# Patient Record
Sex: FEMALE | Race: WHITE | Employment: OTHER | URBAN - METROPOLITAN AREA
[De-identification: names, ages, dates, MRNs, and addresses within clinical notes are randomized per-mention and may not be internally consistent; named-entity substitution may affect disease eponyms.]

---

## 2017-01-10 ENCOUNTER — HOSPITAL ENCOUNTER (OUTPATIENT)
Dept: LAB | Age: 72
Discharge: HOME | End: 2017-01-10
Payer: MEDICARE

## 2017-01-10 DIAGNOSIS — C50.919 MALIGNANT NEOPLASM OF FEMALE BREAST, UNSPECIFIED LATERALITY, UNSPECIFIED SITE OF BREAST: ICD-10-CM

## 2017-01-10 LAB
ALBUMIN SERPL BCP-MCNC: 3.4 G/DL (ref 3.2–4.6)
ALBUMIN/GLOB SERPL: 0.9 {RATIO} (ref 1.2–3.5)
ALP SERPL-CCNC: 117 U/L (ref 50–136)
ALT SERPL-CCNC: 18 U/L (ref 12–65)
ANION GAP BLD CALC-SCNC: 5 MMOL/L (ref 7–16)
AST SERPL W P-5'-P-CCNC: 16 U/L (ref 15–37)
BASOPHILS # BLD AUTO: 0.1 K/UL (ref 0–0.2)
BASOPHILS # BLD: 2 % (ref 0–2)
BILIRUB SERPL-MCNC: 0.3 MG/DL (ref 0.2–1.1)
BUN SERPL-MCNC: 24 MG/DL (ref 8–23)
CALCIUM SERPL-MCNC: 7.6 MG/DL (ref 8.3–10.4)
CHLORIDE SERPL-SCNC: 108 MMOL/L (ref 98–107)
CO2 SERPL-SCNC: 26 MMOL/L (ref 23–32)
CREAT SERPL-MCNC: 1.61 MG/DL (ref 0.6–1)
DIFFERENTIAL METHOD BLD: ABNORMAL
EOSINOPHIL # BLD: 0 K/UL (ref 0–0.8)
EOSINOPHIL NFR BLD: 1 % (ref 0.5–7.8)
ERYTHROCYTE [DISTWIDTH] IN BLOOD BY AUTOMATED COUNT: 18.4 % (ref 11.9–14.6)
GLOBULIN SER CALC-MCNC: 3.6 G/DL (ref 2.3–3.5)
GLUCOSE SERPL-MCNC: 88 MG/DL (ref 65–100)
HCT VFR BLD AUTO: 28 % (ref 35.8–46.3)
HGB BLD-MCNC: 9.1 G/DL (ref 11.7–15.4)
LYMPHOCYTES # BLD AUTO: 29 % (ref 13–44)
LYMPHOCYTES # BLD: 1 K/UL (ref 0.5–4.6)
MAGNESIUM SERPL-MCNC: 2.2 MG/DL (ref 1.8–2.4)
MCH RBC QN AUTO: 33.1 PG (ref 26.1–32.9)
MCHC RBC AUTO-ENTMCNC: 32.5 G/DL (ref 31.4–35)
MCV RBC AUTO: 101.8 FL (ref 79.6–97.8)
MONOCYTES # BLD: 0.3 K/UL (ref 0.1–1.3)
MONOCYTES NFR BLD AUTO: 10 % (ref 4–12)
NEUTS SEG # BLD: 1.9 K/UL (ref 1.7–8.2)
NEUTS SEG NFR BLD AUTO: 58 % (ref 43–78)
NRBC # BLD: 0 K/UL (ref 0–0.2)
PLATELET # BLD AUTO: 216 K/UL (ref 150–450)
PMV BLD AUTO: 9.4 FL (ref 10.8–14.1)
POTASSIUM SERPL-SCNC: 4.3 MMOL/L (ref 3.5–5.1)
PROT SERPL-MCNC: 7 G/DL (ref 6.3–8.2)
RBC # BLD AUTO: 2.75 M/UL (ref 4.05–5.25)
SODIUM SERPL-SCNC: 139 MMOL/L (ref 136–145)
WBC # BLD AUTO: 3.3 K/UL (ref 4.3–11.1)

## 2017-01-10 PROCEDURE — 85025 COMPLETE CBC W/AUTO DIFF WBC: CPT | Performed by: INTERNAL MEDICINE

## 2017-01-10 PROCEDURE — 83735 ASSAY OF MAGNESIUM: CPT | Performed by: INTERNAL MEDICINE

## 2017-01-10 PROCEDURE — 36415 COLL VENOUS BLD VENIPUNCTURE: CPT | Performed by: INTERNAL MEDICINE

## 2017-01-10 PROCEDURE — 80053 COMPREHEN METABOLIC PANEL: CPT | Performed by: INTERNAL MEDICINE

## 2017-01-23 ENCOUNTER — HOSPITAL ENCOUNTER (OUTPATIENT)
Dept: INFUSION THERAPY | Age: 72
Discharge: HOME | End: 2017-01-23
Payer: MEDICARE

## 2017-01-23 ENCOUNTER — HOSPITAL ENCOUNTER (OUTPATIENT)
Dept: LAB | Age: 72
Discharge: HOME | End: 2017-01-23
Payer: MEDICARE

## 2017-01-23 ENCOUNTER — DOCUMENTATION ONLY (OUTPATIENT)
Dept: HEMATOLOGY | Age: 72
End: 2017-01-23

## 2017-01-23 DIAGNOSIS — C79.51 MALIGNANT NEOPLASM METASTATIC TO BONE (HCC): ICD-10-CM

## 2017-01-23 LAB
ALBUMIN SERPL BCP-MCNC: 3.4 G/DL (ref 3.2–4.6)
ALBUMIN/GLOB SERPL: 1 {RATIO} (ref 1.2–3.5)
ALP SERPL-CCNC: 109 U/L (ref 50–136)
ALT SERPL-CCNC: 14 U/L (ref 12–65)
ANION GAP BLD CALC-SCNC: 7 MMOL/L (ref 7–16)
AST SERPL W P-5'-P-CCNC: 17 U/L (ref 15–37)
BASOPHILS # BLD AUTO: 0 K/UL (ref 0–0.2)
BASOPHILS # BLD: 1 % (ref 0–2)
BILIRUB SERPL-MCNC: 0.3 MG/DL (ref 0.2–1.1)
BUN SERPL-MCNC: 21 MG/DL (ref 8–23)
CALCIUM SERPL-MCNC: 6.9 MG/DL (ref 8.3–10.4)
CHLORIDE SERPL-SCNC: 110 MMOL/L (ref 98–107)
CO2 SERPL-SCNC: 23 MMOL/L (ref 23–32)
CREAT SERPL-MCNC: 2.29 MG/DL (ref 0.6–1)
DIFFERENTIAL METHOD BLD: ABNORMAL
EOSINOPHIL # BLD: 0.1 K/UL (ref 0–0.8)
EOSINOPHIL NFR BLD: 2 % (ref 0.5–7.8)
ERYTHROCYTE [DISTWIDTH] IN BLOOD BY AUTOMATED COUNT: 15.9 % (ref 11.9–14.6)
GLOBULIN SER CALC-MCNC: 3.4 G/DL (ref 2.3–3.5)
GLUCOSE SERPL-MCNC: 84 MG/DL (ref 65–100)
HCT VFR BLD AUTO: 26.6 % (ref 35.8–46.3)
HGB BLD-MCNC: 8.5 G/DL (ref 11.7–15.4)
LYMPHOCYTES # BLD AUTO: 23 % (ref 13–44)
LYMPHOCYTES # BLD: 0.6 K/UL (ref 0.5–4.6)
MCH RBC QN AUTO: 33.5 PG (ref 26.1–32.9)
MCHC RBC AUTO-ENTMCNC: 32 G/DL (ref 31.4–35)
MCV RBC AUTO: 104.7 FL (ref 79.6–97.8)
MONOCYTES # BLD: 0.1 K/UL (ref 0.1–1.3)
MONOCYTES NFR BLD AUTO: 4 % (ref 4–12)
NEUTS SEG # BLD: 1.8 K/UL (ref 1.7–8.2)
NEUTS SEG NFR BLD AUTO: 70 % (ref 43–78)
NRBC # BLD: 0 K/UL (ref 0–0.2)
PLATELET # BLD AUTO: 192 K/UL (ref 150–450)
PMV BLD AUTO: 9.1 FL (ref 10.8–14.1)
POTASSIUM SERPL-SCNC: 4.3 MMOL/L (ref 3.5–5.1)
PROT SERPL-MCNC: 6.8 G/DL (ref 6.3–8.2)
RBC # BLD AUTO: 2.54 M/UL (ref 4.05–5.25)
SODIUM SERPL-SCNC: 140 MMOL/L (ref 136–145)
WBC # BLD AUTO: 2.5 K/UL (ref 4.3–11.1)

## 2017-01-23 PROCEDURE — 74011250636 HC RX REV CODE- 250/636: Performed by: NURSE PRACTITIONER

## 2017-01-23 PROCEDURE — 36415 COLL VENOUS BLD VENIPUNCTURE: CPT | Performed by: NURSE PRACTITIONER

## 2017-01-23 PROCEDURE — 96372 THER/PROPH/DIAG INJ SC/IM: CPT

## 2017-01-23 PROCEDURE — 85025 COMPLETE CBC W/AUTO DIFF WBC: CPT | Performed by: NURSE PRACTITIONER

## 2017-01-23 PROCEDURE — 80053 COMPREHEN METABOLIC PANEL: CPT | Performed by: NURSE PRACTITIONER

## 2017-01-23 RX ORDER — CALCIUM CARBONATE 500(1250)
2000 TABLET ORAL ONCE
Status: DISCONTINUED | OUTPATIENT
Start: 2017-01-23 | End: 2017-01-23

## 2017-01-23 RX ADMIN — DENOSUMAB 120 MG: 120 INJECTION SUBCUTANEOUS at 12:09

## 2017-01-23 NOTE — PROGRESS NOTES
Ms. Elvin Mars was approved for a $5,000 nick through [facility]) to cover patient responsibility for Xgeva injections. Effective dates for nick are from 7/27/16 - 1/23/18. Sallie Olivares information will be scanned into EPIC. Nick info given to Nidia Noriega for billing.

## 2017-01-23 NOTE — PROGRESS NOTES
Arrived to the [facility]. Xgeva completed. Patient tolerated well. Any issues or concerns during appointment: MD office ordered IV Calcium. Pt unable to stay for infusion. Notified Marisa JOSEPH at office. Per Marisa JOSEPH office to call in PO calcium. Office notified patient. Patient aware of next [facility]  appointment on 2-21-17 (date) at 12:45 (time). Discharged via ambulatory.

## 2017-02-21 ENCOUNTER — HOSPITAL ENCOUNTER (OUTPATIENT)
Dept: LAB | Age: 72
Discharge: HOME | End: 2017-02-21
Payer: MEDICARE

## 2017-02-21 ENCOUNTER — HOSPITAL ENCOUNTER (OUTPATIENT)
Dept: INFUSION THERAPY | Age: 72
Discharge: HOME | End: 2017-02-21
Payer: MEDICARE

## 2017-02-21 DIAGNOSIS — C79.51 MALIGNANT NEOPLASM METASTATIC TO BONE (HCC): ICD-10-CM

## 2017-02-21 DIAGNOSIS — C50.919 MALIGNANT NEOPLASM OF FEMALE BREAST, UNSPECIFIED LATERALITY, UNSPECIFIED SITE OF BREAST: ICD-10-CM

## 2017-02-21 LAB
ALBUMIN SERPL BCP-MCNC: 3.4 G/DL (ref 3.2–4.6)
ALBUMIN/GLOB SERPL: 1.1 {RATIO} (ref 1.2–3.5)
ALP SERPL-CCNC: 110 U/L (ref 50–136)
ALT SERPL-CCNC: 18 U/L (ref 12–65)
ANION GAP BLD CALC-SCNC: 8 MMOL/L (ref 7–16)
AST SERPL W P-5'-P-CCNC: 18 U/L (ref 15–37)
BASOPHILS # BLD AUTO: 0 K/UL (ref 0–0.2)
BASOPHILS # BLD: 2 % (ref 0–2)
BILIRUB SERPL-MCNC: 0.3 MG/DL (ref 0.2–1.1)
BUN SERPL-MCNC: 22 MG/DL (ref 8–23)
CALCIUM SERPL-MCNC: 7.7 MG/DL (ref 8.3–10.4)
CHLORIDE SERPL-SCNC: 110 MMOL/L (ref 98–107)
CO2 SERPL-SCNC: 24 MMOL/L (ref 23–32)
CREAT SERPL-MCNC: 2.14 MG/DL (ref 0.6–1)
DIFFERENTIAL METHOD BLD: ABNORMAL
EOSINOPHIL # BLD: 0 K/UL (ref 0–0.8)
EOSINOPHIL NFR BLD: 1 % (ref 0.5–7.8)
ERYTHROCYTE [DISTWIDTH] IN BLOOD BY AUTOMATED COUNT: 15.6 % (ref 11.9–14.6)
GLOBULIN SER CALC-MCNC: 3.2 G/DL (ref 2.3–3.5)
GLUCOSE SERPL-MCNC: 92 MG/DL (ref 65–100)
HCT VFR BLD AUTO: 25.9 % (ref 35.8–46.3)
HGB BLD-MCNC: 8.2 G/DL (ref 11.7–15.4)
LYMPHOCYTES # BLD AUTO: 30 % (ref 13–44)
LYMPHOCYTES # BLD: 0.6 K/UL (ref 0.5–4.6)
MAGNESIUM SERPL-MCNC: 2.2 MG/DL (ref 1.8–2.4)
MCH RBC QN AUTO: 34.3 PG (ref 26.1–32.9)
MCHC RBC AUTO-ENTMCNC: 31.7 G/DL (ref 31.4–35)
MCV RBC AUTO: 108.4 FL (ref 79.6–97.8)
MONOCYTES # BLD: 0.1 K/UL (ref 0.1–1.3)
MONOCYTES NFR BLD AUTO: 5 % (ref 4–12)
NEUTS SEG # BLD: 1.2 K/UL (ref 1.7–8.2)
NEUTS SEG NFR BLD AUTO: 63 % (ref 43–78)
NRBC # BLD: 0 K/UL (ref 0–0.2)
NRBC BLD-RTO: 0 PER 100 WBC (ref 0–2)
PLATELET # BLD AUTO: 221 K/UL (ref 150–450)
PMV BLD AUTO: 9.3 FL (ref 10.8–14.1)
POTASSIUM SERPL-SCNC: 4.5 MMOL/L (ref 3.5–5.1)
PROT SERPL-MCNC: 6.6 G/DL (ref 6.3–8.2)
RBC # BLD AUTO: 2.39 M/UL (ref 4.05–5.25)
SODIUM SERPL-SCNC: 142 MMOL/L (ref 136–145)
WBC # BLD AUTO: 1.9 K/UL (ref 4.3–11.1)

## 2017-02-21 PROCEDURE — 74011250636 HC RX REV CODE- 250/636: Performed by: INTERNAL MEDICINE

## 2017-02-21 PROCEDURE — 85025 COMPLETE CBC W/AUTO DIFF WBC: CPT | Performed by: INTERNAL MEDICINE

## 2017-02-21 PROCEDURE — 83735 ASSAY OF MAGNESIUM: CPT | Performed by: INTERNAL MEDICINE

## 2017-02-21 PROCEDURE — 80053 COMPREHEN METABOLIC PANEL: CPT | Performed by: INTERNAL MEDICINE

## 2017-02-21 PROCEDURE — 96372 THER/PROPH/DIAG INJ SC/IM: CPT

## 2017-02-21 PROCEDURE — 36415 COLL VENOUS BLD VENIPUNCTURE: CPT | Performed by: INTERNAL MEDICINE

## 2017-02-21 RX ADMIN — DENOSUMAB 120 MG: 120 INJECTION SUBCUTANEOUS at 14:40

## 2017-03-21 ENCOUNTER — HOSPITAL ENCOUNTER (OUTPATIENT)
Dept: INFUSION THERAPY | Age: 72
Discharge: HOME | End: 2017-03-21
Payer: MEDICARE

## 2017-03-21 ENCOUNTER — HOSPITAL ENCOUNTER (OUTPATIENT)
Dept: LAB | Age: 72
Discharge: HOME | End: 2017-03-21
Payer: MEDICARE

## 2017-03-21 DIAGNOSIS — C79.51 MALIGNANT NEOPLASM METASTATIC TO BONE (HCC): ICD-10-CM

## 2017-03-21 DIAGNOSIS — C50.919 INFILTRATING DUCTAL CARCINOMA OF FEMALE BREAST, UNSPECIFIED LATERALITY (HCC): ICD-10-CM

## 2017-03-21 LAB
ALBUMIN SERPL BCP-MCNC: 3.6 G/DL (ref 3.2–4.6)
ALBUMIN/GLOB SERPL: 1.1 {RATIO} (ref 1.2–3.5)
ALP SERPL-CCNC: 109 U/L (ref 50–136)
ALT SERPL-CCNC: 15 U/L (ref 12–65)
ANION GAP BLD CALC-SCNC: 7 MMOL/L (ref 7–16)
AST SERPL W P-5'-P-CCNC: 20 U/L (ref 15–37)
BASOPHILS # BLD AUTO: 0 K/UL (ref 0–0.2)
BASOPHILS # BLD: 1 % (ref 0–2)
BILIRUB SERPL-MCNC: 0.2 MG/DL (ref 0.2–1.1)
BUN SERPL-MCNC: 28 MG/DL (ref 8–23)
CALCIUM SERPL-MCNC: 7.2 MG/DL (ref 8.3–10.4)
CHLORIDE SERPL-SCNC: 113 MMOL/L (ref 98–107)
CO2 SERPL-SCNC: 23 MMOL/L (ref 23–32)
CREAT SERPL-MCNC: 2.41 MG/DL (ref 0.6–1)
DIFFERENTIAL METHOD BLD: ABNORMAL
EOSINOPHIL # BLD: 0 K/UL (ref 0–0.8)
EOSINOPHIL NFR BLD: 1 % (ref 0.5–7.8)
ERYTHROCYTE [DISTWIDTH] IN BLOOD BY AUTOMATED COUNT: 15.5 % (ref 11.9–14.6)
GLOBULIN SER CALC-MCNC: 3.3 G/DL (ref 2.3–3.5)
GLUCOSE SERPL-MCNC: 82 MG/DL (ref 65–100)
HCT VFR BLD AUTO: 27.1 % (ref 35.8–46.3)
HGB BLD-MCNC: 8.8 G/DL (ref 11.7–15.4)
LYMPHOCYTES # BLD AUTO: 32 % (ref 13–44)
LYMPHOCYTES # BLD: 0.6 K/UL (ref 0.5–4.6)
MAGNESIUM SERPL-MCNC: 2.2 MG/DL (ref 1.8–2.4)
MCH RBC QN AUTO: 34.9 PG (ref 26.1–32.9)
MCHC RBC AUTO-ENTMCNC: 32.5 G/DL (ref 31.4–35)
MCV RBC AUTO: 107.5 FL (ref 79.6–97.8)
MONOCYTES # BLD: 0.1 K/UL (ref 0.1–1.3)
MONOCYTES NFR BLD AUTO: 4 % (ref 4–12)
NEUTS SEG # BLD: 1.1 K/UL (ref 1.7–8.2)
NEUTS SEG NFR BLD AUTO: 61 % (ref 43–78)
NRBC # BLD: 0 K/UL (ref 0–0.2)
PLATELET # BLD AUTO: 203 K/UL (ref 150–450)
PMV BLD AUTO: 9.5 FL (ref 10.8–14.1)
POTASSIUM SERPL-SCNC: 5.2 MMOL/L (ref 3.5–5.1)
PROT SERPL-MCNC: 6.9 G/DL (ref 6.3–8.2)
RBC # BLD AUTO: 2.52 M/UL (ref 4.05–5.25)
SODIUM SERPL-SCNC: 143 MMOL/L (ref 136–145)
WBC # BLD AUTO: 1.9 K/UL (ref 4.3–11.1)

## 2017-03-21 PROCEDURE — 80053 COMPREHEN METABOLIC PANEL: CPT | Performed by: NURSE PRACTITIONER

## 2017-03-21 PROCEDURE — 96372 THER/PROPH/DIAG INJ SC/IM: CPT

## 2017-03-21 PROCEDURE — 74011250636 HC RX REV CODE- 250/636: Performed by: NURSE PRACTITIONER

## 2017-03-21 PROCEDURE — 36415 COLL VENOUS BLD VENIPUNCTURE: CPT | Performed by: NURSE PRACTITIONER

## 2017-03-21 PROCEDURE — 83735 ASSAY OF MAGNESIUM: CPT | Performed by: NURSE PRACTITIONER

## 2017-03-21 PROCEDURE — 85025 COMPLETE CBC W/AUTO DIFF WBC: CPT | Performed by: NURSE PRACTITIONER

## 2017-03-21 RX ADMIN — DENOSUMAB 120 MG: 120 INJECTION SUBCUTANEOUS at 11:41

## 2017-03-21 NOTE — PROGRESS NOTES
Xgeva injection given per left arm per order Corrected Calcium 7.52 today. Patient taking oral Calcium supplements as per order. Directed to push oral hydration 2-3 liters daily. Note Creatinine 2.41 today. Reports voiding without difficulty. Directed to notify MD for difficulty urinating or inability to push oral hydration. Verbalizes understanding. Patient discharged via w/c accompanied by family. Instructed to notify physician of any problems, questions or concerns. Allowed opportunity for patient/family to ask questions. Verbalized understanding. Next appointment is 04/18/17 at 200 with lab and OV prior.

## 2017-03-21 NOTE — PROGRESS NOTES
Problem: Knowledge Deficit  Goal: *Verbalizes understanding of procedures and medications  Outcome: Progressing Towards Goal  Reviewed Xgeva injection with patient.

## 2017-03-31 VITALS — OXYGEN SATURATION: 99 %

## 2017-04-18 ENCOUNTER — HOSPITAL ENCOUNTER (OUTPATIENT)
Dept: LAB | Age: 72
Discharge: HOME | End: 2017-04-18
Payer: MEDICARE

## 2017-04-18 ENCOUNTER — HOSPITAL ENCOUNTER (OUTPATIENT)
Dept: INFUSION THERAPY | Age: 72
Discharge: HOME | End: 2017-04-18
Payer: MEDICARE

## 2017-04-18 VITALS
DIASTOLIC BLOOD PRESSURE: 52 MMHG | RESPIRATION RATE: 18 BRPM | HEART RATE: 74 BPM | WEIGHT: 208.2 LBS | BODY MASS INDEX: 29.04 KG/M2 | OXYGEN SATURATION: 99 % | SYSTOLIC BLOOD PRESSURE: 131 MMHG | TEMPERATURE: 98.1 F

## 2017-04-18 DIAGNOSIS — D64.9 ANEMIA, UNSPECIFIED TYPE: ICD-10-CM

## 2017-04-18 DIAGNOSIS — C79.51 MALIGNANT NEOPLASM METASTATIC TO BONE (HCC): ICD-10-CM

## 2017-04-18 DIAGNOSIS — C50.919 MALIGNANT NEOPLASM OF FEMALE BREAST, UNSPECIFIED LATERALITY, UNSPECIFIED SITE OF BREAST: ICD-10-CM

## 2017-04-18 LAB
ALBUMIN SERPL BCP-MCNC: 3.5 G/DL (ref 3.2–4.6)
ALBUMIN/GLOB SERPL: 1.1 {RATIO}
ALP SERPL-CCNC: 87 U/L (ref 50–136)
ALT SERPL-CCNC: 18 U/L (ref 12–65)
ANION GAP BLD CALC-SCNC: 7 MMOL/L
AST SERPL W P-5'-P-CCNC: 17 U/L (ref 15–37)
BASOPHILS # BLD AUTO: 0 K/UL (ref 0–0.2)
BASOPHILS # BLD: 1 % (ref 0–2)
BILIRUB SERPL-MCNC: 0.4 MG/DL (ref 0.2–1.1)
BUN SERPL-MCNC: 28 MG/DL (ref 8–23)
CALCIUM SERPL-MCNC: 7.4 MG/DL (ref 8.3–10.4)
CHLORIDE SERPL-SCNC: 110 MMOL/L (ref 98–107)
CO2 SERPL-SCNC: 24 MMOL/L (ref 21–32)
CREAT SERPL-MCNC: 2.1 MG/DL (ref 0.6–1)
DIFFERENTIAL METHOD BLD: ABNORMAL
EOSINOPHIL # BLD: 0 K/UL (ref 0–0.8)
EOSINOPHIL NFR BLD: 1 % (ref 0.5–7.8)
ERYTHROCYTE [DISTWIDTH] IN BLOOD BY AUTOMATED COUNT: 15 % (ref 11.9–14.6)
FERRITIN SERPL-MCNC: 224 NG/ML (ref 8–388)
FOLATE SERPL-MCNC: 16.2 NG/ML (ref 3.1–17.5)
GLOBULIN SER CALC-MCNC: 3.3 G/DL
GLUCOSE SERPL-MCNC: 90 MG/DL (ref 65–100)
HAPTOGLOB SERPL-MCNC: 123 MG/DL (ref 30–200)
HCT VFR BLD AUTO: 25.5 % (ref 35.8–46.3)
HGB BLD-MCNC: 8.4 G/DL (ref 11.7–15.4)
HGB RETIC QN AUTO: 37 PG (ref 29–35)
IMM RETICS NFR: 20.7 % (ref 3–15.9)
IRON SATN MFR SERPL: 23 %
IRON SERPL-MCNC: 54 UG/DL (ref 35–150)
LDH SERPL L TO P-CCNC: 194 U/L (ref 110–210)
LYMPHOCYTES # BLD AUTO: 32 % (ref 13–44)
LYMPHOCYTES # BLD: 0.7 K/UL (ref 0.5–4.6)
MAGNESIUM SERPL-MCNC: 2.3 MG/DL (ref 1.8–2.4)
MCH RBC QN AUTO: 35.4 PG (ref 26.1–32.9)
MCHC RBC AUTO-ENTMCNC: 32.9 G/DL (ref 31.4–35)
MCV RBC AUTO: 107.6 FL (ref 79.6–97.8)
MONOCYTES # BLD: 0.1 K/UL (ref 0.1–1.3)
MONOCYTES NFR BLD AUTO: 5 % (ref 4–12)
NEUTS SEG # BLD: 1.3 K/UL (ref 1.7–8.2)
NEUTS SEG NFR BLD AUTO: 61 % (ref 43–78)
NRBC # BLD: 0 K/UL (ref 0–0.2)
PLATELET # BLD AUTO: 190 K/UL (ref 150–450)
PMV BLD AUTO: 9.2 FL (ref 10.8–14.1)
POTASSIUM SERPL-SCNC: 4.6 MMOL/L (ref 3.5–5.1)
PROT SERPL-MCNC: 6.8 G/DL (ref 6.3–8.2)
RBC # BLD AUTO: 2.37 M/UL (ref 4.05–5.25)
RETICS # AUTO: 0.05 M/UL (ref 0.03–0.1)
RETICS/RBC NFR AUTO: 2 % (ref 0.3–2)
SODIUM SERPL-SCNC: 141 MMOL/L (ref 136–145)
TIBC SERPL-MCNC: 233 UG/DL (ref 250–450)
TSH SERPL DL<=0.005 MIU/L-ACNC: 1.01 UIU/ML (ref 0.36–3.74)
VIT B12 SERPL-MCNC: 334 PG/ML (ref 193–986)
WBC # BLD AUTO: 2.1 K/UL (ref 4.3–11.1)

## 2017-04-18 PROCEDURE — 36415 COLL VENOUS BLD VENIPUNCTURE: CPT | Performed by: NURSE PRACTITIONER

## 2017-04-18 PROCEDURE — 83540 ASSAY OF IRON: CPT | Performed by: INTERNAL MEDICINE

## 2017-04-18 PROCEDURE — 82728 ASSAY OF FERRITIN: CPT | Performed by: INTERNAL MEDICINE

## 2017-04-18 PROCEDURE — 82668 ASSAY OF ERYTHROPOIETIN: CPT | Performed by: INTERNAL MEDICINE

## 2017-04-18 PROCEDURE — 96372 THER/PROPH/DIAG INJ SC/IM: CPT

## 2017-04-18 PROCEDURE — 82607 VITAMIN B-12: CPT | Performed by: INTERNAL MEDICINE

## 2017-04-18 PROCEDURE — 82746 ASSAY OF FOLIC ACID SERUM: CPT | Performed by: INTERNAL MEDICINE

## 2017-04-18 PROCEDURE — 83735 ASSAY OF MAGNESIUM: CPT | Performed by: INTERNAL MEDICINE

## 2017-04-18 PROCEDURE — 80053 COMPREHEN METABOLIC PANEL: CPT | Performed by: INTERNAL MEDICINE

## 2017-04-18 PROCEDURE — 84443 ASSAY THYROID STIM HORMONE: CPT | Performed by: INTERNAL MEDICINE

## 2017-04-18 PROCEDURE — 85046 RETICYTE/HGB CONCENTRATE: CPT | Performed by: INTERNAL MEDICINE

## 2017-04-18 PROCEDURE — 83615 LACTATE (LD) (LDH) ENZYME: CPT | Performed by: INTERNAL MEDICINE

## 2017-04-18 PROCEDURE — 74011250636 HC RX REV CODE- 250/636: Performed by: INTERNAL MEDICINE

## 2017-04-18 PROCEDURE — 83010 ASSAY OF HAPTOGLOBIN QUANT: CPT | Performed by: INTERNAL MEDICINE

## 2017-04-18 PROCEDURE — 85025 COMPLETE CBC W/AUTO DIFF WBC: CPT | Performed by: NURSE PRACTITIONER

## 2017-04-18 RX ADMIN — DENOSUMAB 120 MG: 120 INJECTION SUBCUTANEOUS at 11:48

## 2017-04-18 NOTE — PROGRESS NOTES
Pt arrived via wheelchair today at 1110, to receive Xgeva. Pt tolerated without difficulty. Patient discharged via wheelchair accompanied by family. Instructed to notify physician of any problems, questions or concerns. Allowed opportunity for patient/family to ask questions. Verbalized understanding. Next appointment is May 16 at 1400 with [facility].

## 2017-04-18 NOTE — PROGRESS NOTES
Problem: Knowledge Deficit  Goal: *Verbalizes understanding of procedures and medications  Outcome: Progressing Towards Goal  Verbalizes/demonstrates understanding of purpose/procedure/potential side effects of xgeva.

## 2017-04-19 LAB — EPO SERPL-ACNC: 43.4 MIU/ML (ref 2.6–18.5)

## 2017-05-16 ENCOUNTER — HOSPITAL ENCOUNTER (OUTPATIENT)
Dept: LAB | Age: 72
Discharge: HOME | End: 2017-05-16
Payer: MEDICARE

## 2017-05-16 ENCOUNTER — HOSPITAL ENCOUNTER (OUTPATIENT)
Dept: INFUSION THERAPY | Age: 72
Discharge: HOME | End: 2017-05-16
Payer: MEDICARE

## 2017-05-16 DIAGNOSIS — C79.51 MALIGNANT NEOPLASM METASTATIC TO BONE (HCC): ICD-10-CM

## 2017-05-16 LAB
ALBUMIN SERPL BCP-MCNC: 3.4 G/DL (ref 3.2–4.6)
ALBUMIN/GLOB SERPL: 1.1 {RATIO} (ref 1.2–3.5)
ALP SERPL-CCNC: 93 U/L (ref 50–136)
ALT SERPL-CCNC: 18 U/L (ref 12–65)
ANION GAP BLD CALC-SCNC: 6 MMOL/L (ref 7–16)
AST SERPL W P-5'-P-CCNC: 14 U/L (ref 15–37)
BASOPHILS # BLD AUTO: 0 K/UL (ref 0–0.2)
BASOPHILS # BLD: 2 % (ref 0–2)
BILIRUB SERPL-MCNC: 0.4 MG/DL (ref 0.2–1.1)
BUN SERPL-MCNC: 26 MG/DL (ref 8–23)
CALCIUM SERPL-MCNC: 7.7 MG/DL (ref 8.3–10.4)
CHLORIDE SERPL-SCNC: 111 MMOL/L (ref 98–107)
CO2 SERPL-SCNC: 25 MMOL/L (ref 21–32)
CREAT SERPL-MCNC: 2.34 MG/DL (ref 0.6–1)
DIFFERENTIAL METHOD BLD: ABNORMAL
EOSINOPHIL # BLD: 0 K/UL (ref 0–0.8)
EOSINOPHIL NFR BLD: 2 % (ref 0.5–7.8)
ERYTHROCYTE [DISTWIDTH] IN BLOOD BY AUTOMATED COUNT: 14.1 % (ref 11.9–14.6)
GLOBULIN SER CALC-MCNC: 3.1 G/DL (ref 2.3–3.5)
GLUCOSE SERPL-MCNC: 115 MG/DL (ref 65–100)
HCT VFR BLD AUTO: 25 % (ref 35.8–46.3)
HGB BLD-MCNC: 8.2 G/DL (ref 11.7–15.4)
LYMPHOCYTES # BLD AUTO: 34 % (ref 13–44)
LYMPHOCYTES # BLD: 0.7 K/UL (ref 0.5–4.6)
MAGNESIUM SERPL-MCNC: 2.2 MG/DL (ref 1.8–2.4)
MCH RBC QN AUTO: 36 PG (ref 26.1–32.9)
MCHC RBC AUTO-ENTMCNC: 32.8 G/DL (ref 31.4–35)
MCV RBC AUTO: 109.6 FL (ref 79.6–97.8)
MONOCYTES # BLD: 0.1 K/UL (ref 0.1–1.3)
MONOCYTES NFR BLD AUTO: 5 % (ref 4–12)
NEUTS SEG # BLD: 1.2 K/UL (ref 1.7–8.2)
NEUTS SEG NFR BLD AUTO: 57 % (ref 43–78)
NRBC # BLD: 0 K/UL (ref 0–0.2)
NRBC BLD-RTO: 0 PER 100 WBC (ref 0–2)
PLATELET # BLD AUTO: 219 K/UL (ref 150–450)
PLATELET COMMENTS,PCOM: ADEQUATE
PMV BLD AUTO: 9.1 FL (ref 10.8–14.1)
POTASSIUM SERPL-SCNC: 4.5 MMOL/L (ref 3.5–5.1)
PROT SERPL-MCNC: 6.5 G/DL (ref 6.3–8.2)
RBC # BLD AUTO: 2.28 M/UL (ref 4.05–5.25)
RBC MORPH BLD: ABNORMAL
SODIUM SERPL-SCNC: 142 MMOL/L (ref 136–145)
WBC # BLD AUTO: 2 K/UL (ref 4.3–11.1)
WBC MORPH BLD: ABNORMAL

## 2017-05-16 PROCEDURE — 36415 COLL VENOUS BLD VENIPUNCTURE: CPT | Performed by: INTERNAL MEDICINE

## 2017-05-16 PROCEDURE — 96372 THER/PROPH/DIAG INJ SC/IM: CPT

## 2017-05-16 PROCEDURE — 83735 ASSAY OF MAGNESIUM: CPT | Performed by: INTERNAL MEDICINE

## 2017-05-16 PROCEDURE — 74011250636 HC RX REV CODE- 250/636: Performed by: INTERNAL MEDICINE

## 2017-05-16 PROCEDURE — 80053 COMPREHEN METABOLIC PANEL: CPT | Performed by: INTERNAL MEDICINE

## 2017-05-16 PROCEDURE — 85025 COMPLETE CBC W/AUTO DIFF WBC: CPT | Performed by: INTERNAL MEDICINE

## 2017-05-16 RX ADMIN — DENOSUMAB 120 MG: 120 INJECTION SUBCUTANEOUS at 14:28

## 2017-05-16 NOTE — PROGRESS NOTES
Arrived to the [facility]. Xgeva completed. Patient tolerated well. Any issues or concerns during appointment: none. Patient aware of next infusion appointment on 06/14 at 1300 . Discharged via wheelchair.

## 2017-06-13 ENCOUNTER — APPOINTMENT (OUTPATIENT)
Dept: INFUSION THERAPY | Age: 72
End: 2017-06-13
Payer: MEDICARE

## 2017-06-14 ENCOUNTER — HOSPITAL ENCOUNTER (OUTPATIENT)
Dept: LAB | Age: 72
Discharge: HOME | End: 2017-06-14
Payer: MEDICARE

## 2017-06-14 ENCOUNTER — HOSPITAL ENCOUNTER (OUTPATIENT)
Dept: INFUSION THERAPY | Age: 72
Discharge: HOME | End: 2017-06-14
Payer: MEDICARE

## 2017-06-14 VITALS — WEIGHT: 211 LBS | BODY MASS INDEX: 29.43 KG/M2

## 2017-06-14 DIAGNOSIS — C79.51 MALIGNANT NEOPLASM METASTATIC TO BONE (HCC): ICD-10-CM

## 2017-06-14 DIAGNOSIS — D64.9 ANEMIA, UNSPECIFIED TYPE: ICD-10-CM

## 2017-06-14 DIAGNOSIS — R42 VERTIGO: ICD-10-CM

## 2017-06-14 DIAGNOSIS — R42 DIZZINESS: ICD-10-CM

## 2017-06-14 LAB
ALBUMIN SERPL BCP-MCNC: 3.2 G/DL (ref 3.2–4.6)
ALBUMIN/GLOB SERPL: 1.1 {RATIO} (ref 1.2–3.5)
ALP SERPL-CCNC: 85 U/L (ref 50–136)
ALT SERPL-CCNC: 17 U/L (ref 12–65)
ANION GAP BLD CALC-SCNC: 9 MMOL/L (ref 7–16)
AST SERPL W P-5'-P-CCNC: 14 U/L (ref 15–37)
BASOPHILS # BLD AUTO: 0.1 K/UL (ref 0–0.2)
BASOPHILS # BLD: 2 % (ref 0–2)
BILIRUB SERPL-MCNC: 0.2 MG/DL (ref 0.2–1.1)
BUN SERPL-MCNC: 31 MG/DL (ref 8–23)
CALCIUM SERPL-MCNC: 7.8 MG/DL (ref 8.3–10.4)
CHLORIDE SERPL-SCNC: 114 MMOL/L (ref 98–107)
CO2 SERPL-SCNC: 20 MMOL/L (ref 21–32)
CREAT SERPL-MCNC: 2.43 MG/DL (ref 0.6–1)
DIFFERENTIAL METHOD BLD: ABNORMAL
EOSINOPHIL # BLD: 0.1 K/UL (ref 0–0.8)
EOSINOPHIL NFR BLD: 2 % (ref 0.5–7.8)
ERYTHROCYTE [DISTWIDTH] IN BLOOD BY AUTOMATED COUNT: 13.3 % (ref 11.9–14.6)
GLOBULIN SER CALC-MCNC: 3 G/DL (ref 2.3–3.5)
GLUCOSE SERPL-MCNC: 101 MG/DL (ref 65–100)
HCT VFR BLD AUTO: 23.2 % (ref 35.8–46.3)
HGB BLD-MCNC: 7.6 G/DL (ref 11.7–15.4)
LYMPHOCYTES # BLD AUTO: 25 % (ref 13–44)
LYMPHOCYTES # BLD: 0.7 K/UL (ref 0.5–4.6)
MCH RBC QN AUTO: 35.8 PG (ref 26.1–32.9)
MCHC RBC AUTO-ENTMCNC: 32.8 G/DL (ref 31.4–35)
MCV RBC AUTO: 109.4 FL (ref 79.6–97.8)
MONOCYTES # BLD: 0.2 K/UL (ref 0.1–1.3)
MONOCYTES NFR BLD AUTO: 6 % (ref 4–12)
NEUTS SEG # BLD: 1.9 K/UL (ref 1.7–8.2)
NEUTS SEG NFR BLD AUTO: 66 % (ref 43–78)
NRBC # BLD: 0 K/UL (ref 0–0.2)
PLATELET # BLD AUTO: 183 K/UL (ref 150–450)
PMV BLD AUTO: 9.3 FL (ref 10.8–14.1)
POTASSIUM SERPL-SCNC: 5.4 MMOL/L (ref 3.5–5.1)
PROT SERPL-MCNC: 6.2 G/DL (ref 6.3–8.2)
RBC # BLD AUTO: 2.12 M/UL (ref 4.05–5.25)
SODIUM SERPL-SCNC: 143 MMOL/L (ref 136–145)
WBC # BLD AUTO: 2.9 K/UL (ref 4.3–11.1)

## 2017-06-14 PROCEDURE — 85025 COMPLETE CBC W/AUTO DIFF WBC: CPT | Performed by: INTERNAL MEDICINE

## 2017-06-14 PROCEDURE — 83921 ORGANIC ACID SINGLE QUANT: CPT | Performed by: INTERNAL MEDICINE

## 2017-06-14 PROCEDURE — 83090 ASSAY OF HOMOCYSTEINE: CPT | Performed by: INTERNAL MEDICINE

## 2017-06-14 PROCEDURE — 80053 COMPREHEN METABOLIC PANEL: CPT | Performed by: INTERNAL MEDICINE

## 2017-06-14 PROCEDURE — 36415 COLL VENOUS BLD VENIPUNCTURE: CPT | Performed by: INTERNAL MEDICINE

## 2017-06-14 PROCEDURE — 96372 THER/PROPH/DIAG INJ SC/IM: CPT

## 2017-06-14 PROCEDURE — 74011250636 HC RX REV CODE- 250/636: Performed by: INTERNAL MEDICINE

## 2017-06-14 RX ADMIN — DENOSUMAB 120 MG: 120 INJECTION SUBCUTANEOUS at 16:17

## 2017-06-14 NOTE — PROGRESS NOTES
Arrived to the [facility]. Xgeva injection completed. Patient tolerated well. Any issues or concerns during appointment: NO.  Patient aware of next infusion appointment on 07/13/17 (date) at 200 (time). Discharged via wheelchair with family.

## 2017-06-15 LAB — HCYS SERPL-SCNC: 16.9 UMOL/L (ref 0–15)

## 2017-06-17 LAB — METHYLMALONATE SERPL-SCNC: 367 NMOL/L (ref 0–378)

## 2017-06-27 ENCOUNTER — HOSPITAL ENCOUNTER (OUTPATIENT)
Dept: PET IMAGING | Age: 72
Discharge: HOME | End: 2017-06-27
Attending: INTERNAL MEDICINE
Payer: MEDICARE

## 2017-06-27 DIAGNOSIS — C79.51 MALIGNANT NEOPLASM METASTATIC TO BONE (HCC): ICD-10-CM

## 2017-06-27 DIAGNOSIS — D64.9 ANEMIA, UNSPECIFIED TYPE: ICD-10-CM

## 2017-06-27 DIAGNOSIS — R42 VERTIGO: ICD-10-CM

## 2017-06-27 DIAGNOSIS — R42 DIZZINESS: ICD-10-CM

## 2017-06-27 PROCEDURE — A9552 F18 FDG: HCPCS

## 2017-06-27 PROCEDURE — 74011636320 HC RX REV CODE- 636/320: Performed by: INTERNAL MEDICINE

## 2017-06-27 RX ORDER — SODIUM CHLORIDE 0.9 % (FLUSH) 0.9 %
10 SYRINGE (ML) INJECTION
Status: COMPLETED | OUTPATIENT
Start: 2017-06-27 | End: 2017-06-27

## 2017-06-27 RX ADMIN — Medication 20 ML: at 11:08

## 2017-06-27 RX ADMIN — DIATRIZOATE MEGLUMINE AND DIATRIZOATE SODIUM 10 ML: 660; 100 LIQUID ORAL; RECTAL at 11:08

## 2017-07-18 ENCOUNTER — HOSPITAL ENCOUNTER (OUTPATIENT)
Dept: ULTRASOUND IMAGING | Age: 72
Discharge: HOME | End: 2017-07-18
Attending: NURSE PRACTITIONER
Payer: MEDICARE

## 2017-07-18 ENCOUNTER — HOSPITAL ENCOUNTER (OUTPATIENT)
Dept: INFUSION THERAPY | Age: 72
Discharge: HOME | End: 2017-07-18
Payer: MEDICARE

## 2017-07-18 ENCOUNTER — HOSPITAL ENCOUNTER (OUTPATIENT)
Dept: LAB | Age: 72
Discharge: HOME | End: 2017-07-18
Payer: MEDICARE

## 2017-07-18 DIAGNOSIS — C50.919 INFILTRATING DUCTAL CARCINOMA OF FEMALE BREAST, UNSPECIFIED LATERALITY (HCC): ICD-10-CM

## 2017-07-18 DIAGNOSIS — E87.5 HYPERKALEMIA: ICD-10-CM

## 2017-07-18 DIAGNOSIS — M79.89 SWELLING OF LOWER EXTREMITY: ICD-10-CM

## 2017-07-18 DIAGNOSIS — C79.51 MALIGNANT NEOPLASM METASTATIC TO BONE (HCC): ICD-10-CM

## 2017-07-18 LAB
ALBUMIN SERPL BCP-MCNC: 3.5 G/DL (ref 3.2–4.6)
ALBUMIN/GLOB SERPL: 1 {RATIO} (ref 1.2–3.5)
ALP SERPL-CCNC: 84 U/L (ref 50–136)
ALT SERPL-CCNC: 18 U/L (ref 12–65)
ANION GAP BLD CALC-SCNC: 1 MMOL/L (ref 7–16)
AST SERPL W P-5'-P-CCNC: 17 U/L (ref 15–37)
BASOPHILS # BLD AUTO: 0 K/UL (ref 0–0.2)
BASOPHILS # BLD: 2 % (ref 0–2)
BILIRUB SERPL-MCNC: 0.3 MG/DL (ref 0.2–1.1)
BUN SERPL-MCNC: 28 MG/DL (ref 8–23)
CALCIUM SERPL-MCNC: 6.8 MG/DL (ref 8.3–10.4)
CHLORIDE SERPL-SCNC: 118 MMOL/L (ref 98–107)
CO2 SERPL-SCNC: 20 MMOL/L (ref 21–32)
CREAT SERPL-MCNC: 2.65 MG/DL (ref 0.6–1)
CREAT SERPL-MCNC: 2.7 MG/DL (ref 0.6–1)
DIFFERENTIAL METHOD BLD: ABNORMAL
EOSINOPHIL # BLD: 0 K/UL (ref 0–0.8)
EOSINOPHIL NFR BLD: 1 % (ref 0.5–7.8)
ERYTHROCYTE [DISTWIDTH] IN BLOOD BY AUTOMATED COUNT: 14.6 % (ref 11.9–14.6)
GLOBULIN SER CALC-MCNC: 3.5 G/DL (ref 2.3–3.5)
GLUCOSE SERPL-MCNC: 87 MG/DL (ref 65–100)
HCT VFR BLD AUTO: 24.9 % (ref 35.8–46.3)
HGB BLD-MCNC: 7.9 G/DL (ref 11.7–15.4)
LYMPHOCYTES # BLD AUTO: 27 % (ref 13–44)
LYMPHOCYTES # BLD: 0.5 K/UL (ref 0.5–4.6)
MCH RBC QN AUTO: 35.1 PG (ref 26.1–32.9)
MCHC RBC AUTO-ENTMCNC: 31.7 G/DL (ref 31.4–35)
MCV RBC AUTO: 110.7 FL (ref 79.6–97.8)
MONOCYTES # BLD: 0.1 K/UL (ref 0.1–1.3)
MONOCYTES NFR BLD AUTO: 5 % (ref 4–12)
NEUTS SEG # BLD: 1.1 K/UL (ref 1.7–8.2)
NEUTS SEG NFR BLD AUTO: 65 % (ref 43–78)
NRBC # BLD: 0 K/UL (ref 0–0.2)
PLATELET # BLD AUTO: 156 K/UL (ref 150–450)
PMV BLD AUTO: 9.3 FL (ref 10.8–14.1)
POTASSIUM SERPL-SCNC: 6.2 MMOL/L (ref 3.5–5.1)
POTASSIUM SERPL-SCNC: 6.5 MMOL/L (ref 3.5–5.1)
PROT SERPL-MCNC: 7 G/DL (ref 6.3–8.2)
RBC # BLD AUTO: 2.25 M/UL (ref 4.05–5.25)
SODIUM SERPL-SCNC: 139 MMOL/L (ref 136–145)
WBC # BLD AUTO: 1.6 K/UL (ref 4.3–11.1)

## 2017-07-18 PROCEDURE — 84132 ASSAY OF SERUM POTASSIUM: CPT | Performed by: NURSE PRACTITIONER

## 2017-07-18 PROCEDURE — 74011250637 HC RX REV CODE- 250/637: Performed by: NURSE PRACTITIONER

## 2017-07-18 PROCEDURE — 36415 COLL VENOUS BLD VENIPUNCTURE: CPT | Performed by: INTERNAL MEDICINE

## 2017-07-18 PROCEDURE — 96361 HYDRATE IV INFUSION ADD-ON: CPT

## 2017-07-18 PROCEDURE — 80053 COMPREHEN METABOLIC PANEL: CPT | Performed by: INTERNAL MEDICINE

## 2017-07-18 PROCEDURE — 96372 THER/PROPH/DIAG INJ SC/IM: CPT

## 2017-07-18 PROCEDURE — 85025 COMPLETE CBC W/AUTO DIFF WBC: CPT | Performed by: INTERNAL MEDICINE

## 2017-07-18 PROCEDURE — 74011250636 HC RX REV CODE- 250/636: Performed by: NURSE PRACTITIONER

## 2017-07-18 PROCEDURE — 96365 THER/PROPH/DIAG IV INF INIT: CPT

## 2017-07-18 RX ORDER — SODIUM POLYSTYRENE SULFONATE 15 G/60ML
30 SUSPENSION ORAL; RECTAL
Status: COMPLETED | OUTPATIENT
Start: 2017-07-18 | End: 2017-07-18

## 2017-07-18 RX ADMIN — DENOSUMAB 120 MG: 120 INJECTION SUBCUTANEOUS at 16:20

## 2017-07-18 RX ADMIN — SODIUM CHLORIDE 500 ML: 900 INJECTION, SOLUTION INTRAVENOUS at 13:00

## 2017-07-18 RX ADMIN — SODIUM POLYSTYRENE SULFONATE 30 G: 15 SUSPENSION ORAL; RECTAL at 13:00

## 2017-07-18 RX ADMIN — CALCIUM GLUCONATE 2 G: 94 INJECTION, SOLUTION INTRAVENOUS at 16:14

## 2017-07-18 RX ADMIN — SODIUM POLYSTYRENE SULFONATE 30 G: 15 SUSPENSION ORAL; RECTAL at 17:20

## 2017-07-18 NOTE — PROGRESS NOTES
Arrived to the [facility] via [facility].   500ccns bolus, kayexalate, xgeva and calcium glucouate completed after 2nd lab draw pt got another dose of kayexalate per Vance Palafox NP. Patient tolerated well. Any issues or concerns during appointment: no.  Patient aware of next infusion appointment on  7/25 at 1500. Discharged to home via [facility] with family.

## 2017-07-25 ENCOUNTER — HOSPITAL ENCOUNTER (OUTPATIENT)
Dept: LAB | Age: 72
Discharge: HOME | End: 2017-07-25
Payer: MEDICARE

## 2017-07-25 DIAGNOSIS — C50.919 INFILTRATING DUCTAL CARCINOMA OF FEMALE BREAST, UNSPECIFIED LATERALITY (HCC): ICD-10-CM

## 2017-07-25 PROBLEM — N18.9 ANEMIA DUE TO CHRONIC KIDNEY DISEASE TREATED WITH ERYTHROPOIETIN: Status: ACTIVE | Noted: 2017-07-25

## 2017-07-25 PROBLEM — D63.1 ANEMIA DUE TO CHRONIC KIDNEY DISEASE TREATED WITH ERYTHROPOIETIN: Status: ACTIVE | Noted: 2017-07-25

## 2017-07-25 LAB
ALBUMIN SERPL BCP-MCNC: 3.2 G/DL (ref 3.2–4.6)
ALBUMIN/GLOB SERPL: 1 {RATIO} (ref 1.2–3.5)
ALP SERPL-CCNC: 77 U/L (ref 50–136)
ALT SERPL-CCNC: 13 U/L (ref 12–65)
ANION GAP BLD CALC-SCNC: 7 MMOL/L (ref 7–16)
AST SERPL W P-5'-P-CCNC: 13 U/L (ref 15–37)
BASOPHILS # BLD AUTO: 0 K/UL (ref 0–0.2)
BASOPHILS # BLD: 1 % (ref 0–2)
BILIRUB SERPL-MCNC: 0.4 MG/DL (ref 0.2–1.1)
BUN SERPL-MCNC: 21 MG/DL (ref 8–23)
CALCIUM SERPL-MCNC: 6.6 MG/DL (ref 8.3–10.4)
CHLORIDE SERPL-SCNC: 113 MMOL/L (ref 98–107)
CO2 SERPL-SCNC: 21 MMOL/L (ref 21–32)
CREAT SERPL-MCNC: 2.39 MG/DL (ref 0.6–1)
DIFFERENTIAL METHOD BLD: ABNORMAL
EOSINOPHIL # BLD: 0 K/UL (ref 0–0.8)
EOSINOPHIL NFR BLD: 2 % (ref 0.5–7.8)
ERYTHROCYTE [DISTWIDTH] IN BLOOD BY AUTOMATED COUNT: 15.5 % (ref 11.9–14.6)
FERRITIN SERPL-MCNC: 213 NG/ML (ref 8–388)
GLOBULIN SER CALC-MCNC: 3.2 G/DL (ref 2.3–3.5)
GLUCOSE SERPL-MCNC: 126 MG/DL (ref 65–100)
HCT VFR BLD AUTO: 21.5 % (ref 35.8–46.3)
HGB BLD-MCNC: 7.1 G/DL (ref 11.7–15.4)
LYMPHOCYTES # BLD AUTO: 37 % (ref 13–44)
LYMPHOCYTES # BLD: 0.5 K/UL (ref 0.5–4.6)
MCH RBC QN AUTO: 35.5 PG (ref 26.1–32.9)
MCHC RBC AUTO-ENTMCNC: 33 G/DL (ref 31.4–35)
MCV RBC AUTO: 107.5 FL (ref 79.6–97.8)
MONOCYTES # BLD: 0.1 K/UL (ref 0.1–1.3)
MONOCYTES NFR BLD AUTO: 6 % (ref 4–12)
NEUTS SEG # BLD: 0.8 K/UL (ref 1.7–8.2)
NEUTS SEG NFR BLD AUTO: 54 % (ref 43–78)
NRBC # BLD: 0.01 K/UL (ref 0–0.2)
PLATELET # BLD AUTO: 101 K/UL (ref 150–450)
PLATELET COMMENTS,PCOM: ABNORMAL
PMV BLD AUTO: 10 FL (ref 10.8–14.1)
POTASSIUM SERPL-SCNC: 4.4 MMOL/L (ref 3.5–5.1)
PROT SERPL-MCNC: 6.4 G/DL (ref 6.3–8.2)
RBC # BLD AUTO: 2 M/UL (ref 4.05–5.25)
RBC MORPH BLD: ABNORMAL
SODIUM SERPL-SCNC: 141 MMOL/L (ref 136–145)
WBC # BLD AUTO: 1.4 K/UL (ref 4.3–11.1)
WBC MORPH BLD: ABNORMAL

## 2017-07-25 PROCEDURE — 82728 ASSAY OF FERRITIN: CPT | Performed by: INTERNAL MEDICINE

## 2017-07-25 PROCEDURE — 36415 COLL VENOUS BLD VENIPUNCTURE: CPT | Performed by: INTERNAL MEDICINE

## 2017-07-25 PROCEDURE — 85025 COMPLETE CBC W/AUTO DIFF WBC: CPT | Performed by: INTERNAL MEDICINE

## 2017-07-25 PROCEDURE — 80053 COMPREHEN METABOLIC PANEL: CPT | Performed by: INTERNAL MEDICINE

## 2017-08-01 ENCOUNTER — APPOINTMENT (OUTPATIENT)
Dept: INFUSION THERAPY | Age: 72
End: 2017-08-01
Payer: MEDICARE

## 2017-08-02 ENCOUNTER — HOSPITAL ENCOUNTER (OUTPATIENT)
Dept: INFUSION THERAPY | Age: 72
Discharge: HOME | End: 2017-08-02
Payer: MEDICARE

## 2017-08-02 VITALS
WEIGHT: 208.4 LBS | HEART RATE: 77 BPM | RESPIRATION RATE: 18 BRPM | TEMPERATURE: 98.7 F | OXYGEN SATURATION: 94 % | DIASTOLIC BLOOD PRESSURE: 65 MMHG | SYSTOLIC BLOOD PRESSURE: 133 MMHG | BODY MASS INDEX: 29.07 KG/M2

## 2017-08-02 DIAGNOSIS — N18.9 ANEMIA DUE TO CHRONIC KIDNEY DISEASE TREATED WITH ERYTHROPOIETIN: ICD-10-CM

## 2017-08-02 DIAGNOSIS — C79.51 MALIGNANT NEOPLASM METASTATIC TO BONE (HCC): ICD-10-CM

## 2017-08-02 DIAGNOSIS — E87.5 HYPERKALEMIA: ICD-10-CM

## 2017-08-02 DIAGNOSIS — D63.1 ANEMIA DUE TO CHRONIC KIDNEY DISEASE TREATED WITH ERYTHROPOIETIN: ICD-10-CM

## 2017-08-02 LAB — HGB BLD-MCNC: 8 G/DL (ref 11.7–15.4)

## 2017-08-02 PROCEDURE — 36415 COLL VENOUS BLD VENIPUNCTURE: CPT | Performed by: INTERNAL MEDICINE

## 2017-08-02 PROCEDURE — 96372 THER/PROPH/DIAG INJ SC/IM: CPT

## 2017-08-02 PROCEDURE — 85018 HEMOGLOBIN: CPT | Performed by: INTERNAL MEDICINE

## 2017-08-02 PROCEDURE — 74011250636 HC RX REV CODE- 250/636: Performed by: INTERNAL MEDICINE

## 2017-08-02 RX ORDER — CYANOCOBALAMIN 1000 UG/ML
1000 INJECTION, SOLUTION INTRAMUSCULAR; SUBCUTANEOUS ONCE
Status: COMPLETED | OUTPATIENT
Start: 2017-08-02 | End: 2017-08-02

## 2017-08-02 RX ADMIN — CYANOCOBALAMIN 1000 MCG: 1000 INJECTION, SOLUTION INTRAMUSCULAR at 10:00

## 2017-08-02 RX ADMIN — ERYTHROPOIETIN 40000 UNITS: 40000 INJECTION, SOLUTION INTRAVENOUS; SUBCUTANEOUS at 10:02

## 2017-08-02 NOTE — PROGRESS NOTES
Problem: Knowledge Deficit  Goal: *Verbalizes understanding of procedures and medications  Outcome: Progressing Towards Goal  Verbalizes/demonstrates understanding of purpose/procedure/potential side effects of procrit/vitamin B-12.

## 2017-08-02 NOTE — PROGRESS NOTES
Pt arrived via wheelchair today at 0930, to receive Procrit/b-12. Pt tolerated without difficulty. Patient discharged via wheelchair accompanied by family. Instructed to notify physician of any problems, questions or concerns. Allowed opportunity for patient/family to ask questions. Verbalized understanding. Next appointment is August 15 at 1500 with Maria Esther Hagen.

## 2017-08-15 ENCOUNTER — HOSPITAL ENCOUNTER (OUTPATIENT)
Dept: LAB | Age: 72
Discharge: HOME | End: 2017-08-15
Payer: MEDICARE

## 2017-08-15 ENCOUNTER — HOSPITAL ENCOUNTER (OUTPATIENT)
Dept: INFUSION THERAPY | Age: 72
Discharge: HOME | End: 2017-08-15
Payer: MEDICARE

## 2017-08-15 DIAGNOSIS — N18.9 ANEMIA DUE TO CHRONIC KIDNEY DISEASE TREATED WITH ERYTHROPOIETIN: ICD-10-CM

## 2017-08-15 DIAGNOSIS — C79.51 MALIGNANT NEOPLASM METASTATIC TO BONE (HCC): ICD-10-CM

## 2017-08-15 DIAGNOSIS — D63.1 ANEMIA DUE TO CHRONIC KIDNEY DISEASE TREATED WITH ERYTHROPOIETIN: ICD-10-CM

## 2017-08-15 LAB
ALBUMIN SERPL BCP-MCNC: 3.5 G/DL (ref 3.2–4.6)
ALBUMIN/GLOB SERPL: 1.1 {RATIO} (ref 1.2–3.5)
ALP SERPL-CCNC: 79 U/L (ref 50–136)
ALT SERPL-CCNC: 13 U/L (ref 12–65)
ANION GAP BLD CALC-SCNC: 8 MMOL/L (ref 7–16)
AST SERPL W P-5'-P-CCNC: 12 U/L (ref 15–37)
BASOPHILS # BLD AUTO: 0.1 K/UL (ref 0–0.2)
BASOPHILS NFR BLD MANUAL: 4 % (ref 0–2)
BILIRUB SERPL-MCNC: 0.3 MG/DL (ref 0.2–1.1)
BUN SERPL-MCNC: 24 MG/DL (ref 8–23)
CALCIUM SERPL-MCNC: 7.5 MG/DL (ref 8.3–10.4)
CHLORIDE SERPL-SCNC: 108 MMOL/L (ref 98–107)
CO2 SERPL-SCNC: 22 MMOL/L (ref 21–32)
CREAT SERPL-MCNC: 2.4 MG/DL (ref 0.6–1)
DIFFERENTIAL METHOD BLD: ABNORMAL
ERYTHROCYTE [DISTWIDTH] IN BLOOD BY AUTOMATED COUNT: 15.7 % (ref 11.9–14.6)
GLOBULIN SER CALC-MCNC: 3.2 G/DL (ref 2.3–3.5)
GLUCOSE SERPL-MCNC: 143 MG/DL (ref 65–100)
HCT VFR BLD AUTO: 26.3 % (ref 35.8–46.3)
HGB BLD-MCNC: 8.5 G/DL (ref 11.7–15.4)
LYMPHOCYTES # BLD: 0.6 K/UL (ref 0.5–4.6)
LYMPHOCYTES NFR BLD MANUAL: 23 % (ref 16–44)
MCH RBC QN AUTO: 35.1 PG (ref 26.1–32.9)
MCHC RBC AUTO-ENTMCNC: 32.3 G/DL (ref 31.4–35)
MCV RBC AUTO: 108.7 FL (ref 79.6–97.8)
MONOCYTES # BLD: 0.1 K/UL (ref 0.1–1.3)
MONOCYTES NFR BLD MANUAL: 4 % (ref 3–9)
NEUTS SEG # BLD: 1.6 K/UL (ref 1.7–8.2)
NEUTS SEG NFR BLD MANUAL: 69 % (ref 47–75)
NRBC # BLD: 0.01 K/UL (ref 0–0.2)
PLATELET # BLD AUTO: 239 K/UL (ref 150–450)
PLATELET COMMENTS,PCOM: ADEQUATE
PMV BLD AUTO: 9.6 FL (ref 10.8–14.1)
POTASSIUM SERPL-SCNC: 3.7 MMOL/L (ref 3.5–5.1)
PROT SERPL-MCNC: 6.7 G/DL (ref 6.3–8.2)
RBC # BLD AUTO: 2.42 M/UL (ref 4.05–5.25)
RBC MORPH BLD: ABNORMAL
SODIUM SERPL-SCNC: 138 MMOL/L (ref 136–145)
WBC # BLD AUTO: 2.4 K/UL (ref 4.3–11.1)
WBC MORPH BLD: ABNORMAL

## 2017-08-15 PROCEDURE — 36415 COLL VENOUS BLD VENIPUNCTURE: CPT | Performed by: INTERNAL MEDICINE

## 2017-08-15 PROCEDURE — 74011250636 HC RX REV CODE- 250/636: Performed by: INTERNAL MEDICINE

## 2017-08-15 PROCEDURE — 80053 COMPREHEN METABOLIC PANEL: CPT | Performed by: INTERNAL MEDICINE

## 2017-08-15 PROCEDURE — 85025 COMPLETE CBC W/AUTO DIFF WBC: CPT | Performed by: INTERNAL MEDICINE

## 2017-08-15 PROCEDURE — 96372 THER/PROPH/DIAG INJ SC/IM: CPT

## 2017-08-15 RX ADMIN — DENOSUMAB 120 MG: 120 INJECTION SUBCUTANEOUS at 15:51

## 2017-08-15 RX ADMIN — ERYTHROPOIETIN 40000 UNITS: 40000 INJECTION, SOLUTION INTRAVENOUS; SUBCUTANEOUS at 15:48

## 2017-08-29 ENCOUNTER — HOSPITAL ENCOUNTER (OUTPATIENT)
Dept: INFUSION THERAPY | Age: 72
Discharge: HOME | End: 2017-08-29
Payer: MEDICARE

## 2017-08-29 VITALS
WEIGHT: 203 LBS | RESPIRATION RATE: 18 BRPM | HEART RATE: 84 BPM | DIASTOLIC BLOOD PRESSURE: 60 MMHG | BODY MASS INDEX: 28.31 KG/M2 | OXYGEN SATURATION: 98 % | TEMPERATURE: 97.9 F | SYSTOLIC BLOOD PRESSURE: 138 MMHG

## 2017-08-29 DIAGNOSIS — N18.9 ANEMIA DUE TO CHRONIC KIDNEY DISEASE TREATED WITH ERYTHROPOIETIN: ICD-10-CM

## 2017-08-29 DIAGNOSIS — D63.1 ANEMIA DUE TO CHRONIC KIDNEY DISEASE TREATED WITH ERYTHROPOIETIN: ICD-10-CM

## 2017-08-29 LAB
FERRITIN SERPL-MCNC: 227 NG/ML (ref 8–388)
HGB BLD-MCNC: 9.3 G/DL (ref 11.7–15.4)
IRON SATN MFR SERPL: 39 %
IRON SERPL-MCNC: 92 UG/DL (ref 35–150)
TIBC SERPL-MCNC: 237 UG/DL (ref 250–450)

## 2017-08-29 PROCEDURE — 96372 THER/PROPH/DIAG INJ SC/IM: CPT

## 2017-08-29 PROCEDURE — 82728 ASSAY OF FERRITIN: CPT | Performed by: INTERNAL MEDICINE

## 2017-08-29 PROCEDURE — 85018 HEMOGLOBIN: CPT | Performed by: INTERNAL MEDICINE

## 2017-08-29 PROCEDURE — 83540 ASSAY OF IRON: CPT | Performed by: INTERNAL MEDICINE

## 2017-08-29 PROCEDURE — 36415 COLL VENOUS BLD VENIPUNCTURE: CPT | Performed by: INTERNAL MEDICINE

## 2017-08-29 PROCEDURE — 74011250636 HC RX REV CODE- 250/636: Performed by: INTERNAL MEDICINE

## 2017-08-29 RX ORDER — CYANOCOBALAMIN 1000 UG/ML
1000 INJECTION, SOLUTION INTRAMUSCULAR; SUBCUTANEOUS ONCE
Status: COMPLETED | OUTPATIENT
Start: 2017-08-29 | End: 2017-08-29

## 2017-08-29 RX ADMIN — CYANOCOBALAMIN 1000 MCG: 1000 INJECTION, SOLUTION INTRAMUSCULAR at 15:03

## 2017-08-29 RX ADMIN — ERYTHROPOIETIN 40000 UNITS: 40000 INJECTION, SOLUTION INTRAVENOUS; SUBCUTANEOUS at 15:04

## 2017-08-29 NOTE — PROGRESS NOTES
Arrived to the [facility]. IM Vitamin B12 and Procrit SQ completed. Patient tolerated well. Any issues or concerns during appointment: NONE  Patient aware of next infusion appointment on 9/12 @ 1400 for labs then injection (1500). Discharged via wheelchair with family.

## 2017-09-12 ENCOUNTER — HOSPITAL ENCOUNTER (OUTPATIENT)
Dept: INFUSION THERAPY | Age: 72
Discharge: HOME | End: 2017-09-12
Payer: MEDICARE

## 2017-09-12 ENCOUNTER — HOSPITAL ENCOUNTER (OUTPATIENT)
Dept: LAB | Age: 72
Discharge: HOME | End: 2017-09-12
Payer: MEDICARE

## 2017-09-12 DIAGNOSIS — C79.51 MALIGNANT NEOPLASM METASTATIC TO BONE (HCC): ICD-10-CM

## 2017-09-12 DIAGNOSIS — N18.9 ANEMIA DUE TO CHRONIC KIDNEY DISEASE TREATED WITH ERYTHROPOIETIN: ICD-10-CM

## 2017-09-12 DIAGNOSIS — D63.1 ANEMIA DUE TO CHRONIC KIDNEY DISEASE TREATED WITH ERYTHROPOIETIN: ICD-10-CM

## 2017-09-12 LAB
ALBUMIN SERPL-MCNC: 3.7 G/DL (ref 3.2–4.6)
ALBUMIN/GLOB SERPL: 1 {RATIO} (ref 1.2–3.5)
ALP SERPL-CCNC: 92 U/L (ref 50–136)
ALT SERPL-CCNC: 18 U/L (ref 12–65)
ANION GAP SERPL CALC-SCNC: 3 MMOL/L (ref 7–16)
AST SERPL-CCNC: 21 U/L (ref 15–37)
BASOPHILS # BLD: 0.1 K/UL (ref 0–0.2)
BASOPHILS NFR BLD: 3 % (ref 0–2)
BILIRUB SERPL-MCNC: 0.4 MG/DL (ref 0.2–1.1)
BUN SERPL-MCNC: 22 MG/DL (ref 8–23)
CALCIUM SERPL-MCNC: 8.2 MG/DL (ref 8.3–10.4)
CHLORIDE SERPL-SCNC: 111 MMOL/L (ref 98–107)
CO2 SERPL-SCNC: 25 MMOL/L (ref 21–32)
CREAT SERPL-MCNC: 1.95 MG/DL (ref 0.6–1)
DIFFERENTIAL METHOD BLD: ABNORMAL
EOSINOPHIL # BLD: 0 K/UL (ref 0–0.8)
EOSINOPHIL NFR BLD: 1 % (ref 0.5–7.8)
ERYTHROCYTE [DISTWIDTH] IN BLOOD BY AUTOMATED COUNT: 15.5 % (ref 11.9–14.6)
GLOBULIN SER CALC-MCNC: 3.6 G/DL (ref 2.3–3.5)
GLUCOSE SERPL-MCNC: 140 MG/DL (ref 65–100)
HCT VFR BLD AUTO: 30.2 % (ref 35.8–46.3)
HGB BLD-MCNC: 10 G/DL (ref 11.7–15.4)
LYMPHOCYTES # BLD: 0.7 K/UL (ref 0.5–4.6)
LYMPHOCYTES NFR BLD: 35 % (ref 13–44)
MCH RBC QN AUTO: 36.2 PG (ref 26.1–32.9)
MCHC RBC AUTO-ENTMCNC: 33.1 G/DL (ref 31.4–35)
MCV RBC AUTO: 109.4 FL (ref 79.6–97.8)
MONOCYTES # BLD: 0.1 K/UL (ref 0.1–1.3)
MONOCYTES NFR BLD: 4 % (ref 4–12)
NEUTS SEG # BLD: 1.1 K/UL (ref 1.7–8.2)
NEUTS SEG NFR BLD: 57 % (ref 43–78)
NRBC # BLD: 0 K/UL (ref 0–0.2)
PLATELET # BLD AUTO: 196 K/UL (ref 150–450)
PMV BLD AUTO: 9.8 FL (ref 10.8–14.1)
POTASSIUM SERPL-SCNC: 3.9 MMOL/L (ref 3.5–5.1)
PROT SERPL-MCNC: 7.3 G/DL (ref 6.3–8.2)
RBC # BLD AUTO: 2.76 M/UL (ref 4.05–5.25)
SODIUM SERPL-SCNC: 139 MMOL/L (ref 136–145)
WBC # BLD AUTO: 1.9 K/UL (ref 4.3–11.1)

## 2017-09-12 PROCEDURE — 36415 COLL VENOUS BLD VENIPUNCTURE: CPT | Performed by: INTERNAL MEDICINE

## 2017-09-12 PROCEDURE — 85025 COMPLETE CBC W/AUTO DIFF WBC: CPT | Performed by: INTERNAL MEDICINE

## 2017-09-12 PROCEDURE — 74011250636 HC RX REV CODE- 250/636: Performed by: INTERNAL MEDICINE

## 2017-09-12 PROCEDURE — 96372 THER/PROPH/DIAG INJ SC/IM: CPT

## 2017-09-12 PROCEDURE — 80053 COMPREHEN METABOLIC PANEL: CPT | Performed by: INTERNAL MEDICINE

## 2017-09-12 RX ADMIN — DENOSUMAB 120 MG: 120 INJECTION SUBCUTANEOUS at 16:59

## 2017-09-12 NOTE — PROGRESS NOTES
Pt. Discharged via wheelchair accompanied by family. Tolerated injection well. No distress noted. To return to Infusions on 9/26/17.

## 2017-09-26 ENCOUNTER — HOSPITAL ENCOUNTER (OUTPATIENT)
Dept: INFUSION THERAPY | Age: 72
Discharge: HOME | End: 2017-09-26
Payer: MEDICARE

## 2017-09-26 VITALS
WEIGHT: 206.8 LBS | BODY MASS INDEX: 28.84 KG/M2 | DIASTOLIC BLOOD PRESSURE: 71 MMHG | OXYGEN SATURATION: 97 % | SYSTOLIC BLOOD PRESSURE: 159 MMHG | RESPIRATION RATE: 18 BRPM | HEART RATE: 85 BPM | TEMPERATURE: 99.3 F

## 2017-09-26 DIAGNOSIS — N18.9 ANEMIA DUE TO CHRONIC KIDNEY DISEASE TREATED WITH ERYTHROPOIETIN: ICD-10-CM

## 2017-09-26 DIAGNOSIS — D63.1 ANEMIA DUE TO CHRONIC KIDNEY DISEASE TREATED WITH ERYTHROPOIETIN: ICD-10-CM

## 2017-09-26 LAB — HGB BLD-MCNC: 9.1 G/DL (ref 11.7–15.4)

## 2017-09-26 PROCEDURE — 85018 HEMOGLOBIN: CPT | Performed by: INTERNAL MEDICINE

## 2017-09-26 PROCEDURE — 96372 THER/PROPH/DIAG INJ SC/IM: CPT

## 2017-09-26 PROCEDURE — 74011250636 HC RX REV CODE- 250/636: Performed by: INTERNAL MEDICINE

## 2017-09-26 RX ADMIN — ERYTHROPOIETIN 40000 UNITS: 40000 INJECTION, SOLUTION INTRAVENOUS; SUBCUTANEOUS at 17:09

## 2017-10-10 ENCOUNTER — HOSPITAL ENCOUNTER (OUTPATIENT)
Dept: INFUSION THERAPY | Age: 72
Discharge: HOME | End: 2017-10-10
Payer: MEDICARE

## 2017-10-10 ENCOUNTER — HOSPITAL ENCOUNTER (OUTPATIENT)
Dept: LAB | Age: 72
Discharge: HOME | End: 2017-10-10
Payer: MEDICARE

## 2017-10-10 DIAGNOSIS — C79.51 MALIGNANT NEOPLASM METASTATIC TO BONE (HCC): ICD-10-CM

## 2017-10-10 DIAGNOSIS — N18.9 ANEMIA DUE TO CHRONIC KIDNEY DISEASE TREATED WITH ERYTHROPOIETIN: ICD-10-CM

## 2017-10-10 DIAGNOSIS — D63.1 ANEMIA DUE TO CHRONIC KIDNEY DISEASE TREATED WITH ERYTHROPOIETIN: ICD-10-CM

## 2017-10-10 LAB
ALBUMIN SERPL-MCNC: 3.3 G/DL (ref 3.2–4.6)
ALBUMIN/GLOB SERPL: 1 {RATIO} (ref 1.2–3.5)
ALP SERPL-CCNC: 81 U/L (ref 50–136)
ALT SERPL-CCNC: 19 U/L (ref 12–65)
ANION GAP SERPL CALC-SCNC: 8 MMOL/L (ref 7–16)
AST SERPL-CCNC: 18 U/L (ref 15–37)
BASOPHILS # BLD: 0.1 K/UL (ref 0–0.2)
BASOPHILS NFR BLD: 3 % (ref 0–2)
BILIRUB SERPL-MCNC: 0.4 MG/DL (ref 0.2–1.1)
BUN SERPL-MCNC: 20 MG/DL (ref 8–23)
CALCIUM SERPL-MCNC: 7.8 MG/DL (ref 8.3–10.4)
CHLORIDE SERPL-SCNC: 108 MMOL/L (ref 98–107)
CO2 SERPL-SCNC: 26 MMOL/L (ref 21–32)
CREAT SERPL-MCNC: 2.01 MG/DL (ref 0.6–1)
DIFFERENTIAL METHOD BLD: ABNORMAL
EOSINOPHIL # BLD: 0.1 K/UL (ref 0–0.8)
EOSINOPHIL NFR BLD: 3 % (ref 0.5–7.8)
ERYTHROCYTE [DISTWIDTH] IN BLOOD BY AUTOMATED COUNT: 14.8 % (ref 11.9–14.6)
GLOBULIN SER CALC-MCNC: 3.4 G/DL (ref 2.3–3.5)
GLUCOSE SERPL-MCNC: 134 MG/DL (ref 65–100)
HCT VFR BLD AUTO: 27.7 % (ref 35.8–46.3)
HGB BLD-MCNC: 9.1 G/DL (ref 11.7–15.4)
LYMPHOCYTES # BLD: 0.5 K/UL (ref 0.5–4.6)
LYMPHOCYTES NFR BLD: 31 % (ref 13–44)
MCH RBC QN AUTO: 36.3 PG (ref 26.1–32.9)
MCHC RBC AUTO-ENTMCNC: 32.9 G/DL (ref 31.4–35)
MCV RBC AUTO: 110.4 FL (ref 79.6–97.8)
MONOCYTES # BLD: 0.1 K/UL (ref 0.1–1.3)
MONOCYTES NFR BLD: 5 % (ref 4–12)
NEUTS SEG # BLD: 0.9 K/UL (ref 1.7–8.2)
NEUTS SEG NFR BLD: 58 % (ref 43–78)
NRBC # BLD: 0 K/UL (ref 0–0.2)
PLATELET # BLD AUTO: 182 K/UL (ref 150–450)
PMV BLD AUTO: 9.5 FL (ref 10.8–14.1)
POTASSIUM SERPL-SCNC: 4 MMOL/L (ref 3.5–5.1)
PROT SERPL-MCNC: 6.7 G/DL (ref 6.3–8.2)
RBC # BLD AUTO: 2.51 M/UL (ref 4.05–5.25)
SODIUM SERPL-SCNC: 142 MMOL/L (ref 136–145)
WBC # BLD AUTO: 1.7 K/UL (ref 4.3–11.1)

## 2017-10-10 PROCEDURE — 85025 COMPLETE CBC W/AUTO DIFF WBC: CPT | Performed by: INTERNAL MEDICINE

## 2017-10-10 PROCEDURE — 96372 THER/PROPH/DIAG INJ SC/IM: CPT

## 2017-10-10 PROCEDURE — 80053 COMPREHEN METABOLIC PANEL: CPT | Performed by: INTERNAL MEDICINE

## 2017-10-10 PROCEDURE — 36415 COLL VENOUS BLD VENIPUNCTURE: CPT | Performed by: INTERNAL MEDICINE

## 2017-10-10 PROCEDURE — 74011250636 HC RX REV CODE- 250/636: Performed by: NURSE PRACTITIONER

## 2017-10-10 RX ADMIN — ERYTHROPOIETIN 40000 UNITS: 40000 INJECTION, SOLUTION INTRAVENOUS; SUBCUTANEOUS at 11:14

## 2017-10-10 RX ADMIN — DENOSUMAB 120 MG: 120 INJECTION SUBCUTANEOUS at 11:14

## 2017-10-10 NOTE — PROGRESS NOTES
Arrived to the [facility]. Procrit and Xgeva completed. Patient tolerated well. Any issues or concerns during appointment: no.  Patient aware of next infusion appointment on 10/24 (date) at 1500 (time). Discharged via w/c.

## 2017-10-17 ENCOUNTER — HOSPITAL ENCOUNTER (OUTPATIENT)
Dept: ULTRASOUND IMAGING | Age: 72
Discharge: HOME | End: 2017-10-17
Attending: INTERNAL MEDICINE
Payer: MEDICARE

## 2017-10-17 DIAGNOSIS — N18.4 CKD (CHRONIC KIDNEY DISEASE), STAGE IV (HCC): ICD-10-CM

## 2017-10-17 PROCEDURE — 76770 US EXAM ABDO BACK WALL COMP: CPT

## 2017-10-26 ENCOUNTER — HOSPITAL ENCOUNTER (OUTPATIENT)
Dept: INFUSION THERAPY | Age: 72
Discharge: HOME | End: 2017-10-26
Payer: MEDICARE

## 2017-10-26 VITALS
HEART RATE: 80 BPM | BODY MASS INDEX: 29.43 KG/M2 | OXYGEN SATURATION: 97 % | WEIGHT: 211 LBS | DIASTOLIC BLOOD PRESSURE: 66 MMHG | TEMPERATURE: 98.9 F | SYSTOLIC BLOOD PRESSURE: 146 MMHG | RESPIRATION RATE: 18 BRPM

## 2017-10-26 LAB
FERRITIN SERPL-MCNC: 183 NG/ML (ref 8–388)
HGB BLD-MCNC: 9.5 G/DL (ref 11.7–15.4)
IRON SATN MFR SERPL: 28 %
IRON SERPL-MCNC: 71 UG/DL (ref 35–150)
TIBC SERPL-MCNC: 253 UG/DL (ref 250–450)

## 2017-10-26 PROCEDURE — 74011250636 HC RX REV CODE- 250/636: Performed by: INTERNAL MEDICINE

## 2017-10-26 PROCEDURE — 82728 ASSAY OF FERRITIN: CPT | Performed by: INTERNAL MEDICINE

## 2017-10-26 PROCEDURE — 85018 HEMOGLOBIN: CPT | Performed by: INTERNAL MEDICINE

## 2017-10-26 PROCEDURE — 83540 ASSAY OF IRON: CPT | Performed by: INTERNAL MEDICINE

## 2017-10-26 PROCEDURE — 96372 THER/PROPH/DIAG INJ SC/IM: CPT

## 2017-10-26 PROCEDURE — 36415 COLL VENOUS BLD VENIPUNCTURE: CPT | Performed by: INTERNAL MEDICINE

## 2017-10-26 RX ADMIN — ERYTHROPOIETIN 40000 UNITS: 40000 INJECTION, SOLUTION INTRAVENOUS; SUBCUTANEOUS at 17:25

## 2017-10-26 NOTE — PROGRESS NOTES
Pt arrived via wheelchair today at 1630, to receive Procrit. Pt tolerated without difficulty. Patient discharged via wheelchair accompanied by daughter. Instructed to notify physician of any problems, questions or concerns. Allowed opportunity for patient/family to ask questions. Verbalized understanding. Next appointment is Nov 9 at [phone] with Maria sEther [phone].

## 2017-10-26 NOTE — PROGRESS NOTES
Problem: Knowledge Deficit  Goal: *Verbalizes understanding of procedures and medications  Outcome: Progressing Towards Goal  Verbalizes/demonstrates understanding of purpose/procedure/potential side effects of procrit.

## 2017-11-09 ENCOUNTER — HOSPITAL ENCOUNTER (OUTPATIENT)
Dept: LAB | Age: 72
Discharge: HOME | End: 2017-11-09
Payer: MEDICARE

## 2017-11-09 ENCOUNTER — HOSPITAL ENCOUNTER (OUTPATIENT)
Dept: INFUSION THERAPY | Age: 72
Discharge: HOME | End: 2017-11-09
Payer: MEDICARE

## 2017-11-09 DIAGNOSIS — C79.51 MALIGNANT NEOPLASM METASTATIC TO BONE (HCC): ICD-10-CM

## 2017-11-09 DIAGNOSIS — D63.1 ANEMIA DUE TO CHRONIC KIDNEY DISEASE TREATED WITH ERYTHROPOIETIN: ICD-10-CM

## 2017-11-09 DIAGNOSIS — N18.9 ANEMIA DUE TO CHRONIC KIDNEY DISEASE TREATED WITH ERYTHROPOIETIN: ICD-10-CM

## 2017-11-09 DIAGNOSIS — C50.919 INFILTRATING DUCTAL CARCINOMA OF FEMALE BREAST, UNSPECIFIED LATERALITY (HCC): ICD-10-CM

## 2017-11-09 LAB
ALBUMIN SERPL-MCNC: 3.3 G/DL (ref 3.2–4.6)
ALBUMIN/GLOB SERPL: 0.9 {RATIO}
ALP SERPL-CCNC: 88 U/L (ref 50–136)
ALT SERPL-CCNC: 16 U/L (ref 12–65)
ANION GAP SERPL CALC-SCNC: 10 MMOL/L
AST SERPL-CCNC: 16 U/L (ref 15–37)
BASOPHILS # BLD: 0 K/UL (ref 0–0.2)
BASOPHILS NFR BLD: 2 % (ref 0–2)
BILIRUB SERPL-MCNC: 0.4 MG/DL (ref 0.2–1.1)
BUN SERPL-MCNC: 35 MG/DL (ref 8–23)
CALCIUM SERPL-MCNC: 8 MG/DL (ref 8.3–10.4)
CHLORIDE SERPL-SCNC: 109 MMOL/L (ref 98–107)
CO2 SERPL-SCNC: 23 MMOL/L (ref 21–32)
CREAT SERPL-MCNC: 2.2 MG/DL (ref 0.6–1)
DIFFERENTIAL METHOD BLD: ABNORMAL
EOSINOPHIL # BLD: 0 K/UL (ref 0–0.8)
EOSINOPHIL NFR BLD: 2 % (ref 0.5–7.8)
ERYTHROCYTE [DISTWIDTH] IN BLOOD BY AUTOMATED COUNT: 14.7 % (ref 11.9–14.6)
FERRITIN SERPL-MCNC: 182 NG/ML (ref 8–388)
GLOBULIN SER CALC-MCNC: 3.7 G/DL
GLUCOSE SERPL-MCNC: 99 MG/DL (ref 65–100)
HCT VFR BLD AUTO: 29 % (ref 35.8–46.3)
HGB BLD-MCNC: 9.4 G/DL (ref 11.7–15.4)
IRON SATN MFR SERPL: 31 %
IRON SERPL-MCNC: 79 UG/DL (ref 35–150)
LYMPHOCYTES # BLD: 0.4 K/UL (ref 0.5–4.6)
LYMPHOCYTES NFR BLD: 26 % (ref 13–44)
MCH RBC QN AUTO: 36.2 PG (ref 26.1–32.9)
MCHC RBC AUTO-ENTMCNC: 32.4 G/DL (ref 31.4–35)
MCV RBC AUTO: 111.5 FL (ref 79.6–97.8)
MONOCYTES # BLD: 0.1 K/UL (ref 0.1–1.3)
MONOCYTES NFR BLD: 6 % (ref 4–12)
NEUTS SEG # BLD: 1.2 K/UL (ref 1.7–8.2)
NEUTS SEG NFR BLD: 64 % (ref 43–78)
NRBC # BLD: 0 K/UL (ref 0–0.2)
PLATELET # BLD AUTO: 175 K/UL (ref 150–450)
PLATELET COMMENTS,PCOM: ADEQUATE
PMV BLD AUTO: 10 FL (ref 10.8–14.1)
POTASSIUM SERPL-SCNC: 4.8 MMOL/L (ref 3.5–5.1)
PROT SERPL-MCNC: 7 G/DL (ref 6.3–8.2)
RBC # BLD AUTO: 2.6 M/UL (ref 4.05–5.25)
RBC MORPH BLD: ABNORMAL
SODIUM SERPL-SCNC: 142 MMOL/L (ref 136–145)
TIBC SERPL-MCNC: 254 UG/DL (ref 250–450)
WBC # BLD AUTO: 1.7 K/UL (ref 4.3–11.1)
WBC MORPH BLD: ABNORMAL

## 2017-11-09 PROCEDURE — 96372 THER/PROPH/DIAG INJ SC/IM: CPT

## 2017-11-09 PROCEDURE — 74011250636 HC RX REV CODE- 250/636: Performed by: INTERNAL MEDICINE

## 2017-11-09 RX ADMIN — ERYTHROPOIETIN 40000 UNITS: 40000 INJECTION, SOLUTION INTRAVENOUS; SUBCUTANEOUS at 12:25

## 2017-11-09 RX ADMIN — DENOSUMAB 120 MG: 120 INJECTION SUBCUTANEOUS at 12:25

## 2017-11-09 NOTE — PROGRESS NOTES
Arrived to the [facility]. Procrit and Xgeva completed. Patient tolerated well   Any issues or concerns during appointment: No  Patient aware of next infusion appointment on Friday,November 25th @ 1400  Discharged home via wheelchair.

## 2017-11-24 ENCOUNTER — APPOINTMENT (OUTPATIENT)
Dept: INFUSION THERAPY | Age: 72
End: 2017-11-24
Payer: MEDICARE

## 2017-11-29 ENCOUNTER — HOSPITAL ENCOUNTER (OUTPATIENT)
Dept: INFUSION THERAPY | Age: 72
Discharge: HOME | End: 2017-11-29
Payer: MEDICARE

## 2017-11-29 VITALS
RESPIRATION RATE: 18 BRPM | OXYGEN SATURATION: 99 % | HEART RATE: 90 BPM | WEIGHT: 213 LBS | SYSTOLIC BLOOD PRESSURE: 134 MMHG | DIASTOLIC BLOOD PRESSURE: 65 MMHG | TEMPERATURE: 98.2 F | BODY MASS INDEX: 29.71 KG/M2

## 2017-11-29 DIAGNOSIS — N18.9 ANEMIA DUE TO CHRONIC KIDNEY DISEASE TREATED WITH ERYTHROPOIETIN: ICD-10-CM

## 2017-11-29 DIAGNOSIS — D63.1 ANEMIA DUE TO CHRONIC KIDNEY DISEASE TREATED WITH ERYTHROPOIETIN: ICD-10-CM

## 2017-11-29 LAB — HGB BLD-MCNC: 9.2 G/DL (ref 11.7–15.4)

## 2017-11-29 PROCEDURE — 96372 THER/PROPH/DIAG INJ SC/IM: CPT

## 2017-11-29 PROCEDURE — 36415 COLL VENOUS BLD VENIPUNCTURE: CPT | Performed by: INTERNAL MEDICINE

## 2017-11-29 PROCEDURE — 85018 HEMOGLOBIN: CPT | Performed by: INTERNAL MEDICINE

## 2017-11-29 PROCEDURE — 74011250636 HC RX REV CODE- 250/636: Performed by: NURSE PRACTITIONER

## 2017-11-29 RX ADMIN — ERYTHROPOIETIN 40000 UNITS: 40000 INJECTION, SOLUTION INTRAVENOUS; SUBCUTANEOUS at 14:28

## 2017-11-29 NOTE — PROGRESS NOTES
Arrived to the [facility]. Labs and blood pressure reviewed; Procrit completed. Patient tolerated well. Any issues or concerns during appointment: none. Patient aware of next infusion appointment on 12/8/17 at 1115. Discharged via wheelchair accompanied by family.     Jere Ching RN

## 2017-12-08 ENCOUNTER — HOSPITAL ENCOUNTER (OUTPATIENT)
Dept: LAB | Age: 72
Discharge: HOME | End: 2017-12-08
Payer: MEDICARE

## 2017-12-08 ENCOUNTER — HOSPITAL ENCOUNTER (OUTPATIENT)
Dept: INFUSION THERAPY | Age: 72
Discharge: HOME | End: 2017-12-08
Payer: MEDICARE

## 2017-12-08 DIAGNOSIS — D63.1 ANEMIA DUE TO CHRONIC KIDNEY DISEASE TREATED WITH ERYTHROPOIETIN: ICD-10-CM

## 2017-12-08 DIAGNOSIS — N18.9 ANEMIA DUE TO CHRONIC KIDNEY DISEASE TREATED WITH ERYTHROPOIETIN: ICD-10-CM

## 2017-12-08 DIAGNOSIS — C50.919 INFILTRATING DUCTAL CARCINOMA OF FEMALE BREAST, UNSPECIFIED LATERALITY (HCC): ICD-10-CM

## 2017-12-08 DIAGNOSIS — C79.51 MALIGNANT NEOPLASM METASTATIC TO BONE (HCC): ICD-10-CM

## 2017-12-08 LAB
ALBUMIN SERPL-MCNC: 3.2 G/DL (ref 3.2–4.6)
ALBUMIN/GLOB SERPL: 0.9 {RATIO} (ref 1.2–3.5)
ALP SERPL-CCNC: 97 U/L (ref 50–136)
ALT SERPL-CCNC: 21 U/L (ref 12–65)
ANION GAP SERPL CALC-SCNC: 9 MMOL/L (ref 7–16)
AST SERPL-CCNC: 18 U/L (ref 15–37)
BASOPHILS # BLD: 0 K/UL (ref 0–0.2)
BASOPHILS NFR BLD: 1 % (ref 0–2)
BILIRUB SERPL-MCNC: 0.3 MG/DL (ref 0.2–1.1)
BUN SERPL-MCNC: 26 MG/DL (ref 8–23)
CALCIUM SERPL-MCNC: 7.5 MG/DL (ref 8.3–10.4)
CHLORIDE SERPL-SCNC: 108 MMOL/L (ref 98–107)
CO2 SERPL-SCNC: 26 MMOL/L (ref 21–32)
CREAT SERPL-MCNC: 2.13 MG/DL (ref 0.6–1)
DIFFERENTIAL METHOD BLD: ABNORMAL
EOSINOPHIL # BLD: 0 K/UL (ref 0–0.8)
EOSINOPHIL NFR BLD: 1 % (ref 0.5–7.8)
ERYTHROCYTE [DISTWIDTH] IN BLOOD BY AUTOMATED COUNT: 14.8 % (ref 11.9–14.6)
FERRITIN SERPL-MCNC: 236 NG/ML (ref 8–388)
GLOBULIN SER CALC-MCNC: 3.7 G/DL (ref 2.3–3.5)
GLUCOSE SERPL-MCNC: 104 MG/DL (ref 65–100)
HCT VFR BLD AUTO: 28.5 % (ref 35.8–46.3)
HGB BLD-MCNC: 9.1 G/DL (ref 11.7–15.4)
IRON SATN MFR SERPL: 29 %
IRON SERPL-MCNC: 67 UG/DL (ref 35–150)
LYMPHOCYTES # BLD: 0.7 K/UL (ref 0.5–4.6)
LYMPHOCYTES NFR BLD: 23 % (ref 13–44)
MCH RBC QN AUTO: 36.1 PG (ref 26.1–32.9)
MCHC RBC AUTO-ENTMCNC: 31.9 G/DL (ref 31.4–35)
MCV RBC AUTO: 113.1 FL (ref 79.6–97.8)
MONOCYTES # BLD: 0.1 K/UL (ref 0.1–1.3)
MONOCYTES NFR BLD: 5 % (ref 4–12)
NEUTS SEG # BLD: 2.1 K/UL (ref 1.7–8.2)
NEUTS SEG NFR BLD: 70 % (ref 43–78)
NRBC # BLD: 0.01 K/UL (ref 0–0.2)
NRBC BLD-RTO: 0 PER 100 WBC (ref 0–2)
PLATELET # BLD AUTO: 221 K/UL (ref 150–450)
PLATELET COMMENTS,PCOM: ADEQUATE
PMV BLD AUTO: 10 FL (ref 10.8–14.1)
POTASSIUM SERPL-SCNC: 4 MMOL/L (ref 3.5–5.1)
PROT SERPL-MCNC: 6.9 G/DL (ref 6.3–8.2)
RBC # BLD AUTO: 2.52 M/UL (ref 4.05–5.25)
RBC MORPH BLD: ABNORMAL
SODIUM SERPL-SCNC: 143 MMOL/L (ref 136–145)
TIBC SERPL-MCNC: 231 UG/DL (ref 250–450)
WBC # BLD AUTO: 2.9 K/UL (ref 4.3–11.1)
WBC MORPH BLD: ABNORMAL

## 2017-12-08 PROCEDURE — 80053 COMPREHEN METABOLIC PANEL: CPT | Performed by: NURSE PRACTITIONER

## 2017-12-08 PROCEDURE — 96372 THER/PROPH/DIAG INJ SC/IM: CPT

## 2017-12-08 PROCEDURE — 74011250636 HC RX REV CODE- 250/636: Performed by: NURSE PRACTITIONER

## 2017-12-08 PROCEDURE — 85025 COMPLETE CBC W/AUTO DIFF WBC: CPT | Performed by: NURSE PRACTITIONER

## 2017-12-08 PROCEDURE — 83540 ASSAY OF IRON: CPT | Performed by: NURSE PRACTITIONER

## 2017-12-08 PROCEDURE — 36415 COLL VENOUS BLD VENIPUNCTURE: CPT | Performed by: NURSE PRACTITIONER

## 2017-12-08 PROCEDURE — 82728 ASSAY OF FERRITIN: CPT | Performed by: NURSE PRACTITIONER

## 2017-12-08 RX ADMIN — ERYTHROPOIETIN 40000 UNITS: 40000 INJECTION, SOLUTION INTRAVENOUS; SUBCUTANEOUS at 11:59

## 2017-12-08 RX ADMIN — DENOSUMAB 120 MG: 120 INJECTION SUBCUTANEOUS at 12:00

## 2017-12-08 NOTE — PROGRESS NOTES
Pt arrived via w/c to [facility]. Procrit and xgeva given sq to bilateral arms. Pt aware of next appt on 12/22/17 at 1115. Pt discharged via w/c.

## 2017-12-22 ENCOUNTER — HOSPITAL ENCOUNTER (OUTPATIENT)
Dept: INFUSION THERAPY | Age: 72
Discharge: HOME | End: 2017-12-22
Payer: MEDICARE

## 2017-12-22 VITALS
OXYGEN SATURATION: 97 % | BODY MASS INDEX: 30.01 KG/M2 | DIASTOLIC BLOOD PRESSURE: 58 MMHG | TEMPERATURE: 98.1 F | SYSTOLIC BLOOD PRESSURE: 147 MMHG | HEART RATE: 80 BPM | WEIGHT: 215.2 LBS | RESPIRATION RATE: 18 BRPM

## 2017-12-22 DIAGNOSIS — D63.1 ANEMIA DUE TO CHRONIC KIDNEY DISEASE TREATED WITH ERYTHROPOIETIN: ICD-10-CM

## 2017-12-22 DIAGNOSIS — N18.9 ANEMIA DUE TO CHRONIC KIDNEY DISEASE TREATED WITH ERYTHROPOIETIN: ICD-10-CM

## 2017-12-22 LAB — HGB BLD-MCNC: 9.7 G/DL (ref 11.7–15.4)

## 2017-12-22 PROCEDURE — 74011250636 HC RX REV CODE- 250/636: Performed by: INTERNAL MEDICINE

## 2017-12-22 PROCEDURE — 96372 THER/PROPH/DIAG INJ SC/IM: CPT

## 2017-12-22 PROCEDURE — 85018 HEMOGLOBIN: CPT | Performed by: INTERNAL MEDICINE

## 2017-12-22 PROCEDURE — 36415 COLL VENOUS BLD VENIPUNCTURE: CPT | Performed by: INTERNAL MEDICINE

## 2017-12-22 RX ADMIN — ERYTHROPOIETIN 40000 UNITS: 40000 INJECTION, SOLUTION INTRAVENOUS; SUBCUTANEOUS at 10:47

## 2017-12-22 NOTE — PROGRESS NOTES
Pt. Discharged via wheelchair accompanied by daughter. Tolerated injection well. No distress noted. To return to Infusions on 1/4/18.

## 2018-01-04 ENCOUNTER — HOSPITAL ENCOUNTER (OUTPATIENT)
Dept: INFUSION THERAPY | Age: 73
End: 2018-01-04
Payer: MEDICARE

## 2018-01-12 ENCOUNTER — HOSPITAL ENCOUNTER (OUTPATIENT)
Dept: LAB | Age: 73
Discharge: HOME | End: 2018-01-12
Payer: MEDICARE

## 2018-01-12 ENCOUNTER — HOSPITAL ENCOUNTER (OUTPATIENT)
Dept: INFUSION THERAPY | Age: 73
Discharge: HOME | End: 2018-01-12
Payer: MEDICARE

## 2018-01-12 DIAGNOSIS — C79.51 MALIGNANT NEOPLASM METASTATIC TO BONE (HCC): ICD-10-CM

## 2018-01-12 DIAGNOSIS — N18.9 ANEMIA DUE TO CHRONIC KIDNEY DISEASE TREATED WITH ERYTHROPOIETIN: ICD-10-CM

## 2018-01-12 DIAGNOSIS — D63.1 ANEMIA DUE TO CHRONIC KIDNEY DISEASE TREATED WITH ERYTHROPOIETIN: ICD-10-CM

## 2018-01-12 LAB
ALBUMIN SERPL-MCNC: 3.5 G/DL (ref 3.2–4.6)
ALBUMIN/GLOB SERPL: 1 {RATIO} (ref 1.2–3.5)
ALP SERPL-CCNC: 69 U/L (ref 50–136)
ALT SERPL-CCNC: 18 U/L (ref 12–65)
ANION GAP SERPL CALC-SCNC: 8 MMOL/L (ref 7–16)
AST SERPL-CCNC: 16 U/L (ref 15–37)
BILIRUB SERPL-MCNC: 0.4 MG/DL (ref 0.2–1.1)
BUN SERPL-MCNC: 29 MG/DL (ref 8–23)
CALCIUM SERPL-MCNC: 8.4 MG/DL (ref 8.3–10.4)
CHLORIDE SERPL-SCNC: 109 MMOL/L (ref 98–107)
CO2 SERPL-SCNC: 26 MMOL/L (ref 21–32)
CREAT SERPL-MCNC: 2.33 MG/DL (ref 0.6–1)
DIFFERENTIAL METHOD BLD: ABNORMAL
EOSINOPHIL # BLD: 0.1 K/UL (ref 0–0.8)
EOSINOPHIL NFR BLD MANUAL: 4 % (ref 1–8)
ERYTHROCYTE [DISTWIDTH] IN BLOOD BY AUTOMATED COUNT: 14.1 % (ref 11.9–14.6)
FERRITIN SERPL-MCNC: 198 NG/ML (ref 8–388)
GLOBULIN SER CALC-MCNC: 3.5 G/DL (ref 2.3–3.5)
GLUCOSE SERPL-MCNC: 118 MG/DL (ref 65–100)
HCT VFR BLD AUTO: 30.6 % (ref 35.8–46.3)
HGB BLD-MCNC: 10.2 G/DL (ref 11.7–15.4)
IRON SATN MFR SERPL: 52 %
IRON SERPL-MCNC: 134 UG/DL (ref 35–150)
LYMPHOCYTES # BLD: 0.8 K/UL (ref 0.5–4.6)
LYMPHOCYTES NFR BLD MANUAL: 47 % (ref 16–44)
MAGNESIUM SERPL-MCNC: 1.8 MG/DL (ref 1.8–2.4)
MCH RBC QN AUTO: 36.4 PG (ref 26.1–32.9)
MCHC RBC AUTO-ENTMCNC: 33.3 G/DL (ref 31.4–35)
MCV RBC AUTO: 109.3 FL (ref 79.6–97.8)
MONOCYTES # BLD: 0.1 K/UL (ref 0.1–1.3)
MONOCYTES NFR BLD MANUAL: 4 % (ref 3–9)
NEUTS SEG # BLD: 0.8 K/UL (ref 1.7–8.2)
NEUTS SEG NFR BLD MANUAL: 45 % (ref 47–75)
NRBC # BLD: 0 K/UL (ref 0–0.2)
PHOSPHATE SERPL-MCNC: 2.5 MG/DL (ref 2.3–3.7)
PLATELET # BLD AUTO: 121 K/UL (ref 150–450)
PLATELET COMMENTS,PCOM: SLIGHT
PMV BLD AUTO: 10.2 FL (ref 10.8–14.1)
POTASSIUM SERPL-SCNC: 4.3 MMOL/L (ref 3.5–5.1)
PROT SERPL-MCNC: 7 G/DL (ref 6.3–8.2)
RBC # BLD AUTO: 2.8 M/UL (ref 4.05–5.25)
RBC MORPH BLD: ABNORMAL
RBC MORPH BLD: ABNORMAL
SODIUM SERPL-SCNC: 143 MMOL/L (ref 136–145)
TIBC SERPL-MCNC: 257 UG/DL (ref 250–450)
WBC # BLD AUTO: 1.8 K/UL (ref 4.3–11.1)
WBC MORPH BLD: ABNORMAL

## 2018-01-12 PROCEDURE — 83540 ASSAY OF IRON: CPT | Performed by: INTERNAL MEDICINE

## 2018-01-12 PROCEDURE — 83735 ASSAY OF MAGNESIUM: CPT | Performed by: INTERNAL MEDICINE

## 2018-01-12 PROCEDURE — 84100 ASSAY OF PHOSPHORUS: CPT | Performed by: INTERNAL MEDICINE

## 2018-01-12 PROCEDURE — 80053 COMPREHEN METABOLIC PANEL: CPT | Performed by: INTERNAL MEDICINE

## 2018-01-12 PROCEDURE — 85025 COMPLETE CBC W/AUTO DIFF WBC: CPT | Performed by: INTERNAL MEDICINE

## 2018-01-12 PROCEDURE — 74011250636 HC RX REV CODE- 250/636: Performed by: NURSE PRACTITIONER

## 2018-01-12 PROCEDURE — 74011250636 HC RX REV CODE- 250/636: Performed by: INTERNAL MEDICINE

## 2018-01-12 PROCEDURE — 96372 THER/PROPH/DIAG INJ SC/IM: CPT

## 2018-01-12 PROCEDURE — 36415 COLL VENOUS BLD VENIPUNCTURE: CPT | Performed by: INTERNAL MEDICINE

## 2018-01-12 PROCEDURE — 82728 ASSAY OF FERRITIN: CPT | Performed by: INTERNAL MEDICINE

## 2018-01-12 RX ORDER — CYANOCOBALAMIN 1000 UG/ML
1000 INJECTION, SOLUTION INTRAMUSCULAR; SUBCUTANEOUS ONCE
Status: COMPLETED | OUTPATIENT
Start: 2018-01-12 | End: 2018-01-12

## 2018-01-12 RX ADMIN — CYANOCOBALAMIN 1000 MCG: 1000 INJECTION, SOLUTION INTRAMUSCULAR at 12:45

## 2018-01-12 RX ADMIN — DENOSUMAB 120 MG: 120 INJECTION SUBCUTANEOUS at 12:50

## 2018-01-12 RX ADMIN — ERYTHROPOIETIN 40000 UNITS: 40000 INJECTION, SOLUTION INTRAVENOUS; SUBCUTANEOUS at 12:47

## 2018-01-12 NOTE — PROGRESS NOTES
Arrived to the [facility]. Injections completed. Patient tolerated well. Any issues or concerns during appointment: none. Patient aware of next infusion appointment on 1/26/18 at 1700. Discharged in [facility].

## 2018-01-26 ENCOUNTER — HOSPITAL ENCOUNTER (OUTPATIENT)
Dept: INFUSION THERAPY | Age: 73
Discharge: HOME | End: 2018-01-26
Payer: MEDICARE

## 2018-01-26 VITALS
BODY MASS INDEX: 30.68 KG/M2 | SYSTOLIC BLOOD PRESSURE: 139 MMHG | RESPIRATION RATE: 18 BRPM | DIASTOLIC BLOOD PRESSURE: 68 MMHG | WEIGHT: 220 LBS | TEMPERATURE: 98.6 F | HEART RATE: 75 BPM | OXYGEN SATURATION: 97 %

## 2018-01-26 DIAGNOSIS — N18.9 ANEMIA DUE TO CHRONIC KIDNEY DISEASE TREATED WITH ERYTHROPOIETIN: ICD-10-CM

## 2018-01-26 DIAGNOSIS — D63.1 ANEMIA DUE TO CHRONIC KIDNEY DISEASE TREATED WITH ERYTHROPOIETIN: ICD-10-CM

## 2018-01-26 LAB
HCT VFR BLD AUTO: 33 % (ref 35.8–46.3)
HGB BLD-MCNC: 11.1 G/DL (ref 11.7–15.4)

## 2018-01-26 PROCEDURE — 99211 OFF/OP EST MAY X REQ PHY/QHP: CPT

## 2018-01-26 PROCEDURE — 85018 HEMOGLOBIN: CPT | Performed by: INTERNAL MEDICINE

## 2018-02-08 ENCOUNTER — HOSPITAL ENCOUNTER (OUTPATIENT)
Dept: LAB | Age: 73
Discharge: HOME | End: 2018-02-08
Payer: MEDICARE

## 2018-02-08 ENCOUNTER — HOSPITAL ENCOUNTER (OUTPATIENT)
Dept: INFUSION THERAPY | Age: 73
Discharge: HOME | End: 2018-02-08
Payer: MEDICARE

## 2018-02-08 DIAGNOSIS — N18.9 ANEMIA DUE TO CHRONIC KIDNEY DISEASE TREATED WITH ERYTHROPOIETIN: ICD-10-CM

## 2018-02-08 DIAGNOSIS — D63.1 ANEMIA DUE TO CHRONIC KIDNEY DISEASE TREATED WITH ERYTHROPOIETIN: ICD-10-CM

## 2018-02-08 DIAGNOSIS — C79.51 MALIGNANT NEOPLASM METASTATIC TO BONE (HCC): ICD-10-CM

## 2018-02-08 LAB
ALBUMIN SERPL-MCNC: 3.6 G/DL (ref 3.2–4.6)
ALBUMIN/GLOB SERPL: 1.1 {RATIO} (ref 1.2–3.5)
ALP SERPL-CCNC: 70 U/L (ref 50–136)
ALT SERPL-CCNC: 20 U/L (ref 12–65)
ANION GAP SERPL CALC-SCNC: 7 MMOL/L (ref 7–16)
AST SERPL-CCNC: 17 U/L (ref 15–37)
BASOPHILS # BLD: 0 K/UL (ref 0–0.2)
BASOPHILS NFR BLD: 2 % (ref 0–2)
BILIRUB SERPL-MCNC: 0.4 MG/DL (ref 0.2–1.1)
BUN SERPL-MCNC: 36 MG/DL (ref 8–23)
CALCIUM SERPL-MCNC: 8.6 MG/DL (ref 8.3–10.4)
CHLORIDE SERPL-SCNC: 110 MMOL/L (ref 98–107)
CO2 SERPL-SCNC: 25 MMOL/L (ref 21–32)
CREAT SERPL-MCNC: 2.16 MG/DL (ref 0.6–1)
DIFFERENTIAL METHOD BLD: ABNORMAL
EOSINOPHIL # BLD: 0 K/UL (ref 0–0.8)
EOSINOPHIL NFR BLD: 2 % (ref 0.5–7.8)
ERYTHROCYTE [DISTWIDTH] IN BLOOD BY AUTOMATED COUNT: 13.8 % (ref 11.9–14.6)
FERRITIN SERPL-MCNC: 185 NG/ML (ref 8–388)
GLOBULIN SER CALC-MCNC: 3.4 G/DL (ref 2.3–3.5)
GLUCOSE SERPL-MCNC: 87 MG/DL (ref 65–100)
HCT VFR BLD AUTO: 28.6 % (ref 35.8–46.3)
HGB BLD-MCNC: 9.5 G/DL (ref 11.7–15.4)
IRON SATN MFR SERPL: 37 %
IRON SERPL-MCNC: 100 UG/DL (ref 35–150)
LYMPHOCYTES # BLD: 0.7 K/UL (ref 0.5–4.6)
LYMPHOCYTES NFR BLD: 39 % (ref 13–44)
MCH RBC QN AUTO: 36.4 PG (ref 26.1–32.9)
MCHC RBC AUTO-ENTMCNC: 33.2 G/DL (ref 31.4–35)
MCV RBC AUTO: 109.6 FL (ref 79.6–97.8)
MONOCYTES # BLD: 0.1 K/UL (ref 0.1–1.3)
MONOCYTES NFR BLD: 7 % (ref 4–12)
NEUTS SEG # BLD: 0.9 K/UL (ref 1.7–8.2)
NEUTS SEG NFR BLD: 50 % (ref 43–78)
NRBC # BLD: 0 K/UL (ref 0–0.2)
PLATELET # BLD AUTO: 103 K/UL (ref 150–450)
PLATELET COMMENTS,PCOM: SLIGHT
PMV BLD AUTO: 9.3 FL (ref 10.8–14.1)
POTASSIUM SERPL-SCNC: 4.4 MMOL/L (ref 3.5–5.1)
PROT SERPL-MCNC: 7 G/DL (ref 6.3–8.2)
RBC # BLD AUTO: 2.61 M/UL (ref 4.05–5.25)
RBC MORPH BLD: ABNORMAL
RBC MORPH BLD: ABNORMAL
SODIUM SERPL-SCNC: 142 MMOL/L (ref 136–145)
TIBC SERPL-MCNC: 267 UG/DL (ref 250–450)
WBC # BLD AUTO: 1.7 K/UL (ref 4.3–11.1)
WBC MORPH BLD: ABNORMAL

## 2018-02-08 PROCEDURE — 82728 ASSAY OF FERRITIN: CPT | Performed by: INTERNAL MEDICINE

## 2018-02-08 PROCEDURE — 85025 COMPLETE CBC W/AUTO DIFF WBC: CPT | Performed by: INTERNAL MEDICINE

## 2018-02-08 PROCEDURE — 80053 COMPREHEN METABOLIC PANEL: CPT | Performed by: INTERNAL MEDICINE

## 2018-02-08 PROCEDURE — 36415 COLL VENOUS BLD VENIPUNCTURE: CPT | Performed by: INTERNAL MEDICINE

## 2018-02-08 PROCEDURE — 74011250636 HC RX REV CODE- 250/636: Performed by: INTERNAL MEDICINE

## 2018-02-08 PROCEDURE — 96372 THER/PROPH/DIAG INJ SC/IM: CPT

## 2018-02-08 PROCEDURE — 83540 ASSAY OF IRON: CPT | Performed by: INTERNAL MEDICINE

## 2018-02-08 RX ORDER — CYANOCOBALAMIN 1000 UG/ML
1000 INJECTION, SOLUTION INTRAMUSCULAR; SUBCUTANEOUS ONCE
Status: COMPLETED | OUTPATIENT
Start: 2018-02-08 | End: 2018-02-08

## 2018-02-08 RX ADMIN — ERYTHROPOIETIN 40000 UNITS: 40000 INJECTION, SOLUTION INTRAVENOUS; SUBCUTANEOUS at 13:56

## 2018-02-08 RX ADMIN — DENOSUMAB 120 MG: 120 INJECTION SUBCUTANEOUS at 14:04

## 2018-02-08 RX ADMIN — CYANOCOBALAMIN 1000 MCG: 1000 INJECTION, SOLUTION INTRAMUSCULAR at 13:55

## 2018-02-08 NOTE — PROGRESS NOTES
Arrived to the [facility]. Assessment completed. Patient was seen in [place] [address] [place] today, before coming to infusion. Labs reviewed. Patient tolerated procrit, xgeva, and vitamin B12 injections well today. Any issues or concerns during appointment: none. Patient aware of next infusion appointment on 2/22/18 (date) at 1400 (time) with [facility] infusion center. Discharged via W/C, with family member. Patient instructed to call Dr. Silvia Luis office immediately for any problems or concerns. She verbalizes understanding.

## 2018-02-22 ENCOUNTER — HOSPITAL ENCOUNTER (OUTPATIENT)
Dept: INFUSION THERAPY | Age: 73
Discharge: HOME | End: 2018-02-22
Payer: MEDICARE

## 2018-02-22 VITALS
SYSTOLIC BLOOD PRESSURE: 166 MMHG | WEIGHT: 221.8 LBS | TEMPERATURE: 98 F | BODY MASS INDEX: 30.93 KG/M2 | DIASTOLIC BLOOD PRESSURE: 77 MMHG | RESPIRATION RATE: 18 BRPM | HEART RATE: 70 BPM | OXYGEN SATURATION: 99 %

## 2018-02-22 LAB — HGB BLD-MCNC: 10 G/DL (ref 11.7–15.4)

## 2018-02-22 PROCEDURE — 85018 HEMOGLOBIN: CPT | Performed by: INTERNAL MEDICINE

## 2018-03-08 ENCOUNTER — HOSPITAL ENCOUNTER (OUTPATIENT)
Dept: LAB | Age: 73
Discharge: HOME | End: 2018-03-08
Payer: MEDICARE

## 2018-03-08 ENCOUNTER — HOSPITAL ENCOUNTER (OUTPATIENT)
Dept: INFUSION THERAPY | Age: 73
Discharge: HOME | End: 2018-03-08
Payer: MEDICARE

## 2018-03-08 DIAGNOSIS — C50.919 INFILTRATING DUCTAL CARCINOMA OF FEMALE BREAST, UNSPECIFIED LATERALITY (HCC): ICD-10-CM

## 2018-03-08 DIAGNOSIS — C79.51 MALIGNANT NEOPLASM METASTATIC TO BONE (HCC): ICD-10-CM

## 2018-03-08 LAB
ALBUMIN SERPL-MCNC: 3.4 G/DL (ref 3.2–4.6)
ALBUMIN/GLOB SERPL: 1 {RATIO} (ref 1.2–3.5)
ALP SERPL-CCNC: 66 U/L (ref 50–136)
ALT SERPL-CCNC: 16 U/L (ref 12–65)
ANION GAP SERPL CALC-SCNC: 5 MMOL/L (ref 7–16)
AST SERPL-CCNC: 15 U/L (ref 15–37)
BASOPHILS # BLD: 0 K/UL (ref 0–0.2)
BASOPHILS NFR BLD: 1 % (ref 0–2)
BILIRUB SERPL-MCNC: 0.3 MG/DL (ref 0.2–1.1)
BUN SERPL-MCNC: 30 MG/DL (ref 8–23)
CALCIUM SERPL-MCNC: 8.6 MG/DL (ref 8.3–10.4)
CHLORIDE SERPL-SCNC: 111 MMOL/L (ref 98–107)
CO2 SERPL-SCNC: 27 MMOL/L (ref 21–32)
CREAT SERPL-MCNC: 2.13 MG/DL (ref 0.6–1)
DIFFERENTIAL METHOD BLD: ABNORMAL
EOSINOPHIL # BLD: 0 K/UL (ref 0–0.8)
EOSINOPHIL NFR BLD: 1 % (ref 0.5–7.8)
ERYTHROCYTE [DISTWIDTH] IN BLOOD BY AUTOMATED COUNT: 13.9 % (ref 11.9–14.6)
GLOBULIN SER CALC-MCNC: 3.5 G/DL (ref 2.3–3.5)
GLUCOSE SERPL-MCNC: 140 MG/DL (ref 65–100)
HCT VFR BLD AUTO: 30.2 % (ref 35.8–46.3)
HGB BLD-MCNC: 10.1 G/DL (ref 11.7–15.4)
LYMPHOCYTES # BLD: 0.6 K/UL (ref 0.5–4.6)
LYMPHOCYTES NFR BLD: 30 % (ref 13–44)
MCH RBC QN AUTO: 36.5 PG (ref 26.1–32.9)
MCHC RBC AUTO-ENTMCNC: 33.4 G/DL (ref 31.4–35)
MCV RBC AUTO: 109 FL (ref 79.6–97.8)
MONOCYTES # BLD: 0.1 K/UL (ref 0.1–1.3)
MONOCYTES NFR BLD: 6 % (ref 4–12)
NEUTS SEG # BLD: 1.4 K/UL (ref 1.7–8.2)
NEUTS SEG NFR BLD: 62 % (ref 43–78)
NRBC # BLD: 0 K/UL (ref 0–0.2)
NRBC BLD-RTO: 0 PER 100 WBC (ref 0–2)
PLATELET # BLD AUTO: 147 K/UL (ref 150–450)
PLATELET COMMENTS,PCOM: SLIGHT
PMV BLD AUTO: 9.4 FL (ref 10.8–14.1)
POTASSIUM SERPL-SCNC: 4 MMOL/L (ref 3.5–5.1)
PROT SERPL-MCNC: 6.9 G/DL (ref 6.3–8.2)
RBC # BLD AUTO: 2.77 M/UL (ref 4.05–5.25)
RBC MORPH BLD: ABNORMAL
SODIUM SERPL-SCNC: 143 MMOL/L (ref 136–145)
WBC # BLD AUTO: 2.1 K/UL (ref 4.3–11.1)
WBC MORPH BLD: ABNORMAL

## 2018-03-08 PROCEDURE — 85025 COMPLETE CBC W/AUTO DIFF WBC: CPT

## 2018-03-08 PROCEDURE — 80053 COMPREHEN METABOLIC PANEL: CPT

## 2018-03-08 PROCEDURE — 74011250636 HC RX REV CODE- 250/636: Performed by: NURSE PRACTITIONER

## 2018-03-08 PROCEDURE — 96372 THER/PROPH/DIAG INJ SC/IM: CPT

## 2018-03-08 PROCEDURE — 36415 COLL VENOUS BLD VENIPUNCTURE: CPT

## 2018-03-08 RX ORDER — CYANOCOBALAMIN 1000 UG/ML
1000 INJECTION, SOLUTION INTRAMUSCULAR; SUBCUTANEOUS ONCE
Status: COMPLETED | OUTPATIENT
Start: 2018-03-08 | End: 2018-03-08

## 2018-03-08 RX ADMIN — DENOSUMAB 120 MG: 120 INJECTION SUBCUTANEOUS at 14:39

## 2018-03-08 RX ADMIN — CYANOCOBALAMIN 1000 MCG: 1000 INJECTION, SOLUTION INTRAMUSCULAR at 14:38

## 2018-03-08 NOTE — PROGRESS NOTES
Arrived to the [facility]. Xgeva and B12 completed. Patient tolerated well. Any issues or concerns during appointment: none. Patient aware of next infusion appointment on 3/22 (date) at [phone] (time). Discharged via wheelchair.

## 2018-03-22 ENCOUNTER — HOSPITAL ENCOUNTER (OUTPATIENT)
Dept: INFUSION THERAPY | Age: 73
Discharge: HOME | End: 2018-03-22
Payer: MEDICARE

## 2018-03-22 VITALS
BODY MASS INDEX: 31.8 KG/M2 | SYSTOLIC BLOOD PRESSURE: 147 MMHG | OXYGEN SATURATION: 98 % | DIASTOLIC BLOOD PRESSURE: 59 MMHG | TEMPERATURE: 98.5 F | HEART RATE: 80 BPM | RESPIRATION RATE: 18 BRPM | WEIGHT: 228 LBS

## 2018-03-22 DIAGNOSIS — E87.5 HYPERKALEMIA: ICD-10-CM

## 2018-03-22 DIAGNOSIS — C79.51 MALIGNANT NEOPLASM METASTATIC TO BONE (HCC): ICD-10-CM

## 2018-03-22 DIAGNOSIS — D63.1 ANEMIA DUE TO CHRONIC KIDNEY DISEASE TREATED WITH ERYTHROPOIETIN: ICD-10-CM

## 2018-03-22 DIAGNOSIS — N18.9 ANEMIA DUE TO CHRONIC KIDNEY DISEASE TREATED WITH ERYTHROPOIETIN: ICD-10-CM

## 2018-03-22 LAB
BASOPHILS # BLD: 0 K/UL (ref 0–0.2)
BASOPHILS NFR BLD: 2 % (ref 0–2)
DIFFERENTIAL METHOD BLD: ABNORMAL
EOSINOPHIL # BLD: 0 K/UL (ref 0–0.8)
EOSINOPHIL NFR BLD: 1 % (ref 0.5–7.8)
ERYTHROCYTE [DISTWIDTH] IN BLOOD BY AUTOMATED COUNT: 14.2 % (ref 11.9–14.6)
FERRITIN SERPL-MCNC: 187 NG/ML (ref 8–388)
HCT VFR BLD AUTO: 28.2 % (ref 35.8–46.3)
HGB BLD-MCNC: 9.6 G/DL (ref 11.7–15.4)
IRON SATN MFR SERPL: 27 %
IRON SERPL-MCNC: 67 UG/DL (ref 35–150)
LYMPHOCYTES # BLD: 0.6 K/UL (ref 0.5–4.6)
LYMPHOCYTES NFR BLD: 27 % (ref 13–44)
MCH RBC QN AUTO: 36.8 PG (ref 26.1–32.9)
MCHC RBC AUTO-ENTMCNC: 34 G/DL (ref 31.4–35)
MCV RBC AUTO: 108 FL (ref 79.6–97.8)
MONOCYTES # BLD: 0.2 K/UL (ref 0.1–1.3)
MONOCYTES NFR BLD: 8 % (ref 4–12)
NEUTS SEG # BLD: 1.3 K/UL (ref 1.7–8.2)
NEUTS SEG NFR BLD: 62 % (ref 43–78)
NRBC # BLD: 0 K/UL (ref 0–0.2)
PLATELET # BLD AUTO: 186 K/UL (ref 150–450)
PLATELET COMMENTS,PCOM: ADEQUATE
PMV BLD AUTO: 9.3 FL (ref 10.8–14.1)
RBC # BLD AUTO: 2.61 M/UL (ref 4.05–5.25)
RBC MORPH BLD: ABNORMAL
TIBC SERPL-MCNC: 247 UG/DL (ref 250–450)
WBC # BLD AUTO: 2.1 K/UL (ref 4.3–11.1)
WBC MORPH BLD: ABNORMAL

## 2018-03-22 PROCEDURE — 85025 COMPLETE CBC W/AUTO DIFF WBC: CPT | Performed by: INTERNAL MEDICINE

## 2018-03-22 PROCEDURE — 82728 ASSAY OF FERRITIN: CPT | Performed by: INTERNAL MEDICINE

## 2018-03-22 PROCEDURE — 83540 ASSAY OF IRON: CPT | Performed by: INTERNAL MEDICINE

## 2018-03-22 PROCEDURE — 74011250636 HC RX REV CODE- 250/636: Performed by: NURSE PRACTITIONER

## 2018-03-22 PROCEDURE — 96372 THER/PROPH/DIAG INJ SC/IM: CPT

## 2018-03-22 PROCEDURE — 36415 COLL VENOUS BLD VENIPUNCTURE: CPT | Performed by: INTERNAL MEDICINE

## 2018-03-22 RX ADMIN — ERYTHROPOIETIN 40000 UNITS: 40000 INJECTION, SOLUTION INTRAVENOUS; SUBCUTANEOUS at 14:37

## 2018-03-22 NOTE — PROGRESS NOTES
Arrived to the [facility] [address] at the [facility]. Labs and Procrit completed.  Patient tolerated without problems  Any issues or concerns during appointment: no  Patient aware of next infusion appointment on 4/6/18 at 1400  Discharged ambulatory

## 2018-04-05 ENCOUNTER — APPOINTMENT (OUTPATIENT)
Dept: INFUSION THERAPY | Age: 73
End: 2018-04-05

## 2018-04-06 ENCOUNTER — HOSPITAL ENCOUNTER (OUTPATIENT)
Dept: LAB | Age: 73
Discharge: HOME | End: 2018-04-06
Payer: MEDICARE

## 2018-04-06 ENCOUNTER — HOSPITAL ENCOUNTER (OUTPATIENT)
Dept: INFUSION THERAPY | Age: 73
Discharge: HOME | End: 2018-04-06
Payer: MEDICARE

## 2018-04-06 DIAGNOSIS — N18.9 ANEMIA DUE TO CHRONIC KIDNEY DISEASE TREATED WITH ERYTHROPOIETIN: ICD-10-CM

## 2018-04-06 DIAGNOSIS — C79.51 MALIGNANT NEOPLASM METASTATIC TO BONE (HCC): ICD-10-CM

## 2018-04-06 DIAGNOSIS — D63.1 ANEMIA DUE TO CHRONIC KIDNEY DISEASE TREATED WITH ERYTHROPOIETIN: ICD-10-CM

## 2018-04-06 LAB
ALBUMIN SERPL-MCNC: 3.6 G/DL (ref 3.2–4.6)
ALBUMIN/GLOB SERPL: 1 {RATIO} (ref 1.2–3.5)
ALP SERPL-CCNC: 68 U/L (ref 50–136)
ALT SERPL-CCNC: 18 U/L (ref 12–65)
ANION GAP SERPL CALC-SCNC: 8 MMOL/L (ref 7–16)
AST SERPL-CCNC: 16 U/L (ref 15–37)
BASOPHILS # BLD: 0 K/UL (ref 0–0.2)
BASOPHILS NFR BLD MANUAL: 2 % (ref 0–2)
BILIRUB SERPL-MCNC: 0.3 MG/DL (ref 0.2–1.1)
BUN SERPL-MCNC: 27 MG/DL (ref 8–23)
CALCIUM SERPL-MCNC: 8.5 MG/DL (ref 8.3–10.4)
CHLORIDE SERPL-SCNC: 108 MMOL/L (ref 98–107)
CO2 SERPL-SCNC: 25 MMOL/L (ref 21–32)
CREAT SERPL-MCNC: 2.13 MG/DL (ref 0.6–1)
DIFFERENTIAL METHOD BLD: ABNORMAL
EOSINOPHIL # BLD: 0 K/UL (ref 0–0.8)
EOSINOPHIL NFR BLD MANUAL: 2 % (ref 1–8)
ERYTHROCYTE [DISTWIDTH] IN BLOOD BY AUTOMATED COUNT: 14.8 % (ref 11.9–14.6)
FERRITIN SERPL-MCNC: 160 NG/ML (ref 8–388)
GLOBULIN SER CALC-MCNC: 3.5 G/DL (ref 2.3–3.5)
GLUCOSE SERPL-MCNC: 100 MG/DL (ref 65–100)
HCT VFR BLD AUTO: 30.2 % (ref 35.8–46.3)
HGB BLD-MCNC: 9.9 G/DL (ref 11.7–15.4)
IRON SATN MFR SERPL: 26 %
IRON SERPL-MCNC: 70 UG/DL (ref 35–150)
LYMPHOCYTES # BLD: 0.8 K/UL (ref 0.5–4.6)
LYMPHOCYTES NFR BLD MANUAL: 38 % (ref 16–44)
MCH RBC QN AUTO: 36.3 PG (ref 26.1–32.9)
MCHC RBC AUTO-ENTMCNC: 32.8 G/DL (ref 31.4–35)
MCV RBC AUTO: 110.6 FL (ref 79.6–97.8)
MONOCYTES # BLD: 0.3 K/UL (ref 0.1–1.3)
MONOCYTES NFR BLD MANUAL: 14 % (ref 3–9)
NEUTS SEG # BLD: 0.9 K/UL (ref 1.7–8.2)
NEUTS SEG NFR BLD MANUAL: 44 % (ref 47–75)
NRBC # BLD: 0 K/UL (ref 0–0.2)
PLATELET # BLD AUTO: 123 K/UL (ref 150–450)
PLATELET COMMENTS,PCOM: ADEQUATE
PMV BLD AUTO: 9.4 FL (ref 10.8–14.1)
POTASSIUM SERPL-SCNC: 3.9 MMOL/L (ref 3.5–5.1)
PROT SERPL-MCNC: 7.1 G/DL (ref 6.3–8.2)
RBC # BLD AUTO: 2.73 M/UL (ref 4.05–5.25)
RBC MORPH BLD: ABNORMAL
SODIUM SERPL-SCNC: 141 MMOL/L (ref 136–145)
TIBC SERPL-MCNC: 270 UG/DL (ref 250–450)
WBC # BLD AUTO: 2 K/UL (ref 4.3–11.1)
WBC MORPH BLD: ABNORMAL

## 2018-04-06 PROCEDURE — 36415 COLL VENOUS BLD VENIPUNCTURE: CPT | Performed by: INTERNAL MEDICINE

## 2018-04-06 PROCEDURE — 80053 COMPREHEN METABOLIC PANEL: CPT | Performed by: INTERNAL MEDICINE

## 2018-04-06 PROCEDURE — 96372 THER/PROPH/DIAG INJ SC/IM: CPT

## 2018-04-06 PROCEDURE — 83540 ASSAY OF IRON: CPT | Performed by: INTERNAL MEDICINE

## 2018-04-06 PROCEDURE — 85025 COMPLETE CBC W/AUTO DIFF WBC: CPT | Performed by: INTERNAL MEDICINE

## 2018-04-06 PROCEDURE — 74011250636 HC RX REV CODE- 250/636: Performed by: INTERNAL MEDICINE

## 2018-04-06 PROCEDURE — 82728 ASSAY OF FERRITIN: CPT | Performed by: INTERNAL MEDICINE

## 2018-04-06 RX ORDER — CYANOCOBALAMIN 1000 UG/ML
1000 INJECTION, SOLUTION INTRAMUSCULAR; SUBCUTANEOUS ONCE
Status: COMPLETED | OUTPATIENT
Start: 2018-04-06 | End: 2018-04-06

## 2018-04-06 RX ADMIN — ERYTHROPOIETIN 40000 UNITS: 40000 INJECTION, SOLUTION INTRAVENOUS; SUBCUTANEOUS at 15:24

## 2018-04-06 RX ADMIN — DENOSUMAB 120 MG: 120 INJECTION SUBCUTANEOUS at 15:20

## 2018-04-06 RX ADMIN — CYANOCOBALAMIN 1000 MCG: 1000 INJECTION, SOLUTION INTRAMUSCULAR at 15:26

## 2018-04-06 NOTE — PROGRESS NOTES
Pt arrived via wheelchair today at 1500, to receive Procrit, xgeva & Vitamin B-12. Pt tolerated without difficulty. Patient discharged via wheelchair accompanied by daughter. Instructed to notify physician of any problems, questions or concerns. Allowed opportunity for patient/family to ask questions. Verbalized understanding. Next appointment is April 19 at 1600 with Maria Esther Hagen.

## 2018-04-20 ENCOUNTER — HOSPITAL ENCOUNTER (OUTPATIENT)
Dept: INFUSION THERAPY | Age: 73
Discharge: HOME | End: 2018-04-20
Payer: MEDICARE

## 2018-04-20 VITALS
SYSTOLIC BLOOD PRESSURE: 147 MMHG | TEMPERATURE: 98.4 F | OXYGEN SATURATION: 97 % | RESPIRATION RATE: 16 BRPM | WEIGHT: 228.8 LBS | BODY MASS INDEX: 31.91 KG/M2 | HEART RATE: 87 BPM | DIASTOLIC BLOOD PRESSURE: 66 MMHG

## 2018-04-20 LAB
BASOPHILS # BLD: 0.1 K/UL (ref 0–0.2)
BASOPHILS NFR BLD: 3 % (ref 0–2)
DIFFERENTIAL METHOD BLD: ABNORMAL
EOSINOPHIL # BLD: 0 K/UL (ref 0–0.8)
EOSINOPHIL NFR BLD: 0 % (ref 0.5–7.8)
ERYTHROCYTE [DISTWIDTH] IN BLOOD BY AUTOMATED COUNT: 14.2 % (ref 11.9–14.6)
HCT VFR BLD AUTO: 30.9 % (ref 35.8–46.3)
HGB BLD-MCNC: 10.1 G/DL (ref 11.7–15.4)
LYMPHOCYTES # BLD: 0.6 K/UL (ref 0.5–4.6)
LYMPHOCYTES NFR BLD: 24 % (ref 13–44)
MCH RBC QN AUTO: 35.9 PG (ref 26.1–32.9)
MCHC RBC AUTO-ENTMCNC: 32.7 G/DL (ref 31.4–35)
MCV RBC AUTO: 110 FL (ref 79.6–97.8)
MONOCYTES # BLD: 0.2 K/UL (ref 0.1–1.3)
MONOCYTES NFR BLD: 10 % (ref 4–12)
NEUTS SEG # BLD: 1.5 K/UL (ref 1.7–8.2)
NEUTS SEG NFR BLD: 63 % (ref 43–78)
NRBC # BLD: 0 K/UL (ref 0–0.2)
PLATELET # BLD AUTO: 181 K/UL (ref 150–450)
PMV BLD AUTO: 9.5 FL (ref 10.8–14.1)
RBC # BLD AUTO: 2.81 M/UL (ref 4.05–5.25)
WBC # BLD AUTO: 2.3 K/UL (ref 4.3–11.1)

## 2018-04-20 PROCEDURE — 85025 COMPLETE CBC W/AUTO DIFF WBC: CPT | Performed by: INTERNAL MEDICINE

## 2018-04-20 PROCEDURE — 36415 COLL VENOUS BLD VENIPUNCTURE: CPT | Performed by: INTERNAL MEDICINE

## 2018-04-20 NOTE — PROGRESS NOTES
Hgb resulted as 10.1 today. Procrit held per treatment plan parameters. Patient aware of next appt scheduled on 5/3/18 at 11:15am. Patient not seen in infusion center.

## 2018-05-03 ENCOUNTER — HOSPITAL ENCOUNTER (OUTPATIENT)
Dept: LAB | Age: 73
Discharge: HOME | End: 2018-05-03
Payer: MEDICARE

## 2018-05-03 ENCOUNTER — HOSPITAL ENCOUNTER (OUTPATIENT)
Dept: INFUSION THERAPY | Age: 73
Discharge: HOME | End: 2018-05-03
Payer: MEDICARE

## 2018-05-03 DIAGNOSIS — C79.51 MALIGNANT NEOPLASM METASTATIC TO BONE (HCC): ICD-10-CM

## 2018-05-03 DIAGNOSIS — N18.9 ANEMIA DUE TO CHRONIC KIDNEY DISEASE TREATED WITH ERYTHROPOIETIN: ICD-10-CM

## 2018-05-03 DIAGNOSIS — E87.5 HYPERKALEMIA: ICD-10-CM

## 2018-05-03 DIAGNOSIS — D63.1 ANEMIA DUE TO CHRONIC KIDNEY DISEASE TREATED WITH ERYTHROPOIETIN: ICD-10-CM

## 2018-05-03 LAB
ALBUMIN SERPL-MCNC: 3.4 G/DL (ref 3.2–4.6)
ALBUMIN/GLOB SERPL: 1 {RATIO} (ref 1.2–3.5)
ALP SERPL-CCNC: 65 U/L (ref 50–136)
ALT SERPL-CCNC: 16 U/L (ref 12–65)
ANION GAP SERPL CALC-SCNC: 6 MMOL/L (ref 7–16)
AST SERPL-CCNC: 17 U/L (ref 15–37)
BASOPHILS # BLD: 0.1 K/UL (ref 0–0.2)
BASOPHILS NFR BLD: 3 % (ref 0–2)
BILIRUB SERPL-MCNC: 0.3 MG/DL (ref 0.2–1.1)
BUN SERPL-MCNC: 26 MG/DL (ref 8–23)
CALCIUM SERPL-MCNC: 8.3 MG/DL (ref 8.3–10.4)
CHLORIDE SERPL-SCNC: 105 MMOL/L (ref 98–107)
CO2 SERPL-SCNC: 28 MMOL/L (ref 21–32)
CREAT SERPL-MCNC: 2.39 MG/DL (ref 0.6–1)
DIFFERENTIAL METHOD BLD: ABNORMAL
EOSINOPHIL # BLD: 0 K/UL (ref 0–0.8)
EOSINOPHIL NFR BLD: 1 % (ref 0.5–7.8)
ERYTHROCYTE [DISTWIDTH] IN BLOOD BY AUTOMATED COUNT: 13.6 % (ref 11.9–14.6)
FERRITIN SERPL-MCNC: 165 NG/ML (ref 8–388)
GLOBULIN SER CALC-MCNC: 3.5 G/DL (ref 2.3–3.5)
GLUCOSE SERPL-MCNC: 122 MG/DL (ref 65–100)
HCT VFR BLD AUTO: 29.2 % (ref 35.8–46.3)
HGB BLD-MCNC: 9.7 G/DL (ref 11.7–15.4)
IRON SATN MFR SERPL: 31 %
IRON SERPL-MCNC: 78 UG/DL (ref 35–150)
LYMPHOCYTES # BLD: 0.7 K/UL (ref 0.5–4.6)
LYMPHOCYTES NFR BLD: 29 % (ref 13–44)
MCH RBC QN AUTO: 35.8 PG (ref 26.1–32.9)
MCHC RBC AUTO-ENTMCNC: 33.2 G/DL (ref 31.4–35)
MCV RBC AUTO: 107.7 FL (ref 79.6–97.8)
MONOCYTES # BLD: 0.2 K/UL (ref 0.1–1.3)
MONOCYTES NFR BLD: 8 % (ref 4–12)
NEUTS SEG # BLD: 1.4 K/UL (ref 1.7–8.2)
NEUTS SEG NFR BLD: 59 % (ref 43–78)
NRBC # BLD: 0 K/UL (ref 0–0.2)
PLATELET # BLD AUTO: 171 K/UL (ref 150–450)
PMV BLD AUTO: 9.5 FL (ref 10.8–14.1)
POTASSIUM SERPL-SCNC: 4 MMOL/L (ref 3.5–5.1)
PROT SERPL-MCNC: 6.9 G/DL (ref 6.3–8.2)
RBC # BLD AUTO: 2.71 M/UL (ref 4.05–5.25)
SODIUM SERPL-SCNC: 139 MMOL/L (ref 136–145)
TIBC SERPL-MCNC: 252 UG/DL (ref 250–450)
WBC # BLD AUTO: 2.4 K/UL (ref 4.3–11.1)

## 2018-05-03 PROCEDURE — 74011250636 HC RX REV CODE- 250/636: Performed by: INTERNAL MEDICINE

## 2018-05-03 PROCEDURE — 80053 COMPREHEN METABOLIC PANEL: CPT | Performed by: INTERNAL MEDICINE

## 2018-05-03 PROCEDURE — 85025 COMPLETE CBC W/AUTO DIFF WBC: CPT | Performed by: INTERNAL MEDICINE

## 2018-05-03 PROCEDURE — 36415 COLL VENOUS BLD VENIPUNCTURE: CPT | Performed by: INTERNAL MEDICINE

## 2018-05-03 PROCEDURE — 82728 ASSAY OF FERRITIN: CPT | Performed by: INTERNAL MEDICINE

## 2018-05-03 PROCEDURE — 83540 ASSAY OF IRON: CPT | Performed by: INTERNAL MEDICINE

## 2018-05-03 PROCEDURE — 96372 THER/PROPH/DIAG INJ SC/IM: CPT

## 2018-05-03 RX ORDER — CYANOCOBALAMIN 1000 UG/ML
1000 INJECTION, SOLUTION INTRAMUSCULAR; SUBCUTANEOUS ONCE
Status: COMPLETED | OUTPATIENT
Start: 2018-05-03 | End: 2018-05-03

## 2018-05-03 RX ADMIN — CYANOCOBALAMIN 1000 MCG: 1000 INJECTION, SOLUTION INTRAMUSCULAR at 11:35

## 2018-05-03 RX ADMIN — DENOSUMAB 120 MG: 120 INJECTION SUBCUTANEOUS at 11:35

## 2018-05-03 RX ADMIN — ERYTHROPOIETIN 40000 UNITS: 40000 INJECTION, SOLUTION INTRAVENOUS; SUBCUTANEOUS at 11:35

## 2018-05-17 ENCOUNTER — HOSPITAL ENCOUNTER (OUTPATIENT)
Dept: INFUSION THERAPY | Age: 73
Discharge: HOME | End: 2018-05-17
Payer: MEDICARE

## 2018-05-17 VITALS
RESPIRATION RATE: 16 BRPM | TEMPERATURE: 98 F | DIASTOLIC BLOOD PRESSURE: 63 MMHG | OXYGEN SATURATION: 98 % | SYSTOLIC BLOOD PRESSURE: 142 MMHG | HEART RATE: 76 BPM | WEIGHT: 234.2 LBS | BODY MASS INDEX: 32.66 KG/M2

## 2018-05-17 DIAGNOSIS — D63.1 ANEMIA DUE TO CHRONIC KIDNEY DISEASE TREATED WITH ERYTHROPOIETIN: ICD-10-CM

## 2018-05-17 DIAGNOSIS — N18.9 ANEMIA DUE TO CHRONIC KIDNEY DISEASE TREATED WITH ERYTHROPOIETIN: ICD-10-CM

## 2018-05-17 LAB — HGB BLD-MCNC: 9.8 G/DL (ref 11.7–15.4)

## 2018-05-17 PROCEDURE — 36415 COLL VENOUS BLD VENIPUNCTURE: CPT | Performed by: INTERNAL MEDICINE

## 2018-05-17 PROCEDURE — 74011250636 HC RX REV CODE- 250/636: Performed by: INTERNAL MEDICINE

## 2018-05-17 PROCEDURE — 85018 HEMOGLOBIN: CPT | Performed by: INTERNAL MEDICINE

## 2018-05-17 PROCEDURE — 96372 THER/PROPH/DIAG INJ SC/IM: CPT

## 2018-05-17 RX ADMIN — ERYTHROPOIETIN 40000 UNITS: 40000 INJECTION, SOLUTION INTRAVENOUS; SUBCUTANEOUS at 14:03

## 2018-05-17 NOTE — PROGRESS NOTES
Arrived to the [facility]. procrit completed. Patient tolerated well. Any issues or concerns during appointment: no.  Patient aware of next infusion appointment on 5/31 (date) at 8 (time). Discharged home.

## 2018-05-31 ENCOUNTER — HOSPITAL ENCOUNTER (OUTPATIENT)
Dept: INFUSION THERAPY | Age: 73
Discharge: HOME | End: 2018-05-31
Payer: MEDICARE

## 2018-05-31 ENCOUNTER — HOSPITAL ENCOUNTER (OUTPATIENT)
Dept: LAB | Age: 73
Discharge: HOME | End: 2018-05-31
Payer: MEDICARE

## 2018-05-31 DIAGNOSIS — C79.51 MALIGNANT NEOPLASM METASTATIC TO BONE (HCC): ICD-10-CM

## 2018-05-31 LAB
ALBUMIN SERPL-MCNC: 3.7 G/DL (ref 3.2–4.6)
ALBUMIN/GLOB SERPL: 1.1 {RATIO} (ref 1.2–3.5)
ALP SERPL-CCNC: 56 U/L (ref 50–136)
ALT SERPL-CCNC: 15 U/L (ref 12–65)
ANION GAP SERPL CALC-SCNC: 6 MMOL/L (ref 7–16)
AST SERPL-CCNC: 17 U/L (ref 15–37)
BASOPHILS # BLD: 0.1 K/UL (ref 0–0.2)
BASOPHILS NFR BLD: 2 % (ref 0–2)
BILIRUB SERPL-MCNC: 0.6 MG/DL (ref 0.2–1.1)
BUN SERPL-MCNC: 20 MG/DL (ref 8–23)
CALCIUM SERPL-MCNC: 8.2 MG/DL (ref 8.3–10.4)
CHLORIDE SERPL-SCNC: 107 MMOL/L (ref 98–107)
CO2 SERPL-SCNC: 27 MMOL/L (ref 21–32)
CREAT SERPL-MCNC: 2.16 MG/DL (ref 0.6–1)
DIFFERENTIAL METHOD BLD: ABNORMAL
EOSINOPHIL # BLD: 0 K/UL (ref 0–0.8)
EOSINOPHIL NFR BLD: 2 % (ref 0.5–7.8)
ERYTHROCYTE [DISTWIDTH] IN BLOOD BY AUTOMATED COUNT: 14 % (ref 11.9–14.6)
FERRITIN SERPL-MCNC: 136 NG/ML (ref 8–388)
GLOBULIN SER CALC-MCNC: 3.5 G/DL (ref 2.3–3.5)
GLUCOSE SERPL-MCNC: 90 MG/DL (ref 65–100)
HCT VFR BLD AUTO: 33.3 % (ref 35.8–46.3)
HGB BLD-MCNC: 10.8 G/DL (ref 11.7–15.4)
IRON SATN MFR SERPL: 43 %
IRON SERPL-MCNC: 112 UG/DL (ref 35–150)
LYMPHOCYTES # BLD: 0.6 K/UL (ref 0.5–4.6)
LYMPHOCYTES NFR BLD: 25 % (ref 13–44)
MCH RBC QN AUTO: 35.1 PG (ref 26.1–32.9)
MCHC RBC AUTO-ENTMCNC: 32.4 G/DL (ref 31.4–35)
MCV RBC AUTO: 108.1 FL (ref 79.6–97.8)
MONOCYTES # BLD: 0.1 K/UL (ref 0.1–1.3)
MONOCYTES NFR BLD: 4 % (ref 4–12)
NEUTS SEG # BLD: 1.5 K/UL (ref 1.7–8.2)
NEUTS SEG NFR BLD: 67 % (ref 43–78)
NRBC # BLD: 0 K/UL (ref 0–0.2)
PLATELET # BLD AUTO: 155 K/UL (ref 150–450)
PMV BLD AUTO: 9.7 FL (ref 10.8–14.1)
POTASSIUM SERPL-SCNC: 4 MMOL/L (ref 3.5–5.1)
PROT SERPL-MCNC: 7.2 G/DL (ref 6.3–8.2)
RBC # BLD AUTO: 3.08 M/UL (ref 4.05–5.25)
SODIUM SERPL-SCNC: 140 MMOL/L (ref 136–145)
TIBC SERPL-MCNC: 260 UG/DL (ref 250–450)
WBC # BLD AUTO: 2.2 K/UL (ref 4.3–11.1)

## 2018-05-31 PROCEDURE — 80053 COMPREHEN METABOLIC PANEL: CPT | Performed by: INTERNAL MEDICINE

## 2018-05-31 PROCEDURE — 85025 COMPLETE CBC W/AUTO DIFF WBC: CPT | Performed by: INTERNAL MEDICINE

## 2018-05-31 PROCEDURE — 74011250636 HC RX REV CODE- 250/636: Performed by: INTERNAL MEDICINE

## 2018-05-31 PROCEDURE — 36415 COLL VENOUS BLD VENIPUNCTURE: CPT | Performed by: INTERNAL MEDICINE

## 2018-05-31 PROCEDURE — 82728 ASSAY OF FERRITIN: CPT | Performed by: INTERNAL MEDICINE

## 2018-05-31 PROCEDURE — 83540 ASSAY OF IRON: CPT | Performed by: INTERNAL MEDICINE

## 2018-05-31 PROCEDURE — 96372 THER/PROPH/DIAG INJ SC/IM: CPT

## 2018-05-31 RX ORDER — CYANOCOBALAMIN 1000 UG/ML
1000 INJECTION, SOLUTION INTRAMUSCULAR; SUBCUTANEOUS ONCE
Status: COMPLETED | OUTPATIENT
Start: 2018-05-31 | End: 2018-05-31

## 2018-05-31 RX ADMIN — CYANOCOBALAMIN 1000 MCG: 1000 INJECTION, SOLUTION INTRAMUSCULAR at 11:35

## 2018-05-31 RX ADMIN — DENOSUMAB 120 MG: 120 INJECTION SUBCUTANEOUS at 11:35

## 2018-05-31 NOTE — PROGRESS NOTES
Arrived to the [facility]. Xgeva and Vitamin B12 completed. Patient tolerated well.   Any issues or concerns during appointment:No  Patient aware of next infusion appointment on Thursday,June 14th @ [phone]  Discharged via wheelchair accompanied by domenico

## 2018-06-14 ENCOUNTER — HOSPITAL ENCOUNTER (OUTPATIENT)
Dept: INFUSION THERAPY | Age: 73
Discharge: HOME | End: 2018-06-14
Payer: MEDICARE

## 2018-06-14 VITALS
SYSTOLIC BLOOD PRESSURE: 160 MMHG | OXYGEN SATURATION: 96 % | HEART RATE: 76 BPM | TEMPERATURE: 98.2 F | RESPIRATION RATE: 16 BRPM | DIASTOLIC BLOOD PRESSURE: 72 MMHG | BODY MASS INDEX: 32.39 KG/M2 | WEIGHT: 232.2 LBS

## 2018-06-14 DIAGNOSIS — N18.9 ANEMIA DUE TO CHRONIC KIDNEY DISEASE TREATED WITH ERYTHROPOIETIN: ICD-10-CM

## 2018-06-14 DIAGNOSIS — D63.1 ANEMIA DUE TO CHRONIC KIDNEY DISEASE TREATED WITH ERYTHROPOIETIN: ICD-10-CM

## 2018-06-14 LAB
HCT VFR BLD AUTO: 29.9 % (ref 35.8–46.3)
HGB BLD-MCNC: 9.8 G/DL (ref 11.7–15.4)

## 2018-06-14 PROCEDURE — 96372 THER/PROPH/DIAG INJ SC/IM: CPT

## 2018-06-14 PROCEDURE — 85018 HEMOGLOBIN: CPT | Performed by: INTERNAL MEDICINE

## 2018-06-14 PROCEDURE — 74011250636 HC RX REV CODE- 250/636: Performed by: INTERNAL MEDICINE

## 2018-06-14 PROCEDURE — 36415 COLL VENOUS BLD VENIPUNCTURE: CPT | Performed by: INTERNAL MEDICINE

## 2018-06-14 RX ADMIN — ERYTHROPOIETIN 40000 UNITS: 40000 INJECTION, SOLUTION INTRAVENOUS; SUBCUTANEOUS at 14:25

## 2018-06-14 NOTE — PROGRESS NOTES
Pt arrived via wheelchair today at [phone], to receive Procrit. Pt tolerated without difficulty. Patient discharged via wheelchair accompanied by daughter. Instructed to notify physician of any problems, questions or concerns. Allowed opportunity for patient/family to ask questions. Verbalized understanding. Next appointment is June 28 at [phone] with Maria Esther Hagen.

## 2018-06-28 ENCOUNTER — HOSPITAL ENCOUNTER (OUTPATIENT)
Dept: INFUSION THERAPY | Age: 73
Discharge: HOME | End: 2018-06-28
Payer: MEDICARE

## 2018-06-28 ENCOUNTER — HOSPITAL ENCOUNTER (OUTPATIENT)
Dept: LAB | Age: 73
Discharge: HOME | End: 2018-06-28
Payer: MEDICARE

## 2018-06-28 DIAGNOSIS — C79.51 MALIGNANT NEOPLASM METASTATIC TO BONE (HCC): ICD-10-CM

## 2018-06-28 LAB
ALBUMIN SERPL-MCNC: 3.5 G/DL (ref 3.2–4.6)
ALBUMIN/GLOB SERPL: 1.1 {RATIO} (ref 1.2–3.5)
ALP SERPL-CCNC: 53 U/L (ref 50–136)
ALT SERPL-CCNC: 13 U/L (ref 12–65)
ANION GAP SERPL CALC-SCNC: 7 MMOL/L (ref 7–16)
AST SERPL-CCNC: 15 U/L (ref 15–37)
BASOPHILS # BLD: 0.1 K/UL (ref 0–0.2)
BASOPHILS NFR BLD: 3 % (ref 0–2)
BILIRUB SERPL-MCNC: 0.7 MG/DL (ref 0.2–1.1)
BUN SERPL-MCNC: 17 MG/DL (ref 8–23)
CALCIUM SERPL-MCNC: 7.6 MG/DL (ref 8.3–10.4)
CHLORIDE SERPL-SCNC: 109 MMOL/L (ref 98–107)
CO2 SERPL-SCNC: 24 MMOL/L (ref 21–32)
CREAT SERPL-MCNC: 1.91 MG/DL (ref 0.6–1)
DIFFERENTIAL METHOD BLD: ABNORMAL
EOSINOPHIL # BLD: 0 K/UL (ref 0–0.8)
EOSINOPHIL NFR BLD: 1 % (ref 0.5–7.8)
ERYTHROCYTE [DISTWIDTH] IN BLOOD BY AUTOMATED COUNT: 15.3 % (ref 11.9–14.6)
FERRITIN SERPL-MCNC: 130 NG/ML (ref 8–388)
GLOBULIN SER CALC-MCNC: 3.1 G/DL (ref 2.3–3.5)
GLUCOSE SERPL-MCNC: 85 MG/DL (ref 65–100)
HCT VFR BLD AUTO: 31.3 % (ref 35.8–46.3)
HGB BLD-MCNC: 10.4 G/DL (ref 11.7–15.4)
IRON SATN MFR SERPL: 45 %
IRON SERPL-MCNC: 115 UG/DL (ref 35–150)
LYMPHOCYTES # BLD: 0.7 K/UL (ref 0.5–4.6)
LYMPHOCYTES NFR BLD: 33 % (ref 13–44)
MCH RBC QN AUTO: 35.1 PG (ref 26.1–32.9)
MCHC RBC AUTO-ENTMCNC: 33.2 G/DL (ref 31.4–35)
MCV RBC AUTO: 105.7 FL (ref 79.6–97.8)
MONOCYTES # BLD: 0.2 K/UL (ref 0.1–1.3)
MONOCYTES NFR BLD: 8 % (ref 4–12)
NEUTS SEG # BLD: 1.1 K/UL (ref 1.7–8.2)
NEUTS SEG NFR BLD: 56 % (ref 43–78)
NRBC # BLD: 0 K/UL (ref 0–0.2)
PLATELET # BLD AUTO: 143 K/UL (ref 150–450)
PMV BLD AUTO: 10.1 FL (ref 10.8–14.1)
POTASSIUM SERPL-SCNC: 4 MMOL/L (ref 3.5–5.1)
PROT SERPL-MCNC: 6.6 G/DL (ref 6.3–8.2)
RBC # BLD AUTO: 2.96 M/UL (ref 4.05–5.25)
SODIUM SERPL-SCNC: 140 MMOL/L (ref 136–145)
TIBC SERPL-MCNC: 253 UG/DL (ref 250–450)
WBC # BLD AUTO: 2 K/UL (ref 4.3–11.1)

## 2018-06-28 PROCEDURE — 82728 ASSAY OF FERRITIN: CPT | Performed by: INTERNAL MEDICINE

## 2018-06-28 PROCEDURE — 80053 COMPREHEN METABOLIC PANEL: CPT | Performed by: INTERNAL MEDICINE

## 2018-06-28 PROCEDURE — 96372 THER/PROPH/DIAG INJ SC/IM: CPT

## 2018-06-28 PROCEDURE — 83540 ASSAY OF IRON: CPT | Performed by: INTERNAL MEDICINE

## 2018-06-28 PROCEDURE — 85025 COMPLETE CBC W/AUTO DIFF WBC: CPT | Performed by: INTERNAL MEDICINE

## 2018-06-28 PROCEDURE — 36415 COLL VENOUS BLD VENIPUNCTURE: CPT | Performed by: INTERNAL MEDICINE

## 2018-06-28 PROCEDURE — 74011250636 HC RX REV CODE- 250/636: Performed by: NURSE PRACTITIONER

## 2018-06-28 RX ORDER — CYANOCOBALAMIN 1000 UG/ML
1000 INJECTION, SOLUTION INTRAMUSCULAR; SUBCUTANEOUS ONCE
Status: COMPLETED | OUTPATIENT
Start: 2018-06-28 | End: 2018-06-28

## 2018-06-28 RX ADMIN — CYANOCOBALAMIN 1000 MCG: 1000 INJECTION, SOLUTION INTRAMUSCULAR at 13:37

## 2018-06-28 RX ADMIN — DENOSUMAB 120 MG: 120 INJECTION SUBCUTANEOUS at 13:37

## 2018-06-28 NOTE — PROGRESS NOTES
Arrived to the [facility]. VitB12, and xgeva injections completed. Patient tolerated well. Any issues or concerns during appointment: None. Patient aware of next infusion appointment on July 12th at 1315. Discharged in wheelchair.

## 2018-07-12 ENCOUNTER — HOSPITAL ENCOUNTER (OUTPATIENT)
Dept: INFUSION THERAPY | Age: 73
Discharge: HOME | End: 2018-07-12
Payer: MEDICARE

## 2018-07-12 VITALS
DIASTOLIC BLOOD PRESSURE: 70 MMHG | WEIGHT: 234.2 LBS | RESPIRATION RATE: 16 BRPM | SYSTOLIC BLOOD PRESSURE: 148 MMHG | TEMPERATURE: 99 F | HEART RATE: 86 BPM | OXYGEN SATURATION: 95 % | BODY MASS INDEX: 32.66 KG/M2

## 2018-07-12 DIAGNOSIS — D63.1 ANEMIA DUE TO CHRONIC KIDNEY DISEASE TREATED WITH ERYTHROPOIETIN: ICD-10-CM

## 2018-07-12 DIAGNOSIS — N18.9 ANEMIA DUE TO CHRONIC KIDNEY DISEASE TREATED WITH ERYTHROPOIETIN: ICD-10-CM

## 2018-07-12 LAB — HGB BLD-MCNC: 9.5 G/DL (ref 11.7–15.4)

## 2018-07-12 PROCEDURE — 85018 HEMOGLOBIN: CPT | Performed by: INTERNAL MEDICINE

## 2018-07-12 PROCEDURE — 74011250636 HC RX REV CODE- 250/636: Performed by: NURSE PRACTITIONER

## 2018-07-12 PROCEDURE — 96372 THER/PROPH/DIAG INJ SC/IM: CPT

## 2018-07-12 PROCEDURE — 36415 COLL VENOUS BLD VENIPUNCTURE: CPT | Performed by: INTERNAL MEDICINE

## 2018-07-12 RX ADMIN — ERYTHROPOIETIN 40000 UNITS: 40000 INJECTION, SOLUTION INTRAVENOUS; SUBCUTANEOUS at 13:10

## 2018-07-12 NOTE — PROGRESS NOTES
Arrived to the [facility]. Procrit completed. Patient tolerated well  Any issues or concerns during appointment: No  Patient aware of next infusion appointment on Thursday,July 26th @ 1400  Discharged home via wheelchair.

## 2018-07-26 ENCOUNTER — HOSPITAL ENCOUNTER (OUTPATIENT)
Dept: INFUSION THERAPY | Age: 73
Discharge: HOME | End: 2018-07-26
Payer: MEDICARE

## 2018-07-26 ENCOUNTER — HOSPITAL ENCOUNTER (OUTPATIENT)
Dept: LAB | Age: 73
Discharge: HOME | End: 2018-07-26
Payer: MEDICARE

## 2018-07-26 DIAGNOSIS — C79.51 MALIGNANT NEOPLASM METASTATIC TO BONE (HCC): ICD-10-CM

## 2018-07-26 DIAGNOSIS — C50.919 INFILTRATING DUCTAL CARCINOMA OF FEMALE BREAST, UNSPECIFIED LATERALITY (HCC): ICD-10-CM

## 2018-07-26 DIAGNOSIS — D63.1 ANEMIA DUE TO CHRONIC KIDNEY DISEASE TREATED WITH ERYTHROPOIETIN: ICD-10-CM

## 2018-07-26 DIAGNOSIS — N18.9 ANEMIA DUE TO CHRONIC KIDNEY DISEASE TREATED WITH ERYTHROPOIETIN: ICD-10-CM

## 2018-07-26 LAB
ALBUMIN SERPL-MCNC: 3.3 G/DL (ref 3.2–4.6)
ALBUMIN/GLOB SERPL: 1 {RATIO} (ref 1.2–3.5)
ALP SERPL-CCNC: 67 U/L (ref 50–136)
ALT SERPL-CCNC: 21 U/L (ref 12–65)
ANION GAP SERPL CALC-SCNC: 8 MMOL/L (ref 7–16)
AST SERPL-CCNC: 20 U/L (ref 15–37)
BILIRUB SERPL-MCNC: 0.6 MG/DL (ref 0.2–1.1)
BUN SERPL-MCNC: 25 MG/DL (ref 8–23)
CALCIUM SERPL-MCNC: 7.2 MG/DL (ref 8.3–10.4)
CHLORIDE SERPL-SCNC: 105 MMOL/L (ref 98–107)
CO2 SERPL-SCNC: 26 MMOL/L (ref 21–32)
CREAT SERPL-MCNC: 2.45 MG/DL (ref 0.6–1)
DIFFERENTIAL METHOD BLD: ABNORMAL
EOSINOPHIL # BLD: 0 K/UL (ref 0–0.8)
EOSINOPHIL NFR BLD MANUAL: 2 % (ref 1–8)
ERYTHROCYTE [DISTWIDTH] IN BLOOD BY AUTOMATED COUNT: 15.6 % (ref 11.9–14.6)
FERRITIN SERPL-MCNC: 209 NG/ML (ref 8–388)
GLOBULIN SER CALC-MCNC: 3.2 G/DL (ref 2.3–3.5)
GLUCOSE SERPL-MCNC: 92 MG/DL (ref 65–100)
HCT VFR BLD AUTO: 30.4 % (ref 35.8–46.3)
HGB BLD-MCNC: 9.8 G/DL (ref 11.7–15.4)
IRON SATN MFR SERPL: 32 %
IRON SERPL-MCNC: 69 UG/DL (ref 35–150)
LYMPHOCYTES # BLD: 0.4 K/UL (ref 0.5–4.6)
LYMPHOCYTES NFR BLD MANUAL: 24 % (ref 16–44)
MCH RBC QN AUTO: 34.3 PG (ref 26.1–32.9)
MCHC RBC AUTO-ENTMCNC: 32.2 G/DL (ref 31.4–35)
MCV RBC AUTO: 106.3 FL (ref 79.6–97.8)
MONOCYTES # BLD: 0.1 K/UL (ref 0.1–1.3)
MONOCYTES NFR BLD MANUAL: 4 % (ref 3–9)
NEUTS BAND NFR BLD MANUAL: 10 % (ref 0–10)
NEUTS SEG # BLD: 1.2 K/UL (ref 1.7–8.2)
NEUTS SEG NFR BLD MANUAL: 60 % (ref 47–75)
NRBC # BLD: 0.01 K/UL (ref 0–0.2)
PLATELET # BLD AUTO: 121 K/UL (ref 150–450)
PLATELET COMMENTS,PCOM: SLIGHT
PMV BLD AUTO: 9.1 FL (ref 10.8–14.1)
POTASSIUM SERPL-SCNC: 3.9 MMOL/L (ref 3.5–5.1)
PROT SERPL-MCNC: 6.5 G/DL (ref 6.3–8.2)
RBC # BLD AUTO: 2.86 M/UL (ref 4.05–5.25)
RBC MORPH BLD: ABNORMAL
SODIUM SERPL-SCNC: 139 MMOL/L (ref 136–145)
TIBC SERPL-MCNC: 216 UG/DL (ref 250–450)
WBC # BLD AUTO: 1.7 K/UL (ref 4.3–11.1)
WBC MORPH BLD: ABNORMAL

## 2018-07-26 PROCEDURE — 96372 THER/PROPH/DIAG INJ SC/IM: CPT

## 2018-07-26 PROCEDURE — 85025 COMPLETE CBC W/AUTO DIFF WBC: CPT | Performed by: INTERNAL MEDICINE

## 2018-07-26 PROCEDURE — 80053 COMPREHEN METABOLIC PANEL: CPT | Performed by: INTERNAL MEDICINE

## 2018-07-26 PROCEDURE — 82728 ASSAY OF FERRITIN: CPT | Performed by: INTERNAL MEDICINE

## 2018-07-26 PROCEDURE — 83540 ASSAY OF IRON: CPT | Performed by: INTERNAL MEDICINE

## 2018-07-26 PROCEDURE — 74011250636 HC RX REV CODE- 250/636: Performed by: NURSE PRACTITIONER

## 2018-07-26 PROCEDURE — 36415 COLL VENOUS BLD VENIPUNCTURE: CPT | Performed by: INTERNAL MEDICINE

## 2018-07-26 PROCEDURE — 74011250636 HC RX REV CODE- 250/636: Performed by: INTERNAL MEDICINE

## 2018-07-26 RX ORDER — CYANOCOBALAMIN 1000 UG/ML
1000 INJECTION, SOLUTION INTRAMUSCULAR; SUBCUTANEOUS ONCE
Status: DISPENSED | OUTPATIENT
Start: 2018-07-26 | End: 2018-07-27

## 2018-07-26 RX ADMIN — DENOSUMAB 120 MG: 120 INJECTION SUBCUTANEOUS at 14:35

## 2018-07-26 RX ADMIN — ERYTHROPOIETIN 40000 UNITS: 40000 INJECTION, SOLUTION INTRAVENOUS; SUBCUTANEOUS at 14:33

## 2018-07-26 NOTE — PROGRESS NOTES
Arrived to infusion  No concerns ,seen in [facility]   Vit b12,procrit,and xgeva administered  Tolerated well

## 2018-08-07 ENCOUNTER — HOSPITAL ENCOUNTER (INPATIENT)
Age: 73
LOS: 7 days | Discharge: HOME HEALTH | DRG: 478 | End: 2018-08-14
Attending: EMERGENCY MEDICINE | Admitting: INTERNAL MEDICINE
Payer: MEDICARE

## 2018-08-07 ENCOUNTER — APPOINTMENT (OUTPATIENT)
Dept: GENERAL RADIOLOGY | Age: 73
DRG: 478 | End: 2018-08-07
Attending: EMERGENCY MEDICINE
Payer: MEDICARE

## 2018-08-07 ENCOUNTER — APPOINTMENT (OUTPATIENT)
Dept: GENERAL RADIOLOGY | Age: 73
DRG: 478 | End: 2018-08-07
Attending: NURSE PRACTITIONER
Payer: MEDICARE

## 2018-08-07 DIAGNOSIS — S72.002A FRACTURE, PROXIMAL FEMUR, LEFT, CLOSED, INITIAL ENCOUNTER (HCC): Primary | ICD-10-CM

## 2018-08-07 DIAGNOSIS — C50.919 METASTATIC BREAST CANCER (HCC): ICD-10-CM

## 2018-08-07 DIAGNOSIS — E83.51 HYPOCALCEMIA: ICD-10-CM

## 2018-08-07 DIAGNOSIS — E87.6 HYPOKALEMIA: ICD-10-CM

## 2018-08-07 PROBLEM — S72.92XA FEMUR FRACTURE, LEFT (HCC): Status: ACTIVE | Noted: 2018-08-07

## 2018-08-07 LAB
ANION GAP SERPL CALC-SCNC: 12 MMOL/L (ref 7–16)
BUN SERPL-MCNC: 16 MG/DL (ref 8–23)
CALCIUM SERPL-MCNC: 6.6 MG/DL (ref 8.3–10.4)
CHLORIDE SERPL-SCNC: 110 MMOL/L (ref 98–107)
CO2 SERPL-SCNC: 24 MMOL/L (ref 21–32)
CREAT SERPL-MCNC: 1.67 MG/DL (ref 0.6–1)
ERYTHROCYTE [DISTWIDTH] IN BLOOD BY AUTOMATED COUNT: 15.9 %
GLUCOSE SERPL-MCNC: 107 MG/DL (ref 65–100)
HCT VFR BLD AUTO: 32.4 % (ref 35.8–46.3)
HGB BLD-MCNC: 10.3 G/DL (ref 11.7–15.4)
INR PPP: 1.1
MAGNESIUM SERPL-MCNC: 1.5 MG/DL (ref 1.8–2.4)
MCH RBC QN AUTO: 34.4 PG (ref 26.1–32.9)
MCHC RBC AUTO-ENTMCNC: 31.8 G/DL (ref 31.4–35)
MCV RBC AUTO: 108.4 FL (ref 79.6–97.8)
NRBC # BLD: 0 K/UL (ref 0–0.2)
PLATELET # BLD AUTO: 181 K/UL (ref 150–450)
PMV BLD AUTO: 9.9 FL (ref 9.4–12.3)
POTASSIUM SERPL-SCNC: 3.1 MMOL/L (ref 3.5–5.1)
PROTHROMBIN TIME: 14 SEC (ref 11.5–14.5)
RBC # BLD AUTO: 2.99 M/UL (ref 4.05–5.2)
SODIUM SERPL-SCNC: 146 MMOL/L (ref 136–145)
WBC # BLD AUTO: 2.6 K/UL (ref 4.3–11.1)

## 2018-08-07 PROCEDURE — 74011250636 HC RX REV CODE- 250/636: Performed by: EMERGENCY MEDICINE

## 2018-08-07 PROCEDURE — 99285 EMERGENCY DEPT VISIT HI MDM: CPT | Performed by: EMERGENCY MEDICINE

## 2018-08-07 PROCEDURE — 73502 X-RAY EXAM HIP UNI 2-3 VIEWS: CPT

## 2018-08-07 PROCEDURE — 74011250636 HC RX REV CODE- 250/636: Performed by: INTERNAL MEDICINE

## 2018-08-07 PROCEDURE — 74011250637 HC RX REV CODE- 250/637: Performed by: NURSE PRACTITIONER

## 2018-08-07 PROCEDURE — 96375 TX/PRO/DX INJ NEW DRUG ADDON: CPT | Performed by: EMERGENCY MEDICINE

## 2018-08-07 PROCEDURE — 96374 THER/PROPH/DIAG INJ IV PUSH: CPT | Performed by: EMERGENCY MEDICINE

## 2018-08-07 PROCEDURE — 71045 X-RAY EXAM CHEST 1 VIEW: CPT

## 2018-08-07 PROCEDURE — 83735 ASSAY OF MAGNESIUM: CPT

## 2018-08-07 PROCEDURE — 72170 X-RAY EXAM OF PELVIS: CPT

## 2018-08-07 PROCEDURE — 93005 ELECTROCARDIOGRAM TRACING: CPT | Performed by: EMERGENCY MEDICINE

## 2018-08-07 PROCEDURE — 86900 BLOOD TYPING SEROLOGIC ABO: CPT

## 2018-08-07 PROCEDURE — 86923 COMPATIBILITY TEST ELECTRIC: CPT

## 2018-08-07 PROCEDURE — 65270000029 HC RM PRIVATE

## 2018-08-07 PROCEDURE — 73552 X-RAY EXAM OF FEMUR 2/>: CPT

## 2018-08-07 PROCEDURE — 85610 PROTHROMBIN TIME: CPT

## 2018-08-07 PROCEDURE — 80048 BASIC METABOLIC PNL TOTAL CA: CPT

## 2018-08-07 PROCEDURE — 85027 COMPLETE CBC AUTOMATED: CPT

## 2018-08-07 RX ORDER — OXYCODONE HYDROCHLORIDE 5 MG/1
5 TABLET ORAL
Status: DISCONTINUED | OUTPATIENT
Start: 2018-08-07 | End: 2018-08-08

## 2018-08-07 RX ORDER — SODIUM CHLORIDE, SODIUM LACTATE, POTASSIUM CHLORIDE, CALCIUM CHLORIDE 600; 310; 30; 20 MG/100ML; MG/100ML; MG/100ML; MG/100ML
75 INJECTION, SOLUTION INTRAVENOUS
Status: COMPLETED | OUTPATIENT
Start: 2018-08-07 | End: 2018-08-08

## 2018-08-07 RX ORDER — TRAMADOL HYDROCHLORIDE 50 MG/1
50 TABLET ORAL
Status: DISCONTINUED | OUTPATIENT
Start: 2018-08-07 | End: 2018-08-14 | Stop reason: HOSPADM

## 2018-08-07 RX ORDER — HYDROMORPHONE HYDROCHLORIDE 2 MG/ML
1 INJECTION, SOLUTION INTRAMUSCULAR; INTRAVENOUS; SUBCUTANEOUS
Status: COMPLETED | OUTPATIENT
Start: 2018-08-07 | End: 2018-08-07

## 2018-08-07 RX ORDER — CEFAZOLIN SODIUM/WATER 2 G/20 ML
2 SYRINGE (ML) INTRAVENOUS
Status: COMPLETED | OUTPATIENT
Start: 2018-08-07 | End: 2018-08-08

## 2018-08-07 RX ORDER — MORPHINE SULFATE 2 MG/ML
4 INJECTION, SOLUTION INTRAMUSCULAR; INTRAVENOUS
Status: DISCONTINUED | OUTPATIENT
Start: 2018-08-07 | End: 2018-08-07

## 2018-08-07 RX ORDER — HYDRALAZINE HYDROCHLORIDE 20 MG/ML
10 INJECTION INTRAMUSCULAR; INTRAVENOUS
Status: DISCONTINUED | OUTPATIENT
Start: 2018-08-07 | End: 2018-08-14 | Stop reason: HOSPADM

## 2018-08-07 RX ORDER — ONDANSETRON 2 MG/ML
4 INJECTION INTRAMUSCULAR; INTRAVENOUS
Status: COMPLETED | OUTPATIENT
Start: 2018-08-07 | End: 2018-08-07

## 2018-08-07 RX ORDER — ACETAMINOPHEN 325 MG/1
650 TABLET ORAL EVERY 8 HOURS
Status: DISCONTINUED | OUTPATIENT
Start: 2018-08-07 | End: 2018-08-08

## 2018-08-07 RX ORDER — SODIUM CHLORIDE 9 MG/ML
2000 INJECTION, SOLUTION INTRAVENOUS CONTINUOUS
Status: DISCONTINUED | OUTPATIENT
Start: 2018-08-07 | End: 2018-08-08

## 2018-08-07 RX ORDER — POTASSIUM CHLORIDE 14.9 MG/ML
20 INJECTION INTRAVENOUS ONCE
Status: COMPLETED | OUTPATIENT
Start: 2018-08-07 | End: 2018-08-08

## 2018-08-07 RX ORDER — HYDROMORPHONE HYDROCHLORIDE 2 MG/ML
0.5 INJECTION, SOLUTION INTRAMUSCULAR; INTRAVENOUS; SUBCUTANEOUS
Status: DISCONTINUED | OUTPATIENT
Start: 2018-08-07 | End: 2018-08-08

## 2018-08-07 RX ADMIN — ONDANSETRON 4 MG: 2 INJECTION, SOLUTION INTRAMUSCULAR; INTRAVENOUS at 19:07

## 2018-08-07 RX ADMIN — HYDROMORPHONE HYDROCHLORIDE 1 MG: 2 INJECTION, SOLUTION INTRAMUSCULAR; INTRAVENOUS; SUBCUTANEOUS at 19:07

## 2018-08-07 RX ADMIN — ACETAMINOPHEN 650 MG: 325 TABLET ORAL at 23:23

## 2018-08-07 RX ADMIN — HYDRALAZINE HYDROCHLORIDE 10 MG: 20 INJECTION INTRAMUSCULAR; INTRAVENOUS at 23:23

## 2018-08-07 RX ADMIN — TRAMADOL HYDROCHLORIDE 50 MG: 50 TABLET, FILM COATED ORAL at 20:50

## 2018-08-07 RX ADMIN — OXYCODONE HYDROCHLORIDE 5 MG: 5 TABLET ORAL at 22:25

## 2018-08-07 NOTE — ED TRIAGE NOTES
pt reports coming grocery store pt fell in paring lot . Pt injured left leg.  bp 144/82 hr 65 bgl 160  20g in  r-arm

## 2018-08-07 NOTE — IP AVS SNAPSHOT
[address] [facility] ([place] 
 
 
 [address] [facility] [address] [facility] 
[phone] Patient: Kolton Galvez MRN: [id number] JG About your hospitalization You were admitted on:  2018 You last received care in the:  [facility] 7 MED SURG You were discharged on:  2018 Why you were hospitalized Your primary diagnosis was:  Femur Fracture, Left (Hcc) Your diagnoses also included: Malignant Neoplasm Metastatic To Bone (Hcc), Obesity, Hypertension, Stage 3 Chronic Kidney Disease, Anemia Due To Chronic Kidney Disease Treated With Erythropoietin, Hypokalemia Follow-up Information Follow up With Details Comments Contact Info Hui Weston NP Call As needed [facility]12 [facility] 8583 [facility] 
[phone] Paloma Andrew MD On 2018 7:30 AM [place] [address] [employer] [address] [place] [id number]  35 [facility]  someone with homecare to arrange followup appointment [address] [address] 
[phone] Your Scheduled Appointments   9:15 AM EDT  
NM PET/CT DOSE with [facility] NM PET/CT INJ [facility]. [facility] DENA/ Aneudy Najera [address] [place] [address]  
[phone] * FOR ALL APPOINTMENTS BEFORE NOON: Nothing to eat or drink after midnight except for water ONLY. * AFTERNOON APPOINTMENTS: May eat a light breakfast, but without carbohydrates or sugars (We have a list of suggested foods). They must be NPO except for water for at least 4 hours before their appointment time. Patient should be well hydrated (at least 24 oz of water). Do not participate in strenuous activity the day before or the morning before afternoon appointments. Diabetic patients should refrain from insulin 4 hours prior to their appointment.   No pregnant patients may have a PET/CT exam, breast feeding mothers should pump enough breast milk for 24 hours as they will not be able to breast feed for 1 day after the scan. Contact Nuclear Medicine with any questions. Procedure takes anywhere from 2-3 hours. If the patient requires pain or anxiety medications, arrangements must be made prior to patient's arrival with their ordering physician. The patient should wait 8 weeks after radiation therapy and 2 weeks after chemotherapy has been completed. * PATIENT ARRIVAL A Technologist will contact you with arrival instructions. Thursday August 30, 2018  9:45 AM EDT  
LAB with [address]  
1808 [facility] OUTREACH INSURANCE [facility]) Georgie Riggs [address] [place] [address]  
[phone] Thursday August 30, 2018 10:15 AM EDT Follow Up with MD Arlette Parisisor [facility] Hematology and Oncology [facility]) DENA/ Aneudy Najera 33 [facility] [address]  
[phone] Thursday August 30, 2018 11:15 AM EDT Injection with [facility]  
ST. 3979 [facility] ([facility]) [address] Viky Key [phone] [facility] [address]  
[phone] [address] Discharge Orders None A check georges indicates which time of day the medication should be taken. My Medications START taking these medications Instructions Each Dose to Equal  
 Morning Noon Evening Bedtime  
 enoxaparin 30 mg/0.3 mL injection Commonly known as:  LOVENOX Your next dose is:  Tomorrow afternoon 8/15  
   
 0.3 mL by SubCUTAneous route every twenty-four (24) hours. 30 mg  
    
   
  
   
   
  
 oxyCODONE IR 5 mg immediate release tablet Commonly known as:  Ever Ivans Your last dose was: This morning 8/14  5 AM  
Your next dose is: Take today if needed Take 1-2 Tabs by mouth every four (4) hours as needed. Max Daily Amount: 60 mg. 5 mg moderate pain 10 mg for severe pain 5-10 mg  
    
   
   
   
  
 triamterene-hydroCHLOROthiazide 37.5-25 mg per tablet Commonly known as:  Vandana Ortiz Your next dose is:  Tomorrow morning 8/15 TAKE 1 TABLET BY MOUTH EVERY DAY  
     
  
   
   
   
  
  
CONTINUE taking these medications Instructions Each Dose to Equal  
 Morning Noon Evening Bedtime  
 amLODIPine 5 mg tablet Commonly known as:  Jerri Colon Your last dose was: This morning 8/14 Your next dose is:  Tomorrow morning 8/15 Take 5 mg by mouth daily. 5 mg  
    
  
   
   
   
  
 calcium carbonate 500 mg calcium (1,250 mg) tablet Commonly known as:  OS-JORGE Your last dose was:  8/8 Your next dose is: This evening 8/14 Take 2 tablets at lunch and 2 tablets at dinner. Indications: hypocalcemia  
     
   
  
   
  
   
  
 furosemide 20 mg tablet Commonly known as:  LASIX Your next dose is: Take today if needed Take 1 Tab by mouth daily as needed. For swelling. 20 mg  
    
   
   
   
  
 letrozole 2.5 mg tablet Commonly known as:  [facility] Your last dose was: This morning 8/14 Your next dose is:  Tomorrow morning 8/15 Take 1 Tab by mouth daily. 2.5 mg  
    
  
   
   
   
  
 ondansetron hcl 8 mg tablet Commonly known as:  Lindarlean Precise Your next dose is: Take today if needed Take 1 Tab by mouth every eight (8) hours as needed for Nausea. 8 mg  
    
   
   
   
  
 palbociclib 75 mg Cap Commonly known as:  [employer] Your next dose is:  Tomorrow morning 8/15  
   
 1 capsule daily for 21 days, hold for 7 Where to Get Your Medications Information on where to get these meds will be given to you by the nurse or doctor. ! Ask your nurse or doctor about these medications  
  enoxaparin 30 mg/0.3 mL injection oxyCODONE IR 5 mg immediate release tablet  
 triamterene-hydroCHLOROthiazide 37.5-25 mg per tablet Opioid Education Prescription Opioids: What You Need to Know: 
 
Prescription opioids can be used to help relieve moderate-to-severe pain and are often prescribed following a surgery or injury, or for certain health conditions. These medications can be an important part of treatment but also come with serious risks. Opioids are strong pain medicines. Examples include hydrocodone, oxycodone, fentanyl, and morphine. Heroin is an example of an illegal opioid. It is important to work with your health care provider to make sure you are getting the safest, most effective care. WHAT ARE THE RISKS AND SIDE EFFECTS OF OPIOID USE? Prescription opioids carry serious risks of addiction and overdose, especially with prolonged use. An opioid overdose, often marked by slow breathing, can cause sudden death. The use of prescription opioids can have a number of side effects as well, even when taken as directed. · Tolerance-meaning you might need to take more of a medication for the same pain relief · Physical dependence-meaning you have symptoms of withdrawal when the medication is stopped. Withdrawal symptoms can include nausea, sweating, chills, diarrhea, stomach cramps, and muscle aches. Withdrawal can last up to several weeks, depending on which drug you took and how long you took it. · Increased sensitivity to pain · Constipation · Nausea, vomiting, and dry mouth · Sleepiness and dizziness · Confusion · Depression · Low levels of testosterone that can result in lower sex drive, energy, and strength · Itching and sweating RISKS ARE GREATER WITH:      
· History of drug misuse, substance use disorder, or overdose · Mental health conditions (such as depression or anxiety) · Sleep apnea · Older age (72 years or older) · Pregnancy Avoid alcohol while taking prescription opioids. Also, unless specifically advised by your health care provider, medications to avoid include: · Benzodiazepines (such as Xanax or Valium) · Muscle relaxants (such as Soma or Flexeril) · Hypnotics (such as Ambien or Lunesta) · Other prescription opioids KNOW YOUR OPTIONS Talk to your health care provider about ways to manage your pain that don't involve prescription opioids. Some of these options may actually work better and have fewer risks and side effects. Options may include: 
· Pain relievers such as acetaminophen, ibuprofen, and naproxen · Some medications that are also used for depression or seizures · Physical therapy and exercise · Counseling to help patients learn how to cope better with triggers of pain and stress. · Application of heat or cold compress · Massage therapy · Relaxation techniques Be Informed Make sure you know the name of your medication, how much and how often to take it, and its potential risks & side effects. IF YOU ARE PRESCRIBED OPIOIDS FOR PAIN: 
· Never take opioids in greater amounts or more often than prescribed. Remember the goal is not to be pain-free but to manage your pain at a tolerable level. · Follow up with your primary care provider to: · Work together to create a plan on how to manage your pain. · Talk about ways to help manage your pain that don't involve prescription opioids. · Talk about any and all concerns and side effects. · Help prevent misuse and abuse. · Never sell or share prescription opioids · Help prevent misuse and abuse. · Store prescription opioids in a secure place and out of reach of others (this may include visitors, children, friends, and family).  
· Safely dispose of unused/unwanted prescription opioids: Find your community drug take-back program or your pharmacy mail-back program, or flush them down the toilet, following guidance from the Food and Drug Administration (www.fda.gov/Drugs/ResourcesForYou). · Visit www.cdc.gov/drugoverdose to learn about the risks of opioid abuse and overdose. · If you believe you may be struggling with addiction, tell your health care provider and ask for guidance or call Robert Montesinos at 9-877-194-SMJS. Discharge Instructions Activity : non weight bearing of lower extremities No driving until directed by doctor Dressing : keep clean and dry Report to Dr Severa Art  temp 100.5 ,increased pain , redness, warmth , swelling or drainage from  Incision . Report to Dr Severa Art  redness ,swelling or pain  in your calf, back of knee, thigh or groin Go to emergency room for sudden chest pain and difficulty breathing Report  to Dr Severa Art signs of impaired circulation in affected extremity:  Excessive swelling, increased pain , numbness, tingling ,discoloration or extremity cool to touch Notify surgeon of pain not controlled by prescription pain medication Avoid having pets sleep in bed with you until incision is completely healed Hip Precautions: 
 
 
Avoid bending right or left leg  past 90 degrees Avoid twisting right or left leg in or out Avoid crossing your legs or ankles Keep feet pointed straight ahead No bending at waist 
 
 
 
  
Enoxaparin (Lovenox): Care Instructions Your Care Instructions Enoxaparin (Lovenox) is an anticoagulant medicine. It is one of a class of anticoagulants called low molecular weight heparin. Many people call these medicines blood thinners. They don't actually thin the blood, but they increase the time it takes a blood clot to form. This reduces the chance of a blood clot in the leg veins (deep vein thrombosis) or in the lungs (pulmonary embolism). Enoxaparin is a shot (injection).  You or someone caring for you will inject it once or twice a day. Most people need shots for 5 to 10 days, but in some cases it can be longer. Your doctor will tell you how long you need to have the shots. Enoxaparin is used to: · Treat deep vein thrombosis (DVT), which is a blood clot in the legs, pelvis, or arms. · Reduce the chance of getting blood clots after certain surgeries. For example, you may take enoxaparin after knee or hip replacement surgery. · Reduce the chance of getting blood clots in people who are likely to get them and who are not active for a long period of time. For example, you may need enoxaparin if you need to stay in bed for a long time because of a health problem. · Reduce the chance of blood clots when another blood thinner is stopped for a short time. For example, if you take warfarin and need surgery, your doctor may ask you to stop taking warfarin for a short time before the surgery. You will take enoxaparin to help prevent blood clots before the surgery. After the surgery, your doctor will tell you when it is safe to start taking warfarin again. This is called bridge therapy. Follow-up care is a key part of your treatment and safety. Be sure to make and go to all appointments, and call your doctor if you are having problems. It's also a good idea to know your test results and keep a list of the medicines you take. How can you care for yourself at home? How to inject enoxaparin You will get a prescription for prefilled syringes. Inject the medicine at the same time every day unless your doctor gives you other instructions. 1. Wash and dry your hands. 2. Sit or lie in a position that lets you see your belly. 3. Clean the injection site with an alcohol pad or swab, and let it dry. Choose a site on the right or left side of your belly, at least 2 inches from your belly button. Change the site each time you inject the medicine. 4. Remove the needle cap by pulling it straight off. Don't twist it. 5. Hold the syringe like a pencil in one hand. With the other hand, pinch an area of the injection site skin. You should have a \"fold\" in the skin. 6. Insert the entire needle straight down into the fold of skin. Don't insert the needle at an angle. 7. Press the plunger with your thumb until the syringe is empty. 8. Pull the needle straight out and let go of the skin. 9. Point the needle away from you and press down on the plunger. The needle will be covered. Take precautions · Don't rub the injection site. This could cause bruising. · Don't push air bubbles out of the syringe unless your doctor tells you to. Each syringe comes with air bubbles. · Don't stop taking enoxaparin without talking to your doctor. · If you are taking a blood thinner, be sure you get instructions about how to take your medicine safely. Blood thinners can cause serious bleeding problems. · Talk to your doctor before you take any prescription medicines, over-the-counter medicines, antibiotics, vitamins, or herbal products. · Don't take the following medicines unless your doctor says it's okay: ¨ Aspirin, products like aspirin (salicylates), or products that contain aspirin ¨ Nonsteroidal anti-inflammatory drugs (NSAIDs), such as ibuprofen (Advil, Motrin) and naproxen (Aleve) · Store enoxaparin at room temperature. Don't put it in the refrigerator or freezer. When should you call for help? Call 911 anytime you think you may need emergency care. For example, call if: 
  · You passed out (lost consciousness).  
  · You have signs of severe bleeding, such as: ¨ A severe headache that is different from past headaches. ¨ Vomiting blood or what looks like coffee grounds. ¨ Passing maroon or very bloody stools.  
 Call your doctor now or seek immediate medical care if: 
  · You have unexpected bleeding, including: ¨ Blood in stools or black stools that look like tar. ¨ Blood in your urine. ¨ Bruises or blood spots under the skin.  
  · You feel dizzy or lightheaded.  
 Watch closely for changes in your health, and be sure to contact your doctor if: 
  · You do not get better as expected. Where can you learn more? Go to http://ayush-nury.info/. Enter P266 in the search box to learn more about \"Enoxaparin (Lovenox): Care Instructions. \" Current as of: May 10, 2017 Content Version: 11.7 © 0361-0694 [employer]. Care instructions adapted under license by [employer] (which disclaims liability or warranty for this information). If you have questions about a medical condition or this instruction, always ask your healthcare professional. [address] any warranty or liability for your use of this information. Acute Kidney Injury: Care Instructions Your Care Instructions Acute kidney injury (ALEISHA) is a sudden decrease in kidney function. This can happen over a period of hours, days or, in some cases, weeks. ALEISHA used to be called acute renal failure, but kidney failure doesn't always happen with ALEISHA. Common causes of ALEISHA are dehydration, blood loss, and medicines. When ALEISHA happens, the kidneys have trouble removing waste and excess fluids from the body. The waste and fluids build up and become harmful. Kidney function may return to normal if the cause of ALEISHA is treated quickly. Your chance of a full recovery depends on what caused the problem, how quickly the cause was treated, and what other medical problems you have. A machine may be used to help your kidneys remove waste and fluids for a short period of time. This is called dialysis. Follow-up care is a key part of your treatment and safety. Be sure to make and go to all appointments, and call your doctor if you are having problems. It's also a good idea to know your test results and keep a list of the medicines you take. How can you care for yourself at home? · Talk to your doctor about how much fluid you should drink. · Eat a balanced diet. Talk to your doctor or a dietitian about what type of diet may be best for you. You may need to limit sodium, potassium, and phosphorus. · If you need dialysis, follow the instructions and schedule for dialysis that your doctor gives you. · Do not smoke. Smoking can make your condition worse. If you need help quitting, talk to your doctor about stop-smoking programs and medicines. These can increase your chances of quitting for good. · Do not drink alcohol. · Review all of your medicines with your doctor. Do not take any medicines, including nonsteroidal anti-inflammatory drugs (NSAIDs) such as ibuprofen (Advil, Motrin) or naproxen (Aleve), unless your doctor says it is safe for you to do so. · Make sure that anyone treating you for any health problem knows that you have had ALEISHA. When should you call for help? Call 911 anytime you think you may need emergency care. For example, call if: 
  · You passed out (lost consciousness).  
 Call your doctor now or seek immediate medical care if: 
  · You have new or worse nausea and vomiting.  
  · You have much less urine than normal, or you have no urine.  
  · You are feeling confused or cannot think clearly.  
  · You have new or more blood in your urine.  
  · You have new swelling.  
  · You are dizzy or lightheaded, or feel like you may faint.  
 Watch closely for changes in your health, and be sure to contact your doctor if: 
  · You do not get better as expected. Where can you learn more? Go to http://ayush-nury.info/. Enter B405 in the search box to learn more about \"Acute Kidney Injury: Care Instructions. \" Current as of: May 12, 2017 Content Version: 11.7 © 6936-4158 [employer], [employer].  Care instructions adapted under license by [employer]Why (which disclaims liability or warranty for this information). If you have questions about a medical condition or this instruction, always ask your healthcare professional. [address] any warranty or liability for your use of this information. DISCHARGE SUMMARY from Nurse PATIENT INSTRUCTIONS: 
 
 
F-face looks uneven A-arms unable to move or move unevenly S-speech slurred or non-existent T-time-call 911 as soon as signs and symptoms begin-DO NOT go Back to bed or wait to see if you get better-TIME IS BRAIN. Warning Signs of HEART ATTACK Call 911 if you have these symptoms: 
? Chest discomfort. Most heart attacks involve discomfort in the center of the chest that lasts more than a few minutes, or that goes away and comes back. It can feel like uncomfortable pressure, squeezing, fullness, or pain. ? Discomfort in other areas of the upper body. Symptoms can include pain or discomfort in one or both arms, the back, neck, jaw, or stomach. ? Shortness of breath with or without chest discomfort. ? Other signs may include breaking out in a cold sweat, nausea, or lightheadedness. Don't wait more than five minutes to call [address]! Fast action can save your life. Calling 911 is almost always the fastest way to get lifesaving treatment. Emergency Medical Services staff can begin treatment when they arrive  up to an hour sooner than if someone gets to the hospital by car. The discharge information has been reviewed with the patient. The patient verbalized understanding. Discharge medications reviewed with the patient and appropriate educational materials and side effects teaching were provided.  
___________________________________________________________________________ ________________________________________________________ [employer] Announcement We are excited to announce that we are making your provider's discharge notes available to you in Ciespace. You will see these notes when they are completed and signed by the physician that discharged you from your recent hospital stay. If you have any questions or concerns about any information you see in Ciespace, please call the Health Information Department where you were seen or reach out to your Primary Care Provider for more information about your plan of care. Introducing [facility]! [employer] introduces Ciespace patient portal. Now you can access parts of your medical record, email your doctor's office, and request medication refills online. 1. In your internet browser, go to [email or web address] 2. Click on the First Time User? Click Here link in the Sign In box. You will see the New Member Sign Up page. 3. Enter your Ciespace Access Code exactly as it appears below. You will not need to use this code after youve completed the sign-up process. If you do not sign up before the expiration date, you must request a new code. · Ciespace Access Code: [email or web address] Expires: 10/30/2018 10:19 AM 
 
4. Enter the last four digits of your Social Security Number (xxxx) and Date of Birth (mm/dd/yyyy) as indicated and click Submit. You will be taken to the next sign-up page. 5. Create a Ciespace ID. This will be your Ciespace login ID and cannot be changed, so think of one that is secure and easy to remember. 6. Create a Ciespace password. You can change your password at any time. 7. Enter your Password Reset Question and Answer. This can be used at a later time if you forget your password. 8. Enter your e-mail address. You will receive e-mail notification when new information is available in [address] 9. Click Sign Up. You can now view and download portions of your medical record. 10. Click the Download Summary menu link to download a portable copy of your medical information. If you have questions, please visit the Frequently Asked Questions section of the [employer] website. Remember, [employer] is NOT to be used for urgent needs. For medical emergencies, dial 911. Now available from your iPhone and Android! Introducing Rashad Mendenhall As a Néstor Lora patient, I wanted to make you aware of our electronic visit tool called Rashad Mendenhall. Tackk 24/7 allows you to connect within minutes with a medical provider 24 hours a day, seven days a week via a mobile device or tablet or logging into a secure website from your computer. You can access Rashad Mendenhall from anywhere in the [place]. A virtual visit might be right for you when you have a simple condition and feel like you just dont want to get out of bed, or cant get away from work for an appointment, when your regular [facility] provider is not available (evenings, weekends or holidays), or when youre out of town and need minor care. Electronic visits cost only $49 and if the Néstor [employer]/Innoverne provider determines a prescription is needed to treat your condition, one can be electronically transmitted to a nearby pharmacy*. Please take a moment to enroll today if you have not already done so. The enrollment process is free and takes just a few minutes. To enroll, please download the [employer]/Innoverne hayden to your tablet or phone, or visit [email or web address] to enroll on your computer. And, as an [address] [place] [address] patient with a [employer] account, the results of your visits will be scanned into your electronic medical record and your primary care provider will be able to view the scanned results. We urge you to continue to see your regular [facility] provider for your ongoing medical care.   And while your primary care provider may not be the one available when you seek a [facility]gg[facility] virtual visit, the peace of mind you get from getting a real diagnosis real time can be priceless. For more information on [facility] Scrugg[facility]in, view our Frequently Asked Questions (FAQs) at [email or web address]. Sincerely, 
 
Otilia Cevallos MD 
Chief Medical Officer Ac Fu *:  certain medications cannot be prescribed via [facility] Unresulted Labs-Please follow up with your PCP about these lab tests Order Current Status NC XR TECHNOLOGIST SERVICE In process NC XR TECHNOLOGIST SERVICE In process Providers Seen During Your Hospitalization Provider Specialty Primary office phone Eda Dakins, MD Emergency Medicine [phone] Harpreet Sinclair [facility], [address] [place] [address] Internal Medicine [phone] Immunizations Administered for This Admission Name Date  
 TB Skin Test (PPD) Intradermal  Deferred (),  Deferred (), 8/8/2018 Your Primary Care Physician (PCP) Primary Care Physician Office Phone [facility] [address] [phone] You are allergic to the following Allergen Reactions Penicillins Unknown (comments) UNKNOWN REACTION Recent Documentation Height Weight BMI OB Status Smoking Status 1.803 m 105.2 kg 32.36 kg/m2 Menopause Never Smoker Emergency Contacts Name Discharge Info Relation Home Work Mobile Kristine Ridley DISCHARGE CAREGIVER [3] Daughter [21] [phone] Patient Belongings The following personal items are in your possession at time of discharge: 
  Dental Appliances: None Please provide this summary of care documentation to your next provider. Signatures-by signing, you are acknowledging that this After Visit Summary has been reviewed with you and you have received a copy.   
  
 
  
    
    
 Patient Signature: ____________________________________________________________ Date:  ____________________________________________________________  
  
Aloma Snellen Provider Signature:  ____________________________________________________________ Date:  ____________________________________________________________

## 2018-08-07 NOTE — IP AVS SNAPSHOT
[address] 
[phone] Patient: Joanne Saldana MRN: [id number] CUO:[id number] A check georges indicates which time of day the medication should be taken. My Medications START taking these medications Instructions Each Dose to Equal  
 Morning Noon Evening Bedtime  
 enoxaparin 30 mg/0.3 mL injection Commonly known as:  LOVENOX Your next dose is:  Tomorrow afternoon 8/15  
   
 0.3 mL by SubCUTAneous route every twenty-four (24) hours. 30 mg  
    
   
  
   
   
  
 oxyCODONE IR 5 mg immediate release tablet Commonly known as:  Jameel Mejia Your last dose was: This morning 8/14  5 AM  
Your next dose is: Take today if needed Take 1-2 Tabs by mouth every four (4) hours as needed. Max Daily Amount: 60 mg. 5 mg moderate pain 10 mg for severe pain 5-10 mg  
    
   
   
   
  
 triamterene-hydroCHLOROthiazide 37.5-25 mg per tablet Commonly known as:  Mellissa Perch Your next dose is:  Tomorrow morning 8/15 TAKE 1 TABLET BY MOUTH EVERY DAY  
     
  
   
   
   
  
  
CONTINUE taking these medications Instructions Each Dose to Equal  
 Morning Noon Evening Bedtime  
 amLODIPine 5 mg tablet Commonly known as:  Cathy Merida Your last dose was: This morning 8/14 Your next dose is:  Tomorrow morning 8/15 Take 5 mg by mouth daily. 5 mg  
    
  
   
   
   
  
 calcium carbonate 500 mg calcium (1,250 mg) tablet Commonly known as:  OS-JORGE Your last dose was:  8/8 Your next dose is: This evening 8/14 Take 2 tablets at lunch and 2 tablets at dinner. Indications: hypocalcemia  
     
   
  
   
  
   
  
 furosemide 20 mg tablet Commonly known as:  LASIX Your next dose is: Take today if needed Take 1 Tab by mouth daily as needed. For swelling. 20 mg  
    
   
   
   
  
 letrozole 2.5 mg tablet Commonly known as:  [facility] Your last dose was: This morning 8/14 Your next dose is:  Tomorrow morning 8/15 Take 1 Tab by mouth daily. 2.5 mg  
    
  
   
   
   
  
 ondansetron hcl 8 mg tablet Commonly known as:  Angy Reyes Your next dose is: Take today if needed Take 1 Tab by mouth every eight (8) hours as needed for Nausea. 8 mg  
    
   
   
   
  
 palbociclib 75 mg Cap Commonly known as:  [employer] Your next dose is:  Tomorrow morning 8/15  
   
 1 capsule daily for 21 days, hold for 7 Where to Get Your Medications Information on where to get these meds will be given to you by the nurse or doctor. ! Ask your nurse or doctor about these medications  
  enoxaparin 30 mg/0.3 mL injection  
 oxyCODONE IR 5 mg immediate release tablet  
 triamterene-hydroCHLOROthiazide 37.5-25 mg per tablet

## 2018-08-08 ENCOUNTER — ANESTHESIA (OUTPATIENT)
Dept: SURGERY | Age: 73
DRG: 478 | End: 2018-08-08
Payer: MEDICARE

## 2018-08-08 ENCOUNTER — APPOINTMENT (OUTPATIENT)
Dept: NUCLEAR MEDICINE | Age: 73
DRG: 478 | End: 2018-08-08
Attending: NURSE PRACTITIONER
Payer: MEDICARE

## 2018-08-08 ENCOUNTER — APPOINTMENT (OUTPATIENT)
Dept: GENERAL RADIOLOGY | Age: 73
DRG: 478 | End: 2018-08-08
Attending: ORTHOPAEDIC SURGERY
Payer: MEDICARE

## 2018-08-08 ENCOUNTER — ANESTHESIA EVENT (OUTPATIENT)
Dept: SURGERY | Age: 73
DRG: 478 | End: 2018-08-08
Payer: MEDICARE

## 2018-08-08 PROBLEM — N18.30 STAGE 3 CHRONIC KIDNEY DISEASE (HCC): Status: ACTIVE | Noted: 2018-08-08

## 2018-08-08 PROBLEM — E87.6 HYPOKALEMIA: Status: ACTIVE | Noted: 2018-08-08

## 2018-08-08 LAB
ANION GAP SERPL CALC-SCNC: 10 MMOL/L (ref 7–16)
APPEARANCE UR: CLEAR
ATRIAL RATE: 82 BPM
BACTERIA SPEC CULT: NORMAL
BASOPHILS # BLD: 0 K/UL
BASOPHILS NFR BLD: 1 % (ref 0–2)
BILIRUB UR QL: NEGATIVE
BUN SERPL-MCNC: 17 MG/DL (ref 8–23)
CALCIUM SERPL-MCNC: 6.4 MG/DL (ref 8.3–10.4)
CALCIUM SERPL-MCNC: 6.5 MG/DL (ref 8.3–10.4)
CALCULATED P AXIS, ECG09: 64 DEGREES
CALCULATED R AXIS, ECG10: 40 DEGREES
CALCULATED T AXIS, ECG11: -58 DEGREES
CHLORIDE SERPL-SCNC: 109 MMOL/L (ref 98–107)
CO2 SERPL-SCNC: 25 MMOL/L (ref 21–32)
COLOR UR: YELLOW
CREAT SERPL-MCNC: 2.14 MG/DL (ref 0.6–1)
DIAGNOSIS, 93000: NORMAL
DIFFERENTIAL METHOD BLD: ABNORMAL
EOSINOPHIL # BLD: 0 K/UL
EOSINOPHIL NFR BLD: 0 % (ref 0.5–7.8)
ERYTHROCYTE [DISTWIDTH] IN BLOOD BY AUTOMATED COUNT: 15.9 %
GLUCOSE SERPL-MCNC: 115 MG/DL (ref 65–100)
GLUCOSE UR STRIP.AUTO-MCNC: NEGATIVE MG/DL
HCT VFR BLD AUTO: 31.8 % (ref 35.8–46.3)
HGB BLD-MCNC: 10 G/DL (ref 11.7–15.4)
HGB UR QL STRIP: NEGATIVE
IMM GRANULOCYTES # BLD: 0 K/UL
IMM GRANULOCYTES NFR BLD AUTO: 1 % (ref 0–5)
KETONES UR QL STRIP.AUTO: NEGATIVE MG/DL
LEUKOCYTE ESTERASE UR QL STRIP.AUTO: NEGATIVE
LYMPHOCYTES # BLD: 0.5 K/UL
LYMPHOCYTES NFR BLD: 17 % (ref 13–44)
MCH RBC QN AUTO: 34.5 PG (ref 26.1–32.9)
MCHC RBC AUTO-ENTMCNC: 31.4 G/DL (ref 31.4–35)
MCV RBC AUTO: 109.7 FL (ref 79.6–97.8)
MONOCYTES # BLD: 0.4 K/UL
MONOCYTES NFR BLD: 13 % (ref 4–12)
NEUTS SEG # BLD: 2.2 K/UL
NEUTS SEG NFR BLD: 69 % (ref 43–78)
NITRITE UR QL STRIP.AUTO: NEGATIVE
NRBC # BLD: 0 K/UL (ref 0–0.2)
PH UR STRIP: 5 [PH] (ref 5–9)
PLATELET # BLD AUTO: 231 K/UL (ref 150–450)
PMV BLD AUTO: 9.9 FL (ref 9.4–12.3)
POTASSIUM SERPL-SCNC: 4.2 MMOL/L (ref 3.5–5.1)
PREALB SERPL-MCNC: 20.5 MG/DL (ref 18–35.7)
PROT UR STRIP-MCNC: NEGATIVE MG/DL
PTH-INTACT SERPL-MCNC: 960.3 PG/ML (ref 18.5–88)
Q-T INTERVAL, ECG07: 372 MS
QRS DURATION, ECG06: 78 MS
QTC CALCULATION (BEZET), ECG08: 437 MS
RBC # BLD AUTO: 2.9 M/UL (ref 4.05–5.2)
SERVICE CMNT-IMP: NORMAL
SODIUM SERPL-SCNC: 144 MMOL/L (ref 136–145)
SP GR UR REFRACTOMETRY: 1.01 (ref 1–1.02)
UROBILINOGEN UR QL STRIP.AUTO: 0.2 EU/DL (ref 0.2–1)
VENTRICULAR RATE, ECG03: 83 BPM
WBC # BLD AUTO: 3.2 K/UL (ref 4.3–11.1)

## 2018-08-08 PROCEDURE — 36415 COLL VENOUS BLD VENIPUNCTURE: CPT

## 2018-08-08 PROCEDURE — 87641 MR-STAPH DNA AMP PROBE: CPT

## 2018-08-08 PROCEDURE — 74011250636 HC RX REV CODE- 250/636

## 2018-08-08 PROCEDURE — 73552 X-RAY EXAM OF FEMUR 2/>: CPT

## 2018-08-08 PROCEDURE — C1713 ANCHOR/SCREW BN/BN,TIS/BN: HCPCS | Performed by: ORTHOPAEDIC SURGERY

## 2018-08-08 PROCEDURE — 86580 TB INTRADERMAL TEST: CPT | Performed by: INTERNAL MEDICINE

## 2018-08-08 PROCEDURE — 77030018836 HC SOL IRR NACL ICUM -A: Performed by: ORTHOPAEDIC SURGERY

## 2018-08-08 PROCEDURE — 84134 ASSAY OF PREALBUMIN: CPT

## 2018-08-08 PROCEDURE — 77030020143 HC AIRWY LARYN INTUB CGAS -A: Performed by: ANESTHESIOLOGY

## 2018-08-08 PROCEDURE — 74011250636 HC RX REV CODE- 250/636: Performed by: ORTHOPAEDIC SURGERY

## 2018-08-08 PROCEDURE — 74011000258 HC RX REV CODE- 258: Performed by: ORTHOPAEDIC SURGERY

## 2018-08-08 PROCEDURE — 82306 VITAMIN D 25 HYDROXY: CPT

## 2018-08-08 PROCEDURE — 74011250636 HC RX REV CODE- 250/636: Performed by: INTERNAL MEDICINE

## 2018-08-08 PROCEDURE — 77030002933 HC SUT MCRYL J&J -A: Performed by: ORTHOPAEDIC SURGERY

## 2018-08-08 PROCEDURE — 76010000173 HC OR TIME 3 TO 3.5 HR INTENSV-TIER 1: Performed by: ORTHOPAEDIC SURGERY

## 2018-08-08 PROCEDURE — 74011250636 HC RX REV CODE- 250/636: Performed by: ANESTHESIOLOGY

## 2018-08-08 PROCEDURE — 74011250636 HC RX REV CODE- 250/636: Performed by: NURSE PRACTITIONER

## 2018-08-08 PROCEDURE — 74011000250 HC RX REV CODE- 250

## 2018-08-08 PROCEDURE — A9561 TC99M OXIDRONATE: HCPCS

## 2018-08-08 PROCEDURE — 76060000038 HC ANESTHESIA 3.5 TO 4 HR: Performed by: ORTHOPAEDIC SURGERY

## 2018-08-08 PROCEDURE — 77030035168: Performed by: ORTHOPAEDIC SURGERY

## 2018-08-08 PROCEDURE — 77030003862 HC BIT DRL SYNT -B: Performed by: ORTHOPAEDIC SURGERY

## 2018-08-08 PROCEDURE — 77030020255 HC SOL INJ LR 1000ML BG

## 2018-08-08 PROCEDURE — 85025 COMPLETE CBC W/AUTO DIFF WBC: CPT

## 2018-08-08 PROCEDURE — 77030025487 HC SLV RESRB LOK SCR SYNT -C: Performed by: ORTHOPAEDIC SURGERY

## 2018-08-08 PROCEDURE — 0QS704Z REPOSITION LEFT UPPER FEMUR WITH INTERNAL FIXATION DEVICE, OPEN APPROACH: ICD-10-PCS | Performed by: ORTHOPAEDIC SURGERY

## 2018-08-08 PROCEDURE — C1769 GUIDE WIRE: HCPCS | Performed by: ORTHOPAEDIC SURGERY

## 2018-08-08 PROCEDURE — 88305 TISSUE EXAM BY PATHOLOGIST: CPT

## 2018-08-08 PROCEDURE — 74011000302 HC RX REV CODE- 302: Performed by: INTERNAL MEDICINE

## 2018-08-08 PROCEDURE — 81003 URINALYSIS AUTO W/O SCOPE: CPT

## 2018-08-08 PROCEDURE — 83970 ASSAY OF PARATHORMONE: CPT

## 2018-08-08 PROCEDURE — 77030014405 HC GD ROD RMR SYNT -C: Performed by: ORTHOPAEDIC SURGERY

## 2018-08-08 PROCEDURE — 74011250637 HC RX REV CODE- 250/637: Performed by: NURSE PRACTITIONER

## 2018-08-08 PROCEDURE — 77030008467 HC STPLR SKN COVD -B: Performed by: ORTHOPAEDIC SURGERY

## 2018-08-08 PROCEDURE — 80048 BASIC METABOLIC PNL TOTAL CA: CPT

## 2018-08-08 PROCEDURE — 88311 DECALCIFY TISSUE: CPT

## 2018-08-08 PROCEDURE — 77030016470 HC BIT DRL QC1 SYNT -C: Performed by: ORTHOPAEDIC SURGERY

## 2018-08-08 PROCEDURE — 0QB70ZX EXCISION OF LEFT UPPER FEMUR, OPEN APPROACH, DIAGNOSTIC: ICD-10-PCS | Performed by: ORTHOPAEDIC SURGERY

## 2018-08-08 PROCEDURE — 74011250637 HC RX REV CODE- 250/637: Performed by: INTERNAL MEDICINE

## 2018-08-08 PROCEDURE — 77030020782 HC GWN BAIR PAWS FLX 3M -B: Performed by: ANESTHESIOLOGY

## 2018-08-08 PROCEDURE — 76210000016 HC OR PH I REC 1 TO 1.5 HR: Performed by: ORTHOPAEDIC SURGERY

## 2018-08-08 PROCEDURE — 65270000029 HC RM PRIVATE

## 2018-08-08 PROCEDURE — 0QS634Z REPOSITION RIGHT UPPER FEMUR WITH INTERNAL FIXATION DEVICE, PERCUTANEOUS APPROACH: ICD-10-PCS | Performed by: ORTHOPAEDIC SURGERY

## 2018-08-08 DEVICE — IMPLANTABLE DEVICE: Type: IMPLANTABLE DEVICE | Site: FEMUR | Status: FUNCTIONAL

## 2018-08-08 DEVICE — NAIL IM L400MM DIA11MM 125DEG LNG GRN R PROX FEM TI: Type: IMPLANTABLE DEVICE | Site: FEMUR | Status: FUNCTIONAL

## 2018-08-08 DEVICE — SCREW BNE L38MM DIA5MM S STL ST LOK FULL THRD T25 STARDRV: Type: IMPLANTABLE DEVICE | Site: FEMUR | Status: FUNCTIONAL

## 2018-08-08 DEVICE — DBX MIX, 10CC
Type: IMPLANTABLE DEVICE | Site: FEMUR | Status: FUNCTIONAL
Brand: DBX®

## 2018-08-08 DEVICE — SCREW BNE L52MM DIA5MM LT BLU TI ST W STARDRV T25 RECESS FOR: Type: IMPLANTABLE DEVICE | Site: FEMUR | Status: FUNCTIONAL

## 2018-08-08 DEVICE — SCREW BNE L42MM DIA5MM S STL ST LOK FULL THRD T25 STARDRV: Type: IMPLANTABLE DEVICE | Site: FEMUR | Status: FUNCTIONAL

## 2018-08-08 DEVICE — SCREW BNE L36MM DIA5MM S STL ST LOK FULL THRD T25 STARDRV: Type: IMPLANTABLE DEVICE | Site: FEMUR | Status: FUNCTIONAL

## 2018-08-08 DEVICE — SLEEVE IM RESRB FOR 5MM ANGULAR STBL LOK SCR: Type: IMPLANTABLE DEVICE | Site: FEMUR | Status: FUNCTIONAL

## 2018-08-08 DEVICE — SCREW BNE L40MM DIA5MM S STL ST LOK FULL THRD T25 STARDRV: Type: IMPLANTABLE DEVICE | Site: FEMUR | Status: FUNCTIONAL

## 2018-08-08 RX ORDER — PROPOFOL 10 MG/ML
INJECTION, EMULSION INTRAVENOUS AS NEEDED
Status: DISCONTINUED | OUTPATIENT
Start: 2018-08-08 | End: 2018-08-08 | Stop reason: HOSPADM

## 2018-08-08 RX ORDER — MAG HYDROX/ALUMINUM HYD/SIMETH 200-200-20
30 SUSPENSION, ORAL (FINAL DOSE FORM) ORAL
Status: DISCONTINUED | OUTPATIENT
Start: 2018-08-08 | End: 2018-08-14 | Stop reason: HOSPADM

## 2018-08-08 RX ORDER — ONDANSETRON 2 MG/ML
4 INJECTION INTRAMUSCULAR; INTRAVENOUS ONCE
Status: DISCONTINUED | OUTPATIENT
Start: 2018-08-08 | End: 2018-08-08 | Stop reason: HOSPADM

## 2018-08-08 RX ORDER — DOCUSATE SODIUM 100 MG/1
100 CAPSULE, LIQUID FILLED ORAL 2 TIMES DAILY
Status: DISCONTINUED | OUTPATIENT
Start: 2018-08-08 | End: 2018-08-14 | Stop reason: HOSPADM

## 2018-08-08 RX ORDER — ONDANSETRON 2 MG/ML
INJECTION INTRAMUSCULAR; INTRAVENOUS AS NEEDED
Status: DISCONTINUED | OUTPATIENT
Start: 2018-08-08 | End: 2018-08-08 | Stop reason: HOSPADM

## 2018-08-08 RX ORDER — SODIUM CHLORIDE, SODIUM LACTATE, POTASSIUM CHLORIDE, CALCIUM CHLORIDE 600; 310; 30; 20 MG/100ML; MG/100ML; MG/100ML; MG/100ML
100 INJECTION, SOLUTION INTRAVENOUS CONTINUOUS
Status: DISCONTINUED | OUTPATIENT
Start: 2018-08-08 | End: 2018-08-08

## 2018-08-08 RX ORDER — ONDANSETRON 2 MG/ML
4 INJECTION INTRAMUSCULAR; INTRAVENOUS
Status: DISCONTINUED | OUTPATIENT
Start: 2018-08-08 | End: 2018-08-08

## 2018-08-08 RX ORDER — EPHEDRINE SULFATE 50 MG/ML
INJECTION, SOLUTION INTRAVENOUS AS NEEDED
Status: DISCONTINUED | OUTPATIENT
Start: 2018-08-08 | End: 2018-08-08 | Stop reason: HOSPADM

## 2018-08-08 RX ORDER — ENOXAPARIN SODIUM 100 MG/ML
30 INJECTION SUBCUTANEOUS EVERY 24 HOURS
Status: DISCONTINUED | OUTPATIENT
Start: 2018-08-09 | End: 2018-08-14 | Stop reason: HOSPADM

## 2018-08-08 RX ORDER — AMLODIPINE BESYLATE 5 MG/1
5 TABLET ORAL DAILY
Status: DISCONTINUED | OUTPATIENT
Start: 2018-08-08 | End: 2018-08-14 | Stop reason: HOSPADM

## 2018-08-08 RX ORDER — CALCIUM CARBONATE 500(1250)
500 TABLET ORAL 2 TIMES DAILY WITH MEALS
Status: DISCONTINUED | OUTPATIENT
Start: 2018-08-08 | End: 2018-08-08

## 2018-08-08 RX ORDER — ACETAMINOPHEN 10 MG/ML
INJECTION, SOLUTION INTRAVENOUS AS NEEDED
Status: DISCONTINUED | OUTPATIENT
Start: 2018-08-08 | End: 2018-08-08 | Stop reason: HOSPADM

## 2018-08-08 RX ORDER — ACETAMINOPHEN 325 MG/1
650 TABLET ORAL EVERY 8 HOURS
Status: DISCONTINUED | OUTPATIENT
Start: 2018-08-08 | End: 2018-08-14 | Stop reason: HOSPADM

## 2018-08-08 RX ORDER — OXYCODONE HYDROCHLORIDE 5 MG/1
5-10 TABLET ORAL
Status: DISCONTINUED | OUTPATIENT
Start: 2018-08-08 | End: 2018-08-14 | Stop reason: HOSPADM

## 2018-08-08 RX ORDER — SODIUM CHLORIDE 0.9 % (FLUSH) 0.9 %
5-10 SYRINGE (ML) INJECTION AS NEEDED
Status: DISCONTINUED | OUTPATIENT
Start: 2018-08-08 | End: 2018-08-10 | Stop reason: SDUPTHER

## 2018-08-08 RX ORDER — SODIUM CHLORIDE 0.9 % (FLUSH) 0.9 %
5-10 SYRINGE (ML) INJECTION AS NEEDED
Status: DISCONTINUED | OUTPATIENT
Start: 2018-08-08 | End: 2018-08-14 | Stop reason: HOSPADM

## 2018-08-08 RX ORDER — FENTANYL CITRATE 50 UG/ML
INJECTION, SOLUTION INTRAMUSCULAR; INTRAVENOUS AS NEEDED
Status: DISCONTINUED | OUTPATIENT
Start: 2018-08-08 | End: 2018-08-08 | Stop reason: HOSPADM

## 2018-08-08 RX ORDER — ONDANSETRON 4 MG/1
8 TABLET, ORALLY DISINTEGRATING ORAL
Status: DISCONTINUED | OUTPATIENT
Start: 2018-08-08 | End: 2018-08-14 | Stop reason: HOSPADM

## 2018-08-08 RX ORDER — FERROUS SULFATE, DRIED 160(50) MG
1 TABLET, EXTENDED RELEASE ORAL
Status: DISCONTINUED | OUTPATIENT
Start: 2018-08-09 | End: 2018-08-14 | Stop reason: HOSPADM

## 2018-08-08 RX ORDER — ONDANSETRON 2 MG/ML
4 INJECTION INTRAMUSCULAR; INTRAVENOUS
Status: DISCONTINUED | OUTPATIENT
Start: 2018-08-08 | End: 2018-08-14 | Stop reason: HOSPADM

## 2018-08-08 RX ORDER — SODIUM CHLORIDE 0.9 % (FLUSH) 0.9 %
5-10 SYRINGE (ML) INJECTION EVERY 8 HOURS
Status: DISCONTINUED | OUTPATIENT
Start: 2018-08-08 | End: 2018-08-14 | Stop reason: HOSPADM

## 2018-08-08 RX ORDER — SODIUM CHLORIDE 0.9 % (FLUSH) 0.9 %
5-10 SYRINGE (ML) INJECTION EVERY 8 HOURS
Status: DISCONTINUED | OUTPATIENT
Start: 2018-08-08 | End: 2018-08-10

## 2018-08-08 RX ORDER — LIDOCAINE HYDROCHLORIDE 20 MG/ML
INJECTION, SOLUTION EPIDURAL; INFILTRATION; INTRACAUDAL; PERINEURAL AS NEEDED
Status: DISCONTINUED | OUTPATIENT
Start: 2018-08-08 | End: 2018-08-08 | Stop reason: HOSPADM

## 2018-08-08 RX ORDER — ACETAMINOPHEN 10 MG/ML
1000 INJECTION, SOLUTION INTRAVENOUS ONCE
Status: COMPLETED | OUTPATIENT
Start: 2018-08-08 | End: 2018-08-08

## 2018-08-08 RX ORDER — SODIUM CHLORIDE, SODIUM LACTATE, POTASSIUM CHLORIDE, CALCIUM CHLORIDE 600; 310; 30; 20 MG/100ML; MG/100ML; MG/100ML; MG/100ML
75 INJECTION, SOLUTION INTRAVENOUS CONTINUOUS
Status: DISCONTINUED | OUTPATIENT
Start: 2018-08-08 | End: 2018-08-09

## 2018-08-08 RX ORDER — LETROZOLE 2.5 MG/1
2.5 TABLET, FILM COATED ORAL DAILY
Status: DISCONTINUED | OUTPATIENT
Start: 2018-08-08 | End: 2018-08-14 | Stop reason: HOSPADM

## 2018-08-08 RX ADMIN — ACETAMINOPHEN 1000 MG: 10 INJECTION, SOLUTION INTRAVENOUS at 17:48

## 2018-08-08 RX ADMIN — LIDOCAINE HYDROCHLORIDE 40 MG: 20 INJECTION, SOLUTION EPIDURAL; INFILTRATION; INTRACAUDAL; PERINEURAL at 16:44

## 2018-08-08 RX ADMIN — OXYCODONE HYDROCHLORIDE 5 MG: 5 TABLET ORAL at 04:37

## 2018-08-08 RX ADMIN — Medication 10 ML: at 02:26

## 2018-08-08 RX ADMIN — SODIUM CHLORIDE, SODIUM LACTATE, POTASSIUM CHLORIDE, AND CALCIUM CHLORIDE: 600; 310; 30; 20 INJECTION, SOLUTION INTRAVENOUS at 15:59

## 2018-08-08 RX ADMIN — Medication 5 ML: at 19:21

## 2018-08-08 RX ADMIN — SODIUM CHLORIDE, SODIUM LACTATE, POTASSIUM CHLORIDE, AND CALCIUM CHLORIDE 100 ML/HR: 600; 310; 30; 20 INJECTION, SOLUTION INTRAVENOUS at 07:00

## 2018-08-08 RX ADMIN — AMLODIPINE BESYLATE 5 MG: 5 TABLET ORAL at 09:18

## 2018-08-08 RX ADMIN — SODIUM CHLORIDE, SODIUM LACTATE, POTASSIUM CHLORIDE, AND CALCIUM CHLORIDE 75 ML/HR: 600; 310; 30; 20 INJECTION, SOLUTION INTRAVENOUS at 19:20

## 2018-08-08 RX ADMIN — ONDANSETRON 4 MG: 2 INJECTION, SOLUTION INTRAMUSCULAR; INTRAVENOUS at 19:18

## 2018-08-08 RX ADMIN — PROPOFOL 150 MG: 10 INJECTION, EMULSION INTRAVENOUS at 13:55

## 2018-08-08 RX ADMIN — LIDOCAINE HYDROCHLORIDE 60 MG: 20 INJECTION, SOLUTION EPIDURAL; INFILTRATION; INTRACAUDAL; PERINEURAL at 13:45

## 2018-08-08 RX ADMIN — EPHEDRINE SULFATE 5 MG: 50 INJECTION, SOLUTION INTRAVENOUS at 14:26

## 2018-08-08 RX ADMIN — Medication 2 G: at 13:58

## 2018-08-08 RX ADMIN — FENTANYL CITRATE 12.5 MCG: 50 INJECTION, SOLUTION INTRAMUSCULAR; INTRAVENOUS at 15:39

## 2018-08-08 RX ADMIN — FENTANYL CITRATE 12.5 MCG: 50 INJECTION, SOLUTION INTRAMUSCULAR; INTRAVENOUS at 15:09

## 2018-08-08 RX ADMIN — SODIUM CHLORIDE 1000 MG: 900 INJECTION, SOLUTION INTRAVENOUS at 22:14

## 2018-08-08 RX ADMIN — TUBERCULIN PURIFIED PROTEIN DERIVATIVE 5 UNITS: 5 INJECTION, SOLUTION INTRADERMAL at 02:11

## 2018-08-08 RX ADMIN — FENTANYL CITRATE 12.5 MCG: 50 INJECTION, SOLUTION INTRAMUSCULAR; INTRAVENOUS at 15:42

## 2018-08-08 RX ADMIN — ONDANSETRON 4 MG: 2 INJECTION INTRAMUSCULAR; INTRAVENOUS at 16:42

## 2018-08-08 RX ADMIN — ACETAMINOPHEN 650 MG: 325 TABLET ORAL at 04:37

## 2018-08-08 RX ADMIN — OXYCODONE HYDROCHLORIDE 10 MG: 5 TABLET ORAL at 22:13

## 2018-08-08 RX ADMIN — SODIUM CHLORIDE, SODIUM LACTATE, POTASSIUM CHLORIDE, AND CALCIUM CHLORIDE: 600; 310; 30; 20 INJECTION, SOLUTION INTRAVENOUS at 13:39

## 2018-08-08 RX ADMIN — Medication 500 MG: at 09:19

## 2018-08-08 RX ADMIN — HYDROMORPHONE HYDROCHLORIDE 0.5 MG: 2 INJECTION, SOLUTION INTRAMUSCULAR; INTRAVENOUS; SUBCUTANEOUS at 17:29

## 2018-08-08 RX ADMIN — ACETAMINOPHEN 650 MG: 325 TABLET, FILM COATED ORAL at 22:13

## 2018-08-08 RX ADMIN — OXYCODONE HYDROCHLORIDE 5 MG: 5 TABLET ORAL at 11:26

## 2018-08-08 RX ADMIN — POTASSIUM CHLORIDE 20 MEQ: 200 INJECTION, SOLUTION INTRAVENOUS at 02:22

## 2018-08-08 RX ADMIN — SODIUM CHLORIDE 2000 ML: 900 INJECTION, SOLUTION INTRAVENOUS at 00:08

## 2018-08-08 RX ADMIN — FENTANYL CITRATE 50 MCG: 50 INJECTION, SOLUTION INTRAMUSCULAR; INTRAVENOUS at 14:03

## 2018-08-08 RX ADMIN — ACETAMINOPHEN 1000 MG: 10 INJECTION, SOLUTION INTRAVENOUS at 16:31

## 2018-08-08 RX ADMIN — HYDROMORPHONE HYDROCHLORIDE 0.5 MG: 2 INJECTION, SOLUTION INTRAMUSCULAR; INTRAVENOUS; SUBCUTANEOUS at 02:22

## 2018-08-08 NOTE — ED PROVIDER NOTES
HPI Comments: Patient lost her balance and her leg gave out when she got out of the car causing her to fall to the pavement onto her left buttock area. She complains of left hip and left thigh pain. She denies other injury. No loss of consciousness. Patient is a 75 y.o. female presenting with hip pain. The history is provided by the patient and a relative. Hip Pain    This is a new problem. The problem occurs constantly. The problem has not changed since onset. The pain is present in the left upper leg. The pain is moderate. Pertinent negatives include no numbness, no back pain and no neck pain. Past Medical History:   Diagnosis Date    Abnormal EKG 2/16/2016    Aortic valve insufficiency 2/16/2016    Cancer ([facility] [address]     breast    Hyperlipidemia 2/16/2016    Hypertension 2/16/2016    UNSPECIFIED     Obesity 2/16/2016    UNSPECIFIED        Past Surgical History:   Procedure Laterality Date    HX TUBAL LIGATION           Family History:   Problem Relation Age of Onset    Coronary Artery Disease Mother     Heart Disease Father        Social History     Social History    Marital status:      Spouse name: N/A    Number of children: N/A    Years of education: N/A     Occupational History    Not on file. Social History Main Topics    Smoking status: Never Smoker    Smokeless tobacco: Current User     Types: Snuff    Alcohol use No    Drug use: Not on file    Sexual activity: Not on file     Other Topics Concern    Not on file     Social History Narrative         ALLERGIES: Penicillins    Review of Systems   HENT: Negative for ear pain and facial swelling. Respiratory: Negative for shortness of breath. Cardiovascular: Negative for chest pain and leg swelling. Gastrointestinal: Negative for blood in stool, nausea and vomiting. Genitourinary: Negative for dysuria and hematuria. Musculoskeletal: Negative for back pain and neck pain.    Neurological: Negative for syncope, weakness, numbness and headaches. Vitals:    08/07/18 1823   BP: 158/72   Pulse: 80   Resp: 18   Temp: 98.4 °F (36.9 °C)   SpO2: 99%   Weight: 105.2 kg (232 lb)   Height: 5' 11\" (1.803 m)            Physical Exam   Constitutional: She is oriented to person, place, and time. No distress. Morbidly obese   Eyes: Conjunctivae are normal. Pupils are equal, round, and reactive to light. Neck: Normal range of motion. Neck supple. Cardiovascular: Normal rate, regular rhythm and normal heart sounds. No murmur heard. Pulmonary/Chest: Breath sounds normal. No respiratory distress. Abdominal: Soft. She exhibits no distension. There is no tenderness. There is no rebound and no guarding. Musculoskeletal: Normal range of motion. She exhibits tenderness (tenderness left hip area. pain with movement of the left hip with flexion and internal/external rotation. Leg is shortened and externally rotated. ). She exhibits no edema. Neurological: She is alert and oriented to person, place, and time. She has normal strength. No sensory deficit. Skin: Skin is warm and dry. Nursing note and vitals reviewed. MDM  Number of Diagnoses or Management Options  Diagnosis management comments: No syncope. Pain began when she hit the ground and not prior so I do not believe this is a pathologic fracture. No other injuries or tenderness. 8:25 PM  Admitted to room 729 to the hospitalist service. Still awaiting basic metabolic panel.        Amount and/or Complexity of Data Reviewed  Clinical lab tests: ordered and reviewed (Results for orders placed or performed during the hospital encounter of 08/07/18  -CBC W/O DIFF       Result                                            Value                         Ref Range                       WBC                                               2.6 (L)                       4.3 - 11.1 K/uL                 RBC                                               2.99 (L) 4.05 - 5.2 M/uL                 HGB                                               10.3 (L)                      11.7 - 15.4 g/dL                HCT                                               32.4 (L)                      35.8 - 46.3 %                   MCV                                               108.4 (H)                     79.6 - 97.8 FL                  MCH                                               34.4 (H)                      26.1 - 32.9 PG                  MCHC                                              31.8                          31.4 - 35.0 g/dL                RDW                                               15.9                          %                               PLATELET                                          181                           150 - 450 K/uL                  MPV                                               9.9                           9.4 - 12.3 FL                   ABSOLUTE NRBC                                     0.00                          0.0 - 0.2 K/uL             -TYPE & SCREEN       Result                                            Value                         Ref Range                       Crossmatch Expiration                             08/10/2018                                                    ABO/Rh(D)                                         A POSITIVE                                                    Antibody screen                                   NEG                                                      )  Tests in the radiology section of CPT®: ordered and reviewed (Xr Chest Sngl V    Result Date: 8/7/2018  Portable chest x-ray CLINICAL INDICATION: Preoperative evaluation prior to hip surgery FINDINGS: Single AP view the chest the lungs to be expanded and clear. No pleural effusion or pneumothorax. The cardiac silhouette and mediastinum are unremarkable. IMPRESSION: No acute abnormality.     Xr Pelv Ap Only    Result Date: 8/7/2018  Pelvis single view 8/7/2018 CLINICAL HISTORY: Fall with acute hip pain. FINDINGS: A very limited portable single frontal view of the pelvis is submitted for evaluation. There is a clearly acute, angulated fracture involving the proximal diaphysis of the left femur. Significant angulation is seen with a secondary varus deformity. No evidence for dislocation is clearly seen of the left femoral head. No definite acute fractures are otherwise seen. Note is made that multiple osseous structures have an abnormal sclerotic appearance which can suggest an underlying metastatic, or metabolic bony disorder. IMPRESSION: 1. Significantly angulated fracture of the proximal diaphysis of the left femur. 2.  Abnormal sclerotic changes of multiple osseous structures raising concern for a potential underlying metastatic, or metabolic bony process. Xr Femur Lt 2 V    Result Date: 8/7/2018  Left femur Clinical indications: Left hip pain after fall today, metastatic breast cancer FINDINGS: Five views of the left femur show a pathologic left proximal femur subtrochanteric fracture with marked varus angulation. IMPRESSION: Pathologic left proximal femur subtrochanteric fracture with shortening and varus attenuation.     )          ED Course       Procedures

## 2018-08-08 NOTE — H&P
HOSPITALIST H&P  NAME:  Lavern Collins   Age:  75 y.o.  :   1945   MRN:   [id number]  PCP: Raina Ventura NP  Consulting MD:  Treatment Team: Attending Provider: Mei Rose. 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 She reports her L leg 'gave out' and she fell backwards. She is noted to have a L proximal subtrochanteric pathologic fracture on x-ray. At present, she reports her L hip pain is controlled. She denies any chest pain or shortness of breath. Hospitalist service asked to admit for L hip fracture. Complete ROS done and is as stated in HPI or otherwise negative  Past Medical History:   Diagnosis Date    Abnormal EKG 2016    Aortic valve insufficiency 2016    Cancer ([facility] [address]     breast    Hyperlipidemia 2016    Hypertension 2016    UNSPECIFIED     Obesity 2016    UNSPECIFIED       Past Surgical History:   Procedure Laterality Date    HX TUBAL LIGATION        Prior to Admission Medications   Prescriptions Last Dose Informant Patient Reported? Taking? amLODIPine (NORVASC) 5 mg tablet   Yes Yes   Sig: Take 5 mg by mouth daily. calcium carbonate (OS-JORGE) 500 mg calcium (1,250 mg) tablet   No Yes   Sig: Take 2 tablets at lunch and 2 tablets at dinner. Indications: hypocalcemia   furosemide (LASIX) 20 mg tablet   No Yes   Sig: Take 1 Tab by mouth daily as needed. For swelling. letrozole (FEMARA) 2.5 mg tablet   No Yes   Sig: Take 1 Tab by mouth daily. ondansetron hcl (ZOFRAN, AS HYDROCHLORIDE,) 8 mg tablet   No Yes   Sig: Take 1 Tab by mouth every eight (8) hours as needed for Nausea.    palbociclib (IBRANCE) 75 mg cap   No Yes   Si capsule daily for 21 days, hold for 7      Facility-Administered Medications: None     Allergies   Allergen Reactions    Penicillins Unknown (comments)     UNKNOWN REACTION       Social History   Substance Use Topics    Smoking status: Never Smoker    Smokeless tobacco: Current User     Types: Snuff    Alcohol use No      Family History   Problem Relation Age of Onset    Coronary Artery Disease Mother     Heart Disease Father       Objective:     Visit Vitals    /83 (BP 1 Location: Left arm, BP Patient Position: At rest)    Pulse 98    Temp 98.3 °F (36.8 °C)    Resp 16    Ht 5' 11\" (1.803 m)    Wt 105.2 kg (232 lb)    SpO2 96%    BMI 32.36 kg/m2      Temp (24hrs), Av.4 °F (36.9 °C), Min:98.3 °F (36.8 °C), Max:98.4 °F (36.9 °C)    Oxygen Therapy  O2 Sat (%): 96 % (18 0213)  Pulse via Oximetry: 98 beats per minute (18 0100)  O2 Device: Nasal cannula (18 0013)  O2 Flow Rate (L/min): 2 l/min (18 0013)  Physical Exam:  General:    Alert, cooperative, no distress, obese  Head:   Normocephalic, without obvious abnormality, atraumatic. Nose:  Nares normal. No drainage or sinus tenderness. Lungs:   Clear to auscultation bilaterally. No Wheezing or Rhonchi. No rales. Heart:   Regular rate and rhythm,  no murmur, rub or gallop. Abdomen:   Soft, non-tender. Not distended. Bowel sounds normal.   Extremities: No cyanosis. No edema. No clubbing. L leg noticeably shorter than R.  Skin:     Texture, turgor normal. No rashes or lesions.   Not Jaundiced  Neurologic: Alert and oriented x 3, no focal deficits   Data Review:   Recent Results (from the past 24 hour(s))   CBC W/O DIFF    Collection Time: 18  7:27 PM   Result Value Ref Range    WBC 2.6 (L) 4.3 - 11.1 K/uL    RBC 2.99 (L) 4.05 - 5.2 M/uL    HGB 10.3 (L) 11.7 - 15.4 g/dL    HCT 32.4 (L) 35.8 - 46.3 %    .4 (H) 79.6 - 97.8 FL    MCH 34.4 (H) 26.1 - 32.9 PG    MCHC 31.8 31.4 - 35.0 g/dL RDW 15.9 %    PLATELET 087 174 - 444 K/uL    MPV 9.9 9.4 - 12.3 FL    ABSOLUTE NRBC 0.00 0.0 - 0.2 K/uL   METABOLIC PANEL, BASIC    Collection Time: 08/07/18  7:27 PM   Result Value Ref Range    Sodium 146 (H) 136 - 145 mmol/L    Potassium 3.1 (L) 3.5 - 5.1 mmol/L    Chloride 110 (H) 98 - 107 mmol/L    CO2 24 21 - 32 mmol/L    Anion gap 12 7 - 16 mmol/L    Glucose 107 (H) 65 - 100 mg/dL    BUN 16 8 - 23 MG/DL    Creatinine 1.67 (H) 0.6 - 1.0 MG/DL    GFR est AA 39 (L) >60 ml/min/1.73m2    GFR est non-AA 32 (L) >60 ml/min/1.73m2    Calcium 6.6 (L) 8.3 - 10.4 MG/DL   PROTHROMBIN TIME + INR    Collection Time: 08/07/18  7:27 PM   Result Value Ref Range    Prothrombin time 14.0 11.5 - 14.5 sec    INR 1.1     MAGNESIUM    Collection Time: 08/07/18  7:27 PM   Result Value Ref Range    Magnesium 1.5 (L) 1.8 - 2.4 mg/dL   TYPE & SCREEN    Collection Time: 08/07/18  7:35 PM   Result Value Ref Range    Crossmatch Expiration 08/10/2018     ABO/Rh(D) A POSITIVE     Antibody screen NEG    EKG, 12 LEAD, INITIAL    Collection Time: 08/07/18  8:25 PM   Result Value Ref Range    Ventricular Rate 83 BPM    Atrial Rate 82 BPM    QRS Duration 78 ms    Q-T Interval 372 ms    QTC Calculation (Bezet) 437 ms    Calculated P Axis 64 degrees    Calculated R Axis 40 degrees    Calculated T Axis -58 degrees    Diagnosis       !! AGE AND GENDER SPECIFIC ECG ANALYSIS !!   Sinus rhythm with A-V dissociation and Accelerated Junctional rhythm  Low voltage QRS  Septal infarct , age undetermined  T wave abnormality, consider inferior ischemia  Abnormal ECG  No previous ECGs available     URINALYSIS W/ RFLX MICROSCOPIC    Collection Time: 08/08/18 12:58 AM   Result Value Ref Range    Color YELLOW      Appearance CLEAR      Specific gravity 1.013 1.001 - 1.023      pH (UA) 5.0 5.0 - 9.0      Protein NEGATIVE  NEG mg/dL    Glucose NEGATIVE  mg/dL    Ketone NEGATIVE  NEG mg/dL    Bilirubin NEGATIVE  NEG      Blood NEGATIVE  NEG      Urobilinogen 0.2 0.2 - 1.0 EU/dL    Nitrites NEGATIVE  NEG      Leukocyte Esterase NEGATIVE  NEG       Imaging /Procedures /Studies   XR HIP LT W OR WO PELV 2-3 VWS   Final Result   IMPRESSION:      1. No acute fracture or dislocation of the right hip. Right femur is intact. 2. Osseous sclerosis, suggesting metastasis. 3. Left femur subtrochanteric fracture. There is suggestion of underlying lytic   lesion in the subtrochanteric region. XR HIP RT W OR WO PELV 2-3 VWS   Final Result   IMPRESSION:      1. No acute fracture or dislocation of the right hip. Right femur is intact. 2. Osseous sclerosis, suggesting metastasis. 3. Left femur subtrochanteric fracture. There is suggestion of underlying lytic   lesion in the subtrochanteric region. XR FEMUR RT 2 VS   Final Result   IMPRESSION:      1. No acute fracture or dislocation of the right hip. Right femur is intact. 2. Osseous sclerosis, suggesting metastasis. 3. Left femur subtrochanteric fracture. There is suggestion of underlying lytic   lesion in the subtrochanteric region. XR CHEST SNGL V   Final Result   IMPRESSION: No acute abnormality. XR FEMUR LT 2 V   Final Result   IMPRESSION: Pathologic left proximal femur subtrochanteric fracture with   shortening and varus attenuation. XR PELV AP ONLY   Final Result   IMPRESSION:   1. Significantly angulated fracture of the proximal diaphysis of the left femur. 2.  Abnormal sclerotic changes of multiple osseous structures raising concern   for a potential underlying metastatic, or metabolic bony process. Assessment and Plan:      Active Hospital Problems    Diagnosis Date Noted    Stage 3 chronic kidney disease 08/08/2018    Hypokalemia 08/08/2018    Femur fracture, left ([facility] [address] 08/07/2018    Anemia due to chronic kidney disease treated with erythropoietin 07/25/2017    Malignant neoplasm metastatic to bone ([facility] [address] 07/31/2016     Last Assessment & Plan:   1. Complete radiation therapy to the patient's sacrum and femurs as directed by Dr. María Neely. 2.  Return to the office in 6 weeks with x-rays of the pelvis on arrival.  3.  Instruct the patient to advance her weightbearing as tolerated as she continues to enjoy a reduction in pain following the radiation therapy.  Obesity 02/16/2016     UNSPECIFIED       Hypertension 02/16/2016     UNSPECIFIED          PLAN  · Admit to ortho floor. · Pain control and DVT prophylaxis per ortho. · HTN- continue amlodipine and use prn hydralazine. · Hypokalemia- replete and monitor. · Metastatic breast cancer- continue Femara. Resume Ibranze on discharge. · Consult case management for discharge planning. Patient is low-to-moderate cardiovascular risk and may proceed with surgery. Code Status: Full    Anticipated discharge: 3 days to STR. Signed By: Janet Samayoa MD     August 8, 2018

## 2018-08-08 NOTE — ROUTINE PROCESS
TRANSFER - OUT REPORT:    Verbal report given to Halle Byrd rn(name) on Estela Fraction  being transferred to 729(unit) for routine progression of care       Report consisted of patients Situation, Background, Assessment and   Recommendations(SBAR). Information from the following report(s) SBAR was reviewed with the receiving nurse. Lines:   Peripheral IV 08/07/18 Right Antecubital (Active)   Site Assessment Clean, dry, & intact 8/7/2018 11:06 PM   Phlebitis Assessment 0 8/7/2018 11:06 PM   Infiltration Assessment 0 8/7/2018 11:06 PM   Dressing Status Clean, dry, & intact 8/7/2018 11:06 PM   Dressing Type Transparent 8/7/2018 11:06 PM        Opportunity for questions and clarification was provided.       Patient transported with:   [employer]

## 2018-08-08 NOTE — PROGRESS NOTES
TRANSFER - IN REPORT:    Verbal report received from OPAL Hoover(name) on [facility]  being received from PACU(unit) for routine progression of care      Report consisted of patients Situation, Background, Assessment and   Recommendations(SBAR). Information from the following report(s) SBAR, MAR and Recent Results was reviewed with the receiving nurse. Opportunity for questions and clarification was provided. Assessment to be completed upon patients arrival to unit and care assumed.

## 2018-08-08 NOTE — PROGRESS NOTES
ORTHO:    PATIENT TO BE ADMITTED BY HOSPITALIST TO ROOM 729 FOR LEFT FEMUR FRACTURE.    XRAYS SHOW LEFT FEMUR STRESS FRACTURE, RIGHT HIP AND FEMUR XRAYS ORDERED FOR COMPARISON DUE TO METASTATIC DISEASE. SURGERY PLANNED WITH DR. DUMONT TOMORROW. PLEASE KEEP NPO AFTER MIDNIGHT.

## 2018-08-08 NOTE — PROGRESS NOTES
TRANSFER - IN REPORT:    Verbal report received from Venus Rivers, [address] [place] [address] on Zelphia Cuff  being received from ED for ordered procedure      Report consisted of patients Situation, Background, Assessment and   Recommendations(SBAR). Information from the following report(s) SBAR, ED Summary and MAR was reviewed with the receiving nurse. Opportunity for questions and clarification was provided. Assessment completed upon patients arrival to unit and care assumed.

## 2018-08-08 NOTE — PERIOP NOTES
TRANSFER - IN REPORT:    Verbal report received from Bret Porter, [address] on [address]  being received from [phone] for routine progression of care      Report consisted of patients Situation, Background, Assessment and   Recommendations(SBAR). Information from the following report(s) SBAR was reviewed with the receiving nurse. Opportunity for questions and clarification was provided. Assessment to be completed upon patients arrival to unit and care assumed.

## 2018-08-08 NOTE — PROGRESS NOTES
Problem: Pressure Injury - Risk of  Goal: *Prevention of pressure injury  Document Arturo Scale and appropriate interventions in the flowsheet. Outcome: Progressing Towards Goal  Pressure Injury Interventions: Activity Interventions: Pressure redistribution bed/mattress(bed type)    Mobility Interventions: HOB 30 degrees or less, Pressure redistribution bed/mattress (bed type), PT/OT evaluation    Nutrition Interventions: Document food/fluid/supplement intake                    Problem: Falls - Risk of  Goal: *Absence of Falls  Document Maurizio Fall Risk and appropriate interventions in the flowsheet.    Outcome: Progressing Towards Goal  Fall Risk Interventions:            Medication Interventions: Bed/chair exit alarm, Evaluate medications/consider consulting pharmacy, Patient to call before getting OOB    Elimination Interventions: Call light in reach, Bed/chair exit alarm, Patient to call for help with toileting needs    History of Falls Interventions: Bed/chair exit alarm, Consult care management for discharge planning, Door open when patient unattended

## 2018-08-08 NOTE — PROGRESS NOTES
TRANSFER - OUT REPORT:    Verbal report given to Bao dawson RN on Gage Espinosa  being transferred to PreOp for ordered procedure       Report consisted of patients Situation, Background, Assessment and   Recommendations(SBAR). Information from the following report(s) SBAR, Intake/Output and MAR was reviewed with the receiving nurse. Lines:   Peripheral IV 08/07/18 Right Antecubital (Active)   Site Assessment Clean, dry, & intact 8/8/2018  2:36 AM   Phlebitis Assessment 0 8/8/2018  2:36 AM   Infiltration Assessment 0 8/8/2018  2:36 AM   Dressing Status Clean, dry, & intact 8/8/2018  2:36 AM   Dressing Type Tape;Transparent 8/8/2018  2:36 AM   Hub Color/Line Status Capped 8/8/2018  2:36 AM       Peripheral IV Left Antecubital (Active)   Site Assessment Clean, dry, & intact 8/8/2018  2:36 AM   Phlebitis Assessment 0 8/8/2018  2:36 AM   Infiltration Assessment 0 8/8/2018  2:36 AM   Dressing Status Clean, dry, & intact 8/8/2018  2:36 AM   Dressing Type Tape;Transparent 8/8/2018  2:36 AM   Hub Color/Line Status Green; Infusing 8/8/2018  2:36 AM        Opportunity for questions and clarification was provided.       Patient transported with:   O2 @ 2 liters

## 2018-08-08 NOTE — PROGRESS NOTES
Spoke with patient and family regarding discharge planning. Lives with 3 daughters in single story home with ramp entrance. Multiple family members provide her support and assistance if needed. At baseline patient is wheelchair bound but is able to transfer from wheelchair to toilet/bed/shower chair. PCP- Dr. Leslie Barboza and last seen approx 8 months ago. Dr. Toney Saunders is her oncologist and see has scheduled appointments every two weeks with him. DME- wheelchair, shower chair  Denies any oxygen use at home. Denies any previous [facility] services. Denies having any problems filling Rx. CM discussed discharge to [facility] and patient is declining at this time to go to [facility] upon discharge. Patient and family are agreeable to [facility] PT services upon discharge. CM will continue to follow    Care Management Interventions  PCP Verified by CM: Yes  Mode of Transport at Discharge:  Other (see comment) (family)  Transition of Care Consult (CM Consult): Discharge Planning  Discharge Durable Medical Equipment: No  Physical Therapy Consult: Yes  Occupational Therapy Consult: Yes  Current Support Network: Relative's Home  Confirm Follow Up Transport: Family  Plan discussed with Pt/Family/Caregiver: Yes  Freedom of Choice Offered: Yes  Discharge Location  Discharge Placement: Home with home health

## 2018-08-08 NOTE — ANESTHESIA PREPROCEDURE EVALUATION
Anesthetic History   No history of anesthetic complications            Review of Systems / Medical History  Patient summary reviewed and pertinent labs reviewed    Pulmonary  Within defined limits                 Neuro/Psych   Within defined limits           Cardiovascular    Hypertension: well controlled  Valvular problems/murmurs: aortic insufficiency              Comments: Denies recent CP, SOB, Palps, Syncope, Fatigue    Echo 2014: EF 50%, Mod AI, impaired relaxation   GI/Hepatic/Renal         Renal disease (Cr 1.67,  K 3.1, Ca 6.6): CRI       Endo/Other        Morbid obesity, cancer (Breast) and anemia     Other Findings              Physical Exam    Airway  Mallampati: II  TM Distance: 4 - 6 cm  Neck ROM: normal range of motion   Mouth opening: Normal     Cardiovascular    Rhythm: regular  Rate: normal         Dental  No notable dental hx       Pulmonary  Breath sounds clear to auscultation               Abdominal  GI exam deferred       Other Findings            Anesthetic Plan    ASA: 3  Anesthesia type: general            Anesthetic plan and risks discussed with: Patient      Pt request GA

## 2018-08-08 NOTE — PROGRESS NOTES
08/08/18 0227   Dual Skin Pressure Injury Assessment   Dual Skin Pressure Injury Assessment WDL   Second Care Provider (Based on [address] [place] [address]) Doreen Carrero, RN   Skin Integumentary   Skin Integumentary (WDL) WDL   Skin Color Appropriate for ethnicity   Skin Condition/Temp Warm;Dry;Flaky   Skin Integrity Intact;Scars (comment)     Dual skin assessment performed by primary RN and Doreen Carrero RN. Scars noted to R breast and right thigh. Sacrum clear. Skin intact. No other skin abnormalities noted.

## 2018-08-08 NOTE — ED NOTES
Spoke with pharmacy about potassium not being in pyxis. Pharmacy stated it may be \"a little while\" before they get it to us. Patient will be transported upstairs without it.   RN notified during report

## 2018-08-08 NOTE — PERIOP NOTES
TRANSFER - OUT REPORT:    Verbal report given to Robert Freed on Randy Santamaria  being transferred to 729(unit) for routine post - op       Report consisted of patients Situation, Background, Assessment and   Recommendations(SBAR). Information from the following report(s) SBAR, OR Summary and Procedure Summary was reviewed with the receiving nurse. Lines:   Peripheral IV 08/07/18 Right Antecubital (Active)   Site Assessment Clean, dry, & intact 8/8/2018  5:55 PM   Phlebitis Assessment 0 8/8/2018  5:55 PM   Infiltration Assessment 0 8/8/2018  5:55 PM   Dressing Status Dry 8/8/2018  5:55 PM   Dressing Type Transparent 8/8/2018  5:55 PM   Hub Color/Line Status Pink 8/8/2018  5:55 PM       Peripheral IV Left Antecubital (Active)   Site Assessment Clean, dry, & intact 8/8/2018  5:55 PM   Phlebitis Assessment 0 8/8/2018  5:55 PM   Infiltration Assessment 0 8/8/2018  5:55 PM   Dressing Status Dry 8/8/2018  5:55 PM   Dressing Type Transparent 8/8/2018  5:55 PM   Hub Color/Line Status Green 8/8/2018  5:55 PM        Opportunity for questions and clarification was provided. Patient transported with:   O2 @ 5 liters    VTE prophylaxis orders have been written for Randy Santamaria. Patient and family given floor number and nurses name. Family updated re: pt status after security code verified.

## 2018-08-09 LAB
25(OH)D3+25(OH)D2 SERPL-MCNC: 8.4 NG/ML (ref 30–100)
ANION GAP SERPL CALC-SCNC: 11 MMOL/L (ref 7–16)
ANION GAP SERPL CALC-SCNC: 11 MMOL/L (ref 7–16)
ANION GAP SERPL CALC-SCNC: 14 MMOL/L (ref 7–16)
BASOPHILS # BLD: 0 K/UL
BASOPHILS # BLD: 0 K/UL
BASOPHILS NFR BLD: 0 % (ref 0–2)
BASOPHILS NFR BLD: 0 % (ref 0–2)
BUN SERPL-MCNC: 22 MG/DL (ref 8–23)
BUN SERPL-MCNC: 28 MG/DL (ref 8–23)
BUN SERPL-MCNC: 30 MG/DL (ref 8–23)
CA-I BLD-MCNC: 3.2 MG/DL (ref 4–5.2)
CALCIUM SERPL-MCNC: 5.6 MG/DL (ref 8.3–10.4)
CALCIUM SERPL-MCNC: 5.7 MG/DL (ref 8.3–10.4)
CALCIUM SERPL-MCNC: 6.4 MG/DL (ref 8.3–10.4)
CHLORIDE SERPL-SCNC: 104 MMOL/L (ref 98–107)
CHLORIDE SERPL-SCNC: 105 MMOL/L (ref 98–107)
CHLORIDE SERPL-SCNC: 106 MMOL/L (ref 98–107)
CO2 SERPL-SCNC: 21 MMOL/L (ref 21–32)
CO2 SERPL-SCNC: 23 MMOL/L (ref 21–32)
CO2 SERPL-SCNC: 25 MMOL/L (ref 21–32)
CREAT SERPL-MCNC: 3.15 MG/DL (ref 0.6–1)
CREAT SERPL-MCNC: 3.85 MG/DL (ref 0.6–1)
CREAT SERPL-MCNC: 3.89 MG/DL (ref 0.6–1)
DIFFERENTIAL METHOD BLD: ABNORMAL
DIFFERENTIAL METHOD BLD: ABNORMAL
EOSINOPHIL # BLD: 0 K/UL
EOSINOPHIL # BLD: 0 K/UL
EOSINOPHIL NFR BLD: 0 % (ref 0.5–7.8)
EOSINOPHIL NFR BLD: 0 % (ref 0.5–7.8)
ERYTHROCYTE [DISTWIDTH] IN BLOOD BY AUTOMATED COUNT: 16.3 %
ERYTHROCYTE [DISTWIDTH] IN BLOOD BY AUTOMATED COUNT: 18.7 %
GLUCOSE SERPL-MCNC: 128 MG/DL (ref 65–100)
GLUCOSE SERPL-MCNC: 137 MG/DL (ref 65–100)
GLUCOSE SERPL-MCNC: 178 MG/DL (ref 65–100)
HCT VFR BLD AUTO: 26 % (ref 35.8–46.3)
HCT VFR BLD AUTO: 28.1 % (ref 35.8–46.3)
HGB BLD-MCNC: 8.4 G/DL (ref 11.7–15.4)
HGB BLD-MCNC: 8.6 G/DL (ref 11.7–15.4)
IMM GRANULOCYTES # BLD: 0 K/UL
IMM GRANULOCYTES # BLD: 0 K/UL
IMM GRANULOCYTES NFR BLD AUTO: 1 % (ref 0–5)
IMM GRANULOCYTES NFR BLD AUTO: 1 % (ref 0–5)
LYMPHOCYTES # BLD: 0.4 K/UL
LYMPHOCYTES # BLD: 0.5 K/UL
LYMPHOCYTES NFR BLD: 11 % (ref 13–44)
LYMPHOCYTES NFR BLD: 16 % (ref 13–44)
MAGNESIUM SERPL-MCNC: 1.2 MG/DL (ref 1.8–2.4)
MCH RBC QN AUTO: 33.9 PG (ref 26.1–32.9)
MCH RBC QN AUTO: 34.3 PG (ref 26.1–32.9)
MCHC RBC AUTO-ENTMCNC: 30.6 G/DL (ref 31.4–35)
MCHC RBC AUTO-ENTMCNC: 32.3 G/DL (ref 31.4–35)
MCV RBC AUTO: 104.8 FL (ref 79.6–97.8)
MCV RBC AUTO: 112 FL (ref 79.6–97.8)
MM INDURATION POC: 0 MM (ref 0–5)
MONOCYTES # BLD: 0.5 K/UL
MONOCYTES # BLD: 0.7 K/UL
MONOCYTES NFR BLD: 15 % (ref 4–12)
MONOCYTES NFR BLD: 18 % (ref 4–12)
NEUTS SEG # BLD: 1.8 K/UL
NEUTS SEG # BLD: 3.1 K/UL
NEUTS SEG NFR BLD: 64 % (ref 43–78)
NEUTS SEG NFR BLD: 73 % (ref 43–78)
NRBC # BLD: 0 K/UL (ref 0–0.2)
NRBC # BLD: 0.07 K/UL (ref 0–0.2)
PHOSPHATE SERPL-MCNC: 3 MG/DL (ref 2.3–3.7)
PLATELET # BLD AUTO: 163 K/UL (ref 150–450)
PLATELET # BLD AUTO: 254 K/UL (ref 150–450)
PMV BLD AUTO: 9.4 FL (ref 9.4–12.3)
PMV BLD AUTO: 9.4 FL (ref 9.4–12.3)
POTASSIUM SERPL-SCNC: 4.1 MMOL/L (ref 3.5–5.1)
POTASSIUM SERPL-SCNC: 4.3 MMOL/L (ref 3.5–5.1)
POTASSIUM SERPL-SCNC: 4.5 MMOL/L (ref 3.5–5.1)
PPD POC: NEGATIVE NEGATIVE
RBC # BLD AUTO: 2.48 M/UL (ref 4.05–5.2)
RBC # BLD AUTO: 2.51 M/UL (ref 4.05–5.2)
SODIUM SERPL-SCNC: 138 MMOL/L (ref 136–145)
SODIUM SERPL-SCNC: 141 MMOL/L (ref 136–145)
SODIUM SERPL-SCNC: 141 MMOL/L (ref 136–145)
WBC # BLD AUTO: 2.7 K/UL (ref 4.3–11.1)
WBC # BLD AUTO: 4.2 K/UL (ref 4.3–11.1)

## 2018-08-09 PROCEDURE — 74011250636 HC RX REV CODE- 250/636: Performed by: ORTHOPAEDIC SURGERY

## 2018-08-09 PROCEDURE — 80048 BASIC METABOLIC PNL TOTAL CA: CPT

## 2018-08-09 PROCEDURE — 65270000029 HC RM PRIVATE

## 2018-08-09 PROCEDURE — 97166 OT EVAL MOD COMPLEX 45 MIN: CPT

## 2018-08-09 PROCEDURE — 74011250637 HC RX REV CODE- 250/637: Performed by: NURSE PRACTITIONER

## 2018-08-09 PROCEDURE — 82330 ASSAY OF CALCIUM: CPT

## 2018-08-09 PROCEDURE — 97535 SELF CARE MNGMENT TRAINING: CPT

## 2018-08-09 PROCEDURE — 36415 COLL VENOUS BLD VENIPUNCTURE: CPT

## 2018-08-09 PROCEDURE — P9016 RBC LEUKOCYTES REDUCED: HCPCS

## 2018-08-09 PROCEDURE — 97162 PT EVAL MOD COMPLEX 30 MIN: CPT

## 2018-08-09 PROCEDURE — 74011250636 HC RX REV CODE- 250/636: Performed by: HOSPITALIST

## 2018-08-09 PROCEDURE — 74011250637 HC RX REV CODE- 250/637: Performed by: INTERNAL MEDICINE

## 2018-08-09 PROCEDURE — 36430 TRANSFUSION BLD/BLD COMPNT: CPT

## 2018-08-09 PROCEDURE — 97530 THERAPEUTIC ACTIVITIES: CPT

## 2018-08-09 PROCEDURE — 85025 COMPLETE CBC W/AUTO DIFF WBC: CPT

## 2018-08-09 PROCEDURE — 77030020263 HC SOL INJ SOD CL0.9% LFCR 1000ML

## 2018-08-09 PROCEDURE — 74011250636 HC RX REV CODE- 250/636: Performed by: NURSE PRACTITIONER

## 2018-08-09 PROCEDURE — 84100 ASSAY OF PHOSPHORUS: CPT

## 2018-08-09 PROCEDURE — 77030011943

## 2018-08-09 PROCEDURE — 74011000258 HC RX REV CODE- 258: Performed by: ORTHOPAEDIC SURGERY

## 2018-08-09 PROCEDURE — 83735 ASSAY OF MAGNESIUM: CPT

## 2018-08-09 PROCEDURE — 77030039270 HC TU BLD FLTR CARD -A

## 2018-08-09 PROCEDURE — 51798 US URINE CAPACITY MEASURE: CPT

## 2018-08-09 RX ORDER — SODIUM CHLORIDE 9 MG/ML
75 INJECTION, SOLUTION INTRAVENOUS CONTINUOUS
Status: DISCONTINUED | OUTPATIENT
Start: 2018-08-09 | End: 2018-08-11

## 2018-08-09 RX ORDER — MAGNESIUM SULFATE HEPTAHYDRATE 40 MG/ML
2 INJECTION, SOLUTION INTRAVENOUS ONCE
Status: COMPLETED | OUTPATIENT
Start: 2018-08-09 | End: 2018-08-09

## 2018-08-09 RX ORDER — SODIUM CHLORIDE 9 MG/ML
250 INJECTION, SOLUTION INTRAVENOUS AS NEEDED
Status: DISCONTINUED | OUTPATIENT
Start: 2018-08-09 | End: 2018-08-10 | Stop reason: SDUPTHER

## 2018-08-09 RX ADMIN — SODIUM CHLORIDE 500 ML: 900 INJECTION, SOLUTION INTRAVENOUS at 02:32

## 2018-08-09 RX ADMIN — ACETAMINOPHEN 650 MG: 325 TABLET, FILM COATED ORAL at 21:17

## 2018-08-09 RX ADMIN — ONDANSETRON 4 MG: 2 INJECTION INTRAMUSCULAR; INTRAVENOUS at 09:25

## 2018-08-09 RX ADMIN — CALCIUM GLUCONATE 2 G: 94 INJECTION, SOLUTION INTRAVENOUS at 23:35

## 2018-08-09 RX ADMIN — Medication 10 ML: at 15:37

## 2018-08-09 RX ADMIN — ENOXAPARIN SODIUM 30 MG: 100 INJECTION SUBCUTANEOUS at 15:37

## 2018-08-09 RX ADMIN — Medication 10 ML: at 05:54

## 2018-08-09 RX ADMIN — MAGNESIUM SULFATE HEPTAHYDRATE 2 G: 40 INJECTION, SOLUTION INTRAVENOUS at 21:17

## 2018-08-09 RX ADMIN — OXYCODONE HYDROCHLORIDE 5 MG: 5 TABLET ORAL at 11:21

## 2018-08-09 RX ADMIN — DOCUSATE SODIUM 100 MG: 100 CAPSULE, LIQUID FILLED ORAL at 17:42

## 2018-08-09 RX ADMIN — Medication 5 ML: at 21:17

## 2018-08-09 RX ADMIN — DOCUSATE SODIUM 100 MG: 100 CAPSULE, LIQUID FILLED ORAL at 11:21

## 2018-08-09 RX ADMIN — SODIUM CHLORIDE 1000 MG: 900 INJECTION, SOLUTION INTRAVENOUS at 05:52

## 2018-08-09 RX ADMIN — ACETAMINOPHEN 650 MG: 325 TABLET, FILM COATED ORAL at 15:37

## 2018-08-09 RX ADMIN — CALCIUM CARBONATE 500 MG (1,250 MG)-VITAMIN D3 200 UNIT TABLET 1 TABLET: at 17:42

## 2018-08-09 RX ADMIN — ACETAMINOPHEN 650 MG: 325 TABLET, FILM COATED ORAL at 05:53

## 2018-08-09 RX ADMIN — ONDANSETRON 8 MG: 4 TABLET, ORALLY DISINTEGRATING ORAL at 17:37

## 2018-08-09 RX ADMIN — CALCIUM CARBONATE 500 MG (1,250 MG)-VITAMIN D3 200 UNIT TABLET 1 TABLET: at 11:21

## 2018-08-09 RX ADMIN — SODIUM CHLORIDE 75 ML/HR: 900 INJECTION, SOLUTION INTRAVENOUS at 12:00

## 2018-08-09 NOTE — PROGRESS NOTES
Slide board ordered from [address] [place] [address] to be delivered to room for patient/family education prior to discharge home. Patient is still requesting to go home with [facility] and not pursue STR. CM will continue to follow.

## 2018-08-09 NOTE — PROGRESS NOTES
ORTH FRACTURE PROGRESS NOTE    2018  Admit Date:   2018    Post Op day: 1 Day Post-Op    Subjective:    Thresea Ahr SITTING UP IN BED     PT/OT:   Gait:                    Vital Signs:    Patient Vitals for the past 8 hrs:   BP Temp Pulse Resp SpO2   18 1530 135/77 98.3 °F (36.8 °C) 88 18 95 %   18 1431 125/77 97.8 °F (36.6 °C) 89 16 96 %   18 1416 122/61 98.3 °F (36.8 °C) 87 18 96 %   18 1110 120/58 98.2 °F (36.8 °C) 78 16 94 %     Temp (24hrs), Av.9 °F (36.6 °C), Min:97.2 °F (36.2 °C), Max:99.1 °F (37.3 °C)      Pain Control:   Pain Assessment  Pain Scale 1: Numeric (0 - 10)  Pain Intensity 1: 5  Pain Onset 1: post fall  Pain Location 1: Hip  Pain Orientation 1: Left, Right  Pain Description 1: Aching  Pain Intervention(s) 1: Ambulation/Increased Activity    Meds:    Current Facility-Administered Medications   Medication Dose Route Frequency    0.9% sodium chloride infusion 250 mL  250 mL IntraVENous PRN    0.9% sodium chloride infusion  75 mL/hr IntraVENous CONTINUOUS    amLODIPine (NORVASC) tablet 5 mg  5 mg Oral DAILY    letrozole (FEMARA) tablet 2.5 mg (Patient Supplied)  2.5 mg Oral DAILY    ondansetron (ZOFRAN ODT) tablet 8 mg  8 mg Oral Q8H PRN    sodium chloride (NS) flush 5-10 mL  5-10 mL IntraVENous Q8H    sodium chloride (NS) flush 5-10 mL  5-10 mL IntraVENous PRN    sodium chloride (NS) flush 5-10 mL  5-10 mL IntraVENous Q8H    sodium chloride (NS) flush 5-10 mL  5-10 mL IntraVENous PRN    acetaminophen (TYLENOL) tablet 650 mg  650 mg Oral Q8H    oxyCODONE IR (ROXICODONE) tablet 5-10 mg  5-10 mg Oral Q4H PRN    ondansetron (ZOFRAN) injection 4 mg  4 mg IntraVENous Q4H PRN    docusate sodium (COLACE) capsule 100 mg  100 mg Oral BID    alum-mag hydroxide-simeth (MYLANTA) oral suspension 30 mL  30 mL Oral Q4H PRN    calcium-vitamin D (OS-JORGE) 500 mg-200 unit tablet  1 Tab Oral TID WITH MEALS    enoxaparin (LOVENOX) injection 30 mg  30 mg SubCUTAneous Q24H    traMADol (ULTRAM) tablet 50 mg  50 mg Oral Q6H PRN    hydrALAZINE (APRESOLINE) 20 mg/mL injection 10 mg  10 mg IntraVENous Q6H PRN       LAB:    Recent Labs      08/09/18   0210   08/07/18   1927   HCT  28.1*   < >  32.4*   HGB  8.6*   < >  10.3*   INR   --    --   1.1    < > = values in this interval not displayed. 24 Hour Assessment Issues:    Oriented    Discharge Planning: HOME    Transfuse PRBC's:      Assessment & Physician's Comment:  Dressing is clean, dry, and intact  Neurovascular checks within normal limits    Principal Problem:    Femur fracture, left ([facility] [address] (8/7/2018)    Active Problems:    Hypertension (2/16/2016)      Overview: UNSPECIFIED       Obesity (2/16/2016)      Overview: UNSPECIFIED       Malignant neoplasm metastatic to bone ([facility] [address] (7/31/2016)      Overview: Last Assessment & Plan:       1. Complete radiation therapy to the patient's sacrum and femurs as       directed by Dr. Raquel Gong. 2.  Return to the office in 6 weeks with x-rays of the pelvis on arrival.      3.  Instruct the patient to advance her weightbearing as tolerated as she       continues to enjoy a reduction in pain following the radiation therapy.       Anemia due to chronic kidney disease treated with erythropoietin (7/25/2017)      Stage 3 chronic kidney disease (8/8/2018)      Hypokalemia (8/8/2018)        Plan:  CONTINUE THERAPY   NWB LEFT LE   WBAT RIGHT [facility]      Abdirahman Ring MD

## 2018-08-09 NOTE — ANESTHESIA POSTPROCEDURE EVALUATION
Post-Anesthesia Evaluation and Assessment    Patient: Lavern Collins MRN: [id number]  SSN: [id number]    YOB: 1945  Age: 75 y.o. Sex: female       Cardiovascular Function/Vital Signs  Visit Vitals    /81 (BP 1 Location: Left arm, BP Patient Position: At rest)    Pulse 95    Temp 36.2 °C (97.2 °F)    Resp 16    Ht 5' 11\" (1.803 m)    Wt 105.2 kg (232 lb)    SpO2 93%    BMI 32.36 kg/m2       Patient is status post general anesthesia for Procedure(s):  Prophylactic nailing right femur / Open reduction internal fixation left subtrochanteric left femur / Open biopsy left proximal femur. Nausea/Vomiting: None    Postoperative hydration reviewed and adequate. Pain:  Pain Scale 1: Numeric (0 - 10) (08/08/18 2214)  Pain Intensity 1: 7 (08/08/18 2214)   Managed    Neurological Status:   Neuro (WDL): Exceptions to WDL (08/08/18 1755)  Neuro  Neurologic State: Drowsy (08/08/18 1755)   At baseline    Mental Status and Level of Consciousness: Arousable    Pulmonary Status:   O2 Device: Oxygen mask (08/08/18 1755)   Adequate oxygenation and airway patent    Complications related to anesthesia: None    Post-anesthesia assessment completed.  No concerns    Signed By: Yan Walker MD     August 8, 2018

## 2018-08-09 NOTE — PROGRESS NOTES
Problem: Interdisciplinary Rounds  Goal: Interdisciplinary Rounds  Outcome: Progressing Towards Goal  Interdisciplinary team rounds were held 8/9/2018 with the following team members:Care Management, Nurse Practitioner, Occupational Therapy and  and the patient and child(ezequiel). Anticipate discharge home with home health/family assistance. Plan of care discussed. See clinical pathway and/or care plan for interventions and desired outcomes.

## 2018-08-09 NOTE — BRIEF OP NOTE
BRIEF OPERATIVE NOTE    Date of Procedure: 8/8/2018   Preoperative Diagnosis: hip fracture  Postoperative Diagnosis: Pending pathological fracture right femur / Left pathological subtrochanteric femur fracture    Procedure(s):  Prophylactic nailing right femur    Open reduction internal fixation left subtrochanteric left femur   Open biopsy left proximal femur  Surgeon(s) and Role:     * Jovani Mathews MD - Primary         Surgical Assistant: NONE    Surgical Staff:  Circ-1: Keith Chapman RN  Scrub Tech-1: [place] Debbie  Scrub Tech-2: Maishamicheal Banda  Scrub Tech-3: Linda Cote  Event Time In   Incision Start 1406   Incision Close 1644     Anesthesia: General   Estimated Blood Loss: 100 CC FROM RIGHT  CC FROM LEFT  Specimens:   ID Type Source Tests Collected by Time Destination   1 : Nailings Right Femur Fresh Bone  Jovani Mathews MD 8/8/2018 1520 Pathology   2 : Bone Proximal Left Femur Fresh Bone  Jovani Mathews MD 8/8/2018 1520 Pathology      Findings: NONE   Complications: NONE  Implants:   Implant Name Type Inv.  Item Serial No.  Lot No. LRB No. Used Action   SLEEVE RESORB 5.0MM LCK SCR -- 2/PK STRL - [id number]  SLEEVE RESORB 5.0MM LCK SCR -- 2/PK STRL  [employer] [place] [id number] Right 1 Implanted   NAIL CHETAN 125D [id number] RT -- TFNA STRL - [id number]  NAIL CHETAN 125D [id number] RT -- TFNA STRL  [employer] [place] [id number] Right 1 Implanted   BLADE HELCL FEN 100MM STRL -- TFNA - [id number]  BLADE HELCL FEN 100MM STRL -- TFNA  [employer] [place] [id number] Right 1 Implanted   SCR NL LCK ANGL STBL T25 5X52 -- TI STRL - [id number]  SCR NL LCK ANGL STBL T25 5X52 -- [place] [id number] Right 1 Implanted   SLEEVE RESORB 5.0MM LCK SCR -- 2/PK STRL - [id number]  SLEEVE RESORB 5.0MM LCK SCR -- 2/PK STRL  [employer] [place] [id number] Right 1 Implanted   SCR NL LCK ANGL STBL T25 5X74 -- TI STRL - [id number]  SCR NL LCK ANGL STBL T25 5X74 -- [place] [id number] Right 1 Implanted   BONE GRFT SUB DBX PUTTY 10ML --  - [id number]  BONE GRFT SUB DBX PUTTY 10ML --  [id number] MUSCULOSKELETAL TRANS [id number] Left 1 Implanted   PLATE FEM PROX 35U 2.7D061 LT --  - [id number]  PLATE FEM PROX 58Y 3.5U322 LT --   [address] [id number] Left 1 Implanted   SCR BNE LCK ST T25 3.5X36MM SS --  - [id number]  SCR BNE LCK ST T25 3.5X36MM SS --   [employer] [place] [id number] Left 1 Implanted   SCR BNE LCK ST T25 3.5X38MM SS --  - [id number]  SCR BNE LCK ST T25 3.5X38MM SS --   [employer] [place] [id number] Left 1 Implanted   SCR BNE LCK ST T25 3.5X40MM SS --  - [id number]  SCR BNE LCK ST T25 3.5X40MM SS --   [employer] [place] [id number] Left 2 Implanted   SCR BNE LCK ST T25 3.5X42MM SS --  - [id number]  SCR BNE LCK ST T25 3.5X42MM SS --   [employer] [place] [id number] Left 1 Implanted   SCR BNE CHETAN LCK 7.3X90MM SS --  - [id number]  SCR BNE CHETAN LCK 7.3X90MM SS --   [employer] [place] [id number] Left 1 Implanted   SCR BNE CHETAN LCK 7.3O131UU SS --  - [id number]   SCR BNE CHETAN LCK 7.8H172WM SS --    [address] [id number] Left 1 Implanted

## 2018-08-09 NOTE — PROGRESS NOTES
08/08/18 1908   Assessment  Type   Assessment Type Shift assessment   Dual Skin Pressure Injury Assessment   Dual Skin Pressure Injury Assessment WDL   Second Care Provider (Based on Facility Policy) Carmen Fried RN   Skin Integumentary   Skin Integumentary (WDL) X   Skin Color Appropriate for ethnicity   Skin Condition/Temp Dry; Warm   Skin Integrity Incision (comment); Excoriation  (Bilateral Legs (dressing cdi), groin)   Turgor Epidermis thin w/ loss of subcut tissue   Hair Growth Present   Wound Prevention and Protection Methods   Orientation of Wound Prevention Posterior   Location of Wound Prevention Sacrum/Coccyx   Dressing Present  No   Wound Offloading (Prevention Methods) Bed, pressure reduction mattress;Pillows;Repositioning;Turning     Dual Skin Assessment    Skin assessment completed with Carmen Fried RN. Patient has surgical incisions to bilateral legs,   See below. Patient has excoriation to her groin and abdominal folds, with a slight abrasion on right side. Patient has extensive bruising to her right breast.  Patient has blanchable redness to her buttocks, no evidence of breakdown noted. Patient has scattered moles and bruising. Bilateral feet are dry and flaky with thickened toenails. Noland catheter is in place. No other skin issues noted.     Wound Hip (Active)   DRESSING STATUS Clean, dry, and intact 8/8/2018  5:55 PM   DRESSING TYPE 4 x 4 8/8/2018  5:55 PM       Wound Hip Right (Active)   DRESSING STATUS Breakthrough drainage 8/8/2018  5:11 PM   DRESSING TYPE 4 x 4;Transparent film 8/8/2018  5:11 PM        Wendy Woodard RN

## 2018-08-09 NOTE — PROGRESS NOTES
Hospitalist Progress Note     Admit Date:  2018  6:22 PM   Name:  Kolton Galvez   Age:  75 y.o.  :  1945   MRN:  [id number]   PCP:  Hui Weston NP  Treatment Team: Attending Provider: Ricardo Ham. Zackery Marquez MD; Consulting Provider: Paloma Andrew MD; Utilization Review: Mayo Toledo RN; Care Manager: Kaiden Cameron RN; Charge Nurse: Luzmaria Ortega RN    Subjective:   CC:fall    Pt is a 74 yo female who presented to the ED after falling while transferring from a car to her wheelchair. Pt has PMH of metastatic breast cancer, HTN, obesity, CKD stage 3-4, wheelchair bound. Pt reported that her left leg gave out and she fell backward. Xray showed fracture of left proximal subtrochanteric pathologic fracture. Additionally it was felt that patient had a pending pathologic fracture of her right femur. Pt went to surgery for prophylactic nailing of the right femur, and an ORIF of the left subtrochanteric left femur. They also did an open biopsy of the left proximal femur. Pt doing well post operatively, pain controlled. PT/OT to work with pt whose baseline is wheelchair bound. H/H 8.6/28.1, WBC 4.2, her Cr/BUN are up to 3.15/22 - will monitor and request nephrology to see pt.       Objective:   Patient Vitals for the past 24 hrs:   Temp Pulse Resp BP SpO2   18 0715 99.1 °F (37.3 °C) 99 16 96/55 94 %   18 0249 97.3 °F (36.3 °C) 93 16 112/74 93 %   18 2329 97.4 °F (36.3 °C) 97 16 115/73 95 %   18 97.2 °F (36.2 °C) 95 16 116/81 -   18 193 97.4 °F (36.3 °C) 97 16 92/61 -   18 1932 - 95 - (!) 88/53 -   18 1925 97.4 °F (36.3 °C) 96 16 (!) 81/55 -   18 1755 97.6 °F (36.4 °C) 96 14 120/56 93 %   /0818 1750 - 97 - 121/59 92 %   18 1745 - 98 - 139/57 93 %   18 1740 - 98 - 128/58 93 %   18 1735 - 99 - 97/51 (!) 89 %   18 1730 - 97 - 100/54 96 %   18 1725 - (!) 102 - 113/55 93 %   18 1720 - (!) 105 14 108/54 93 % 08/08/18 1714 - (!) 101 11 96/53 93 %   08/08/18 1711 98.2 °F (36.8 °C) 99 12 116/56 93 %     Oxygen Therapy  O2 Sat (%): 94 % (08/09/18 0715)  Pulse via Oximetry: 97 beats per minute (08/08/18 1755)  O2 Device: Oxygen mask (08/08/18 1755)  O2 Flow Rate (L/min): 6 l/min (08/08/18 1755)  ETCO2 (mmHg): 99 mmHg (08/08/18 2011)    Intake/Output Summary (Last 24 hours) at 08/09/18 1106  Last data filed at 08/09/18 0800   Gross per 24 hour   Intake             1540 ml   Output              750 ml   Net              790 ml         General:    Well nourished. Awake and alert. Head:  Normocephalic, atraumatic  Eyes:  Extraocular movements intact, normal sclera  Neck:  Supple, no lymphadenopathy  CV:   RRR. No  Murmurs, clicks, or gallops  Lungs:   Unlabored, no cyanosis  Abdomen:   Soft, nondistended, nontender. Extremities: Warm and dry. No cyanosis or edema. Dressings intact over each, bloody drainage on right. Skin:     No rashes or jaundice. Neuro:  No gross focal deficits  Psych:  Mood and affect appropriate    Data Review:  I have reviewed all labs, meds, telemetry events, and studies from the last 24 hours. Recent Results (from the past 24 hour(s))   CBC WITH AUTOMATED DIFF    Collection Time: 08/09/18  2:10 AM   Result Value Ref Range    WBC 4.2 (L) 4.3 - 11.1 K/uL    RBC 2.51 (L) 4.05 - 5.2 M/uL    HGB 8.6 (L) 11.7 - 15.4 g/dL    HCT 28.1 (L) 35.8 - 46.3 %    .0 (H) 79.6 - 97.8 FL    MCH 34.3 (H) 26.1 - 32.9 PG    MCHC 30.6 (L) 31.4 - 35.0 g/dL    RDW 16.3 %    PLATELET 421 146 - 046 K/uL    MPV 9.4 9.4 - 12.3 FL    ABSOLUTE NRBC 0.00 0.0 - 0.2 K/uL    DF AUTOMATED      NEUTROPHILS 73 43 - 78 %    LYMPHOCYTES 11 (L) 13 - 44 %    MONOCYTES 15 (H) 4.0 - 12.0 %    EOSINOPHILS 0 (L) 0.5 - 7.8 %    BASOPHILS 0 0.0 - 2.0 %    IMMATURE GRANULOCYTES 1 0.0 - 5.0 %    ABS. NEUTROPHILS 3.1 K/UL    ABS. LYMPHOCYTES 0.5 K/UL    ABS. MONOCYTES 0.7 K/UL    ABS. EOSINOPHILS 0.0 K/UL    ABS.  BASOPHILS 0.0 K/UL ABS. IMM. GRANS. 0.0 K/UL   METABOLIC PANEL, BASIC    Collection Time: 08/09/18  2:10 AM   Result Value Ref Range    Sodium 141 136 - 145 mmol/L    Potassium 4.5 3.5 - 5.1 mmol/L    Chloride 106 98 - 107 mmol/L    CO2 21 21 - 32 mmol/L    Anion gap 14 7 - 16 mmol/L    Glucose 178 (H) 65 - 100 mg/dL    BUN 22 8 - 23 MG/DL    Creatinine 3.15 (H) 0.6 - 1.0 MG/DL    GFR est AA 19 (L) >60 ml/min/1.73m2    GFR est non-AA 15 (L) >60 ml/min/1.73m2    Calcium 6.4 (L) 8.3 - 10.4 MG/DL   PLEASE READ & DOCUMENT PPD TEST IN 24 HRS    Collection Time: 08/09/18  2:43 AM   Result Value Ref Range    PPD Negative Negative    mm Induration 0 mm        All Micro Results     Procedure Component Value Units Date/Time    MSSA/MRSA SC BY PCR, NASAL SWAB [[id number]] Collected:  08/08/18 0251    Order Status:  Completed Specimen:  Swab Updated:  08/08/18 5204     Special Requests: NO SPECIAL REQUESTS        Culture result:         SA target not detected. A MRSA NEGATIVE, SA NEGATIVE test result does not preclude MRSA or SA nasal colonization.           Current Meds:  Current Facility-Administered Medications   Medication Dose Route Frequency    0.9% sodium chloride infusion 250 mL  250 mL IntraVENous PRN    amLODIPine (NORVASC) tablet 5 mg  5 mg Oral DAILY    letrozole (FEMARA) tablet 2.5 mg (Patient Supplied)  2.5 mg Oral DAILY    ondansetron (ZOFRAN ODT) tablet 8 mg  8 mg Oral Q8H PRN    sodium chloride (NS) flush 5-10 mL  5-10 mL IntraVENous Q8H    sodium chloride (NS) flush 5-10 mL  5-10 mL IntraVENous PRN    lactated Ringers infusion  75 mL/hr IntraVENous CONTINUOUS    sodium chloride (NS) flush 5-10 mL  5-10 mL IntraVENous Q8H    sodium chloride (NS) flush 5-10 mL  5-10 mL IntraVENous PRN    acetaminophen (TYLENOL) tablet 650 mg  650 mg Oral Q8H    oxyCODONE IR (ROXICODONE) tablet 5-10 mg  5-10 mg Oral Q4H PRN    ondansetron (ZOFRAN) injection 4 mg  4 mg IntraVENous Q4H PRN    docusate sodium (COLACE) capsule 100 mg  100 mg Oral BID    alum-mag hydroxide-simeth (MYLANTA) oral suspension 30 mL  30 mL Oral Q4H PRN    calcium-vitamin D (OS-JORGE) 500 mg-200 unit tablet  1 Tab Oral TID WITH MEALS    enoxaparin (LOVENOX) injection 30 mg  30 mg SubCUTAneous Q24H    traMADol (ULTRAM) tablet 50 mg  50 mg Oral Q6H PRN    hydrALAZINE (APRESOLINE) 20 mg/mL injection 10 mg  10 mg IntraVENous Q6H PRN       Diet:  DIET REGULAR    Other Studies (last 24 hours):  Nm Bone Scan Wh Body    Result Date: 8/8/2018  Whole-body bone scintigraphy. Indication: evaluate for metastatic disease, 75 years Female fall on August 07, 2018, left femur fracture with left hip pain, history of breast cancer in 2016 with bone metastases, status post radiation treatment to the right femur and pelvis in 2016, chronic kidney disease Comparison:  Left hip radiographs August 07, 2018, whole body PET/CT June 27, 2017 Radiopharmaceutical:  26.5 mCi of technetium 99m HDP. Findings: Delayed whole-body images were obtained. Asymmetric focal intense radiotracer uptake is seen in the left intratrochanteric region consistent with healing of recent nondisplaced fracture. There are multiple asymmetric foci of increased radiotracer uptake in the bilateral anterior more than posterior ribs, these may correspond to the multiple healing nondisplaced fracture deformities of the bilateral ribs seen on prior PET/CT. Asymmetric focal increased uptake in the distal right femoral metadiaphysis is nonspecific without prior comparison. Mild symmetric uptake in the bilateral joints likely representing degenerative changes. Impression: 1. Healing subacute fracture of the left intertrochanteric region. 2.  Asymmetric focal uptake in the bilateral anterior ribs likely corresponding to subacute healing nondisplaced fractures seen on prior PET/CT. 3.  Nonspecific asymmetric focal uptake in the distal right femur.   If further clinical concern for metastatic disease, elective whole body PET/CT may be more sensitive in evaluation. Xr Femur Lt 2 V    Result Date: 8/8/2018  Exam:  Left femur radiographs History:  pain, lt femur fx, 75 years Female OR 7 LT FEMUR FX ?ORIF LEFT FEMUR ?3.55 MIN FLUORO ?98.54 MGY Comparison: Left femur radiographs yesterday Findings: Limited intraoperative spot radiographs of the left femur demonstrate lateral cortical plate and screw fixation of the left proximal femoral metadiaphyseal fracture, with final images in relative anatomic alignment. The left femoral head appears well-seated within the acetabulum. Normal alignment, joint spaces preserved. Persistent mild diffuse osteopenia. Visualized soft tissues otherwise unremarkable. Impression: Intraoperative fixation as above. Xr Femur Rt 2 Vs    Result Date: 8/8/2018  Exam:  Right femur radiographs History:  pain, or 7 rt femur, 73 years Female Or 7 rt prophylactic femur fx ? ? ? ? ? ? ? ? ? orif right femur ? ? ? ? ? ? 3.22 min fluoro ?67.48 mgy ? ? ? Long tfn placed with 2 distal screws Comparison: Right femur radiographs yesterday Findings:  Limited intraoperative spot radiographs of the right femur demonstrate intramedullary jeri fixation of the right femur in relative anatomic alignment. No evidence of acute fracture or dislocation. Normal alignment, joint spaces preserved. Persistent mild diffuse osteopenia. Visualized soft tissues otherwise unremarkable. Impression: Intraoperative findings as above.        Assessment and Plan:     Hospital Problems as of 8/9/2018  Date Reviewed: 7/26/2018          Codes Class Noted - Resolved POA    Stage 3 chronic kidney disease ICD-10-CM: N18.3  ICD-9-CM: 585.3  8/8/2018 - Present Yes        Hypokalemia ICD-10-CM: E87.6  ICD-9-CM: 276.8  8/8/2018 - Present Yes        * (Principal)Femur fracture, left ([address] ICD-10-CM: J02.19ZI  ICD-9-CM: 821.00  8/7/2018 - Present Yes        Anemia due to chronic kidney disease treated with erythropoietin ICD-10-CM: N18.9, D63.1  ICD-9-CM: 285.21, 585.9  7/25/2017 - Present Yes        Malignant neoplasm metastatic to bone [facility]) ICD-10-CM: C79.51  ICD-9-CM: 198.5  7/31/2016 - Present Yes    Overview Signed 1/10/2017  6:13 PM by General tEhan MD     Last Assessment & Plan:   1. Complete radiation therapy to the patient's sacrum and femurs as directed by Dr. Di Moran. 2.  Return to the office in 6 weeks with x-rays of the pelvis on arrival.  3.  Instruct the patient to advance her weightbearing as tolerated as she continues to enjoy a reduction in pain following the radiation therapy. Hypertension ICD-10-CM: I10  ICD-9-CM: 401.9  2/16/2016 - Present Yes    Overview Signed 2/16/2016 12:09 PM by Suly Dawn     UNSPECIFIED              Obesity ICD-10-CM: E66.9  ICD-9-CM: 278.00  2/16/2016 - Present Yes    Overview Signed 2/16/2016 12:10 PM by Suly Dawn     UNSPECIFIED                    A/P:    1. Hip fracture  -Pain control and DVT prophylaxis per ortho    2. HTN  -Cont amlodipine  -Prn hydralazine    3. Hypokalemia  -Resolved  -Cont to monitor    4. Metastatic breast CA  -Cont Femara  -Restart lbranze on d/c    5. ALEISHA  -Cr 3.15, up from 1.67 on admit  -IVF with NS 75/hr  -Nephrology consult    DC planning/Dispo:  CM consult, pt wants to go home with [address]  DVT ppx:  SCDs per ortho    Case reviewed with supervising physician - MIKE Sin MD    Signed:  KHRIS Macias

## 2018-08-09 NOTE — PROGRESS NOTES
1. Patient will verbalize and demonstrate understanding of hip precautions with 100% accuracy during ADL. - MET  2. Patient will complete functional transfers with CGA and adaptive equipment as needed. 3. Patient will complete lower body bathing and dressing with mod A and adaptive equipment as needed. 4. Patient will complete toileting with mod A.   5. Patient will tolerate at least 10 minutes of BUE therapeutic exercises to increase strength in BUE to aid in functional transfers. 6. Patient will tolerate at least 23 minutes of OT treatment with no rest breaks to increase activity tolerance for ADLs. Timeframe: 7 visits       OCCUPATIONAL THERAPY: Initial Assessment, Daily Note, Treatment Day: 1st and AM 8/9/2018  INPATIENT: Hospital Day: 3  Payor: Jorge Green / Plan: [address] [place] [address] HMO / Product Type: [employer] Medicare /      NAME/AGE/GENDER: Kolton Galvez is a 75 y.o. female   PRIMARY DIAGNOSIS:  hip fracture  Femur fracture, left ([address] Femur fracture, left ([address] Femur fracture, left (HCC)  Procedure(s) (LRB):  Prophylactic nailing right femur / Open reduction internal fixation left subtrochanteric left femur / Open biopsy left proximal femur (Left)  1 Day Post-Op  ICD-10: Treatment Diagnosis:    · Generalized Muscle Weakness (M62.81)  · Other lack of cordination (R27.8)  · History of falling (Z91.81)   Precautions/Allergies:     Penicillins      ASSESSMENT:     Ms. Barry Berrios presents for L femur fracture after sustaining a fall transferring from car to w/c. Pt is currently NWB in LLE and WBAT in RLE. Upon arrival, pt sitting upright on side of bed, on 4L O2 via n.c. and working with PT. Pt agreeable to OT evaluation. Pt lives with 3 daughters in [facility]. At baseline, pt states she was completing all of her ADLs and was performing functional transfers independently, however was using w/c for household and community mobility. Assisted PT with slide board transfer to bedside chair.  Pt required mod A for slide board transfer and scooting into chair. Pt did report lightheadedness and dizziness upon completing slide board transfer. Per PT, pt did become sick during their session; RN had been notified and provided medication. Completed total body bathing, with pt able to complete UB bathing with min A for washing back; pt required total A for lower body bathing, however was able to assist with jannette-care. Pt able [facility] independently. Pt performed self-grooming, including washing face and brushing teeth independently after set-up. Pt left upright in bedside chair, with needs met, call light within reach, posey alarm on, and daughters at bedside. At this time, pt is functioning below baseline for ADLs and functional mobility. Pt would benefit from skilled OT services to address OT goals and plan of care    This section established at most recent assessment   PROBLEM LIST (Impairments causing functional limitations):  1. Decreased Strength  2. Decreased ADL/Functional Activities  3. Decreased Transfer Abilities  4. Decreased Ambulation Ability/Technique  5. Decreased Balance  6. Increased Pain  7. Decreased Activity Tolerance  8. Increased Fatigue  9. Decreased Knowledge of Precautions   INTERVENTIONS PLANNED: (Benefits and precautions of occupational therapy have been discussed with the patient.)  1. Activities of daily living training  2. Adaptive equipment training  3. Balance training  4. Clothing management  5. Community reintergration  6. Donning&doffing training  7. Royce tech training  8. Re-evaluation  9. Therapeutic activity  10. Therapeutic exercise     TREATMENT PLAN: Frequency/Duration: Follow patient 6x/week to address above goals.   Rehabilitation Potential For Stated Goals: Fair     RECOMMENDED REHABILITATION/EQUIPMENT: (at time of discharge pending progress): Due to the probability of continued deficits (see above) this patient will likely need continued skilled occupational therapy after discharge. Equipment:    TBD              OCCUPATIONAL PROFILE AND HISTORY:   History of Present Injury/Illness (Reason for Referral):  See H&P  Past Medical History/Comorbidities:   Ms. Clinton Shelton  has a past medical history of Abnormal EKG (2/16/2016); Aortic valve insufficiency (2/16/2016); Cancer ([facility] [address] Hyperlipidemia (2/16/2016); Hypertension (2/16/2016); and Obesity (2/16/2016). Ms. Clinton Shelton  has a past surgical history that includes hx tubal ligation. Social History/Living Environment:   Home Environment: Private residence  Wheelchair Ramp: Yes  One/Two Story Residence: One story  Living Alone: No  Support Systems: Child(ezequiel)  Patient Expects to be Discharged to[de-identified] Unknown  Current DME Used/Available at Home: Wheelchair  Prior Level of Function/Work/Activity:  Independent with ADLs; use of w/c for all mobility. Personal Factors:          Sex:  female        Age:  75 y.o. Other factors that influence how disability is experienced by the patient:  Multiple co-morbidities    Number of Personal Factors/Comorbidities that affect the Plan of Care: Expanded review of therapy/medical records (1-2):  MODERATE COMPLEXITY   ASSESSMENT OF OCCUPATIONAL PERFORMANCE[de-identified]   Activities of Daily Living:   Basic ADLs (From Assessment) Complex ADLs (From Assessment)   Feeding: Independent  Oral Facial Hygiene/Grooming: Independent  Bathing: Maximum assistance  Upper Body Dressing: Independent  Lower Body Dressing: Maximum assistance  Toileting: Maximum assistance Instrumental ADL  Meal Preparation: Total assistance  Homemaking: Total assistance   Grooming/Bathing/Dressing Activities of Daily Living     Cognitive Retraining  Safety/Judgement: Awareness of environment; Fall prevention;Home safety                       Bed/Mat Mobility  Rolling: Moderate assistance  Supine to Sit: Moderate assistance  Sit to Stand: Maximum assistance;Assist x2  Bed to Chair: Maximum assistance;Assist x2  Scooting:  Moderate assistance       Most Recent Physical Functioning:   Gross Assessment:                  Posture:  Posture (WDL): Exceptions to WDL  Posture Assessment: Forward head  Balance:  Sitting: Intact  Standing: Impaired  Standing - Static: Poor  Standing - Dynamic : Poor Bed Mobility:  Rolling: Moderate assistance  Supine to Sit: Moderate assistance  Scooting: Moderate assistance  Wheelchair Mobility:     Transfers:  Sit to Stand: Maximum assistance;Assist x2  Stand to Sit: Maximum assistance;Assist x2  Bed to Chair: Maximum assistance;Assist x2  Sliding Board : Maximum assistance            Patient Vitals for the past 6 hrs:   BP BP Patient Position SpO2 Pulse   18 0715 96/55 At rest 94 % 99       Mental Status  Neurologic State: Drowsy  Orientation Level: Oriented X4  Cognition: Follows commands  Perception: Appears intact  Perseveration: No perseveration noted  Safety/Judgement: Awareness of environment, Fall prevention, Home safety                          Physical Skills Involved:  1. Balance  2. Strength  3. Activity Tolerance  4. Gross Motor Control  5. Pain (acute) Cognitive Skills Affected (resulting in the inability to perform in a timely and safe manner): 1. none Psychosocial Skills Affected:  1. Habits/Routines  2. Environmental Adaptation   Number of elements that affect the Plan of Care: 5+:  HIGH COMPLEXITY   CLINICAL DECISION MAKIN \[facility]\"" [address] [address] AM-PAC 6 Clicks   Daily Activity Inpatient Short Form  How much help from another person does the patient currently need. .. Total A Lot A Little None   1. Putting on and taking off regular lower body clothing? [] 1   [x] 2   [] 3   [] 4   2. Bathing (including washing, rinsing, drying)? [] 1   [x] 2   [] 3   [] 4   3. Toileting, which includes using toilet, bedpan or urinal?   [] 1   [x] 2   [] 3   [] 4   4. Putting on and taking off regular upper body clothing? [] 1   [] 2   [] 3   [x] 4   5.   Taking care of personal grooming such as brushing teeth? [] 1   [] 2   [] 3   [x] 4   6. Eating meals? [] 1   [] 2   [] 3   [x] 4   © 2007, Trustees of [address] [place] [address] [address] [address], under license to [employer]. All rights reserved      Score:  Initial: 21 Most Recent: X (Date: -- )    Interpretation of Tool:  Represents activities that are increasingly more difficult (i.e. Bed mobility, Transfers, Gait). Score 24 23 22-20 19-15 14-10 9-7 6     Modifier CH CI CJ CK CL CM CN      ? Self Care:     - CURRENT STATUS: CJ - 20%-39% impaired, limited or restricted    - GOAL STATUS: CI - 1%-19% impaired, limited or restricted    - D/C STATUS:  ---------------To be determined---------------  Payor: Sajan Vázquez / Plan: [address] [place] [address] HMO / Product Type: Managed Care Medicare /      Medical Necessity:     · Patient is expected to demonstrate progress in strength, balance, coordination and functional technique to decrease assistance required with ADls and functional moiblity. .  Reason for Services/Other Comments:  · Patient continues to require skilled intervention due to medical complications and patient unable to attend/participate in therapy as expected. Use of outcome tool(s) and clinical judgement create a POC that gives a: MODERATE COMPLEXITY         TREATMENT:   (In addition to Assessment/Re-Assessment sessions the following treatments were rendered)     Pre-treatment Symptoms/Complaints:  Pt states, \"I really don't have any pain right now. \"  Pain: Initial:   Pain Intensity 1: 0  Pain Location 1: Hip  Pain Orientation 1: Left, Right  Pain Intervention(s) 1: Ambulation/Increased Activity  Post Session:  same     Self Care: (25 min): Procedure(s) (per grid) utilized to improve and/or restore self-care/home management as related to dressing, bathing and grooming. Required minimal verbal and   cueing to facilitate activities of daily living skills.     Completed UB bathing independently, however did require min A for washing back; required total A for lowr body bathing. Independent with jannette-care. Complete self-grooming, including washing face and brushing teeth, independently after set-up. Pt able to ata/doff hospital gown independently. Braces/Orthotics/Lines/Etc:   · nasal cannula. · O2 Device: Oxygen mask  Treatment/Session Assessment:    · Response to Treatment:  Good participation. · Interdisciplinary Collaboration:   o Physical Therapist  o Physical Therapy Assistant  o Occupational Therapist  o Registered Nurse  · After treatment position/precautions:   o Up in chair  o Bed alarm/tab alert on  o Bed/Chair-wheels locked  o Call light within reach  o Family at bedside   · Compliance with Program/Exercises: Will assess as treatment progresses. · Recommendations/Intent for next treatment session: \"Next visit will focus on advancements to more challenging activities and reduction in assistance provided\".   Total Treatment Duration:  OT Patient Time In/Time Out  Time In: [address]  Time Out: [address], OT

## 2018-08-09 NOTE — PROGRESS NOTES
Because lab cristian CBC and BMP as stat, they cancelled the daily orders for future lab work tomorrow. Notified lab that they needed to reorder the daily labs.

## 2018-08-09 NOTE — PROGRESS NOTES
Problem: Mobility Impaired (Adult and Pediatric)  Goal: *Acute Goals and Plan of Care (Insert Text)  STG:  (1.)Ms. Kelsey Triplett will move from supine to sit and sit to supine , scoot up and down and roll side to side with MINIMAL ASSIST within 3 treatment day(s). (2.)Ms. Kelsey Triplett will transfer from bed to chair and chair to bed with MINIMAL ASSIST using the least restrictive device within 3 treatment day(s). (3.)Ms. Kelsey Triplett will ambulate with MAXIMAL ASSIST for 2 feet with the least restrictive device within 3 treatment day(s). (4.)Ms. Kelsey Triplett will perform standing static and dynamic balance activities x 5 minutes with MAXIMAL ASSIST to improve safety within 3 day(s). (5.)Ms. Kelsye Triplett will perform LE exercises with 1 to 2 cues for form within 3 days to improve strength for functional transfers and ambulation. (6.)Ms. Kelsey Triplett will perform wheelchair mobility 150 feet with SUPERVISION within 3 days to maximize independence with functional mobility. (7.)Ms. Kelsey Triplett will maintain NWB LLE, WBAT RLE throughout all functional mobility within 3 days. LTG:  (1.)Ms. Kelsey Triplett will move from supine to sit and sit to supine , scoot up and down and roll side to side in bed with CONTACT GUARD ASSIST within 7 treatment day(s). (2.)Ms. Kelsey Triplett will transfer from bed to chair and chair to bed with CONTACT GUARD ASSIST using the least restrictive device within 7 treatment day(s). (3.)Ms. Kelsey Triplett will ambulate with MINIMAL ASSIST for 2 feet with the least restrictive device within 7 treatment day(s). (4.)Ms. Kelsey Triplett will perform standing static and dynamic balance activities x 5 minutes with MINIMAL ASSIST to improve safety within 7 day(s). (5.)Ms. Kelsey Triplett will perform wheelchair mobility 250 feet with MODIFIED INDEPENDENCE within 7 days to maximize independence with functional mobility.  ________________________________________________________________________________________________      PHYSICAL THERAPY: Daily Note, Treatment Day: Day of Assessment, PM 8/9/2018  INPATIENT: Hospital Day: 3  Payor: Sajan Vázquez / Plan: [address] [place] [address] HMO / Product Type: Managed Care Medicare /    LLE NWB, RLE WBAT  NAME/AGE/GENDER: Sury Gustafson is a 75 y.o. female   PRIMARY DIAGNOSIS: hip fracture  Femur fracture, left (HCC) Femur fracture, left (HCC) Femur fracture, left (HCC)  Procedure(s) (LRB):  Prophylactic nailing right femur / Open reduction internal fixation left subtrochanteric left femur / Open biopsy left proximal femur (Left)  1 Day Post-Op  ICD-10: Treatment Diagnosis:    · Difficulty in walking, Not elsewhere classified (R26.2)  · Repeated Falls (R29.6)  · History of falling (Z91.81)   Precaution/Allergies:  Penicillins      ASSESSMENT:     Ms. Emery Bryant is a 75 y.o. female s/p above surgery. She is now LLE NWB and RLE WBAT. She lives with 3 daughters in a single story home and typically transfers independently to a wheelchair and is modified independent at wheelchair level. She reports this is the only fall in the last 6 months. She is supine and agreeable to therapy. Understands NWB status, however requires verbal cues for safety awareness, as she wanted to attempt transfer without use of rolling walker. Moderate assist x2 to transfer to edge of bed, intact sitting balance once positioned at edge of bed (requiring max assist to scoot forward). Attempted to stand with maximal assist x2 while maintaining NWB LLE. Unable to clear bottom. Treatment initiated to include education on use of transfer board to complete transfer to chair with drop arm recliner with max assist x2 and maximal verbal, visual and manual cues. Patient left with OT attending at the end of the session. Noted patient became nauseous, vomited and diaphoretic during session, BP taken (WNL) and RN alerted. Discussed concerns with patient and daughters about patient returning home at d/c.  Sury Gustafson is currently functioning below her baseline and would benefit from skilled PT during acute care stay to maximize safety and independence with functional mobility. PM Note: Patient sitting on BSC post transfer with OT. She required total assist x2 to transfer for [facility] back to bed with maximal verbal, visual and manual cues for transfer. Patient's family instructed to bring in wheelchair for PT inspection and recommendations for transferring to wheelchair, family reports it should be here tomorrow. Max to total assist x2 to return to supine. No progress from AM session. Again discussed concerns of patient returning home at d/c, patient and daughters to not appear to be concerned despite required significant assistance for all mobility at this time. She will need a sliding board for transfers if she is to return home. Recommend continued therapy as well. Will continue efforts. This section established at most recent assessment   PROBLEM LIST (Impairments causing functional limitations):  1. Decreased Strength  2. Decreased ADL/Functional Activities  3. Decreased Transfer Abilities  4. Decreased Ambulation Ability/Technique  5. Decreased Balance  6. Increased Pain  7. Decreased Activity Tolerance  8. Decreased Pacing Skills  9. Increased Shortness of Breath  10. Decreased Knowledge of Precautions  11. Decreased [place] with Home Exercise Program   INTERVENTIONS PLANNED: (Benefits and precautions of physical therapy have been discussed with the patient.)  1. Balance Exercise  2. Bed Mobility  3. Family Education  4. Gait Training  5. Home Exercise Program (HEP)  6. Therapeutic Activites  7. Therapeutic Exercise/Strengthening  8. Transfer Training  9. Patient Education  10.  Group Therapy     TREATMENT PLAN: Frequency/Duration: twice daily for duration of hospital stay  Rehabilitation Potential For Stated Goals: Good     RECOMMENDED REHABILITATION/EQUIPMENT: (at time of discharge pending progress): Due to the probability of continued deficits (see above) this patient will likely need continued skilled physical therapy after discharge. 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 She reports her L leg 'gave out' and she fell backwards. She is noted to have a L proximal subtrochanteric pathologic fracture on x-ray. At present, she reports her L hip pain is controlled. She denies any chest pain or shortness of breath.     Hospitalist service asked to admit for L hip fracture. Past Medical History/Comorbidities:   Ms. Kelsey Triplett  has a past medical history of Abnormal EKG (2/16/2016); Aortic valve insufficiency (2/16/2016); Cancer ([facility] [address] Hyperlipidemia (2/16/2016); Hypertension (2/16/2016); and Obesity (2/16/2016). Ms. Kelsey Triplett  has a past surgical history that includes hx tubal ligation. Social History/Living Environment:   Home Environment: Private residence  Wheelchair Ramp: Yes  One/Two Story Residence: One story  Living Alone: No  Support Systems: Child(ezequiel)  Patient Expects to be Discharged to[de-identified] Unknown  Current DME Used/Available at Home: Wheelchair  Prior Level of Function/Work/Activity:  She lives with 3 daughters in a single story home and typically transfers independently to a wheelchair and is modified independent at wheelchair level.      Number of Personal Factors/Comorbidities that affect the Plan of Care: 1-2: MODERATE COMPLEXITY   EXAMINATION:   Most Recent Physical Functioning:   Gross Assessment:  AROM: Grossly decreased, non-functional  PROM: Grossly decreased, non-functional  Strength: Generally decreased, functional  Coordination: Generally decreased, functional Posture:  Posture (WDL): Exceptions to WDL  Posture Assessment: Forward head  Balance:  Sitting: Impaired  Sitting - Static: Fair (occasional)  Sitting - Dynamic: Fair (occasional) (-)  Standing: Impaired  Standing - Static: Poor  Standing - Dynamic : Poor Bed Mobility:  Supine to Sit: Moderate assistance;Assist x2  Sit to Supine: Maximum assistance; Total assistance;Assist x2  Scooting: Maximum assistance;Assist x2  Wheelchair Mobility:     Transfers:  Sit to Stand: Maximum assistance;Assist x2  Stand to Sit: Maximum assistance;Assist x2  Bed to Chair: Maximum assistance;Assist x2  Sliding Board : Total assistance  Gait:            Body Structures Involved:  1. Nerves  2. Lungs  3. Bones  4. Joints  5. Muscles  6. Ligaments Body Functions Affected:  1. Sensory/Pain  2. Neuromusculoskeletal  3. Movement Related Activities and Participation Affected:  1. Mobility  2. Self Care  3. Domestic Life  4. Interpersonal Interactions and Relationships  5. Community, Social and [place]   Number of elements that affect the Plan of Care: 4+: HIGH COMPLEXITY   CLINICAL PRESENTATION:   Presentation: Evolving clinical presentation with changing clinical characteristics: MODERATE COMPLEXITY   CLINICAL DECISION MAKIN [facility] Inpatient Short Form  How much difficulty does the patient currently have. .. Unable A Lot A Little None   1. Turning over in bed (including adjusting bedclothes, sheets and blankets)? [] 1   [x] 2   [] 3   [] 4   2. Sitting down on and standing up from a chair with arms ( e.g., wheelchair, bedside commode, etc.)   [x] 1   [] 2   [] 3   [] 4   3. Moving from lying on back to sitting on the side of the bed? [] 1   [x] 2   [] 3   [] 4   How much help from another person does the patient currently need. .. Total A Lot A Little None   4. Moving to and from a bed to a chair (including a wheelchair)? [] 1   [x] 2   [] 3   [] 4   5.   Need to walk in hospital room? [x] 1   [] 2   [] 3   [] 4   6. Climbing 3-5 steps with a railing? [x] 1   [] 2   [] 3   [] 4   © 2007, Trustees of [address] [place] [address] [address] [address], under license to [employer]. All rights reserved      Score:  Initial: 9 Most Recent: X (Date: -- )    Interpretation of Tool:  Represents activities that are increasingly more difficult (i.e. Bed mobility, Transfers, Gait). Score 24 23 22-20 19-15 14-10 9-7 6     Modifier CH CI CJ CK CL CM CN      ? Mobility - Walking and Moving Around:     - CURRENT STATUS: CM - 80%-99% impaired, limited or restricted    - GOAL STATUS: CK - 40%-59% impaired, limited or restricted    - D/C STATUS:  ---------------To be determined---------------  Payor: [employer] MEDICARE / Plan: [address] [place] [address] HMO / Product Type: Managed Care Medicare /      Medical Necessity:     · Patient demonstrates good rehab potential due to higher previous functional level. Reason for Services/Other Comments:  · Patient continues to require modification of therapeutic interventions to increase complexity of exercises. Use of outcome tool(s) and clinical judgement create a POC that gives a: Questionable prediction of patient's progress: MODERATE COMPLEXITY            TREATMENT:   (In addition to Assessment/Re-Assessment sessions the following treatments were rendered)   Pre-treatment Symptoms/Complaints:  none  Pain: Initial:   Pain Intensity 1: 5  Post Session:  0/10     Therapeutic Activity: (    11 minutes): Therapeutic activities including bed transfers and sliding board transfers to improve mobility, strength and balance. Required maximal visual verbal and manual cues   to promote static and dynamic balance in sitting. Braces/Orthotics/Lines/Etc:   · IV  · O2 Nasal Cannula  Treatment/Session Assessment:    · Response to Treatment:  Patient experienced lightheadedness, diaphoretic, nausea and vomiting during session.   · Interdisciplinary Collaboration:   o Physical Therapist  o Occupational Therapist  o Registered Nurse  o Rehabilitation Attendant  · After treatment position/precautions:   o Supine in bed  o Bed alarm/tab alert on  o Bed/Chair-wheels locked  o Bed in low position  o Call light within reach  o RN notified  o Family at bedside   · Compliance with Program/Exercises: Will assess as treatment progresses. · Recommendations/Intent for next treatment session: \"Next visit will focus on advancements to more challenging activities and reduction in assistance provided\".   Total Treatment Duration:  PT Patient Time In/Time Out  Time In: 1311  Time Out: 1329    Candi Valle DPT

## 2018-08-09 NOTE — PROGRESS NOTES
1. Patient will verbalize and demonstrate understanding of hip precautions with 100% accuracy during ADL. - MET  2. Patient will complete functional transfers with CGA and adaptive equipment as needed. 3. Patient will complete lower body bathing and dressing with mod A and adaptive equipment as needed. - CONTINUE to ADDRESS  4. Patient will complete toileting with mod A.   5. Patient will tolerate at least 10 minutes of BUE therapeutic exercises to increase strength in BUE to aid in functional transfers. 6. Patient will tolerate at least 23 minutes of OT treatment with no rest breaks to increase activity tolerance for ADLs. Timeframe: 7 visits       OCCUPATIONAL THERAPY: Daily Note, Treatment Day: 1st and PM 8/9/2018  INPATIENT: Hospital Day: 3  Payor: Hilton Kahn / Plan: [address] [place] [address] HMO / Product Type: [employer] Medicare /      NAME/AGE/GENDER: Bibi York is a 75 y.o. female   PRIMARY DIAGNOSIS:  hip fracture  Femur fracture, left (HCC) Femur fracture, left ([address] Femur fracture, left (HCC)  Procedure(s) (LRB):  Prophylactic nailing right femur / Open reduction internal fixation left subtrochanteric left femur / Open biopsy left proximal femur (Left)  1 Day Post-Op  ICD-10: Treatment Diagnosis:    · Generalized Muscle Weakness (M62.81)  · Other lack of cordination (R27.8)  · History of falling (Z91.81)   Precautions/Allergies:     Penicillins      ASSESSMENT:     Ms. Christiana Naqvi presents for L femur fracture after sustaining a fall transferring from car to w/c. Pt is currently NWB in LLE and WBAT in RLE. Upon arrival, pt sitting upright on side of bed, on 4L O2 via n.c. and working with PT. Pt agreeable to OT evaluation. Pt lives with 3 daughters in [facility]. At baseline, pt states she was completing all of her ADLs and was performing functional transfers independently, however was using w/c for household and community mobility. Assisted PT with slide board transfer to bedside chair.  Pt required mod A for slide board transfer and scooting into chair. Pt did report lightheadedness and dizziness upon completing slide board transfer. Per PT, pt did become sick during their session; RN had been notified and provided medication. Completed total body bathing, with pt able to complete UB bathing with min A for washing back; pt required total A for lower body bathing, however was able to assist with jannette-care. Pt able [facility] independently. Pt performed self-grooming, including washing face and brushing teeth independently after set-up. Pt left upright in bedside chair, with needs met, call light within reach, posey alarm on, and daughters at bedside. At this time, pt is functioning below baseline for ADLs and functional mobility. Pt would benefit from skilled OT services to address OT goals and plan of care      This afternoon, pt requesting to use BSC before returning to bed. Pt completed slide board transfer to [facility] with max A x 3 and max verbal cueing for hand placement on slide board and for maintaining NWB status in LLE. Despite attempt, pt unable to void during current session. PT entered the room to return pt to bed. Assisted PT with transfer back to bed. Continue to address OT goals and POC. This section established at most recent assessment   PROBLEM LIST (Impairments causing functional limitations):  1. Decreased Strength  2. Decreased ADL/Functional Activities  3. Decreased Transfer Abilities  4. Decreased Ambulation Ability/Technique  5. Decreased Balance  6. Increased Pain  7. Decreased Activity Tolerance  8. Increased Fatigue  9. Decreased Knowledge of Precautions   INTERVENTIONS PLANNED: (Benefits and precautions of occupational therapy have been discussed with the patient.)  1. Activities of daily living training  2. Adaptive equipment training  3. Balance training  4. Clothing management  5. Community reintergration  6. Donning&doffing training  7.  [facility] tech training  8. Re-evaluation  9. Therapeutic activity  10. Therapeutic exercise     TREATMENT PLAN: Frequency/Duration: Follow patient 6x/week to address above goals. Rehabilitation Potential For Stated Goals: Fair     RECOMMENDED REHABILITATION/EQUIPMENT: (at time of discharge pending progress): Due to the probability of continued deficits (see above) this patient will likely need continued skilled occupational therapy after discharge. Equipment:    TBD              OCCUPATIONAL PROFILE AND HISTORY:   History of Present Injury/Illness (Reason for Referral):  See H&P  Past Medical History/Comorbidities:   Ms. Elvin Mars  has a past medical history of Abnormal EKG (2/16/2016); Aortic valve insufficiency (2/16/2016); Cancer ([facility] [address] Hyperlipidemia (2/16/2016); Hypertension (2/16/2016); and Obesity (2/16/2016). Ms. Elvin Mars  has a past surgical history that includes hx tubal ligation. Social History/Living Environment:   Home Environment: Private residence  Wheelchair Ramp: Yes  One/Two Story Residence: One story  Living Alone: No  Support Systems: Child(ezequiel)  Patient Expects to be Discharged to[de-identified] Unknown  Current DME Used/Available at Home: Wheelchair  Prior Level of Function/Work/Activity:  Independent with ADLs; use of w/c for all mobility. Personal Factors:          Sex:  female        Age:  75 y.o.         Other factors that influence how disability is experienced by the patient:  Multiple co-morbidities    Number of Personal Factors/Comorbidities that affect the Plan of Care: Expanded review of therapy/medical records (1-2):  MODERATE COMPLEXITY   ASSESSMENT OF OCCUPATIONAL PERFORMANCE[de-identified]   Activities of Daily Living:   Basic ADLs (From Assessment) Complex ADLs (From Assessment)   Feeding: Independent  Oral Facial Hygiene/Grooming: Independent  Bathing: Maximum assistance  Upper Body Dressing: Independent  Lower Body Dressing: Maximum assistance  Toileting: Maximum assistance Instrumental ADL  Meal Preparation: Total assistance  Homemaking: Total assistance   Grooming/Bathing/Dressing Activities of Daily Living     Cognitive Retraining  Safety/Judgement: Awareness of environment; Fall prevention;Home safety                       Bed/Mat Mobility  Rolling: Moderate assistance  Supine to Sit: Moderate assistance  Sit to Stand: Maximum assistance;Assist x2  Bed to Chair: Maximum assistance;Assist x2  Scooting: Moderate assistance       Most Recent Physical Functioning:   Gross Assessment:                  Posture:  Posture (WDL): Exceptions to WDL  Posture Assessment: Forward head  Balance:  Sitting: Intact  Standing: Impaired  Standing - Static: Poor  Standing - Dynamic : Poor Bed Mobility:  Rolling: Moderate assistance  Supine to Sit: Moderate assistance  Scooting: Moderate assistance  Wheelchair Mobility:     Transfers:  Sit to Stand: Maximum assistance;Assist x2  Stand to Sit: Maximum assistance;Assist x2  Bed to Chair: Maximum assistance;Assist x2  Sliding Board : Maximum assistance            Patient Vitals for the past 6 hrs:   BP BP Patient Position SpO2 Pulse   18 1110 120/58 Sitting 94 % 78   18 1416 122/61 At rest 96 % 87   18 1431 125/77 At rest 96 % 89       Mental Status  Neurologic State: Drowsy  Orientation Level: Oriented X4  Cognition: Follows commands  Perception: Appears intact  Perseveration: No perseveration noted  Safety/Judgement: Awareness of environment, Fall prevention, Home safety                          Physical Skills Involved:  1. Balance  2. Strength  3. Activity Tolerance  4. Gross Motor Control  5. Pain (acute) Cognitive Skills Affected (resulting in the inability to perform in a timely and safe manner): 1. none Psychosocial Skills Affected:  1. Habits/Routines  2.  Environmental Adaptation   Number of elements that affect the Plan of Care: 5+:  HIGH COMPLEXITY   CLINICAL DECISION MAKIN [facility] [address] [address] AM-PAC 6 Clicks   Daily Activity Inpatient Short Form  How much help from another person does the patient currently need. .. Total A Lot A Little None   1. Putting on and taking off regular lower body clothing? [] 1   [x] 2   [] 3   [] 4   2. Bathing (including washing, rinsing, drying)? [] 1   [x] 2   [] 3   [] 4   3. Toileting, which includes using toilet, bedpan or urinal?   [] 1   [x] 2   [] 3   [] 4   4. Putting on and taking off regular upper body clothing? [] 1   [] 2   [] 3   [x] 4   5. Taking care of personal grooming such as brushing teeth? [] 1   [] 2   [] 3   [x] 4   6. Eating meals? [] 1   [] 2   [] 3   [x] 4   © 2007, Trustees of [address] [place] [address] [address] [address], under license to [employer]. All rights reserved      Score:  Initial: 21 Most Recent: X (Date: -- )    Interpretation of Tool:  Represents activities that are increasingly more difficult (i.e. Bed mobility, Transfers, Gait). Score 24 23 22-20 19-15 14-10 9-7 6     Modifier CH CI CJ CK CL CM CN      ? Self Care:     - CURRENT STATUS: CJ - 20%-39% impaired, limited or restricted    - GOAL STATUS: CI - 1%-19% impaired, limited or restricted    - D/C STATUS:  ---------------To be determined---------------  Payor: Christine Chinchilla / Plan: [address] [place] [address] HMO / Product Type: Managed Care Medicare /      Medical Necessity:     · Patient is expected to demonstrate progress in strength, balance, coordination and functional technique to decrease assistance required with ADls and functional moiblity. .  Reason for Services/Other Comments:  · Patient continues to require skilled intervention due to medical complications and patient unable to attend/participate in therapy as expected. Use of outcome tool(s) and clinical judgement create a POC that gives a: MODERATE COMPLEXITY         TREATMENT:   (In addition to Assessment/Re-Assessment sessions the following treatments were rendered)     Pre-treatment Symptoms/Complaints:  Pt states, \"I'm not sure if I need to use the potty, but ill try. \"  Pain: Initial:   Pain Intensity 1: 0  Pain Location 1: Hip  Pain Orientation 1: Left, Right  Pain Intervention(s) 1: Ambulation/Increased Activity  Post Session:  same   Therapeutic Activity: (    10): Therapeutic activities including Toilet transfers to improve mobility and balance. Required minimal   to promote dynamic balance in sitting. Pt completed slide board transfer to [facility] from bedside chair. Pt required max A x 3 for transfer and max verbal cueing to hand placement and to maintain NWB status in L LE.       · nasal cannula. Treatment/Session Assessment:    · Response to Treatment:  Good participation. · Interdisciplinary Collaboration:   o Physical Therapist  o Occupational Therapist  o Registered Nurse  o Rehabilitation Attendant  · After treatment position/precautions:   o Up in chair  o Bed alarm/tab alert on  o Bed/Chair-wheels locked  o Call light within reach  o Family at bedside   · Compliance with Program/Exercises: Will assess as treatment progresses. · Recommendations/Intent for next treatment session: \"Next visit will focus on advancements to more challenging activities and reduction in assistance provided\".   Total Treatment Duration:  OT Patient Time In/Time Out  Time In: 1300  Time Out: 355 Mark[address] Thomas

## 2018-08-09 NOTE — CONSULTS
[facility] Consult    Subjective:     Bibi York is a 75 y.o.  female who is being seen for ALEISHA. This is a very pleasant lady with breast cancer, underlying stage 3 CKD who coimes in with a hip fracture. She had surgery on both hips including ORIF and apparent biopsy. Creatinine on admission was 1.7 which appears to be near baseline. She denies NSAIDs. No contrast. Appears nonoliguric but has not been to the bathroom since rodriguez was removed this morning. No obvious hemodynamic insult. Past Medical History:   Diagnosis Date    Abnormal EKG 2/16/2016    Aortic valve insufficiency 2/16/2016    Cancer (HCC)     breast    Hyperlipidemia 2/16/2016    Hypertension 2/16/2016    UNSPECIFIED     Obesity 2/16/2016    UNSPECIFIED       Past Surgical History:   Procedure Laterality Date    HX TUBAL LIGATION       Family History   Problem Relation Age of Onset    Coronary Artery Disease Mother     Heart Disease Father       Social History   Substance Use Topics    Smoking status: Never Smoker    Smokeless tobacco: Current User     Types: Snuff    Alcohol use No       Current Facility-Administered Medications   Medication Dose Route Frequency Provider Last Rate Last Dose    0.9% sodium chloride infusion 250 mL  250 mL IntraVENous PRN Lawence Room, NP        0.9% sodium chloride infusion  75 mL/hr IntraVENous CONTINUOUS Revkah Twylait, NP        amLODIPine (NORVASC) tablet 5 mg  5 mg Oral DAILY Barbarann Flies. Delisa Huitron MD   Stopped at 08/09/18 0900    letrozole [facility]) tablet 2.5 mg (Patient Supplied)  2.5 mg Oral DAILY Barbarann Flies. Delisa Huitron MD        ondansetron [facility]) tablet 8 mg  8 mg Oral Q8H PRN Barbarann Flies. Delisa Huitron MD        sodium chloride (NS) flush 5-10 mL  5-10 mL IntraVENous Q8H Adan Huitron MD   Stopped at 08/08/18 0600    sodium chloride (NS) flush 5-10 mL  5-10 mL IntraVENous PRN Barbarann Flies.  Delisa Huitron MD        sodium chloride (NS) flush 5-10 mL  5-10 mL IntraVENous Q8H Suzette Martínez NP   10 mL at 08/09/18 0554    sodium chloride (NS) flush 5-10 mL  5-10 mL IntraVENous PRN Gavin Guthrie, ANITA        acetaminophen (TYLENOL) tablet 650 mg  650 mg Oral Q8H Jefe Go Martínez, NP   650 mg at 08/09/18 0553    oxyCODONE IR (ROXICODONE) tablet 5-10 mg  5-10 mg Oral Q4H PRN Gavin Guthrie, NP   5 mg at 08/09/18 1121    ondansetron (ZOFRAN) injection 4 mg  4 mg IntraVENous Q4H PRN Gavin Guthrie, NP   4 mg at 08/09/18 6817    docusate sodium (COLACE) capsule 100 mg  100 mg Oral BID Jefe Go Martínez, NP   100 mg at 08/09/18 1121    alum-mag hydroxide-simeth (MYLANTA) oral suspension 30 mL  30 mL Oral Q4H PRN Gavin Guthrie, NP        calcium-vitamin D (OS-JORGE) 500 mg-200 unit tablet  1 Tab Oral TID WITH MEALS Charlesmunira Go Martínez, NP   1 Tab at 08/09/18 1121    enoxaparin (LOVENOX) injection 30 mg  30 mg SubCUTAneous Q24H Jefe Go Martínez, NP        traMADol (ULTRAM) tablet 50 mg  50 mg Oral Q6H PRN Jefe Go Martínez, NP   50 mg at 08/07/18 2050    hydrALAZINE (APRESOLINE) 20 mg/mL injection 10 mg  10 mg IntraVENous Q6H PRN David Carreno. Serina Kennedy MD   10 mg at 08/07/18 2323        Allergies   Allergen Reactions    Penicillins Unknown (comments)     UNKNOWN REACTION         Review of Systems:  Pertinent items are noted in the History of Present Illness. Objective: Intake and Output:    08/09 0701 - 08/09 1900  In: 240 [P.O.:240]  Out: -   08/07 1901 - 08/09 0700  In: 1300 [I.V.:1300]  Out: 750 [Urine:550]    Physical Exam:   General appearance: alert, cooperative, no distress, appears stated age     Neck: supple, symmetrical, trachea midline, no adenopathy, thyroid: not enlarged, symmetric, no tenderness/mass/nodules, no carotid bruit and no JVD  Lungs: clear to auscultation bilaterally  Heart: regular rate and rhythm, S1, S2 normal, no murmur, click, rub or gallop  Abdomen: soft, non-tender.  Bowel sounds normal. No masses,  no organomegaly  Extremities: extremities normal, atraumatic, no cyanosis or edema         Data Review: Recent Results (from the past 24 hour(s))   CBC WITH AUTOMATED DIFF    Collection Time: 08/09/18  2:10 AM   Result Value Ref Range    WBC 4.2 (L) 4.3 - 11.1 K/uL    RBC 2.51 (L) 4.05 - 5.2 M/uL    HGB 8.6 (L) 11.7 - 15.4 g/dL    HCT 28.1 (L) 35.8 - 46.3 %    .0 (H) 79.6 - 97.8 FL    MCH 34.3 (H) 26.1 - 32.9 PG    MCHC 30.6 (L) 31.4 - 35.0 g/dL    RDW 16.3 %    PLATELET 985 832 - 784 K/uL    MPV 9.4 9.4 - 12.3 FL    ABSOLUTE NRBC 0.00 0.0 - 0.2 K/uL    DF AUTOMATED      NEUTROPHILS 73 43 - 78 %    LYMPHOCYTES 11 (L) 13 - 44 %    MONOCYTES 15 (H) 4.0 - 12.0 %    EOSINOPHILS 0 (L) 0.5 - 7.8 %    BASOPHILS 0 0.0 - 2.0 %    IMMATURE GRANULOCYTES 1 0.0 - 5.0 %    ABS. NEUTROPHILS 3.1 K/UL    ABS. LYMPHOCYTES 0.5 K/UL    ABS. MONOCYTES 0.7 K/UL    ABS. EOSINOPHILS 0.0 K/UL    ABS. BASOPHILS 0.0 K/UL    ABS. IMM. GRANS. 0.0 K/UL   METABOLIC PANEL, BASIC    Collection Time: 08/09/18  2:10 AM   Result Value Ref Range    Sodium 141 136 - 145 mmol/L    Potassium 4.5 3.5 - 5.1 mmol/L    Chloride 106 98 - 107 mmol/L    CO2 21 21 - 32 mmol/L    Anion gap 14 7 - 16 mmol/L    Glucose 178 (H) 65 - 100 mg/dL    BUN 22 8 - 23 MG/DL    Creatinine 3.15 (H) 0.6 - 1.0 MG/DL    GFR est AA 19 (L) >60 ml/min/1.73m2    GFR est non-AA 15 (L) >60 ml/min/1.73m2    Calcium 6.4 (L) 8.3 - 10.4 MG/DL   PLEASE READ & DOCUMENT PPD TEST IN 24 HRS    Collection Time: 08/09/18  2:43 AM   Result Value Ref Range    PPD Negative Negative    mm Induration 0 mm          Assessment:     Principal Problem:    Femur fracture, left (HCC) (8/7/2018)    Active Problems:    Hypertension (2/16/2016)      Overview: UNSPECIFIED       Obesity (2/16/2016)      Overview: UNSPECIFIED       Malignant neoplasm metastatic to bone ([facility] (7/31/2016)      Overview: Last Assessment & Plan:       1. Complete radiation therapy to the patient's sacrum and femurs as       directed by Dr. Nunn Me.       2.  Return to the office in 6 weeks with x-rays of the pelvis on arrival.      3.  Instruct the patient to advance her weightbearing as tolerated as she       continues to enjoy a reduction in pain following the radiation therapy. Anemia due to chronic kidney disease treated with erythropoietin (7/25/2017)      Stage 3 chronic kidney disease (8/8/2018)      Hypokalemia (8/8/2018)        Plan: This is an unfortunate lady with ALEISHA most likely prerenal in origin versus ATN. I agree with fluids. Will check a bladder scan and replace rodriguez if there in any significant volume. Check urine studies as well. Additional work up based on initial response to fluids. Thank you for the kind courtesy of this consultation. Will make further adjustments to the medical regimen as the clinical course dictates and follow very closely with you.       Signed By: Jose Mauricio MD     August 9, 2018

## 2018-08-09 NOTE — PROGRESS NOTES
Problem: Pressure Injury - Risk of  Goal: *Prevention of pressure injury  Document Arturo Scale and appropriate interventions in the flowsheet. Outcome: Progressing Towards Goal  Pressure Injury Interventions: Activity Interventions: Increase time out of bed, Pressure redistribution bed/mattress(bed type), PT/OT evaluation    Mobility Interventions: HOB 30 degrees or less, Pressure redistribution bed/mattress (bed type), PT/OT evaluation    Nutrition Interventions: Document food/fluid/supplement intake                    Problem: Falls - Risk of  Goal: *Absence of Falls  Document Maurizio Fall Risk and appropriate interventions in the flowsheet.    Outcome: Progressing Towards Goal  Fall Risk Interventions:  Mobility Interventions: OT consult for ADLs, Patient to call before getting OOB, PT Consult for mobility concerns, PT Consult for assist device competence         Medication Interventions: Bed/chair exit alarm, Evaluate medications/consider consulting pharmacy, Patient to call before getting OOB    Elimination Interventions: Call light in reach, Elevated toilet seat, Patient to call for help with toileting needs    History of Falls Interventions: Bed/chair exit alarm, Consult care management for discharge planning, Evaluate medications/consider consulting pharmacy

## 2018-08-09 NOTE — CONSULTS
Geriatric Fracture Consult  Patient: Jordan Rice  YOB: 1945   MRN: [id number]      Consult Date: 8/8/2018     Consulting Physician: DR Segundo Lanier    Chief Complaint: LEFT PATHOLOGICAL SUBTROCHANTERIC HIP FRACTURE; IMPENDING PATHOLOGICAL RIGHT FEMUR FRACTURE  History of Present Illness: Jordan Rice is a 68y. o.  male who is being seen for left hip pain after sustaining a fall at home . Onset of symptoms was abrupt with unchanged course since that time. The pain is located in the left hip. Patient describes the pain as continuous and rated as moderate. Pain has been associated with movement. Patient denies other injuries. Review of Systems: A comprehensive review of systems was negative except for that written in the History of Present Illness. ED Presentation Time: < 8 hours  Mechanism of Injury: Fall from standing  Ambulatory Status: MOSTLY WHEELCHAIR BOUND, CAN TRANSFER  Past Medical History:   Past Medical History:   Diagnosis Date    Abnormal EKG 2/16/2016    Aortic valve insufficiency 2/16/2016    Cancer ([facility]     breast    Hyperlipidemia 2/16/2016    Hypertension 2/16/2016    UNSPECIFIED     Obesity 2/16/2016    UNSPECIFIED        Allergies: Allergies   Allergen Reactions    Penicillins Unknown (comments)     UNKNOWN REACTION       Past Surgical History:   Past Surgical History:   Procedure Laterality Date    HX TUBAL LIGATION        Social History:   Social History     Social History    Marital status:      Spouse name: N/A    Number of children: N/A    Years of education: N/A     Occupational History    Not on file.      Social History Main Topics    Smoking status: Never Smoker    Smokeless tobacco: Current User     Types: Snuff    Alcohol use No    Drug use: Not on file    Sexual activity: Not on file     Other Topics Concern    Not on file     Social History Narrative      FAMILY HISTORY - REVIEWED - NO PERTINENT FAMILY HISTORY  Dwelling Status: HOME WITH 3 DAUGHTERS  Current Anticoagulant Medications: DENIES  History of falls: YES  Prior Fractures: DENIES  Osteoporosis Medications: calcium with vitamin D 1200mg daily  Bone Density Tests: UNKNOWN  X-RAYS: Left Subtrochanteric Fracture   BONE SCAN - IMPRESSION  Impression:   1. Healing subacute fracture of the left intertrochanteric region. 2.  Asymmetric focal uptake in the bilateral anterior ribs likely corresponding  to subacute healing nondisplaced fractures seen on prior PET/CT. 3.  Nonspecific asymmetric focal uptake in the distal right femur. If further  clinical concern for metastatic disease, elective whole body PET/CT may be more  sensitive in evaluation. Physical Exam:   PATIENT COMPLAINING OF LEFT HIP PAIN AFTER A FALL AT HOME. FOCUSED MUSCULOSKELETAL EXAM REVEALS DECREASED ROM TO LEFT LE. THERE IS SHORTENING AND EXTERNAL ROTATION NOTED TO LEFT LE. PATIENT IS TENDER TO PALPATION OVER LEFT HIP AND GROIN. PATIENT HAS PAIN WITH LOG ROLLING. N/V INTACT. DENIES OTHER INJURIES.   PATIENT DENIES PAIN TO RIGHT HIP OR THIGH; NO SHORTENING OR ROTATION TO RIGHT LOWER EXTREMITY; N/V INTACT    Assessment / Plan: ORIF LEFT ST FRACTURE; OPEN BIOPSY LEFT FEMUR; PROPHYLACTIC NAILING RIGHT FEMUR; CONSULT PT/OT - NWB LEFT LE; WBAT RIGHT LE; STR VS HOME WITH [facility]  RISKS AND BENEFITS WERE ADDRESSED WITH PATIENT AND 2 DAUGHTERS  Labs:    Lab Results   Component Value Date/Time    HGB 8.6 (L) 08/09/2018 02:10 AM    WBC 4.2 (L) 08/09/2018 02:10 AM    INR 1.1 08/07/2018 07:27 PM    Albumin 3.3 07/26/2018 01:03 PM      Preoperative Clearance: YES by Hospitalist          Signed by: Christy Mays NP   Today's Date: August 9, 2018

## 2018-08-09 NOTE — PROGRESS NOTES
Patient has not voided since catheter was removed this morning. Bladder scans show 0 ml. Notified Suzette orthopedic NP, verbal order for one time straight catheter received.

## 2018-08-09 NOTE — PROGRESS NOTES
Problem: Mobility Impaired (Adult and Pediatric)  Goal: *Acute Goals and Plan of Care (Insert Text)  STG:  (1.)Ms. Alisha Villalta will move from supine to sit and sit to supine , scoot up and down and roll side to side with MINIMAL ASSIST within 3 treatment day(s). (2.)Ms. Alisha Villalta will transfer from bed to chair and chair to bed with MINIMAL ASSIST using the least restrictive device within 3 treatment day(s). (3.)Ms. Alisha Villalta will ambulate with MAXIMAL ASSIST for 2 feet with the least restrictive device within 3 treatment day(s). (4.)Ms. Alisha Villalta will perform standing static and dynamic balance activities x 5 minutes with MAXIMAL ASSIST to improve safety within 3 day(s). (5.)Ms. Alisha Villalta will perform LE exercises with 1 to 2 cues for form within 3 days to improve strength for functional transfers and ambulation. (6.)Ms. Alisha Villalta will perform wheelchair mobility 150 feet with SUPERVISION within 3 days to maximize independence with functional mobility. (7.)Ms. Alisha Villalta will maintain NWB LLE, WBAT RLE throughout all functional mobility within 3 days. LTG:  (1.)Ms. Alisha Villalta will move from supine to sit and sit to supine , scoot up and down and roll side to side in bed with CONTACT GUARD ASSIST within 7 treatment day(s). (2.)Ms. Alisha Vilallta will transfer from bed to chair and chair to bed with CONTACT GUARD ASSIST using the least restrictive device within 7 treatment day(s). (3.)Ms. Alisha Villalta will ambulate with MINIMAL ASSIST for 2 feet with the least restrictive device within 7 treatment day(s). (4.)Ms. Alisha Villalta will perform standing static and dynamic balance activities x 5 minutes with MINIMAL ASSIST to improve safety within 7 day(s). (5.)Ms. Alisha Villalta will perform wheelchair mobility 250 feet with MODIFIED INDEPENDENCE within 7 days to maximize independence with functional mobility.  ________________________________________________________________________________________________      PHYSICAL THERAPY: Initial Assessment, Daily Note, AM 8/9/2018  INPATIENT: Hospital Day: 3  Payor: Annette Simmonds / Plan: [address] [place] [address] HMO / Product Type: Managed Care Medicare /    LLE NWB, RLE WBAT  NAME/AGE/GENDER: Car Mcclain is a 75 y.o. female   PRIMARY DIAGNOSIS: hip fracture  Femur fracture, left (HCC) Femur fracture, left (HCC) Femur fracture, left (HCC)  Procedure(s) (LRB):  Prophylactic nailing right femur / Open reduction internal fixation left subtrochanteric left femur / Open biopsy left proximal femur (Left)  1 Day Post-Op  ICD-10: Treatment Diagnosis:    · Difficulty in walking, Not elsewhere classified (R26.2)  · Repeated Falls (R29.6)  · History of falling (Z91.81)   Precaution/Allergies:  Penicillins      ASSESSMENT:     Ms. Maddi Magana is a 75 y.o. female s/p above surgery. She is now LLE NWB and RLE WBAT. She lives with 3 daughters in a single story home and typically transfers independently to a wheelchair and is modified independent at wheelchair level. She reports this is the only fall in the last 6 months. She is supine and agreeable to therapy. Understands NWB status, however requires verbal cues for safety awareness, as she wanted to attempt transfer without use of rolling walker. Moderate assist x2 to transfer to edge of bed, intact sitting balance once positioned at edge of bed (requiring max assist to scoot forward). Attempted to stand with maximal assist x2 while maintaining NWB LLE. Unable to clear bottom. Treatment initiated to include education on use of transfer board to complete transfer to chair with drop arm recliner with max assist x2 and maximal verbal, visual and manual cues. Patient left with OT attending at the end of the session. Noted patient became nauseous, vomited and diaphoretic during session, BP taken (WNL) and RN alerted. Discussed concerns with patient and daughters about patient returning home at d/c.  Car Mcclain is currently functioning below her baseline and would benefit from skilled PT during acute care stay to maximize safety and independence with functional mobility. This section established at most recent assessment   PROBLEM LIST (Impairments causing functional limitations):  1. Decreased Strength  2. Decreased ADL/Functional Activities  3. Decreased Transfer Abilities  4. Decreased Ambulation Ability/Technique  5. Decreased Balance  6. Increased Pain  7. Decreased Activity Tolerance  8. Decreased Pacing Skills  9. Increased Shortness of Breath  10. Decreased Knowledge of Precautions  11. Decreased [place] with Home Exercise Program   INTERVENTIONS PLANNED: (Benefits and precautions of physical therapy have been discussed with the patient.)  1. Balance Exercise  2. Bed Mobility  3. Family Education  4. Gait Training  5. Home Exercise Program (HEP)  6. Therapeutic Activites  7. Therapeutic Exercise/Strengthening  8. Transfer Training  9. Patient Education  10. Group Therapy     TREATMENT PLAN: Frequency/Duration: twice daily for duration of hospital stay  Rehabilitation Potential For Stated Goals: Good     RECOMMENDED REHABILITATION/EQUIPMENT: (at time of discharge pending progress): Due to the probability of continued deficits (see above) this patient will likely need continued skilled physical therapy after discharge. Equipment:    Transfer/Sliding board              HISTORY:   History of Present Injury/Illness (Reason for Referral):  Bibi York is a 74 yo F with history of metastatic breast cancer (on [place] and [facility]), hypertension, obesity, stage 3-4 CKD, and chronic debility (at baseline is wheelchair bound and can only transfer a few feet with assistance), who presents to the ED after falling while trying to transfer from a car to a wheelchair at her a store. She reports her L leg 'gave out' and she fell backwards. She is noted to have a L proximal subtrochanteric pathologic fracture on x-ray. At present, she reports her L hip pain is controlled.   She denies any chest pain or shortness of breath.     Hospitalist service asked to admit for L hip fracture. Past Medical History/Comorbidities:   Ms. Emeka Rock  has a past medical history of Abnormal EKG (2/16/2016); Aortic valve insufficiency (2/16/2016); Cancer ([facility] Hyperlipidemia (2/16/2016); Hypertension (2/16/2016); and Obesity (2/16/2016). Ms. Emeka Rock  has a past surgical history that includes hx tubal ligation. Social History/Living Environment:   Home Environment: Private residence  Wheelchair Ramp: Yes  One/Two Story Residence: One story  Living Alone: No  Support Systems: Child(ezequiel)  Patient Expects to be Discharged to[de-identified] Unknown  Current DME Used/Available at Home: Wheelchair  Prior Level of Function/Work/Activity:  She lives with 3 daughters in a single story home and typically transfers independently to a wheelchair and is modified independent at wheelchair level. Number of Personal Factors/Comorbidities that affect the Plan of Care: 1-2: MODERATE COMPLEXITY   EXAMINATION:   Most Recent Physical Functioning:   Gross Assessment:  AROM: Grossly decreased, non-functional  PROM: Grossly decreased, non-functional  Strength: Generally decreased, functional  Coordination: Generally decreased, functional               Posture:  Posture (WDL): Exceptions to WDL  Posture Assessment: Forward head  Balance:  Sitting: Intact  Standing: Impaired  Standing - Static: Poor  Standing - Dynamic : Poor Bed Mobility:  Supine to Sit: Moderate assistance;Assist x2  Scooting: Moderate assistance;Assist x2  Wheelchair Mobility:     Transfers:  Sit to Stand: Maximum assistance;Assist x2  Stand to Sit: Maximum assistance;Assist x2  Bed to Chair: Maximum assistance;Assist x2  Sliding Board : Maximum assistance  Gait:            Body Structures Involved:  1. Nerves  2. Lungs  3. Bones  4. Joints  5. Muscles  6. Ligaments Body Functions Affected:  1. Sensory/Pain  2. Neuromusculoskeletal  3.  Movement Related Activities and Participation Affected:  1. Mobility  2. Self Care  3. Domestic Life  4. Interpersonal Interactions and Relationships  5. Community, Social and [place]   Number of elements that affect the Plan of Care: 4+: HIGH COMPLEXITY   CLINICAL PRESENTATION:   Presentation: Evolving clinical presentation with changing clinical characteristics: MODERATE COMPLEXITY   CLINICAL DECISION MAKIN [facility] Mobility Inpatient Short Form  How much difficulty does the patient currently have. .. Unable A Lot A Little None   1. Turning over in bed (including adjusting bedclothes, sheets and blankets)? [] 1   [x] 2   [] 3   [] 4   2. Sitting down on and standing up from a chair with arms ( e.g., wheelchair, bedside commode, etc.)   [x] 1   [] 2   [] 3   [] 4   3. Moving from lying on back to sitting on the side of the bed? [] 1   [x] 2   [] 3   [] 4   How much help from another person does the patient currently need. .. Total A Lot A Little None   4. Moving to and from a bed to a chair (including a wheelchair)? [] 1   [x] 2   [] 3   [] 4   5. Need to walk in hospital room? [x] 1   [] 2   [] 3   [] 4   6. Climbing 3-5 steps with a railing? [x] 1   [] 2   [] 3   [] 4   © , Trustees of [address] [place], [place] [address], under license to [employer]. All rights reserved      Score:  Initial: 9 Most Recent: X (Date: -- )    Interpretation of Tool:  Represents activities that are increasingly more difficult (i.e. Bed mobility, Transfers, Gait). Score 24 23 22-20 19-15 14-10 9-7 6     Modifier CH CI CJ CK CL CM CN      ?  Mobility - Walking and Moving Around:     - CURRENT STATUS: CM - 80%-99% impaired, limited or restricted    - GOAL STATUS: CK - 40%-59% impaired, limited or restricted    - D/C STATUS:  ---------------To be determined---------------  Payor: [facility] MEDICARE / Plan: [address] [place] [address] HMO / Product Type: Managed Care Medicare /      Medical Necessity:     · Patient demonstrates good rehab potential due to higher previous functional level. Reason for Services/Other Comments:  · Patient continues to require modification of therapeutic interventions to increase complexity of exercises. Use of outcome tool(s) and clinical judgement create a POC that gives a: Questionable prediction of patient's progress: MODERATE COMPLEXITY            TREATMENT:   (In addition to Assessment/Re-Assessment sessions the following treatments were rendered)   Pre-treatment Symptoms/Complaints:  none  Pain: Initial:   Pain Intensity 1: 0  Post Session:  0/10     Therapeutic Activity: (    10 minutes): Therapeutic activities including Chair transfers and sliding board transfers to improve mobility, strength and balance. Required maximal visual verbal and manual cues   to promote static and dynamic balance in sitting. Braces/Orthotics/Lines/Etc:   · IV  · O2 Nasal Cannula  Treatment/Session Assessment:    · Response to Treatment:  Patient experienced lightheadedness, diaphoretic, nausea and vomiting during session. · Interdisciplinary Collaboration:   o Physical Therapist  o Occupational Therapist  o Registered Nurse  o Rehabilitation Attendant  · After treatment position/precautions:   o Up in chair  o Bed alarm/tab alert on  o Bed/Chair-wheels locked  o Bed in low position  o Call light within reach  o RN notified  o Family at bedside   · Compliance with Program/Exercises: Will assess as treatment progresses. · Recommendations/Intent for next treatment session: \"Next visit will focus on advancements to more challenging activities and reduction in assistance provided\".   Total Treatment Duration:  PT Patient Time In/Time Out  Time In: 0853  Time Out: 17[address], DPT

## 2018-08-10 ENCOUNTER — HOSPICE ADMISSION (OUTPATIENT)
Dept: HOSPICE | Facility: HOSPICE | Age: 73
End: 2018-08-10

## 2018-08-10 ENCOUNTER — APPOINTMENT (OUTPATIENT)
Dept: ULTRASOUND IMAGING | Age: 73
DRG: 478 | End: 2018-08-10
Attending: INTERNAL MEDICINE
Payer: MEDICARE

## 2018-08-10 ENCOUNTER — HOSPITAL ENCOUNTER (OUTPATIENT)
Dept: INFUSION THERAPY | Age: 73
End: 2018-08-10

## 2018-08-10 LAB
ANION GAP SERPL CALC-SCNC: 11 MMOL/L (ref 7–16)
APPEARANCE UR: ABNORMAL
BACTERIA URNS QL MICRO: 0 /HPF
BASOPHILS # BLD: 0 K/UL
BASOPHILS NFR BLD: 0 % (ref 0–2)
BILIRUB UR QL: NEGATIVE
BUN SERPL-MCNC: 36 MG/DL (ref 8–23)
CALCIUM SERPL-MCNC: 6 MG/DL (ref 8.3–10.4)
CASTS URNS QL MICRO: ABNORMAL /LPF
CHLORIDE SERPL-SCNC: 103 MMOL/L (ref 98–107)
CO2 SERPL-SCNC: 22 MMOL/L (ref 21–32)
COLOR UR: ABNORMAL
CREAT SERPL-MCNC: 3.84 MG/DL (ref 0.6–1)
CREAT UR-MCNC: 167 MG/DL
DIFFERENTIAL METHOD BLD: ABNORMAL
EOSINOPHIL # BLD: 0 K/UL
EOSINOPHIL NFR BLD: 1 % (ref 0.5–7.8)
EPI CELLS #/AREA URNS HPF: ABNORMAL /HPF
ERYTHROCYTE [DISTWIDTH] IN BLOOD BY AUTOMATED COUNT: 19.5 %
GLUCOSE SERPL-MCNC: 107 MG/DL (ref 65–100)
GLUCOSE UR STRIP.AUTO-MCNC: NEGATIVE MG/DL
HCT VFR BLD AUTO: 24.8 % (ref 35.8–46.3)
HGB BLD-MCNC: 7.9 G/DL (ref 11.7–15.4)
HGB UR QL STRIP: ABNORMAL
IMM GRANULOCYTES # BLD: 0.1 K/UL
IMM GRANULOCYTES NFR BLD AUTO: 3 % (ref 0–5)
KETONES UR QL STRIP.AUTO: ABNORMAL MG/DL
LEUKOCYTE ESTERASE UR QL STRIP.AUTO: NEGATIVE
LYMPHOCYTES # BLD: 0.5 K/UL
LYMPHOCYTES NFR BLD: 18 % (ref 13–44)
MCH RBC QN AUTO: 33.3 PG (ref 26.1–32.9)
MCHC RBC AUTO-ENTMCNC: 31.9 G/DL (ref 31.4–35)
MCV RBC AUTO: 104.6 FL (ref 79.6–97.8)
MM INDURATION POC: 0 MM (ref 0–5)
MONOCYTES # BLD: 0.6 K/UL
MONOCYTES NFR BLD: 20 % (ref 4–12)
NEUTS SEG # BLD: 1.6 K/UL
NEUTS SEG NFR BLD: 58 % (ref 43–78)
NITRITE UR QL STRIP.AUTO: NEGATIVE
NRBC # BLD: 0.05 K/UL (ref 0–0.2)
PH UR STRIP: 5 [PH] (ref 5–9)
PLATELET # BLD AUTO: 150 K/UL (ref 150–450)
PLATELET COMMENTS,PCOM: ADEQUATE
PMV BLD AUTO: 9.8 FL (ref 9.4–12.3)
POTASSIUM SERPL-SCNC: 4.2 MMOL/L (ref 3.5–5.1)
PPD POC: NEGATIVE NEGATIVE
PROT UR STRIP-MCNC: ABNORMAL MG/DL
PROT UR-MCNC: 44 MG/DL
PROT/CREAT UR-RTO: 0.3
RBC # BLD AUTO: 2.37 M/UL (ref 4.05–5.2)
RBC #/AREA URNS HPF: ABNORMAL /HPF
RBC MORPH BLD: ABNORMAL
SODIUM SERPL-SCNC: 136 MMOL/L (ref 136–145)
SODIUM UR-SCNC: 10 MMOL/L
SP GR UR REFRACTOMETRY: 1.02 (ref 1–1.02)
UROBILINOGEN UR QL STRIP.AUTO: 0.2 EU/DL (ref 0.2–1)
WBC # BLD AUTO: 2.8 K/UL (ref 4.3–11.1)
WBC MORPH BLD: ABNORMAL
WBC URNS QL MICRO: ABNORMAL /HPF

## 2018-08-10 PROCEDURE — C1751 CATH, INF, PER/CENT/MIDLINE: HCPCS

## 2018-08-10 PROCEDURE — 87086 URINE CULTURE/COLONY COUNT: CPT

## 2018-08-10 PROCEDURE — 76937 US GUIDE VASCULAR ACCESS: CPT

## 2018-08-10 PROCEDURE — 74011250637 HC RX REV CODE- 250/637: Performed by: NURSE PRACTITIONER

## 2018-08-10 PROCEDURE — 74011250636 HC RX REV CODE- 250/636: Performed by: NURSE PRACTITIONER

## 2018-08-10 PROCEDURE — 77030021907 HC KT URIN FOL O&M -A

## 2018-08-10 PROCEDURE — 74011250637 HC RX REV CODE- 250/637: Performed by: INTERNAL MEDICINE

## 2018-08-10 PROCEDURE — P9016 RBC LEUKOCYTES REDUCED: HCPCS

## 2018-08-10 PROCEDURE — 80048 BASIC METABOLIC PNL TOTAL CA: CPT

## 2018-08-10 PROCEDURE — 97530 THERAPEUTIC ACTIVITIES: CPT

## 2018-08-10 PROCEDURE — 77030039270 HC TU BLD FLTR CARD -A

## 2018-08-10 PROCEDURE — 77030020263 HC SOL INJ SOD CL0.9% LFCR 1000ML

## 2018-08-10 PROCEDURE — 85025 COMPLETE CBC W/AUTO DIFF WBC: CPT

## 2018-08-10 PROCEDURE — 77030034849

## 2018-08-10 PROCEDURE — 81001 URINALYSIS AUTO W/SCOPE: CPT

## 2018-08-10 PROCEDURE — 51798 US URINE CAPACITY MEASURE: CPT

## 2018-08-10 PROCEDURE — 84300 ASSAY OF URINE SODIUM: CPT

## 2018-08-10 PROCEDURE — 36415 COLL VENOUS BLD VENIPUNCTURE: CPT

## 2018-08-10 PROCEDURE — 97110 THERAPEUTIC EXERCISES: CPT

## 2018-08-10 PROCEDURE — 84156 ASSAY OF PROTEIN URINE: CPT

## 2018-08-10 PROCEDURE — 76775 US EXAM ABDO BACK WALL LIM: CPT

## 2018-08-10 PROCEDURE — 36430 TRANSFUSION BLD/BLD COMPNT: CPT

## 2018-08-10 PROCEDURE — 65270000029 HC RM PRIVATE

## 2018-08-10 PROCEDURE — 77030018786 HC NDL GD F/USND BARD -B

## 2018-08-10 RX ORDER — SODIUM CHLORIDE 9 MG/ML
250 INJECTION, SOLUTION INTRAVENOUS AS NEEDED
Status: DISCONTINUED | OUTPATIENT
Start: 2018-08-10 | End: 2018-08-14 | Stop reason: HOSPADM

## 2018-08-10 RX ORDER — SODIUM CHLORIDE 0.9 % (FLUSH) 0.9 %
10 SYRINGE (ML) INJECTION AS NEEDED
Status: DISCONTINUED | OUTPATIENT
Start: 2018-08-10 | End: 2018-08-14 | Stop reason: HOSPADM

## 2018-08-10 RX ORDER — SODIUM CHLORIDE 0.9 % (FLUSH) 0.9 %
10 SYRINGE (ML) INJECTION EVERY 8 HOURS
Status: DISCONTINUED | OUTPATIENT
Start: 2018-08-10 | End: 2018-08-14 | Stop reason: HOSPADM

## 2018-08-10 RX ADMIN — Medication 10 ML: at 21:27

## 2018-08-10 RX ADMIN — DOCUSATE SODIUM 100 MG: 100 CAPSULE, LIQUID FILLED ORAL at 16:58

## 2018-08-10 RX ADMIN — ENOXAPARIN SODIUM 30 MG: 100 INJECTION SUBCUTANEOUS at 16:18

## 2018-08-10 RX ADMIN — CALCIUM CARBONATE 500 MG (1,250 MG)-VITAMIN D3 200 UNIT TABLET 1 TABLET: at 09:54

## 2018-08-10 RX ADMIN — ACETAMINOPHEN 650 MG: 325 TABLET, FILM COATED ORAL at 05:10

## 2018-08-10 RX ADMIN — AMLODIPINE BESYLATE 5 MG: 5 TABLET ORAL at 09:55

## 2018-08-10 RX ADMIN — CALCIUM CARBONATE 500 MG (1,250 MG)-VITAMIN D3 200 UNIT TABLET 1 TABLET: at 16:58

## 2018-08-10 RX ADMIN — Medication 10 ML: at 17:01

## 2018-08-10 RX ADMIN — Medication 10 ML: at 14:00

## 2018-08-10 RX ADMIN — CALCIUM CARBONATE 500 MG (1,250 MG)-VITAMIN D3 200 UNIT TABLET 1 TABLET: at 12:01

## 2018-08-10 RX ADMIN — ACETAMINOPHEN 650 MG: 325 TABLET, FILM COATED ORAL at 14:00

## 2018-08-10 RX ADMIN — ACETAMINOPHEN 650 MG: 325 TABLET, FILM COATED ORAL at 21:27

## 2018-08-10 RX ADMIN — DOCUSATE SODIUM 100 MG: 100 CAPSULE, LIQUID FILLED ORAL at 09:54

## 2018-08-10 RX ADMIN — OXYCODONE HYDROCHLORIDE 10 MG: 5 TABLET ORAL at 02:04

## 2018-08-10 NOTE — PROGRESS NOTES
Attempted 4 times with ultrasound guidance to get IV for patient to continue blood transfusion. Emilia Rincon RN attempted 2 times and I attempted two times. Moved to left cephalic vein and placed midline.

## 2018-08-10 NOTE — PROGRESS NOTES
Subjective:   Daily Progress Note: 8/10/2018 10:58 AM    Feels better    Current Facility-Administered Medications   Medication Dose Route Frequency    0.9% sodium chloride infusion 250 mL  250 mL IntraVENous PRN    0.9% sodium chloride infusion 250 mL  250 mL IntraVENous PRN    0.9% sodium chloride infusion  75 mL/hr IntraVENous CONTINUOUS    amLODIPine (NORVASC) tablet 5 mg  5 mg Oral DAILY    letrozole (FEMARA) tablet 2.5 mg (Patient Supplied)  2.5 mg Oral DAILY    ondansetron (ZOFRAN ODT) tablet 8 mg  8 mg Oral Q8H PRN    sodium chloride (NS) flush 5-10 mL  5-10 mL IntraVENous Q8H    sodium chloride (NS) flush 5-10 mL  5-10 mL IntraVENous PRN    sodium chloride (NS) flush 5-10 mL  5-10 mL IntraVENous PRN    acetaminophen (TYLENOL) tablet 650 mg  650 mg Oral Q8H    oxyCODONE IR (ROXICODONE) tablet 5-10 mg  5-10 mg Oral Q4H PRN    ondansetron (ZOFRAN) injection 4 mg  4 mg IntraVENous Q4H PRN    docusate sodium (COLACE) capsule 100 mg  100 mg Oral BID    alum-mag hydroxide-simeth (MYLANTA) oral suspension 30 mL  30 mL Oral Q4H PRN    calcium-vitamin D (OS-JORGE) 500 mg-200 unit tablet  1 Tab Oral TID WITH MEALS    enoxaparin (LOVENOX) injection 30 mg  30 mg SubCUTAneous Q24H    traMADol (ULTRAM) tablet 50 mg  50 mg Oral Q6H PRN    hydrALAZINE (APRESOLINE) 20 mg/mL injection 10 mg  10 mg IntraVENous Q6H PRN               Objective:     Visit Vitals    /64 (BP 1 Location: Right arm, BP Patient Position: At rest)    Pulse 85    Temp 98 °F (36.7 °C)    Resp 17    Ht 5' 11\" (1.803 m)    Wt 105.2 kg (232 lb)    SpO2 97%    BMI 32.36 kg/m2    O2 Flow Rate (L/min): 6 l/min O2 Device: Oxygen mask    Temp (24hrs), Av.4 °F (36.9 °C), Min:97.8 °F (36.6 °C), Max:99.6 °F (37.6 °C)         08/1901 - 08/10 0700  In: 600 [P.O.:600]  Out: 360 [Urine:360]      Visit Vitals    /64 (BP 1 Location: Right arm, BP Patient Position: At rest)    Pulse 85    Temp 98 °F (36.7 °C)    Resp 17    Ht 5' 11\" (1.803 m)    Wt 105.2 kg (232 lb)    SpO2 97%    BMI 32.36 kg/m2     Head: Normocephalic, without obvious abnormality  Neck: no JVD  Lungs: clear to auscultation bilaterally  Heart: regular rate and rhythm  Abdomen: soft, non-tender. Extremities: + edema          Data Review    Recent Results (from the past 48 hour(s))   CBC WITH AUTOMATED DIFF    Collection Time: 08/09/18  2:10 AM   Result Value Ref Range    WBC 4.2 (L) 4.3 - 11.1 K/uL    RBC 2.51 (L) 4.05 - 5.2 M/uL    HGB 8.6 (L) 11.7 - 15.4 g/dL    HCT 28.1 (L) 35.8 - 46.3 %    .0 (H) 79.6 - 97.8 FL    MCH 34.3 (H) 26.1 - 32.9 PG    MCHC 30.6 (L) 31.4 - 35.0 g/dL    RDW 16.3 %    PLATELET 927 625 - 928 K/uL    MPV 9.4 9.4 - 12.3 FL    ABSOLUTE NRBC 0.00 0.0 - 0.2 K/uL    DF AUTOMATED      NEUTROPHILS 73 43 - 78 %    LYMPHOCYTES 11 (L) 13 - 44 %    MONOCYTES 15 (H) 4.0 - 12.0 %    EOSINOPHILS 0 (L) 0.5 - 7.8 %    BASOPHILS 0 0.0 - 2.0 %    IMMATURE GRANULOCYTES 1 0.0 - 5.0 %    ABS. NEUTROPHILS 3.1 K/UL    ABS. LYMPHOCYTES 0.5 K/UL    ABS. MONOCYTES 0.7 K/UL    ABS. EOSINOPHILS 0.0 K/UL    ABS. BASOPHILS 0.0 K/UL    ABS. IMM.  GRANS. 0.0 K/UL   METABOLIC PANEL, BASIC    Collection Time: 08/09/18  2:10 AM   Result Value Ref Range    Sodium 141 136 - 145 mmol/L    Potassium 4.5 3.5 - 5.1 mmol/L    Chloride 106 98 - 107 mmol/L    CO2 21 21 - 32 mmol/L    Anion gap 14 7 - 16 mmol/L    Glucose 178 (H) 65 - 100 mg/dL    BUN 22 8 - 23 MG/DL    Creatinine 3.15 (H) 0.6 - 1.0 MG/DL    GFR est AA 19 (L) >60 ml/min/1.73m2    GFR est non-AA 15 (L) >60 ml/min/1.73m2    Calcium 6.4 (L) 8.3 - 10.4 MG/DL   PLEASE READ & DOCUMENT PPD TEST IN 24 HRS    Collection Time: 08/09/18  2:43 AM   Result Value Ref Range    PPD Negative Negative    mm Induration 0 mm   METABOLIC PANEL, BASIC    Collection Time: 08/09/18  6:11 PM   Result Value Ref Range    Sodium 141 136 - 145 mmol/L    Potassium 4.3 3.5 - 5.1 mmol/L    Chloride 105 98 - 107 mmol/L    CO2 25 21 - 32 mmol/L    Anion gap 11 7 - 16 mmol/L    Glucose 137 (H) 65 - 100 mg/dL    BUN 28 (H) 8 - 23 MG/DL    Creatinine 3.85 (H) 0.6 - 1.0 MG/DL    GFR est AA 15 (L) >60 ml/min/1.73m2    GFR est non-AA 12 (L) >60 ml/min/1.73m2    Calcium 5.7 (LL) 8.3 - 10.4 MG/DL   MAGNESIUM    Collection Time: 08/09/18  6:11 PM   Result Value Ref Range    Magnesium 1.2 (LL) 1.8 - 2.4 mg/dL   PHOSPHORUS    Collection Time: 08/09/18  6:11 PM   Result Value Ref Range    Phosphorus 3.0 2.3 - 3.7 MG/DL   CBC WITH AUTOMATED DIFF    Collection Time: 08/09/18  9:06 PM   Result Value Ref Range    WBC 2.7 (L) 4.3 - 11.1 K/uL    RBC 2.48 (L) 4.05 - 5.2 M/uL    HGB 8.4 (L) 11.7 - 15.4 g/dL    HCT 26.0 (L) 35.8 - 46.3 %    .8 (H) 79.6 - 97.8 FL    MCH 33.9 (H) 26.1 - 32.9 PG    MCHC 32.3 31.4 - 35.0 g/dL    RDW 18.7 %    PLATELET 189 773 - 270 K/uL    MPV 9.4 9.4 - 12.3 FL    ABSOLUTE NRBC 0.07 0.0 - 0.2 K/uL    DF AUTOMATED      NEUTROPHILS 64 43 - 78 %    LYMPHOCYTES 16 13 - 44 %    MONOCYTES 18 (H) 4.0 - 12.0 %    EOSINOPHILS 0 (L) 0.5 - 7.8 %    BASOPHILS 0 0.0 - 2.0 %    IMMATURE GRANULOCYTES 1 0.0 - 5.0 %    ABS. NEUTROPHILS 1.8 K/UL    ABS. LYMPHOCYTES 0.4 K/UL    ABS. MONOCYTES 0.5 K/UL    ABS. EOSINOPHILS 0.0 K/UL    ABS. BASOPHILS 0.0 K/UL    ABS. IMM.  GRANS. 0.0 K/UL   METABOLIC PANEL, BASIC    Collection Time: 08/09/18  9:06 PM   Result Value Ref Range    Sodium 138 136 - 145 mmol/L    Potassium 4.1 3.5 - 5.1 mmol/L    Chloride 104 98 - 107 mmol/L    CO2 23 21 - 32 mmol/L    Anion gap 11 7 - 16 mmol/L    Glucose 128 (H) 65 - 100 mg/dL    BUN 30 (H) 8 - 23 MG/DL    Creatinine 3.89 (H) 0.6 - 1.0 MG/DL    GFR est AA 15 (L) >60 ml/min/1.73m2    GFR est non-AA 12 (L) >60 ml/min/1.73m2    Calcium 5.6 (LL) 8.3 - 10.4 MG/DL   CALCIUM, IONIZED    Collection Time: 08/09/18  9:06 PM   Result Value Ref Range    Calcium, ionized 3.20 (L) 4.0 - 5.2 mg/dL   SODIUM, UR, RANDOM    Collection Time: 08/10/18  1:41 AM   Result Value Ref Range    Sodium urine, random 10 MMOL/L   PROTEIN/CREATININE RATIO, URINE    Collection Time: 08/10/18  1:41 AM   Result Value Ref Range    Protein, urine random 44 (H) <11.9 mg/dL    Creatinine, urine 167.00 mg/dL    Protein/Creat. urine Ratio 0.3     CULTURE, URINE    Collection Time: 08/10/18  1:41 AM   Result Value Ref Range    Special Requests: NO SPECIAL REQUESTS      Culture result:        NO GROWTH AFTER SHORT PERIOD OF INCUBATION. FURTHER RESULTS TO FOLLOW AFTER OVERNIGHT INCUBATION.    URINALYSIS W/ RFLX MICROSCOPIC    Collection Time: 08/10/18  1:41 AM   Result Value Ref Range    Color NANCI      Appearance CLOUDY      Specific gravity 1.023 1.001 - 1.023      pH (UA) 5.0 5.0 - 9.0      Protein TRACE (A) NEG mg/dL    Glucose NEGATIVE  mg/dL    Ketone TRACE (A) NEG mg/dL    Bilirubin NEGATIVE  NEG      Blood LARGE (A) NEG      Urobilinogen 0.2 0.2 - 1.0 EU/dL    Nitrites NEGATIVE  NEG      Leukocyte Esterase NEGATIVE  NEG      WBC 0-3 0 /hpf    RBC 0-3 0 /hpf    Epithelial cells 0-3 0 /hpf    Bacteria 0 0 /hpf    Casts 5-10 0 /lpf   PLEASE READ & DOCUMENT PPD TEST IN 48 HRS    Collection Time: 08/10/18  2:07 AM   Result Value Ref Range    PPD Negative Negative    mm Induration 0 mm   METABOLIC PANEL, BASIC    Collection Time: 08/10/18  6:17 AM   Result Value Ref Range    Sodium 136 136 - 145 mmol/L    Potassium 4.2 3.5 - 5.1 mmol/L    Chloride 103 98 - 107 mmol/L    CO2 22 21 - 32 mmol/L    Anion gap 11 7 - 16 mmol/L    Glucose 107 (H) 65 - 100 mg/dL    BUN 36 (H) 8 - 23 MG/DL    Creatinine 3.84 (H) 0.6 - 1.0 MG/DL    GFR est AA 15 (L) >60 ml/min/1.73m2    GFR est non-AA 12 (L) >60 ml/min/1.73m2    Calcium 6.0 (L) 8.3 - 10.4 MG/DL   CBC WITH AUTOMATED DIFF    Collection Time: 08/10/18  6:17 AM   Result Value Ref Range    WBC 2.8 (L) 4.3 - 11.1 K/uL    RBC 2.37 (L) 4.05 - 5.2 M/uL    HGB 7.9 (L) 11.7 - 15.4 g/dL    HCT 24.8 (L) 35.8 - 46.3 %    .6 (H) 79.6 - 97.8 FL    MCH 33.3 (H) 26.1 - 32.9 PG    MCHC 31.9 31.4 - 35.0 g/dL    RDW 19.5 %    PLATELET 929 445 - 181 K/uL    MPV 9.8 9.4 - 12.3 FL    ABSOLUTE NRBC 0.05 0.0 - 0.2 K/uL    NEUTROPHILS 58 43 - 78 %    LYMPHOCYTES 18 13 - 44 %    MONOCYTES 20 (H) 4.0 - 12.0 %    EOSINOPHILS 1 0.5 - 7.8 %    BASOPHILS 0 0.0 - 2.0 %    IMMATURE GRANULOCYTES 3 0.0 - 5.0 %    ABS. NEUTROPHILS 1.6 K/UL    ABS. LYMPHOCYTES 0.5 K/UL    ABS. MONOCYTES 0.6 K/UL    ABS. EOSINOPHILS 0.0 K/UL    ABS. BASOPHILS 0.0 K/UL    ABS. IMM. GRANS. 0.1 K/UL    RBC COMMENTS SLIGHT  ANISOCYTOSIS        RBC COMMENTS SLIGHT  HYPOCHROMIA        RBC COMMENTS SLIGHT  POLYCHROMASIA        RBC COMMENTS OCCASIONAL  BASOPHILIC STIPPLING        RBC COMMENTS OCCASIONAL  TEARDROP CELLS        WBC COMMENTS Result Confirmed By Smear      PLATELET COMMENTS ADEQUATE      DF MANUAL         Assessment     Patient Active Problem List    Diagnosis Date Noted    Stage 3 chronic kidney disease 08/08/2018    Hypokalemia 08/08/2018    Femur fracture, left ([facility] [address] 08/07/2018    Anemia due to chronic kidney disease treated with erythropoietin 07/25/2017    Hyperkalemia 07/18/2017    Malignant neoplasm metastatic to bone ([facility] [address] 07/31/2016    Infiltrating ductal carcinoma of female breast ([facility] [address] 07/25/2016    Mass of breast 07/08/2016    Aortic valve insufficiency 02/16/2016    Hypertension 02/16/2016    Obesity 02/16/2016    Hyperlipidemia 02/16/2016    Abnormal EKG 02/16/2016           Problems Addressed by Nephrology     ALEISHA - prerenal  CKD 3    Plan     Renal function stable. Making a bit more urine. Urine sodium consistent with prerenal picture. Would continue IV fluid for another day or so. PO intake is picking up as well. D/W family.

## 2018-08-10 NOTE — PROGRESS NOTES
Problem: Mobility Impaired (Adult and Pediatric)  Goal: *Acute Goals and Plan of Care (Insert Text)  STG:  (1.)Ms. Elvin Mars will move from supine to sit and sit to supine , scoot up and down and roll side to side with MINIMAL ASSIST within 3 treatment day(s). (2.)Ms. Elvin Mars will transfer from bed to chair and chair to bed with MINIMAL ASSIST using the least restrictive device within 3 treatment day(s). (3.)Ms. Elvin Mars will ambulate with MAXIMAL ASSIST for 2 feet with the least restrictive device within 3 treatment day(s). (4.)Ms. Elvin Mars will perform standing static and dynamic balance activities x 5 minutes with MAXIMAL ASSIST to improve safety within 3 day(s). (5.)Ms. Elvin Mars will perform LE exercises with 1 to 2 cues for form within 3 days to improve strength for functional transfers and ambulation. (6.)Ms. Elvin Mars will perform wheelchair mobility 150 feet with SUPERVISION within 3 days to maximize independence with functional mobility. (7.)Ms. Elvin Mars will maintain NWB LLE, WBAT RLE throughout all functional mobility within 3 days. LTG:  (1.)Ms. Elvin Mars will move from supine to sit and sit to supine , scoot up and down and roll side to side in bed with CONTACT GUARD ASSIST within 7 treatment day(s). (2.)Ms. Elvin Mars will transfer from bed to chair and chair to bed with CONTACT GUARD ASSIST using the least restrictive device within 7 treatment day(s). (3.)Ms. Elvin Mars will ambulate with MINIMAL ASSIST for 2 feet with the least restrictive device within 7 treatment day(s). (4.)Ms. Elvin Mars will perform standing static and dynamic balance activities x 5 minutes with MINIMAL ASSIST to improve safety within 7 day(s). (5.)Ms. Elvin Mars will perform wheelchair mobility 250 feet with MODIFIED INDEPENDENCE within 7 days to maximize independence with functional mobility.  ________________________________________________________________________________________________    PHYSICAL THERAPY: Daily Note, Treatment Day: 1st, PM 8/10/2018  INPATIENT: Hospital Day: 4  Payor: Albamireya Valentina / Plan: [address] [place] [address] HMO / Product Type: Managed Care Medicare /    LLE NWB, RLE WBAT  NAME/AGE/GENDER: Garrett Epperson is a 75 y.o. female   PRIMARY DIAGNOSIS: hip fracture  Femur fracture, left (HCC) Femur fracture, left ([address] Femur fracture, left (HCC)  Procedure(s) (LRB):  Prophylactic nailing right femur / Open reduction internal fixation left subtrochanteric left femur / Open biopsy left proximal femur (Left)  2 Days Post-Op  ICD-10: Treatment Diagnosis:    · Difficulty in walking, Not elsewhere classified (R26.2)  · Repeated Falls (R29.6)  · History of falling (Z91.81)   Precaution/Allergies:  Penicillins      ASSESSMENT:     Ms. Chucky Ulloa is a 75 y.o. female s/p above surgery. She is now LLE NWB and RLE WBAT. Patient is sitting up in recliner chair upon contact and agreeable to PT. [place] it unsafe to attempt transfer to standing at this time due patient's size and weight bearing restrictions. Patient agreeable to sliding board transfer training. Patient able to perform sliding board transfer to EOB with max assist x 2 and cues for technique as well as several short rest breaks due to fatigue/shortness of breath. Patient demonstrates fair sitting balance during sitting balance activities on EOB. Patient returns to supine with max assist x 2 and cues for technique. Patient rolls left and right several times with max assist x 2 and difficulty completing task due to pain. Overall slow progress towards physical therapy goals. No goals have been met thus far. Will continue efforts. This section established at most recent assessment   PROBLEM LIST (Impairments causing functional limitations):  1. Decreased Strength  2. Decreased ADL/Functional Activities  3. Decreased Transfer Abilities  4. Decreased Ambulation Ability/Technique  5. Decreased Balance  6. Increased Pain  7. Decreased Activity Tolerance  8.  Decreased Pacing Skills  9. Increased Shortness of Breath  10. Decreased Knowledge of Precautions  11. Decreased [place] with Home Exercise Program   INTERVENTIONS PLANNED: (Benefits and precautions of physical therapy have been discussed with the patient.)  1. Balance Exercise  2. Bed Mobility  3. Family Education  4. Gait Training  5. Home Exercise Program (HEP)  6. Therapeutic Activites  7. Therapeutic Exercise/Strengthening  8. Transfer Training  9. Patient Education  10. Group Therapy     TREATMENT PLAN: Frequency/Duration: twice daily for duration of hospital stay  Rehabilitation Potential For Stated Goals: Good     RECOMMENDED REHABILITATION/EQUIPMENT: (at time of discharge pending progress): Due to the probability of continued deficits (see above) this patient will likely need continued skilled physical therapy after discharge. Equipment:    Transfer/Sliding board              HISTORY:   History of Present Injury/Illness (Reason for Referral):  Ivy Nguyen is a 76 yo F with history of metastatic breast cancer (on [place] and Femara), hypertension, obesity, stage 3-4 CKD, and chronic debility (at baseline is wheelchair bound and can only transfer a few feet with assistance), who presents to the ED after falling while trying to transfer from a car to a wheelchair at her a store. She reports her L leg 'gave out' and she fell backwards. She is noted to have a L proximal subtrochanteric pathologic fracture on x-ray. At present, she reports her L hip pain is controlled. She denies any chest pain or shortness of breath.     Hospitalist service asked to admit for L hip fracture. Past Medical History/Comorbidities:   Ms. Maritza Hernandez  has a past medical history of Abnormal EKG (2/16/2016); Aortic valve insufficiency (2/16/2016); Cancer ([facility] [address] Hyperlipidemia (2/16/2016); Hypertension (2/16/2016); and Obesity (2/16/2016). Ms. Maritza Hernandez  has a past surgical history that includes hx tubal ligation.   Social History/Living Environment:   Home Environment: Private residence  Wheelchair Ramp: Yes  One/Two Story Residence: One story  Living Alone: No  Support Systems: Child(ezequiel)  Patient Expects to be Discharged to[de-identified] Unknown  Current DME Used/Available at Home: Wheelchair  Prior Level of Function/Work/Activity:  She lives with 3 daughters in a single story home and typically transfers independently to a wheelchair and is modified independent at wheelchair level. Number of Personal Factors/Comorbidities that affect the Plan of Care: 1-2: MODERATE COMPLEXITY   EXAMINATION:   Most Recent Physical Functioning:   Gross Assessment:                  Posture:     Balance:  Sitting: Impaired  Sitting - Static: Fair (occasional)  Sitting - Dynamic: Fair (occasional) Bed Mobility:  Supine to Sit: Moderate assistance  Sit to Supine: Maximum assistance;Assist x2  Wheelchair Mobility:     Transfers:  Bed to Chair: Maximum assistance;Assist x2  Sliding Board : Maximum assistance (x2)  Gait:            Body Structures Involved:  1. Nerves  2. Lungs  3. Bones  4. Joints  5. Muscles  6. Ligaments Body Functions Affected:  1. Sensory/Pain  2. Neuromusculoskeletal  3. Movement Related Activities and Participation Affected:  1. Mobility  2. Self Care  3. Domestic Life  4. Interpersonal Interactions and Relationships  5. Community, Social and [place]   Number of elements that affect the Plan of Care: 4+: HIGH COMPLEXITY   CLINICAL PRESENTATION:   Presentation: Evolving clinical presentation with changing clinical characteristics: MODERATE COMPLEXITY   CLINICAL DECISION MAKIN [facility] Inpatient Short Form  How much difficulty does the patient currently have. .. Unable A Lot A Little None   1. Turning over in bed (including adjusting bedclothes, sheets and blankets)? [] 1   [x] 2   [] 3   [] 4   2.   Sitting down on and standing up from a chair with arms ( e.g., wheelchair, bedside commode, etc.)   [x] 1 [] 2   [] 3   [] 4   3. Moving from lying on back to sitting on the side of the bed? [] 1   [x] 2   [] 3   [] 4   How much help from another person does the patient currently need. .. Total A Lot A Little None   4. Moving to and from a bed to a chair (including a wheelchair)? [] 1   [x] 2   [] 3   [] 4   5. Need to walk in hospital room? [x] 1   [] 2   [] 3   [] 4   6. Climbing 3-5 steps with a railing? [x] 1   [] 2   [] 3   [] 4   © 2007, Trustees of [address] [place] [address] [address] [address], under license to [employer]. All rights reserved      Score:  Initial: 9 Most Recent: X (Date: -- )    Interpretation of Tool:  Represents activities that are increasingly more difficult (i.e. Bed mobility, Transfers, Gait). Score 24 23 22-20 19-15 14-10 9-7 6     Modifier CH CI CJ CK CL CM CN      ? Mobility - Walking and Moving Around:     - CURRENT STATUS: CM - 80%-99% impaired, limited or restricted    - GOAL STATUS: CK - 40%-59% impaired, limited or restricted    - D/C STATUS:  ---------------To be determined---------------  Payor: [employer] MEDICARE / Plan: [address] [place] [address] HMO / Product Type: Managed Care Medicare /      Medical Necessity:     · Patient demonstrates good rehab potential due to higher previous functional level. Reason for Services/Other Comments:  · Patient continues to require modification of therapeutic interventions to increase complexity of exercises. Use of outcome tool(s) and clinical judgement create a POC that gives a: Questionable prediction of patient's progress: MODERATE COMPLEXITY            TREATMENT:   (In addition to Assessment/Re-Assessment sessions the following treatments were rendered)   Pre-treatment Symptoms/Complaints:  none  Pain: Initial:   Pain Intensity 1: 0  Post Session:  0/10     Therapeutic Activity: (    25 minutes):   Therapeutic activities including bed mobility training including sit to supine and rolling left/right, transfer training (sliding board transfer), static/dynamic sitting balance training, scooting, posture training, and patient education to improve mobility, strength and balance. Required maximal visual verbal and manual cues   to promote static and dynamic balance in sitting and promote coordination of bilateral, lower extremity(s). Therapeutic Exercise: (   Minutes):  Exercises per grid below to improve mobility, strength and balance. Required moderate visual, verbal, manual and tactile cues to promote proper body alignment, promote proper body posture and promote proper body mechanics. Progressed range, repetitions and complexity of movement as indicated. Date:  8/10/18 Date:   Date:     ACTIVITY/EXERCISE AM PM AM PM AM PM   AP's 2x10B A        LAQ 2x10B A        Hip flexion 2x10B A        Hip abduction 2x10B A                                   B = bilateral; AA = active assistive; A = active; P = passive    Braces/Orthotics/Lines/Etc:   · O2 Nasal Cannula  Treatment/Session Assessment:    · Response to Treatment:  No complaints  · Interdisciplinary Collaboration:   o Physical Therapy Assistant  o Registered Nurse  o Rehabilitation Attendant  · After treatment position/precautions:   o Supine in bed  o Bed alarm/tab alert on  o Bed/Chair-wheels locked  o Bed in low position  o Call light within reach  o RN notified  o Family at bedside   · Compliance with Program/Exercises: Will assess as treatment progresses. · Recommendations/Intent for next treatment session: \"Next visit will focus on advancements to more challenging activities and reduction in assistance provided\".   Total Treatment Duration:  PT Patient Time In/Time Out  Time In: 1345  Time Out: [facility]

## 2018-08-10 NOTE — PROGRESS NOTES
Hospitalist Progress Note     Admit Date:  2018  6:22 PM   Name:  Laura Mansfield   Age:  75 y.o.  :  1945   MRN:  [id number]   PCP:  Marian Cervantes NP  Treatment Team: Attending Provider: Niecy Cary MD; Consulting Provider: Melody Ramirez MD; Utilization Review: Lindy Dueñas RN; Care Manager: Belem Rutledge RN; Consulting Provider: Patrick Carr MD; Primary Nurse: Orlando Otoole RN    Subjective:   CC:fall    Pt is a 74 yo female who presented to the ED after falling while transferring from a car to her wheelchair. Pt has PMH of metastatic breast cancer, HTN, obesity, CKD stage 3-4, wheelchair bound. Pt reported that her left leg gave out and she fell backward. Xray showed fracture of left proximal subtrochanteric pathologic fracture. Additionally it was felt that patient had a pending pathologic fracture of her right femur. Pt went to surgery for prophylactic nailing of the right femur, and an ORIF of the left subtrochanteric left femur. They also did an open biopsy of the left proximal femur. Pt doing well post operatively, pain controlled. PT/OT to work with pt whose baseline is wheelchair bound. H/H 7.9 today, WBC 2.8. Pt getting 2 units PRBC today. Per nephrology to continue IV fluids one more day as her ALEISHA resolves. Lengthy discussion with pt and her daughters regarding discharge planning and end of life issues - decision made by pt and family to pursue home hospice. CM notified and hospice assessment requested.     Objective:     Patient Vitals for the past 24 hrs:   Temp Pulse Resp BP SpO2   08/10/18 1300 98.6 °F (37 °C) 84 16 132/60 100 %   08/10/18 1246 98.1 °F (36.7 °C) 86 17 140/76 98 %   08/10/18 1156 98.1 °F (36.7 °C) 86 18 142/77 98 %   08/10/18 0815 98 °F (36.7 °C) 85 17 107/64 97 %   08/10/18 0521 98.6 °F (37 °C) 90 18 118/66 96 %   18 2321 97.9 °F (36.6 °C) 86 18 128/48 95 %   18 2116 99 °F (37.2 °C) 87 18 131/77 98 %   18 1734 98.4 °F (36.9 °C) 85 18 129/78 98 %   08/09/18 1630 99.6 °F (37.6 °C) 80 16 140/78 95 %   08/09/18 1530 98.3 °F (36.8 °C) 88 18 135/77 95 %   08/09/18 1431 97.8 °F (36.6 °C) 89 16 125/77 96 %   08/09/18 1416 98.3 °F (36.8 °C) 87 18 122/61 96 %     Oxygen Therapy  O2 Sat (%): 100 % (08/10/18 1300)  Pulse via Oximetry: 97 beats per minute (08/08/18 1755)  O2 Device: Oxygen mask (08/08/18 1755)  O2 Flow Rate (L/min): 6 l/min (08/08/18 1755)  ETCO2 (mmHg): 99 mmHg (08/08/18 2011)    Intake/Output Summary (Last 24 hours) at 08/10/18 1356  Last data filed at 08/10/18 0203   Gross per 24 hour   Intake              360 ml   Output              310 ml   Net               50 ml         General:    Well nourished. Awake and alert. Head:  Normocephalic, atraumatic  Eyes:  Extraocular movements intact, normal sclera  Neck:  Supple, no lymphadenopathy  CV:   RRR. No  Murmurs, clicks, or gallops  Lungs:   Unlabored, no cyanosis  Abdomen:   Soft, nondistended, nontender. Extremities: Warm and dry. No cyanosis or edema. Dressings intact over each, bloody drainage on right. Skin:     No rashes or jaundice. Neuro:  No gross focal deficits  Psych:  Mood and affect appropriate    Data Review:  I have reviewed all labs, meds, telemetry events, and studies from the last 24 hours.     Recent Results (from the past 24 hour(s))   METABOLIC PANEL, BASIC    Collection Time: 08/09/18  6:11 PM   Result Value Ref Range    Sodium 141 136 - 145 mmol/L    Potassium 4.3 3.5 - 5.1 mmol/L    Chloride 105 98 - 107 mmol/L    CO2 25 21 - 32 mmol/L    Anion gap 11 7 - 16 mmol/L    Glucose 137 (H) 65 - 100 mg/dL    BUN 28 (H) 8 - 23 MG/DL    Creatinine 3.85 (H) 0.6 - 1.0 MG/DL    GFR est AA 15 (L) >60 ml/min/1.73m2    GFR est non-AA 12 (L) >60 ml/min/1.73m2    Calcium 5.7 (LL) 8.3 - 10.4 MG/DL   MAGNESIUM    Collection Time: 08/09/18  6:11 PM   Result Value Ref Range    Magnesium 1.2 (LL) 1.8 - 2.4 mg/dL   PHOSPHORUS    Collection Time: 08/09/18  6:11 PM   Result Value Ref Range    Phosphorus 3.0 2.3 - 3.7 MG/DL   CBC WITH AUTOMATED DIFF    Collection Time: 08/09/18  9:06 PM   Result Value Ref Range    WBC 2.7 (L) 4.3 - 11.1 K/uL    RBC 2.48 (L) 4.05 - 5.2 M/uL    HGB 8.4 (L) 11.7 - 15.4 g/dL    HCT 26.0 (L) 35.8 - 46.3 %    .8 (H) 79.6 - 97.8 FL    MCH 33.9 (H) 26.1 - 32.9 PG    MCHC 32.3 31.4 - 35.0 g/dL    RDW 18.7 %    PLATELET 286 768 - 291 K/uL    MPV 9.4 9.4 - 12.3 FL    ABSOLUTE NRBC 0.07 0.0 - 0.2 K/uL    DF AUTOMATED      NEUTROPHILS 64 43 - 78 %    LYMPHOCYTES 16 13 - 44 %    MONOCYTES 18 (H) 4.0 - 12.0 %    EOSINOPHILS 0 (L) 0.5 - 7.8 %    BASOPHILS 0 0.0 - 2.0 %    IMMATURE GRANULOCYTES 1 0.0 - 5.0 %    ABS. NEUTROPHILS 1.8 K/UL    ABS. LYMPHOCYTES 0.4 K/UL    ABS. MONOCYTES 0.5 K/UL    ABS. EOSINOPHILS 0.0 K/UL    ABS. BASOPHILS 0.0 K/UL    ABS. IMM. GRANS. 0.0 K/UL   METABOLIC PANEL, BASIC    Collection Time: 08/09/18  9:06 PM   Result Value Ref Range    Sodium 138 136 - 145 mmol/L    Potassium 4.1 3.5 - 5.1 mmol/L    Chloride 104 98 - 107 mmol/L    CO2 23 21 - 32 mmol/L    Anion gap 11 7 - 16 mmol/L    Glucose 128 (H) 65 - 100 mg/dL    BUN 30 (H) 8 - 23 MG/DL    Creatinine 3.89 (H) 0.6 - 1.0 MG/DL    GFR est AA 15 (L) >60 ml/min/1.73m2    GFR est non-AA 12 (L) >60 ml/min/1.73m2    Calcium 5.6 (LL) 8.3 - 10.4 MG/DL   CALCIUM, IONIZED    Collection Time: 08/09/18  9:06 PM   Result Value Ref Range    Calcium, ionized 3.20 (L) 4.0 - 5.2 mg/dL   SODIUM, UR, RANDOM    Collection Time: 08/10/18  1:41 AM   Result Value Ref Range    Sodium urine, random 10 MMOL/L   PROTEIN/CREATININE RATIO, URINE    Collection Time: 08/10/18  1:41 AM   Result Value Ref Range    Protein, urine random 44 (H) <11.9 mg/dL    Creatinine, urine 167.00 mg/dL    Protein/Creat.  urine Ratio 0.3     CULTURE, URINE    Collection Time: 08/10/18  1:41 AM   Result Value Ref Range    Special Requests: NO SPECIAL REQUESTS      Culture result:        NO GROWTH AFTER SHORT PERIOD OF INCUBATION. FURTHER RESULTS TO FOLLOW AFTER OVERNIGHT INCUBATION. URINALYSIS W/ RFLX MICROSCOPIC    Collection Time: 08/10/18  1:41 AM   Result Value Ref Range    Color NANCI      Appearance CLOUDY      Specific gravity 1.023 1.001 - 1.023      pH (UA) 5.0 5.0 - 9.0      Protein TRACE (A) NEG mg/dL    Glucose NEGATIVE  mg/dL    Ketone TRACE (A) NEG mg/dL    Bilirubin NEGATIVE  NEG      Blood LARGE (A) NEG      Urobilinogen 0.2 0.2 - 1.0 EU/dL    Nitrites NEGATIVE  NEG      Leukocyte Esterase NEGATIVE  NEG      WBC 0-3 0 /hpf    RBC 0-3 0 /hpf    Epithelial cells 0-3 0 /hpf    Bacteria 0 0 /hpf    Casts 5-10 0 /lpf   PLEASE READ & DOCUMENT PPD TEST IN 48 HRS    Collection Time: 08/10/18  2:07 AM   Result Value Ref Range    PPD Negative Negative    mm Induration 0 mm   METABOLIC PANEL, BASIC    Collection Time: 08/10/18  6:17 AM   Result Value Ref Range    Sodium 136 136 - 145 mmol/L    Potassium 4.2 3.5 - 5.1 mmol/L    Chloride 103 98 - 107 mmol/L    CO2 22 21 - 32 mmol/L    Anion gap 11 7 - 16 mmol/L    Glucose 107 (H) 65 - 100 mg/dL    BUN 36 (H) 8 - 23 MG/DL    Creatinine 3.84 (H) 0.6 - 1.0 MG/DL    GFR est AA 15 (L) >60 ml/min/1.73m2    GFR est non-AA 12 (L) >60 ml/min/1.73m2    Calcium 6.0 (L) 8.3 - 10.4 MG/DL   CBC WITH AUTOMATED DIFF    Collection Time: 08/10/18  6:17 AM   Result Value Ref Range    WBC 2.8 (L) 4.3 - 11.1 K/uL    RBC 2.37 (L) 4.05 - 5.2 M/uL    HGB 7.9 (L) 11.7 - 15.4 g/dL    HCT 24.8 (L) 35.8 - 46.3 %    .6 (H) 79.6 - 97.8 FL    MCH 33.3 (H) 26.1 - 32.9 PG    MCHC 31.9 31.4 - 35.0 g/dL    RDW 19.5 %    PLATELET 971 962 - 927 K/uL    MPV 9.8 9.4 - 12.3 FL    ABSOLUTE NRBC 0.05 0.0 - 0.2 K/uL    NEUTROPHILS 58 43 - 78 %    LYMPHOCYTES 18 13 - 44 %    MONOCYTES 20 (H) 4.0 - 12.0 %    EOSINOPHILS 1 0.5 - 7.8 %    BASOPHILS 0 0.0 - 2.0 %    IMMATURE GRANULOCYTES 3 0.0 - 5.0 %    ABS. NEUTROPHILS 1.6 K/UL    ABS. LYMPHOCYTES 0.5 K/UL    ABS. MONOCYTES 0.6 K/UL    ABS.  EOSINOPHILS 0.0 K/UL ABS. BASOPHILS 0.0 K/UL    ABS. IMM. GRANS. 0.1 K/UL    RBC COMMENTS SLIGHT  ANISOCYTOSIS        RBC COMMENTS SLIGHT  HYPOCHROMIA        RBC COMMENTS SLIGHT  POLYCHROMASIA        RBC COMMENTS OCCASIONAL  BASOPHILIC STIPPLING        RBC COMMENTS OCCASIONAL  TEARDROP CELLS        WBC COMMENTS Result Confirmed By Smear      PLATELET COMMENTS ADEQUATE      DF MANUAL          All Micro Results     Procedure Component Value Units Date/Time    CULTURE, URINE [[id number]] Collected:  08/10/18 0141    Order Status:  Completed Specimen:  Urine from [place] Specimen Updated:  08/10/18 1036     Special Requests: NO SPECIAL REQUESTS        Culture result:         NO GROWTH AFTER SHORT PERIOD OF INCUBATION. FURTHER RESULTS TO FOLLOW AFTER OVERNIGHT INCUBATION. MSSA/MRSA SC BY PCR, NASAL SWAB [[id number]] Collected:  08/08/18 0251    Order Status:  Completed Specimen:  Swab Updated:  08/08/18 4400     Special Requests: NO SPECIAL REQUESTS        Culture result:         SA target not detected. A MRSA NEGATIVE, SA NEGATIVE test result does not preclude MRSA or SA nasal colonization.           Current Meds:  Current Facility-Administered Medications   Medication Dose Route Frequency    0.9% sodium chloride infusion 250 mL  250 mL IntraVENous PRN    0.9% sodium chloride infusion 250 mL  250 mL IntraVENous PRN    0.9% sodium chloride infusion  75 mL/hr IntraVENous CONTINUOUS    amLODIPine (NORVASC) tablet 5 mg  5 mg Oral DAILY    letrozole (FEMARA) tablet 2.5 mg (Patient Supplied)  2.5 mg Oral DAILY    ondansetron (ZOFRAN ODT) tablet 8 mg  8 mg Oral Q8H PRN    sodium chloride (NS) flush 5-10 mL  5-10 mL IntraVENous Q8H    sodium chloride (NS) flush 5-10 mL  5-10 mL IntraVENous PRN    sodium chloride (NS) flush 5-10 mL  5-10 mL IntraVENous PRN    acetaminophen (TYLENOL) tablet 650 mg  650 mg Oral Q8H    oxyCODONE IR (ROXICODONE) tablet 5-10 mg  5-10 mg Oral Q4H PRN    ondansetron (ZOFRAN) injection 4 mg  4 mg IntraVENous Q4H PRN    docusate sodium (COLACE) capsule 100 mg  100 mg Oral BID    alum-mag hydroxide-simeth (MYLANTA) oral suspension 30 mL  30 mL Oral Q4H PRN    calcium-vitamin D (OS-JORGE) 500 mg-200 unit tablet  1 Tab Oral TID WITH MEALS    enoxaparin (LOVENOX) injection 30 mg  30 mg SubCUTAneous Q24H    traMADol (ULTRAM) tablet 50 mg  50 mg Oral Q6H PRN    hydrALAZINE (APRESOLINE) 20 mg/mL injection 10 mg  10 mg IntraVENous Q6H PRN       Diet:  DIET REGULAR    Other Studies (last 24 hours):  [address]    Result Date: 8/10/2018  Renal ultrasound, limited CLINICAL INDICATION:  Acute renal failure PROCEDURE: Realtime grayscale color Doppler evaluation of the kidneys and bladder. COMPARISON: Renal ultrasound dated 10/17/2017 FINDINGS: The right kidney is normal in size with increased renal cortical echogenicity measuring 10.6 cm. The left kidney is normal in size with increased renal cortical echogenicity measuring 9.6 cm. There is no hydronephrosis. The bladder is not distended. The aorta is normal in caliber. IMPRESSION: Bilateral increased renal cortical echogenicity can be seen with medical renal disease. There is no hydronephrosis.       Assessment and Plan:     Hospital Problems as of 8/10/2018  Date Reviewed: 7/26/2018          Codes Class Noted - Resolved POA    Stage 3 chronic kidney disease ICD-10-CM: N18.3  ICD-9-CM: 585.3  8/8/2018 - Present Yes        Hypokalemia ICD-10-CM: E87.6  ICD-9-CM: 276.8  8/8/2018 - Present Yes        * (Principal)Femur fracture, left ([address] ICD-10-CM: Q48.09BW  ICD-9-CM: 821.00  8/7/2018 - Present Yes        Anemia due to chronic kidney disease treated with erythropoietin ICD-10-CM: N18.9, D63.1  ICD-9-CM: 285.21, 585.9  7/25/2017 - Present Yes        Malignant neoplasm metastatic to bone [facility]) ICD-10-CM: C79.51  ICD-9-CM: 198.5  7/31/2016 - Present Yes    Overview Signed 1/10/2017  6:13 PM by Halima Peña MD     Last Assessment & Plan:   1. Complete radiation therapy to the patient's sacrum and femurs as directed by Dr. Caren Singh. 2.  Return to the office in 6 weeks with x-rays of the pelvis on arrival.  3.  Instruct the patient to advance her weightbearing as tolerated as she continues to enjoy a reduction in pain following the radiation therapy. Hypertension ICD-10-CM: I10  ICD-9-CM: 401.9  2/16/2016 - Present Yes    Overview Signed 2/16/2016 12:09 PM by Svetlana Johnson     UNSPECIFIED              Obesity ICD-10-CM: E66.9  ICD-9-CM: 278.00  2/16/2016 - Present Yes    Overview Signed 2/16/2016 12:10 PM by Svetlana Johnson     UNSPECIFIED                    A/P:    1. Hip fracture  -Pain control and DVT prophylaxis per ortho    2. HTN  -Cont amlodipine  -Prn hydralazine    3. Hypokalemia  -Resolved  -Cont to monitor    4. Metastatic breast CA  -Cont Femara  -Restart lbranze on d/c    5. ALEISHA  -Cr 3.84, up from 1.67 on admit  -IVF with NS 75/hr  -Nephrology consult    DC planning/Dispo:  Home with hhc vs home with hospice  DVT ppx:  SCDs per ortho    Case reviewed with supervising physician - MIKE Guzman MD    Signed:  Marylen Marseille, NP-C

## 2018-08-10 NOTE — PROGRESS NOTES
Problem: Mobility Impaired (Adult and Pediatric)  Goal: *Acute Goals and Plan of Care (Insert Text)  STG:  (1.)Ms. Villeda Ped will move from supine to sit and sit to supine , scoot up and down and roll side to side with MINIMAL ASSIST within 3 treatment day(s). (2.)Ms. Laraa Ped will transfer from bed to chair and chair to bed with MINIMAL ASSIST using the least restrictive device within 3 treatment day(s). (3.)Ms. Christiana Ped will ambulate with MAXIMAL ASSIST for 2 feet with the least restrictive device within 3 treatment day(s). (4.)Ms. Christiana Ped will perform standing static and dynamic balance activities x 5 minutes with MAXIMAL ASSIST to improve safety within 3 day(s). (5.)Ms. Christiana Ped will perform LE exercises with 1 to 2 cues for form within 3 days to improve strength for functional transfers and ambulation. (6.)MsRafi Laraa Ped will perform wheelchair mobility 150 feet with SUPERVISION within 3 days to maximize independence with functional mobility. (7.)MsRafi Villeda Ped will maintain NWB LLE, WBAT RLE throughout all functional mobility within 3 days. LTG:  (1.)Ms. Villeda Ped will move from supine to sit and sit to supine , scoot up and down and roll side to side in bed with CONTACT GUARD ASSIST within 7 treatment day(s). (2.)Ms. Villeda Ped will transfer from bed to chair and chair to bed with CONTACT GUARD ASSIST using the least restrictive device within 7 treatment day(s). (3.)Ms. Villeda Ped will ambulate with MINIMAL ASSIST for 2 feet with the least restrictive device within 7 treatment day(s). (4.)Ms. Christiana Ped will perform standing static and dynamic balance activities x 5 minutes with MINIMAL ASSIST to improve safety within 7 day(s). (5.)Ms.  Christiana Ped will perform wheelchair mobility 250 feet with MODIFIED INDEPENDENCE within 7 days to maximize independence with functional mobility.  ________________________________________________________________________________________________    PHYSICAL THERAPY: Daily Note, Treatment Day: 1st, AM 8/10/2018  INPATIENT: Hospital Day: 4  Payor: Natalie Ralf / Plan: [address] [place] [address] HMO / Product Type: Managed Care Medicare /    LLE NWB, RLE WBAT  NAME/AGE/GENDER: Raul Marcelino is a 75 y.o. female   PRIMARY DIAGNOSIS: hip fracture  Femur fracture, left (HCC) Femur fracture, left (HCC) Femur fracture, left (HCC)  Procedure(s) (LRB):  Prophylactic nailing right femur / Open reduction internal fixation left subtrochanteric left femur / Open biopsy left proximal femur (Left)  2 Days Post-Op  ICD-10: Treatment Diagnosis:    · Difficulty in walking, Not elsewhere classified (R26.2)  · Repeated Falls (R29.6)  · History of falling (Z91.81)   Precaution/Allergies:  Penicillins      ASSESSMENT:     Ms. Bev Cooper is a 75 y.o. female s/p above surgery. She is now LLE NWB and RLE WBAT. Patient was supine upon contact and agreeable to PT. Patient able to perform supine to sit with mod assist, additional time, and cues for improved/proper technique. Demonstrates fair static/dynamic sitting balance. Felt it unsafe to attempt transfer to standing at this time due patient's size and weight bearing restrictions. Patient agreeable to sliding board transfer training. Patient able to perform sliding board transfer to recliner chair with max assist x 2 and cues for technique as well as several short rest breaks due to fatigue/shortness of breath. Patient then participates in therapeutic strengthening exercises to improve functional strength for transfers, gait and overall mobility. Patient requires cues and assistance to perform exercises correctly. Overall slow progress towards physical therapy goals. No goals have been met thus far. Will continue efforts. This section established at most recent assessment   PROBLEM LIST (Impairments causing functional limitations):  1. Decreased Strength  2. Decreased ADL/Functional Activities  3. Decreased Transfer Abilities  4.  Decreased Ambulation Ability/Technique  5. Decreased Balance  6. Increased Pain  7. Decreased Activity Tolerance  8. Decreased Pacing Skills  9. Increased Shortness of Breath  10. Decreased Knowledge of Precautions  11. Decreased [place] with Home Exercise Program   INTERVENTIONS PLANNED: (Benefits and precautions of physical therapy have been discussed with the patient.)  1. Balance Exercise  2. Bed Mobility  3. Family Education  4. Gait Training  5. Home Exercise Program (HEP)  6. Therapeutic Activites  7. Therapeutic Exercise/Strengthening  8. Transfer Training  9. Patient Education  10. Group Therapy     TREATMENT PLAN: Frequency/Duration: twice daily for duration of hospital stay  Rehabilitation Potential For Stated Goals: Good     RECOMMENDED REHABILITATION/EQUIPMENT: (at time of discharge pending progress): Due to the probability of continued deficits (see above) this patient will likely need continued skilled physical therapy after discharge. 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 She reports her L leg 'gave out' and she fell backwards. She is noted to have a L proximal subtrochanteric pathologic fracture on x-ray. At present, she reports her L hip pain is controlled. She denies any chest pain or shortness of breath.     Hospitalist service asked to admit for L hip fracture. Past Medical History/Comorbidities:   Ms. Alisha Villalta  has a past medical history of Abnormal EKG (2/16/2016); Aortic valve insufficiency (2/16/2016); Cancer ([facility] [address] Hyperlipidemia (2/16/2016);  Hypertension (2/16/2016); and Obesity (2016). Ms. Brianna Isaacs  has a past surgical history that includes hx tubal ligation. Social History/Living Environment:   Home Environment: Private residence  Wheelchair Ramp: Yes  One/Two Story Residence: One story  Living Alone: No  Support Systems: Child(ezequiel)  Patient Expects to be Discharged to[de-identified] Unknown  Current DME Used/Available at Home: Wheelchair  Prior Level of Function/Work/Activity:  She lives with 3 daughters in a single story home and typically transfers independently to a wheelchair and is modified independent at wheelchair level. Number of Personal Factors/Comorbidities that affect the Plan of Care: 1-2: MODERATE COMPLEXITY   EXAMINATION:   Most Recent Physical Functioning:   Gross Assessment:                  Posture:     Balance:  Sitting: Impaired  Sitting - Static: Fair (occasional)  Sitting - Dynamic: Fair (occasional) Bed Mobility:  Supine to Sit: Moderate assistance  Wheelchair Mobility:     Transfers:  Bed to Chair: Maximum assistance;Assist x2  Sliding Board : Maximum assistance (x2)  Gait:            Body Structures Involved:  1. Nerves  2. Lungs  3. Bones  4. Joints  5. Muscles  6. Ligaments Body Functions Affected:  1. Sensory/Pain  2. Neuromusculoskeletal  3. Movement Related Activities and Participation Affected:  1. Mobility  2. Self Care  3. Domestic Life  4. Interpersonal Interactions and Relationships  5. Community, Social and [place]   Number of elements that affect the Plan of Care: 4+: HIGH COMPLEXITY   CLINICAL PRESENTATION:   Presentation: Evolving clinical presentation with changing clinical characteristics: MODERATE COMPLEXITY   CLINICAL DECISION MAKIN [facility] Mobility Inpatient Short Form  How much difficulty does the patient currently have. .. Unable A Lot A Little None   1. Turning over in bed (including adjusting bedclothes, sheets and blankets)? [] 1   [x] 2   [] 3   [] 4   2.   Sitting down on and standing up from a chair with arms ( e.g., wheelchair, bedside commode, etc.)   [x] 1   [] 2   [] 3   [] 4   3. Moving from lying on back to sitting on the side of the bed? [] 1   [x] 2   [] 3   [] 4   How much help from another person does the patient currently need. .. Total A Lot A Little None   4. Moving to and from a bed to a chair (including a wheelchair)? [] 1   [x] 2   [] 3   [] 4   5. Need to walk in hospital room? [x] 1   [] 2   [] 3   [] 4   6. Climbing 3-5 steps with a railing? [x] 1   [] 2   [] 3   [] 4   © 2007, Trustees of [address] [place] [address] [address] [address], under license to [employer]. All rights reserved      Score:  Initial: 9 Most Recent: X (Date: -- )    Interpretation of Tool:  Represents activities that are increasingly more difficult (i.e. Bed mobility, Transfers, Gait). Score 24 23 22-20 19-15 14-10 9-7 6     Modifier CH CI CJ CK CL CM CN      ? Mobility - Walking and Moving Around:     - CURRENT STATUS: CM - 80%-99% impaired, limited or restricted    - GOAL STATUS: CK - 40%-59% impaired, limited or restricted    - D/C STATUS:  ---------------To be determined---------------  Payor: [employer] MEDICARE / Plan: [address] [place] [address] HMO / Product Type: Managed Care Medicare /      Medical Necessity:     · Patient demonstrates good rehab potential due to higher previous functional level. Reason for Services/Other Comments:  · Patient continues to require modification of therapeutic interventions to increase complexity of exercises. Use of outcome tool(s) and clinical judgement create a POC that gives a: Questionable prediction of patient's progress: MODERATE COMPLEXITY            TREATMENT:   (In addition to Assessment/Re-Assessment sessions the following treatments were rendered)   Pre-treatment Symptoms/Complaints:  none  Pain: Initial:   Pain Intensity 1: 0  Post Session:  0/10     Therapeutic Activity: (    15 minutes):   Therapeutic activities including bed mobility training, transfer training (sliding board transfer), static/dynamic sitting balance training, scooting, posture training, and patient education to improve mobility, strength and balance. Required maximal visual verbal and manual cues   to promote static and dynamic balance in sitting and promote coordination of bilateral, lower extremity(s). Therapeutic Exercise: (  10 Minutes):  Exercises per grid below to improve mobility, strength and balance. Required moderate visual, verbal, manual and tactile cues to promote proper body alignment, promote proper body posture and promote proper body mechanics. Progressed range, repetitions and complexity of movement as indicated. Date:  8/10/18 Date:   Date:     ACTIVITY/EXERCISE AM PM AM PM AM PM   AP's 2x10B A        LAQ 2x10B A        Hip flexion 2x10B A        Hip abduction 2x10B A                                   B = bilateral; AA = active assistive; A = active; P = passive    Braces/Orthotics/Lines/Etc:   · O2 Nasal Cannula  Treatment/Session Assessment:    · Response to Treatment:  No complaints  · Interdisciplinary Collaboration:   o Physical Therapy Assistant  o Registered Nurse  o Rehabilitation Attendant  · After treatment position/precautions:   o Up in chair  o Bed alarm/tab alert on  o Bed/Chair-wheels locked  o Call light within reach  o RN notified  o Family at bedside   · Compliance with Program/Exercises: Will assess as treatment progresses. · Recommendations/Intent for next treatment session: \"Next visit will focus on advancements to more challenging activities and reduction in assistance provided\".   Total Treatment Duration:  PT Patient Time In/Time Out  Time In: 1100  Time Out: [address] [place], [facility]

## 2018-08-10 NOTE — INTERDISCIPLINARY ROUNDS
Interdisciplinary team rounds were held 8/10/2018 with the following team members:Care Management, Nursing, Nurse Practitioner and Physical Therapy. Plan of care discussed. See clinical pathway and/or care plan for interventions and desired outcomes.     For hospice eval.

## 2018-08-10 NOTE — PROGRESS NOTES
Referral to [address] [place] [address] for in home palliative care placed. Patient and family are in agreement of plan.

## 2018-08-10 NOTE — PROGRESS NOTES
Problem: Pressure Injury - Risk of  Goal: *Prevention of pressure injury  Document Arturo Scale and appropriate interventions in the flowsheet. Outcome: Progressing Towards Goal  Pressure Injury Interventions: Activity Interventions: Increase time out of bed, Pressure redistribution bed/mattress(bed type), PT/OT evaluation    Mobility Interventions: HOB 30 degrees or less, Pressure redistribution bed/mattress (bed type), PT/OT evaluation    Nutrition Interventions: Document food/fluid/supplement intake                    Problem: Falls - Risk of  Goal: *Absence of Falls  Document Maurizio Fall Risk and appropriate interventions in the flowsheet.    Outcome: Progressing Towards Goal  Fall Risk Interventions:  Mobility Interventions: Bed/chair exit alarm, OT consult for ADLs, Patient to call before getting OOB, PT Consult for assist device competence, PT Consult for mobility concerns, Strengthening exercises (ROM-active/passive)         Medication Interventions: Evaluate medications/consider consulting pharmacy, Patient to call before getting OOB    Elimination Interventions: Patient to call for help with toileting needs    History of Falls Interventions: Bed/chair exit alarm, Consult care management for discharge planning, Evaluate medications/consider consulting pharmacy

## 2018-08-10 NOTE — PROGRESS NOTES
Problem: Pressure Injury - Risk of  Goal: *Prevention of pressure injury  Document Arturo Scale and appropriate interventions in the flowsheet. Outcome: Progressing Towards Goal  Pressure Injury Interventions: Activity Interventions: PT/OT evaluation, Pressure redistribution bed/mattress(bed type), Increase time out of bed    Mobility Interventions: HOB 30 degrees or less, Pressure redistribution bed/mattress (bed type), PT/OT evaluation    Nutrition Interventions: Document food/fluid/supplement intake, Offer support with meals,snacks and hydration                    Problem: Falls - Risk of  Goal: *Absence of Falls  Document Maurizio Fall Risk and appropriate interventions in the flowsheet.    Outcome: Progressing Towards Goal  Fall Risk Interventions:  Mobility Interventions: Bed/chair exit alarm, Patient to call before getting OOB, OT consult for ADLs, PT Consult for mobility concerns         Medication Interventions: Evaluate medications/consider consulting pharmacy, Patient to call before getting OOB, Bed/chair exit alarm    Elimination Interventions: Call light in reach, Bed/chair exit alarm, Patient to call for help with toileting needs    History of Falls Interventions: Bed/chair exit alarm, Door open when patient unattended, Investigate reason for fall

## 2018-08-10 NOTE — PROGRESS NOTES
MIDLINE Placement Note    PRE-PROCEDURE VERIFICATION  PROCEDURE DETAIL  Time out completed all person present in agreement with time out. A single lumen Midline was started for vascular access and Other \ blood transfusion in progress. The following documentation is in addition to the Midline properties in the lines/airways flowsheet :  Lot #: [id number]  Xylocaine used: no  Mid-Arm Circumference: 41 (cm)  Internal Catheter Length: 8 (cm)  Internal Catheter Total Length: 8 (cm)  Vein Selection for Midline:left cephalic        Line is okay to use: yes.

## 2018-08-10 NOTE — PROGRESS NOTES
PT Daily Note  Attempted to see patient for physical therapy this morning but patient was leaving the floor to go to ultrasound. Will check back on patient later this morning.   Thank you,  Donna Mccormick, PTA

## 2018-08-10 NOTE — ADT AUTH CERT NOTES
Musculoskeletal Surgery or Procedure GRG - Care Day 2 (8/8/2018) by Medina Romero RN        Review Status Review Entered       Completed 8/9/2018       Details              Care Day: 2 Care Date: 8/8/2018 Level of Care: Inpatient Floor       Guideline Day 1        Clinical Status       (X) * Clinical Indications met [D]       (X) * Procedure completed              Interventions       (X) Inpatient interventions as needed       8/9/2018 3:18 PM EDT by Young Cheung         OFIRMEV 1000 MG IV X 1, ANCEF 1G Q8H IV, ANCEF 2G IV X 1, LR DRIP @ 75, K CHL 20 MEQ IV X 1, NS 2000 ML IV X 2, DILAUDID 0.5 MG IV X 2, ZOFRAN 4 MG IV X 1                                          * Milestone              Additional Notes       ORTHO SURG CONSULT              Assessment / Plan: ORIF LEFT ST FRACTURE; OPEN BIOPSY LEFT FEMUR; PROPHYLACTIC NAILING RIGHT FEMUR; CONSULT PT/OT - NWB LEFT LE; WBAT RIGHT LE; STR VS HOME WITH HOME [facility]                     BRIEF OPERATIVE NOTE               Date of Procedure: 8/8/2018        Preoperative Diagnosis: hip fracture       Postoperative Diagnosis: Pending pathological fracture right femur / Left pathological subtrochanteric femur fracture         Procedure(s):       Prophylactic nailing right femur         Open reduction internal fixation left subtrochanteric left femur        Open biopsy left proximal femur       Complications: NONE                     /73, TEMP 97.4, HR 97, RR 16, O2 97 ON 2L NC              WBC 3.2, HGB 10, , CREAT 2.14, CA 6.4/6. 5           Musculoskeletal Surgery or Procedure GRG - Clinical Indications for Procedure by Medina Romero RN        Review Status Review Entered       Completed 8/9/2018       Details              Clinical Indications for Procedure       Most Recent : Young Cheung Most Recent Date: 8/9/2018 3:17 PM EDT       (X) Surgery or other procedure covered by this guideline is indicated for 1 or more of the following         (1) (2) (3):          (X) Fracture, dislocation, or other skeletal injury requiring procedure, including 1 or more of           the following  (4) (5) (6) (7) (8) (9) (10):             (X) Closed or open reduction             8/9/2018 3:17 PM EDT by Linda Walls               Prophylactic nailing right femur   Open reduction internal fixation left subtrochanteric left femur  Open biopsy left proximal femur

## 2018-08-10 NOTE — PROGRESS NOTES
OT note:  Pt recently returned to bed and too fatigued to participate in OT. Will re-attempt at a later date.   Linda Josue

## 2018-08-10 NOTE — HOSPICE
Met with daughter at bedside and discussed hospice philosophy, services and levels of care as well as management of symptoms and medications. Daughter would like patient to continue all cancer treatments at this point. Case discussed with Dr. Олег Moody and it is recommended that patient discharge home with home health and PT/nursing services with a possible bridge to hospice once chemo/radiation are of no longer any benefit.  Thank you for this referral.  Suzan Michelle RN  [place] Nurse Liaison  [facility]   [phone]

## 2018-08-11 ENCOUNTER — APPOINTMENT (OUTPATIENT)
Dept: GENERAL RADIOLOGY | Age: 73
DRG: 478 | End: 2018-08-11
Attending: INTERNAL MEDICINE
Payer: MEDICARE

## 2018-08-11 LAB
ABO + RH BLD: NORMAL
BASOPHILS # BLD: 0 K/UL
BASOPHILS NFR BLD: 1 % (ref 0–2)
BLD PROD TYP BPU: NORMAL
BLOOD GROUP ANTIBODIES SERPL: NORMAL
BPU ID: NORMAL
CROSSMATCH RESULT,%XM: NORMAL
DIFFERENTIAL METHOD BLD: ABNORMAL
EOSINOPHIL # BLD: 0 K/UL
EOSINOPHIL NFR BLD: 1 % (ref 0.5–7.8)
ERYTHROCYTE [DISTWIDTH] IN BLOOD BY AUTOMATED COUNT: 21.3 %
HCT VFR BLD AUTO: 29.5 % (ref 35.8–46.3)
HGB BLD-MCNC: 9.7 G/DL (ref 11.7–15.4)
IMM GRANULOCYTES # BLD: 0.1 K/UL
IMM GRANULOCYTES NFR BLD AUTO: 2 % (ref 0–5)
LYMPHOCYTES # BLD: 0.4 K/UL
LYMPHOCYTES NFR BLD: 19 % (ref 13–44)
MCH RBC QN AUTO: 31.7 PG (ref 26.1–32.9)
MCHC RBC AUTO-ENTMCNC: 32.9 G/DL (ref 31.4–35)
MCV RBC AUTO: 96.4 FL (ref 79.6–97.8)
MM INDURATION POC: 0 MM (ref 0–5)
MONOCYTES # BLD: 0.4 K/UL
MONOCYTES NFR BLD: 17 % (ref 4–12)
NEUTS SEG # BLD: 1.3 K/UL
NEUTS SEG NFR BLD: 60 % (ref 43–78)
NRBC # BLD: 0.03 K/UL (ref 0–0.2)
PLATELET # BLD AUTO: 117 K/UL (ref 150–450)
PMV BLD AUTO: 9.8 FL (ref 9.4–12.3)
PPD POC: NEGATIVE NEGATIVE
RBC # BLD AUTO: 3.06 M/UL (ref 4.05–5.2)
SPECIMEN EXP DATE BLD: NORMAL
STATUS OF UNIT,%ST: NORMAL
UNIT DIVISION, %UDIV: 0
WBC # BLD AUTO: 2.2 K/UL (ref 4.3–11.1)

## 2018-08-11 PROCEDURE — 74011250637 HC RX REV CODE- 250/637: Performed by: NURSE PRACTITIONER

## 2018-08-11 PROCEDURE — 36415 COLL VENOUS BLD VENIPUNCTURE: CPT

## 2018-08-11 PROCEDURE — 97530 THERAPEUTIC ACTIVITIES: CPT

## 2018-08-11 PROCEDURE — 74011250636 HC RX REV CODE- 250/636: Performed by: NURSE PRACTITIONER

## 2018-08-11 PROCEDURE — 74011250637 HC RX REV CODE- 250/637: Performed by: INTERNAL MEDICINE

## 2018-08-11 PROCEDURE — 65270000029 HC RM PRIVATE

## 2018-08-11 PROCEDURE — 71045 X-RAY EXAM CHEST 1 VIEW: CPT

## 2018-08-11 PROCEDURE — 85025 COMPLETE CBC W/AUTO DIFF WBC: CPT

## 2018-08-11 RX ADMIN — Medication 10 ML: at 13:12

## 2018-08-11 RX ADMIN — ACETAMINOPHEN 650 MG: 325 TABLET, FILM COATED ORAL at 05:54

## 2018-08-11 RX ADMIN — ENOXAPARIN SODIUM 30 MG: 100 INJECTION SUBCUTANEOUS at 13:11

## 2018-08-11 RX ADMIN — Medication 10 ML: at 21:14

## 2018-08-11 RX ADMIN — Medication 10 ML: at 21:15

## 2018-08-11 RX ADMIN — OXYCODONE HYDROCHLORIDE 10 MG: 5 TABLET ORAL at 08:45

## 2018-08-11 RX ADMIN — Medication 5 ML: at 05:54

## 2018-08-11 RX ADMIN — CALCIUM CARBONATE 500 MG (1,250 MG)-VITAMIN D3 200 UNIT TABLET 1 TABLET: at 13:11

## 2018-08-11 RX ADMIN — Medication 10 ML: at 05:54

## 2018-08-11 RX ADMIN — DOCUSATE SODIUM 100 MG: 100 CAPSULE, LIQUID FILLED ORAL at 17:00

## 2018-08-11 RX ADMIN — LETROZOLE 2.5 MG: 2.5 TABLET, FILM COATED ORAL at 09:00

## 2018-08-11 RX ADMIN — OXYCODONE HYDROCHLORIDE 10 MG: 5 TABLET ORAL at 13:10

## 2018-08-11 RX ADMIN — AMLODIPINE BESYLATE 5 MG: 5 TABLET ORAL at 08:48

## 2018-08-11 RX ADMIN — OXYCODONE HYDROCHLORIDE 5 MG: 5 TABLET ORAL at 21:14

## 2018-08-11 RX ADMIN — CALCIUM CARBONATE 500 MG (1,250 MG)-VITAMIN D3 200 UNIT TABLET 1 TABLET: at 16:59

## 2018-08-11 RX ADMIN — CALCIUM CARBONATE 500 MG (1,250 MG)-VITAMIN D3 200 UNIT TABLET 1 TABLET: at 08:47

## 2018-08-11 RX ADMIN — DOCUSATE SODIUM 100 MG: 100 CAPSULE, LIQUID FILLED ORAL at 08:49

## 2018-08-11 RX ADMIN — ACETAMINOPHEN 650 MG: 325 TABLET, FILM COATED ORAL at 21:14

## 2018-08-11 RX ADMIN — ACETAMINOPHEN 650 MG: 325 TABLET, FILM COATED ORAL at 13:10

## 2018-08-11 NOTE — PROGRESS NOTES
2018         Post Op day: 3 Days Post-Op     Admit Date: 2018  Admit Diagnosis: hip fracture  Femur fracture, left (HCC)    Subjective: Doing well, No complaints, No SOB, No Chest Pain, No Nausea or Vomitting. Weight Bearing Status: WBAT  BMP:  Recent Labs      08/10/18   0617  18   2106  18   1811   CREA  3.84*  3.89*  3.85*   BUN  36*  30*  28*   NA  136  138  141   K  4.2  4.1  4.3   CL  103  104  105   CO2  22  23  25   AGAP  11  11  11   GLU  107*  128*  137*     Temp (24hrs), Av.4 °F (36.9 °C), Min:97.7 °F (36.5 °C), Max:98.9 °F (37.2 °C)    CBC:  Recent Labs      18   0442  08/10/18   0617  08/09/18   2106   WBC  2.2*  2.8*  2.7*   GRANS  60  58  64   MONOS  17*  20*  18*   EOS  1  1  0*   ANEU  1.3  1.6  1.8   ABL  0.4  0.5  0.4   HGB  9.7*  7.9*  8.4*   HCT  29.5*  24.8*  26.0*   PLT  117*  150  163       Microbiology:     All Micro Results     Procedure Component Value Units Date/Time    CULTURE, URINE [[id number]] Collected:  08/10/18 0141    Order Status:  Completed Specimen:  Urine from [place] Specimen Updated:  18 0945     Special Requests: NO SPECIAL REQUESTS        Culture result: NO GROWTH 1 DAY       MSSA/MRSA SC BY PCR, NASAL SWAB [[id number]] Collected:  18 0251    Order Status:  Completed Specimen:  Swab Updated:  1815     Special Requests: NO SPECIAL REQUESTS        Culture result:         SA target not detected. A MRSA NEGATIVE, SA NEGATIVE test result does not preclude MRSA or SA nasal colonization.         Objective: Vital Signs are Stable, No Acute Distress, Alert and Oriented  Dressing is Dry,  Neurovascular exam is normal.    Assessment:  Patient Active Problem List   Diagnosis Code    Aortic valve insufficiency I35.1    Hypertension I10    Obesity E66.9    Hyperlipidemia E78.5    Abnormal EKG R94.31    Mass of breast N63.0    Infiltrating ductal carcinoma of female breast ([address] C50.919    Malignant neoplasm metastatic to bone (HCC) C79.51    Hyperkalemia E87.5    Anemia due to chronic kidney disease treated with erythropoietin N18.9, D63.1    Femur fracture, left (HCC) S72. 92XA    Stage 3 chronic kidney disease N18.3    Hypokalemia E87.6       Plan: Continue Physical Therapy  Monitor Hbg and labs.    Dispo soon    Signed By: Tayo Poole MD

## 2018-08-11 NOTE — PROGRESS NOTES
Problem: Mobility Impaired (Adult and Pediatric)  Goal: *Acute Goals and Plan of Care (Insert Text)  STG:  (1.)Ms. Jolynn Villegas will move from supine to sit and sit to supine , scoot up and down and roll side to side with MINIMAL ASSIST within 3 treatment day(s). (2.)Ms. Jolynn Villegas will transfer from bed to chair and chair to bed with MINIMAL ASSIST using the least restrictive device within 3 treatment day(s). (3.)Ms. Jolynn Villegas will ambulate with MAXIMAL ASSIST for 2 feet with the least restrictive device within 3 treatment day(s). (4.)Ms. Jolynn Villegas will perform standing static and dynamic balance activities x 5 minutes with MAXIMAL ASSIST to improve safety within 3 day(s). (5.)Ms. Jolynn Villegas will perform LE exercises with 1 to 2 cues for form within 3 days to improve strength for functional transfers and ambulation. (6.)Ms. Jolynn Villegas will perform wheelchair mobility 150 feet with SUPERVISION within 3 days to maximize independence with functional mobility. (7.)Ms. Jolynn Villegas will maintain NWB LLE, WBAT RLE throughout all functional mobility within 3 days. LTG:  (1.)Ms. Jolynn Villegas will move from supine to sit and sit to supine , scoot up and down and roll side to side in bed with CONTACT GUARD ASSIST within 7 treatment day(s). (2.)Ms. Jolynn Villegas will transfer from bed to chair and chair to bed with CONTACT GUARD ASSIST using the least restrictive device within 7 treatment day(s). (3.)Ms. Jolynn Villegas will ambulate with MINIMAL ASSIST for 2 feet with the least restrictive device within 7 treatment day(s). (4.)Ms. Jolynn Villegas will perform standing static and dynamic balance activities x 5 minutes with MINIMAL ASSIST to improve safety within 7 day(s). (5.)Ms. Jolynn Villegas will perform wheelchair mobility 250 feet with MODIFIED INDEPENDENCE within 7 days to maximize independence with functional mobility.  ________________________________________________________________________________________________    PHYSICAL THERAPY: Daily Note, Treatment Day: 2nd, AM 8/11/2018  INPATIENT: Hospital Day: 5  Payor: Kennedy Dick / Plan: [address] [place] [address] HMO / Product Type: Managed Care Medicare /    LLE NWB, LUPE WBBLANKA  NAME/AGE/GENDER: Estela Langford is a 75 y.o. female   PRIMARY DIAGNOSIS: hip fracture  Femur fracture, left (HCC) Femur fracture, left (HCC) Femur fracture, left (HCC)  Procedure(s) (LRB):  Prophylactic nailing right femur / Open reduction internal fixation left subtrochanteric left femur / Open biopsy left proximal femur (Left)  3 Days Post-Op  ICD-10: Treatment Diagnosis:    · Difficulty in walking, Not elsewhere classified (R26.2)  · Repeated Falls (R29.6)  · History of falling (Z91.81)   Precaution/Allergies:  Penicillins      ASSESSMENT:     Ms. Marcos Eastman presents lying supine in bed. She is agreeable to treatment today. She needed moderate assist to get to edge of bed. She then transferred to chair using sliding board with minimal assist x 2. She needed continuous verbal cues to maintain non-weight bearing on left lower extremity. She was putting slight weight on left lower extremity during transfer. She was left up in chair with PCT attending. She is progressing slowly toward all goals. Will continue PT efforts. This section established at most recent assessment   PROBLEM LIST (Impairments causing functional limitations):  1. Decreased Strength  2. Decreased ADL/Functional Activities  3. Decreased Transfer Abilities  4. Decreased Ambulation Ability/Technique  5. Decreased Balance  6. Increased Pain  7. Decreased Activity Tolerance  8. Decreased Pacing Skills  9. Increased Shortness of Breath  10. Decreased Knowledge of Precautions  11. Decreased [place] with Home Exercise Program   INTERVENTIONS PLANNED: (Benefits and precautions of physical therapy have been discussed with the patient.)  1. Balance Exercise  2. Bed Mobility  3. Family Education  4. Gait Training  5. Home Exercise Program (HEP)  6. Therapeutic Activites  7.  Therapeutic Exercise/Strengthening  8. Transfer Training  9. Patient Education  10. Group Therapy     TREATMENT PLAN: Frequency/Duration: twice daily for duration of hospital stay  Rehabilitation Potential For Stated Goals: Good     RECOMMENDED REHABILITATION/EQUIPMENT: (at time of discharge pending progress): Due to the probability of continued deficits (see above) this patient will likely need continued skilled physical therapy after discharge. 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 She reports her L leg 'gave out' and she fell backwards. She is noted to have a L proximal subtrochanteric pathologic fracture on x-ray. At present, she reports her L hip pain is controlled. She denies any chest pain or shortness of breath.     Hospitalist service asked to admit for L hip fracture. Past Medical History/Comorbidities:   Ms. Chucky Ulloa  has a past medical history of Abnormal EKG (2/16/2016); Aortic valve insufficiency (2/16/2016); Cancer ([facility] Hyperlipidemia (2/16/2016); Hypertension (2/16/2016); and Obesity (2/16/2016). Ms. Chucky Ulloa  has a past surgical history that includes hx tubal ligation.   Social History/Living Environment:   Home Environment: Private residence  Wheelchair Ramp: Yes  One/Two Story Residence: One story  Living Alone: No  Support Systems: Child(ezequiel)  Patient Expects to be Discharged to[de-identified] Unknown  Current DME Used/Available at Home: Wheelchair  Prior Level of Function/Work/Activity:  She lives with 3 daughters in a single story home and typically transfers independently to a wheelchair and is modified independent at wheelchair level. Number of Personal Factors/Comorbidities that affect the Plan of Care: 1-2: MODERATE COMPLEXITY   EXAMINATION:   Most Recent Physical Functioning:   Gross Assessment:                  Posture:  Posture (WDL): Exceptions to WDL  Posture Assessment: Forward head  Balance:  Sitting: Impaired  Sitting - Static: Fair (occasional)  Sitting - Dynamic: Fair (occasional) Bed Mobility:  Supine to Sit: Moderate assistance  Scooting: Maximum assistance  Wheelchair Mobility:     Transfers:  Bed to Chair: Minimum assistance;Assist x2  Sliding Board : Minimum assistance (assist x 2)  Gait:            Body Structures Involved:  1. Nerves  2. Lungs  3. Bones  4. Joints  5. Muscles  6. Ligaments Body Functions Affected:  1. Sensory/Pain  2. Neuromusculoskeletal  3. Movement Related Activities and Participation Affected:  1. Mobility  2. Self Care  3. Domestic Life  4. Interpersonal Interactions and Relationships  5. Community, Social and [place]   Number of elements that affect the Plan of Care: 4+: HIGH COMPLEXITY   CLINICAL PRESENTATION:   Presentation: Evolving clinical presentation with changing clinical characteristics: MODERATE COMPLEXITY   CLINICAL DECISION MAKIN [facility] Inpatient Short Form  How much difficulty does the patient currently have. .. Unable A Lot A Little None   1. Turning over in bed (including adjusting bedclothes, sheets and blankets)? [] 1   [x] 2   [] 3   [] 4   2. Sitting down on and standing up from a chair with arms ( e.g., wheelchair, bedside commode, etc.)   [x] 1   [] 2   [] 3   [] 4   3. Moving from lying on back to sitting on the side of the bed? [] 1   [x] 2   [] 3   [] 4   How much help from another person does the patient currently need. .. Total A Lot A Little None   4. Moving to and from a bed to a chair (including a wheelchair)? [] 1   [x] 2   [] 3   [] 4   5.   Need to walk in hospital room?   [x] 1   [] 2   [] 3   [] 4   6. Climbing 3-5 steps with a railing? [x] 1   [] 2   [] 3   [] 4   © 2007, Trustees of [address] [place] [address] [address] [id number], under license to [email or web address]. All rights reserved      Score:  Initial: 9 Most Recent: X (Date: -- )    Interpretation of Tool:  Represents activities that are increasingly more difficult (i.e. Bed mobility, Transfers, Gait). Score 24 23 22-20 19-15 14-10 9-7 6     Modifier CH CI CJ CK CL CM CN      ? Mobility - Walking and Moving Around:     - CURRENT STATUS: CM - 80%-99% impaired, limited or restricted    - GOAL STATUS: CK - 40%-59% impaired, limited or restricted    - D/C STATUS:  ---------------To be determined---------------  Payor: [employer] MEDICARE / Plan: [address] [place] [address] HMO / Product Type: Managed Care Medicare /      Medical Necessity:     · Patient demonstrates good rehab potential due to higher previous functional level. Reason for Services/Other Comments:  · Patient continues to require modification of therapeutic interventions to increase complexity of exercises. Use of outcome tool(s) and clinical judgement create a POC that gives a: Questionable prediction of patient's progress: MODERATE COMPLEXITY            TREATMENT:   (In addition to Assessment/Re-Assessment sessions the following treatments were rendered)   Pre-treatment Symptoms/Complaints: \"Okay. \"  Pain: Initial:   Pain Intensity 1: 0  Post Session:  0/10     Therapeutic Activity: (    15 minutes): Therapeutic activities including bed mobility training including sit to supine and rolling left/right, transfer training (sliding board transfer), static/dynamic sitting balance training, scooting, posture training, and patient education to improve mobility, strength and balance. Required maximal visual verbal and manual cues   to promote static and dynamic balance in sitting and promote coordination of bilateral, lower extremity(s).      Therapeutic Exercise: (   Minutes): Exercises per grid below to improve mobility, strength and balance. Required moderate visual, verbal, manual and tactile cues to promote proper body alignment, promote proper body posture and promote proper body mechanics. Progressed range, repetitions and complexity of movement as indicated. Date:  8/10/18 Date:   Date:     ACTIVITY/EXERCISE AM PM AM PM AM PM   AP's 2x10B A        LAQ 2x10B A        Hip flexion 2x10B A        Hip abduction 2x10B A                                   B = bilateral; AA = active assistive; A = active; P = passive    Braces/Orthotics/Lines/Etc:   · O2 Nasal Cannula  Treatment/Session Assessment:    · Response to Treatment:  No complaints   · Interdisciplinary Collaboration:   o Physical Therapy Assistant  o Registered Nurse  o Rehabilitation Attendant  · After treatment position/precautions:   o Up in chair  o Bed alarm/tab alert on  o Bed/Chair-wheels locked  o Call light within reach  o RN notified  o Family at bedside   · Compliance with Program/Exercises: Will assess as treatment progresses. · Recommendations/Intent for next treatment session: \"Next visit will focus on advancements to more challenging activities and reduction in assistance provided\".   Total Treatment Duration:  PT Patient Time In/Time Out  Time In: 0940  Time Out: 120 [facility]

## 2018-08-11 NOTE — PROGRESS NOTES
Subjective:   Daily Progress Note: 2018 10:58 AM    Feels better    Current Facility-Administered Medications   Medication Dose Route Frequency    0.9% sodium chloride infusion 250 mL  250 mL IntraVENous PRN    sodium chloride (NS) flush 10 mL  10 mL InterCATHeter Q8H    sodium chloride (NS) flush 10 mL  10 mL InterCATHeter PRN    0.9% sodium chloride infusion  75 mL/hr IntraVENous CONTINUOUS    amLODIPine (NORVASC) tablet 5 mg  5 mg Oral DAILY    letrozole (FEMARA) tablet 2.5 mg (Patient Supplied)  2.5 mg Oral DAILY    ondansetron (ZOFRAN ODT) tablet 8 mg  8 mg Oral Q8H PRN    sodium chloride (NS) flush 5-10 mL  5-10 mL IntraVENous Q8H    sodium chloride (NS) flush 5-10 mL  5-10 mL IntraVENous PRN    acetaminophen (TYLENOL) tablet 650 mg  650 mg Oral Q8H    oxyCODONE IR (ROXICODONE) tablet 5-10 mg  5-10 mg Oral Q4H PRN    ondansetron (ZOFRAN) injection 4 mg  4 mg IntraVENous Q4H PRN    docusate sodium (COLACE) capsule 100 mg  100 mg Oral BID    alum-mag hydroxide-simeth (MYLANTA) oral suspension 30 mL  30 mL Oral Q4H PRN    calcium-vitamin D (OS-JORGE) 500 mg-200 unit tablet  1 Tab Oral TID WITH MEALS    enoxaparin (LOVENOX) injection 30 mg  30 mg SubCUTAneous Q24H    traMADol (ULTRAM) tablet 50 mg  50 mg Oral Q6H PRN    hydrALAZINE (APRESOLINE) 20 mg/mL injection 10 mg  10 mg IntraVENous Q6H PRN               Objective:     Visit Vitals    /83 (BP 1 Location: Right arm, BP Patient Position: At rest)    Pulse 92    Temp 98 °F (36.7 °C)    Resp 19    Ht 5' 11\" (1.803 m)    Wt 105.2 kg (232 lb)    SpO2 100%    BMI 32.36 kg/m2    O2 Flow Rate (L/min): 6 l/min O2 Device: Oxygen mask    Temp (24hrs), Av.4 °F (36.9 °C), Min:97.7 °F (36.5 °C), Max:98.9 °F (37.2 °C)       701 - 1900  In: 240 [P.O.:240]  Out: -   1901 -  0700  In: 762.5   Out: 2510 [Urine:2510]      Visit Vitals    /83 (BP 1 Location: Right arm, BP Patient Position: At rest)    Pulse 92  Temp 98 °F (36.7 °C)    Resp 19    Ht 5' 11\" (1.803 m)    Wt 105.2 kg (232 lb)    SpO2 100%    BMI 32.36 kg/m2     Head: Normocephalic, without obvious abnormality  Neck: no JVD  Lungs: clear to auscultation bilaterally  Heart: regular rate and rhythm  Abdomen: soft, non-tender. Extremities: + edema          Data Review    Recent Results (from the past 48 hour(s))   METABOLIC PANEL, BASIC    Collection Time: 08/09/18  6:11 PM   Result Value Ref Range    Sodium 141 136 - 145 mmol/L    Potassium 4.3 3.5 - 5.1 mmol/L    Chloride 105 98 - 107 mmol/L    CO2 25 21 - 32 mmol/L    Anion gap 11 7 - 16 mmol/L    Glucose 137 (H) 65 - 100 mg/dL    BUN 28 (H) 8 - 23 MG/DL    Creatinine 3.85 (H) 0.6 - 1.0 MG/DL    GFR est AA 15 (L) >60 ml/min/1.73m2    GFR est non-AA 12 (L) >60 ml/min/1.73m2    Calcium 5.7 (LL) 8.3 - 10.4 MG/DL   MAGNESIUM    Collection Time: 08/09/18  6:11 PM   Result Value Ref Range    Magnesium 1.2 (LL) 1.8 - 2.4 mg/dL   PHOSPHORUS    Collection Time: 08/09/18  6:11 PM   Result Value Ref Range    Phosphorus 3.0 2.3 - 3.7 MG/DL   CBC WITH AUTOMATED DIFF    Collection Time: 08/09/18  9:06 PM   Result Value Ref Range    WBC 2.7 (L) 4.3 - 11.1 K/uL    RBC 2.48 (L) 4.05 - 5.2 M/uL    HGB 8.4 (L) 11.7 - 15.4 g/dL    HCT 26.0 (L) 35.8 - 46.3 %    .8 (H) 79.6 - 97.8 FL    MCH 33.9 (H) 26.1 - 32.9 PG    MCHC 32.3 31.4 - 35.0 g/dL    RDW 18.7 %    PLATELET 793 925 - 079 K/uL    MPV 9.4 9.4 - 12.3 FL    ABSOLUTE NRBC 0.07 0.0 - 0.2 K/uL    DF AUTOMATED      NEUTROPHILS 64 43 - 78 %    LYMPHOCYTES 16 13 - 44 %    MONOCYTES 18 (H) 4.0 - 12.0 %    EOSINOPHILS 0 (L) 0.5 - 7.8 %    BASOPHILS 0 0.0 - 2.0 %    IMMATURE GRANULOCYTES 1 0.0 - 5.0 %    ABS. NEUTROPHILS 1.8 K/UL    ABS. LYMPHOCYTES 0.4 K/UL    ABS. MONOCYTES 0.5 K/UL    ABS. EOSINOPHILS 0.0 K/UL    ABS. BASOPHILS 0.0 K/UL    ABS. IMM.  GRANS. 0.0 K/UL   METABOLIC PANEL, BASIC    Collection Time: 08/09/18  9:06 PM   Result Value Ref Range    Sodium 138 136 - 145 mmol/L    Potassium 4.1 3.5 - 5.1 mmol/L    Chloride 104 98 - 107 mmol/L    CO2 23 21 - 32 mmol/L    Anion gap 11 7 - 16 mmol/L    Glucose 128 (H) 65 - 100 mg/dL    BUN 30 (H) 8 - 23 MG/DL    Creatinine 3.89 (H) 0.6 - 1.0 MG/DL    GFR est AA 15 (L) >60 ml/min/1.73m2    GFR est non-AA 12 (L) >60 ml/min/1.73m2    Calcium 5.6 (LL) 8.3 - 10.4 MG/DL   CALCIUM, IONIZED    Collection Time: 08/09/18  9:06 PM   Result Value Ref Range    Calcium, ionized 3.20 (L) 4.0 - 5.2 mg/dL   SODIUM, UR, RANDOM    Collection Time: 08/10/18  1:41 AM   Result Value Ref Range    Sodium urine, random 10 MMOL/L   PROTEIN/CREATININE RATIO, URINE    Collection Time: 08/10/18  1:41 AM   Result Value Ref Range    Protein, urine random 44 (H) <11.9 mg/dL    Creatinine, urine 167.00 mg/dL    Protein/Creat.  urine Ratio 0.3     CULTURE, URINE    Collection Time: 08/10/18  1:41 AM   Result Value Ref Range    Special Requests: NO SPECIAL REQUESTS      Culture result: NO GROWTH 1 DAY     URINALYSIS W/ RFLX MICROSCOPIC    Collection Time: 08/10/18  1:41 AM   Result Value Ref Range    Color NANCI      Appearance CLOUDY      Specific gravity 1.023 1.001 - 1.023      pH (UA) 5.0 5.0 - 9.0      Protein TRACE (A) NEG mg/dL    Glucose NEGATIVE  mg/dL    Ketone TRACE (A) NEG mg/dL    Bilirubin NEGATIVE  NEG      Blood LARGE (A) NEG      Urobilinogen 0.2 0.2 - 1.0 EU/dL    Nitrites NEGATIVE  NEG      Leukocyte Esterase NEGATIVE  NEG      WBC 0-3 0 /hpf    RBC 0-3 0 /hpf    Epithelial cells 0-3 0 /hpf    Bacteria 0 0 /hpf    Casts 5-10 0 /lpf   PLEASE READ & DOCUMENT PPD TEST IN 48 HRS    Collection Time: 08/10/18  2:07 AM   Result Value Ref Range    PPD Negative Negative    mm Induration 0 mm   METABOLIC PANEL, BASIC    Collection Time: 08/10/18  6:17 AM   Result Value Ref Range    Sodium 136 136 - 145 mmol/L    Potassium 4.2 3.5 - 5.1 mmol/L    Chloride 103 98 - 107 mmol/L    CO2 22 21 - 32 mmol/L    Anion gap 11 7 - 16 mmol/L    Glucose 107 (H) 65 - 100 mg/dL    BUN 36 (H) 8 - 23 MG/DL    Creatinine 3.84 (H) 0.6 - 1.0 MG/DL    GFR est AA 15 (L) >60 ml/min/1.73m2    GFR est non-AA 12 (L) >60 ml/min/1.73m2    Calcium 6.0 (L) 8.3 - 10.4 MG/DL   CBC WITH AUTOMATED DIFF    Collection Time: 08/10/18  6:17 AM   Result Value Ref Range    WBC 2.8 (L) 4.3 - 11.1 K/uL    RBC 2.37 (L) 4.05 - 5.2 M/uL    HGB 7.9 (L) 11.7 - 15.4 g/dL    HCT 24.8 (L) 35.8 - 46.3 %    .6 (H) 79.6 - 97.8 FL    MCH 33.3 (H) 26.1 - 32.9 PG    MCHC 31.9 31.4 - 35.0 g/dL    RDW 19.5 %    PLATELET 107 825 - 069 K/uL    MPV 9.8 9.4 - 12.3 FL    ABSOLUTE NRBC 0.05 0.0 - 0.2 K/uL    NEUTROPHILS 58 43 - 78 %    LYMPHOCYTES 18 13 - 44 %    MONOCYTES 20 (H) 4.0 - 12.0 %    EOSINOPHILS 1 0.5 - 7.8 %    BASOPHILS 0 0.0 - 2.0 %    IMMATURE GRANULOCYTES 3 0.0 - 5.0 %    ABS. NEUTROPHILS 1.6 K/UL    ABS. LYMPHOCYTES 0.5 K/UL    ABS. MONOCYTES 0.6 K/UL    ABS. EOSINOPHILS 0.0 K/UL    ABS. BASOPHILS 0.0 K/UL    ABS. IMM.  GRANS. 0.1 K/UL    RBC COMMENTS SLIGHT  ANISOCYTOSIS        RBC COMMENTS SLIGHT  HYPOCHROMIA        RBC COMMENTS SLIGHT  POLYCHROMASIA        RBC COMMENTS OCCASIONAL  BASOPHILIC STIPPLING        RBC COMMENTS OCCASIONAL  TEARDROP CELLS        WBC COMMENTS Result Confirmed By Smear      PLATELET COMMENTS ADEQUATE      DF MANUAL     PLEASE READ & DOCUMENT PPD TEST IN 72 HRS    Collection Time: 08/11/18  2:11 AM   Result Value Ref Range    PPD negative Negative    mm Induration 0 mm   CBC WITH AUTOMATED DIFF    Collection Time: 08/11/18  4:42 AM   Result Value Ref Range    WBC 2.2 (L) 4.3 - 11.1 K/uL    RBC 3.06 (L) 4.05 - 5.2 M/uL    HGB 9.7 (L) 11.7 - 15.4 g/dL    HCT 29.5 (L) 35.8 - 46.3 %    MCV 96.4 79.6 - 97.8 FL    MCH 31.7 26.1 - 32.9 PG    MCHC 32.9 31.4 - 35.0 g/dL    RDW 21.3 %    PLATELET 419 (L) 328 - 450 K/uL    MPV 9.8 9.4 - 12.3 FL    ABSOLUTE NRBC 0.03 0.0 - 0.2 K/uL    DF AUTOMATED      NEUTROPHILS 60 43 - 78 %    LYMPHOCYTES 19 13 - 44 %    MONOCYTES 17 (H) 4.0 - 12.0 %    EOSINOPHILS 1 0.5 - 7.8 %    BASOPHILS 1 0.0 - 2.0 %    IMMATURE GRANULOCYTES 2 0.0 - 5.0 %    ABS. NEUTROPHILS 1.3 K/UL    ABS. LYMPHOCYTES 0.4 K/UL    ABS. MONOCYTES 0.4 K/UL    ABS. EOSINOPHILS 0.0 K/UL    ABS. BASOPHILS 0.0 K/UL    ABS. IMM. GRANS. 0.1 K/UL       Assessment     Patient Active Problem List    Diagnosis Date Noted    Stage 3 chronic kidney disease 08/08/2018    Hypokalemia 08/08/2018    [facility] 08[facility]07/2018    Anemia due to chronic kidney disease treated with erythropoietin 07/25/2017    Hyperkalemia 07/18/2017    Malignant neoplasm metastatic to bone ([facility] 07/31/2016    Infiltrating ductal carcinoma of female breast ([facility] 07/25/2016    Mass of breast 07/08/2016    Aortic valve insufficiency 02/16/2016    Hypertension 02/16/2016    Obesity 02/16/2016    Hyperlipidemia 02/16/2016    Abnormal EKG 02/16/2016           Problems Addressed by Nephrology     ALEISHA - prerenal  CKD 3    Plan     Off fluids. Looks markedly better. Eating, drinking. No lab today - can check tomorrow.

## 2018-08-11 NOTE — PROGRESS NOTES
Hospitalist Progress Note     Admit Date:  2018  6:22 PM   Name:  Joanne Saldana   Age:  75 y.o.  :  1945   MRN:  [id number]   PCP:  Tere Robles NP  Treatment Team: Attending Provider: Leonor Miller. Criselda Lewis MD; Consulting Provider: Viktoriya Bright MD; Utilization Review: Sakina Pak RN; Care Manager: Tra Mota RN; Consulting Provider: Jostin Costello MD    Subjective:   CC:fall    Pt is a 76 yo female who presented to the ED after falling while transferring from a car to her wheelchair. Pt has PMH of metastatic breast cancer, HTN, obesity, CKD stage 3-4, wheelchair bound. Pt reported that her left leg gave out and she fell backward. Xray showed fracture of left proximal subtrochanteric pathologic fracture. Additionally it was felt that patient had a pending pathologic fracture of her right femur. Pt went to surgery for prophylactic nailing of the right femur, and an ORIF of the left subtrochanteric left femur. They also did an open biopsy of the left proximal femur. Pt doing well post operatively, pain controlled. PT/OT to work with pt whose baseline is wheelchair bound. H/H 9.7 today, WBC 2.2. Per nephrology to continue IV fluids stopped. Nephrology is following as ALEISHA resolves. Lengthy discussion with pt and her daughters regarding discharge planning and end of life issues - decision made by pt and family to pursue home hospice. Hospice coordination spoke with family and felt home health initially would be more appropriate, transitioning to hospice at some time in the future. Per ortho should be ready for d/c soon.     Objective:     Patient Vitals for the past 24 hrs:   Temp Pulse Resp BP SpO2   18 1201 98 °F (36.7 °C) 89 20 138/74 100 %   18 0805 98 °F (36.7 °C) 92 19 153/83 100 %   18 0357 97.7 °F (36.5 °C) 78 18 105/64 97 %   18 0023 98.5 °F (36.9 °C) 79 18 124/59 96 %   08/10/18 1935 98.2 °F (36.8 °C) 84 18 111/69 97 %   08/10/18 1620 98.8 °F (37.1 °C) 84 16 132/76 98 %   08/10/18 1555 98.9 °F (37.2 °C) 82 16 136/76 98 %   08/10/18 1400 98.8 °F (37.1 °C) 80 16 138/77 98 %     Oxygen Therapy  O2 Sat (%): 100 % (08/11/18 1201)  Pulse via Oximetry: 97 beats per minute (08/08/18 1755)  O2 Device: Oxygen mask (08/11/18 0805)  O2 Flow Rate (L/min): 6 l/min (08/11/18 0805)  ETCO2 (mmHg): 99 mmHg (08/08/18 2011)    Intake/Output Summary (Last 24 hours) at 08/11/18 1328  Last data filed at 08/11/18 0806   Gross per 24 hour   Intake           1002.5 ml   Output             2200 ml   Net          -1197.5 ml         General:    Well nourished. Awake and alert. Head:  Normocephalic, atraumatic  Eyes:  Extraocular movements intact, normal sclera  Neck:  Supple, no lymphadenopathy  CV:   RRR. No  Murmurs, clicks, or gallops  Lungs:   Unlabored, no cyanosis  Abdomen:   Soft, nondistended, nontender. Extremities: Warm and dry. No cyanosis or edema. Dressings intact over dressings bilaterally. Skin:     No rashes or jaundice. Neuro:  No gross focal deficits  Psych:  Mood and affect appropriate    Data Review:  I have reviewed all labs, meds, telemetry events, and studies from the last 24 hours.     Recent Results (from the past 24 hour(s))   PLEASE READ & DOCUMENT PPD TEST IN 72 HRS    Collection Time: 08/11/18  2:11 AM   Result Value Ref Range    PPD negative Negative    mm Induration 0 mm   CBC WITH AUTOMATED DIFF    Collection Time: 08/11/18  4:42 AM   Result Value Ref Range    WBC 2.2 (L) 4.3 - 11.1 K/uL    RBC 3.06 (L) 4.05 - 5.2 M/uL    HGB 9.7 (L) 11.7 - 15.4 g/dL    HCT 29.5 (L) 35.8 - 46.3 %    MCV 96.4 79.6 - 97.8 FL    MCH 31.7 26.1 - 32.9 PG    MCHC 32.9 31.4 - 35.0 g/dL    RDW 21.3 %    PLATELET 213 (L) 562 - 450 K/uL    MPV 9.8 9.4 - 12.3 FL    ABSOLUTE NRBC 0.03 0.0 - 0.2 K/uL    DF AUTOMATED      NEUTROPHILS 60 43 - 78 %    LYMPHOCYTES 19 13 - 44 %    MONOCYTES 17 (H) 4.0 - 12.0 %    EOSINOPHILS 1 0.5 - 7.8 %    BASOPHILS 1 0.0 - 2.0 % IMMATURE GRANULOCYTES 2 0.0 - 5.0 %    ABS. NEUTROPHILS 1.3 K/UL    ABS. LYMPHOCYTES 0.4 K/UL    ABS. MONOCYTES 0.4 K/UL    ABS. EOSINOPHILS 0.0 K/UL    ABS. BASOPHILS 0.0 K/UL    ABS. IMM. GRANS. 0.1 K/UL        All Micro Results     Procedure Component Value Units Date/Time    CULTURE, URINE [[id number]] Collected:  08/10/18 0141    Order Status:  Completed Specimen:  Urine from [place] Specimen Updated:  08/11/18 0906     Special Requests: NO SPECIAL REQUESTS        Culture result: NO GROWTH 1 DAY       MSSA/MRSA SC BY PCR, NASAL SWAB [[id number]] Collected:  08/08/18 0251    Order Status:  Completed Specimen:  Swab Updated:  08/08/18 6817     Special Requests: NO SPECIAL REQUESTS        Culture result:         SA target not detected. A MRSA NEGATIVE, SA NEGATIVE test result does not preclude MRSA or SA nasal colonization.           Current Meds:  Current Facility-Administered Medications   Medication Dose Route Frequency    0.9% sodium chloride infusion 250 mL  250 mL IntraVENous PRN    sodium chloride (NS) flush 10 mL  10 mL InterCATHeter Q8H    sodium chloride (NS) flush 10 mL  10 mL InterCATHeter PRN    amLODIPine (NORVASC) tablet 5 mg  5 mg Oral DAILY    letrozole (FEMARA) tablet 2.5 mg (Patient Supplied)  2.5 mg Oral DAILY    ondansetron (ZOFRAN ODT) tablet 8 mg  8 mg Oral Q8H PRN    sodium chloride (NS) flush 5-10 mL  5-10 mL IntraVENous Q8H    sodium chloride (NS) flush 5-10 mL  5-10 mL IntraVENous PRN    acetaminophen (TYLENOL) tablet 650 mg  650 mg Oral Q8H    oxyCODONE IR (ROXICODONE) tablet 5-10 mg  5-10 mg Oral Q4H PRN    ondansetron (ZOFRAN) injection 4 mg  4 mg IntraVENous Q4H PRN    docusate sodium (COLACE) capsule 100 mg  100 mg Oral BID    alum-mag hydroxide-simeth (MYLANTA) oral suspension 30 mL  30 mL Oral Q4H PRN    calcium-vitamin D (OS-JORGE) 500 mg-200 unit tablet  1 Tab Oral TID WITH MEALS    enoxaparin (LOVENOX) injection 30 mg  30 mg SubCUTAneous Q24H    traMADol (ULTRAM) tablet 50 mg  50 mg Oral Q6H PRN    hydrALAZINE (APRESOLINE) 20 mg/mL injection 10 mg  10 mg IntraVENous Q6H PRN       Diet:  DIET REGULAR    Other Studies (last 24 hours):  No results found. Assessment and Plan:     Hospital Problems as of 8/11/2018  Date Reviewed: 7/26/2018          Codes Class Noted - Resolved POA    Stage 3 chronic kidney disease ICD-10-CM: N18.3  ICD-9-CM: 585.3  8/8/2018 - Present Yes        Hypokalemia ICD-10-CM: E87.6  ICD-9-CM: 276.8  8/8/2018 - Present Yes        * (Principal)Femur fracture, left ([address] ICD-10-CM: V36.45SK  ICD-9-CM: 821.00  8/7/2018 - Present Yes        Anemia due to chronic kidney disease treated with erythropoietin ICD-10-CM: N18.9, D63.1  ICD-9-CM: 285.21, 585.9  7/25/2017 - Present Yes        Malignant neoplasm metastatic to bone [facility]) ICD-10-CM: C79.51  ICD-9-CM: 198.5  7/31/2016 - Present Yes    Overview Signed 1/10/2017  6:13 PM by Eugenio Matthews MD     Last Assessment & Plan:   1. Complete radiation therapy to the patient's sacrum and femurs as directed by Dr. Simba Peace. 2.  Return to the office in 6 weeks with x-rays of the pelvis on arrival.  3.  Instruct the patient to advance her weightbearing as tolerated as she continues to enjoy a reduction in pain following the radiation therapy. Hypertension ICD-10-CM: I10  ICD-9-CM: 401.9  2/16/2016 - Present Yes    Overview Signed 2/16/2016 12:09 PM by Adelita Black     UNSPECIFIED              Obesity ICD-10-CM: E66.9  ICD-9-CM: 278.00  2/16/2016 - Present Yes    Overview Signed 2/16/2016 12:10 PM by Adelita Black     UNSPECIFIED                    A/P:    1. Hip fracture  -Pain control and DVT prophylaxis per ortho  -PT/OT    2. HTN  -Cont amlodipine  -Prn hydralazine    3. Hypokalemia  -Resolved  -Cont to monitor    4. Metastatic breast CA  -Cont Femara  -Restart lbranze on d/c    5. ALEISHA  -Cr 3.84, up from 1.67 on admit  -IVF with NS 75/hr  -Nephrology following    DC planning/Dispo:  Home with c   DVT ppx:  SCDs per ortho    Case reviewed with supervising physician - MIKE Lewis MD    Signed:  KHRIS Ruiz

## 2018-08-11 NOTE — PROGRESS NOTES
Problem: Pressure Injury - Risk of  Goal: *Prevention of pressure injury  Document Arturo Scale and appropriate interventions in the flowsheet. Outcome: Progressing Towards Goal  Pressure Injury Interventions:  Sensory Interventions: Assess changes in LOC    Moisture Interventions: Absorbent underpads    Activity Interventions: Increase time out of bed, Pressure redistribution bed/mattress(bed type), PT/OT evaluation    Mobility Interventions: HOB 30 degrees or less, Pressure redistribution bed/mattress (bed type), PT/OT evaluation    Nutrition Interventions: Document food/fluid/supplement intake    Friction and Shear Interventions: HOB 30 degrees or less               Problem: Falls - Risk of  Goal: *Absence of Falls  Document Maurizio Fall Risk and appropriate interventions in the flowsheet.    Outcome: Progressing Towards Goal  Fall Risk Interventions:  Mobility Interventions: Bed/chair exit alarm, Patient to call before getting OOB         Medication Interventions: Bed/chair exit alarm, Patient to call before getting OOB    Elimination Interventions: Bed/chair exit alarm, Call light in reach, Patient to call for help with toileting needs    History of Falls Interventions: Bed/chair exit alarm

## 2018-08-11 NOTE — PROGRESS NOTES
Problem: Mobility Impaired (Adult and Pediatric)  Goal: *Acute Goals and Plan of Care (Insert Text)  STG:  (1.)Ms. Marcos Eastman will move from supine to sit and sit to supine , scoot up and down and roll side to side with MINIMAL ASSIST within 3 treatment day(s). (2.)Ms. Marcos Eastman will transfer from bed to chair and chair to bed with MINIMAL ASSIST using the least restrictive device within 3 treatment day(s). (3.)Ms. Marcos Eastman will ambulate with MAXIMAL ASSIST for 2 feet with the least restrictive device within 3 treatment day(s). (4.)Ms. Marcos Eastman will perform standing static and dynamic balance activities x 5 minutes with MAXIMAL ASSIST to improve safety within 3 day(s). (5.)Ms. Marcos Eastman will perform LE exercises with 1 to 2 cues for form within 3 days to improve strength for functional transfers and ambulation. (6.)Ms. Marcos Eastman will perform wheelchair mobility 150 feet with SUPERVISION within 3 days to maximize independence with functional mobility. (7.)Ms. Marcos Eastman will maintain NWB LLE, WBAT RLE throughout all functional mobility within 3 days. LTG:  (1.)Ms. Marcos Eastman will move from supine to sit and sit to supine , scoot up and down and roll side to side in bed with CONTACT GUARD ASSIST within 7 treatment day(s). (2.)Ms. Marcos Eastman will transfer from bed to chair and chair to bed with CONTACT GUARD ASSIST using the least restrictive device within 7 treatment day(s). (3.)Ms. Marcos Eastman will ambulate with MINIMAL ASSIST for 2 feet with the least restrictive device within 7 treatment day(s). (4.)Ms. Marcos Eastman will perform standing static and dynamic balance activities x 5 minutes with MINIMAL ASSIST to improve safety within 7 day(s). (5.)Ms. Marcos Eastman will perform wheelchair mobility 250 feet with MODIFIED INDEPENDENCE within 7 days to maximize independence with functional mobility.  ________________________________________________________________________________________________    PHYSICAL THERAPY: Daily Note, Treatment Day: 2nd, PM 8/11/2018  INPATIENT: Hospital Day: 5  Payor: Halima Renteria / Plan: [address] [place] [address] HMO / Product Type: Managed Care Medicare /    LLE NWB, RLE WBAT  NAME/AGE/GENDER: Charlanne Bumpers is a 75 y.o. female   PRIMARY DIAGNOSIS: hip fracture  Femur fracture, left (HCC) Femur fracture, left (HCC) Femur fracture, left (HCC)  Procedure(s) (LRB):  Prophylactic nailing right femur / Open reduction internal fixation left subtrochanteric left femur / Open biopsy left proximal femur (Left)  3 Days Post-Op  ICD-10: Treatment Diagnosis:    · Difficulty in walking, Not elsewhere classified (R26.2)  · Repeated Falls (R29.6)  · History of falling (Z91.81)   Precaution/Allergies:  Penicillins      ASSESSMENT:     Ms. Alisha Villalta presents lying supine in bed. She is agreeable to treatment today. She needed moderate assist to get to edge of bed. She then transferred to chair using sliding board with minimal assist x 2. She needed continuous verbal cues to maintain non-weight bearing on left lower extremity. She was putting slight weight on left lower extremity during transfer. She was left up in chair with PCT attending. She is progressing slowly toward all goals. Will continue PT efforts. PM NOTE: Patient presents sitting up in bedside chair. She is agreeable to treatment today. She needed max assist x 2 to transfer to bed from chair. She is unable to maintain no weight bearing on left lower extremity. She needed additional assistance this afternoon. She was fatigued from sitting up in chair. She needed moderate assist x 2 for sit to supine. No progress noted this treatment. Will continue PT efforts. This section established at most recent assessment   PROBLEM LIST (Impairments causing functional limitations):  1. Decreased Strength  2. Decreased ADL/Functional Activities  3. Decreased Transfer Abilities  4. Decreased Ambulation Ability/Technique  5. Decreased Balance  6. Increased Pain  7.  Decreased Activity Tolerance  8. Decreased Pacing Skills  9. Increased Shortness of Breath  10. Decreased Knowledge of Precautions  11. Decreased Michael with Home Exercise Program   INTERVENTIONS PLANNED: (Benefits and precautions of physical therapy have been discussed with the patient.)  1. Balance Exercise  2. Bed Mobility  3. Family Education  4. Gait Training  5. Home Exercise Program (HEP)  6. Therapeutic Activites  7. Therapeutic Exercise/Strengthening  8. Transfer Training  9. Patient Education  10. Group Therapy     TREATMENT PLAN: Frequency/Duration: twice daily for duration of hospital stay  Rehabilitation Potential For Stated Goals: Good     RECOMMENDED REHABILITATION/EQUIPMENT: (at time of discharge pending progress): Due to the probability of continued deficits (see above) this patient will likely need continued skilled physical therapy after discharge. Equipment:    Transfer/Sliding board              HISTORY:   History of Present Injury/Illness (Reason for Referral):  Jose Calderon is a 76 yo F with history of metastatic breast cancer (on [place] and Femara), hypertension, obesity, stage 3-4 CKD, and chronic debility (at baseline is wheelchair bound and can only transfer a few feet with assistance), who presents to the ED after falling while trying to transfer from a car to a wheelchair at her a store. She reports her L leg 'gave out' and she fell backwards. She is noted to have a L proximal subtrochanteric pathologic fracture on x-ray. At present, she reports her L hip pain is controlled. She denies any chest pain or shortness of breath.     Hospitalist service asked to admit for L hip fracture. Past Medical History/Comorbidities:   Ms. Elvin Mars  has a past medical history of Abnormal EKG (2/16/2016); Aortic valve insufficiency (2/16/2016); Cancer ([facility] [address] Hyperlipidemia (2/16/2016); Hypertension (2/16/2016); and Obesity (2/16/2016).   Ms. Elvin Mars  has a past surgical history that includes hx tubal ligation. Social History/Living Environment:   Home Environment: Private residence  Wheelchair Ramp: Yes  One/Two Story Residence: One story  Living Alone: No  Support Systems: Child(ezequiel)  Patient Expects to be Discharged to[de-identified] Unknown  Current DME Used/Available at Home: Wheelchair  Prior Level of Function/Work/Activity:  She lives with 3 daughters in a single story home and typically transfers independently to a wheelchair and is modified independent at wheelchair level. Number of Personal Factors/Comorbidities that affect the Plan of Care: 1-2: MODERATE COMPLEXITY   EXAMINATION:   Most Recent Physical Functioning:   Gross Assessment:                  Posture:  Posture (WDL): Exceptions to WDL  Posture Assessment: Forward head  Balance:  Sitting: Impaired  Sitting - Static: Fair (occasional)  Sitting - Dynamic: Fair (occasional) Bed Mobility:  Supine to Sit: Moderate assistance  Sit to Supine: Moderate assistance;Assist x2  Scooting: Maximum assistance  Wheelchair Mobility:     Transfers:  Bed to Chair: Maximum assistance;Assist x2  Sliding Board : Maximum assistance (Assist x 2)  Gait:            Body Structures Involved:  1. Nerves  2. Lungs  3. Bones  4. Joints  5. Muscles  6. Ligaments Body Functions Affected:  1. Sensory/Pain  2. Neuromusculoskeletal  3. Movement Related Activities and Participation Affected:  1. Mobility  2. Self Care  3. Domestic Life  4. Interpersonal Interactions and Relationships  5. Community, Social and [place]   Number of elements that affect the Plan of Care: 4+: HIGH COMPLEXITY   CLINICAL PRESENTATION:   Presentation: Evolving clinical presentation with changing clinical characteristics: MODERATE COMPLEXITY   CLINICAL DECISION MAKIN [facility] Mobility Inpatient Short Form  How much difficulty does the patient currently have. .. Unable A Lot A Little None   1. Turning over in bed (including adjusting bedclothes, sheets and blankets)?    [] 1   [x] 2   [] 3   [] 4   2. Sitting down on and standing up from a chair with arms ( e.g., wheelchair, bedside commode, etc.)   [x] 1   [] 2   [] 3   [] 4   3. Moving from lying on back to sitting on the side of the bed? [] 1   [x] 2   [] 3   [] 4   How much help from another person does the patient currently need. .. Total A Lot A Little None   4. Moving to and from a bed to a chair (including a wheelchair)? [] 1   [x] 2   [] 3   [] 4   5. Need to walk in hospital room? [x] 1   [] 2   [] 3   [] 4   6. Climbing 3-5 steps with a railing? [x] 1   [] 2   [] 3   [] 4   © 2007, Trustees of [facility] [employer], under license to [employer]. All rights reserved      Score:  Initial: 9 Most Recent: X (Date: -- )    Interpretation of Tool:  Represents activities that are increasingly more difficult (i.e. Bed mobility, Transfers, Gait). Score 24 23 22-20 19-15 14-10 9-7 6     Modifier CH CI CJ CK CL CM CN      ? Mobility - Walking and Moving Around:     - CURRENT STATUS: CM - 80%-99% impaired, limited or restricted    - GOAL STATUS: CK - 40%-59% impaired, limited or restricted    - D/C STATUS:  ---------------To be determined---------------  Payor: [employer] / Plan: [address] [place] [address] HMO / Product Type: Managed Care Medicare /      Medical Necessity:     · Patient demonstrates good rehab potential due to higher previous functional level. Reason for Services/Other Comments:  · Patient continues to require modification of therapeutic interventions to increase complexity of exercises. Use of outcome tool(s) and clinical judgement create a POC that gives a: Questionable prediction of patient's progress: MODERATE COMPLEXITY            TREATMENT:   (In addition to Assessment/Re-Assessment sessions the following treatments were rendered)   Pre-treatment Symptoms/Complaints:  \"I am tired. \"  Pain: Initial:   Pain Intensity 1: 0  Post Session:  0/10     Therapeutic Activity: (    25 minutes): Therapeutic activities including bed mobility training including sit to supine and rolling left/right, transfer training (sliding board transfer), static/dynamic sitting balance training, scooting, posture training, and patient education to improve mobility, strength and balance. Required maximal visual verbal and manual cues   to promote static and dynamic balance in sitting and promote coordination of bilateral, lower extremity(s). Therapeutic Exercise: (   Minutes):  Exercises per grid below to improve mobility, strength and balance. Required moderate visual, verbal, manual and tactile cues to promote proper body alignment, promote proper body posture and promote proper body mechanics. Progressed range, repetitions and complexity of movement as indicated. Date:  8/10/18 Date:   Date:     ACTIVITY/EXERCISE AM PM AM PM AM PM   AP's 2x10B A        LAQ 2x10B A        Hip flexion 2x10B A        Hip abduction 2x10B A                                   B = bilateral; AA = active assistive; A = active; P = passive    Braces/Orthotics/Lines/Etc:   · O2 Nasal Cannula  Treatment/Session Assessment:    · Response to Treatment:  No complaints   · Interdisciplinary Collaboration:   o Physical Therapist  o Physical Therapy Assistant  o Registered Nurse  · After treatment position/precautions:   o Supine in bed  o Bed alarm/tab alert on  o Bed/Chair-wheels locked  o Bed in low position  o Call light within reach  o RN notified  o Family at bedside   · Compliance with Program/Exercises: Will assess as treatment progresses. · Recommendations/Intent for next treatment session: \"Next visit will focus on advancements to more challenging activities and reduction in assistance provided\".   Total Treatment Duration:  PT Patient Time In/Time Out  Time In: 1350  Time Out: [address], PTA

## 2018-08-12 LAB
ANION GAP SERPL CALC-SCNC: 8 MMOL/L (ref 7–16)
BACTERIA SPEC CULT: NORMAL
BUN SERPL-MCNC: 34 MG/DL (ref 8–23)
CALCIUM SERPL-MCNC: 7.2 MG/DL (ref 8.3–10.4)
CHLORIDE SERPL-SCNC: 107 MMOL/L (ref 98–107)
CO2 SERPL-SCNC: 26 MMOL/L (ref 21–32)
CREAT SERPL-MCNC: 2.24 MG/DL (ref 0.6–1)
GLUCOSE SERPL-MCNC: 99 MG/DL (ref 65–100)
MAGNESIUM SERPL-MCNC: 2.1 MG/DL (ref 1.8–2.4)
POTASSIUM SERPL-SCNC: 4.1 MMOL/L (ref 3.5–5.1)
SERVICE CMNT-IMP: NORMAL
SODIUM SERPL-SCNC: 141 MMOL/L (ref 136–145)

## 2018-08-12 PROCEDURE — 74011250636 HC RX REV CODE- 250/636: Performed by: NURSE PRACTITIONER

## 2018-08-12 PROCEDURE — 36415 COLL VENOUS BLD VENIPUNCTURE: CPT

## 2018-08-12 PROCEDURE — 74011250637 HC RX REV CODE- 250/637: Performed by: NURSE PRACTITIONER

## 2018-08-12 PROCEDURE — 74011250637 HC RX REV CODE- 250/637: Performed by: INTERNAL MEDICINE

## 2018-08-12 PROCEDURE — 65270000029 HC RM PRIVATE

## 2018-08-12 PROCEDURE — 97535 SELF CARE MNGMENT TRAINING: CPT

## 2018-08-12 PROCEDURE — 97530 THERAPEUTIC ACTIVITIES: CPT

## 2018-08-12 PROCEDURE — 80048 BASIC METABOLIC PNL TOTAL CA: CPT

## 2018-08-12 PROCEDURE — 83735 ASSAY OF MAGNESIUM: CPT

## 2018-08-12 RX ADMIN — Medication 10 ML: at 14:27

## 2018-08-12 RX ADMIN — OXYCODONE HYDROCHLORIDE 10 MG: 5 TABLET ORAL at 08:21

## 2018-08-12 RX ADMIN — DOCUSATE SODIUM 100 MG: 100 CAPSULE, LIQUID FILLED ORAL at 17:07

## 2018-08-12 RX ADMIN — ACETAMINOPHEN 650 MG: 325 TABLET, FILM COATED ORAL at 21:50

## 2018-08-12 RX ADMIN — ACETAMINOPHEN 650 MG: 325 TABLET, FILM COATED ORAL at 05:35

## 2018-08-12 RX ADMIN — OXYCODONE HYDROCHLORIDE 10 MG: 5 TABLET ORAL at 21:51

## 2018-08-12 RX ADMIN — Medication 10 ML: at 21:51

## 2018-08-12 RX ADMIN — LETROZOLE 2.5 MG: 2.5 TABLET, FILM COATED ORAL at 09:00

## 2018-08-12 RX ADMIN — ACETAMINOPHEN 650 MG: 325 TABLET, FILM COATED ORAL at 14:26

## 2018-08-12 RX ADMIN — Medication 10 ML: at 08:23

## 2018-08-12 RX ADMIN — Medication 10 ML: at 05:35

## 2018-08-12 RX ADMIN — DOCUSATE SODIUM 100 MG: 100 CAPSULE, LIQUID FILLED ORAL at 08:22

## 2018-08-12 RX ADMIN — CALCIUM CARBONATE 500 MG (1,250 MG)-VITAMIN D3 200 UNIT TABLET 1 TABLET: at 17:07

## 2018-08-12 RX ADMIN — CALCIUM CARBONATE 500 MG (1,250 MG)-VITAMIN D3 200 UNIT TABLET 1 TABLET: at 08:22

## 2018-08-12 RX ADMIN — ONDANSETRON 8 MG: 4 TABLET, ORALLY DISINTEGRATING ORAL at 08:21

## 2018-08-12 RX ADMIN — CALCIUM CARBONATE 500 MG (1,250 MG)-VITAMIN D3 200 UNIT TABLET 1 TABLET: at 12:07

## 2018-08-12 RX ADMIN — ENOXAPARIN SODIUM 30 MG: 100 INJECTION SUBCUTANEOUS at 14:26

## 2018-08-12 RX ADMIN — AMLODIPINE BESYLATE 5 MG: 5 TABLET ORAL at 08:22

## 2018-08-12 NOTE — PROGRESS NOTES
Problem: Falls - Risk of  Goal: *Absence of Falls  Document Maurizio Fall Risk and appropriate interventions in the flowsheet.    Outcome: Progressing Towards Goal  Fall Risk Interventions:  Mobility Interventions: Bed/chair exit alarm, Communicate number of staff needed for ambulation/transfer, Patient to call before getting OOB         Medication Interventions: Assess postural VS orthostatic hypotension, Bed/chair exit alarm, Patient to call before getting OOB    Elimination Interventions: Bed/chair exit alarm, Call light in reach, Patient to call for help with toileting needs    History of Falls Interventions: Bed/chair exit alarm, Door open when patient unattended, Investigate reason for fall

## 2018-08-12 NOTE — PROGRESS NOTES
Problem: Mobility Impaired (Adult and Pediatric)  Goal: *Acute Goals and Plan of Care (Insert Text)  STG:  (1.)Ms. Emery Bryant will move from supine to sit and sit to supine , scoot up and down and roll side to side with MINIMAL ASSIST within 3 treatment day(s). (2.)Ms. Emery Bryant will transfer from bed to chair and chair to bed with MINIMAL ASSIST using the least restrictive device within 3 treatment day(s). (3.)Ms. Emery Bryant will ambulate with MAXIMAL ASSIST for 2 feet with the least restrictive device within 3 treatment day(s). (4.)Ms. Emery Bryant will perform standing static and dynamic balance activities x 5 minutes with MAXIMAL ASSIST to improve safety within 3 day(s). (5.)Ms. Emery Bryant will perform LE exercises with 1 to 2 cues for form within 3 days to improve strength for functional transfers and ambulation. (6.)Ms. Emery Bryant will perform wheelchair mobility 150 feet with SUPERVISION within 3 days to maximize independence with functional mobility. (7.)Ms. Emery Bryant will maintain NWB LLE, WBAT RLE throughout all functional mobility within 3 days. LTG:  (1.)Ms. Emery Bryant will move from supine to sit and sit to supine , scoot up and down and roll side to side in bed with CONTACT GUARD ASSIST within 7 treatment day(s). (2.)Ms. Emery Bryant will transfer from bed to chair and chair to bed with CONTACT GUARD ASSIST using the least restrictive device within 7 treatment day(s). (3.)Ms. Emery Bryant will ambulate with MINIMAL ASSIST for 2 feet with the least restrictive device within 7 treatment day(s). (4.)Ms. Emery Bryant will perform standing static and dynamic balance activities x 5 minutes with MINIMAL ASSIST to improve safety within 7 day(s). (5.)Ms. Emery Bryant will perform wheelchair mobility 250 feet with MODIFIED INDEPENDENCE within 7 days to maximize independence with functional mobility.  ________________________________________________________________________________________________    PHYSICAL THERAPY: Daily Note, Treatment Day: 3rd, PM 8/12/2018  INPATIENT: Hospital Day: 6  Payor: Yudelka Mckeon / Plan: [address] [place] [address] HMO / Product Type: Managed Care Medicare /    LLE NWB, LUPE WBBLANKA  NAME/AGE/GENDER: Gage Espinosa is a 75 y.o. female   PRIMARY DIAGNOSIS: hip fracture  Femur fracture, left (HCC) Femur fracture, left (HCC) Femur fracture, left (HCC)  Procedure(s) (LRB):  Prophylactic nailing right femur / Open reduction internal fixation left subtrochanteric left femur / Open biopsy left proximal femur (Left)  4 Days Post-Op  ICD-10: Treatment Diagnosis:    · Difficulty in walking, Not elsewhere classified (R26.2)  · Repeated Falls (R29.6)  · History of falling (Z91.81)   Precaution/Allergies:  Penicillins      ASSESSMENT:     Ms. Calli Sharp presents sitting up from AM treatment. She is agreeable to treatment. She transferred to bed from chair using sliding board with max/total assist x 2. She was using UE's and right LE to move across sliding board slightly down hill. She puts forth good effort but was just too tired to complete the task. Sit to supine and positioned in bed with total assist.  No goals met . Will continue PT efforts. This section established at most recent assessment   PROBLEM LIST (Impairments causing functional limitations):  1. Decreased Strength  2. Decreased ADL/Functional Activities  3. Decreased Transfer Abilities  4. Decreased Ambulation Ability/Technique  5. Decreased Balance  6. Increased Pain  7. Decreased Activity Tolerance  8. Decreased Pacing Skills  9. Increased Shortness of Breath  10. Decreased Knowledge of Precautions  11. Decreased [place] with Home Exercise Program   INTERVENTIONS PLANNED: (Benefits and precautions of physical therapy have been discussed with the patient.)  1. Balance Exercise  2. Bed Mobility  3. Family Education  4. Gait Training  5. Home Exercise Program (HEP)  6. Therapeutic Activites  7. Therapeutic Exercise/Strengthening  8. Transfer Training  9.  Patient Education  10. Group Therapy     TREATMENT PLAN: Frequency/Duration: twice daily for duration of hospital stay  Rehabilitation Potential For Stated Goals: Good     RECOMMENDED REHABILITATION/EQUIPMENT: (at time of discharge pending progress): Due to the probability of continued deficits (see above) this patient will likely need continued skilled physical therapy after discharge. 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 She reports her L leg 'gave out' and she fell backwards. She is noted to have a L proximal subtrochanteric pathologic fracture on x-ray. At present, she reports her L hip pain is controlled. She denies any chest pain or shortness of breath.     Hospitalist service asked to admit for L hip fracture. Past Medical History/Comorbidities:   Ms. Clinton Shelton  has a past medical history of Abnormal EKG (2/16/2016); Aortic valve insufficiency (2/16/2016); Cancer ([facility] [address] Hyperlipidemia (2/16/2016); Hypertension (2/16/2016); and Obesity (2/16/2016). Ms. Clinton Shelton  has a past surgical history that includes hx tubal ligation. Social History/Living Environment:   Home Environment: Private residence  Wheelchair Ramp: Yes  One/Two Story Residence: One story  Living Alone: No  Support Systems: Child(ezequiel)  Patient Expects to be Discharged to[de-identified] Unknown  Current DME Used/Available at Home: Wheelchair  Prior Level of Function/Work/Activity:  She lives with 3 daughters in a single story home and typically transfers independently to a wheelchair and is modified independent at wheelchair level.      Number of Personal Factors/Comorbidities that affect the Plan of Care: 1-2: MODERATE COMPLEXITY   EXAMINATION:   Most Recent Physical Functioning:   Gross Assessment:                  Posture:     Balance:  Sitting: Impaired  Sitting - Static: Good (unsupported)  Sitting - Dynamic: Fair (occasional) Bed Mobility:     Wheelchair Mobility:     Transfers:  Sliding Board : Assist x2 (initially min assist but as pt fatigued she required max )  Gait:            Body Structures Involved:  1. Nerves  2. Lungs  3. Bones  4. Joints  5. Muscles  6. Ligaments Body Functions Affected:  1. Sensory/Pain  2. Neuromusculoskeletal  3. Movement Related Activities and Participation Affected:  1. Mobility  2. Self Care  3. Domestic Life  4. Interpersonal Interactions and Relationships  5. Community, Social and [place]   Number of elements that affect the Plan of Care: 4+: HIGH COMPLEXITY   CLINICAL PRESENTATION:   Presentation: Evolving clinical presentation with changing clinical characteristics: MODERATE COMPLEXITY   CLINICAL DECISION MAKIN [facility] Inpatient Short Form  How much difficulty does the patient currently have. .. Unable A Lot A Little None   1. Turning over in bed (including adjusting bedclothes, sheets and blankets)? [] 1   [x] 2   [] 3   [] 4   2. Sitting down on and standing up from a chair with arms ( e.g., wheelchair, bedside commode, etc.)   [x] 1   [] 2   [] 3   [] 4   3. Moving from lying on back to sitting on the side of the bed? [] 1   [x] 2   [] 3   [] 4   How much help from another person does the patient currently need. .. Total A Lot A Little None   4. Moving to and from a bed to a chair (including a wheelchair)? [] 1   [x] 2   [] 3   [] 4   5. Need to walk in hospital room? [x] 1   [] 2   [] 3   [] 4   6. Climbing 3-5 steps with a railing? [x] 1   [] 2   [] 3   [] 4   © , Trustees of [address] [place] [address] [address] [address], under license to [employer].  All rights reserved      Score:  Initial: 9 Most Recent: X (Date: -- )    Interpretation of Tool:  Represents activities that are increasingly more difficult (i.e. Bed mobility, Transfers, Gait). Score 24 23 22-20 19-15 14-10 9-7 6     Modifier CH CI CJ CK CL CM CN      ? Mobility - Walking and Moving Around:     - CURRENT STATUS: CM - 80%-99% impaired, limited or restricted    - GOAL STATUS: CK - 40%-59% impaired, limited or restricted    - D/C STATUS:  ---------------To be determined---------------  Payor: [employer] MEDICARE / Plan: [address] [place] [address] HMO / Product Type: [employer] Care Medicare /      Medical Necessity:     · Patient demonstrates good rehab potential due to higher previous functional level. Reason for Services/Other Comments:  · Patient continues to require modification of therapeutic interventions to increase complexity of exercises. Use of outcome tool(s) and clinical judgement create a POC that gives a: Questionable prediction of patient's progress: MODERATE COMPLEXITY            TREATMENT:   (In addition to Assessment/Re-Assessment sessions the following treatments were rendered)   Pre-treatment Symptoms/Complaints:  \"I am tired. \"  Pain: Initial:      Post Session:  0/10     Therapeutic Activity: (    23 minutes): Therapeutic activities including bed mobility training including transfer training (sliding board transfer), static/dynamic sitting balance training, scooting, posture training, Patient education to improve mobility, strength and balance. Required min visual verbal and manual cues   to promote static and dynamic balance in sitting and promote coordination of bilateral, lower extremity(s).             Date:  8/10/18 Date:  8/12/18  Date:     ACTIVITY/EXERCISE AM PM AM PM AM PM   AP's 2x10B A  X 10 B      LAQ 2x10B A  X 10 B      Hip flexion 2x10B A  X 10 B      Hip abduction 2x10B A  X 10 B                                 B = bilateral; AA = active assistive; A = active; P = passive    Braces/Orthotics/Lines/Etc:   · none  Treatment/Session Assessment:    · Response to Treatment:  No complaints   · Interdisciplinary Collaboration:   o Physical Therapy Assistant  o Registered Nurse  · After treatment position/precautions:   o Supine in bed  o Bed alarm/tab alert on  o Bed/Chair-wheels locked  o Bed in low position  o Call light within reach  o RN notified  o Family at bedside   · Compliance with Program/Exercises: Compliant  · Recommendations/Intent for next treatment session: \"Next visit will focus on advancements to more challenging activities and reduction in assistance provided\".   Total Treatment Duration:  PT Patient Time In/Time Out  Time In: 1330  Time Out: Ziggy Aldana, PTA

## 2018-08-12 NOTE — PROGRESS NOTES
Orthopedic Joint Progress Note    2018  Admit Date: 2018  Admit Diagnosis: hip fracture  Femur fracture, left (HCC)    4 Days Post-Op    Subjective: no complaints sitting up in chair     Linette Peter     Review of Systems: Pertinent items are noted in HPI. Objective:     PT/OT:     PATIENT MOBILITY    Bed Mobility  Rolling: Moderate assistance  Supine to Sit: Moderate assistance  Sit to Supine: Moderate assistance, Assist x2  Scooting: Maximum assistance  Transfers  Sit to Stand: Maximum assistance, Assist x2  Stand to Sit: Maximum assistance, Assist x2  Bed to Chair: Maximum assistance, Assist x2  Sliding Board : Maximum assistance (Assist x 2)                   Vital Signs:    Blood pressure 122/69, pulse 70, temperature 98.6 °F (37 °C), resp. rate 16, height 5' 11\" (1.803 m), weight 105.2 kg (232 lb), SpO2 97 %.   Temp (24hrs), Av.4 °F (36.9 °C), Min:98 °F (36.7 °C), Max:99.1 °F (37.3 °C)      Pain Control:   Pain Assessment  Pain Scale 1: Numeric (0 - 10)  Pain Intensity 1: 7  Pain Onset 1: post op  Pain Location 1: Hip, Leg  Pain Orientation 1: Left, Right  Pain Description 1: Aching  Pain Intervention(s) 1: Medication (see MAR)    Meds:  Current Facility-Administered Medications   Medication Dose Route Frequency    0.9% sodium chloride infusion 250 mL  250 mL IntraVENous PRN    sodium chloride (NS) flush 10 mL  10 mL InterCATHeter Q8H    sodium chloride (NS) flush 10 mL  10 mL InterCATHeter PRN    amLODIPine (NORVASC) tablet 5 mg  5 mg Oral DAILY    letrozole (FEMARA) tablet 2.5 mg (Patient Supplied)  2.5 mg Oral DAILY    ondansetron (ZOFRAN ODT) tablet 8 mg  8 mg Oral Q8H PRN    sodium chloride (NS) flush 5-10 mL  5-10 mL IntraVENous Q8H    sodium chloride (NS) flush 5-10 mL  5-10 mL IntraVENous PRN    acetaminophen (TYLENOL) tablet 650 mg  650 mg Oral Q8H    oxyCODONE IR (ROXICODONE) tablet 5-10 mg  5-10 mg Oral Q4H PRN    ondansetron (ZOFRAN) injection 4 mg  4 mg IntraVENous Q4H PRN    docusate sodium (COLACE) capsule 100 mg  100 mg Oral BID    alum-mag hydroxide-simeth (MYLANTA) oral suspension 30 mL  30 mL Oral Q4H PRN    calcium-vitamin D (OS-JORGE) 500 mg-200 unit tablet  1 Tab Oral TID WITH MEALS    enoxaparin (LOVENOX) injection 30 mg  30 mg SubCUTAneous Q24H    traMADol (ULTRAM) tablet 50 mg  50 mg Oral Q6H PRN    hydrALAZINE (APRESOLINE) 20 mg/mL injection 10 mg  10 mg IntraVENous Q6H PRN        LAB:    Lab Results   Component Value Date/Time    INR 1.1 08/07/2018 07:27 PM     Lab Results   Component Value Date/Time    HGB 9.7 (L) 08/11/2018 04:42 AM    HGB 7.9 (L) 08/10/2018 06:17 AM    HGB 8.4 (L) 08/09/2018 09:06 PM       Wound Hip (Active)   DRESSING STATUS Clean, dry, and intact 8/12/2018  8:30 AM   DRESSING TYPE 4 x 4;ABD pad;Transparent film 8/12/2018  8:30 AM   Incision site well approximated? Yes 8/11/2018  5:53 AM   Drainage Amount  Small  8/11/2018  5:53 AM   Drainage Color Serosanguinous 8/11/2018  5:53 AM   Wound Odor None 8/11/2018  5:53 AM   Periwound Skin Condition Intact 8/11/2018  5:53 AM   Dressing Type Applied 4 x 4;ABD pad;Transparent film 8/11/2018  5:53 AM   Procedure Tolerated Well 8/11/2018  5:53 AM   Number of days:4       Wound Hip Right (Active)   DRESSING STATUS Clean, dry, and intact 8/12/2018  8:30 AM   DRESSING TYPE 4 x 4;ABD pad;Transparent film 8/12/2018  8:30 AM   Incision site well approximated?  Yes 8/11/2018  5:53 AM   Drainage Amount  Moderate 8/11/2018  5:53 AM   Drainage Color Serosanguinous 8/11/2018  5:53 AM   Wound Odor None 8/8/2018  7:08 PM   Periwound Skin Condition Intact 8/11/2018  5:53 AM   Dressing Type Applied 4 x 4;ABD pad;Transparent film 8/11/2018  5:53 AM   Procedure Tolerated Well 8/11/2018  5:53 AM   Number of days:4             Physical Exam:  No significant changes    Assessment:      Principal Problem:    Femur fracture, left ([address] (8/7/2018)    Active Problems:    Hypertension (2/16/2016)      Overview: UNSPECIFIED Obesity (2/16/2016)      Overview: UNSPECIFIED       Malignant neoplasm metastatic to bone ([facility] [address] (7/31/2016)      Overview: Last Assessment & Plan:       1. Complete radiation therapy to the patient's sacrum and femurs as       directed by Dr. Alexander Phillips. 2.  Return to the office in 6 weeks with x-rays of the pelvis on arrival.      3.  Instruct the patient to advance her weightbearing as tolerated as she       continues to enjoy a reduction in pain following the radiation therapy.       Anemia due to chronic kidney disease treated with erythropoietin (7/25/2017)      Stage 3 chronic kidney disease (8/8/2018)      Hypokalemia (8/8/2018)         Plan:     Continue PT/OT/Rehab  Observe  Continue care  Transfer to rehab when stable    Patient Expects to be Discharged to[de-identified] Unknown     Signed By: Phong Draper MD

## 2018-08-12 NOTE — PROGRESS NOTES
Problem: Mobility Impaired (Adult and Pediatric)  Goal: *Acute Goals and Plan of Care (Insert Text)  STG:  (1.)Ms. Clinton Shelton will move from supine to sit and sit to supine , scoot up and down and roll side to side with MINIMAL ASSIST within 3 treatment day(s). (2.)Ms. Clinton Shelton will transfer from bed to chair and chair to bed with MINIMAL ASSIST using the least restrictive device within 3 treatment day(s). (3.)Ms. Clinton Shelton will ambulate with MAXIMAL ASSIST for 2 feet with the least restrictive device within 3 treatment day(s). (4.)Ms. Clinton Shelton will perform standing static and dynamic balance activities x 5 minutes with MAXIMAL ASSIST to improve safety within 3 day(s). (5.)Ms. Clinton Shelton will perform LE exercises with 1 to 2 cues for form within 3 days to improve strength for functional transfers and ambulation. (6.)Ms. Clinton Shelton will perform wheelchair mobility 150 feet with SUPERVISION within 3 days to maximize independence with functional mobility. (7.)Ms. Clinton Shelton will maintain NWB LLE, WBAT RLE throughout all functional mobility within 3 days. LTG:  (1.)Ms. Clinton Shelton will move from supine to sit and sit to supine , scoot up and down and roll side to side in bed with CONTACT GUARD ASSIST within 7 treatment day(s). (2.)Ms. Clinton Shelton will transfer from bed to chair and chair to bed with CONTACT GUARD ASSIST using the least restrictive device within 7 treatment day(s). (3.)Ms. Clinton Shelton will ambulate with MINIMAL ASSIST for 2 feet with the least restrictive device within 7 treatment day(s). (4.)Ms. Clinton Shelton will perform standing static and dynamic balance activities x 5 minutes with MINIMAL ASSIST to improve safety within 7 day(s). (5.)Ms. Clinton Shelton will perform wheelchair mobility 250 feet with MODIFIED INDEPENDENCE within 7 days to maximize independence with functional mobility.  ________________________________________________________________________________________________    PHYSICAL THERAPY: Daily Note, Treatment Day: 3rd, AM 8/12/2018  INPATIENT: Hospital Day: 6  Payor: Yolande Colon / Plan: [address] [place] [address] HMO / Product Type: Managed Care Medicare /    LLE NWB, RLE WBAT  NAME/AGE/GENDER: Renay Fairbanks is a 75 y.o. female   PRIMARY DIAGNOSIS: hip fracture  Femur fracture, left (HCC) Femur fracture, left (HCC) Femur fracture, left (HCC)  Procedure(s) (LRB):  Prophylactic nailing right femur / Open reduction internal fixation left subtrochanteric left femur / Open biopsy left proximal femur (Left)  4 Days Post-Op  ICD-10: Treatment Diagnosis:    · Difficulty in walking, Not elsewhere classified (R26.2)  · Repeated Falls (R29.6)  · History of falling (Z91.81)   Precaution/Allergies:  Penicillins      ASSESSMENT:     Ms. Kelsey Triplett presents sitting EOB with [place]. She is agreeable to treatment today. She transferred to chair using sliding board with minimal assist x 2 until very end she fatigued and required max to get scooted over and back in chair. She was using UE's and right LE to move across sliding board. Pt performed bilateral LE ex as listed below. She was left up in chair with PCT attending. No goals met yet but she is progressing slowly toward all goals. Will continue PT efforts. This section established at most recent assessment   PROBLEM LIST (Impairments causing functional limitations):  1. Decreased Strength  2. Decreased ADL/Functional Activities  3. Decreased Transfer Abilities  4. Decreased Ambulation Ability/Technique  5. Decreased Balance  6. Increased Pain  7. Decreased Activity Tolerance  8. Decreased Pacing Skills  9. Increased Shortness of Breath  10. Decreased Knowledge of Precautions  11. Decreased [place] with Home Exercise Program   INTERVENTIONS PLANNED: (Benefits and precautions of physical therapy have been discussed with the patient.)  1. Balance Exercise  2. Bed Mobility  3. Family Education  4. Gait Training  5. Home Exercise Program (HEP)  6. Therapeutic Activites  7.  Therapeutic Exercise/Strengthening  8. Transfer Training  9. Patient Education  10. Group Therapy     TREATMENT PLAN: Frequency/Duration: twice daily for duration of hospital stay  Rehabilitation Potential For Stated Goals: Good     RECOMMENDED REHABILITATION/EQUIPMENT: (at time of discharge pending progress): Due to the probability of continued deficits (see above) this patient will likely need continued skilled physical therapy after discharge. 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 She reports her L leg 'gave out' and she fell backwards. She is noted to have a L proximal subtrochanteric pathologic fracture on x-ray. At present, she reports her L hip pain is controlled. She denies any chest pain or shortness of breath.     Hospitalist service asked to admit for L hip fracture. Past Medical History/Comorbidities:   Ms. Marcos Eastman  has a past medical history of Abnormal EKG (2/16/2016); Aortic valve insufficiency (2/16/2016); Cancer ([facility] [address] Hyperlipidemia (2/16/2016); Hypertension (2/16/2016); and Obesity (2/16/2016). Ms. Marcos Eastman  has a past surgical history that includes hx tubal ligation.   Social History/Living Environment:   Home Environment: Private residence  Wheelchair Ramp: Yes  One/Two Story Residence: One story  Living Alone: No  Support Systems: Child(ezequiel)  Patient Expects to be Discharged to[de-identified] Unknown  Current DME Used/Available at Home: Wheelchair  Prior Level of Function/Work/Activity:  She lives with 3 daughters in a single story home and typically transfers independently to a wheelchair and is modified independent at wheelchair level. Number of Personal Factors/Comorbidities that affect the Plan of Care: 1-2: MODERATE COMPLEXITY   EXAMINATION:   Most Recent Physical Functioning:   Gross Assessment:                  Posture:     Balance:  Sitting: Impaired  Sitting - Static: Good (unsupported)  Sitting - Dynamic: Fair (occasional) Bed Mobility:     Wheelchair Mobility:     Transfers:  Sliding Board : Assist x2 (initially min assist but as pt fatigued she required max )  Gait:            Body Structures Involved:  1. Nerves  2. Lungs  3. Bones  4. Joints  5. Muscles  6. Ligaments Body Functions Affected:  1. Sensory/Pain  2. Neuromusculoskeletal  3. Movement Related Activities and Participation Affected:  1. Mobility  2. Self Care  3. Domestic Life  4. Interpersonal Interactions and Relationships  5. Community, Social and [place]   Number of elements that affect the Plan of Care: 4+: HIGH COMPLEXITY   CLINICAL PRESENTATION:   Presentation: Evolving clinical presentation with changing clinical characteristics: MODERATE COMPLEXITY   CLINICAL DECISION MAKIN [facility] Inpatient Short Form  How much difficulty does the patient currently have. .. Unable A Lot A Little None   1. Turning over in bed (including adjusting bedclothes, sheets and blankets)? [] 1   [x] 2   [] 3   [] 4   2. Sitting down on and standing up from a chair with arms ( e.g., wheelchair, bedside commode, etc.)   [x] 1   [] 2   [] 3   [] 4   3. Moving from lying on back to sitting on the side of the bed? [] 1   [x] 2   [] 3   [] 4   How much help from another person does the patient currently need. .. Total A Lot A Little None   4. Moving to and from a bed to a chair (including a wheelchair)? [] 1   [x] 2   [] 3   [] 4   5. Need to walk in hospital room? [x] 1   [] 2   [] 3   [] 4   6. Climbing 3-5 steps with a railing?    [x] 1   [] 2   [] 3   [] 4   © , Trustees of [facility], under license to [employer]. All rights reserved      Score:  Initial: 9 Most Recent: X (Date: -- )    Interpretation of Tool:  Represents activities that are increasingly more difficult (i.e. Bed mobility, Transfers, Gait). Score 24 23 22-20 19-15 14-10 9-7 6     Modifier CH CI CJ CK CL CM CN      ? Mobility - Walking and Moving Around:     - CURRENT STATUS: CM - 80%-99% impaired, limited or restricted    - GOAL STATUS: CK - 40%-59% impaired, limited or restricted    - D/C STATUS:  ---------------To be determined---------------  Payor: [employer] MEDICARE / Plan: [address] [place] [address] HMO / Product Type: [employer] Care Medicare /      Medical Necessity:     · Patient demonstrates good rehab potential due to higher previous functional level. Reason for Services/Other Comments:  · Patient continues to require modification of therapeutic interventions to increase complexity of exercises. Use of outcome tool(s) and clinical judgement create a POC that gives a: Questionable prediction of patient's progress: MODERATE COMPLEXITY            TREATMENT:   (In addition to Assessment/Re-Assessment sessions the following treatments were rendered)   Pre-treatment Symptoms/Complaints:  \"I am tired. \"  Pain: Initial:      Post Session:  0/10     Therapeutic Activity: (    14 minutes): Therapeutic activities including bed mobility training including transfer training (sliding board transfer), static/dynamic sitting balance training, scooting, posture training, LE ex and patient education to improve mobility, strength and balance. Required min visual verbal and manual cues   to promote static and dynamic balance in sitting and promote coordination of bilateral, lower extremity(s).             Date:  8/10/18 Date:  8/12/18 Date:     ACTIVITY/EXERCISE AM PM AM PM AM PM   AP's 2x10B A  X 10 B      LAQ 2x10B A  X 10 B      Hip flexion 2x10B A  X 10 B      Hip abduction 2x10B A  X 10 B B = bilateral; AA = active assistive; A = active; P = passive    Braces/Orthotics/Lines/Etc:   · none  Treatment/Session Assessment:    · Response to Treatment:  No complaints   · Interdisciplinary Collaboration:   o Physical Therapy Assistant  o Certified Occupational Therapy Assistant  o Registered Nurse  · After treatment position/precautions:   o Up in chair  o Bed alarm/tab alert on  o Bed/Chair-wheels locked  o Bed in low position  o Call light within reach  o RN notified  o Family at bedside   · Compliance with Program/Exercises: Compliant  · Recommendations/Intent for next treatment session: \"Next visit will focus on advancements to more challenging activities and reduction in assistance provided\".   Total Treatment Duration:  PT Patient Time In/Time Out  Time In: 4856  Time Out: [address] [facility] [place], [facility]

## 2018-08-12 NOTE — PROGRESS NOTES
1. Patient will verbalize and demonstrate understanding of hip precautions with 100% accuracy during ADL. - MET  2. Patient will complete functional transfers with CGA and adaptive equipment as needed. 3. Patient will complete lower body bathing and dressing with mod A and adaptive equipment as needed. - CONTINUE to ADDRESS  4. Patient will complete toileting with mod A.   5. Patient will tolerate at least 10 minutes of BUE therapeutic exercises to increase strength in BUE to aid in functional transfers. 6. Patient will tolerate at least 23 minutes of OT treatment with no rest breaks to increase activity tolerance for ADLs. Timeframe: 7 visits       OCCUPATIONAL THERAPY: Daily Note, Treatment Day: 2nd and AM 8/12/2018  INPATIENT: Hospital Day: 6  Payor: Olga Mendez / Plan: [address] [place] [address] HMO / Product Type: Managed Care Medicare /      NAME/AGE/GENDER: Joy Stark is a 75 y.o. female   PRIMARY DIAGNOSIS:  hip fracture  Femur fracture, left (HCC) Femur fracture, left ([address] Femur fracture, left (HCC)  Procedure(s) (LRB):  Prophylactic nailing right femur / Open reduction internal fixation left subtrochanteric left femur / Open biopsy left proximal femur (Left)  4 Days Post-Op  ICD-10: Treatment Diagnosis:    · Generalized Muscle Weakness (M62.81)  · Other lack of cordination (R27.8)  · History of falling (Z91.81)   Precautions/Allergies:     Penicillins      ASSESSMENT:     Ms. Tuan Ron presents for L femur fracture after sustaining a fall transferring from car to w/c. Pt is currently NWB in LLE and WBAT in RLE. Pt completed bed mobility with the assistance listed below. Pt completed sponge bath and dressing with the assistance listed below. Pt was given a long handle sponge. Continue POC. This section established at most recent assessment   PROBLEM LIST (Impairments causing functional limitations):  1. Decreased Strength  2. Decreased ADL/Functional Activities  3.  Decreased Transfer Abilities  4. Decreased Ambulation Ability/Technique  5. Decreased Balance  6. Increased Pain  7. Decreased Activity Tolerance  8. Increased Fatigue  9. Decreased Knowledge of Precautions   INTERVENTIONS PLANNED: (Benefits and precautions of occupational therapy have been discussed with the patient.)  1. Activities of daily living training  2. Adaptive equipment training  3. Balance training  4. Clothing management  5. Community reintergration  6. Donning&doffing training  7. Royce tech training  8. Re-evaluation  9. Therapeutic activity  10. Therapeutic exercise     TREATMENT PLAN: Frequency/Duration: Follow patient 6x/week to address above goals. Rehabilitation Potential For Stated Goals: Fair     RECOMMENDED REHABILITATION/EQUIPMENT: (at time of discharge pending progress): Due to the probability of continued deficits (see above) this patient will likely need continued skilled occupational therapy after discharge. Equipment:    TBD              OCCUPATIONAL PROFILE AND HISTORY:   History of Present Injury/Illness (Reason for Referral):  See H&P  Past Medical History/Comorbidities:   Ms. Savannah Crowley  has a past medical history of Abnormal EKG (2/16/2016); Aortic valve insufficiency (2/16/2016); Cancer ([facility] [address] Hyperlipidemia (2/16/2016); Hypertension (2/16/2016); and Obesity (2/16/2016). Ms. Savannah Crowley  has a past surgical history that includes hx tubal ligation. Social History/Living Environment:   Home Environment: Private residence  Wheelchair Ramp: Yes  One/Two Story Residence: One story  Living Alone: No  Support Systems: Child(ezequiel)  Patient Expects to be Discharged to[de-identified] Unknown  Current DME Used/Available at Home: Wheelchair  Prior Level of Function/Work/Activity:  Independent with ADLs; use of w/c for all mobility. Personal Factors:          Sex:  female        Age:  75 y.o.         Other factors that influence how disability is experienced by the patient:  Multiple co-morbidities    Number of Personal Factors/Comorbidities that affect the Plan of Care: Expanded review of therapy/medical records (1-2):  MODERATE COMPLEXITY   ASSESSMENT OF OCCUPATIONAL PERFORMANCE[de-identified]   Activities of Daily Living:   Basic ADLs (From Assessment) Complex ADLs (From Assessment)   Feeding: Independent  Oral Facial Hygiene/Grooming: Independent  Bathing: Maximum assistance  Upper Body Dressing: Independent  Lower Body Dressing: Maximum assistance  Toileting: Maximum assistance Instrumental ADL  Meal Preparation: Total assistance  Homemaking: Total assistance   Grooming/Bathing/Dressing Activities of Daily Living   Grooming  Washing Face: Supervision/set-up Cognitive Retraining  Safety/Judgement: Fall prevention   Upper Body Bathing  Bathing Assistance: Supervision/set-up  Position Performed: Seated edge of bed     Lower Body Bathing  Bathing Assistance: Moderate assistance  Perineal  : Minimum assistance  Lower Body : Moderate assistance  Bed mobility: min assistance    Upper Body Dressing Assistance  Dressing Assistance: [address] Gown: Minimum  assistance     Lower Body Dressing Assistance  Socks: Total assistance (dependent)         Most Recent Physical Functioning:   Gross Assessment:                  Posture:  Posture (WDL): Exceptions to WDL  Posture Assessment: Forward head  Balance:    Bed Mobility:     Wheelchair Mobility:     Transfers:               Patient Vitals for the past 6 hrs:   BP BP Patient Position SpO2 Pulse   08/12/18 0712 122/69 At rest 97 % 70       Mental Status  Neurologic State: Alert  Orientation Level: Oriented X4  Cognition: Follows commands  Perception: Tactile, Verbal  Perseveration: Verbal cues provided, Tactile cues provided  Safety/Judgement: Fall prevention                          Physical Skills Involved:  1. Balance  2. Strength  3. Activity Tolerance  4. Gross Motor Control  5.  Pain (acute) Cognitive Skills Affected (resulting in the inability to perform in a timely and safe manner): 1. none Psychosocial Skills Affected:  1. Habits/Routines  2. Environmental Adaptation   Number of elements that affect the Plan of Care: 5+:  HIGH COMPLEXITY   CLINICAL DECISION MAKIN[address] [place] [address] AM-PAC 6 Clicks   Daily Activity Inpatient Short Form  How much help from another person does the patient currently need. .. Total A Lot A Little None   1. Putting on and taking off regular lower body clothing? [] 1   [x] 2   [] 3   [] 4   2. Bathing (including washing, rinsing, drying)? [] 1   [x] 2   [] 3   [] 4   3. Toileting, which includes using toilet, bedpan or urinal?   [] 1   [x] 2   [] 3   [] 4   4. Putting on and taking off regular upper body clothing? [] 1   [] 2   [] 3   [x] 4   5. Taking care of personal grooming such as brushing teeth? [] 1   [] 2   [] 3   [x] 4   6. Eating meals? [] 1   [] 2   [] 3   [x] 4   © , Trustees of [address] [place] [address], under license to [employer]. All rights reserved      Score:  Initial: 21 Most Recent: X (Date: -- )    Interpretation of Tool:  Represents activities that are increasingly more difficult (i.e. Bed mobility, Transfers, Gait). Score 24 23 22-20 19-15 14-10 9-7 6     Modifier CH CI CJ CK CL CM CN      ? Self Care:     - CURRENT STATUS: CJ - 20%-39% impaired, limited or restricted    - GOAL STATUS: CI - 1%-19% impaired, limited or restricted    - D/C STATUS:  ---------------To be determined---------------  Payor: Laura De Paz / Plan: [address] [place] [address] HMO / Product Type: Managed Care Medicare /      Medical Necessity:     · Patient is expected to demonstrate progress in strength, balance, coordination and functional technique to decrease assistance required with ADls and functional moiblity. .  Reason for Services/Other Comments:  · Patient continues to require skilled intervention due to medical complications and patient unable to attend/participate in therapy as expected.    Use of outcome tool(s) and clinical judgement create a POC that gives a: MODERATE COMPLEXITY         TREATMENT:   (In addition to Assessment/Re-Assessment sessions the following treatments were rendered)     Pre-treatment Symptoms/Complaints:  Pt states, \"I'm not sure if I need to use the potty, but ill try. \"  Pain: Initial:      Post Session:  same   Self Care: (24): Procedure(s) (per grid) utilized to improve and/or restore self-care/home management as related to dressing and bathing. Required min verbal and manual cueing to facilitate activities of daily living skills. · nasal cannula. Treatment/Session Assessment:    · Response to Treatment:  Good participation. · Interdisciplinary Collaboration:   o Certified Occupational Therapy Assistant  o Registered Nurse  · After treatment position/precautions:   o Bed alarm/tab alert on  o Bed/Chair-wheels locked  o Call light within reach  o Family at bedside  o edge of bed with PTA   · Compliance with Program/Exercises: Will assess as treatment progresses. · Recommendations/Intent for next treatment session: \"Next visit will focus on advancements to more challenging activities and reduction in assistance provided\".   Total Treatment Duration:   Time in: 3650  Time out: [address] Bit[address]

## 2018-08-12 NOTE — PROGRESS NOTES
Hospitalist Progress Note     Admit Date:  2018  6:22 PM   Name:  Linette Peter   Age:  75 y.o.  :  1945   MRN:  [id number]   PCP:  Tricia Pak NP  Treatment Team: Attending Provider: Giles Oppenheim. Khushboo Alexander MD; Consulting Provider: Sivakumar Rose MD; Utilization Review: Cass Burkett RN; Care Manager: Nguyen Mejias RN; Consulting Provider: Luly Estrella MD; Charge Nurse: Helen Ackerman    Subjective:   CC:fall    Pt is a 74 yo female who presented to the ED after falling while transferring from a car to her wheelchair. Pt has PMH of metastatic breast cancer, HTN, obesity, CKD stage 3-4, wheelchair bound. Pt reported that her left leg gave out and she fell backward. Xray showed fracture of left proximal subtrochanteric pathologic fracture. Additionally it was felt that patient had a pending pathologic fracture of her right femur. Pt went to surgery for prophylactic nailing of the right femur, and an ORIF of the left subtrochanteric left femur. They also did an open biopsy of the left proximal femur. Pt doing well post operatively, pain controlled. PT/OT to work with pt whose baseline is wheelchair bound. H/H 9.7 today, WBC 2.2. Per nephrology to continue IV fluids stopped. Nephrology is following as ALEISHA resolves. Lengthy discussion with pt and her daughters regarding discharge planning and end of life issues - decision made by pt and family to pursue home hospice. Hospice coordination spoke with family and felt home health initially would be more appropriate, transitioning to hospice at some time in the future. Per ortho should be ready for d/c soon. Pt off of IVF and her renal function is improving with her BUN/Cr 34/2. 24. Nephrology has signed off. Pt's H&H are improving. PT continues with rodriguez due to mobility issues. Per ortho pt should be ready for d/c soon, she does not want to go to [facility], wants to go home with home health.   Pt has many family members willing to assist her at home. Objective:     Patient Vitals for the past 24 hrs:   Temp Pulse Resp BP SpO2   08/12/18 1103 98 °F (36.7 °C) 74 18 135/76 96 %   08/12/18 0712 98.6 °F (37 °C) 70 16 122/69 97 %   08/12/18 0415 98.4 °F (36.9 °C) 71 17 144/80 95 %   08/11/18 2307 98 °F (36.7 °C) 75 17 144/80 94 %   08/11/18 1919 99.1 °F (37.3 °C) 82 18 138/72 94 %   08/11/18 1536 98 °F (36.7 °C) 69 18 122/72 96 %     Oxygen Therapy  O2 Sat (%): 96 % (08/12/18 1103)  Pulse via Oximetry: 97 beats per minute (08/08/18 1755)  O2 Device: Room air (08/11/18 1536)  O2 Flow Rate (L/min): 6 l/min (08/08/18 1755)  ETCO2 (mmHg): 99 mmHg (08/08/18 2011)    Intake/Output Summary (Last 24 hours) at 08/12/18 1241  Last data filed at 08/12/18 0639   Gross per 24 hour   Intake                0 ml   Output             1875 ml   Net            -1875 ml         General:    Well nourished. Awake and alert. Head:  Normocephalic, atraumatic  Eyes:  Extraocular movements intact, normal sclera  Neck:  Supple, no lymphadenopathy  CV:   RRR. No  Murmurs, clicks, or gallops  Lungs:   Unlabored, no cyanosis  Abdomen:   Soft, nondistended, nontender. Extremities: Warm and dry. No cyanosis or edema. Dressings intact over dressings bilaterally. Skin:     No rashes or jaundice. Neuro:  No gross focal deficits  Psych:  Mood and affect appropriate    Data Review:  I have reviewed all labs, meds, telemetry events, and studies from the last 24 hours.     Recent Results (from the past 24 hour(s))   METABOLIC PANEL, BASIC    Collection Time: 08/12/18  5:33 AM   Result Value Ref Range    Sodium 141 136 - 145 mmol/L    Potassium 4.1 3.5 - 5.1 mmol/L    Chloride 107 98 - 107 mmol/L    CO2 26 21 - 32 mmol/L    Anion gap 8 7 - 16 mmol/L    Glucose 99 65 - 100 mg/dL    BUN 34 (H) 8 - 23 MG/DL    Creatinine 2.24 (H) 0.6 - 1.0 MG/DL    GFR est AA 27 (L) >60 ml/min/1.73m2    GFR est non-AA 23 (L) >60 ml/min/1.73m2    Calcium 7.2 (L) 8.3 - 10.4 MG/DL   MAGNESIUM Collection Time: 08/12/18  5:33 AM   Result Value Ref Range    Magnesium 2.1 1.8 - 2.4 mg/dL        All Micro Results     Procedure Component Value Units Date/Time    CULTURE, URINE [[id number]] Collected:  08/10/18 0141    Order Status:  Completed Specimen:  Urine from [place] Specimen Updated:  08/12/18 0750     Special Requests: NO SPECIAL REQUESTS        Culture result: NO GROWTH 2 DAYS       MSSA/MRSA SC BY PCR, NASAL SWAB [[id number]] Collected:  08/08/18 0251    Order Status:  Completed Specimen:  Swab Updated:  08/08/18 5196     Special Requests: NO SPECIAL REQUESTS        Culture result:         SA target not detected. A MRSA NEGATIVE, SA NEGATIVE test result does not preclude MRSA or SA nasal colonization.           Current Meds:  Current Facility-Administered Medications   Medication Dose Route Frequency    0.9% sodium chloride infusion 250 mL  250 mL IntraVENous PRN    sodium chloride (NS) flush 10 mL  10 mL InterCATHeter Q8H    sodium chloride (NS) flush 10 mL  10 mL InterCATHeter PRN    amLODIPine (NORVASC) tablet 5 mg  5 mg Oral DAILY    letrozole (FEMARA) tablet 2.5 mg (Patient Supplied)  2.5 mg Oral DAILY    ondansetron (ZOFRAN ODT) tablet 8 mg  8 mg Oral Q8H PRN    sodium chloride (NS) flush 5-10 mL  5-10 mL IntraVENous Q8H    sodium chloride (NS) flush 5-10 mL  5-10 mL IntraVENous PRN    acetaminophen (TYLENOL) tablet 650 mg  650 mg Oral Q8H    oxyCODONE IR (ROXICODONE) tablet 5-10 mg  5-10 mg Oral Q4H PRN    ondansetron (ZOFRAN) injection 4 mg  4 mg IntraVENous Q4H PRN    docusate sodium (COLACE) capsule 100 mg  100 mg Oral BID    alum-mag hydroxide-simeth (MYLANTA) oral suspension 30 mL  30 mL Oral Q4H PRN    calcium-vitamin D (OS-JORGE) 500 mg-200 unit tablet  1 Tab Oral TID WITH MEALS    enoxaparin (LOVENOX) injection 30 mg  30 mg SubCUTAneous Q24H    traMADol (ULTRAM) tablet 50 mg  50 mg Oral Q6H PRN    hydrALAZINE (APRESOLINE) 20 mg/mL injection 10 mg  10 mg IntraVENous Q6H PRN       Diet:  DIET REGULAR    Other Studies (last 24 hours):  Xr Chest Sngl V    Result Date: 8/11/2018  PORTABLE CHEST, August 11, 2018 at 1602 hours CLINICAL HISTORY:  Postoperative hypoxemia. COMPARISON:  August 7, 2018. FINDINGS:  AP erect image demonstrates no confluent infiltrate or significant pleural fluid. The heart size is within normal limits without evidence of congestive heart failure or pneumothorax. The bony thorax appears intact on this view, accounting for healed rib fracture deformities. There are severe degenerative changes at the right shoulder. IMPRESSION:  NO ACUTE CARDIOPULMONARY DISEASE IDENTIFIED. Assessment and Plan:     Hospital Problems as of 8/12/2018  Date Reviewed: 7/26/2018          Codes Class Noted - Resolved POA    Stage 3 chronic kidney disease ICD-10-CM: N18.3  ICD-9-CM: 585.3  8/8/2018 - Present Yes        Hypokalemia ICD-10-CM: E87.6  ICD-9-CM: 276.8  8/8/2018 - Present Yes        * (Principal)Femur fracture, left ([address] ICD-10-CM: F11.82DN  ICD-9-CM: 821.00  8/7/2018 - Present Yes        Anemia due to chronic kidney disease treated with erythropoietin ICD-10-CM: N18.9, D63.1  ICD-9-CM: 285.21, 585.9  7/25/2017 - Present Yes        Malignant neoplasm metastatic to bone [facility]) ICD-10-CM: C79.51  ICD-9-CM: 198.5  7/31/2016 - Present Yes    Overview Signed 1/10/2017  6:13 PM by Halima Peña MD     Last Assessment & Plan:   1. Complete radiation therapy to the patient's sacrum and femurs as directed by Dr. Mich Gama. 2.  Return to the office in 6 weeks with x-rays of the pelvis on arrival.  3.  Instruct the patient to advance her weightbearing as tolerated as she continues to enjoy a reduction in pain following the radiation therapy.              Hypertension ICD-10-CM: I10  ICD-9-CM: 401.9  2/16/2016 - Present Yes    Overview Signed 2/16/2016 12:09 PM by Beverly Proper     UNSPECIFIED              Obesity ICD-10-CM: X82.1  ICD-9-CM: 278.00  2/16/2016 - Present Yes    Overview Signed 2/16/2016 12:10 PM by Javier Spangler     UNSPECIFIED                    A/P:    1. Hip fracture  -Pain control and DVT prophylaxis per ortho  -PT/OT  -Cont rodriguez    2. HTN  -Cont amlodipine  -Prn hydralazine    3. Hypokalemia  -Resolved  -Cont to monitor    4. Metastatic breast CA  -Cont Femara  -Restart lbranze on d/c    5. ALEISHA  -Cr 2.24   -Nephrology has signed off but remains available if needed. DC planning/Dispo:  Home with [facility]  DVT ppx:  SCDs per ortho    Case reviewed with supervising physician - MIKE Ma MD    Signed:  KHRIS Morgan

## 2018-08-12 NOTE — PROGRESS NOTES
Subjective:   Daily Progress Note: 2018 10:58 AM    Feels better    Current Facility-Administered Medications   Medication Dose Route Frequency    0.9% sodium chloride infusion 250 mL  250 mL IntraVENous PRN    sodium chloride (NS) flush 10 mL  10 mL InterCATHeter Q8H    sodium chloride (NS) flush 10 mL  10 mL InterCATHeter PRN    amLODIPine (NORVASC) tablet 5 mg  5 mg Oral DAILY    letrozole (FEMARA) tablet 2.5 mg (Patient Supplied)  2.5 mg Oral DAILY    ondansetron (ZOFRAN ODT) tablet 8 mg  8 mg Oral Q8H PRN    sodium chloride (NS) flush 5-10 mL  5-10 mL IntraVENous Q8H    sodium chloride (NS) flush 5-10 mL  5-10 mL IntraVENous PRN    acetaminophen (TYLENOL) tablet 650 mg  650 mg Oral Q8H    oxyCODONE IR (ROXICODONE) tablet 5-10 mg  5-10 mg Oral Q4H PRN    ondansetron (ZOFRAN) injection 4 mg  4 mg IntraVENous Q4H PRN    docusate sodium (COLACE) capsule 100 mg  100 mg Oral BID    alum-mag hydroxide-simeth (MYLANTA) oral suspension 30 mL  30 mL Oral Q4H PRN    calcium-vitamin D (OS-JORGE) 500 mg-200 unit tablet  1 Tab Oral TID WITH MEALS    enoxaparin (LOVENOX) injection 30 mg  30 mg SubCUTAneous Q24H    traMADol (ULTRAM) tablet 50 mg  50 mg Oral Q6H PRN    hydrALAZINE (APRESOLINE) 20 mg/mL injection 10 mg  10 mg IntraVENous Q6H PRN               Objective:     Visit Vitals    /69 (BP 1 Location: Right arm, BP Patient Position: At rest)    Pulse 70    Temp 98.6 °F (37 °C)    Resp 16    Ht 5' 11\" (1.803 m)    Wt 105.2 kg (232 lb)    SpO2 97%    BMI 32.36 kg/m2    O2 Flow Rate (L/min): 6 l/min O2 Device: Room air    Temp (24hrs), Av.4 °F (36.9 °C), Min:98 °F (36.7 °C), Max:99.1 °F (37.3 °C)         08/10 1901 -  0700  In: 692.5 [P.O.:240]  Out: 1831 [Urine:3425]      Visit Vitals    /69 (BP 1 Location: Right arm, BP Patient Position: At rest)    Pulse 70    Temp 98.6 °F (37 °C)    Resp 16    Ht 5' 11\" (1.803 m)    Wt 105.2 kg (232 lb)    SpO2 97%    BMI 32.36 kg/m2     Head: Normocephalic, without obvious abnormality  Neck: no JVD  Lungs: clear to auscultation bilaterally  Heart: regular rate and rhythm  Abdomen: soft, non-tender. Extremities: + edema          Data Review    Recent Results (from the past 48 hour(s))   PLEASE READ & DOCUMENT PPD TEST IN 72 HRS    Collection Time: 08/11/18  2:11 AM   Result Value Ref Range    PPD negative Negative    mm Induration 0 mm   CBC WITH AUTOMATED DIFF    Collection Time: 08/11/18  4:42 AM   Result Value Ref Range    WBC 2.2 (L) 4.3 - 11.1 K/uL    RBC 3.06 (L) 4.05 - 5.2 M/uL    HGB 9.7 (L) 11.7 - 15.4 g/dL    HCT 29.5 (L) 35.8 - 46.3 %    MCV 96.4 79.6 - 97.8 FL    MCH 31.7 26.1 - 32.9 PG    MCHC 32.9 31.4 - 35.0 g/dL    RDW 21.3 %    PLATELET 988 (L) 186 - 450 K/uL    MPV 9.8 9.4 - 12.3 FL    ABSOLUTE NRBC 0.03 0.0 - 0.2 K/uL    DF AUTOMATED      NEUTROPHILS 60 43 - 78 %    LYMPHOCYTES 19 13 - 44 %    MONOCYTES 17 (H) 4.0 - 12.0 %    EOSINOPHILS 1 0.5 - 7.8 %    BASOPHILS 1 0.0 - 2.0 %    IMMATURE GRANULOCYTES 2 0.0 - 5.0 %    ABS. NEUTROPHILS 1.3 K/UL    ABS. LYMPHOCYTES 0.4 K/UL    ABS. MONOCYTES 0.4 K/UL    ABS. EOSINOPHILS 0.0 K/UL    ABS. BASOPHILS 0.0 K/UL    ABS. IMM.  GRANS. 0.1 K/UL   METABOLIC PANEL, BASIC    Collection Time: 08/12/18  5:33 AM   Result Value Ref Range    Sodium 141 136 - 145 mmol/L    Potassium 4.1 3.5 - 5.1 mmol/L    Chloride 107 98 - 107 mmol/L    CO2 26 21 - 32 mmol/L    Anion gap 8 7 - 16 mmol/L    Glucose 99 65 - 100 mg/dL    BUN 34 (H) 8 - 23 MG/DL    Creatinine 2.24 (H) 0.6 - 1.0 MG/DL    GFR est AA 27 (L) >60 ml/min/1.73m2    GFR est non-AA 23 (L) >60 ml/min/1.73m2    Calcium 7.2 (L) 8.3 - 10.4 MG/DL   MAGNESIUM    Collection Time: 08/12/18  5:33 AM   Result Value Ref Range    Magnesium 2.1 1.8 - 2.4 mg/dL       Assessment     Patient Active Problem List    Diagnosis Date Noted    Stage 3 chronic kidney disease 08/08/2018    Hypokalemia 08/08/2018    Femur fracture, left ([address] 08/07/2018    Anemia due to chronic kidney disease treated with erythropoietin 07/25/2017    Hyperkalemia 07/18/2017    Malignant neoplasm metastatic to bone ([facility] 07/31/2016    Infiltrating ductal carcinoma of female breast ([facility] 07/25/2016    Mass of breast 07/08/2016    Aortic valve insufficiency 02/16/2016    Hypertension 02/16/2016    Obesity 02/16/2016    Hyperlipidemia 02/16/2016    Abnormal EKG 02/16/2016           Problems Addressed by Nephrology     ALEISHA - prerenal  CKD 3    Plan     Renal function markedly better off fluids. Will s/o. Please recall as needed, thanks.

## 2018-08-13 ENCOUNTER — HOME HEALTH ADMISSION (OUTPATIENT)
Dept: HOME HEALTH SERVICES | Facility: HOME HEALTH | Age: 73
End: 2018-08-13
Payer: MEDICARE

## 2018-08-13 PROCEDURE — 97110 THERAPEUTIC EXERCISES: CPT

## 2018-08-13 PROCEDURE — 74011250636 HC RX REV CODE- 250/636: Performed by: NURSE PRACTITIONER

## 2018-08-13 PROCEDURE — 74011250637 HC RX REV CODE- 250/637: Performed by: NURSE PRACTITIONER

## 2018-08-13 PROCEDURE — 97530 THERAPEUTIC ACTIVITIES: CPT

## 2018-08-13 PROCEDURE — 65270000029 HC RM PRIVATE

## 2018-08-13 PROCEDURE — 74011250637 HC RX REV CODE- 250/637: Performed by: INTERNAL MEDICINE

## 2018-08-13 RX ORDER — POLYETHYLENE GLYCOL 3350 17 G/17G
17 POWDER, FOR SOLUTION ORAL DAILY PRN
Status: DISCONTINUED | OUTPATIENT
Start: 2018-08-13 | End: 2018-08-14 | Stop reason: HOSPADM

## 2018-08-13 RX ADMIN — POLYETHYLENE GLYCOL 3350 17 G: 17 POWDER, FOR SOLUTION ORAL at 16:45

## 2018-08-13 RX ADMIN — OXYCODONE HYDROCHLORIDE 5 MG: 5 TABLET ORAL at 18:00

## 2018-08-13 RX ADMIN — DOCUSATE SODIUM 100 MG: 100 CAPSULE, LIQUID FILLED ORAL at 07:47

## 2018-08-13 RX ADMIN — ENOXAPARIN SODIUM 30 MG: 100 INJECTION SUBCUTANEOUS at 13:33

## 2018-08-13 RX ADMIN — Medication 10 ML: at 22:06

## 2018-08-13 RX ADMIN — Medication 10 ML: at 13:33

## 2018-08-13 RX ADMIN — DOCUSATE SODIUM 100 MG: 100 CAPSULE, LIQUID FILLED ORAL at 16:45

## 2018-08-13 RX ADMIN — OXYCODONE HYDROCHLORIDE 5 MG: 5 TABLET ORAL at 22:07

## 2018-08-13 RX ADMIN — LETROZOLE 2.5 MG: 2.5 TABLET, FILM COATED ORAL at 09:00

## 2018-08-13 RX ADMIN — ACETAMINOPHEN 650 MG: 325 TABLET, FILM COATED ORAL at 05:32

## 2018-08-13 RX ADMIN — ACETAMINOPHEN 650 MG: 325 TABLET, FILM COATED ORAL at 22:07

## 2018-08-13 RX ADMIN — CALCIUM CARBONATE 500 MG (1,250 MG)-VITAMIN D3 200 UNIT TABLET 1 TABLET: at 16:45

## 2018-08-13 RX ADMIN — CALCIUM CARBONATE 500 MG (1,250 MG)-VITAMIN D3 200 UNIT TABLET 1 TABLET: at 07:47

## 2018-08-13 RX ADMIN — Medication 10 ML: at 05:32

## 2018-08-13 RX ADMIN — CALCIUM CARBONATE 500 MG (1,250 MG)-VITAMIN D3 200 UNIT TABLET 1 TABLET: at 13:33

## 2018-08-13 RX ADMIN — AMLODIPINE BESYLATE 5 MG: 5 TABLET ORAL at 07:48

## 2018-08-13 RX ADMIN — ACETAMINOPHEN 650 MG: 325 TABLET, FILM COATED ORAL at 13:33

## 2018-08-13 NOTE — PROGRESS NOTES
In accordance with Medicare Guidelines, a copy of the Important Letter from Medicare was presented to the patient in anticipation of expected discharge within 48 hours. An oral explanation was provided and all questions were answered. Patient signed one copy of the Important Letter from Medicare which was placed in the patient's medical chart, and and an unsigned copy of the Important Letter from Medicare was provided to the patient. Care Managers were notified.

## 2018-08-13 NOTE — PROGRESS NOTES
Problem: Pressure Injury - Risk of  Goal: *Prevention of pressure injury  Document Arturo Scale and appropriate interventions in the flowsheet. Outcome: Progressing Towards Goal  Pressure Injury Interventions:  Sensory Interventions: Assess changes in LOC    Moisture Interventions: Absorbent underpads    Activity Interventions: Increase time out of bed, Pressure redistribution bed/mattress(bed type), PT/OT evaluation    Mobility Interventions: HOB 30 degrees or less, Pressure redistribution bed/mattress (bed type), PT/OT evaluation    Nutrition Interventions: Document food/fluid/supplement intake    Friction and Shear Interventions: HOB 30 degrees or less               Problem: Falls - Risk of  Goal: *Absence of Falls  Document Maurizio Fall Risk and appropriate interventions in the flowsheet.    Outcome: Progressing Towards Goal  Fall Risk Interventions:  Mobility Interventions: Bed/chair exit alarm, OT consult for ADLs, Patient to call before getting OOB, PT Consult for mobility concerns, Strengthening exercises (ROM-active/passive), PT Consult for assist device competence, Utilize walker, cane, or other assistive device         Medication Interventions: Assess postural VS orthostatic hypotension, Bed/chair exit alarm, Patient to call before getting OOB, Teach patient to arise slowly    Elimination Interventions: Bed/chair exit alarm, Call light in reach, Patient to call for help with toileting needs, Toilet paper/wipes in reach    History of Falls Interventions: Bed/chair exit alarm

## 2018-08-13 NOTE — PROGRESS NOTES
Hospitalist Progress Note     Admit Date:  2018  6:22 PM   Name:  Marisel Ziegler   Age:  75 y.o.  :  1945   MRN:  [id number]   PCP:  Irvin Anthony NP  Treatment Team: Attending Provider: Geena Dominguez. Rolan Nuñez MD; Consulting Provider: Garret Potts MD; Utilization Review: Geovanni Gomes RN; Care Manager: Estelle Brunner, RN; Consulting Provider: Nael Rodriguez MD; Charge Nurse: Tom Llamas    Subjective:   CC:fall    Pt is a 74 yo female who presented to the ED after falling while transferring from a car to her wheelchair. Pt has PMH of metastatic breast cancer, HTN, obesity, CKD stage 3-4, wheelchair bound. Pt reported that her left leg gave out and she fell backward. Xray showed fracture of left proximal subtrochanteric pathologic fracture. Additionally it was felt that patient had a pending pathologic fracture of her right femur. Pt went to surgery for prophylactic nailing of the right femur, and an ORIF of the left subtrochanteric left femur. They also did an open biopsy of the left proximal femur. Pt doing well post operatively, pain controlled. PT/OT to work with pt whose baseline is wheelchair bound. H/H 9.7 today, WBC 2.2. Per nephrology to continue IV fluids stopped. Nephrology is following as ALEISHA resolves. Lengthy discussion with pt and her daughters regarding discharge planning and end of life issues - decision made by pt and family to pursue home hospice. Hospice coordination spoke with family and felt home health initially would be more appropriate, transitioning to hospice at some time in the future. Per ortho should be ready for d/c soon. Pt off of IVF and her renal function is back to her baseline. Nephrology has signed off. Pt's H&H are stable. Pt continues with rodriguez due to mobility issues. Per ortho pt should be ready for d/c soon, she wants to go home with home health. Pt has many family members willing to assist her at home.   Anticipate discharge in am.     Pt has had surgical repair of both hips and is non weight bearing. The patient requires frequent changes of body position and requires positioning in ways not feasible in a standard/ordinary bed to alleviate pain and prevent skin breakdown. Objective:     Patient Vitals for the past 24 hrs:   Temp Pulse Resp BP SpO2   08/13/18 1100 98.9 °F (37.2 °C) 64 20 148/78 94 %   08/13/18 0700 98.6 °F (37 °C) 67 20 143/77 94 %   08/13/18 0457 98.5 °F (36.9 °C) 70 20 164/83 93 %   08/12/18 2326 98.7 °F (37.1 °C) 66 18 159/82 94 %   08/12/18 1950 98.4 °F (36.9 °C) 71 20 (!) 160/94 94 %   08/12/18 1518 98.7 °F (37.1 °C) 83 18 118/61 94 %     Oxygen Therapy  O2 Sat (%): 94 % (08/13/18 1100)  Pulse via Oximetry: 97 beats per minute (08/08/18 1755)  O2 Device: Room air (08/11/18 1536)  O2 Flow Rate (L/min): 6 l/min (08/08/18 1755)  ETCO2 (mmHg): 99 mmHg (08/08/18 2011)    Intake/Output Summary (Last 24 hours) at 08/13/18 1116  Last data filed at 08/13/18 0800   Gross per 24 hour   Intake              180 ml   Output             1650 ml   Net            -1470 ml         General:    Well nourished. Awake and alert. Head:  Normocephalic, atraumatic  Eyes:  Extraocular movements intact, normal sclera  Neck:  Supple, no lymphadenopathy  CV:   RRR. No  Murmurs, clicks, or gallops  Lungs:   Unlabored, no cyanosis  Abdomen:   Soft, nondistended, nontender. Extremities: Warm and dry. No cyanosis or edema. Dressings intact over dressings bilaterally. Skin:     No rashes or jaundice. Neuro:  No gross focal deficits  Psych:  Mood and affect appropriate    Data Review:  I have reviewed all labs, meds, telemetry events, and studies from the last 24 hours. No results found for this or any previous visit (from the past 24 hour(s)).      All Micro Results     Procedure Component Value Units Date/Time    CULTURE, URINE [[id number]] Collected:  08/10/18 0141    Order Status:  Completed Specimen:  Urine from [place] Specimen Updated:  08/12/18 0750     Special Requests: NO SPECIAL REQUESTS        Culture result: NO GROWTH 2 DAYS       MSSA/MRSA SC BY PCR, NASAL SWAB [[id number]] Collected:  08/08/18 0251    Order Status:  Completed Specimen:  Swab Updated:  08/08/18 0627     Special Requests: NO SPECIAL REQUESTS        Culture result:         SA target not detected. A MRSA NEGATIVE, SA NEGATIVE test result does not preclude MRSA or SA nasal colonization. Current Meds:  Current Facility-Administered Medications   Medication Dose Route Frequency    0.9% sodium chloride infusion 250 mL  250 mL IntraVENous PRN    sodium chloride (NS) flush 10 mL  10 mL InterCATHeter Q8H    sodium chloride (NS) flush 10 mL  10 mL InterCATHeter PRN    amLODIPine (NORVASC) tablet 5 mg  5 mg Oral DAILY    letrozole (FEMARA) tablet 2.5 mg (Patient Supplied)  2.5 mg Oral DAILY    ondansetron (ZOFRAN ODT) tablet 8 mg  8 mg Oral Q8H PRN    sodium chloride (NS) flush 5-10 mL  5-10 mL IntraVENous Q8H    sodium chloride (NS) flush 5-10 mL  5-10 mL IntraVENous PRN    acetaminophen (TYLENOL) tablet 650 mg  650 mg Oral Q8H    oxyCODONE IR (ROXICODONE) tablet 5-10 mg  5-10 mg Oral Q4H PRN    ondansetron (ZOFRAN) injection 4 mg  4 mg IntraVENous Q4H PRN    docusate sodium (COLACE) capsule 100 mg  100 mg Oral BID    alum-mag hydroxide-simeth (MYLANTA) oral suspension 30 mL  30 mL Oral Q4H PRN    calcium-vitamin D (OS-JORGE) 500 mg-200 unit tablet  1 Tab Oral TID WITH MEALS    enoxaparin (LOVENOX) injection 30 mg  30 mg SubCUTAneous Q24H    traMADol (ULTRAM) tablet 50 mg  50 mg Oral Q6H PRN    hydrALAZINE (APRESOLINE) 20 mg/mL injection 10 mg  10 mg IntraVENous Q6H PRN       Diet:  DIET REGULAR    Other Studies (last 24 hours):  No results found.     Assessment and Plan:     Hospital Problems as of 8/13/2018  Date Reviewed: 7/26/2018          Codes Class Noted - Resolved POA    Stage 3 chronic kidney disease ICD-10-CM: N18.3  ICD-9-CM: 585.3  8/8/2018 - Present Yes        Hypokalemia ICD-10-CM: E87.6  ICD-9-CM: 276.8  8/8/2018 - Present Yes        * (Principal)Femur fracture, left ([address] ICD-10-CM: U91.93WO  ICD-9-CM: 821.00  8/7/2018 - Present Yes        Anemia due to chronic kidney disease treated with erythropoietin ICD-10-CM: N18.9, D63.1  ICD-9-CM: 285.21, 585.9  7/25/2017 - Present Yes        Malignant neoplasm metastatic to bone [facility]) ICD-10-CM: C79.51  ICD-9-CM: 198.5  7/31/2016 - Present Yes    Overview Signed 1/10/2017  6:13 PM by Kena Laird MD     Last Assessment & Plan:   1. Complete radiation therapy to the patient's sacrum and femurs as directed by Dr. Sepideh Reeves. 2.  Return to the office in 6 weeks with x-rays of the pelvis on arrival.  3.  Instruct the patient to advance her weightbearing as tolerated as she continues to enjoy a reduction in pain following the radiation therapy. Hypertension ICD-10-CM: I10  ICD-9-CM: 401.9  2/16/2016 - Present Yes    Overview Signed 2/16/2016 12:09 PM by Winnie Lo     UNSPECIFIED              Obesity ICD-10-CM: E66.9  ICD-9-CM: 278.00  2/16/2016 - Present Yes    Overview Signed 2/16/2016 12:10 PM by Winnie Lo     UNSPECIFIED                    A/P:    1. Hip fracture  -Pain control and DVT prophylaxis per ortho  -PT/OT  -Cont rodriguez    2. HTN  -Cont amlodipine  -Prn hydralazine    3. Hypokalemia  -Resolved  -Cont to monitor    4. Metastatic breast CA  -Cont Femara  -Restart lbranze on d/c    5. ALEISHA  -Cr 2.24   -Nephrology has signed off but remains available if needed. DC planning/Dispo:  Home with [facility] in am if stable  DVT ppx:  SCDs per ortho    Case reviewed with supervising physician - MIKE Farias MD    Signed:  KHRIS Carrizales

## 2018-08-13 NOTE — PROGRESS NOTES
Met with pt and daughter, refusing SNF at dc, agreeable to [facility] with [facility] for PT/OT, Called spoke with staff at [facility] about slide board ordered last week and states will be delivered to pts home today. Daughter states family will be available to care for pt once dc home. Orders sent for home care.

## 2018-08-13 NOTE — PROGRESS NOTES
Problem: Mobility Impaired (Adult and Pediatric)  Goal: *Acute Goals and Plan of Care (Insert Text)  STG:  (1.)Ms. Marcos Eastman will move from supine to sit and sit to supine , scoot up and down and roll side to side with MINIMAL ASSIST within 3 treatment day(s). (2.)Ms. Marcos Eastman will transfer from bed to chair and chair to bed with MINIMAL ASSIST using the least restrictive device within 3 treatment day(s). (3.)Ms. Marcos Eastman will ambulate with MAXIMAL ASSIST for 2 feet with the least restrictive device within 3 treatment day(s). (4.)Ms. Marcos Eastman will perform standing static and dynamic balance activities x 5 minutes with MAXIMAL ASSIST to improve safety within 3 day(s). (5.)Ms. Marcos Eastman will perform LE exercises with 1 to 2 cues for form within 3 days to improve strength for functional transfers and ambulation. (6.)Ms. Marcos Eastman will perform wheelchair mobility 150 feet with SUPERVISION within 3 days to maximize independence with functional mobility. (7.)Ms. Marcos Eastman will maintain NWB LLE, WBAT RLE throughout all functional mobility within 3 days. LTG:  (1.)Ms. Marcos Eastman will move from supine to sit and sit to supine , scoot up and down and roll side to side in bed with CONTACT GUARD ASSIST within 7 treatment day(s). (2.)Ms. Marcos Eastman will transfer from bed to chair and chair to bed with CONTACT GUARD ASSIST using the least restrictive device within 7 treatment day(s). (3.)Ms. Marcos Eastman will ambulate with MINIMAL ASSIST for 2 feet with the least restrictive device within 7 treatment day(s). (4.)Ms. Marcos Eastman will perform standing static and dynamic balance activities x 5 minutes with MINIMAL ASSIST to improve safety within 7 day(s). (5.)Ms. Marcos Eastman will perform wheelchair mobility 250 feet with MODIFIED INDEPENDENCE within 7 days to maximize independence with functional mobility.  ________________________________________________________________________________________________    PHYSICAL THERAPY: Daily Note, Treatment Day: 4th, PM 8/13/2018  INPATIENT: Hospital Day: 7  Payor: Olga Amesjuan alberto / Plan: [address] [place] [address] HMO / Product Type: Managed Care Medicare /    LLE NWB, RLMADHU WBAT  NAME/AGE/GENDER: Joy Stark is a 75 y.o. female   PRIMARY DIAGNOSIS: hip fracture  Femur fracture, left (HCC) Femur fracture, left ([address] Femur fracture, left (HCC)  Procedure(s) (LRB):  Prophylactic nailing right femur / Open reduction internal fixation left subtrochanteric left femur / Open biopsy left proximal femur (Left)  5 Days Post-Op  ICD-10: Treatment Diagnosis:    · Difficulty in walking, Not elsewhere classified (R26.2)  · Repeated Falls (R29.6)  · History of falling (Z91.81)   Precaution/Allergies:  Penicillins      ASSESSMENT:     Ms. Tuan Ron presents in bed. She is agreeable to treatment and eager to get back to bed. . She performed bilateral LE ex as listed below. Later, She transferred from chair to bed with assist x 1 + daughters  and  using sliding board. Mod assist x 2. Education for daughter who assisted. She was using UE's and right LE to move across sliding board gravity assisted. She puts forth good effort but was just too tired to complete the task. Sit to supine and positioned in bed with total assist. Much better with transfers this PM. MAx to return supine. No goals met. Will continue PT efforts. This section established at most recent assessment   PROBLEM LIST (Impairments causing functional limitations):  1. Decreased Strength  2. Decreased ADL/Functional Activities  3. Decreased Transfer Abilities  4. Decreased Ambulation Ability/Technique  5. Decreased Balance  6. Increased Pain  7. Decreased Activity Tolerance  8. Decreased Pacing Skills  9. Increased Shortness of Breath  10. Decreased Knowledge of Precautions  11. Decreased [place] with Home Exercise Program   INTERVENTIONS PLANNED: (Benefits and precautions of physical therapy have been discussed with the patient.)  1. Balance Exercise  2.  Bed Mobility  3. Family Education  4. Gait Training  5. Home Exercise Program (HEP)  6. Therapeutic Activites  7. Therapeutic Exercise/Strengthening  8. Transfer Training  9. Patient Education  10. Group Therapy     TREATMENT PLAN: Frequency/Duration: twice daily for duration of hospital stay  Rehabilitation Potential For Stated Goals: Good     RECOMMENDED REHABILITATION/EQUIPMENT: (at time of discharge pending progress): Due to the probability of continued deficits (see above) this patient will likely need continued skilled physical therapy after discharge. 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 She reports her L leg 'gave out' and she fell backwards. She is noted to have a L proximal subtrochanteric pathologic fracture on x-ray. At present, she reports her L hip pain is controlled. She denies any chest pain or shortness of breath.     Hospitalist service asked to admit for L hip fracture. Past Medical History/Comorbidities:   Ms. Chucky Ulloa  has a past medical history of Abnormal EKG (2/16/2016); Aortic valve insufficiency (2/16/2016); Cancer ([facility] [address] Hyperlipidemia (2/16/2016); Hypertension (2/16/2016); and Obesity (2/16/2016). Ms. Chucky Ulloa  has a past surgical history that includes hx tubal ligation.   Social History/Living Environment:   Home Environment: Private residence  Wheelchair Ramp: Yes  One/Two Story Residence: One story  Living Alone: No  Support Systems: Child(ezequiel)  Patient Expects to be Discharged to[de-identified] Unknown  Current DME Used/Available at Home: Wheelchair  Prior Level of Function/Work/Activity:  She lives with 3 daughters in a single story home and typically transfers independently to a wheelchair and is modified independent at wheelchair level. Number of Personal Factors/Comorbidities that affect the Plan of Care: 1-2: MODERATE COMPLEXITY   EXAMINATION:   Most Recent Physical Functioning:   Gross Assessment:                  Posture:     Balance:  Sitting: Impaired  Sitting - Static: Good (unsupported)  Sitting - Dynamic: Fair (occasional) (+) Bed Mobility:  Supine to Sit: Moderate assistance;Assist x2  Sit to Supine: Moderate assistance;Assist x2  Wheelchair Mobility:     Transfers:  Sliding Board : Assist x2 (mod assit with daughter assisting for training)  Gait:  Right Side Weight Bearing: As tolerated  Left Side Weight Bearing: Non-weight bearing         Body Structures Involved:  1. Nerves  2. Lungs  3. Bones  4. Joints  5. Muscles  6. Ligaments Body Functions Affected:  1. Sensory/Pain  2. Neuromusculoskeletal  3. Movement Related Activities and Participation Affected:  1. Mobility  2. Self Care  3. Domestic Life  4. Interpersonal Interactions and Relationships  5. Community, Social and [place]   Number of elements that affect the Plan of Care: 4+: HIGH COMPLEXITY   CLINICAL PRESENTATION:   Presentation: Evolving clinical presentation with changing clinical characteristics: MODERATE COMPLEXITY   CLINICAL DECISION MAKIN [facility] Mobility Inpatient Short Form  How much difficulty does the patient currently have. .. Unable A Lot A Little None   1. Turning over in bed (including adjusting bedclothes, sheets and blankets)? [] 1   [x] 2   [] 3   [] 4   2. Sitting down on and standing up from a chair with arms ( e.g., wheelchair, bedside commode, etc.)   [x] 1   [] 2   [] 3   [] 4   3. Moving from lying on back to sitting on the side of the bed?    [] 1   [x] 2   [] 3   [] 4   How much help from another person does the patient currently need. .. Total A Lot A Little None   4. Moving to and from a bed to a chair (including a wheelchair)? [] 1   [x] 2   [] 3   [] 4   5. Need to walk in hospital room? [x] 1   [] 2   [] 3   [] 4   6. Climbing 3-5 steps with a railing? [x] 1   [] 2   [] 3   [] 4   © 2007, Trustees of [address] [place] [address] [address] [address], under license to [employer]. All rights reserved      Score:  Initial: 9 Most Recent: X (Date: -- )    Interpretation of Tool:  Represents activities that are increasingly more difficult (i.e. Bed mobility, Transfers, Gait). Score 24 23 22-20 19-15 14-10 9-7 6     Modifier CH CI CJ CK CL CM CN      ? Mobility - Walking and Moving Around:     - CURRENT STATUS: CM - 80%-99% impaired, limited or restricted    - GOAL STATUS: CK - 40%-59% impaired, limited or restricted    - D/C STATUS:  ---------------To be determined---------------  Payor: [employer] MEDICARE / Plan: [address] [place] [address] HMO / Product Type: Managed Care Medicare /      Medical Necessity:     · Patient demonstrates good rehab potential due to higher previous functional level. Reason for Services/Other Comments:  · Patient continues to require modification of therapeutic interventions to increase complexity of exercises. Use of outcome tool(s) and clinical judgement create a POC that gives a: Questionable prediction of patient's progress: MODERATE COMPLEXITY            TREATMENT:   (In addition to Assessment/Re-Assessment sessions the following treatments were rendered)   Pre-treatment Symptoms/Complaints: \"I'M as ready as I'm going to get. \"  Pain: Initial:     0 Post Session:  0/10     Therapeutic Activity: (    10 minutes): Therapeutic activities including bed mobility training including transfer training (sliding board transfer), static/dynamic sitting balance training, scooting, posture training, Patient and daughters education to improve mobility, strength and balance.   Required min visual verbal and manual cues   to promote static and dynamic balance in sitting and promote coordination of bilateral, lower extremity(s). Therapeutic Exercise: (  15 min):  Exercises per grid below to improve mobility, strength and balance. Required min visual and verbal cues to promote proper body alignment, promote proper body posture and promote proper body mechanics. Date:  8/10/18 Date:  8/12/18  Date:  8/13/18   ACTIVITY/EXERCISE AM PM AM PM AM PM   AP's 2x10B A  X 10 B   X 20 B   LAQ 2x10B A  X 10 B   X 10 B   Hip flexion 2x10B A  X 10 B   X 10 B   Hip abduction 2x10B A  X 10 B   X 10 B                              B = bilateral; AA = active assistive; A = active; P = passive    Braces/Orthotics/Lines/Etc:   · none  Treatment/Session Assessment:    · Response to Treatment:  No complaints   · Interdisciplinary Collaboration:   o Physical Therapy Assistant  o Registered Nurse  · After treatment position/precautions:   o Supine in bed  o Bed alarm/tab alert on  o Bed/Chair-wheels locked  o Bed in low position  o Call light within reach  o RN notified  o Family at bedside   · Compliance with Program/Exercises: Compliant  · Recommendations/Intent for next treatment session: \"Next visit will focus on advancements to more challenging activities and reduction in assistance provided\".   Total Treatment Duration:  PT Patient Time In/Time Out  Time In: 1330 (4261)  Time Out: 1345 (1504)    Corina Barksdale, PTA

## 2018-08-13 NOTE — PROGRESS NOTES
WC with drop arms and semi electric hospital bed ordered from [address] [place] [address], notes and orders faxed. Spoke with Glne Mem[employer] with [employer] and equipement will be delivered to pts home. Pt and daughters made aware.

## 2018-08-13 NOTE — PROGRESS NOTES
[address]  Face to Face Encounter    Patients Name: Stanley Contreras    YOB: 1945    Ordering Physician: Melanie Earl NP, Dr. Juma Calero    Primary Diagnosis: hip fracture  Femur fracture, left [facility])    Date of Face to Face:   8/13/2018                                  Face to Face Encounter findings are related to primary reason for home care:   yes. 1. I certify that the patient needs intermittent care as follows: physical therapy: strengthening, stretching/ROM, transfer training, gait/stair training, balance training and pt/caregiver education  occupational therapy:  ADL safety (ie. cooking, bathing, dressing), ROM and pt/caregiver education    2. I certify that this patient is homebound, that is: 1) patient requires the use of a walker device, special transportation, or assistance of another to leave the home; or 2) patient's condition makes leaving the home medically contraindicated; and 3) patient has a normal inability to leave the home and leaving the home requires considerable and taxing effort. Patient may leave the home for infrequent and short duration for medical reasons, and occasional absences for non-medical reasons. Homebound status is due to the following functional limitations: Patient with strength deficits limiting the performance of all ADL's without caregiver assistance or the use of an assistive device. Patient with poor safety awareness and is at risk for falls without assistance of another person and the use of an assistive device. Patient with poor ambulation endurance limiting their safe ability to ascend/descend the required number of steps to leave the home. 3. I certify that this patient is under my care and that I, or a nurse practitioner or [address] [place] [address], or clinical nurse specialist, or certified nurse midwife, working with me, had a Face-to-Face Encounter that meets the physician Face-to-Face Encounter requirements.   The following are the clinical findings from the Face-to-Face encounter that support the need for skilled services and is a summary of the encounter:     See hospital chart      Jennifer Funes RN  8/13/2018      THE FOLLOWING TO BE COMPLETED BY THE COMMUNITY PHYSICIAN:    I concur with the findings described above from the F2F encounter that this patient is homebound and in need of a skilled service.     Certifying Physician: _____________________________________      Printed Certifying Physician Name: _____________________________________    Date: _________________

## 2018-08-13 NOTE — PROGRESS NOTES
1. Patient will verbalize and demonstrate understanding of hip precautions with 100% accuracy during ADL. - MET  2. Patient will complete functional transfers with CGA and adaptive equipment as needed. 3. Patient will complete lower body bathing and dressing with mod A and adaptive equipment as needed. - CONTINUE to ADDRESS  4. Patient will complete toileting with mod A.   5. Patient will tolerate at least 10 minutes of BUE therapeutic exercises to increase strength in BUE to aid in functional transfers. 6. Patient will tolerate at least 23 minutes of OT treatment with no rest breaks to increase activity tolerance for ADLs. Timeframe: 7 visits       OCCUPATIONAL THERAPY: Daily Note, Treatment Day: 3rd and AM    8/13/2018  INPATIENT: Hospital Day: 7  Payor: Audelia Muhammad / Plan: [address] [place] [address] HMO / Product Type: [employer] Medicare /      NAME/AGE/GENDER: Joan Ceron is a 75 y.o. female   PRIMARY DIAGNOSIS:  hip fracture  Femur fracture, left ([address] Femur fracture, left ([address] Femur fracture, left (HCC)  Procedure(s) (LRB):  Prophylactic nailing right femur / Open reduction internal fixation left subtrochanteric left femur / Open biopsy left proximal femur (Left)  5 Days Post-Op  ICD-10: Treatment Diagnosis:    · Generalized Muscle Weakness (M62.81)  · Other lack of cordination (R27.8)  · History of falling (Z91.81)   Precautions/Allergies:     Penicillins      ASSESSMENT:     Ms. Karen Hernandez presents for L femur fracture after sustaining a fall transferring from car to w/c. Pt is currently NWB in LLE and WBAT in RLE. 8/13/2018 Pt sitting up in the chair upon arrival. Team conference completed with PTA who assisted pt earlier with transfer training from the bed to the chair. Pt is discharging home and will need drop arm wheel chair and a drop arm bed side commode as well as a gait belt and sliding board to assist with transfers due to pt's NWB status.  Pt completed exercises with decreased activity tolerance. Pt will continue to benefit from OT. Continue POC. This section established at most recent assessment   PROBLEM LIST (Impairments causing functional limitations):  1. Decreased Strength  2. Decreased ADL/Functional Activities  3. Decreased Transfer Abilities  4. Decreased Ambulation Ability/Technique  5. Decreased Balance  6. Increased Pain  7. Decreased Activity Tolerance  8. Increased Fatigue  9. Decreased Knowledge of Precautions   INTERVENTIONS PLANNED: (Benefits and precautions of occupational therapy have been discussed with the patient.)  1. Activities of daily living training  2. Adaptive equipment training  3. Balance training  4. Clothing management  5. Community reintergration  6. Donning&doffing training  7. Royce tech training  8. Re-evaluation  9. Therapeutic activity  10. Therapeutic exercise     TREATMENT PLAN: Frequency/Duration: Follow patient 6x/week to address above goals. Rehabilitation Potential For Stated Goals: Fair     RECOMMENDED REHABILITATION/EQUIPMENT: (at time of discharge pending progress): Due to the probability of continued deficits (see above) this patient will likely need continued skilled occupational therapy after discharge. Equipment:    TBD              OCCUPATIONAL PROFILE AND HISTORY:   History of Present Injury/Illness (Reason for Referral):  See H&P  Past Medical History/Comorbidities:   Ms. Tasha Hayward  has a past medical history of Abnormal EKG (2/16/2016); Aortic valve insufficiency (2/16/2016); Cancer ([facility] [address] Hyperlipidemia (2/16/2016); Hypertension (2/16/2016); and Obesity (2/16/2016). Ms. Tasha Hayward  has a past surgical history that includes hx tubal ligation.   Social History/Living Environment:   Home Environment: Private residence  Wheelchair Ramp: Yes  One/Two Story Residence: One story  Living Alone: No  Support Systems: Child(ezequiel)  Patient Expects to be Discharged to[de-identified] Unknown  Current DME Used/Available at Home: Wheelchair  Prior Level of Function/Work/Activity:  Independent with ADLs; use of w/c for all mobility. Personal Factors:          Sex:  female        Age:  75 y.o. Other factors that influence how disability is experienced by the patient:  Multiple co-morbidities    Number of Personal Factors/Comorbidities that affect the Plan of Care: Expanded review of therapy/medical records (1-2):  MODERATE COMPLEXITY   ASSESSMENT OF OCCUPATIONAL PERFORMANCE[de-identified]   Activities of Daily Living:   Basic ADLs (From Assessment) Complex ADLs (From Assessment)   Feeding: Independent  Oral Facial Hygiene/Grooming: Independent  Bathing: Maximum assistance  Upper Body Dressing: Independent  Lower Body Dressing: Maximum assistance  Toileting: Maximum assistance Instrumental ADL  Meal Preparation: Total assistance  Homemaking: Total assistance   Grooming/Bathing/Dressing Activities of Daily Living                  Bed mobility: min assistance            Bed/Mat Mobility  Supine to Sit: Moderate assistance;Assist x2  Sit to Supine: Moderate assistance;Assist x2       Most Recent Physical Functioning:   Gross Assessment:                  Posture:  Posture (WDL): Exceptions to WDL  Posture Assessment: Forward head  Balance:  Sitting: Impaired  Sitting - Static: Good (unsupported)  Sitting - Dynamic: Fair (occasional) (+) Bed Mobility:  Supine to Sit: Moderate assistance;Assist x2  Sit to Supine:  Moderate assistance;Assist x2  Wheelchair Mobility:     Transfers:  Sliding Board : Assist x2 (mod assit with daughter assisting for training)            Patient Vitals for the past 6 hrs:   BP BP Patient Position SpO2 Pulse   08/13/18 0700 143/77 At rest 94 % 67   08/13/18 1100 148/78 At rest;Sitting 94 % 64       Mental Status  Neurologic State: Alert  Orientation Level: Oriented X4  Cognition: Follows commands  Perception: Tactile, Verbal  Perseveration: Verbal cues provided, Tactile cues provided  Safety/Judgement: Fall prevention                          Physical Skills Involved:  1. Balance  2. Strength  3. Activity Tolerance  4. Gross Motor Control  5. Pain (acute) Cognitive Skills Affected (resulting in the inability to perform in a timely and safe manner): 1. none Psychosocial Skills Affected:  1. Habits/Routines  2. Environmental Adaptation   Number of elements that affect the Plan of Care: 5+:  HIGH COMPLEXITY   CLINICAL DECISION MAKIN[address] [place] [address] AM-PAC 6 Clicks   Daily Activity Inpatient Short Form  How much help from another person does the patient currently need. .. Total A Lot A Little None   1. Putting on and taking off regular lower body clothing? [] 1   [x] 2   [] 3   [] 4   2. Bathing (including washing, rinsing, drying)? [] 1   [x] 2   [] 3   [] 4   3. Toileting, which includes using toilet, bedpan or urinal?   [] 1   [x] 2   [] 3   [] 4   4. Putting on and taking off regular upper body clothing? [] 1   [] 2   [] 3   [x] 4   5. Taking care of personal grooming such as brushing teeth? [] 1   [] 2   [] 3   [x] 4   6. Eating meals? [] 1   [] 2   [] 3   [x] 4   © , Trustees of [address] [place] [address], under license to [employer]. All rights reserved      Score:  Initial: 21 Most Recent: X (Date: -- )    Interpretation of Tool:  Represents activities that are increasingly more difficult (i.e. Bed mobility, Transfers, Gait). Score 24 23 22-20 19-15 14-10 9-7 6     Modifier CH CI CJ CK CL CM CN      ? Self Care:     - CURRENT STATUS: CJ - 20%-39% impaired, limited or restricted    - GOAL STATUS: CI - 1%-19% impaired, limited or restricted    - D/C STATUS:  ---------------To be determined---------------  Payor: Abby Austin / Plan: [address] [place] [address] HMO / Product Type: Managed Care Medicare /      Medical Necessity:     · Patient is expected to demonstrate progress in strength, balance, coordination and functional technique to decrease assistance required with ADls and functional moiblity.  .  Reason for Services/Other Comments:  · Patient continues to require skilled intervention due to medical complications and patient unable to attend/participate in therapy as expected. Use of outcome tool(s) and clinical judgement create a POC that gives a: MODERATE COMPLEXITY         TREATMENT:   (In addition to Assessment/Re-Assessment sessions the following treatments were rendered)     Pre-treatment Symptoms/Complaints:  \"I'm tired. \"  Pain: Initial:   Pain Intensity 1: 0  Post Session:  same   Therapeutic Exercise: (23 minutes):  Exercises per grid below to improve mobility, strength and dynamic movement of arm - bilateral to improve functional bending, lifting, carrying and reaching. Required moderate visual, verbal and manual cues to promote proper body mechanics. Progressed resistance, range and repetitions as indicated. Date:  8/13/18 Date:   Date:     Activity/Exercise Parameters Parameters Parameters   Shoulder flexion/extension 3 sets of 15 reps with yellow theraband     Shoulder horizontal add/abb 3 sets of 15 reps with yellow theraband     Punches 3 sets of 15 reps with yellow theraband     Elbow flexion/extension 3 sets of 15 reps with yellow theraband     Tricep extension 3 sets of 15 reps with yellow theraband             · nasal cannula. Treatment/Session Assessment:    · Response to Treatment:  Good participation. · Interdisciplinary Collaboration:   o Certified Occupational Therapy Assistant  o Registered Nurse  · After treatment position/precautions:   o Up in chair  o Bed alarm/tab alert on  o Bed/Chair-wheels locked  o Call light within reach  o Family at bedside   · Compliance with Program/Exercises: Will assess as treatment progresses. · Recommendations/Intent for next treatment session: \"Next visit will focus on advancements to more challenging activities and reduction in assistance provided\".   Total Treatment Duration:  OT Patient Time In/Time Out  Time In: 1050  Time Out: 1113Julie MART Kyung Nunez

## 2018-08-13 NOTE — PROGRESS NOTES
Problem: Mobility Impaired (Adult and Pediatric)  Goal: *Acute Goals and Plan of Care (Insert Text)  STG:  (1.)Ms. Calli Sharp will move from supine to sit and sit to supine , scoot up and down and roll side to side with MINIMAL ASSIST within 3 treatment day(s). (2.)Ms. Calli Sharp will transfer from bed to chair and chair to bed with MINIMAL ASSIST using the least restrictive device within 3 treatment day(s). (3.)Ms. Calli Sharp will ambulate with MAXIMAL ASSIST for 2 feet with the least restrictive device within 3 treatment day(s). (4.)Ms. Calli Sharp will perform standing static and dynamic balance activities x 5 minutes with MAXIMAL ASSIST to improve safety within 3 day(s). (5.)Ms. Calli Sharp will perform LE exercises with 1 to 2 cues for form within 3 days to improve strength for functional transfers and ambulation. (6.)Ms. Calli Sharp will perform wheelchair mobility 150 feet with SUPERVISION within 3 days to maximize independence with functional mobility. (7.)Ms. Calli Sharp will maintain NWB LLE, WBAT RLE throughout all functional mobility within 3 days. LTG:  (1.)Ms. Calli Sharp will move from supine to sit and sit to supine , scoot up and down and roll side to side in bed with CONTACT GUARD ASSIST within 7 treatment day(s). (2.)Ms. Calli Sharp will transfer from bed to chair and chair to bed with CONTACT GUARD ASSIST using the least restrictive device within 7 treatment day(s). (3.)Ms. Calli Sharp will ambulate with MINIMAL ASSIST for 2 feet with the least restrictive device within 7 treatment day(s). (4.)Ms. Calli Sharp will perform standing static and dynamic balance activities x 5 minutes with MINIMAL ASSIST to improve safety within 7 day(s). (5.)Ms. Calli Sharp will perform wheelchair mobility 250 feet with MODIFIED INDEPENDENCE within 7 days to maximize independence with functional mobility.  ________________________________________________________________________________________________    PHYSICAL THERAPY: Daily Note, Treatment Day: 4th, AM 8/13/2018  INPATIENT: Hospital Day: 7  Payor: Cristobal Gomez / Plan: [address] [place] [address] HMO / Product Type: Managed Care Medicare /    LLE NWB, LUPE WBBLANKA  NAME/AGE/GENDER: Kacie Hartley is a 75 y.o. female   PRIMARY DIAGNOSIS: hip fracture  Femur fracture, left (HCC) Femur fracture, left (HCC) Femur fracture, left (HCC)  Procedure(s) (LRB):  Prophylactic nailing right femur / Open reduction internal fixation left subtrochanteric left femur / Open biopsy left proximal femur (Left)  5 Days Post-Op  ICD-10: Treatment Diagnosis:    · Difficulty in walking, Not elsewhere classified (R26.2)  · Repeated Falls (R29.6)  · History of falling (Z91.81)   Precaution/Allergies:  Penicillins      ASSESSMENT:     Ms. Brianna Isaacs presents in bed. She is agreeable to treatment. She transferred from bed to chair using sliding board with assist x 2. Education for daughter who assisted. She was using UE's and right LE to move across sliding board gravity assisted. She puts forth good effort but was just too tired to complete the task. Sit to supine and positioned in bed with total assist.  No goals met. Will continue PT efforts. Recommended equipment for home use at d/c for max safety: drop arm w/c, drop arm BSC, sliding board and hospital bed. This section established at most recent assessment   PROBLEM LIST (Impairments causing functional limitations):  1. Decreased Strength  2. Decreased ADL/Functional Activities  3. Decreased Transfer Abilities  4. Decreased Ambulation Ability/Technique  5. Decreased Balance  6. Increased Pain  7. Decreased Activity Tolerance  8. Decreased Pacing Skills  9. Increased Shortness of Breath  10. Decreased Knowledge of Precautions  11. Decreased Nolan with Home Exercise Program   INTERVENTIONS PLANNED: (Benefits and precautions of physical therapy have been discussed with the patient.)  1. Balance Exercise  2. Bed Mobility  3. Family Education  4. Gait Training  5.  Home Exercise Program (HEP)  6. Therapeutic Activites  7. Therapeutic Exercise/Strengthening  8. Transfer Training  9. Patient Education  10. Group Therapy     TREATMENT PLAN: Frequency/Duration: twice daily for duration of hospital stay  Rehabilitation Potential For Stated Goals: Good     RECOMMENDED REHABILITATION/EQUIPMENT: (at time of discharge pending progress): Due to the probability of continued deficits (see above) this patient will likely need continued skilled physical therapy after discharge. 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 She reports her L leg 'gave out' and she fell backwards. She is noted to have a L proximal subtrochanteric pathologic fracture on x-ray. At present, she reports her L hip pain is controlled. She denies any chest pain or shortness of breath.     Hospitalist service asked to admit for L hip fracture. Past Medical History/Comorbidities:   Ms. Connie Fierro  has a past medical history of Abnormal EKG (2/16/2016); Aortic valve insufficiency (2/16/2016); Cancer ([facility] [address] Hyperlipidemia (2/16/2016); Hypertension (2/16/2016); and Obesity (2/16/2016). Ms. Connie Fierro  has a past surgical history that includes hx tubal ligation.   Social History/Living Environment:   Home Environment: Private residence  Wheelchair Ramp: Yes  One/Two Story Residence: One story  Living Alone: No  Support Systems: Child(ezequiel)  Patient Expects to be Discharged to[de-identified] Unknown  Current DME Used/Available at Home: Wheelchair  Prior Level of Function/Work/Activity:  She lives with 3 daughters in a single story home and typically transfers independently to a wheelchair and is modified independent at wheelchair level. Number of Personal Factors/Comorbidities that affect the Plan of Care: 1-2: MODERATE COMPLEXITY   EXAMINATION:   Most Recent Physical Functioning:   Gross Assessment:                  Posture:     Balance:  Sitting: Impaired  Sitting - Static: Good (unsupported)  Sitting - Dynamic: Fair (occasional) (+) Bed Mobility:  Supine to Sit: Moderate assistance;Assist x2  Sit to Supine: Moderate assistance;Assist x2  Wheelchair Mobility:     Transfers:  Sliding Board : Assist x2 (mod assit with daughter assisting for training)  Gait:  Right Side Weight Bearing: As tolerated  Left Side Weight Bearing: Non-weight bearing         Body Structures Involved:  1. Nerves  2. Lungs  3. Bones  4. Joints  5. Muscles  6. Ligaments Body Functions Affected:  1. Sensory/Pain  2. Neuromusculoskeletal  3. Movement Related Activities and Participation Affected:  1. Mobility  2. Self Care  3. Domestic Life  4. Interpersonal Interactions and Relationships  5. Community, Social and Gaffney Grace   Number of elements that affect the Plan of Care: 4+: HIGH COMPLEXITY   CLINICAL PRESENTATION:   Presentation: Evolving clinical presentation with changing clinical characteristics: MODERATE COMPLEXITY   CLINICAL DECISION MAKIN [facility] Inpatient Short Form  How much difficulty does the patient currently have. .. Unable A Lot A Little None   1. Turning over in bed (including adjusting bedclothes, sheets and blankets)? [] 1   [x] 2   [] 3   [] 4   2. Sitting down on and standing up from a chair with arms ( e.g., wheelchair, bedside commode, etc.)   [x] 1   [] 2   [] 3   [] 4   3. Moving from lying on back to sitting on the side of the bed? [] 1   [x] 2   [] 3   [] 4   How much help from another person does the patient currently need. .. Total A Lot A Little None   4.   Moving to and from a bed to a chair (including a wheelchair)? [] 1   [x] 2   [] 3   [] 4   5. Need to walk in hospital room? [x] 1   [] 2   [] 3   [] 4   6. Climbing 3-5 steps with a railing? [x] 1   [] 2   [] 3   [] 4   © 2007, Trustees of [address] [place] [address] [address] [address], under license to [employer]. All rights reserved      Score:  Initial: 9 Most Recent: X (Date: -- )    Interpretation of Tool:  Represents activities that are increasingly more difficult (i.e. Bed mobility, Transfers, Gait). Score 24 23 22-20 19-15 14-10 9-7 6     Modifier CH CI CJ CK CL CM CN      ? Mobility - Walking and Moving Around:     - CURRENT STATUS: CM - 80%-99% impaired, limited or restricted    - GOAL STATUS: CK - 40%-59% impaired, limited or restricted    - D/C STATUS:  ---------------To be determined---------------  Payor: [employer] MEDICARE / Plan: [address] [place] [address] HMO / Product Type: Managed Care Medicare /      Medical Necessity:     · Patient demonstrates good rehab potential due to higher previous functional level. Reason for Services/Other Comments:  · Patient continues to require modification of therapeutic interventions to increase complexity of exercises. Use of outcome tool(s) and clinical judgement create a POC that gives a: Questionable prediction of patient's progress: MODERATE COMPLEXITY            TREATMENT:   (In addition to Assessment/Re-Assessment sessions the following treatments were rendered)   Pre-treatment Symptoms/Complaints: \"I'M as ready as I'm going to get. \"  Pain: Initial:     0 Post Session:  0/10     Therapeutic Activity: (    15 minutes): Therapeutic activities including bed mobility training including transfer training (sliding board transfer), static/dynamic sitting balance training, scooting, posture training, Patient and daughters education to improve mobility, strength and balance.   Required min visual verbal and manual cues   to promote static and dynamic balance in sitting and promote coordination of bilateral, lower extremity(s). Date:  8/10/18 Date:  8/12/18  Date:     ACTIVITY/EXERCISE AM PM AM PM AM PM   AP's 2x10B A  X 10 B      LAQ 2x10B A  X 10 B      Hip flexion 2x10B A  X 10 B      Hip abduction 2x10B A  X 10 B                                 B = bilateral; AA = active assistive; A = active; P = passive    Braces/Orthotics/Lines/Etc:   · none  Treatment/Session Assessment:    · Response to Treatment:  No complaints   · Interdisciplinary Collaboration:   o Physical Therapy Assistant  o Registered Nurse  o Rehabilitation Attendant  · After treatment position/precautions:   o Up in chair  o Bed alarm/tab alert on  o Bed/Chair-wheels locked  o Bed in low position  o Call light within reach  o RN notified  o Family at bedside   · Compliance with Program/Exercises: Compliant  · Recommendations/Intent for next treatment session: \"Next visit will focus on advancements to more challenging activities and reduction in assistance provided\".   Total Treatment Duration:  PT Patient Time In/Time Out  Time In: 1010  Time Out: [address]

## 2018-08-14 VITALS
HEIGHT: 71 IN | DIASTOLIC BLOOD PRESSURE: 76 MMHG | HEART RATE: 71 BPM | RESPIRATION RATE: 18 BRPM | SYSTOLIC BLOOD PRESSURE: 148 MMHG | TEMPERATURE: 98.1 F | WEIGHT: 232 LBS | OXYGEN SATURATION: 94 % | BODY MASS INDEX: 32.48 KG/M2

## 2018-08-14 LAB
ANION GAP SERPL CALC-SCNC: 8 MMOL/L (ref 7–16)
BASOPHILS # BLD: 0 K/UL (ref 0–0.2)
BASOPHILS NFR BLD MANUAL: 1 % (ref 0–2)
BUN SERPL-MCNC: 28 MG/DL (ref 8–23)
CALCIUM SERPL-MCNC: 7.9 MG/DL (ref 8.3–10.4)
CHLORIDE SERPL-SCNC: 107 MMOL/L (ref 98–107)
CO2 SERPL-SCNC: 28 MMOL/L (ref 21–32)
CREAT SERPL-MCNC: 1.53 MG/DL (ref 0.6–1)
DIFFERENTIAL METHOD BLD: ABNORMAL
EOSINOPHIL # BLD: 0 K/UL (ref 0–0.8)
EOSINOPHIL NFR BLD MANUAL: 1 % (ref 1–8)
ERYTHROCYTE [DISTWIDTH] IN BLOOD BY AUTOMATED COUNT: 19.9 %
GLUCOSE SERPL-MCNC: 76 MG/DL (ref 65–100)
HCT VFR BLD AUTO: 31.1 % (ref 35.8–46.3)
HGB BLD-MCNC: 9.6 G/DL (ref 11.7–15.4)
LYMPHOCYTES # BLD: 0.7 K/UL (ref 0.5–4.6)
LYMPHOCYTES NFR BLD MANUAL: 27 % (ref 16–44)
MCH RBC QN AUTO: 31.3 PG (ref 26.1–32.9)
MCHC RBC AUTO-ENTMCNC: 30.9 G/DL (ref 31.4–35)
MCV RBC AUTO: 101.3 FL (ref 79.6–97.8)
METAMYELOCYTES NFR BLD MANUAL: 1 %
MONOCYTES # BLD: 0.4 K/UL (ref 0.1–1.3)
MONOCYTES NFR BLD MANUAL: 15 % (ref 3–9)
NEUTS BAND NFR BLD MANUAL: 1 % (ref 0–10)
NEUTS SEG # BLD: 1.6 K/UL (ref 1.7–8.2)
NEUTS SEG NFR BLD MANUAL: 54 % (ref 47–75)
NRBC # BLD: 0 K/UL (ref 0–0.2)
PLATELET # BLD AUTO: 142 K/UL (ref 150–450)
PLATELET COMMENTS,PCOM: ADEQUATE
PMV BLD AUTO: 10.4 FL (ref 9.4–12.3)
POTASSIUM SERPL-SCNC: 3.9 MMOL/L (ref 3.5–5.1)
RBC # BLD AUTO: 3.07 M/UL (ref 4.05–5.2)
RBC MORPH BLD: ABNORMAL
SODIUM SERPL-SCNC: 143 MMOL/L (ref 136–145)
WBC # BLD AUTO: 2.7 K/UL (ref 4.3–11.1)
WBC MORPH BLD: ABNORMAL

## 2018-08-14 PROCEDURE — 74011250636 HC RX REV CODE- 250/636: Performed by: NURSE PRACTITIONER

## 2018-08-14 PROCEDURE — 97530 THERAPEUTIC ACTIVITIES: CPT

## 2018-08-14 PROCEDURE — 97535 SELF CARE MNGMENT TRAINING: CPT

## 2018-08-14 PROCEDURE — 74011250637 HC RX REV CODE- 250/637: Performed by: NURSE PRACTITIONER

## 2018-08-14 PROCEDURE — 36415 COLL VENOUS BLD VENIPUNCTURE: CPT

## 2018-08-14 PROCEDURE — 80048 BASIC METABOLIC PNL TOTAL CA: CPT

## 2018-08-14 PROCEDURE — 74011250637 HC RX REV CODE- 250/637: Performed by: INTERNAL MEDICINE

## 2018-08-14 PROCEDURE — 85025 COMPLETE CBC W/AUTO DIFF WBC: CPT

## 2018-08-14 RX ORDER — ENOXAPARIN SODIUM 100 MG/ML
30 INJECTION SUBCUTANEOUS EVERY 24 HOURS
Qty: 30 SYRINGE | Refills: 0 | Status: SHIPPED | OUTPATIENT
Start: 2018-08-14 | End: 2019-01-17 | Stop reason: ALTCHOICE

## 2018-08-14 RX ORDER — OXYCODONE HYDROCHLORIDE 5 MG/1
5-10 TABLET ORAL
Qty: 20 TAB | Refills: 0 | Status: SHIPPED | OUTPATIENT
Start: 2018-08-14 | End: 2019-01-17 | Stop reason: ALTCHOICE

## 2018-08-14 RX ADMIN — ACETAMINOPHEN 650 MG: 325 TABLET, FILM COATED ORAL at 13:34

## 2018-08-14 RX ADMIN — ACETAMINOPHEN 650 MG: 325 TABLET, FILM COATED ORAL at 05:17

## 2018-08-14 RX ADMIN — ONDANSETRON 4 MG: 2 INJECTION INTRAMUSCULAR; INTRAVENOUS at 12:18

## 2018-08-14 RX ADMIN — DOCUSATE SODIUM 100 MG: 100 CAPSULE, LIQUID FILLED ORAL at 08:42

## 2018-08-14 RX ADMIN — Medication 10 ML: at 05:17

## 2018-08-14 RX ADMIN — LETROZOLE 2.5 MG: 2.5 TABLET, FILM COATED ORAL at 09:00

## 2018-08-14 RX ADMIN — AMLODIPINE BESYLATE 5 MG: 5 TABLET ORAL at 08:42

## 2018-08-14 RX ADMIN — CALCIUM CARBONATE 500 MG (1,250 MG)-VITAMIN D3 200 UNIT TABLET 1 TABLET: at 13:34

## 2018-08-14 RX ADMIN — OXYCODONE HYDROCHLORIDE 5 MG: 5 TABLET ORAL at 05:17

## 2018-08-14 RX ADMIN — ENOXAPARIN SODIUM 30 MG: 100 INJECTION SUBCUTANEOUS at 13:34

## 2018-08-14 RX ADMIN — CALCIUM CARBONATE 500 MG (1,250 MG)-VITAMIN D3 200 UNIT TABLET 1 TABLET: at 08:42

## 2018-08-14 RX ADMIN — OXYCODONE HYDROCHLORIDE 5 MG: 5 TABLET ORAL at 14:21

## 2018-08-14 NOTE — PROGRESS NOTES
ORTH FRACTURE PROGRESS NOTE    2018  Admit Date:   2018    Post Op day: 6 Days Post-Op    Subjective:    Thresea Ahr PATIENT SITTING IN CHAIR; WANTING TO GO HOME     PT/OT:   Gait:                    Vital Signs:    Patient Vitals for the past 8 hrs:   BP Temp Pulse Resp SpO2   18 1055 148/76 98.1 °F (36.7 °C) 71 18 94 %   18 0717 117/81 98.8 °F (37.1 °C) 68 18 93 %     Temp (24hrs), Av.5 °F (36.9 °C), Min:98.1 °F (36.7 °C), Max:98.8 °F (37.1 °C)      Pain Control:   Pain Assessment  Pain Scale 1: Visual  Pain Intensity 1: 0  Pain Onset 1: post op  Pain Location 1: Hip  Pain Orientation 1: Left  Pain Description 1: Aching  Pain Intervention(s) 1: Medication (see MAR)    Meds:    Current Facility-Administered Medications   Medication Dose Route Frequency    polyethylene glycol (MIRALAX) packet 17 g  17 g Oral DAILY PRN    0.9% sodium chloride infusion 250 mL  250 mL IntraVENous PRN    sodium chloride (NS) flush 10 mL  10 mL InterCATHeter Q8H    sodium chloride (NS) flush 10 mL  10 mL InterCATHeter PRN    amLODIPine (NORVASC) tablet 5 mg  5 mg Oral DAILY    letrozole (FEMARA) tablet 2.5 mg (Patient Supplied)  2.5 mg Oral DAILY    ondansetron (ZOFRAN ODT) tablet 8 mg  8 mg Oral Q8H PRN    sodium chloride (NS) flush 5-10 mL  5-10 mL IntraVENous Q8H    sodium chloride (NS) flush 5-10 mL  5-10 mL IntraVENous PRN    acetaminophen (TYLENOL) tablet 650 mg  650 mg Oral Q8H    oxyCODONE IR (ROXICODONE) tablet 5-10 mg  5-10 mg Oral Q4H PRN    ondansetron (ZOFRAN) injection 4 mg  4 mg IntraVENous Q4H PRN    docusate sodium (COLACE) capsule 100 mg  100 mg Oral BID    alum-mag hydroxide-simeth (MYLANTA) oral suspension 30 mL  30 mL Oral Q4H PRN    calcium-vitamin D (OS-JORGE) 500 mg-200 unit tablet  1 Tab Oral TID WITH MEALS    enoxaparin (LOVENOX) injection 30 mg  30 mg SubCUTAneous Q24H    traMADol (ULTRAM) tablet 50 mg  50 mg Oral Q6H PRN    hydrALAZINE (APRESOLINE) 20 mg/mL injection 10 mg  10 mg IntraVENous Q6H PRN       LAB:    Recent Labs      08/14/18   0359   HCT  31.1*   HGB  9.6*       24 Hour Assessment Issues:    Oriented    Discharge Planning: HOME    Transfuse PRBC's:      Assessment & Physician's Comment:  Dressing is clean, dry, and intact  Neurovascular checks within normal limits    Principal Problem:    Femur fracture, left ([facility] [address] (8/7/2018)    Active Problems:    Hypertension (2/16/2016)      Overview: UNSPECIFIED       Obesity (2/16/2016)      Overview: UNSPECIFIED       Malignant neoplasm metastatic to bone ([facility] [address] (7/31/2016)      Overview: Last Assessment & Plan:       1. Complete radiation therapy to the patient's sacrum and femurs as       directed by Dr. Teresa Stockton. 2.  Return to the office in 6 weeks with x-rays of the pelvis on arrival.      3.  Instruct the patient to advance her weightbearing as tolerated as she       continues to enjoy a reduction in pain following the radiation therapy.       Anemia due to chronic kidney disease treated with erythropoietin (7/25/2017)      Stage 3 chronic kidney disease (8/8/2018)      Hypokalemia (8/8/2018)        Plan:  [address] [facility] LEFT [facility]      Yevgeniy Mijares MD

## 2018-08-14 NOTE — PROGRESS NOTES
Spoke with Nick Matthews with [employer], attempting to contact family to set up autopay for copay for DME, phone number given to daughter, received message autopay completed and NP made aware equipement will be delivered today so pt can be DC home if medically stable.

## 2018-08-14 NOTE — PROGRESS NOTES
Problem: Pressure Injury - Risk of  Goal: *Prevention of pressure injury  Document Arturo Scale and appropriate interventions in the flowsheet. Outcome: Progressing Towards Goal  Pressure Injury Interventions:  Sensory Interventions: Assess changes in LOC    Moisture Interventions: Absorbent underpads    Activity Interventions: Increase time out of bed, Pressure redistribution bed/mattress(bed type), PT/OT evaluation    Mobility Interventions: HOB 30 degrees or less, Pressure redistribution bed/mattress (bed type), PT/OT evaluation    Nutrition Interventions: Document food/fluid/supplement intake    Friction and Shear Interventions: HOB 30 degrees or less               Problem: Falls - Risk of  Goal: *Absence of Falls  Document Maurizio Fall Risk and appropriate interventions in the flowsheet.    Outcome: Progressing Towards Goal  Fall Risk Interventions:  Mobility Interventions: Bed/chair exit alarm, OT consult for ADLs, Patient to call before getting OOB, PT Consult for mobility concerns, PT Consult for assist device competence, Strengthening exercises (ROM-active/passive), Utilize walker, cane, or other assistive device         Medication Interventions: Assess postural VS orthostatic hypotension, Bed/chair exit alarm, Patient to call before getting OOB, Teach patient to arise slowly    Elimination Interventions: Bed/chair exit alarm, Call light in reach, Patient to call for help with toileting needs, Toilet paper/wipes in reach    History of Falls Interventions: Bed/chair exit alarm

## 2018-08-14 NOTE — DISCHARGE SUMMARY
Hospitalist Discharge Summary     Admit Date:  2018  6:22 PM   Name:  Stanley Contreras   Age:  75 y.o.  :  1945   MRN:  [id number]   PCP:  Joan Lay NP  Treatment Team: Attending Provider: Thea Gutierrez MD; Consulting Provider: Ct Phelps MD; Utilization Review: Kat Zapata RN; Care Manager: Steve Harry RN; Consulting Provider: Quoc Abreu MD    Problem List for this Hospitalization:  Hospital Problems as of 2018  Date Reviewed: 2018          Codes Class Noted - Resolved POA    Stage 3 chronic kidney disease ICD-10-CM: N18.3  ICD-9-CM: 585.3  2018 - Present Yes        Hypokalemia ICD-10-CM: E87.6  ICD-9-CM: 276.8  2018 - Present Yes        * (Principal)Femur fracture, left ([address] ICD-10-CM: Y67.13WM  ICD-9-CM: 821.00  2018 - Present Yes        Anemia due to chronic kidney disease treated with erythropoietin ICD-10-CM: N18.9, D63.1  ICD-9-CM: 285.21, 585.9  2017 - Present Yes        Malignant neoplasm metastatic to bone [facility]) ICD-10-CM: C79.51  ICD-9-CM: 198.5  2016 - Present Yes    Overview Signed 1/10/2017  6:13 PM by Neo Allen MD     Last Assessment & Plan:   1. Complete radiation therapy to the patient's sacrum and femurs as directed by Dr. Alexander Phillips. 2.  Return to the office in 6 weeks with x-rays of the pelvis on arrival.  3.  Instruct the patient to advance her weightbearing as tolerated as she continues to enjoy a reduction in pain following the radiation therapy.              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 She reports her L leg 'gave out' and she fell backwards. She is noted to have a L proximal subtrochanteric pathologic fracture on x-ray. At present, she reports her L hip pain is controlled. She denies any chest pain or shortness of breath. \"    Hospital Course:  Pt is a 76 yo female who presented to the ED after falling while transferring from a car to her wheelchair. Pt has PMH of metastatic breast cancer, HTN, obesity, CKD stage 3-4, wheelchair bound. Pt reported that her left leg gave out and she fell backward. Xray showed fracture of left proximal subtrochanteric pathologic fracture. Additionally it was felt that patient had a pending pathologic fracture of her right femur. Pt went to surgery for prophylactic nailing of the right femur, and an ORIF of the left subtrochanteric left femur. They also did an open biopsy of the left proximal femur. Pt doing well post operatively, pain controlled. PT/OT to work with pt whose baseline is wheelchair bound. H/H 9.7 today, WBC 2.2. Per nephrology to continue IV fluids stopped. Nephrology is following as ALEISHA resolves. Lengthy discussion with pt and her daughters regarding discharge planning and end of life issues - decision made by pt and family to pursue home hospice. Hospice coordination spoke with family and felt home health initially would be more appropriate, transitioning to hospice at some time in the future.      Per ortho should be ready for d/c soon. Pt off of IVF and her renal function is back to her baseline. Nephrology has signed off. Pt's H&H are stable. Pt continues with rodriguez due to mobility issues. Per ortho pt should be ready for d/c soon, she wants to go home with home health.   Pt has many family members willing to assist her at home. Pt ready for d/c and all needed equipment has been arranged for by CM. Follow up instructions and discharge meds at bottom of this note. Plan was discussed with patient, family, care team.  All questions answered. Patient was stable at time of discharge. Diagnostic Imaging/Tests:   Nm Bone Scan Wh Body    Result Date: 8/8/2018  Whole-body bone scintigraphy. Indication: evaluate for metastatic disease, 75 years Female fall on August 07, 2018, left femur fracture with left hip pain, history of breast cancer in 2016 with bone metastases, status post radiation treatment to the right femur and pelvis in 2016, chronic kidney disease Comparison:  Left hip radiographs August 07, 2018, whole body PET/CT June 27, 2017 Radiopharmaceutical:  26.5 mCi of technetium 99m HDP. Findings: Delayed whole-body images were obtained. Asymmetric focal intense radiotracer uptake is seen in the left intratrochanteric region consistent with healing of recent nondisplaced fracture. There are multiple asymmetric foci of increased radiotracer uptake in the bilateral anterior more than posterior ribs, these may correspond to the multiple healing nondisplaced fracture deformities of the bilateral ribs seen on prior PET/CT. Asymmetric focal increased uptake in the distal right femoral metadiaphysis is nonspecific without prior comparison. Mild symmetric uptake in the bilateral joints likely representing degenerative changes. Impression: 1. Healing subacute fracture of the left intertrochanteric region. 2.  Asymmetric focal uptake in the bilateral anterior ribs likely corresponding to subacute healing nondisplaced fractures seen on prior PET/CT. 3.  Nonspecific asymmetric focal uptake in the distal right femur. If further clinical concern for metastatic disease, elective whole body PET/CT may be more sensitive in evaluation.     Xr Chest Sngl V    Result Date: 8/7/2018  Portable chest x-ray CLINICAL INDICATION: Preoperative evaluation prior to hip surgery FINDINGS: Single AP view the chest the lungs to be expanded and clear. No pleural effusion or pneumothorax. The cardiac silhouette and mediastinum are unremarkable. IMPRESSION: No acute abnormality. Xr Pelv Ap Only    Result Date: 8/7/2018  Pelvis single view 8/7/2018 CLINICAL HISTORY: Fall with acute hip pain. FINDINGS: A very limited portable single frontal view of the pelvis is submitted for evaluation. There is a clearly acute, angulated fracture involving the proximal diaphysis of the left femur. Significant angulation is seen with a secondary varus deformity. No evidence for dislocation is clearly seen of the left femoral head. No definite acute fractures are otherwise seen. Note is made that multiple osseous structures have an abnormal sclerotic appearance which can suggest an underlying metastatic, or metabolic bony disorder. IMPRESSION: 1. Significantly angulated fracture of the proximal diaphysis of the left femur. 2.  Abnormal sclerotic changes of multiple osseous structures raising concern for a potential underlying metastatic, or metabolic bony process. Xr Hip Lt W Or Wo Pelv 2-3 Vws    Result Date: 8/7/2018  Right Hip. Right femur. Left hip. INDICATION: Left hip fracture, need comparison due to metastatic disease. COMPARISON: Left hip x-rays earlier today. TECHNIQUE: Three views of the right hip were obtained FINDINGS: There is sclerosis of the bone, suggesting metastatic disease. There is no right hip fracture or dislocation. Right hip joint spaces. There is no distal right femur fracture. SI joints and pubic symphysis are intact. There is left femur subtrochanteric fracture. There is suggestion of underlying lytic lesion. No left hip dislocation. IMPRESSION: 1. No acute fracture or dislocation of the right hip. Right femur is intact. 2. Osseous sclerosis, suggesting metastasis.  3. Left femur subtrochanteric fracture. There is suggestion of underlying lytic lesion in the subtrochanteric region. Xr Hip Rt W Or Wo Pelv 2-3 Vws    Result Date: 8/7/2018  Right Hip. Right femur. Left hip. INDICATION: Left hip fracture, need comparison due to metastatic disease. COMPARISON: Left hip x-rays earlier today. TECHNIQUE: Three views of the right hip were obtained FINDINGS: There is sclerosis of the bone, suggesting metastatic disease. There is no right hip fracture or dislocation. Right hip joint spaces. There is no distal right femur fracture. SI joints and pubic symphysis are intact. There is left femur subtrochanteric fracture. There is suggestion of underlying lytic lesion. No left hip dislocation. IMPRESSION: 1. No acute fracture or dislocation of the right hip. Right femur is intact. 2. Osseous sclerosis, suggesting metastasis. 3. Left femur subtrochanteric fracture. There is suggestion of underlying lytic lesion in the subtrochanteric region. Xr Femur Lt 2 V    Result Date: 8/8/2018  Exam:  Left femur radiographs History:  pain, lt femur fx, 75 years Female OR 7 LT FEMUR FX ?ORIF LEFT FEMUR ?3.55 MIN FLUORO ?98.54 MGY Comparison: Left femur radiographs yesterday Findings: Limited intraoperative spot radiographs of the left femur demonstrate lateral cortical plate and screw fixation of the left proximal femoral metadiaphyseal fracture, with final images in relative anatomic alignment. The left femoral head appears well-seated within the acetabulum. Normal alignment, joint spaces preserved. Persistent mild diffuse osteopenia. Visualized soft tissues otherwise unremarkable. Impression: Intraoperative fixation as above. Xr Femur Lt 2 V    Result Date: 8/7/2018  Left femur Clinical indications: Left hip pain after fall today, metastatic breast cancer FINDINGS: Five views of the left femur show a pathologic left proximal femur subtrochanteric fracture with marked varus angulation.      IMPRESSION: Pathologic left proximal femur subtrochanteric fracture with shortening and varus attenuation. Xr Femur Rt 2 Vs    Result Date: 8/8/2018  Exam:  Right femur radiographs History:  pain, or 7 rt femur, 73 years Female Or 7 rt prophylactic femur fx ? ? ? ? ? ? ? ? ? orif right femur ? ? ? ? ? ? 3.22 min fluoro ?67.48 mgy ? ? ? Long tfn placed with 2 distal screws Comparison: Right femur radiographs yesterday Findings:  Limited intraoperative spot radiographs of the right femur demonstrate intramedullary jeri fixation of the right femur in relative anatomic alignment. No evidence of acute fracture or dislocation. Normal alignment, joint spaces preserved. Persistent mild diffuse osteopenia. Visualized soft tissues otherwise unremarkable. Impression: Intraoperative findings as above. Xr Femur Rt 2 Vs    Result Date: 8/7/2018  Right Hip. Right femur. Left hip. INDICATION: Left hip fracture, need comparison due to metastatic disease. COMPARISON: Left hip x-rays earlier today. TECHNIQUE: Three views of the right hip were obtained FINDINGS: There is sclerosis of the bone, suggesting metastatic disease. There is no right hip fracture or dislocation. Right hip joint spaces. There is no distal right femur fracture. SI joints and pubic symphysis are intact. There is left femur subtrochanteric fracture. There is suggestion of underlying lytic lesion. No left hip dislocation. IMPRESSION: 1. No acute fracture or dislocation of the right hip. Right femur is intact. 2. Osseous sclerosis, suggesting metastasis. 3. Left femur subtrochanteric fracture. There is suggestion of underlying lytic lesion in the subtrochanteric region. Echocardiogram results:  No results found for this visit on 08/07/18.       All Micro Results     Procedure Component Value Units Date/Time    CULTURE, URINE [[id number]] Collected:  08/10/18 0141    Order Status:  Completed Specimen:  Urine from [place] Specimen Updated:  08/12/18 0750     Special Requests: NO SPECIAL REQUESTS        Culture result: NO GROWTH 2 DAYS       MSSA/MRSA SC BY PCR, NASAL SWAB [[id number]] Collected:  08/08/18 0251    Order Status:  Completed Specimen:  Swab Updated:  08/08/18 9489     Special Requests: NO SPECIAL REQUESTS        Culture result:         SA target not detected. A MRSA NEGATIVE, SA NEGATIVE test result does not preclude MRSA or SA nasal colonization. Labs: Results:       BMP, Mg, Phos Recent Labs      08/14/18 0359  08/12/18   0533   NA  143  141   K  3.9  4.1   CL  107  107   CO2  28  26   AGAP  8  8   BUN  28*  34*   CREA  1.53*  2.24*   CA  7.9*  7.2*   GLU  76  99   MG   --   2.1      CBC Recent Labs      08/14/18 0359   WBC  2.7*   RBC  3.07*   HGB  9.6*   HCT  31.1*   PLT  142*   GRANS  54   LYMPH  27   EOS  1   MONOS  15*   BASOS  1   ANEU  1.6*   ABL  0.7   DEEPA  0.0   ABM  0.4   ABB  0.0      LFT No results for input(s): SGOT, ALT, TBIL, AP, TP, ALB, GLOB, AGRAT, GPT in the last 72 hours.    Cardiac Testing No results found for: BNPP, BNP, CPK, RCK1, RCK2, RCK3, RCK4, CKMB, CKNDX, CKND1, TROPT, TROIQ   Coagulation Tests Lab Results   Component Value Date/Time    Prothrombin time 14.0 08/07/2018 07:27 PM    INR 1.1 08/07/2018 07:27 PM      A1c No results found for: HBA1C, HGBE8, BAU2RWJK   Lipid Panel No results found for: CHOL, CHOLPOCT, CHOLX, CHLST, CHOLV, 889403, HDL, LDL, LDLC, DLDLP, 863969, VLDLC, VLDL, TGLX, TRIGL, TRIGP, TGLPOCT, CHHD, [facility]   Thyroid Panel Lab Results   Component Value Date/Time    TSH 1.010 04/18/2017 11:15 AM        Most Recent UA Lab Results   Component Value Date/Time    Color NANCI 08/10/2018 01:41 AM    Appearance CLOUDY 08/10/2018 01:41 AM    Specific gravity 1.023 08/10/2018 01:41 AM    pH (UA) 5.0 08/10/2018 01:41 AM    Protein TRACE (A) 08/10/2018 01:41 AM    Glucose NEGATIVE  08/10/2018 01:41 AM    Ketone TRACE (A) 08/10/2018 01:41 AM    Bilirubin NEGATIVE  08/10/2018 01:41 AM Blood LARGE (A) 08/10/2018 01:41 AM    Urobilinogen 0.2 08/10/2018 01:41 AM    Nitrites NEGATIVE  08/10/2018 01:41 AM    Leukocyte Esterase NEGATIVE  08/10/2018 01:41 AM        Allergies   Allergen Reactions    Penicillins Unknown (comments)     UNKNOWN REACTION      Immunization History   Administered Date(s) Administered    TB Skin Test (PPD) Intradermal 08/08/2018       All Labs from Last 24 Hrs:  Recent Results (from the past 24 hour(s))   METABOLIC PANEL, BASIC    Collection Time: 08/14/18  3:59 AM   Result Value Ref Range    Sodium 143 136 - 145 mmol/L    Potassium 3.9 3.5 - 5.1 mmol/L    Chloride 107 98 - 107 mmol/L    CO2 28 21 - 32 mmol/L    Anion gap 8 7 - 16 mmol/L    Glucose 76 65 - 100 mg/dL    BUN 28 (H) 8 - 23 MG/DL    Creatinine 1.53 (H) 0.6 - 1.0 MG/DL    GFR est AA 43 (L) >60 ml/min/1.73m2    GFR est non-AA 35 (L) >60 ml/min/1.73m2    Calcium 7.9 (L) 8.3 - 10.4 MG/DL   CBC WITH AUTOMATED DIFF    Collection Time: 08/14/18  3:59 AM   Result Value Ref Range    WBC 2.7 (L) 4.3 - 11.1 K/uL    RBC 3.07 (L) 4.05 - 5.2 M/uL    HGB 9.6 (L) 11.7 - 15.4 g/dL    HCT 31.1 (L) 35.8 - 46.3 %    .3 (H) 79.6 - 97.8 FL    MCH 31.3 26.1 - 32.9 PG    MCHC 30.9 (L) 31.4 - 35.0 g/dL    RDW 19.9 %    PLATELET 137 (L) 969 - 450 K/uL    MPV 10.4 9.4 - 12.3 FL    ABSOLUTE NRBC 0.00 0.0 - 0.2 K/uL    NEUTROPHILS 54 47 - 75 %    BAND NEUTROPHILS 1 0 - 10 %    LYMPHOCYTES 27 16 - 44 %    MONOCYTES 15 (H) 3 - 9 %    EOSINOPHILS 1 1 - 8 %    BASOPHILS 1 0 - 2 %    METAMYELOCYTES 1 %    ABS. NEUTROPHILS 1.6 (L) 1.7 - 8.2 K/UL    ABS. LYMPHOCYTES 0.7 0.5 - 4.6 K/UL    ABS. MONOCYTES 0.4 0.1 - 1.3 K/UL    ABS. EOSINOPHILS 0.0 0.0 - 0.8 K/UL    ABS.  BASOPHILS 0.0 0.0 - 0.2 K/UL    RBC COMMENTS SLIGHT  ANISOCYTOSIS + POIKILOCYTOSIS        RBC COMMENTS OCCASIONAL  TEARDROP CELLS        RBC COMMENTS SLIGHT  HYPOCHROMIA        WBC COMMENTS Result Confirmed By Smear      PLATELET COMMENTS ADEQUATE      DF MANUAL Discharge Exam:  Patient Vitals for the past 24 hrs:   Temp Pulse Resp BP SpO2   08/14/18 1055 98.1 °F (36.7 °C) 71 18 148/76 94 %   08/14/18 0717 98.8 °F (37.1 °C) 68 18 117/81 93 %   08/14/18 0332 98.4 °F (36.9 °C) 73 16 133/75 91 %   08/13/18 2323 98.7 °F (37.1 °C) 72 16 131/75 93 %   08/13/18 1930 98.6 °F (37 °C) 73 20 154/82 96 %   08/13/18 1424 98.5 °F (36.9 °C) 77 20 147/69 96 %     Oxygen Therapy  O2 Sat (%): 94 % (08/14/18 1055)  Pulse via Oximetry: 73 beats per minute (08/14/18 0332)  O2 Device: Room air (08/14/18 0332)  O2 Flow Rate (L/min): 6 l/min (08/08/18 1755)  ETCO2 (mmHg): 99 mmHg (08/08/18 2011)    Intake/Output Summary (Last 24 hours) at 08/14/18 1241  Last data filed at 08/14/18 2285   Gross per 24 hour   Intake              190 ml   Output             1500 ml   Net            -1310 ml       General:    Well nourished. Alert. No distress. Eyes:   Normal sclera. Extraocular movements intact. ENT:  Normocephalic, atraumatic. Moist mucous membranes  CV:   Regular rate and rhythm. No murmur, rub, or gallop. Lungs:  Clear to auscultation bilaterally. No wheezing, rhonchi, or rales. Abdomen: Soft, nontender, nondistended. Bowel sounds normal.   Extremities: Warm and dry. No cyanosis or edema. Neurologic: CN II-XII grossly intact. Sensation intact. Skin:     No rashes or jaundice. Psych:  Normal mood and affect. Discharge Info:   Current Discharge Medication List      START taking these medications    Details   enoxaparin (LOVENOX) 30 mg/0.3 mL injection 0.3 mL by SubCUTAneous route every twenty-four (24) hours. Qty: 30 Syringe, Refills: 0      oxyCODONE IR (ROXICODONE) 5 mg immediate release tablet Take 1-2 Tabs by mouth every four (4) hours as needed.  Max Daily Amount: 60 mg. 5 mg moderate pain  10 mg for severe pain  Qty: 20 Tab, Refills: 0    Associated Diagnoses: Fracture, proximal femur, left, closed, initial encounter ([facility] [address]         CONTINUE these medications which have NOT CHANGED    Details   furosemide (LASIX) 20 mg tablet Take 1 Tab by mouth daily as needed. For swelling. Qty: 30 Tab, Refills: 1    Associated Diagnoses: Edema, unspecified type      palbociclib (IBRANCE) 75 mg cap 1 capsule daily for 21 days, hold for 7  Qty: 21 Cap, Refills: 6      letrozole (FEMARA) 2.5 mg tablet Take 1 Tab by mouth daily. Qty: 30 Tab, Refills: 6    Associated Diagnoses: Infiltrating ductal carcinoma of female breast, unspecified laterality (HCC)      ondansetron hcl (ZOFRAN, AS HYDROCHLORIDE,) 8 mg tablet Take 1 Tab by mouth every eight (8) hours as needed for Nausea. Qty: 90 Tab, Refills: 2    Associated Diagnoses: Malignant neoplasm metastatic to bone ([address] Edema, unspecified type      calcium carbonate (OS-JORGE) 500 mg calcium (1,250 mg) tablet Take 2 tablets at lunch and 2 tablets at dinner. Indications: hypocalcemia  Qty: 4 Tab, Refills: 0    Associated Diagnoses: Hypocalcemia      amLODIPine (NORVASC) 5 mg tablet Take 5 mg by mouth daily. triamterene-hydroCHLOROthiazide (MAXZIDE) 37.5-25 mg per tablet TAKE 1 TABLET BY MOUTH EVERY DAY  Qty: 30 Tab, Refills: 1    Associated Diagnoses: Malignant neoplasm metastatic to bone ([facility] [address] Edema, unspecified type               Disposition: home    Activity: PT/OT per Home Health  Diet: DIET REGULAR    Follow-up Appointments   Procedures    FOLLOW UP VISIT Appointment in: Other (Specify) DR Law Olivares - 8/23/18 @ 7:30 AM     DR Law Olivares - 8/23/18 @ 7:30 AM     Standing Status:   Standing     Number of Occurrences:   1     Order Specific Question:   Appointment in     Answer:    Other (Specify)         Follow-up Information     Follow up With Details Comments Contact Info    Phil Dumont NP Call As needed [address] [facility] [address] Joana Gannon MD On 8/23/2018 7:30 AM [address] [place] [id number] [address] [place] [address]   someone with homecare to arrange followup appointment [address] [facility] [address]  [address] [facility] [place] [address] [phone]          Case reviewed with supervising physician - Evan Rothman MD    Time spent in patient discharge planning and coordination 35 minutes.     Signed:  Tom Urena NP-C[facility]

## 2018-08-14 NOTE — PROGRESS NOTES
Discharge instructions  all new medications,medication side effects sheet, follow up appointment and  prescriptions reviewed and explained to the patient and daughter. Family instructed on Lovenox injections Patient verbalizes understanding of instructions. A copy of discharge instructions and prescriptions  have been given to patient. Opportunity for questions provided.   Patient to be discharged home via ambulance transport

## 2018-08-14 NOTE — PROGRESS NOTES
Problem: Mobility Impaired (Adult and Pediatric)  Goal: *Acute Goals and Plan of Care (Insert Text)  STG:  (1.)Ms. Chucky Ulloa will move from supine to sit and sit to supine , scoot up and down and roll side to side with MINIMAL ASSIST within 3 treatment day(s). (2.)Ms. Chucky Ulloa will transfer from bed to chair and chair to bed with MINIMAL ASSIST using the least restrictive device within 3 treatment day(s). (3.)Ms. Chucky Ulloa will ambulate with MAXIMAL ASSIST for 2 feet with the least restrictive device within 3 treatment day(s). (4.)Ms. Chucky Ulloa will perform standing static and dynamic balance activities x 5 minutes with MAXIMAL ASSIST to improve safety within 3 day(s). (5.)Ms. Chucky Ulloa will perform LE exercises with 1 to 2 cues for form within 3 days to improve strength for functional transfers and ambulation. (6.)Ms. Chucky Ulloa will perform wheelchair mobility 150 feet with SUPERVISION within 3 days to maximize independence with functional mobility. (7.)Ms. Chucky Ulloa will maintain NWB LLE, WBAT RLE throughout all functional mobility within 3 days. LTG:  (1.)Ms. Chucky Ulloa will move from supine to sit and sit to supine , scoot up and down and roll side to side in bed with CONTACT GUARD ASSIST within 7 treatment day(s). (2.)Ms. Chucky Ulloa will transfer from bed to chair and chair to bed with CONTACT GUARD ASSIST using the least restrictive device within 7 treatment day(s). (3.)Ms. Chucky Ulloa will ambulate with MINIMAL ASSIST for 2 feet with the least restrictive device within 7 treatment day(s). (4.)Ms. Chucky Ulloa will perform standing static and dynamic balance activities x 5 minutes with MINIMAL ASSIST to improve safety within 7 day(s). (5.)Ms. Chucky Ulloa will perform wheelchair mobility 250 feet with MODIFIED INDEPENDENCE within 7 days to maximize independence with functional mobility.  ________________________________________________________________________________________________    PHYSICAL THERAPY: Daily Note, Treatment Day: 5th, AM 8/14/2018  INPATIENT: Hospital Day: 8  Payor: Liana Crawford / Plan: [address] [place] [address] HMO / Product Type: Managed Care Medicare /    LLE NWB, RLE WBAT  NAME/AGE/GENDER: Linette Peter is a 75 y.o. female   PRIMARY DIAGNOSIS: hip fracture  Femur fracture, left (HCC) Femur fracture, left (HCC) Femur fracture, left (HCC)  Procedure(s) (LRB):  Prophylactic nailing right femur / Open reduction internal fixation left subtrochanteric left femur / Open biopsy left proximal femur (Left)  6 Days Post-Op  ICD-10: Treatment Diagnosis:    · Difficulty in walking, Not elsewhere classified (R26.2)  · Repeated Falls (R29.6)  · History of falling (Z91.81)   Precaution/Allergies:  Penicillins      ASSESSMENT:     Ms. Panchito Amor was sitting up in recliner chair upon contact and agreeable to PT. Patient participates in LE therapeutic ROM/strengthening exercises to improve functional ROM/strength for transfers, gait and overall mobility. Patient requires cues and assistance to perform exercises correctly. Overall slow progress towards physical therapy goals. No goals have been met thus far. Will continue efforts. This section established at most recent assessment   PROBLEM LIST (Impairments causing functional limitations):  1. Decreased Strength  2. Decreased ADL/Functional Activities  3. Decreased Transfer Abilities  4. Decreased Ambulation Ability/Technique  5. Decreased Balance  6. Increased Pain  7. Decreased Activity Tolerance  8. Decreased Pacing Skills  9. Increased Shortness of Breath  10. Decreased Knowledge of Precautions  11. Decreased Taylor with Home Exercise Program   INTERVENTIONS PLANNED: (Benefits and precautions of physical therapy have been discussed with the patient.)  1. Balance Exercise  2. Bed Mobility  3. Family Education  4. Gait Training  5. Home Exercise Program (HEP)  6. Therapeutic Activites  7. Therapeutic Exercise/Strengthening  8. Transfer Training  9. Patient Education  10.  Group Therapy     TREATMENT PLAN: Frequency/Duration: twice daily for duration of hospital stay  Rehabilitation Potential For Stated Goals: Good     RECOMMENDED REHABILITATION/EQUIPMENT: (at time of discharge pending progress): Due to the probability of continued deficits (see above) this patient will likely need continued skilled physical therapy after discharge. 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 She reports her L leg 'gave out' and she fell backwards. She is noted to have a L proximal subtrochanteric pathologic fracture on x-ray. At present, she reports her L hip pain is controlled. She denies any chest pain or shortness of breath.     Hospitalist service asked to admit for L hip fracture. Past Medical History/Comorbidities:   Ms. Lynne Oneal  has a past medical history of Abnormal EKG (2/16/2016); Aortic valve insufficiency (2/16/2016); Cancer ([facility] [address] Hyperlipidemia (2/16/2016); Hypertension (2/16/2016); and Obesity (2/16/2016). Ms. Lynne Oneal  has a past surgical history that includes hx tubal ligation. Social History/Living Environment:   Home Environment: Private residence  Wheelchair Ramp: Yes  One/Two Story Residence: One story  Living Alone: No  Support Systems: Child(ezequiel)  Patient Expects to be Discharged to[de-identified] Unknown  Current DME Used/Available at Home: Wheelchair  Prior Level of Function/Work/Activity:  She lives with 3 daughters in a single story home and typically transfers independently to a wheelchair and is modified independent at wheelchair level.      Number of Personal Factors/Comorbidities that affect the Plan of Care: 1-2: MODERATE COMPLEXITY   EXAMINATION:   Most Recent Physical Functioning:   Gross Assessment:                  Posture:     Balance:    Bed Mobility:     Wheelchair Mobility:     Transfers:     Gait:            Body Structures Involved:  1. Nerves  2. Lungs  3. Bones  4. Joints  5. Muscles  6. Ligaments Body Functions Affected:  1. Sensory/Pain  2. Neuromusculoskeletal  3. Movement Related Activities and Participation Affected:  1. Mobility  2. Self Care  3. Domestic Life  4. Interpersonal Interactions and Relationships  5. Community, Social and [place]   Number of elements that affect the Plan of Care: 4+: HIGH COMPLEXITY   CLINICAL PRESENTATION:   Presentation: Evolving clinical presentation with changing clinical characteristics: MODERATE COMPLEXITY   CLINICAL DECISION MAKIN [facility] Inpatient Short Form  How much difficulty does the patient currently have. .. Unable A Lot A Little None   1. Turning over in bed (including adjusting bedclothes, sheets and blankets)? [] 1   [x] 2   [] 3   [] 4   2. Sitting down on and standing up from a chair with arms ( e.g., wheelchair, bedside commode, etc.)   [x] 1   [] 2   [] 3   [] 4   3. Moving from lying on back to sitting on the side of the bed? [] 1   [x] 2   [] 3   [] 4   How much help from another person does the patient currently need. .. Total A Lot A Little None   4. Moving to and from a bed to a chair (including a wheelchair)? [] 1   [x] 2   [] 3   [] 4   5. Need to walk in hospital room? [x] 1   [] 2   [] 3   [] 4   6. Climbing 3-5 steps with a railing? [x] 1   [] 2   [] 3   [] 4   © , Trustees of [address] [place] [address] [address] [address], under license to [employer]. All rights reserved      Score:  Initial: 9 Most Recent: X (Date: -- )    Interpretation of Tool:  Represents activities that are increasingly more difficult (i.e. Bed mobility, Transfers, Gait). Score 24 23 22-20 19-15 14-10 9-7 6     Modifier CH CI CJ CK CL CM CN      ? Mobility - Walking and Moving Around:     - CURRENT STATUS: CM - 80%-99% impaired, limited or restricted    - GOAL STATUS: CK - 40%-59% impaired, limited or restricted    - D/C STATUS:  ---------------To be determined---------------  Payor: [employer] MEDICARE / Plan: [address] [place] [address] HMO / Product Type: Managed Care Medicare /      Medical Necessity:     · Patient demonstrates good rehab potential due to higher previous functional level. Reason for Services/Other Comments:  · Patient continues to require modification of therapeutic interventions to increase complexity of exercises. Use of outcome tool(s) and clinical judgement create a POC that gives a: Questionable prediction of patient's progress: MODERATE COMPLEXITY            TREATMENT:   (In addition to Assessment/Re-Assessment sessions the following treatments were rendered)   Pre-treatment Symptoms/Complaints: \"I'm ready to go home\"  Pain: Initial:   Pain Intensity 1: 0 0 Post Session:  0/10     Therapeutic Activity: (     minutes): Therapeutic activities including bed mobility training including transfer training (sliding board transfer), static/dynamic sitting balance training, scooting, posture training, Patient and daughters education to improve mobility, strength and balance. Required min visual verbal and manual cues   to promote static and dynamic balance in sitting and promote coordination of bilateral, lower extremity(s). Therapeutic Exercise: (  23 minutes):  Exercises per grid below to improve mobility, strength and balance. Required min visual and verbal cues to promote proper body alignment, promote proper body posture and promote proper body mechanics.             Date:  8/13/18 Date:  8/14/18   ACTIVITY/EXERCISE AM PM    AP's  X 20 B 2x20B A   LAQ  X 10 B 2x15B A   Hip flexion  X 10 B 2x15B AA   Hip abduction  X 10 B 2x15B AA   Quad sets 2x10B A   Glut sets   x10 A         B = bilateral; AA = active assistive; A = active; P = passive    Braces/Orthotics/Lines/Etc:   · none  Treatment/Session Assessment:    · Response to Treatment:  No complaints   · Interdisciplinary Collaboration:   o Physical Therapy Assistant  o Registered Nurse  · After treatment position/precautions:   o Up in chair  o Bed alarm/tab alert on  o Bed/Chair-wheels locked  o Caregiver at bedside  o Call light within reach  o RN notified  o Family at bedside   · Compliance with Program/Exercises: Compliant  · Recommendations/Intent for next treatment session: \"Next visit will focus on advancements to more challenging activities and reduction in assistance provided\".   Total Treatment Duration:  PT Patient Time In/Time Out  Time In: 1007  Time Out: [address], [place]

## 2018-08-14 NOTE — PROGRESS NOTES
Problem: Pressure Injury - Risk of  Goal: *Prevention of pressure injury  Document Arturo Scale and appropriate interventions in the flowsheet. Outcome: Progressing Towards Goal  Pressure Injury Interventions:  Sensory Interventions: Assess changes in LOC    Moisture Interventions: Absorbent underpads    Activity Interventions: Increase time out of bed, Pressure redistribution bed/mattress(bed type), PT/OT evaluation    Mobility Interventions: HOB 30 degrees or less, Pressure redistribution bed/mattress (bed type), PT/OT evaluation    Nutrition Interventions: Document food/fluid/supplement intake, Offer support with meals,snacks and hydration    Friction and Shear Interventions: HOB 30 degrees or less               Problem: Falls - Risk of  Goal: *Absence of Falls  Document Maurizio Fall Risk and appropriate interventions in the flowsheet.    Outcome: Progressing Towards Goal  Fall Risk Interventions:  Mobility Interventions: Bed/chair exit alarm, OT consult for ADLs, Patient to call before getting OOB, PT Consult for mobility concerns         Medication Interventions: Bed/chair exit alarm, Evaluate medications/consider consulting pharmacy, Patient to call before getting OOB    Elimination Interventions: Call light in reach, Bed/chair exit alarm, Patient to call for help with toileting needs    History of Falls Interventions: Bed/chair exit alarm, Door open when patient unattended, Investigate reason for fall

## 2018-08-14 NOTE — PROGRESS NOTES
ORTH FRACTURE PROGRESS NOTE    2018  Admit Date:   2018    Post Op day: 6 Days Post-Op    Subjective:    Gage Espinosa PATIENT LYING IN BED; COMFORTABLE     PT/OT:   Gait:                    Vital Signs:    Patient Vitals for the past 8 hrs:   BP Temp Pulse Resp SpO2   18 1055 148/76 98.1 °F (36.7 °C) 71 18 94 %     Temp (24hrs), Av.5 °F (36.9 °C), Min:98.1 °F (36.7 °C), Max:98.8 °F (37.1 °C)      Pain Control:   Pain Assessment  Pain Scale 1: Visual  Pain Intensity 1: 0  Pain Onset 1: post op  Pain Location 1: Hip  Pain Orientation 1: Left  Pain Description 1: Aching  Pain Intervention(s) 1: Medication (see MAR)    Meds:    Current Facility-Administered Medications   Medication Dose Route Frequency    polyethylene glycol (MIRALAX) packet 17 g  17 g Oral DAILY PRN    0.9% sodium chloride infusion 250 mL  250 mL IntraVENous PRN    sodium chloride (NS) flush 10 mL  10 mL InterCATHeter Q8H    sodium chloride (NS) flush 10 mL  10 mL InterCATHeter PRN    amLODIPine (NORVASC) tablet 5 mg  5 mg Oral DAILY    letrozole (FEMARA) tablet 2.5 mg (Patient Supplied)  2.5 mg Oral DAILY    ondansetron (ZOFRAN ODT) tablet 8 mg  8 mg Oral Q8H PRN    sodium chloride (NS) flush 5-10 mL  5-10 mL IntraVENous Q8H    sodium chloride (NS) flush 5-10 mL  5-10 mL IntraVENous PRN    acetaminophen (TYLENOL) tablet 650 mg  650 mg Oral Q8H    oxyCODONE IR (ROXICODONE) tablet 5-10 mg  5-10 mg Oral Q4H PRN    ondansetron (ZOFRAN) injection 4 mg  4 mg IntraVENous Q4H PRN    docusate sodium (COLACE) capsule 100 mg  100 mg Oral BID    alum-mag hydroxide-simeth (MYLANTA) oral suspension 30 mL  30 mL Oral Q4H PRN    calcium-vitamin D (OS-JORGE) 500 mg-200 unit tablet  1 Tab Oral TID WITH MEALS    enoxaparin (LOVENOX) injection 30 mg  30 mg SubCUTAneous Q24H    traMADol (ULTRAM) tablet 50 mg  50 mg Oral Q6H PRN    hydrALAZINE (APRESOLINE) 20 mg/mL injection 10 mg  10 mg IntraVENous Q6H PRN     Current Outpatient Prescriptions   Medication Sig    enoxaparin (LOVENOX) 30 mg/0.3 mL injection 0.3 mL by SubCUTAneous route every twenty-four (24) hours.  oxyCODONE IR (ROXICODONE) 5 mg immediate release tablet Take 1-2 Tabs by mouth every four (4) hours as needed. Max Daily Amount: 60 mg. 5 mg moderate pain  10 mg for severe pain    furosemide (LASIX) 20 mg tablet Take 1 Tab by mouth daily as needed. For swelling.  palbociclib (IBRANCE) 75 mg cap 1 capsule daily for 21 days, hold for 7    letrozole (FEMARA) 2.5 mg tablet Take 1 Tab by mouth daily.  ondansetron hcl (ZOFRAN, AS HYDROCHLORIDE,) 8 mg tablet Take 1 Tab by mouth every eight (8) hours as needed for Nausea.  calcium carbonate (OS-JORGE) 500 mg calcium (1,250 mg) tablet Take 2 tablets at lunch and 2 tablets at dinner. Indications: hypocalcemia    amLODIPine (NORVASC) 5 mg tablet Take 5 mg by mouth daily.  triamterene-hydroCHLOROthiazide (MAXZIDE) 37.5-25 mg per tablet TAKE 1 TABLET BY MOUTH EVERY DAY       LAB:    Recent Labs      08/14/18   0359   HCT  31.1*   HGB  9.6*       24 Hour Assessment Issues:    Oriented    Discharge Planning: HOME    Transfuse PRBC's:      Assessment & Physician's Comment:  Dressing is clean, dry, and intact  Neurovascular checks within normal limits    Principal Problem:    Femur fracture, left ([facility] [address] (8/7/2018)    Active Problems:    Hypertension (2/16/2016)      Overview: UNSPECIFIED       Obesity (2/16/2016)      Overview: UNSPECIFIED       Malignant neoplasm metastatic to bone ([address] (7/31/2016)      Overview: Last Assessment & Plan:       1. Complete radiation therapy to the patient's sacrum and femurs as       directed by Dr. Rod Leyva. 2.  Return to the office in 6 weeks with x-rays of the pelvis on arrival.      3.  Instruct the patient to advance her weightbearing as tolerated as she       continues to enjoy a reduction in pain following the radiation therapy.       Anemia due to chronic kidney disease treated with erythropoietin (7/25/2017)      Stage 3 chronic kidney disease (8/8/2018)      Hypokalemia (8/8/2018)        Plan:  CONTINUE THERAPY   WBAT RIGHT LE   NWB LEFT LE   PLANNING ON GOING HOME AT [facility], NP

## 2018-08-14 NOTE — PROGRESS NOTES
1. Patient will verbalize and demonstrate understanding of hip precautions with 100% accuracy during ADL. - MET  2. Patient will complete functional transfers with CGA and adaptive equipment as needed. 3. Patient will complete lower body bathing and dressing with mod A and adaptive equipment as needed. - CONTINUE to ADDRESS  4. Patient will complete toileting with mod A.   5. Patient will tolerate at least 10 minutes of BUE therapeutic exercises to increase strength in BUE to aid in functional transfers. 6. Patient will tolerate at least 23 minutes of OT treatment with no rest breaks to increase activity tolerance for ADLs. Timeframe: 7 visits       OCCUPATIONAL THERAPY: Daily Note, Treatment Day: 4th and AM    8/14/2018  INPATIENT: Hospital Day: 8  Payor: Olga Mendez / Plan: [address] [place] [address] HMO / Product Type: Managed Care Medicare /      NAME/AGE/GENDER: Joy Stark is a 75 y.o. female   PRIMARY DIAGNOSIS:  hip fracture  Femur fracture, left ([address] Femur fracture, left ([address] Femur fracture, left ([facility])  Procedure(s) (LRB):  Prophylactic nailing right femur / Open reduction internal fixation left subtrochanteric left femur / Open biopsy left proximal femur (Left)  6 Days Post-Op  ICD-10: Treatment Diagnosis:    · Generalized Muscle Weakness (M62.81)  · Other lack of cordination (R27.8)  · History of falling (Z91.81)   Precautions/Allergies:     Penicillins      ASSESSMENT:     Ms. Tuan Ron presents for L femur fracture after sustaining a fall transferring from car to w/c. Pt is currently NWB in LLE and WBAT in RLE. 8/14/2018 Pt supine in bed upon arrival, AOX4, and pleasantly agreeable to OT tx session. Pt completes supine to sit with mod A and requires max A for scooting at EOB. Pt completes sliding board transfer from bed to recliner chair with mod A x2. Family educated on sliding board placement and transfer strategies. They verbalize understanding.  Once seated in chair, pt completed full body bathing with set up assistance for UB/jannette/grooming and mod A for LB. Pt left seated in recliner chair with all needs met and call bell within reach. No goals met. Will continue with POC. This section established at most recent assessment   PROBLEM LIST (Impairments causing functional limitations):  1. Decreased Strength  2. Decreased ADL/Functional Activities  3. Decreased Transfer Abilities  4. Decreased Ambulation Ability/Technique  5. Decreased Balance  6. Increased Pain  7. Decreased Activity Tolerance  8. Increased Fatigue  9. Decreased Knowledge of Precautions   INTERVENTIONS PLANNED: (Benefits and precautions of occupational therapy have been discussed with the patient.)  1. Activities of daily living training  2. Adaptive equipment training  3. Balance training  4. Clothing management  5. Community reintergration  6. Donning&doffing training  7. Royce tech training  8. Re-evaluation  9. Therapeutic activity  10. Therapeutic exercise     TREATMENT PLAN: Frequency/Duration: Follow patient 6x/week to address above goals. Rehabilitation Potential For Stated Goals: Fair     RECOMMENDED REHABILITATION/EQUIPMENT: (at time of discharge pending progress): Due to the probability of continued deficits (see above) this patient will likely need continued skilled occupational therapy after discharge. Equipment:    TBD              OCCUPATIONAL PROFILE AND HISTORY:   History of Present Injury/Illness (Reason for Referral):  See H&P  Past Medical History/Comorbidities:   Ms. Jil Nelson  has a past medical history of Abnormal EKG (2/16/2016); Aortic valve insufficiency (2/16/2016); Cancer ([facility] [address] [facility] Hyperlipidemia (2/16/2016); Hypertension (2/16/2016); and Obesity (2/16/2016). Ms. Jil Nelson  has a past surgical history that includes hx tubal ligation.   Social History/Living Environment:   Home Environment: Private residence  Wheelchair Ramp: Yes  One/Two Story Residence: One story  Living Alone: No  Support Systems: Child(ezequiel)  Patient Expects to be Discharged to[de-identified] Unknown  Current DME Used/Available at Home: Wheelchair  Prior Level of Function/Work/Activity:  Independent with ADLs; use of w/c for all mobility. Personal Factors:          Sex:  female        Age:  75 y.o. Other factors that influence how disability is experienced by the patient:  Multiple co-morbidities    Number of Personal Factors/Comorbidities that affect the Plan of Care: Expanded review of therapy/medical records (1-2):  MODERATE COMPLEXITY   ASSESSMENT OF OCCUPATIONAL PERFORMANCE[de-identified]   Activities of Daily Living:   Basic ADLs (From Assessment) Complex ADLs (From Assessment)   Feeding: Independent  Oral Facial Hygiene/Grooming: Independent  Bathing: Maximum assistance  Upper Body Dressing: Independent  Lower Body Dressing: Maximum assistance  Toileting: Maximum assistance Instrumental ADL  Meal Preparation: Total assistance  Homemaking: Total assistance   Grooming/Bathing/Dressing Activities of Daily Living     Cognitive Retraining  Safety/Judgement: Awareness of environment; Fall prevention            Bed mobility: min assistance            Bed/Mat Mobility  Supine to Sit: Moderate assistance  Bed to Chair: Moderate assistance;Assist x2  Scooting: Maximum assistance       Most Recent Physical Functioning:   Gross Assessment:                  Posture:  Posture (WDL): Exceptions to WDL  Posture Assessment:  Forward head  Balance:  Sitting: Impaired  Sitting - Static: Good (unsupported)  Sitting - Dynamic: Fair (occasional) Bed Mobility:  Supine to Sit: Moderate assistance  Scooting: Maximum assistance  Wheelchair Mobility:     Transfers:  Bed to Chair: Moderate assistance;Assist x2            Patient Vitals for the past 6 hrs:   BP BP Patient Position SpO2 Pulse   08/14/18 0717 117/81 At rest 93 % 68       Mental Status  Neurologic State: Alert  Orientation Level: Oriented X4  Cognition: Follows commands  Perception: Appears intact  Perseveration: No perseveration noted  Safety/Judgement: Awareness of environment, Fall prevention                          Physical Skills Involved:  1. Balance  2. Strength  3. Activity Tolerance  4. Gross Motor Control  5. Pain (acute) Cognitive Skills Affected (resulting in the inability to perform in a timely and safe manner): 1. none Psychosocial Skills Affected:  1. Habits/Routines  2. Environmental Adaptation   Number of elements that affect the Plan of Care: 5+:  HIGH COMPLEXITY   CLINICAL DECISION MAKIN[address] [place] [address] AM-PAC 6 Clicks   Daily Activity Inpatient Short Form  How much help from another person does the patient currently need. .. Total A Lot A Little None   1. Putting on and taking off regular lower body clothing? [] 1   [x] 2   [] 3   [] 4   2. Bathing (including washing, rinsing, drying)? [] 1   [x] 2   [] 3   [] 4   3. Toileting, which includes using toilet, bedpan or urinal?   [] 1   [x] 2   [] 3   [] 4   4. Putting on and taking off regular upper body clothing? [] 1   [] 2   [] 3   [x] 4   5. Taking care of personal grooming such as brushing teeth? [] 1   [] 2   [] 3   [x] 4   6. Eating meals? [] 1   [] 2   [] 3   [x] 4   © , Trustees of [address] [place] [address], under license to [employer]. All rights reserved      Score:  Initial: 21 Most Recent: X (Date: -- )    Interpretation of Tool:  Represents activities that are increasingly more difficult (i.e. Bed mobility, Transfers, Gait). Score 24 23 22-20 19-15 14-10 9-7 6     Modifier CH CI CJ CK CL CM CN      ?  Self Care:     - CURRENT STATUS: CJ - 20%-39% impaired, limited or restricted    - GOAL STATUS: CI - 1%-19% impaired, limited or restricted    - D/C STATUS:  ---------------To be determined---------------  Payor: Sammy Nolasco / Plan: [address] [place] [address] HMO / Product Type: Managed Care Medicare /      Medical Necessity:     · Patient is expected to demonstrate progress in strength, balance, coordination and functional technique to decrease assistance required with ADls and functional moiblity. .  Reason for Services/Other Comments:  · Patient continues to require skilled intervention due to medical complications and patient unable to attend/participate in therapy as expected. Use of outcome tool(s) and clinical judgement create a POC that gives a: MODERATE COMPLEXITY         TREATMENT:   (In addition to Assessment/Re-Assessment sessions the following treatments were rendered)     Pre-treatment Symptoms/Complaints:    Pain: Initial:   Pain Intensity 1: 0  Post Session:  same   Self Care: (25 minutes): Procedure(s) (per grid) utilized to improve and/or restore self-care/home management as related to bathing and grooming. Required moderate verbal and manual cueing to facilitate activities of daily living skills and compensatory activities. Therapeutic Activity: (    15 minutes): Therapeutic activities including Bed transfers and Chair transfers to improve mobility, strength and balance. Required moderate assistance   to promote static and dynamic balance in sitting. Date:  8/13/18 Date:   Date:     Activity/Exercise Parameters Parameters Parameters   Shoulder flexion/extension 3 sets of 15 reps with yellow theraband     Shoulder horizontal add/abb 3 sets of 15 reps with yellow theraband     Punches 3 sets of 15 reps with yellow theraband     Elbow flexion/extension 3 sets of 15 reps with yellow theraband     Tricep extension 3 sets of 15 reps with yellow theraband       Treatment/Session Assessment:    · Response to Treatment:  Tolerated well. · Interdisciplinary Collaboration:   o Occupational Therapist  o Registered Nurse  o Rehabilitation Attendant  · After treatment position/precautions:   o Up in chair  o Bed/Chair-wheels locked  o Call light within reach  o RN notified  o Family at bedside   · Compliance with Program/Exercises: Will assess as treatment progresses.   · Recommendations/Intent for next treatment session: \"Next visit will focus on advancements to more challenging activities and reduction in assistance provided\".   Total Treatment Duration:  OT Patient Time In/Time Out  Time In: 0910  Time Out: 0950[facility], OTR/L

## 2018-08-15 ENCOUNTER — PATIENT OUTREACH (OUTPATIENT)
Dept: CASE MANAGEMENT | Age: 73
End: 2018-08-15

## 2018-08-15 ENCOUNTER — HOME CARE VISIT (OUTPATIENT)
Dept: SCHEDULING | Facility: HOME HEALTH | Age: 73
End: 2018-08-15
Payer: MEDICARE

## 2018-08-15 VITALS
TEMPERATURE: 98.4 F | RESPIRATION RATE: 18 BRPM | HEART RATE: 74 BPM | DIASTOLIC BLOOD PRESSURE: 84 MMHG | SYSTOLIC BLOOD PRESSURE: 128 MMHG

## 2018-08-15 PROCEDURE — 3331090001 HH PPS REVENUE CREDIT

## 2018-08-15 PROCEDURE — 3331090002 HH PPS REVENUE DEBIT

## 2018-08-15 PROCEDURE — 400013 HH SOC

## 2018-08-15 PROCEDURE — G0151 HHCP-SERV OF PT,EA 15 MIN: HCPCS

## 2018-08-15 NOTE — PROGRESS NOTES
Care Coordinator left voicemail message on home/mobile # [phone], requesting a return call from patient. Care Coordinator will make several more attempts to reach patient for Transitions of Care Outreach. This note will not be viewable in [address]

## 2018-08-16 ENCOUNTER — PATIENT OUTREACH (OUTPATIENT)
Dept: CASE MANAGEMENT | Age: 73
End: 2018-08-16

## 2018-08-16 PROCEDURE — 3331090002 HH PPS REVENUE DEBIT

## 2018-08-16 PROCEDURE — 3331090001 HH PPS REVENUE CREDIT

## 2018-08-16 NOTE — PROGRESS NOTES
Transition of Care Discharge Follow-up Questionnaire   Date/Time of Call:   August 16, 2018 1:10PM   What was the patient hospitalized for? Femur fracture, left        Does the patient understand his/her diagnosis and/or treatment and what happened during the hospitalization? Care Coordinator spoke with patient's daughter who agreed to Transitions of Care Outreach Call. Patient's daughter states understanding of diagnosis and treatment plan during hospitalization. Did the patient receive discharge instructions? Yes   CM Assessed Risk for Readmission:         Patient stated Risk for Readmission:      Low risk for readmit related to complications from Femur fracture, left       Patient's daughter states patient is doing well and has no risk for readmission. Review any discharge instructions (see discharge instructions/AVS in [facility]). Ask patient if they understand these. Do they have any questions? Care Coordinator reviewed discharge medication, diet and discharge instructions with patient's daughter. Patient's daughter states understanding of patient discharge instructions, patient's daughter states no questions. Were home services ordered (nursing, PT, OT, ST, etc.)? Yes, home health services ordered with [facility] (PT/OT). If so, has the first visit occurred? If not, why? (Assist with coordination of services if necessary.)   Yes   Was any DME ordered? Wheelchair with drop arms and semi electric hospital bed. If so, has it been received? If not, why?  (Assist patient in obtaining DME orders &/or equipment if necessary.) Yes         Complete a review of all medications (new, continued and discontinued meds per the D/C instructions and medication tab in [facility]). Care Coordinator reviewed all medications with patient's daughter per connect care who verbalized understanding. Were all new prescriptions filled?   If not, why?  (Assist patient in obtaining medications if necessary  escalate for CCM &/or SW if ongoing issues are verbalized by pt or anticipated)   Yes       Does the patient understand the purpose and dosing instructions for all medications? (If patient has questions, provide explanation and education.)   Patient's daughter states understanding of patient current medications and dosing instructions. Does the patient have any problems in performing ADLs? (If patient is unable to perform ADLs  what is the limiting factor(s)? Do they have a support system that can assist? If no support system is present, discuss possible assistance that they may be able to obtain. Escalate for CCM/SW if ongoing issues are verbalized by pt ornticipated) Patient's daughter states patient is independent with ADL's and uses a wheelchair for mobility. Patient's daughter states patient is doing good and states patient is up in wheelchair active and doing exercises with Physical Therapy. Patient's daughter states she is assisting patient as needed. Does the p have all follow-up appointments scheduled? 7 day f/up with PCP?   (f/up with PCP may be w/in 14 days if patient has a f/up with their specialist w/in 7 days)    7-14 day f/up with specialist?   (or per discharge instructions)    If f/up has not been made  what actions has the care coordinator made to accomplish this? Has transportation been arranged? Care Coordinator educated patient on the importance of scheduling follow up appointment with PCP within 7 days of hospital discharge. Patient's daughter states patient will follow up with PCP at a later time. Dr. Nereida Melgar. Adis (Orthopedic Surgeon) August 23, 2018 @ 11:30AM    NA        Yes   Any other questions or concerns expressed by the patient?      No further needs identified: Patient instructed to call Care Coordinator if further questions or concerns arise   Schedule next appointment with EDGARD Thao or refer to RN Case Manager/ per the workflow guidelines. When is care coordinators next follow-up call scheduled? If referred for CCM  what RN care manager was the referral assigned? NA            Care Coordinator provided contact information if assistance is needed for concerns or questions. NA   OSCAR Call Completed By: BRANDAN Barrera Help ACO  Care Coordinator         This note will not be viewable in [address]

## 2018-08-17 ENCOUNTER — HOME CARE VISIT (OUTPATIENT)
Dept: SCHEDULING | Facility: HOME HEALTH | Age: 73
End: 2018-08-17
Payer: MEDICARE

## 2018-08-17 PROCEDURE — 3331090001 HH PPS REVENUE CREDIT

## 2018-08-17 PROCEDURE — G0152 HHCP-SERV OF OT,EA 15 MIN: HCPCS

## 2018-08-17 PROCEDURE — G0151 HHCP-SERV OF PT,EA 15 MIN: HCPCS

## 2018-08-17 PROCEDURE — 3331090002 HH PPS REVENUE DEBIT

## 2018-08-18 VITALS
OXYGEN SATURATION: 93 % | HEART RATE: 70 BPM | DIASTOLIC BLOOD PRESSURE: 65 MMHG | SYSTOLIC BLOOD PRESSURE: 130 MMHG | RESPIRATION RATE: 18 BRPM | TEMPERATURE: 97.9 F

## 2018-08-18 PROCEDURE — 3331090001 HH PPS REVENUE CREDIT

## 2018-08-18 PROCEDURE — 3331090002 HH PPS REVENUE DEBIT

## 2018-08-19 VITALS
DIASTOLIC BLOOD PRESSURE: 70 MMHG | SYSTOLIC BLOOD PRESSURE: 133 MMHG | TEMPERATURE: 97.7 F | HEART RATE: 70 BPM | RESPIRATION RATE: 18 BRPM

## 2018-08-19 PROCEDURE — 3331090001 HH PPS REVENUE CREDIT

## 2018-08-19 PROCEDURE — 3331090002 HH PPS REVENUE DEBIT

## 2018-08-20 ENCOUNTER — HOME CARE VISIT (OUTPATIENT)
Dept: SCHEDULING | Facility: HOME HEALTH | Age: 73
End: 2018-08-20
Payer: MEDICARE

## 2018-08-20 PROCEDURE — G0158 HHC OT ASSISTANT EA 15: HCPCS

## 2018-08-20 PROCEDURE — 3331090002 HH PPS REVENUE DEBIT

## 2018-08-20 PROCEDURE — 3331090001 HH PPS REVENUE CREDIT

## 2018-08-20 PROCEDURE — G0157 HHC PT ASSISTANT EA 15: HCPCS

## 2018-08-21 ENCOUNTER — HOME CARE VISIT (OUTPATIENT)
Dept: SCHEDULING | Facility: HOME HEALTH | Age: 73
End: 2018-08-21
Payer: MEDICARE

## 2018-08-21 VITALS
RESPIRATION RATE: 17 BRPM | OXYGEN SATURATION: 93 % | TEMPERATURE: 97.9 F | HEART RATE: 66 BPM | DIASTOLIC BLOOD PRESSURE: 55 MMHG | SYSTOLIC BLOOD PRESSURE: 100 MMHG

## 2018-08-21 VITALS
DIASTOLIC BLOOD PRESSURE: 60 MMHG | TEMPERATURE: 97.9 F | RESPIRATION RATE: 16 BRPM | SYSTOLIC BLOOD PRESSURE: 122 MMHG | HEART RATE: 72 BPM

## 2018-08-21 PROCEDURE — 3331090002 HH PPS REVENUE DEBIT

## 2018-08-21 PROCEDURE — 3331090001 HH PPS REVENUE CREDIT

## 2018-08-21 PROCEDURE — G0155 HHCP-SVS OF CSW,EA 15 MIN: HCPCS

## 2018-08-22 ENCOUNTER — HOME CARE VISIT (OUTPATIENT)
Dept: SCHEDULING | Facility: HOME HEALTH | Age: 73
End: 2018-08-22
Payer: MEDICARE

## 2018-08-22 VITALS
DIASTOLIC BLOOD PRESSURE: 60 MMHG | RESPIRATION RATE: 17 BRPM | TEMPERATURE: 97.8 F | SYSTOLIC BLOOD PRESSURE: 121 MMHG | HEART RATE: 80 BPM

## 2018-08-22 VITALS
RESPIRATION RATE: 18 BRPM | TEMPERATURE: 98.2 F | DIASTOLIC BLOOD PRESSURE: 64 MMHG | HEART RATE: 76 BPM | SYSTOLIC BLOOD PRESSURE: 124 MMHG

## 2018-08-22 PROCEDURE — 3331090002 HH PPS REVENUE DEBIT

## 2018-08-22 PROCEDURE — G0158 HHC OT ASSISTANT EA 15: HCPCS

## 2018-08-22 PROCEDURE — G0151 HHCP-SERV OF PT,EA 15 MIN: HCPCS

## 2018-08-22 PROCEDURE — 3331090001 HH PPS REVENUE CREDIT

## 2018-08-22 NOTE — OP NOTES
[facility] REPORT    Lauro Dalal  MR#: [id number]  : 1945  ACCOUNT #: [de-identified]   DATE OF SERVICE: 2018    PREOPERATIVE DIAGNOSES:  1. Left pathologic subtrochanteric femur fracture. 2.  Right impending pathologic femur fracture. POSTOPERATIVE DIAGNOSES:     1. Left pathologic subtrochanteric femur fracture. 2.  Right impending pathologic femur fracture. PROCEDURES PERFORMED:  1. ORIF of a left subtrochanteric periprosthetic femur fracture. 2.  Open biopsy, left proximal femur. 3.  Prophylactic IM nailing of right impending pathologic fracture. SURGEON:  Dr. Iqra Vo:  General.    PROCEDURE IN DETAIL:  The patient was brought to the operative suite, placed in supine position. After adequate anesthesia achieved in the form of general, the patient was placed on a fracture table. Perineal posts and foot holders were well padded. Beginning on the left side left hip was prepped and draped in the usual sterile fashion. Incision was made over the lateral aspect of the proximal femur. Hemostasis achieved with Bovie cautery. Fascia incised along the line of the skin incision. The vastus lateralis was split along its length. The fracture is an oblique subtrochanteric fracture. SPECIMENS REMOVED:  Pituitary rongeur was obtained from the fracture site and sent to pathology. The fracture was cleaned of soft tissue debris and clot. The incision extended down the lateral aspect of the femur splitting the vastus along the way. The decision was made to use a Synthes proximal femoral plate. The plate was secured to the proximal fragment over guidewires supplemented then by two 7-3 cannulated locking screws. This gave control of the proximal fragment. The end of the plate was then taken to the bone of the shaft distally with reduction clamps.   AP and lateral fluoroscopic images confirmed this resulted in anatomic reduction of the fracture. A push-pull screw was placed distally and the compression device was used to tension the fracture. The plate was pulled through the bone at the shaft level with a 4-5 cortical screws and then it was locked in place with 5-0 locking screws. The cortical screws were removed. The tensioning device was removed and the push-pull screw was removed. AP and lateral fluoroscopic views confirmed the fracture was reduced anatomically and the hardware was in good position. The wound was irrigated with normal saline. DBX bone putty was injected around the fracture site. The wound was then closed in layers. Sterile compressive dressings were applied. We then turned our attention to the right hip. The right hip was prepped and draped in the usual sterile fashion. This procedure was necessary to pathologically jeri the right femur due to her metastatic breast cancer. There were lesions seen in the right femur on her bone scan. OPERATIVE PROCEDURE:  The patient was brought to the operative suite and placed in supine position. After adequate anesthesia was achieved in the form of general, the patient was placed upon the fracture table. Peroneal post and foot holders were well padded. The patient underwent a closed reduction of the left intertrochanteric hip fracture. This was achieved by longitudinal traction, internal rotation, and adduction. AP and lateral fluoroscopic images confirmed the fracture was now reduced anatomically. Left hip was then prepped and draped in the usual sterile fashion. A 3 cm incision was made over the tip of the greater trochanter. Hemostasis was achieved with Bovie cautery. Guide pin for a Synthes trochanteric fixation nail was inserted through the tip of the trochanter, across the fracture site, and into the canal of the femur. Its position was confirmed by fluoroscopy.   The proximal reamer was then passed by hand over this guide pin in order to open up a proximal portal.  Guide pin and reamer were removed. A ball-tip guidewire was inserted through this portal, across the fracture site, and down to the epiphyseal scar of the knee. The position of the guidewire was confirmed by AP and lateral fluoroscopic images. The 11.5 mm reamer was passed as a sound and reaming was only performed by power if necessary. The Synthes trochanteric fixation nail was then passed over the guidewire without difficulty. The guidewire was removed and the targeting guide was inserted through a stab wound in the lateral aspect of the proximal femur. Guide pin was then inserted through the lateral cortex into the neck and head. It stopped just short of the subchondral bone. AP and lateral fluoroscopic images confirmed that the position of the guide pin was centered in the head. Depth gauge was used to determine the appropriate length helical blade. The reamers were passed over the guide pin in order to open up the lateral cortex neck and head. The appropriate length helical blade was then passed over the guidewire without difficulty. The set screw was passed from above with a spring wrench. Traction was removed. The position of the helical blade was confirmed by fluoroscopy. The fracture was then compressed to a stable position using the proximal targeting guide. At this point the targeting guides were removed. AP and lateral fluoroscopic images confirmed the fracture was reduced anatomically. Wounds were then irrigated with normal saline and closed in layers. Sterile compressive dressings were applied. The patient was then removed from the fracture table and transferred to the recovery room alert and oriented, under the care of Anesthesia. ESTIMATED BLOOD LOSS:  200 mL. FLUIDS:  See anesthesia record. CLOSURE:  Primary. COMPLICATIONS:  None.     The jeri was locked distally with 2 distal interlocks placed in a freehand technique from lateral to medial.  The AP and lateral fluoroscopic images confirmed the hardware was in good position. The pathologic fracture had not completed. The wound was irrigated with normal saline. It was closed in layers. A sterile compressive dressing was applied. The patient was then transferred to the recovery room, alert and oriented under the care of anesthesia. ESTIMATED BLOOD LOSS ON THE LEFT SIDE:  400 mL. On the right side:  100 mL. FLUIDS:  See Anesthesia record. CLOSURE:  Primary. COMPLICATIONS:  None. MD Geovanna Perez  D: 08/21/2018 20:26     T: 08/21/2018 22:01  JOB #: [id number]

## 2018-08-23 PROCEDURE — 3331090002 HH PPS REVENUE DEBIT

## 2018-08-23 PROCEDURE — 3331090001 HH PPS REVENUE CREDIT

## 2018-08-24 ENCOUNTER — HOME CARE VISIT (OUTPATIENT)
Dept: SCHEDULING | Facility: HOME HEALTH | Age: 73
End: 2018-08-24
Payer: MEDICARE

## 2018-08-24 PROCEDURE — 3331090001 HH PPS REVENUE CREDIT

## 2018-08-24 PROCEDURE — 3331090002 HH PPS REVENUE DEBIT

## 2018-08-24 PROCEDURE — G0151 HHCP-SERV OF PT,EA 15 MIN: HCPCS

## 2018-08-25 PROCEDURE — 3331090002 HH PPS REVENUE DEBIT

## 2018-08-25 PROCEDURE — 3331090001 HH PPS REVENUE CREDIT

## 2018-08-26 PROCEDURE — 3331090002 HH PPS REVENUE DEBIT

## 2018-08-26 PROCEDURE — 3331090001 HH PPS REVENUE CREDIT

## 2018-08-27 ENCOUNTER — HOME CARE VISIT (OUTPATIENT)
Dept: SCHEDULING | Facility: HOME HEALTH | Age: 73
End: 2018-08-27
Payer: MEDICARE

## 2018-08-27 VITALS
HEART RATE: 76 BPM | SYSTOLIC BLOOD PRESSURE: 132 MMHG | TEMPERATURE: 98.6 F | DIASTOLIC BLOOD PRESSURE: 60 MMHG | RESPIRATION RATE: 18 BRPM

## 2018-08-27 VITALS
SYSTOLIC BLOOD PRESSURE: 126 MMHG | TEMPERATURE: 98.2 F | HEART RATE: 82 BPM | RESPIRATION RATE: 18 BRPM | DIASTOLIC BLOOD PRESSURE: 78 MMHG

## 2018-08-27 VITALS
TEMPERATURE: 97.7 F | RESPIRATION RATE: 17 BRPM | HEART RATE: 64 BPM | SYSTOLIC BLOOD PRESSURE: 130 MMHG | DIASTOLIC BLOOD PRESSURE: 69 MMHG

## 2018-08-27 PROCEDURE — 3331090001 HH PPS REVENUE CREDIT

## 2018-08-27 PROCEDURE — 3331090002 HH PPS REVENUE DEBIT

## 2018-08-27 PROCEDURE — G0151 HHCP-SERV OF PT,EA 15 MIN: HCPCS

## 2018-08-27 PROCEDURE — G0158 HHC OT ASSISTANT EA 15: HCPCS

## 2018-08-28 PROCEDURE — 3331090001 HH PPS REVENUE CREDIT

## 2018-08-28 PROCEDURE — 3331090002 HH PPS REVENUE DEBIT

## 2018-08-29 ENCOUNTER — HOME CARE VISIT (OUTPATIENT)
Dept: SCHEDULING | Facility: HOME HEALTH | Age: 73
End: 2018-08-29
Payer: MEDICARE

## 2018-08-29 VITALS
TEMPERATURE: 98.2 F | DIASTOLIC BLOOD PRESSURE: 64 MMHG | RESPIRATION RATE: 18 BRPM | HEART RATE: 76 BPM | SYSTOLIC BLOOD PRESSURE: 106 MMHG

## 2018-08-29 PROCEDURE — G0155 HHCP-SVS OF CSW,EA 15 MIN: HCPCS

## 2018-08-29 PROCEDURE — G0151 HHCP-SERV OF PT,EA 15 MIN: HCPCS

## 2018-08-29 PROCEDURE — 3331090001 HH PPS REVENUE CREDIT

## 2018-08-29 PROCEDURE — 3331090002 HH PPS REVENUE DEBIT

## 2018-08-30 PROCEDURE — 3331090002 HH PPS REVENUE DEBIT

## 2018-08-30 PROCEDURE — 3331090001 HH PPS REVENUE CREDIT

## 2018-08-31 ENCOUNTER — HOME CARE VISIT (OUTPATIENT)
Dept: SCHEDULING | Facility: HOME HEALTH | Age: 73
End: 2018-08-31
Payer: MEDICARE

## 2018-08-31 ENCOUNTER — HOME CARE VISIT (OUTPATIENT)
Dept: HOME HEALTH SERVICES | Facility: HOME HEALTH | Age: 73
End: 2018-08-31
Payer: MEDICARE

## 2018-08-31 VITALS
DIASTOLIC BLOOD PRESSURE: 58 MMHG | TEMPERATURE: 98.2 F | SYSTOLIC BLOOD PRESSURE: 126 MMHG | RESPIRATION RATE: 18 BRPM | HEART RATE: 76 BPM

## 2018-08-31 VITALS
TEMPERATURE: 98.3 F | SYSTOLIC BLOOD PRESSURE: 122 MMHG | DIASTOLIC BLOOD PRESSURE: 58 MMHG | HEART RATE: 74 BPM | RESPIRATION RATE: 16 BRPM

## 2018-08-31 PROCEDURE — G0151 HHCP-SERV OF PT,EA 15 MIN: HCPCS

## 2018-08-31 PROCEDURE — G0152 HHCP-SERV OF OT,EA 15 MIN: HCPCS

## 2018-08-31 PROCEDURE — 3331090001 HH PPS REVENUE CREDIT

## 2018-08-31 PROCEDURE — 3331090002 HH PPS REVENUE DEBIT

## 2018-09-01 PROCEDURE — 3331090001 HH PPS REVENUE CREDIT

## 2018-09-01 PROCEDURE — 3331090002 HH PPS REVENUE DEBIT

## 2018-09-02 PROCEDURE — 3331090001 HH PPS REVENUE CREDIT

## 2018-09-02 PROCEDURE — 3331090002 HH PPS REVENUE DEBIT

## 2018-09-03 PROCEDURE — 3331090001 HH PPS REVENUE CREDIT

## 2018-09-03 PROCEDURE — 3331090002 HH PPS REVENUE DEBIT

## 2018-09-04 PROCEDURE — 3331090002 HH PPS REVENUE DEBIT

## 2018-09-04 PROCEDURE — 3331090001 HH PPS REVENUE CREDIT

## 2018-09-05 ENCOUNTER — HOME CARE VISIT (OUTPATIENT)
Dept: SCHEDULING | Facility: HOME HEALTH | Age: 73
End: 2018-09-05
Payer: MEDICARE

## 2018-09-05 PROCEDURE — 3331090002 HH PPS REVENUE DEBIT

## 2018-09-05 PROCEDURE — G0151 HHCP-SERV OF PT,EA 15 MIN: HCPCS

## 2018-09-05 PROCEDURE — 3331090001 HH PPS REVENUE CREDIT

## 2018-09-06 VITALS
DIASTOLIC BLOOD PRESSURE: 60 MMHG | SYSTOLIC BLOOD PRESSURE: 124 MMHG | HEART RATE: 76 BPM | TEMPERATURE: 98.5 F | RESPIRATION RATE: 16 BRPM

## 2018-09-06 PROCEDURE — 3331090001 HH PPS REVENUE CREDIT

## 2018-09-06 PROCEDURE — 3331090002 HH PPS REVENUE DEBIT

## 2018-09-07 ENCOUNTER — HOME CARE VISIT (OUTPATIENT)
Dept: SCHEDULING | Facility: HOME HEALTH | Age: 73
End: 2018-09-07
Payer: MEDICARE

## 2018-09-07 VITALS
SYSTOLIC BLOOD PRESSURE: 126 MMHG | DIASTOLIC BLOOD PRESSURE: 60 MMHG | RESPIRATION RATE: 18 BRPM | HEART RATE: 76 BPM | TEMPERATURE: 98.7 F

## 2018-09-07 PROCEDURE — G0151 HHCP-SERV OF PT,EA 15 MIN: HCPCS

## 2018-09-07 PROCEDURE — 3331090001 HH PPS REVENUE CREDIT

## 2018-09-07 PROCEDURE — 3331090002 HH PPS REVENUE DEBIT

## 2018-09-08 PROCEDURE — 3331090002 HH PPS REVENUE DEBIT

## 2018-09-08 PROCEDURE — 3331090001 HH PPS REVENUE CREDIT

## 2018-09-09 PROCEDURE — 3331090001 HH PPS REVENUE CREDIT

## 2018-09-09 PROCEDURE — 3331090002 HH PPS REVENUE DEBIT

## 2018-09-10 PROCEDURE — 3331090002 HH PPS REVENUE DEBIT

## 2018-09-10 PROCEDURE — 3331090001 HH PPS REVENUE CREDIT

## 2018-09-11 ENCOUNTER — HOME CARE VISIT (OUTPATIENT)
Dept: SCHEDULING | Facility: HOME HEALTH | Age: 73
End: 2018-09-11
Payer: MEDICARE

## 2018-09-11 PROCEDURE — 3331090002 HH PPS REVENUE DEBIT

## 2018-09-11 PROCEDURE — G0151 HHCP-SERV OF PT,EA 15 MIN: HCPCS

## 2018-09-11 PROCEDURE — 3331090001 HH PPS REVENUE CREDIT

## 2018-09-12 PROCEDURE — 3331090001 HH PPS REVENUE CREDIT

## 2018-09-12 PROCEDURE — 3331090002 HH PPS REVENUE DEBIT

## 2018-09-13 ENCOUNTER — HOME CARE VISIT (OUTPATIENT)
Dept: SCHEDULING | Facility: HOME HEALTH | Age: 73
End: 2018-09-13
Payer: MEDICARE

## 2018-09-13 VITALS
SYSTOLIC BLOOD PRESSURE: 118 MMHG | TEMPERATURE: 98.2 F | RESPIRATION RATE: 18 BRPM | DIASTOLIC BLOOD PRESSURE: 64 MMHG | HEART RATE: 88 BPM

## 2018-09-13 PROCEDURE — G0151 HHCP-SERV OF PT,EA 15 MIN: HCPCS

## 2018-09-13 PROCEDURE — 3331090002 HH PPS REVENUE DEBIT

## 2018-09-13 PROCEDURE — 3331090001 HH PPS REVENUE CREDIT

## 2018-09-14 PROCEDURE — 3331090002 HH PPS REVENUE DEBIT

## 2018-09-14 PROCEDURE — 3331090001 HH PPS REVENUE CREDIT

## 2018-09-15 PROCEDURE — 3331090002 HH PPS REVENUE DEBIT

## 2018-09-15 PROCEDURE — 3331090001 HH PPS REVENUE CREDIT

## 2018-09-16 PROCEDURE — 3331090002 HH PPS REVENUE DEBIT

## 2018-09-16 PROCEDURE — 3331090001 HH PPS REVENUE CREDIT

## 2018-09-17 PROCEDURE — 3331090001 HH PPS REVENUE CREDIT

## 2018-09-17 PROCEDURE — 3331090002 HH PPS REVENUE DEBIT

## 2018-09-18 ENCOUNTER — HOME CARE VISIT (OUTPATIENT)
Dept: SCHEDULING | Facility: HOME HEALTH | Age: 73
End: 2018-09-18
Payer: MEDICARE

## 2018-09-18 VITALS
SYSTOLIC BLOOD PRESSURE: 124 MMHG | HEART RATE: 80 BPM | DIASTOLIC BLOOD PRESSURE: 60 MMHG | RESPIRATION RATE: 18 BRPM | TEMPERATURE: 99 F

## 2018-09-18 PROCEDURE — 3331090002 HH PPS REVENUE DEBIT

## 2018-09-18 PROCEDURE — G0151 HHCP-SERV OF PT,EA 15 MIN: HCPCS

## 2018-09-18 PROCEDURE — 3331090001 HH PPS REVENUE CREDIT

## 2018-09-19 PROCEDURE — 3331090001 HH PPS REVENUE CREDIT

## 2018-09-19 PROCEDURE — 3331090002 HH PPS REVENUE DEBIT

## 2018-09-20 ENCOUNTER — HOME CARE VISIT (OUTPATIENT)
Dept: SCHEDULING | Facility: HOME HEALTH | Age: 73
End: 2018-09-20
Payer: MEDICARE

## 2018-09-20 VITALS
TEMPERATURE: 98.2 F | HEART RATE: 68 BPM | DIASTOLIC BLOOD PRESSURE: 60 MMHG | RESPIRATION RATE: 18 BRPM | SYSTOLIC BLOOD PRESSURE: 110 MMHG

## 2018-09-20 PROCEDURE — G0151 HHCP-SERV OF PT,EA 15 MIN: HCPCS

## 2018-09-20 PROCEDURE — 3331090002 HH PPS REVENUE DEBIT

## 2018-09-20 PROCEDURE — 3331090001 HH PPS REVENUE CREDIT

## 2018-09-21 PROCEDURE — 3331090002 HH PPS REVENUE DEBIT

## 2018-09-21 PROCEDURE — 3331090001 HH PPS REVENUE CREDIT

## 2018-09-22 PROCEDURE — 3331090001 HH PPS REVENUE CREDIT

## 2018-09-22 PROCEDURE — 3331090002 HH PPS REVENUE DEBIT

## 2018-09-23 PROCEDURE — 3331090002 HH PPS REVENUE DEBIT

## 2018-09-23 PROCEDURE — 3331090001 HH PPS REVENUE CREDIT

## 2018-09-24 PROCEDURE — 3331090001 HH PPS REVENUE CREDIT

## 2018-09-24 PROCEDURE — 3331090002 HH PPS REVENUE DEBIT

## 2018-09-25 ENCOUNTER — HOME CARE VISIT (OUTPATIENT)
Dept: SCHEDULING | Facility: HOME HEALTH | Age: 73
End: 2018-09-25
Payer: MEDICARE

## 2018-09-25 PROCEDURE — 3331090001 HH PPS REVENUE CREDIT

## 2018-09-25 PROCEDURE — 3331090002 HH PPS REVENUE DEBIT

## 2018-09-25 PROCEDURE — G0151 HHCP-SERV OF PT,EA 15 MIN: HCPCS

## 2018-09-26 ENCOUNTER — HOME CARE VISIT (OUTPATIENT)
Dept: SCHEDULING | Facility: HOME HEALTH | Age: 73
End: 2018-09-26
Payer: MEDICARE

## 2018-09-26 VITALS
TEMPERATURE: 98.2 F | RESPIRATION RATE: 16 BRPM | HEART RATE: 78 BPM | DIASTOLIC BLOOD PRESSURE: 78 MMHG | SYSTOLIC BLOOD PRESSURE: 126 MMHG

## 2018-09-26 PROCEDURE — 3331090002 HH PPS REVENUE DEBIT

## 2018-09-26 PROCEDURE — G0151 HHCP-SERV OF PT,EA 15 MIN: HCPCS

## 2018-09-26 PROCEDURE — 3331090001 HH PPS REVENUE CREDIT

## 2018-09-27 PROCEDURE — 3331090002 HH PPS REVENUE DEBIT

## 2018-09-27 PROCEDURE — 3331090001 HH PPS REVENUE CREDIT

## 2018-09-28 VITALS
TEMPERATURE: 97.5 F | SYSTOLIC BLOOD PRESSURE: 134 MMHG | HEART RATE: 78 BPM | RESPIRATION RATE: 22 BRPM | DIASTOLIC BLOOD PRESSURE: 82 MMHG

## 2018-09-28 PROCEDURE — 3331090002 HH PPS REVENUE DEBIT

## 2018-09-28 PROCEDURE — 3331090001 HH PPS REVENUE CREDIT

## 2018-09-29 PROCEDURE — 3331090002 HH PPS REVENUE DEBIT

## 2018-09-29 PROCEDURE — 3331090001 HH PPS REVENUE CREDIT

## 2018-09-30 PROCEDURE — 3331090001 HH PPS REVENUE CREDIT

## 2018-09-30 PROCEDURE — 3331090002 HH PPS REVENUE DEBIT

## 2018-10-01 PROCEDURE — 3331090002 HH PPS REVENUE DEBIT

## 2018-10-01 PROCEDURE — 3331090001 HH PPS REVENUE CREDIT

## 2018-10-02 PROCEDURE — 3331090002 HH PPS REVENUE DEBIT

## 2018-10-02 PROCEDURE — 3331090001 HH PPS REVENUE CREDIT

## 2018-10-03 PROCEDURE — 3331090001 HH PPS REVENUE CREDIT

## 2018-10-03 PROCEDURE — 3331090002 HH PPS REVENUE DEBIT

## 2018-10-04 PROCEDURE — 3331090002 HH PPS REVENUE DEBIT

## 2018-10-04 PROCEDURE — 3331090001 HH PPS REVENUE CREDIT

## 2018-10-05 ENCOUNTER — HOME CARE VISIT (OUTPATIENT)
Dept: SCHEDULING | Facility: HOME HEALTH | Age: 73
End: 2018-10-05
Payer: MEDICARE

## 2018-10-05 VITALS
RESPIRATION RATE: 16 BRPM | SYSTOLIC BLOOD PRESSURE: 117 MMHG | DIASTOLIC BLOOD PRESSURE: 64 MMHG | HEART RATE: 78 BPM | TEMPERATURE: 98.2 F

## 2018-10-05 PROCEDURE — 3331090002 HH PPS REVENUE DEBIT

## 2018-10-05 PROCEDURE — 3331090001 HH PPS REVENUE CREDIT

## 2018-10-05 PROCEDURE — 3331090003 HH PPS REVENUE ADJ

## 2018-10-05 PROCEDURE — G0151 HHCP-SERV OF PT,EA 15 MIN: HCPCS

## 2018-10-06 PROCEDURE — 3331090002 HH PPS REVENUE DEBIT

## 2018-10-06 PROCEDURE — 3331090001 HH PPS REVENUE CREDIT

## 2018-10-07 PROCEDURE — 3331090002 HH PPS REVENUE DEBIT

## 2018-10-07 PROCEDURE — 3331090001 HH PPS REVENUE CREDIT

## 2018-11-20 ENCOUNTER — HOSPITAL ENCOUNTER (OUTPATIENT)
Dept: INFUSION THERAPY | Age: 73
Discharge: HOME | End: 2018-11-20
Payer: MEDICARE

## 2018-11-20 ENCOUNTER — HOSPITAL ENCOUNTER (OUTPATIENT)
Dept: LAB | Age: 73
Discharge: HOME | End: 2018-11-20
Payer: MEDICARE

## 2018-11-20 DIAGNOSIS — N18.9 ANEMIA DUE TO CHRONIC KIDNEY DISEASE TREATED WITH ERYTHROPOIETIN: ICD-10-CM

## 2018-11-20 DIAGNOSIS — C50.919 INFILTRATING DUCTAL CARCINOMA OF FEMALE BREAST, UNSPECIFIED LATERALITY (HCC): ICD-10-CM

## 2018-11-20 DIAGNOSIS — D63.1 ANEMIA DUE TO CHRONIC KIDNEY DISEASE TREATED WITH ERYTHROPOIETIN: ICD-10-CM

## 2018-11-20 DIAGNOSIS — C79.51 MALIGNANT NEOPLASM METASTATIC TO BONE (HCC): Primary | ICD-10-CM

## 2018-11-20 DIAGNOSIS — E87.5 HYPERKALEMIA: ICD-10-CM

## 2018-11-20 LAB
ALBUMIN SERPL-MCNC: 3.4 G/DL (ref 3.2–4.6)
ALBUMIN/GLOB SERPL: 0.9 {RATIO} (ref 1.2–3.5)
ALP SERPL-CCNC: 109 U/L (ref 50–136)
ALT SERPL-CCNC: 25 U/L (ref 12–65)
ANION GAP SERPL CALC-SCNC: 8 MMOL/L (ref 7–16)
AST SERPL-CCNC: 27 U/L (ref 15–37)
BASOPHILS # BLD: 0 K/UL (ref 0–0.2)
BASOPHILS NFR BLD: 1 % (ref 0–2)
BILIRUB SERPL-MCNC: 0.6 MG/DL (ref 0.2–1.1)
BUN SERPL-MCNC: 32 MG/DL (ref 8–23)
CALCIUM SERPL-MCNC: 8.4 MG/DL (ref 8.3–10.4)
CHLORIDE SERPL-SCNC: 110 MMOL/L (ref 98–107)
CO2 SERPL-SCNC: 22 MMOL/L (ref 21–32)
CREAT SERPL-MCNC: 1.86 MG/DL (ref 0.6–1)
DIFFERENTIAL METHOD BLD: ABNORMAL
EOSINOPHIL # BLD: 0.1 K/UL (ref 0–0.8)
EOSINOPHIL NFR BLD: 3 % (ref 0.5–7.8)
ERYTHROCYTE [DISTWIDTH] IN BLOOD BY AUTOMATED COUNT: 15.2 % (ref 11.9–14.6)
FERRITIN SERPL-MCNC: 420 NG/ML (ref 8–388)
GLOBULIN SER CALC-MCNC: 3.7 G/DL (ref 2.3–3.5)
GLUCOSE SERPL-MCNC: 92 MG/DL (ref 65–100)
HCT VFR BLD AUTO: 29.2 % (ref 35.8–46.3)
HGB BLD-MCNC: 8.9 G/DL (ref 11.7–15.4)
IMM GRANULOCYTES # BLD: 0 K/UL (ref 0–0.5)
IMM GRANULOCYTES NFR BLD AUTO: 1 % (ref 0–5)
IRON SATN MFR SERPL: 38 %
IRON SERPL-MCNC: 79 UG/DL (ref 35–150)
LYMPHOCYTES # BLD: 0.7 K/UL (ref 0.5–4.6)
LYMPHOCYTES NFR BLD: 20 % (ref 13–44)
MCH RBC QN AUTO: 31.4 PG (ref 26.1–32.9)
MCHC RBC AUTO-ENTMCNC: 30.5 G/DL (ref 31.4–35)
MCV RBC AUTO: 103.2 FL (ref 79.6–97.8)
MONOCYTES # BLD: 0.2 K/UL (ref 0.1–1.3)
MONOCYTES NFR BLD: 7 % (ref 4–12)
NEUTS SEG # BLD: 2.2 K/UL (ref 1.7–8.2)
NEUTS SEG NFR BLD: 68 % (ref 43–78)
NRBC # BLD: 0 K/UL (ref 0–0.2)
PLATELET # BLD AUTO: 155 K/UL (ref 150–450)
PMV BLD AUTO: 9.8 FL (ref 9.4–12.3)
POTASSIUM SERPL-SCNC: 3.9 MMOL/L (ref 3.5–5.1)
PROT SERPL-MCNC: 7.1 G/DL (ref 6.3–8.2)
RBC # BLD AUTO: 2.83 M/UL (ref 4.05–5.25)
SODIUM SERPL-SCNC: 140 MMOL/L (ref 136–145)
TIBC SERPL-MCNC: 207 UG/DL (ref 250–450)
WBC # BLD AUTO: 3.3 K/UL (ref 4.3–11.1)

## 2018-11-20 PROCEDURE — 36415 COLL VENOUS BLD VENIPUNCTURE: CPT

## 2018-11-20 PROCEDURE — 96372 THER/PROPH/DIAG INJ SC/IM: CPT

## 2018-11-20 PROCEDURE — 85025 COMPLETE CBC W/AUTO DIFF WBC: CPT

## 2018-11-20 PROCEDURE — 83540 ASSAY OF IRON: CPT

## 2018-11-20 PROCEDURE — 82728 ASSAY OF FERRITIN: CPT

## 2018-11-20 PROCEDURE — 80053 COMPREHEN METABOLIC PANEL: CPT

## 2018-11-20 PROCEDURE — 74011250636 HC RX REV CODE- 250/636: Performed by: INTERNAL MEDICINE

## 2018-11-20 RX ORDER — CYANOCOBALAMIN 1000 UG/ML
1000 INJECTION, SOLUTION INTRAMUSCULAR; SUBCUTANEOUS ONCE
Status: COMPLETED | OUTPATIENT
Start: 2018-11-20 | End: 2018-11-20

## 2018-11-20 RX ADMIN — DENOSUMAB 120 MG: 120 INJECTION SUBCUTANEOUS at 13:42

## 2018-11-20 RX ADMIN — ERYTHROPOIETIN 40000 UNITS: 40000 INJECTION, SOLUTION INTRAVENOUS; SUBCUTANEOUS at 13:36

## 2018-11-20 RX ADMIN — CYANOCOBALAMIN 1000 MCG: 1000 INJECTION, SOLUTION INTRAMUSCULAR at 13:40

## 2018-11-20 NOTE — PROGRESS NOTES
Arrived to the [facility]. B-12, XGEVA, Epotin completed. Patient tolerated well. Any issues or concerns during appointment: None. Patient aware of next infusion appointment on 12.04.2018 (date) at 56 (time). Discharged in wheelchair with daughter to home.

## 2018-12-04 ENCOUNTER — APPOINTMENT (OUTPATIENT)
Dept: INFUSION THERAPY | Age: 73
End: 2018-12-04
Payer: MEDICARE

## 2018-12-04 ENCOUNTER — HOSPITAL ENCOUNTER (OUTPATIENT)
Dept: INFUSION THERAPY | Age: 73
Discharge: HOME | End: 2018-12-04
Payer: MEDICARE

## 2018-12-04 VITALS
RESPIRATION RATE: 16 BRPM | SYSTOLIC BLOOD PRESSURE: 142 MMHG | OXYGEN SATURATION: 99 % | DIASTOLIC BLOOD PRESSURE: 62 MMHG | TEMPERATURE: 97.8 F | HEART RATE: 74 BPM

## 2018-12-04 DIAGNOSIS — N18.9 ANEMIA DUE TO CHRONIC KIDNEY DISEASE TREATED WITH ERYTHROPOIETIN: Primary | ICD-10-CM

## 2018-12-04 DIAGNOSIS — D63.1 ANEMIA DUE TO CHRONIC KIDNEY DISEASE TREATED WITH ERYTHROPOIETIN: Primary | ICD-10-CM

## 2018-12-04 LAB — HGB BLD-MCNC: 9.4 G/DL (ref 11.7–15.4)

## 2018-12-04 PROCEDURE — 96372 THER/PROPH/DIAG INJ SC/IM: CPT

## 2018-12-04 PROCEDURE — 36415 COLL VENOUS BLD VENIPUNCTURE: CPT

## 2018-12-04 PROCEDURE — 74011250636 HC RX REV CODE- 250/636: Performed by: INTERNAL MEDICINE

## 2018-12-04 PROCEDURE — 85018 HEMOGLOBIN: CPT

## 2018-12-04 RX ADMIN — ERYTHROPOIETIN 40000 UNITS: 40000 INJECTION, SOLUTION INTRAVENOUS; SUBCUTANEOUS at 10:29

## 2018-12-04 NOTE — PROGRESS NOTES
Arrived to the [facility]. Procrit completed. Patient tolerated well. Any issues or concerns during appointment: none. Patient aware of next infusion appointment on 12/21/18 at 1130.    Patient discharged via wheelchair

## 2018-12-06 ENCOUNTER — HOSPITAL ENCOUNTER (OUTPATIENT)
Dept: PET IMAGING | Age: 73
Discharge: HOME | End: 2018-12-06
Attending: INTERNAL MEDICINE
Payer: MEDICARE

## 2018-12-06 DIAGNOSIS — C50.919 INFILTRATING DUCTAL CARCINOMA OF FEMALE BREAST, UNSPECIFIED LATERALITY (HCC): ICD-10-CM

## 2018-12-06 DIAGNOSIS — M84.552A PATHOLOGICAL FRACTURE OF LEFT FEMUR DUE TO NEOPLASTIC DISEASE, INITIAL ENCOUNTER (HCC): ICD-10-CM

## 2018-12-06 PROCEDURE — A9552 F18 FDG: HCPCS

## 2018-12-06 PROCEDURE — 74011636320 HC RX REV CODE- 636/320: Performed by: INTERNAL MEDICINE

## 2018-12-06 RX ADMIN — DIATRIZOATE MEGLUMINE AND DIATRIZOATE SODIUM 10 ML: 600; 100 SOLUTION ORAL; RECTAL at 08:45

## 2018-12-21 ENCOUNTER — HOSPITAL ENCOUNTER (OUTPATIENT)
Dept: INFUSION THERAPY | Age: 73
Discharge: HOME | End: 2018-12-21
Payer: MEDICARE

## 2018-12-21 ENCOUNTER — HOSPITAL ENCOUNTER (OUTPATIENT)
Dept: LAB | Age: 73
Discharge: HOME | End: 2018-12-21
Payer: MEDICARE

## 2018-12-21 DIAGNOSIS — C79.51 MALIGNANT NEOPLASM METASTATIC TO BONE (HCC): ICD-10-CM

## 2018-12-21 DIAGNOSIS — D63.1 ANEMIA DUE TO CHRONIC KIDNEY DISEASE TREATED WITH ERYTHROPOIETIN: Primary | ICD-10-CM

## 2018-12-21 DIAGNOSIS — N18.9 ANEMIA DUE TO CHRONIC KIDNEY DISEASE TREATED WITH ERYTHROPOIETIN: Primary | ICD-10-CM

## 2018-12-21 LAB
ALBUMIN SERPL-MCNC: 3.3 G/DL (ref 3.2–4.6)
ALBUMIN/GLOB SERPL: 0.9 {RATIO} (ref 1.2–3.5)
ALP SERPL-CCNC: 91 U/L (ref 50–136)
ALT SERPL-CCNC: 25 U/L (ref 12–65)
ANION GAP SERPL CALC-SCNC: 8 MMOL/L (ref 7–16)
AST SERPL-CCNC: 29 U/L (ref 15–37)
BASOPHILS # BLD: 0 K/UL (ref 0–0.2)
BASOPHILS NFR BLD: 1 % (ref 0–2)
BILIRUB SERPL-MCNC: 0.4 MG/DL (ref 0.2–1.1)
BUN SERPL-MCNC: 20 MG/DL (ref 8–23)
CALCIUM SERPL-MCNC: 7.9 MG/DL (ref 8.3–10.4)
CHLORIDE SERPL-SCNC: 108 MMOL/L (ref 98–107)
CO2 SERPL-SCNC: 26 MMOL/L (ref 21–32)
CREAT SERPL-MCNC: 1.72 MG/DL (ref 0.6–1)
DIFFERENTIAL METHOD BLD: ABNORMAL
EOSINOPHIL # BLD: 0 K/UL (ref 0–0.8)
EOSINOPHIL NFR BLD: 1 % (ref 0.5–7.8)
ERYTHROCYTE [DISTWIDTH] IN BLOOD BY AUTOMATED COUNT: 15.5 % (ref 11.9–14.6)
GLOBULIN SER CALC-MCNC: 3.6 G/DL (ref 2.3–3.5)
GLUCOSE SERPL-MCNC: 85 MG/DL (ref 65–100)
HCT VFR BLD AUTO: 31.1 % (ref 35.8–46.3)
HGB BLD-MCNC: 9.4 G/DL (ref 11.7–15.4)
IMM GRANULOCYTES # BLD: 0 K/UL (ref 0–0.5)
IMM GRANULOCYTES NFR BLD AUTO: 1 % (ref 0–5)
LYMPHOCYTES # BLD: 0.7 K/UL (ref 0.5–4.6)
LYMPHOCYTES NFR BLD: 40 % (ref 13–44)
MCH RBC QN AUTO: 31.1 PG (ref 26.1–32.9)
MCHC RBC AUTO-ENTMCNC: 30.2 G/DL (ref 31.4–35)
MCV RBC AUTO: 103 FL (ref 79.6–97.8)
MONOCYTES # BLD: 0.1 K/UL (ref 0.1–1.3)
MONOCYTES NFR BLD: 4 % (ref 4–12)
NEUTS SEG # BLD: 0.9 K/UL (ref 1.7–8.2)
NEUTS SEG NFR BLD: 53 % (ref 43–78)
NRBC # BLD: 0 K/UL (ref 0–0.2)
PLATELET # BLD AUTO: 112 K/UL (ref 150–450)
PMV BLD AUTO: 9.4 FL (ref 9.4–12.3)
POTASSIUM SERPL-SCNC: 4.4 MMOL/L (ref 3.5–5.1)
PROT SERPL-MCNC: 6.9 G/DL (ref 6.3–8.2)
RBC # BLD AUTO: 3.02 M/UL (ref 4.05–5.25)
SODIUM SERPL-SCNC: 142 MMOL/L (ref 136–145)
WBC # BLD AUTO: 1.6 K/UL (ref 4.3–11.1)

## 2018-12-21 PROCEDURE — 74011250636 HC RX REV CODE- 250/636: Performed by: INTERNAL MEDICINE

## 2018-12-21 PROCEDURE — 96372 THER/PROPH/DIAG INJ SC/IM: CPT

## 2018-12-21 PROCEDURE — 80053 COMPREHEN METABOLIC PANEL: CPT

## 2018-12-21 PROCEDURE — 36415 COLL VENOUS BLD VENIPUNCTURE: CPT

## 2018-12-21 PROCEDURE — 85025 COMPLETE CBC W/AUTO DIFF WBC: CPT

## 2018-12-21 RX ADMIN — ERYTHROPOIETIN 40000 UNITS: 40000 INJECTION, SOLUTION INTRAVENOUS; SUBCUTANEOUS at 12:00

## 2019-01-03 ENCOUNTER — HOSPITAL ENCOUNTER (OUTPATIENT)
Dept: INFUSION THERAPY | Age: 74
Discharge: HOME | End: 2019-01-03
Payer: MEDICARE

## 2019-01-03 VITALS
SYSTOLIC BLOOD PRESSURE: 152 MMHG | OXYGEN SATURATION: 97 % | RESPIRATION RATE: 16 BRPM | HEART RATE: 80 BPM | TEMPERATURE: 98.1 F | DIASTOLIC BLOOD PRESSURE: 68 MMHG

## 2019-01-03 DIAGNOSIS — D63.1 ANEMIA DUE TO CHRONIC KIDNEY DISEASE TREATED WITH ERYTHROPOIETIN: Primary | ICD-10-CM

## 2019-01-03 DIAGNOSIS — N18.9 ANEMIA DUE TO CHRONIC KIDNEY DISEASE TREATED WITH ERYTHROPOIETIN: Primary | ICD-10-CM

## 2019-01-03 LAB
FERRITIN SERPL-MCNC: 247 NG/ML (ref 8–388)
HGB BLD-MCNC: 10 G/DL (ref 11.7–15.4)
IRON SATN MFR SERPL: 21 %
IRON SERPL-MCNC: 51 UG/DL (ref 35–150)
TIBC SERPL-MCNC: 243 UG/DL (ref 250–450)

## 2019-01-03 PROCEDURE — 36415 COLL VENOUS BLD VENIPUNCTURE: CPT

## 2019-01-03 PROCEDURE — 96372 THER/PROPH/DIAG INJ SC/IM: CPT

## 2019-01-03 PROCEDURE — 83540 ASSAY OF IRON: CPT

## 2019-01-03 PROCEDURE — 82728 ASSAY OF FERRITIN: CPT

## 2019-01-03 PROCEDURE — 74011250636 HC RX REV CODE- 250/636: Performed by: INTERNAL MEDICINE

## 2019-01-03 PROCEDURE — 85018 HEMOGLOBIN: CPT

## 2019-01-03 RX ADMIN — EPOETIN ALFA-EPBX 40000 UNITS: 40000 INJECTION, SOLUTION INTRAVENOUS; SUBCUTANEOUS at 17:11

## 2019-01-07 NOTE — PROGRESS NOTES
Pt. Discharged via wheelchair. Tolerated injection well. No distress noted. To return to Infusions on 1/17/19.

## 2019-01-17 ENCOUNTER — HOSPITAL ENCOUNTER (OUTPATIENT)
Dept: LAB | Age: 74
Discharge: HOME | End: 2019-01-17
Payer: MEDICARE

## 2019-01-17 ENCOUNTER — HOSPITAL ENCOUNTER (OUTPATIENT)
Dept: INFUSION THERAPY | Age: 74
Discharge: HOME | End: 2019-01-17
Payer: MEDICARE

## 2019-01-17 DIAGNOSIS — C79.51 MALIGNANT NEOPLASM METASTATIC TO BONE (HCC): ICD-10-CM

## 2019-01-17 DIAGNOSIS — N18.9 ANEMIA DUE TO CHRONIC KIDNEY DISEASE TREATED WITH ERYTHROPOIETIN: ICD-10-CM

## 2019-01-17 DIAGNOSIS — E87.5 HYPERKALEMIA: Primary | ICD-10-CM

## 2019-01-17 DIAGNOSIS — D63.1 ANEMIA DUE TO CHRONIC KIDNEY DISEASE TREATED WITH ERYTHROPOIETIN: ICD-10-CM

## 2019-01-17 LAB
ALBUMIN SERPL-MCNC: 3.5 G/DL (ref 3.2–4.6)
ALBUMIN/GLOB SERPL: 0.9 {RATIO} (ref 1.2–3.5)
ALP SERPL-CCNC: 77 U/L (ref 50–136)
ALT SERPL-CCNC: 14 U/L (ref 12–65)
ANION GAP SERPL CALC-SCNC: 5 MMOL/L (ref 7–16)
AST SERPL-CCNC: 16 U/L (ref 15–37)
BASOPHILS # BLD: 0 K/UL (ref 0–0.2)
BASOPHILS NFR BLD: 2 % (ref 0–2)
BILIRUB SERPL-MCNC: 0.3 MG/DL (ref 0.2–1.1)
BUN SERPL-MCNC: 23 MG/DL (ref 8–23)
CALCIUM SERPL-MCNC: 8.6 MG/DL (ref 8.3–10.4)
CHLORIDE SERPL-SCNC: 110 MMOL/L (ref 98–107)
CO2 SERPL-SCNC: 27 MMOL/L (ref 21–32)
CREAT SERPL-MCNC: 1.8 MG/DL (ref 0.6–1)
DIFFERENTIAL METHOD BLD: ABNORMAL
EOSINOPHIL # BLD: 0 K/UL (ref 0–0.8)
EOSINOPHIL NFR BLD: 0 % (ref 0.5–7.8)
ERYTHROCYTE [DISTWIDTH] IN BLOOD BY AUTOMATED COUNT: 16.4 % (ref 11.9–14.6)
FERRITIN SERPL-MCNC: 208 NG/ML (ref 8–388)
GLOBULIN SER CALC-MCNC: 3.7 G/DL (ref 2.3–3.5)
GLUCOSE SERPL-MCNC: 88 MG/DL (ref 65–100)
HCT VFR BLD AUTO: 34.5 % (ref 35.8–46.3)
HGB BLD-MCNC: 10.7 G/DL (ref 11.7–15.4)
IMM GRANULOCYTES # BLD AUTO: 0 K/UL (ref 0–0.5)
IMM GRANULOCYTES NFR BLD AUTO: 0 % (ref 0–5)
IRON SATN MFR SERPL: 33 %
IRON SERPL-MCNC: 74 UG/DL (ref 35–150)
LYMPHOCYTES # BLD: 0.6 K/UL (ref 0.5–4.6)
LYMPHOCYTES NFR BLD: 26 % (ref 13–44)
MCH RBC QN AUTO: 31.5 PG (ref 26.1–32.9)
MCHC RBC AUTO-ENTMCNC: 31 G/DL (ref 31.4–35)
MCV RBC AUTO: 101.5 FL (ref 79.6–97.8)
MONOCYTES # BLD: 0.1 K/UL (ref 0.1–1.3)
MONOCYTES NFR BLD: 4 % (ref 4–12)
NEUTS SEG # BLD: 1.5 K/UL (ref 1.7–8.2)
NEUTS SEG NFR BLD: 67 % (ref 43–78)
NRBC # BLD: 0 K/UL (ref 0–0.2)
PLATELET # BLD AUTO: 172 K/UL (ref 150–450)
PMV BLD AUTO: 9.1 FL (ref 9.4–12.3)
POTASSIUM SERPL-SCNC: 3.9 MMOL/L (ref 3.5–5.1)
PROT SERPL-MCNC: 7.2 G/DL (ref 6.3–8.2)
RBC # BLD AUTO: 3.4 M/UL (ref 4.05–5.25)
SODIUM SERPL-SCNC: 142 MMOL/L (ref 136–145)
TIBC SERPL-MCNC: 225 UG/DL (ref 250–450)
WBC # BLD AUTO: 2.3 K/UL (ref 4.3–11.1)

## 2019-01-17 PROCEDURE — 80053 COMPREHEN METABOLIC PANEL: CPT

## 2019-01-17 PROCEDURE — 36415 COLL VENOUS BLD VENIPUNCTURE: CPT

## 2019-01-17 PROCEDURE — 74011250636 HC RX REV CODE- 250/636: Performed by: INTERNAL MEDICINE

## 2019-01-17 PROCEDURE — 83540 ASSAY OF IRON: CPT

## 2019-01-17 PROCEDURE — 82728 ASSAY OF FERRITIN: CPT

## 2019-01-17 PROCEDURE — 96372 THER/PROPH/DIAG INJ SC/IM: CPT

## 2019-01-17 PROCEDURE — 85025 COMPLETE CBC W/AUTO DIFF WBC: CPT

## 2019-01-17 RX ORDER — CYANOCOBALAMIN 1000 UG/ML
1000 INJECTION, SOLUTION INTRAMUSCULAR; SUBCUTANEOUS ONCE
Status: COMPLETED | OUTPATIENT
Start: 2019-01-17 | End: 2019-01-17

## 2019-01-17 RX ADMIN — EPOETIN ALFA-EPBX 40000 UNITS: 40000 INJECTION, SOLUTION INTRAVENOUS; SUBCUTANEOUS at 11:45

## 2019-01-17 RX ADMIN — CYANOCOBALAMIN 1000 MCG: 1000 INJECTION, SOLUTION INTRAMUSCULAR; SUBCUTANEOUS at 11:50

## 2019-01-17 NOTE — PROGRESS NOTES
Arrived to the [facility]. Retacrit  and Vitamin B12 completed.  Patient tolerated well   Any issues or concerns during appointment: No  Patient aware of next infusion appointment on Thursday,January 31st @ 1030  Discharged home with family

## 2019-01-19 ENCOUNTER — APPOINTMENT (OUTPATIENT)
Dept: INFUSION THERAPY | Age: 74
End: 2019-01-19
Payer: MEDICARE

## 2019-01-31 ENCOUNTER — HOSPITAL ENCOUNTER (OUTPATIENT)
Dept: INFUSION THERAPY | Age: 74
Discharge: HOME | End: 2019-01-31
Payer: MEDICARE

## 2019-01-31 VITALS
RESPIRATION RATE: 16 BRPM | TEMPERATURE: 98.4 F | SYSTOLIC BLOOD PRESSURE: 147 MMHG | HEART RATE: 75 BPM | DIASTOLIC BLOOD PRESSURE: 59 MMHG | OXYGEN SATURATION: 99 %

## 2019-01-31 DIAGNOSIS — D63.1 ANEMIA DUE TO CHRONIC KIDNEY DISEASE TREATED WITH ERYTHROPOIETIN: Primary | ICD-10-CM

## 2019-01-31 DIAGNOSIS — N18.9 ANEMIA DUE TO CHRONIC KIDNEY DISEASE TREATED WITH ERYTHROPOIETIN: Primary | ICD-10-CM

## 2019-01-31 LAB — HGB BLD-MCNC: 10.7 G/DL (ref 11.7–15.4)

## 2019-01-31 PROCEDURE — 85018 HEMOGLOBIN: CPT

## 2019-01-31 PROCEDURE — 96372 THER/PROPH/DIAG INJ SC/IM: CPT

## 2019-01-31 NOTE — PROGRESS NOTES
Arrived to the [facility]. Assessment completed. Patient tolerated well. Any issues or concerns during appointment: Procrit held, per order. Patient's hemoglobin noted at 10.7. Patient aware of next infusion appointment on 02/13/2019 (date) at 1200 (time) with infusion center. Discharged via wheelchair, with daughter. Patient advised to call Dr. Veronica Ford office if she has any problems or concerns. Patient verbalized understanding.

## 2019-02-13 ENCOUNTER — HOSPITAL ENCOUNTER (OUTPATIENT)
Dept: INFUSION THERAPY | Age: 74
Discharge: HOME | End: 2019-02-13
Payer: MEDICARE

## 2019-02-13 ENCOUNTER — HOSPITAL ENCOUNTER (OUTPATIENT)
Dept: LAB | Age: 74
Discharge: HOME | End: 2019-02-13
Payer: MEDICARE

## 2019-02-13 DIAGNOSIS — D63.1 ANEMIA DUE TO CHRONIC KIDNEY DISEASE TREATED WITH ERYTHROPOIETIN: Primary | ICD-10-CM

## 2019-02-13 DIAGNOSIS — C50.919 INFILTRATING DUCTAL CARCINOMA OF FEMALE BREAST, UNSPECIFIED LATERALITY (HCC): ICD-10-CM

## 2019-02-13 DIAGNOSIS — N18.9 ANEMIA DUE TO CHRONIC KIDNEY DISEASE TREATED WITH ERYTHROPOIETIN: Primary | ICD-10-CM

## 2019-02-13 LAB
ALBUMIN SERPL-MCNC: 3.4 G/DL (ref 3.2–4.6)
ALBUMIN/GLOB SERPL: 0.9 {RATIO} (ref 1.2–3.5)
ALP SERPL-CCNC: 91 U/L (ref 50–136)
ALT SERPL-CCNC: 26 U/L (ref 12–65)
ANION GAP SERPL CALC-SCNC: 5 MMOL/L (ref 7–16)
AST SERPL-CCNC: 17 U/L (ref 15–37)
BASOPHILS # BLD: 0.1 K/UL (ref 0–0.2)
BASOPHILS NFR BLD: 2 % (ref 0–2)
BILIRUB SERPL-MCNC: 0.3 MG/DL (ref 0.2–1.1)
BUN SERPL-MCNC: 28 MG/DL (ref 8–23)
CALCIUM SERPL-MCNC: 9.1 MG/DL (ref 8.3–10.4)
CHLORIDE SERPL-SCNC: 110 MMOL/L (ref 98–107)
CO2 SERPL-SCNC: 28 MMOL/L (ref 21–32)
CREAT SERPL-MCNC: 1.92 MG/DL (ref 0.6–1)
DIFFERENTIAL METHOD BLD: ABNORMAL
EOSINOPHIL # BLD: 0 K/UL (ref 0–0.8)
EOSINOPHIL NFR BLD: 1 % (ref 0.5–7.8)
ERYTHROCYTE [DISTWIDTH] IN BLOOD BY AUTOMATED COUNT: 15.8 % (ref 11.9–14.6)
FERRITIN SERPL-MCNC: 252 NG/ML (ref 8–388)
GLOBULIN SER CALC-MCNC: 3.9 G/DL (ref 2.3–3.5)
GLUCOSE SERPL-MCNC: 107 MG/DL (ref 65–100)
HCT VFR BLD AUTO: 33.2 % (ref 35.8–46.3)
HGB BLD-MCNC: 10.4 G/DL (ref 11.7–15.4)
IMM GRANULOCYTES # BLD AUTO: 0 K/UL (ref 0–0.5)
IMM GRANULOCYTES NFR BLD AUTO: 0 % (ref 0–5)
IRON SATN MFR SERPL: 41 %
IRON SERPL-MCNC: 90 UG/DL (ref 35–150)
LYMPHOCYTES # BLD: 0.6 K/UL (ref 0.5–4.6)
LYMPHOCYTES NFR BLD: 26 % (ref 13–44)
MCH RBC QN AUTO: 32.6 PG (ref 26.1–32.9)
MCHC RBC AUTO-ENTMCNC: 31.3 G/DL (ref 31.4–35)
MCV RBC AUTO: 104.1 FL (ref 79.6–97.8)
MONOCYTES # BLD: 0.1 K/UL (ref 0.1–1.3)
MONOCYTES NFR BLD: 5 % (ref 4–12)
NEUTS SEG # BLD: 1.5 K/UL (ref 1.7–8.2)
NEUTS SEG NFR BLD: 65 % (ref 43–78)
NRBC # BLD: 0 K/UL (ref 0–0.2)
PLATELET # BLD AUTO: 178 K/UL (ref 150–450)
PMV BLD AUTO: 9.3 FL (ref 9.4–12.3)
POTASSIUM SERPL-SCNC: 4.3 MMOL/L (ref 3.5–5.1)
PROT SERPL-MCNC: 7.3 G/DL (ref 6.3–8.2)
RBC # BLD AUTO: 3.19 M/UL (ref 4.05–5.25)
SODIUM SERPL-SCNC: 143 MMOL/L (ref 136–145)
TIBC SERPL-MCNC: 221 UG/DL (ref 250–450)
WBC # BLD AUTO: 2.3 K/UL (ref 4.3–11.1)

## 2019-02-13 PROCEDURE — 96372 THER/PROPH/DIAG INJ SC/IM: CPT

## 2019-02-13 PROCEDURE — 82728 ASSAY OF FERRITIN: CPT

## 2019-02-13 PROCEDURE — 74011250636 HC RX REV CODE- 250/636: Performed by: NURSE PRACTITIONER

## 2019-02-13 PROCEDURE — 36415 COLL VENOUS BLD VENIPUNCTURE: CPT

## 2019-02-13 PROCEDURE — 85025 COMPLETE CBC W/AUTO DIFF WBC: CPT

## 2019-02-13 PROCEDURE — 80053 COMPREHEN METABOLIC PANEL: CPT

## 2019-02-13 PROCEDURE — 83540 ASSAY OF IRON: CPT

## 2019-02-13 RX ORDER — CYANOCOBALAMIN 1000 UG/ML
1000 INJECTION, SOLUTION INTRAMUSCULAR; SUBCUTANEOUS ONCE
Status: COMPLETED | OUTPATIENT
Start: 2019-02-13 | End: 2019-02-13

## 2019-02-13 RX ADMIN — CYANOCOBALAMIN 1000 MCG: 1000 INJECTION, SOLUTION INTRAMUSCULAR at 12:13

## 2019-02-13 NOTE — PROGRESS NOTES
Arrived to the [facility]. B12 injection completed. Patient tolerated well. Any issues or concerns during appointment: none. Patient aware of next infusion appointment on 2/27/19 at 11am.  Discharged via wheelchair.

## 2019-02-27 ENCOUNTER — HOSPITAL ENCOUNTER (OUTPATIENT)
Dept: INFUSION THERAPY | Age: 74
Discharge: HOME | End: 2019-02-27
Payer: MEDICARE

## 2019-02-27 VITALS
HEART RATE: 74 BPM | OXYGEN SATURATION: 100 % | TEMPERATURE: 97.9 F | RESPIRATION RATE: 18 BRPM | DIASTOLIC BLOOD PRESSURE: 61 MMHG | SYSTOLIC BLOOD PRESSURE: 172 MMHG

## 2019-02-27 DIAGNOSIS — N18.9 ANEMIA DUE TO CHRONIC KIDNEY DISEASE TREATED WITH ERYTHROPOIETIN: Primary | ICD-10-CM

## 2019-02-27 DIAGNOSIS — D63.1 ANEMIA DUE TO CHRONIC KIDNEY DISEASE TREATED WITH ERYTHROPOIETIN: Primary | ICD-10-CM

## 2019-02-27 LAB — HGB BLD-MCNC: 9.7 G/DL (ref 11.7–15.4)

## 2019-02-27 PROCEDURE — 36415 COLL VENOUS BLD VENIPUNCTURE: CPT

## 2019-02-27 PROCEDURE — 96372 THER/PROPH/DIAG INJ SC/IM: CPT

## 2019-02-27 PROCEDURE — 85018 HEMOGLOBIN: CPT

## 2019-02-27 PROCEDURE — 74011250636 HC RX REV CODE- 250/636: Performed by: NURSE PRACTITIONER

## 2019-02-27 RX ADMIN — EPOETIN ALFA-EPBX 40000 UNITS: 40000 INJECTION, SOLUTION INTRAVENOUS; SUBCUTANEOUS at 11:30

## 2019-02-27 NOTE — PROGRESS NOTES
Arrived to the [facility]. Retacrit completed. Patient tolerated well. Any issues or concerns during appointment: none. Patient aware of next infusion appointment on 3/15/19 at 12pm.  Discharged via wheelchair.

## 2019-03-15 ENCOUNTER — HOSPITAL ENCOUNTER (OUTPATIENT)
Dept: INFUSION THERAPY | Age: 74
Discharge: HOME | End: 2019-03-15
Payer: MEDICARE

## 2019-03-15 ENCOUNTER — HOSPITAL ENCOUNTER (OUTPATIENT)
Dept: LAB | Age: 74
Discharge: HOME | End: 2019-03-15
Payer: MEDICARE

## 2019-03-15 DIAGNOSIS — D63.1 ANEMIA DUE TO CHRONIC KIDNEY DISEASE TREATED WITH ERYTHROPOIETIN: Primary | ICD-10-CM

## 2019-03-15 DIAGNOSIS — D63.1 ANEMIA DUE TO CHRONIC KIDNEY DISEASE TREATED WITH ERYTHROPOIETIN: ICD-10-CM

## 2019-03-15 DIAGNOSIS — C79.51 MALIGNANT NEOPLASM METASTATIC TO BONE (HCC): ICD-10-CM

## 2019-03-15 DIAGNOSIS — N18.9 ANEMIA DUE TO CHRONIC KIDNEY DISEASE TREATED WITH ERYTHROPOIETIN: Primary | ICD-10-CM

## 2019-03-15 DIAGNOSIS — N18.9 ANEMIA DUE TO CHRONIC KIDNEY DISEASE TREATED WITH ERYTHROPOIETIN: ICD-10-CM

## 2019-03-15 LAB
ALBUMIN SERPL-MCNC: 3.6 G/DL (ref 3.2–4.6)
ALBUMIN/GLOB SERPL: 1.1 {RATIO} (ref 1.2–3.5)
ALP SERPL-CCNC: 83 U/L (ref 50–136)
ALT SERPL-CCNC: 20 U/L (ref 12–65)
ANION GAP SERPL CALC-SCNC: 6 MMOL/L (ref 7–16)
AST SERPL-CCNC: 20 U/L (ref 15–37)
BASOPHILS # BLD: 0.1 K/UL (ref 0–0.2)
BASOPHILS NFR BLD: 2 % (ref 0–2)
BILIRUB SERPL-MCNC: 0.5 MG/DL (ref 0.2–1.1)
BUN SERPL-MCNC: 22 MG/DL (ref 8–23)
CALCIUM SERPL-MCNC: 8.8 MG/DL (ref 8.3–10.4)
CHLORIDE SERPL-SCNC: 108 MMOL/L (ref 98–107)
CO2 SERPL-SCNC: 27 MMOL/L (ref 21–32)
CREAT SERPL-MCNC: 1.99 MG/DL (ref 0.6–1)
DIFFERENTIAL METHOD BLD: ABNORMAL
EOSINOPHIL # BLD: 0.1 K/UL (ref 0–0.8)
EOSINOPHIL NFR BLD: 4 % (ref 0.5–7.8)
ERYTHROCYTE [DISTWIDTH] IN BLOOD BY AUTOMATED COUNT: 15 % (ref 11.9–14.6)
FERRITIN SERPL-MCNC: 245 NG/ML (ref 8–388)
GLOBULIN SER CALC-MCNC: 3.3 G/DL (ref 2.3–3.5)
GLUCOSE SERPL-MCNC: 102 MG/DL (ref 65–100)
HCT VFR BLD AUTO: 32.9 % (ref 35.8–46.3)
HGB BLD-MCNC: 10.3 G/DL (ref 11.7–15.4)
IMM GRANULOCYTES # BLD AUTO: 0 K/UL (ref 0–0.5)
IMM GRANULOCYTES NFR BLD AUTO: 0 % (ref 0–5)
IRON SATN MFR SERPL: 57 %
IRON SERPL-MCNC: 131 UG/DL (ref 35–150)
LYMPHOCYTES # BLD: 0.8 K/UL (ref 0.5–4.6)
LYMPHOCYTES NFR BLD: 23 % (ref 13–44)
MCH RBC QN AUTO: 32.7 PG (ref 26.1–32.9)
MCHC RBC AUTO-ENTMCNC: 31.3 G/DL (ref 31.4–35)
MCV RBC AUTO: 104.4 FL (ref 79.6–97.8)
MONOCYTES # BLD: 0.1 K/UL (ref 0.1–1.3)
MONOCYTES NFR BLD: 4 % (ref 4–12)
NEUTS SEG # BLD: 2.2 K/UL (ref 1.7–8.2)
NEUTS SEG NFR BLD: 67 % (ref 43–78)
NRBC # BLD: 0 K/UL (ref 0–0.2)
PLATELET # BLD AUTO: 212 K/UL (ref 150–450)
PMV BLD AUTO: 9.8 FL (ref 9.4–12.3)
POTASSIUM SERPL-SCNC: 4.4 MMOL/L (ref 3.5–5.1)
PROT SERPL-MCNC: 6.9 G/DL (ref 6.3–8.2)
RBC # BLD AUTO: 3.15 M/UL (ref 4.05–5.25)
SODIUM SERPL-SCNC: 141 MMOL/L (ref 136–145)
TIBC SERPL-MCNC: 230 UG/DL (ref 250–450)
WBC # BLD AUTO: 3.4 K/UL (ref 4.3–11.1)

## 2019-03-15 PROCEDURE — 82728 ASSAY OF FERRITIN: CPT

## 2019-03-15 PROCEDURE — 36415 COLL VENOUS BLD VENIPUNCTURE: CPT

## 2019-03-15 PROCEDURE — 85025 COMPLETE CBC W/AUTO DIFF WBC: CPT

## 2019-03-15 PROCEDURE — 74011250636 HC RX REV CODE- 250/636: Performed by: INTERNAL MEDICINE

## 2019-03-15 PROCEDURE — 96372 THER/PROPH/DIAG INJ SC/IM: CPT

## 2019-03-15 PROCEDURE — 80053 COMPREHEN METABOLIC PANEL: CPT

## 2019-03-15 PROCEDURE — 83550 IRON BINDING TEST: CPT

## 2019-03-15 RX ORDER — CYANOCOBALAMIN 1000 UG/ML
1000 INJECTION, SOLUTION INTRAMUSCULAR; SUBCUTANEOUS ONCE
Status: COMPLETED | OUTPATIENT
Start: 2019-03-15 | End: 2019-03-15

## 2019-03-15 RX ADMIN — CYANOCOBALAMIN 1000 MCG: 1000 INJECTION, SOLUTION INTRAMUSCULAR at 12:31

## 2019-04-12 ENCOUNTER — HOSPITAL ENCOUNTER (OUTPATIENT)
Dept: LAB | Age: 74
Discharge: HOME | End: 2019-04-12
Payer: MEDICARE

## 2019-04-12 ENCOUNTER — HOSPITAL ENCOUNTER (OUTPATIENT)
Dept: INFUSION THERAPY | Age: 74
Discharge: HOME | End: 2019-04-12
Payer: MEDICARE

## 2019-04-12 DIAGNOSIS — D63.1 ANEMIA DUE TO CHRONIC KIDNEY DISEASE TREATED WITH ERYTHROPOIETIN: Primary | ICD-10-CM

## 2019-04-12 DIAGNOSIS — N18.9 ANEMIA DUE TO CHRONIC KIDNEY DISEASE TREATED WITH ERYTHROPOIETIN: Primary | ICD-10-CM

## 2019-04-12 DIAGNOSIS — C79.51 MALIGNANT NEOPLASM METASTATIC TO BONE (HCC): ICD-10-CM

## 2019-04-12 LAB
ALBUMIN SERPL-MCNC: 3.4 G/DL (ref 3.2–4.6)
ALBUMIN/GLOB SERPL: 1 {RATIO} (ref 1.2–3.5)
ALP SERPL-CCNC: 75 U/L (ref 50–136)
ALT SERPL-CCNC: 19 U/L (ref 12–65)
ANION GAP SERPL CALC-SCNC: 5 MMOL/L (ref 7–16)
AST SERPL-CCNC: 20 U/L (ref 15–37)
BASOPHILS # BLD: 0 K/UL (ref 0–0.2)
BASOPHILS NFR BLD: 2 % (ref 0–2)
BILIRUB SERPL-MCNC: 0.3 MG/DL (ref 0.2–1.1)
BUN SERPL-MCNC: 28 MG/DL (ref 8–23)
CALCIUM SERPL-MCNC: 8.7 MG/DL (ref 8.3–10.4)
CHLORIDE SERPL-SCNC: 110 MMOL/L (ref 98–107)
CO2 SERPL-SCNC: 26 MMOL/L (ref 21–32)
CREAT SERPL-MCNC: 2.32 MG/DL (ref 0.6–1)
DIFFERENTIAL METHOD BLD: ABNORMAL
EOSINOPHIL # BLD: 0.1 K/UL (ref 0–0.8)
EOSINOPHIL NFR BLD: 5 % (ref 0.5–7.8)
ERYTHROCYTE [DISTWIDTH] IN BLOOD BY AUTOMATED COUNT: 14.8 % (ref 11.9–14.6)
FERRITIN SERPL-MCNC: 232 NG/ML (ref 8–388)
GLOBULIN SER CALC-MCNC: 3.5 G/DL (ref 2.3–3.5)
GLUCOSE SERPL-MCNC: 108 MG/DL (ref 65–100)
HCT VFR BLD AUTO: 28.5 % (ref 35.8–46.3)
HGB BLD-MCNC: 9 G/DL (ref 11.7–15.4)
IMM GRANULOCYTES # BLD AUTO: 0 K/UL (ref 0–0.5)
IMM GRANULOCYTES NFR BLD AUTO: 1 % (ref 0–5)
IRON SATN MFR SERPL: 41 %
IRON SERPL-MCNC: 101 UG/DL (ref 35–150)
LYMPHOCYTES # BLD: 0.7 K/UL (ref 0.5–4.6)
LYMPHOCYTES NFR BLD: 31 % (ref 13–44)
MCH RBC QN AUTO: 34.1 PG (ref 26.1–32.9)
MCHC RBC AUTO-ENTMCNC: 31.6 G/DL (ref 31.4–35)
MCV RBC AUTO: 108 FL (ref 79.6–97.8)
MONOCYTES # BLD: 0.1 K/UL (ref 0.1–1.3)
MONOCYTES NFR BLD: 5 % (ref 4–12)
NEUTS SEG # BLD: 1.2 K/UL (ref 1.7–8.2)
NEUTS SEG NFR BLD: 57 % (ref 43–78)
NRBC # BLD: 0 K/UL (ref 0–0.2)
PLATELET # BLD AUTO: 186 K/UL (ref 150–450)
PMV BLD AUTO: 9.9 FL (ref 9.4–12.3)
POTASSIUM SERPL-SCNC: 4 MMOL/L (ref 3.5–5.1)
PROT SERPL-MCNC: 6.9 G/DL (ref 6.3–8.2)
RBC # BLD AUTO: 2.64 M/UL (ref 4.05–5.25)
SODIUM SERPL-SCNC: 141 MMOL/L (ref 136–145)
TIBC SERPL-MCNC: 245 UG/DL (ref 250–450)
WBC # BLD AUTO: 2.2 K/UL (ref 4.3–11.1)

## 2019-04-12 PROCEDURE — 96372 THER/PROPH/DIAG INJ SC/IM: CPT

## 2019-04-12 PROCEDURE — 80053 COMPREHEN METABOLIC PANEL: CPT

## 2019-04-12 PROCEDURE — 74011250636 HC RX REV CODE- 250/636: Performed by: NURSE PRACTITIONER

## 2019-04-12 PROCEDURE — 82728 ASSAY OF FERRITIN: CPT

## 2019-04-12 PROCEDURE — 36415 COLL VENOUS BLD VENIPUNCTURE: CPT

## 2019-04-12 PROCEDURE — 85025 COMPLETE CBC W/AUTO DIFF WBC: CPT

## 2019-04-12 PROCEDURE — 83540 ASSAY OF IRON: CPT

## 2019-04-12 RX ORDER — CYANOCOBALAMIN 1000 UG/ML
1000 INJECTION, SOLUTION INTRAMUSCULAR; SUBCUTANEOUS
Status: DISCONTINUED | OUTPATIENT
Start: 2019-04-12 | End: 2019-04-15 | Stop reason: HOSPADM

## 2019-04-12 RX ADMIN — CYANOCOBALAMIN 1000 MCG: 1000 INJECTION, SOLUTION INTRAMUSCULAR; SUBCUTANEOUS at 12:17

## 2019-04-12 RX ADMIN — EPOETIN ALFA-EPBX 40000 UNITS: 40000 INJECTION, SOLUTION INTRAVENOUS; SUBCUTANEOUS at 12:18

## 2019-04-12 NOTE — PROGRESS NOTES
Pt to area without complaints. Received injections per order, tolerated well. Aware of next appt on [email or web address]. Advised to call dr with any issues/concerns. Discharged home without complaints.

## 2019-05-10 ENCOUNTER — HOSPITAL ENCOUNTER (OUTPATIENT)
Dept: INFUSION THERAPY | Age: 74
Discharge: HOME | End: 2019-05-10
Payer: MEDICARE

## 2019-05-10 ENCOUNTER — HOSPITAL ENCOUNTER (OUTPATIENT)
Dept: LAB | Age: 74
Discharge: HOME | End: 2019-05-10
Payer: MEDICARE

## 2019-05-10 DIAGNOSIS — C79.51 MALIGNANT NEOPLASM METASTATIC TO BONE (HCC): ICD-10-CM

## 2019-05-10 DIAGNOSIS — D63.1 ANEMIA DUE TO CHRONIC KIDNEY DISEASE TREATED WITH ERYTHROPOIETIN: Primary | ICD-10-CM

## 2019-05-10 DIAGNOSIS — N18.9 ANEMIA DUE TO CHRONIC KIDNEY DISEASE TREATED WITH ERYTHROPOIETIN: Primary | ICD-10-CM

## 2019-05-10 LAB
ALBUMIN SERPL-MCNC: 3.3 G/DL (ref 3.2–4.6)
ALBUMIN/GLOB SERPL: 1 {RATIO} (ref 1.2–3.5)
ALP SERPL-CCNC: 86 U/L (ref 50–136)
ALT SERPL-CCNC: 21 U/L (ref 12–65)
ANION GAP SERPL CALC-SCNC: 8 MMOL/L (ref 7–16)
AST SERPL-CCNC: 22 U/L (ref 15–37)
BASOPHILS # BLD: 0 K/UL (ref 0–0.2)
BASOPHILS NFR BLD: 2 % (ref 0–2)
BILIRUB SERPL-MCNC: 0.3 MG/DL (ref 0.2–1.1)
BUN SERPL-MCNC: 27 MG/DL (ref 8–23)
CALCIUM SERPL-MCNC: 8.9 MG/DL (ref 8.3–10.4)
CHLORIDE SERPL-SCNC: 112 MMOL/L (ref 98–107)
CO2 SERPL-SCNC: 25 MMOL/L (ref 21–32)
CREAT SERPL-MCNC: 2.17 MG/DL (ref 0.6–1)
DIFFERENTIAL METHOD BLD: ABNORMAL
EOSINOPHIL # BLD: 0.1 K/UL (ref 0–0.8)
EOSINOPHIL NFR BLD: 5 % (ref 0.5–7.8)
ERYTHROCYTE [DISTWIDTH] IN BLOOD BY AUTOMATED COUNT: 14.3 % (ref 11.9–14.6)
FERRITIN SERPL-MCNC: 210 NG/ML (ref 8–388)
GLOBULIN SER CALC-MCNC: 3.3 G/DL (ref 2.3–3.5)
GLUCOSE SERPL-MCNC: 115 MG/DL (ref 65–100)
HCT VFR BLD AUTO: 28.5 % (ref 35.8–46.3)
HGB BLD-MCNC: 9.1 G/DL (ref 11.7–15.4)
IMM GRANULOCYTES # BLD AUTO: 0 K/UL (ref 0–0.5)
IMM GRANULOCYTES NFR BLD AUTO: 2 % (ref 0–5)
IRON SATN MFR SERPL: 25 %
IRON SERPL-MCNC: 57 UG/DL (ref 35–150)
LYMPHOCYTES # BLD: 0.7 K/UL (ref 0.5–4.6)
LYMPHOCYTES NFR BLD: 30 % (ref 13–44)
MCH RBC QN AUTO: 34.9 PG (ref 26.1–32.9)
MCHC RBC AUTO-ENTMCNC: 31.9 G/DL (ref 31.4–35)
MCV RBC AUTO: 109.2 FL (ref 79.6–97.8)
MONOCYTES # BLD: 0.2 K/UL (ref 0.1–1.3)
MONOCYTES NFR BLD: 9 % (ref 4–12)
NEUTS SEG # BLD: 1.2 K/UL (ref 1.7–8.2)
NEUTS SEG NFR BLD: 53 % (ref 43–78)
NRBC # BLD: 0 K/UL (ref 0–0.2)
PLATELET # BLD AUTO: 122 K/UL (ref 150–450)
PMV BLD AUTO: 9.5 FL (ref 9.4–12.3)
POTASSIUM SERPL-SCNC: 4.1 MMOL/L (ref 3.5–5.1)
PROT SERPL-MCNC: 6.6 G/DL (ref 6.3–8.2)
RBC # BLD AUTO: 2.61 M/UL (ref 4.05–5.25)
SODIUM SERPL-SCNC: 145 MMOL/L (ref 136–145)
TIBC SERPL-MCNC: 224 UG/DL (ref 250–450)
WBC # BLD AUTO: 2.2 K/UL (ref 4.3–11.1)

## 2019-05-10 PROCEDURE — 96372 THER/PROPH/DIAG INJ SC/IM: CPT

## 2019-05-10 PROCEDURE — 36415 COLL VENOUS BLD VENIPUNCTURE: CPT

## 2019-05-10 PROCEDURE — 82728 ASSAY OF FERRITIN: CPT

## 2019-05-10 PROCEDURE — 83540 ASSAY OF IRON: CPT

## 2019-05-10 PROCEDURE — 74011250636 HC RX REV CODE- 250/636: Performed by: INTERNAL MEDICINE

## 2019-05-10 PROCEDURE — 80053 COMPREHEN METABOLIC PANEL: CPT

## 2019-05-10 PROCEDURE — 85025 COMPLETE CBC W/AUTO DIFF WBC: CPT

## 2019-05-10 RX ORDER — CYANOCOBALAMIN 1000 UG/ML
1000 INJECTION, SOLUTION INTRAMUSCULAR; SUBCUTANEOUS ONCE
Status: COMPLETED | OUTPATIENT
Start: 2019-05-10 | End: 2019-05-10

## 2019-05-10 RX ADMIN — CYANOCOBALAMIN 1000 MCG: 1000 INJECTION, SOLUTION INTRAMUSCULAR; SUBCUTANEOUS at 11:41

## 2019-05-10 RX ADMIN — EPOETIN ALFA-EPBX 40000 UNITS: 40000 INJECTION, SOLUTION INTRAVENOUS; SUBCUTANEOUS at 11:42

## 2019-05-10 NOTE — PROGRESS NOTES
Arrived to the [facility]. Retacrit completed. Patient tolerated well. Any issues or concerns during appointment: denies    Patient aware of next infusion appointment on 6/7/19 at 130pm. Discharged via w/c with family member.

## 2019-06-07 ENCOUNTER — HOSPITAL ENCOUNTER (OUTPATIENT)
Dept: LAB | Age: 74
Discharge: HOME | End: 2019-06-07
Payer: MEDICARE

## 2019-06-07 ENCOUNTER — HOSPITAL ENCOUNTER (OUTPATIENT)
Dept: INFUSION THERAPY | Age: 74
Discharge: HOME | End: 2019-06-07
Payer: MEDICARE

## 2019-06-07 DIAGNOSIS — C50.919 INFILTRATING DUCTAL CARCINOMA OF FEMALE BREAST, UNSPECIFIED LATERALITY (HCC): ICD-10-CM

## 2019-06-07 DIAGNOSIS — D63.1 ANEMIA DUE TO CHRONIC KIDNEY DISEASE TREATED WITH ERYTHROPOIETIN: Primary | ICD-10-CM

## 2019-06-07 DIAGNOSIS — N18.9 ANEMIA DUE TO CHRONIC KIDNEY DISEASE TREATED WITH ERYTHROPOIETIN: Primary | ICD-10-CM

## 2019-06-07 LAB
ALBUMIN SERPL-MCNC: 3.4 G/DL (ref 3.2–4.6)
ALBUMIN/GLOB SERPL: 1 {RATIO} (ref 1.2–3.5)
ALP SERPL-CCNC: 94 U/L (ref 50–136)
ALT SERPL-CCNC: 26 U/L (ref 12–65)
ANION GAP SERPL CALC-SCNC: 8 MMOL/L (ref 7–16)
APPEARANCE UR: CLEAR
AST SERPL-CCNC: 24 U/L (ref 15–37)
BACTERIA URNS QL MICRO: ABNORMAL /HPF
BASOPHILS # BLD: 0 K/UL (ref 0–0.2)
BASOPHILS NFR BLD: 1 % (ref 0–2)
BILIRUB SERPL-MCNC: 0.3 MG/DL (ref 0.2–1.1)
BILIRUB UR QL: NEGATIVE
BUN SERPL-MCNC: 33 MG/DL (ref 8–23)
CALCIUM SERPL-MCNC: 9.1 MG/DL (ref 8.3–10.4)
CASTS URNS QL MICRO: 0 /LPF
CHLORIDE SERPL-SCNC: 108 MMOL/L (ref 98–107)
CO2 SERPL-SCNC: 26 MMOL/L (ref 21–32)
COLOR UR: YELLOW
CREAT SERPL-MCNC: 1.94 MG/DL (ref 0.6–1)
CRYSTALS URNS QL MICRO: 0 /LPF
DIFFERENTIAL METHOD BLD: ABNORMAL
EOSINOPHIL # BLD: 0.1 K/UL (ref 0–0.8)
EOSINOPHIL NFR BLD: 3 % (ref 0.5–7.8)
EPI CELLS #/AREA URNS HPF: ABNORMAL /HPF
ERYTHROCYTE [DISTWIDTH] IN BLOOD BY AUTOMATED COUNT: 14.6 % (ref 11.9–14.6)
FERRITIN SERPL-MCNC: 226 NG/ML (ref 8–388)
GLOBULIN SER CALC-MCNC: 3.5 G/DL (ref 2.3–3.5)
GLUCOSE SERPL-MCNC: 85 MG/DL (ref 65–100)
GLUCOSE UR STRIP.AUTO-MCNC: NEGATIVE MG/DL
HCT VFR BLD AUTO: 28.7 % (ref 35.8–46.3)
HGB BLD-MCNC: 9.5 G/DL (ref 11.7–15.4)
HGB UR QL STRIP: NEGATIVE
IMM GRANULOCYTES # BLD AUTO: 0 K/UL (ref 0–0.5)
IMM GRANULOCYTES NFR BLD AUTO: 1 % (ref 0–5)
IRON SATN MFR SERPL: 38 %
IRON SERPL-MCNC: 90 UG/DL (ref 35–150)
KETONES UR QL STRIP.AUTO: NEGATIVE MG/DL
LEUKOCYTE ESTERASE UR QL STRIP.AUTO: ABNORMAL
LYMPHOCYTES # BLD: 0.8 K/UL (ref 0.5–4.6)
LYMPHOCYTES NFR BLD: 38 % (ref 13–44)
MCH RBC QN AUTO: 35.2 PG (ref 26.1–32.9)
MCHC RBC AUTO-ENTMCNC: 33.1 G/DL (ref 31.4–35)
MCV RBC AUTO: 106.3 FL (ref 79.6–97.8)
MONOCYTES # BLD: 0.2 K/UL (ref 0.1–1.3)
MONOCYTES NFR BLD: 11 % (ref 4–12)
MUCOUS THREADS URNS QL MICRO: 0 /LPF
NEUTS SEG # BLD: 1 K/UL (ref 1.7–8.2)
NEUTS SEG NFR BLD: 45 % (ref 43–78)
NITRITE UR QL STRIP.AUTO: NEGATIVE
NRBC # BLD: 0 K/UL (ref 0–0.2)
PH UR STRIP: 6 [PH] (ref 5–9)
PLATELET # BLD AUTO: 139 K/UL (ref 150–450)
PMV BLD AUTO: 9.7 FL (ref 9.4–12.3)
POTASSIUM SERPL-SCNC: 3.8 MMOL/L (ref 3.5–5.1)
PROT SERPL-MCNC: 6.9 G/DL (ref 6.3–8.2)
PROT UR STRIP-MCNC: NEGATIVE MG/DL
RBC # BLD AUTO: 2.7 M/UL (ref 4.05–5.25)
RBC #/AREA URNS HPF: ABNORMAL /HPF
SODIUM SERPL-SCNC: 142 MMOL/L (ref 136–145)
SP GR UR REFRACTOMETRY: 1.01 (ref 1–1.02)
TIBC SERPL-MCNC: 234 UG/DL (ref 250–450)
UROBILINOGEN UR QL STRIP.AUTO: 0.2 EU/DL (ref 0.2–1)
WBC # BLD AUTO: 2.1 K/UL (ref 4.3–11.1)
WBC URNS QL MICRO: ABNORMAL /HPF

## 2019-06-07 PROCEDURE — 74011250636 HC RX REV CODE- 250/636: Performed by: NURSE PRACTITIONER

## 2019-06-07 PROCEDURE — 80053 COMPREHEN METABOLIC PANEL: CPT

## 2019-06-07 PROCEDURE — 96372 THER/PROPH/DIAG INJ SC/IM: CPT

## 2019-06-07 PROCEDURE — 36415 COLL VENOUS BLD VENIPUNCTURE: CPT

## 2019-06-07 PROCEDURE — 81003 URINALYSIS AUTO W/O SCOPE: CPT

## 2019-06-07 PROCEDURE — 83540 ASSAY OF IRON: CPT

## 2019-06-07 PROCEDURE — 85025 COMPLETE CBC W/AUTO DIFF WBC: CPT

## 2019-06-07 PROCEDURE — 81001 URINALYSIS AUTO W/SCOPE: CPT

## 2019-06-07 PROCEDURE — 82728 ASSAY OF FERRITIN: CPT

## 2019-06-07 RX ORDER — CYANOCOBALAMIN 1000 UG/ML
1000 INJECTION, SOLUTION INTRAMUSCULAR; SUBCUTANEOUS
Status: COMPLETED | OUTPATIENT
Start: 2019-06-07 | End: 2019-06-07

## 2019-06-07 RX ADMIN — CYANOCOBALAMIN 1000 MCG: 1000 INJECTION, SOLUTION INTRAMUSCULAR; SUBCUTANEOUS at 14:00

## 2019-06-07 RX ADMIN — EPOETIN ALFA-EPBX 40000 UNITS: 40000 INJECTION, SOLUTION INTRAVENOUS; SUBCUTANEOUS at 14:01

## 2019-06-07 NOTE — PROGRESS NOTES
Arrived to the [facility]. B12 and Retacrit injections completed. UA specimen obtained and sent to the lab. Patient tolerated well. Any issues or concerns during appointment: no.  Patient aware of next infusion appointment on 7/2 (date) at 1400 (time). Discharged via w/c.

## 2019-07-02 ENCOUNTER — HOSPITAL ENCOUNTER (OUTPATIENT)
Dept: INFUSION THERAPY | Age: 74
Discharge: HOME | End: 2019-07-02
Payer: MEDICARE

## 2019-07-02 ENCOUNTER — HOSPITAL ENCOUNTER (OUTPATIENT)
Dept: LAB | Age: 74
Discharge: HOME | End: 2019-07-02
Payer: MEDICARE

## 2019-07-02 DIAGNOSIS — D63.1 ANEMIA DUE TO CHRONIC KIDNEY DISEASE TREATED WITH ERYTHROPOIETIN: Primary | ICD-10-CM

## 2019-07-02 DIAGNOSIS — D63.1 ANEMIA DUE TO CHRONIC KIDNEY DISEASE TREATED WITH ERYTHROPOIETIN: ICD-10-CM

## 2019-07-02 DIAGNOSIS — C79.51 MALIGNANT NEOPLASM METASTATIC TO BONE (HCC): ICD-10-CM

## 2019-07-02 DIAGNOSIS — N18.9 ANEMIA DUE TO CHRONIC KIDNEY DISEASE TREATED WITH ERYTHROPOIETIN: ICD-10-CM

## 2019-07-02 DIAGNOSIS — N18.9 ANEMIA DUE TO CHRONIC KIDNEY DISEASE TREATED WITH ERYTHROPOIETIN: Primary | ICD-10-CM

## 2019-07-02 LAB
ALBUMIN SERPL-MCNC: 3.4 G/DL (ref 3.2–4.6)
ALBUMIN/GLOB SERPL: 1.1 {RATIO} (ref 1.2–3.5)
ALP SERPL-CCNC: 95 U/L (ref 50–136)
ALT SERPL-CCNC: 34 U/L (ref 12–65)
ANION GAP SERPL CALC-SCNC: 8 MMOL/L (ref 7–16)
AST SERPL-CCNC: 29 U/L (ref 15–37)
BASOPHILS # BLD: 0 K/UL (ref 0–0.2)
BASOPHILS NFR BLD: 3 % (ref 0–2)
BILIRUB SERPL-MCNC: 0.5 MG/DL (ref 0.2–1.1)
BUN SERPL-MCNC: 26 MG/DL (ref 8–23)
CALCIUM SERPL-MCNC: 9.2 MG/DL (ref 8.3–10.4)
CHLORIDE SERPL-SCNC: 107 MMOL/L (ref 98–107)
CO2 SERPL-SCNC: 25 MMOL/L (ref 21–32)
CREAT SERPL-MCNC: 2 MG/DL (ref 0.6–1)
DIFFERENTIAL METHOD BLD: ABNORMAL
EOSINOPHIL # BLD: 0 K/UL (ref 0–0.8)
EOSINOPHIL NFR BLD: 3 % (ref 0.5–7.8)
ERYTHROCYTE [DISTWIDTH] IN BLOOD BY AUTOMATED COUNT: 14.6 % (ref 11.9–14.6)
FERRITIN SERPL-MCNC: 291 NG/ML (ref 8–388)
GLOBULIN SER CALC-MCNC: 3.2 G/DL (ref 2.3–3.5)
GLUCOSE SERPL-MCNC: 97 MG/DL (ref 65–100)
HCT VFR BLD AUTO: 27.3 % (ref 35.8–46.3)
HGB BLD-MCNC: 8.9 G/DL (ref 11.7–15.4)
IMM GRANULOCYTES # BLD AUTO: 0 K/UL (ref 0–0.5)
IMM GRANULOCYTES NFR BLD AUTO: 1 % (ref 0–5)
IRON SATN MFR SERPL: 18 %
IRON SERPL-MCNC: 35 UG/DL (ref 35–150)
LYMPHOCYTES # BLD: 0.4 K/UL (ref 0.5–4.6)
LYMPHOCYTES NFR BLD: 25 % (ref 13–44)
MCH RBC QN AUTO: 34.9 PG (ref 26.1–32.9)
MCHC RBC AUTO-ENTMCNC: 32.6 G/DL (ref 31.4–35)
MCV RBC AUTO: 107.1 FL (ref 79.6–97.8)
MONOCYTES # BLD: 0.3 K/UL (ref 0.1–1.3)
MONOCYTES NFR BLD: 18 % (ref 4–12)
NEUTS SEG # BLD: 0.7 K/UL (ref 1.7–8.2)
NEUTS SEG NFR BLD: 50 % (ref 43–78)
NRBC # BLD: 0 K/UL (ref 0–0.2)
PLATELET # BLD AUTO: 95 K/UL (ref 150–450)
PMV BLD AUTO: 9.5 FL (ref 9.4–12.3)
POTASSIUM SERPL-SCNC: 3.8 MMOL/L (ref 3.5–5.1)
PROT SERPL-MCNC: 6.6 G/DL (ref 6.3–8.2)
RBC # BLD AUTO: 2.55 M/UL (ref 4.05–5.25)
SODIUM SERPL-SCNC: 140 MMOL/L (ref 136–145)
TIBC SERPL-MCNC: 199 UG/DL (ref 250–450)
WBC # BLD AUTO: 1.5 K/UL (ref 4.3–11.1)

## 2019-07-02 PROCEDURE — 82728 ASSAY OF FERRITIN: CPT

## 2019-07-02 PROCEDURE — 80053 COMPREHEN METABOLIC PANEL: CPT

## 2019-07-02 PROCEDURE — 74011250636 HC RX REV CODE- 250/636: Performed by: INTERNAL MEDICINE

## 2019-07-02 PROCEDURE — 36415 COLL VENOUS BLD VENIPUNCTURE: CPT

## 2019-07-02 PROCEDURE — 83540 ASSAY OF IRON: CPT

## 2019-07-02 PROCEDURE — 85025 COMPLETE CBC W/AUTO DIFF WBC: CPT

## 2019-07-02 PROCEDURE — 96372 THER/PROPH/DIAG INJ SC/IM: CPT

## 2019-07-02 RX ORDER — CYANOCOBALAMIN 1000 UG/ML
1000 INJECTION, SOLUTION INTRAMUSCULAR; SUBCUTANEOUS
Status: COMPLETED | OUTPATIENT
Start: 2019-07-02 | End: 2019-07-02

## 2019-07-02 RX ADMIN — CYANOCOBALAMIN 1000 MCG: 1000 INJECTION, SOLUTION INTRAMUSCULAR; SUBCUTANEOUS at 15:18

## 2019-07-02 RX ADMIN — EPOETIN ALFA-EPBX 40000 UNITS: 40000 INJECTION, SOLUTION INTRAVENOUS; SUBCUTANEOUS at 15:19

## 2019-07-02 NOTE — PROGRESS NOTES
Arrived to the [facility]. Retacrit and B12 administered per order. Patient tolerated well. Any issues or concerns during appointment: none. Patient aware of next infusion appointment on 7/16/19 at 1500. Discharged via wheelchair.     Rafael Davenport RN

## 2019-07-11 ENCOUNTER — HOSPITAL ENCOUNTER (OUTPATIENT)
Dept: PET IMAGING | Age: 74
Discharge: HOME | End: 2019-07-11
Payer: MEDICARE

## 2019-07-11 DIAGNOSIS — C50.919 METASTATIC BREAST CANCER (HCC): ICD-10-CM

## 2019-07-11 PROCEDURE — A9552 F18 FDG: HCPCS

## 2019-07-11 PROCEDURE — 74011636320 HC RX REV CODE- 636/320: Performed by: NURSE PRACTITIONER

## 2019-07-11 RX ORDER — SODIUM CHLORIDE 0.9 % (FLUSH) 0.9 %
5-10 SYRINGE (ML) INJECTION
Status: COMPLETED | OUTPATIENT
Start: 2019-07-11 | End: 2019-07-11

## 2019-07-11 RX ADMIN — Medication 10 ML: at 08:19

## 2019-07-11 RX ADMIN — DIATRIZOATE MEGLUMINE AND DIATRIZOATE SODIUM 10 ML: 660; 100 LIQUID ORAL; RECTAL at 08:19

## 2019-07-16 ENCOUNTER — APPOINTMENT (OUTPATIENT)
Dept: INFUSION THERAPY | Age: 74
End: 2019-07-16
Payer: MEDICARE

## 2019-07-30 ENCOUNTER — HOSPITAL ENCOUNTER (OUTPATIENT)
Dept: LAB | Age: 74
Discharge: HOME | End: 2019-07-30
Payer: MEDICARE

## 2019-07-30 ENCOUNTER — HOSPITAL ENCOUNTER (OUTPATIENT)
Dept: INFUSION THERAPY | Age: 74
Discharge: HOME | End: 2019-07-30
Payer: MEDICARE

## 2019-07-30 DIAGNOSIS — N18.9 ANEMIA DUE TO CHRONIC KIDNEY DISEASE TREATED WITH ERYTHROPOIETIN: ICD-10-CM

## 2019-07-30 DIAGNOSIS — D63.1 ANEMIA DUE TO CHRONIC KIDNEY DISEASE TREATED WITH ERYTHROPOIETIN: ICD-10-CM

## 2019-07-30 DIAGNOSIS — D63.1 ANEMIA DUE TO CHRONIC KIDNEY DISEASE TREATED WITH ERYTHROPOIETIN: Primary | ICD-10-CM

## 2019-07-30 DIAGNOSIS — N18.9 ANEMIA DUE TO CHRONIC KIDNEY DISEASE TREATED WITH ERYTHROPOIETIN: Primary | ICD-10-CM

## 2019-07-30 DIAGNOSIS — C79.51 MALIGNANT NEOPLASM METASTATIC TO BONE (HCC): ICD-10-CM

## 2019-07-30 LAB
ALBUMIN SERPL-MCNC: 3.7 G/DL (ref 3.2–4.6)
ALBUMIN/GLOB SERPL: 1.1 {RATIO} (ref 1.2–3.5)
ALP SERPL-CCNC: 80 U/L (ref 50–136)
ALT SERPL-CCNC: 21 U/L (ref 12–65)
ANION GAP SERPL CALC-SCNC: 7 MMOL/L (ref 7–16)
AST SERPL-CCNC: 17 U/L (ref 15–37)
BASOPHILS # BLD: 0 K/UL (ref 0–0.2)
BASOPHILS NFR BLD: 2 % (ref 0–2)
BILIRUB SERPL-MCNC: 0.4 MG/DL (ref 0.2–1.1)
BUN SERPL-MCNC: 31 MG/DL (ref 8–23)
CALCIUM SERPL-MCNC: 9.6 MG/DL (ref 8.3–10.4)
CHLORIDE SERPL-SCNC: 110 MMOL/L (ref 98–107)
CO2 SERPL-SCNC: 24 MMOL/L (ref 21–32)
CREAT SERPL-MCNC: 2.07 MG/DL (ref 0.6–1)
DIFFERENTIAL METHOD BLD: ABNORMAL
EOSINOPHIL # BLD: 0.1 K/UL (ref 0–0.8)
EOSINOPHIL NFR BLD: 4 % (ref 0.5–7.8)
ERYTHROCYTE [DISTWIDTH] IN BLOOD BY AUTOMATED COUNT: 14.5 % (ref 11.9–14.6)
FERRITIN SERPL-MCNC: 258 NG/ML (ref 8–388)
GLOBULIN SER CALC-MCNC: 3.3 G/DL (ref 2.3–3.5)
GLUCOSE SERPL-MCNC: 93 MG/DL (ref 65–100)
HCT VFR BLD AUTO: 27.7 % (ref 35.8–46.3)
HGB BLD-MCNC: 9.2 G/DL (ref 11.7–15.4)
IMM GRANULOCYTES # BLD AUTO: 0 K/UL (ref 0–0.5)
IMM GRANULOCYTES NFR BLD AUTO: 1 % (ref 0–5)
IRON SATN MFR SERPL: 34 %
IRON SERPL-MCNC: 83 UG/DL (ref 35–150)
LYMPHOCYTES # BLD: 0.8 K/UL (ref 0.5–4.6)
LYMPHOCYTES NFR BLD: 37 % (ref 13–44)
MCH RBC QN AUTO: 35.7 PG (ref 26.1–32.9)
MCHC RBC AUTO-ENTMCNC: 33.2 G/DL (ref 31.4–35)
MCV RBC AUTO: 107.4 FL (ref 79.6–97.8)
MONOCYTES # BLD: 0.3 K/UL (ref 0.1–1.3)
MONOCYTES NFR BLD: 12 % (ref 4–12)
NEUTS SEG # BLD: 1 K/UL (ref 1.7–8.2)
NEUTS SEG NFR BLD: 45 % (ref 43–78)
NRBC # BLD: 0 K/UL (ref 0–0.2)
PLATELET # BLD AUTO: 108 K/UL (ref 150–450)
PMV BLD AUTO: 10 FL (ref 9.4–12.3)
POTASSIUM SERPL-SCNC: 4.1 MMOL/L (ref 3.5–5.1)
PROT SERPL-MCNC: 7 G/DL (ref 6.3–8.2)
RBC # BLD AUTO: 2.58 M/UL (ref 4.05–5.25)
SODIUM SERPL-SCNC: 141 MMOL/L (ref 136–145)
TIBC SERPL-MCNC: 243 UG/DL (ref 250–450)
WBC # BLD AUTO: 2.1 K/UL (ref 4.3–11.1)

## 2019-07-30 PROCEDURE — 96372 THER/PROPH/DIAG INJ SC/IM: CPT

## 2019-07-30 PROCEDURE — 85025 COMPLETE CBC W/AUTO DIFF WBC: CPT

## 2019-07-30 PROCEDURE — 80053 COMPREHEN METABOLIC PANEL: CPT

## 2019-07-30 PROCEDURE — 74011250636 HC RX REV CODE- 250/636: Performed by: INTERNAL MEDICINE

## 2019-07-30 PROCEDURE — 82728 ASSAY OF FERRITIN: CPT

## 2019-07-30 PROCEDURE — 83540 ASSAY OF IRON: CPT

## 2019-07-30 PROCEDURE — 36415 COLL VENOUS BLD VENIPUNCTURE: CPT

## 2019-07-30 RX ORDER — CYANOCOBALAMIN 1000 UG/ML
1000 INJECTION, SOLUTION INTRAMUSCULAR; SUBCUTANEOUS
Status: COMPLETED | OUTPATIENT
Start: 2019-07-30 | End: 2019-07-30

## 2019-07-30 RX ADMIN — CYANOCOBALAMIN 1000 MCG: 1000 INJECTION, SOLUTION INTRAMUSCULAR; SUBCUTANEOUS at 16:34

## 2019-07-30 RX ADMIN — EPOETIN ALFA-EPBX 40000 UNITS: 40000 INJECTION, SOLUTION INTRAVENOUS; SUBCUTANEOUS at 16:34

## 2019-07-30 NOTE — PROGRESS NOTES
Arrived to the [facility]. Assessment, and Procrit and Vit  B12 injections completed. Patient tolerated well. Any issues or concerns during appointment: No.  Discharged via w/c accompanied by family member.

## 2019-08-27 ENCOUNTER — HOSPITAL ENCOUNTER (OUTPATIENT)
Dept: LAB | Age: 74
Discharge: HOME | End: 2019-08-27
Payer: MEDICARE

## 2019-08-27 ENCOUNTER — HOSPITAL ENCOUNTER (OUTPATIENT)
Dept: INFUSION THERAPY | Age: 74
Discharge: HOME | End: 2019-08-27

## 2019-08-27 DIAGNOSIS — D63.1 ANEMIA DUE TO CHRONIC KIDNEY DISEASE TREATED WITH ERYTHROPOIETIN: Primary | ICD-10-CM

## 2019-08-27 DIAGNOSIS — D63.1 ANEMIA DUE TO CHRONIC KIDNEY DISEASE TREATED WITH ERYTHROPOIETIN: ICD-10-CM

## 2019-08-27 DIAGNOSIS — N18.9 ANEMIA DUE TO CHRONIC KIDNEY DISEASE TREATED WITH ERYTHROPOIETIN: ICD-10-CM

## 2019-08-27 DIAGNOSIS — N18.9 ANEMIA DUE TO CHRONIC KIDNEY DISEASE TREATED WITH ERYTHROPOIETIN: Primary | ICD-10-CM

## 2019-08-27 DIAGNOSIS — C79.51 MALIGNANT NEOPLASM METASTATIC TO BONE (HCC): ICD-10-CM

## 2019-08-27 LAB
ALBUMIN SERPL-MCNC: 3.4 G/DL (ref 3.2–4.6)
ALBUMIN/GLOB SERPL: 1 {RATIO} (ref 1.2–3.5)
ALP SERPL-CCNC: 79 U/L (ref 50–136)
ALT SERPL-CCNC: 21 U/L (ref 12–65)
ANION GAP SERPL CALC-SCNC: 8 MMOL/L (ref 7–16)
AST SERPL-CCNC: 18 U/L (ref 15–37)
BASOPHILS # BLD: 0 K/UL (ref 0–0.2)
BASOPHILS NFR BLD: 2 % (ref 0–2)
BILIRUB SERPL-MCNC: 0.4 MG/DL (ref 0.2–1.1)
BUN SERPL-MCNC: 33 MG/DL (ref 8–23)
CALCIUM SERPL-MCNC: 9.9 MG/DL (ref 8.3–10.4)
CHLORIDE SERPL-SCNC: 113 MMOL/L (ref 98–107)
CO2 SERPL-SCNC: 22 MMOL/L (ref 21–32)
CREAT SERPL-MCNC: 2.2 MG/DL (ref 0.6–1)
DIFFERENTIAL METHOD BLD: ABNORMAL
EOSINOPHIL # BLD: 0.1 K/UL (ref 0–0.8)
EOSINOPHIL NFR BLD: 3 % (ref 0.5–7.8)
ERYTHROCYTE [DISTWIDTH] IN BLOOD BY AUTOMATED COUNT: 14 % (ref 11.9–14.6)
FERRITIN SERPL-MCNC: 246 NG/ML (ref 8–388)
GLOBULIN SER CALC-MCNC: 3.5 G/DL (ref 2.3–3.5)
GLUCOSE SERPL-MCNC: 88 MG/DL (ref 65–100)
HCT VFR BLD AUTO: 28.2 % (ref 35.8–46.3)
HGB BLD-MCNC: 9 G/DL (ref 11.7–15.4)
IMM GRANULOCYTES # BLD AUTO: 0 K/UL (ref 0–0.5)
IMM GRANULOCYTES NFR BLD AUTO: 0 % (ref 0–5)
IRON SATN MFR SERPL: 42 %
IRON SERPL-MCNC: 108 UG/DL (ref 35–150)
LYMPHOCYTES # BLD: 0.7 K/UL (ref 0.5–4.6)
LYMPHOCYTES NFR BLD: 41 % (ref 13–44)
MCH RBC QN AUTO: 34.6 PG (ref 26.1–32.9)
MCHC RBC AUTO-ENTMCNC: 31.9 G/DL (ref 31.4–35)
MCV RBC AUTO: 108.5 FL (ref 79.6–97.8)
MONOCYTES # BLD: 0.2 K/UL (ref 0.1–1.3)
MONOCYTES NFR BLD: 9 % (ref 4–12)
NEUTS SEG # BLD: 0.8 K/UL (ref 1.7–8.2)
NEUTS SEG NFR BLD: 45 % (ref 43–78)
NRBC # BLD: 0 K/UL (ref 0–0.2)
PLATELET # BLD AUTO: 97 K/UL (ref 150–450)
PMV BLD AUTO: 10.3 FL (ref 9.4–12.3)
POTASSIUM SERPL-SCNC: 4.2 MMOL/L (ref 3.5–5.1)
PROT SERPL-MCNC: 6.9 G/DL (ref 6.3–8.2)
RBC # BLD AUTO: 2.6 M/UL (ref 4.05–5.25)
SODIUM SERPL-SCNC: 143 MMOL/L (ref 136–145)
TIBC SERPL-MCNC: 260 UG/DL (ref 250–450)
WBC # BLD AUTO: 1.7 K/UL (ref 4.3–11.1)

## 2019-08-27 PROCEDURE — 82728 ASSAY OF FERRITIN: CPT

## 2019-08-27 PROCEDURE — 83540 ASSAY OF IRON: CPT

## 2019-08-27 PROCEDURE — 36415 COLL VENOUS BLD VENIPUNCTURE: CPT

## 2019-08-27 PROCEDURE — 85025 COMPLETE CBC W/AUTO DIFF WBC: CPT

## 2019-08-27 PROCEDURE — 80053 COMPREHEN METABOLIC PANEL: CPT

## 2019-08-27 RX ORDER — CYANOCOBALAMIN 1000 UG/ML
1000 INJECTION, SOLUTION INTRAMUSCULAR; SUBCUTANEOUS
Status: DISCONTINUED | OUTPATIENT
Start: 2019-08-27 | End: 2019-08-31 | Stop reason: HOSPADM

## 2019-08-27 NOTE — PROGRESS NOTES
Patient was added onto infusion schedule today for procrit/b12 injections. Before patient was brought back from waiting room, this RN released orders from treatment plan and immediately after that  contacted this RN to say that patient has not received pre-approval for medications today from insurance. Patient rescheduled for later date and was not seen in infusion center today.

## 2019-09-03 ENCOUNTER — HOSPITAL ENCOUNTER (OUTPATIENT)
Dept: INFUSION THERAPY | Age: 74
Discharge: HOME | End: 2019-09-03
Payer: MEDICARE

## 2019-09-03 VITALS
DIASTOLIC BLOOD PRESSURE: 60 MMHG | OXYGEN SATURATION: 97 % | RESPIRATION RATE: 18 BRPM | TEMPERATURE: 98.3 F | SYSTOLIC BLOOD PRESSURE: 144 MMHG | HEART RATE: 71 BPM

## 2019-09-03 DIAGNOSIS — D63.1 ANEMIA DUE TO CHRONIC KIDNEY DISEASE TREATED WITH ERYTHROPOIETIN: Primary | ICD-10-CM

## 2019-09-03 DIAGNOSIS — N18.9 ANEMIA DUE TO CHRONIC KIDNEY DISEASE TREATED WITH ERYTHROPOIETIN: Primary | ICD-10-CM

## 2019-09-03 LAB
BASOPHILS # BLD: 0 K/UL (ref 0–0.2)
BASOPHILS NFR BLD: 2 % (ref 0–2)
DIFFERENTIAL METHOD BLD: ABNORMAL
EOSINOPHIL # BLD: 0.1 K/UL (ref 0–0.8)
EOSINOPHIL NFR BLD: 3 % (ref 0.5–7.8)
ERYTHROCYTE [DISTWIDTH] IN BLOOD BY AUTOMATED COUNT: 14 % (ref 11.9–14.6)
HCT VFR BLD AUTO: 29.5 % (ref 35.8–46.3)
HGB BLD-MCNC: 9.5 G/DL (ref 11.7–15.4)
IMM GRANULOCYTES # BLD AUTO: 0 K/UL (ref 0–0.5)
IMM GRANULOCYTES NFR BLD AUTO: 1 % (ref 0–5)
LYMPHOCYTES # BLD: 0.8 K/UL (ref 0.5–4.6)
LYMPHOCYTES NFR BLD: 36 % (ref 13–44)
MCH RBC QN AUTO: 34.5 PG (ref 26.1–32.9)
MCHC RBC AUTO-ENTMCNC: 32.2 G/DL (ref 31.4–35)
MCV RBC AUTO: 107.3 FL (ref 79.6–97.8)
MONOCYTES # BLD: 0.2 K/UL (ref 0.1–1.3)
MONOCYTES NFR BLD: 9 % (ref 4–12)
NEUTS SEG # BLD: 1.2 K/UL (ref 1.7–8.2)
NEUTS SEG NFR BLD: 49 % (ref 43–78)
NRBC # BLD: 0 K/UL (ref 0–0.2)
PLATELET # BLD AUTO: 132 K/UL (ref 150–450)
PLATELET COMMENTS,PCOM: ABNORMAL
PMV BLD AUTO: 9.7 FL (ref 9.4–12.3)
RBC # BLD AUTO: 2.75 M/UL (ref 4.05–5.25)
RBC MORPH BLD: ABNORMAL
WBC # BLD AUTO: 2.3 K/UL (ref 4.3–11.1)
WBC MORPH BLD: ABNORMAL

## 2019-09-03 PROCEDURE — 85025 COMPLETE CBC W/AUTO DIFF WBC: CPT

## 2019-09-03 PROCEDURE — 96372 THER/PROPH/DIAG INJ SC/IM: CPT

## 2019-09-03 PROCEDURE — 74011250636 HC RX REV CODE- 250/636: Performed by: NURSE PRACTITIONER

## 2019-09-03 PROCEDURE — 36415 COLL VENOUS BLD VENIPUNCTURE: CPT

## 2019-09-03 RX ORDER — CYANOCOBALAMIN 1000 UG/ML
1000 INJECTION, SOLUTION INTRAMUSCULAR; SUBCUTANEOUS
Status: COMPLETED | OUTPATIENT
Start: 2019-09-03 | End: 2019-09-03

## 2019-09-03 RX ADMIN — EPOETIN ALFA-EPBX 40000 UNITS: 40000 INJECTION, SOLUTION INTRAVENOUS; SUBCUTANEOUS at 11:25

## 2019-09-03 RX ADMIN — CYANOCOBALAMIN 1000 MCG: 1000 INJECTION, SOLUTION INTRAMUSCULAR; SUBCUTANEOUS at 11:24

## 2019-09-03 NOTE — PROGRESS NOTES
Arrived to the [facility]. Vit B12/procrit injections completed. Patient tolerated well. Any issues or concerns during appointment: None. Patient aware of next infusion appointment on September 24th at 1030. Discharged ambulatory.

## 2019-09-24 ENCOUNTER — HOSPITAL ENCOUNTER (OUTPATIENT)
Dept: INFUSION THERAPY | Age: 74
Discharge: HOME | End: 2019-09-24
Payer: MEDICARE

## 2019-09-24 ENCOUNTER — HOSPITAL ENCOUNTER (OUTPATIENT)
Dept: LAB | Age: 74
Discharge: HOME | End: 2019-09-24
Payer: MEDICARE

## 2019-09-24 DIAGNOSIS — N18.9 ANEMIA DUE TO CHRONIC KIDNEY DISEASE TREATED WITH ERYTHROPOIETIN: ICD-10-CM

## 2019-09-24 DIAGNOSIS — D63.1 ANEMIA DUE TO CHRONIC KIDNEY DISEASE TREATED WITH ERYTHROPOIETIN: Primary | ICD-10-CM

## 2019-09-24 DIAGNOSIS — N18.9 ANEMIA DUE TO CHRONIC KIDNEY DISEASE TREATED WITH ERYTHROPOIETIN: Primary | ICD-10-CM

## 2019-09-24 DIAGNOSIS — C79.51 MALIGNANT NEOPLASM METASTATIC TO BONE (HCC): ICD-10-CM

## 2019-09-24 DIAGNOSIS — D63.1 ANEMIA DUE TO CHRONIC KIDNEY DISEASE TREATED WITH ERYTHROPOIETIN: ICD-10-CM

## 2019-09-24 PROBLEM — E66.01 SEVERE OBESITY (HCC): Status: ACTIVE | Noted: 2019-09-24

## 2019-09-24 LAB
ALBUMIN SERPL-MCNC: 3.5 G/DL (ref 3.2–4.6)
ALBUMIN/GLOB SERPL: 0.9 {RATIO} (ref 1.2–3.5)
ALP SERPL-CCNC: 91 U/L (ref 50–136)
ALT SERPL-CCNC: 26 U/L (ref 12–65)
ANION GAP SERPL CALC-SCNC: 9 MMOL/L (ref 7–16)
AST SERPL-CCNC: 28 U/L (ref 15–37)
BASOPHILS # BLD: 0 K/UL (ref 0–0.2)
BASOPHILS NFR BLD: 2 % (ref 0–2)
BILIRUB SERPL-MCNC: 0.4 MG/DL (ref 0.2–1.1)
BUN SERPL-MCNC: 31 MG/DL (ref 8–23)
CALCIUM SERPL-MCNC: 9.5 MG/DL (ref 8.3–10.4)
CHLORIDE SERPL-SCNC: 111 MMOL/L (ref 98–107)
CO2 SERPL-SCNC: 22 MMOL/L (ref 21–32)
CREAT SERPL-MCNC: 2.22 MG/DL (ref 0.6–1)
DIFFERENTIAL METHOD BLD: ABNORMAL
EOSINOPHIL # BLD: 0.1 K/UL (ref 0–0.8)
EOSINOPHIL NFR BLD: 3 % (ref 0.5–7.8)
ERYTHROCYTE [DISTWIDTH] IN BLOOD BY AUTOMATED COUNT: 14.5 % (ref 11.9–14.6)
FERRITIN SERPL-MCNC: 282 NG/ML (ref 8–388)
GLOBULIN SER CALC-MCNC: 3.7 G/DL (ref 2.3–3.5)
GLUCOSE SERPL-MCNC: 115 MG/DL (ref 65–100)
HCT VFR BLD AUTO: 30.7 % (ref 35.8–46.3)
HGB BLD-MCNC: 9.8 G/DL (ref 11.7–15.4)
IMM GRANULOCYTES # BLD AUTO: 0 K/UL (ref 0–0.5)
IMM GRANULOCYTES NFR BLD AUTO: 0 % (ref 0–5)
IRON SATN MFR SERPL: 32 %
IRON SERPL-MCNC: 102 UG/DL (ref 35–150)
LYMPHOCYTES # BLD: 0.6 K/UL (ref 0.5–4.6)
LYMPHOCYTES NFR BLD: 39 % (ref 13–44)
MCH RBC QN AUTO: 35.1 PG (ref 26.1–32.9)
MCHC RBC AUTO-ENTMCNC: 31.9 G/DL (ref 31.4–35)
MCV RBC AUTO: 110 FL (ref 79.6–97.8)
MONOCYTES # BLD: 0.1 K/UL (ref 0.1–1.3)
MONOCYTES NFR BLD: 8 % (ref 4–12)
NEUTS SEG # BLD: 0.8 K/UL (ref 1.7–8.2)
NEUTS SEG NFR BLD: 49 % (ref 43–78)
NRBC # BLD: 0 K/UL (ref 0–0.2)
PLATELET # BLD AUTO: 112 K/UL (ref 150–450)
PMV BLD AUTO: 10 FL (ref 9.4–12.3)
POTASSIUM SERPL-SCNC: 4.1 MMOL/L (ref 3.5–5.1)
PROT SERPL-MCNC: 7.2 G/DL (ref 6.3–8.2)
RBC # BLD AUTO: 2.79 M/UL (ref 4.05–5.25)
SODIUM SERPL-SCNC: 142 MMOL/L (ref 136–145)
TIBC SERPL-MCNC: 318 UG/DL (ref 250–450)
WBC # BLD AUTO: 1.6 K/UL (ref 4.3–11.1)

## 2019-09-24 PROCEDURE — 80053 COMPREHEN METABOLIC PANEL: CPT

## 2019-09-24 PROCEDURE — 85025 COMPLETE CBC W/AUTO DIFF WBC: CPT

## 2019-09-24 PROCEDURE — 96372 THER/PROPH/DIAG INJ SC/IM: CPT

## 2019-09-24 PROCEDURE — 36415 COLL VENOUS BLD VENIPUNCTURE: CPT

## 2019-09-24 PROCEDURE — 74011250636 HC RX REV CODE- 250/636: Performed by: INTERNAL MEDICINE

## 2019-09-24 PROCEDURE — 83540 ASSAY OF IRON: CPT

## 2019-09-24 PROCEDURE — 82728 ASSAY OF FERRITIN: CPT

## 2019-09-24 RX ORDER — CYANOCOBALAMIN 1000 UG/ML
1000 INJECTION, SOLUTION INTRAMUSCULAR; SUBCUTANEOUS
Status: COMPLETED | OUTPATIENT
Start: 2019-09-24 | End: 2019-09-24

## 2019-09-24 RX ADMIN — EPOETIN ALFA-EPBX 40000 UNITS: 40000 INJECTION, SOLUTION INTRAVENOUS; SUBCUTANEOUS at 11:04

## 2019-09-24 RX ADMIN — CYANOCOBALAMIN 1000 MCG: 1000 INJECTION, SOLUTION INTRAMUSCULAR; SUBCUTANEOUS at 11:04

## 2019-09-24 NOTE — PROGRESS NOTES
Arrived to the [facility]. Assessment, b12 and retacrit injection completed. Patient tolerated well. Any issues or concerns during appointment: none. Patient aware of next infusion appointment on 10/22/2019 (date) at 1330 (time) with infusion center. Discharged via wheelchair, with family. Patient advised to call Dr. Josefa Zelaya office if she has any problems or concerns. Patient verbalized understanding.

## 2019-10-22 ENCOUNTER — HOSPITAL ENCOUNTER (OUTPATIENT)
Dept: LAB | Age: 74
Discharge: HOME | End: 2019-10-22
Payer: MEDICARE

## 2019-10-22 ENCOUNTER — HOSPITAL ENCOUNTER (OUTPATIENT)
Dept: INFUSION THERAPY | Age: 74
Discharge: HOME | End: 2019-10-22
Payer: MEDICARE

## 2019-10-22 DIAGNOSIS — C79.51 MALIGNANT NEOPLASM METASTATIC TO BONE (HCC): ICD-10-CM

## 2019-10-22 DIAGNOSIS — N18.9 ANEMIA DUE TO CHRONIC KIDNEY DISEASE TREATED WITH ERYTHROPOIETIN: Primary | ICD-10-CM

## 2019-10-22 DIAGNOSIS — C50.919 INFILTRATING DUCTAL CARCINOMA OF FEMALE BREAST, UNSPECIFIED LATERALITY (HCC): ICD-10-CM

## 2019-10-22 DIAGNOSIS — D63.1 ANEMIA DUE TO CHRONIC KIDNEY DISEASE TREATED WITH ERYTHROPOIETIN: Primary | ICD-10-CM

## 2019-10-22 LAB
ALBUMIN SERPL-MCNC: 3.4 G/DL (ref 3.2–4.6)
ALBUMIN/GLOB SERPL: 1 {RATIO} (ref 1.2–3.5)
ALP SERPL-CCNC: 86 U/L (ref 50–136)
ALT SERPL-CCNC: 25 U/L (ref 12–65)
ANION GAP SERPL CALC-SCNC: 8 MMOL/L (ref 7–16)
AST SERPL-CCNC: 23 U/L (ref 15–37)
BASOPHILS # BLD: 0 K/UL (ref 0–0.2)
BASOPHILS NFR BLD: 2 % (ref 0–2)
BILIRUB SERPL-MCNC: 0.4 MG/DL (ref 0.2–1.1)
BUN SERPL-MCNC: 35 MG/DL (ref 8–23)
CALCIUM SERPL-MCNC: 9.6 MG/DL (ref 8.3–10.4)
CHLORIDE SERPL-SCNC: 109 MMOL/L (ref 98–107)
CO2 SERPL-SCNC: 26 MMOL/L (ref 21–32)
CREAT SERPL-MCNC: 2.06 MG/DL (ref 0.6–1)
DIFFERENTIAL METHOD BLD: ABNORMAL
EOSINOPHIL # BLD: 0.1 K/UL (ref 0–0.8)
EOSINOPHIL NFR BLD: 3 % (ref 0.5–7.8)
ERYTHROCYTE [DISTWIDTH] IN BLOOD BY AUTOMATED COUNT: 14.1 % (ref 11.9–14.6)
FERRITIN SERPL-MCNC: 277 NG/ML (ref 8–388)
GLOBULIN SER CALC-MCNC: 3.5 G/DL (ref 2.3–3.5)
GLUCOSE SERPL-MCNC: 97 MG/DL (ref 65–100)
HCT VFR BLD AUTO: 27.2 % (ref 35.8–46.3)
HGB BLD-MCNC: 8.7 G/DL (ref 11.7–15.4)
IMM GRANULOCYTES # BLD AUTO: 0 K/UL (ref 0–0.5)
IMM GRANULOCYTES NFR BLD AUTO: 1 % (ref 0–5)
IRON SATN MFR SERPL: 34 %
IRON SERPL-MCNC: 85 UG/DL (ref 35–150)
LYMPHOCYTES # BLD: 0.7 K/UL (ref 0.5–4.6)
LYMPHOCYTES NFR BLD: 36 % (ref 13–44)
MCH RBC QN AUTO: 34.9 PG (ref 26.1–32.9)
MCHC RBC AUTO-ENTMCNC: 32 G/DL (ref 31.4–35)
MCV RBC AUTO: 109.2 FL (ref 79.6–97.8)
MONOCYTES # BLD: 0.2 K/UL (ref 0.1–1.3)
MONOCYTES NFR BLD: 12 % (ref 4–12)
NEUTS SEG # BLD: 0.9 K/UL (ref 1.7–8.2)
NEUTS SEG NFR BLD: 46 % (ref 43–78)
NRBC # BLD: 0 K/UL (ref 0–0.2)
PLATELET # BLD AUTO: 100 K/UL (ref 150–450)
PMV BLD AUTO: 9.7 FL (ref 9.4–12.3)
POTASSIUM SERPL-SCNC: 4.2 MMOL/L (ref 3.5–5.1)
PROT SERPL-MCNC: 6.9 G/DL (ref 6.3–8.2)
RBC # BLD AUTO: 2.49 M/UL (ref 4.05–5.25)
SODIUM SERPL-SCNC: 143 MMOL/L (ref 136–145)
TIBC SERPL-MCNC: 249 UG/DL (ref 250–450)
WBC # BLD AUTO: 1.8 K/UL (ref 4.3–11.1)

## 2019-10-22 PROCEDURE — 36415 COLL VENOUS BLD VENIPUNCTURE: CPT

## 2019-10-22 PROCEDURE — 96372 THER/PROPH/DIAG INJ SC/IM: CPT

## 2019-10-22 PROCEDURE — 74011250636 HC RX REV CODE- 250/636: Performed by: NURSE PRACTITIONER

## 2019-10-22 PROCEDURE — 83540 ASSAY OF IRON: CPT

## 2019-10-22 PROCEDURE — 82728 ASSAY OF FERRITIN: CPT

## 2019-10-22 PROCEDURE — 85025 COMPLETE CBC W/AUTO DIFF WBC: CPT

## 2019-10-22 PROCEDURE — 80053 COMPREHEN METABOLIC PANEL: CPT

## 2019-10-22 RX ORDER — CYANOCOBALAMIN 1000 UG/ML
1000 INJECTION, SOLUTION INTRAMUSCULAR; SUBCUTANEOUS
Status: COMPLETED | OUTPATIENT
Start: 2019-10-22 | End: 2019-10-22

## 2019-10-22 RX ADMIN — CYANOCOBALAMIN 1000 MCG: 1000 INJECTION, SOLUTION INTRAMUSCULAR; SUBCUTANEOUS at 14:15

## 2019-10-22 RX ADMIN — EPOETIN ALFA-EPBX 40000 UNITS: 40000 INJECTION, SOLUTION INTRAVENOUS; SUBCUTANEOUS at 14:10

## 2019-10-22 NOTE — PROGRESS NOTES
Arrived to the [facility]. Vitamin B12 and Retacrit completed. Patient instructed to report any side affects to ordering provider.   Patient tolerated well  Any issues or concerns during appointment: No  Patient aware of next infusion appointment on Tuesday,November 19th @ 56  Discharged home via wheelchair accompanied by family

## 2019-11-19 ENCOUNTER — HOSPITAL ENCOUNTER (OUTPATIENT)
Dept: LAB | Age: 74
Discharge: HOME | End: 2019-11-19
Payer: MEDICARE

## 2019-11-19 ENCOUNTER — HOSPITAL ENCOUNTER (OUTPATIENT)
Dept: INFUSION THERAPY | Age: 74
Discharge: HOME | End: 2019-11-19
Payer: MEDICARE

## 2019-11-19 DIAGNOSIS — D63.1 ANEMIA DUE TO CHRONIC KIDNEY DISEASE TREATED WITH ERYTHROPOIETIN: ICD-10-CM

## 2019-11-19 DIAGNOSIS — N18.9 ANEMIA DUE TO CHRONIC KIDNEY DISEASE TREATED WITH ERYTHROPOIETIN: ICD-10-CM

## 2019-11-19 DIAGNOSIS — D63.1 ANEMIA DUE TO CHRONIC KIDNEY DISEASE TREATED WITH ERYTHROPOIETIN: Primary | ICD-10-CM

## 2019-11-19 DIAGNOSIS — N18.9 ANEMIA DUE TO CHRONIC KIDNEY DISEASE TREATED WITH ERYTHROPOIETIN: Primary | ICD-10-CM

## 2019-11-19 LAB
ALBUMIN SERPL-MCNC: 3.3 G/DL (ref 3.2–4.6)
ALBUMIN/GLOB SERPL: 0.8 {RATIO} (ref 1.2–3.5)
ALP SERPL-CCNC: 98 U/L (ref 50–136)
ALT SERPL-CCNC: 29 U/L (ref 12–65)
ANION GAP SERPL CALC-SCNC: 5 MMOL/L (ref 7–16)
AST SERPL-CCNC: 28 U/L (ref 15–37)
BASOPHILS # BLD: 0 K/UL (ref 0–0.2)
BASOPHILS NFR BLD: 2 % (ref 0–2)
BILIRUB SERPL-MCNC: 0.4 MG/DL (ref 0.2–1.1)
BUN SERPL-MCNC: 24 MG/DL (ref 8–23)
CALCIUM SERPL-MCNC: 9.3 MG/DL (ref 8.3–10.4)
CHLORIDE SERPL-SCNC: 106 MMOL/L (ref 98–107)
CO2 SERPL-SCNC: 28 MMOL/L (ref 21–32)
CREAT SERPL-MCNC: 2.3 MG/DL (ref 0.6–1)
DIFFERENTIAL METHOD BLD: ABNORMAL
EOSINOPHIL # BLD: 0.1 K/UL (ref 0–0.8)
EOSINOPHIL NFR BLD: 5 % (ref 0.5–7.8)
ERYTHROCYTE [DISTWIDTH] IN BLOOD BY AUTOMATED COUNT: 14 % (ref 11.9–14.6)
FERRITIN SERPL-MCNC: 292 NG/ML (ref 8–388)
GLOBULIN SER CALC-MCNC: 3.9 G/DL (ref 2.3–3.5)
GLUCOSE SERPL-MCNC: 115 MG/DL (ref 65–100)
HCT VFR BLD AUTO: 29.3 % (ref 35.8–46.3)
HGB BLD-MCNC: 9.3 G/DL (ref 11.7–15.4)
IMM GRANULOCYTES # BLD AUTO: 0 K/UL (ref 0–0.5)
IMM GRANULOCYTES NFR BLD AUTO: 1 % (ref 0–5)
IRON SATN MFR SERPL: 27 %
IRON SERPL-MCNC: 69 UG/DL (ref 35–150)
LYMPHOCYTES # BLD: 0.4 K/UL (ref 0.5–4.6)
LYMPHOCYTES NFR BLD: 28 % (ref 13–44)
MCH RBC QN AUTO: 34.8 PG (ref 26.1–32.9)
MCHC RBC AUTO-ENTMCNC: 31.7 G/DL (ref 31.4–35)
MCV RBC AUTO: 109.7 FL (ref 79.6–97.8)
MONOCYTES # BLD: 0.1 K/UL (ref 0.1–1.3)
MONOCYTES NFR BLD: 7 % (ref 4–12)
NEUTS SEG # BLD: 1 K/UL (ref 1.7–8.2)
NEUTS SEG NFR BLD: 57 % (ref 43–78)
NRBC # BLD: 0 K/UL (ref 0–0.2)
PLATELET # BLD AUTO: 130 K/UL (ref 150–450)
PLATELET COMMENTS,PCOM: ADEQUATE
PMV BLD AUTO: 10.2 FL (ref 9.4–12.3)
POTASSIUM SERPL-SCNC: 3.7 MMOL/L (ref 3.5–5.1)
PROT SERPL-MCNC: 7.2 G/DL (ref 6.3–8.2)
RBC # BLD AUTO: 2.67 M/UL (ref 4.05–5.25)
RBC MORPH BLD: ABNORMAL
SODIUM SERPL-SCNC: 139 MMOL/L (ref 136–145)
TIBC SERPL-MCNC: 251 UG/DL (ref 250–450)
WBC # BLD AUTO: 1.6 K/UL (ref 4.3–11.1)
WBC MORPH BLD: ABNORMAL

## 2019-11-19 PROCEDURE — 82728 ASSAY OF FERRITIN: CPT

## 2019-11-19 PROCEDURE — 80053 COMPREHEN METABOLIC PANEL: CPT

## 2019-11-19 PROCEDURE — 74011250636 HC RX REV CODE- 250/636: Performed by: INTERNAL MEDICINE

## 2019-11-19 PROCEDURE — 96372 THER/PROPH/DIAG INJ SC/IM: CPT

## 2019-11-19 PROCEDURE — 85025 COMPLETE CBC W/AUTO DIFF WBC: CPT

## 2019-11-19 PROCEDURE — 83540 ASSAY OF IRON: CPT

## 2019-11-19 PROCEDURE — 36415 COLL VENOUS BLD VENIPUNCTURE: CPT

## 2019-11-19 RX ORDER — CYANOCOBALAMIN 1000 UG/ML
1000 INJECTION, SOLUTION INTRAMUSCULAR; SUBCUTANEOUS
Status: COMPLETED | OUTPATIENT
Start: 2019-11-19 | End: 2019-11-19

## 2019-11-19 RX ADMIN — CYANOCOBALAMIN 1000 MCG: 1000 INJECTION, SOLUTION INTRAMUSCULAR; SUBCUTANEOUS at 11:05

## 2019-11-19 RX ADMIN — EPOETIN ALFA-EPBX 40000 UNITS: 40000 INJECTION, SOLUTION INTRAVENOUS; SUBCUTANEOUS at 11:00

## 2019-11-19 NOTE — PROGRESS NOTES
Arrived to the [facility]. Vitamin B12 and Procrit completed.    Provided education on Vitamin B12 and Procrit  Patient instructed to report any side affects to ordering provider-  Patient tolerated well   Any issues or concerns during appointment:No  Patient aware of next infusion appointment on Tuesday,December 17th @ 1100  Discharged home via wheelchair accompanied by daughter

## 2019-12-17 ENCOUNTER — HOSPITAL ENCOUNTER (OUTPATIENT)
Dept: INFUSION THERAPY | Age: 74
Discharge: HOME | End: 2019-12-17
Payer: MEDICARE

## 2019-12-17 ENCOUNTER — HOSPITAL ENCOUNTER (OUTPATIENT)
Dept: LAB | Age: 74
Discharge: HOME | End: 2019-12-17
Payer: MEDICARE

## 2019-12-17 DIAGNOSIS — C50.919 INFILTRATING DUCTAL CARCINOMA OF FEMALE BREAST, UNSPECIFIED LATERALITY (HCC): ICD-10-CM

## 2019-12-17 DIAGNOSIS — N18.9 ANEMIA DUE TO CHRONIC KIDNEY DISEASE TREATED WITH ERYTHROPOIETIN: ICD-10-CM

## 2019-12-17 DIAGNOSIS — N18.9 ANEMIA DUE TO CHRONIC KIDNEY DISEASE TREATED WITH ERYTHROPOIETIN: Primary | ICD-10-CM

## 2019-12-17 DIAGNOSIS — D63.1 ANEMIA DUE TO CHRONIC KIDNEY DISEASE TREATED WITH ERYTHROPOIETIN: ICD-10-CM

## 2019-12-17 DIAGNOSIS — D63.1 ANEMIA DUE TO CHRONIC KIDNEY DISEASE TREATED WITH ERYTHROPOIETIN: Primary | ICD-10-CM

## 2019-12-17 LAB
ALBUMIN SERPL-MCNC: 3.3 G/DL (ref 3.2–4.6)
ALBUMIN/GLOB SERPL: 0.9 {RATIO} (ref 1.2–3.5)
ALP SERPL-CCNC: 84 U/L (ref 50–136)
ALT SERPL-CCNC: 35 U/L (ref 12–65)
ANION GAP SERPL CALC-SCNC: 7 MMOL/L (ref 7–16)
AST SERPL-CCNC: 30 U/L (ref 15–37)
BASOPHILS # BLD: 0 K/UL (ref 0–0.2)
BASOPHILS NFR BLD: 3 % (ref 0–2)
BILIRUB SERPL-MCNC: 0.4 MG/DL (ref 0.2–1.1)
BUN SERPL-MCNC: 23 MG/DL (ref 8–23)
CALCIUM SERPL-MCNC: 10 MG/DL (ref 8.3–10.4)
CHLORIDE SERPL-SCNC: 109 MMOL/L (ref 98–107)
CO2 SERPL-SCNC: 25 MMOL/L (ref 21–32)
CREAT SERPL-MCNC: 2.37 MG/DL (ref 0.6–1)
DIFFERENTIAL METHOD BLD: ABNORMAL
EOSINOPHIL # BLD: 0.1 K/UL (ref 0–0.8)
EOSINOPHIL NFR BLD: 7 % (ref 0.5–7.8)
ERYTHROCYTE [DISTWIDTH] IN BLOOD BY AUTOMATED COUNT: 13.6 % (ref 11.9–14.6)
FERRITIN SERPL-MCNC: 266 NG/ML (ref 8–388)
GLOBULIN SER CALC-MCNC: 3.7 G/DL (ref 2.3–3.5)
GLUCOSE SERPL-MCNC: 118 MG/DL (ref 65–100)
HCT VFR BLD AUTO: 30.9 % (ref 35.8–46.3)
HGB BLD-MCNC: 9.8 G/DL (ref 11.7–15.4)
IMM GRANULOCYTES # BLD AUTO: 0 K/UL (ref 0–0.5)
IMM GRANULOCYTES NFR BLD AUTO: 1 % (ref 0–5)
IRON SATN MFR SERPL: 50 %
IRON SERPL-MCNC: 121 UG/DL (ref 35–150)
LYMPHOCYTES # BLD: 0.5 K/UL (ref 0.5–4.6)
LYMPHOCYTES NFR BLD: 31 % (ref 13–44)
MCH RBC QN AUTO: 34.8 PG (ref 26.1–32.9)
MCHC RBC AUTO-ENTMCNC: 31.7 G/DL (ref 31.4–35)
MCV RBC AUTO: 109.6 FL (ref 79.6–97.8)
MONOCYTES # BLD: 0.1 K/UL (ref 0.1–1.3)
MONOCYTES NFR BLD: 7 % (ref 4–12)
NEUTS SEG # BLD: 0.9 K/UL (ref 1.7–8.2)
NEUTS SEG NFR BLD: 51 % (ref 43–78)
NRBC # BLD: 0 K/UL (ref 0–0.2)
PLATELET # BLD AUTO: 132 K/UL (ref 150–450)
PLATELET COMMENTS,PCOM: ABNORMAL
PMV BLD AUTO: 9.7 FL (ref 9.4–12.3)
POTASSIUM SERPL-SCNC: 4 MMOL/L (ref 3.5–5.1)
PROT SERPL-MCNC: 7 G/DL (ref 6.3–8.2)
RBC # BLD AUTO: 2.82 M/UL (ref 4.05–5.25)
RBC MORPH BLD: ABNORMAL
RBC MORPH BLD: ABNORMAL
SODIUM SERPL-SCNC: 141 MMOL/L (ref 136–145)
TIBC SERPL-MCNC: 244 UG/DL (ref 250–450)
WBC # BLD AUTO: 1.6 K/UL (ref 4.3–11.1)
WBC MORPH BLD: ABNORMAL

## 2019-12-17 PROCEDURE — 96372 THER/PROPH/DIAG INJ SC/IM: CPT

## 2019-12-17 PROCEDURE — 85025 COMPLETE CBC W/AUTO DIFF WBC: CPT

## 2019-12-17 PROCEDURE — 74011250636 HC RX REV CODE- 250/636: Performed by: NURSE PRACTITIONER

## 2019-12-17 PROCEDURE — 82728 ASSAY OF FERRITIN: CPT

## 2019-12-17 PROCEDURE — 83540 ASSAY OF IRON: CPT

## 2019-12-17 PROCEDURE — 80053 COMPREHEN METABOLIC PANEL: CPT

## 2019-12-17 PROCEDURE — 36415 COLL VENOUS BLD VENIPUNCTURE: CPT

## 2019-12-17 RX ORDER — CYANOCOBALAMIN 1000 UG/ML
1000 INJECTION, SOLUTION INTRAMUSCULAR; SUBCUTANEOUS
Status: COMPLETED | OUTPATIENT
Start: 2019-12-17 | End: 2019-12-17

## 2019-12-17 RX ADMIN — EPOETIN ALFA-EPBX 40000 UNITS: 40000 INJECTION, SOLUTION INTRAVENOUS; SUBCUTANEOUS at 10:57

## 2019-12-17 RX ADMIN — CYANOCOBALAMIN 1000 MCG: 1000 INJECTION, SOLUTION INTRAMUSCULAR; SUBCUTANEOUS at 10:55

## 2019-12-17 NOTE — PROGRESS NOTES
Arrived to the [facility]. Retacrit and Vitamin B12 completed.    Provided education on Retacrit and Vitamin B12-patient has previously received these medications  Patient instructed to report any side affects to ordering provider-  Patient tolerated well  Any issues or concerns during appointment: No  Patient aware of next infusion appointment on [address] [facility] @ 4916  Discharged home via wheelchair accompanied by daughter

## 2020-01-06 ENCOUNTER — HOSPITAL ENCOUNTER (OUTPATIENT)
Dept: CT IMAGING | Age: 75
Discharge: HOME | End: 2020-01-06
Attending: INTERNAL MEDICINE

## 2020-01-06 DIAGNOSIS — C50.919 INFILTRATING DUCTAL CARCINOMA OF FEMALE BREAST, UNSPECIFIED LATERALITY (HCC): ICD-10-CM

## 2020-01-14 ENCOUNTER — HOSPITAL ENCOUNTER (OUTPATIENT)
Dept: LAB | Age: 75
Discharge: HOME | End: 2020-01-14
Payer: MEDICARE

## 2020-01-14 ENCOUNTER — HOSPITAL ENCOUNTER (OUTPATIENT)
Dept: INFUSION THERAPY | Age: 75
Discharge: HOME | End: 2020-01-14
Payer: MEDICARE

## 2020-01-14 DIAGNOSIS — D64.9 ANEMIA, UNSPECIFIED TYPE: ICD-10-CM

## 2020-01-14 DIAGNOSIS — N18.9 ANEMIA DUE TO CHRONIC KIDNEY DISEASE TREATED WITH ERYTHROPOIETIN: Primary | ICD-10-CM

## 2020-01-14 DIAGNOSIS — D63.1 ANEMIA DUE TO CHRONIC KIDNEY DISEASE TREATED WITH ERYTHROPOIETIN: Primary | ICD-10-CM

## 2020-01-14 DIAGNOSIS — C50.919 INFILTRATING DUCTAL CARCINOMA OF FEMALE BREAST, UNSPECIFIED LATERALITY (HCC): ICD-10-CM

## 2020-01-14 LAB
ALBUMIN SERPL-MCNC: 3.3 G/DL (ref 3.2–4.6)
ALBUMIN/GLOB SERPL: 0.9 {RATIO} (ref 1.2–3.5)
ALP SERPL-CCNC: 91 U/L (ref 50–136)
ALT SERPL-CCNC: 37 U/L (ref 12–65)
ANION GAP SERPL CALC-SCNC: 5 MMOL/L (ref 7–16)
AST SERPL-CCNC: 30 U/L (ref 15–37)
BASOPHILS # BLD: 0 K/UL (ref 0–0.2)
BASOPHILS NFR BLD: 2 % (ref 0–2)
BILIRUB SERPL-MCNC: 0.4 MG/DL (ref 0.2–1.1)
BUN SERPL-MCNC: 29 MG/DL (ref 8–23)
CALCIUM SERPL-MCNC: 9.5 MG/DL (ref 8.3–10.4)
CHLORIDE SERPL-SCNC: 107 MMOL/L (ref 98–107)
CO2 SERPL-SCNC: 28 MMOL/L (ref 21–32)
CREAT SERPL-MCNC: 2.71 MG/DL (ref 0.6–1)
DIFFERENTIAL METHOD BLD: ABNORMAL
EOSINOPHIL # BLD: 0.1 K/UL (ref 0–0.8)
EOSINOPHIL NFR BLD: 5 % (ref 0.5–7.8)
ERYTHROCYTE [DISTWIDTH] IN BLOOD BY AUTOMATED COUNT: 13.2 % (ref 11.9–14.6)
FERRITIN SERPL-MCNC: 288 NG/ML (ref 8–388)
GLOBULIN SER CALC-MCNC: 3.8 G/DL (ref 2.3–3.5)
GLUCOSE SERPL-MCNC: 138 MG/DL (ref 65–100)
HCT VFR BLD AUTO: 31.6 % (ref 35.8–46.3)
HGB BLD-MCNC: 10.1 G/DL (ref 11.7–15.4)
IMM GRANULOCYTES # BLD AUTO: 0 K/UL (ref 0–0.5)
IMM GRANULOCYTES NFR BLD AUTO: 1 % (ref 0–5)
IRON SATN MFR SERPL: 37 %
IRON SERPL-MCNC: 93 UG/DL (ref 35–150)
LYMPHOCYTES # BLD: 0.5 K/UL (ref 0.5–4.6)
LYMPHOCYTES NFR BLD: 28 % (ref 13–44)
MCH RBC QN AUTO: 34.2 PG (ref 26.1–32.9)
MCHC RBC AUTO-ENTMCNC: 32 G/DL (ref 31.4–35)
MCV RBC AUTO: 107.1 FL (ref 79.6–97.8)
MONOCYTES # BLD: 0.1 K/UL (ref 0.1–1.3)
MONOCYTES NFR BLD: 7 % (ref 4–12)
NEUTS SEG # BLD: 1 K/UL (ref 1.7–8.2)
NEUTS SEG NFR BLD: 58 % (ref 43–78)
NRBC # BLD: 0 K/UL (ref 0–0.2)
PLATELET # BLD AUTO: 134 K/UL (ref 150–450)
PMV BLD AUTO: 10 FL (ref 9.4–12.3)
POTASSIUM SERPL-SCNC: 4.3 MMOL/L (ref 3.5–5.1)
PROT SERPL-MCNC: 7.1 G/DL (ref 6.3–8.2)
RBC # BLD AUTO: 2.95 M/UL (ref 4.05–5.25)
SODIUM SERPL-SCNC: 140 MMOL/L (ref 136–145)
TIBC SERPL-MCNC: 252 UG/DL (ref 250–450)
WBC # BLD AUTO: 1.7 K/UL (ref 4.3–11.1)

## 2020-01-14 PROCEDURE — 85025 COMPLETE CBC W/AUTO DIFF WBC: CPT

## 2020-01-14 PROCEDURE — 96372 THER/PROPH/DIAG INJ SC/IM: CPT

## 2020-01-14 PROCEDURE — 74011250636 HC RX REV CODE- 250/636: Performed by: INTERNAL MEDICINE

## 2020-01-14 PROCEDURE — 82728 ASSAY OF FERRITIN: CPT

## 2020-01-14 PROCEDURE — 80053 COMPREHEN METABOLIC PANEL: CPT

## 2020-01-14 PROCEDURE — 36415 COLL VENOUS BLD VENIPUNCTURE: CPT

## 2020-01-14 PROCEDURE — 83540 ASSAY OF IRON: CPT

## 2020-01-14 RX ORDER — CYANOCOBALAMIN 1000 UG/ML
1000 INJECTION, SOLUTION INTRAMUSCULAR; SUBCUTANEOUS
Status: COMPLETED | OUTPATIENT
Start: 2020-01-14 | End: 2020-01-14

## 2020-01-14 RX ADMIN — CYANOCOBALAMIN 1000 MCG: 1000 INJECTION, SOLUTION INTRAMUSCULAR; SUBCUTANEOUS at 11:40

## 2020-01-14 NOTE — PROGRESS NOTES
Arrived to the [facility]. Vitamin B12 completed. Provided education on Vitamin B12-patient has previously received this medication  Patient instructed to report any side affects to ordering provider.   Patient tolerated well  Any issues or concerns during appointment: No  Patient aware of next infusion appointment on Wednesday,February 12th @ 1030  Discharged home via wheelchair with daughter

## 2020-02-12 ENCOUNTER — HOSPITAL ENCOUNTER (OUTPATIENT)
Dept: LAB | Age: 75
Discharge: HOME | End: 2020-02-12
Payer: MEDICARE

## 2020-02-12 ENCOUNTER — HOSPITAL ENCOUNTER (OUTPATIENT)
Dept: INFUSION THERAPY | Age: 75
Discharge: HOME | End: 2020-02-12
Payer: MEDICARE

## 2020-02-12 DIAGNOSIS — D63.1 ANEMIA DUE TO CHRONIC KIDNEY DISEASE TREATED WITH ERYTHROPOIETIN: Primary | ICD-10-CM

## 2020-02-12 DIAGNOSIS — D63.1 ANEMIA DUE TO CHRONIC KIDNEY DISEASE TREATED WITH ERYTHROPOIETIN: ICD-10-CM

## 2020-02-12 DIAGNOSIS — N18.9 ANEMIA DUE TO CHRONIC KIDNEY DISEASE TREATED WITH ERYTHROPOIETIN: Primary | ICD-10-CM

## 2020-02-12 DIAGNOSIS — N18.9 ANEMIA DUE TO CHRONIC KIDNEY DISEASE TREATED WITH ERYTHROPOIETIN: ICD-10-CM

## 2020-02-12 LAB
ALBUMIN SERPL-MCNC: 3.4 G/DL (ref 3.2–4.6)
ALBUMIN/GLOB SERPL: 0.9 {RATIO} (ref 1.2–3.5)
ALP SERPL-CCNC: 88 U/L (ref 50–136)
ALT SERPL-CCNC: 29 U/L (ref 12–65)
ANION GAP SERPL CALC-SCNC: 6 MMOL/L (ref 7–16)
AST SERPL-CCNC: 30 U/L (ref 15–37)
BASOPHILS # BLD: 0 K/UL (ref 0–0.2)
BASOPHILS NFR BLD: 2 % (ref 0–2)
BILIRUB SERPL-MCNC: 0.4 MG/DL (ref 0.2–1.1)
BUN SERPL-MCNC: 32 MG/DL (ref 8–23)
CALCIUM SERPL-MCNC: 9.9 MG/DL (ref 8.3–10.4)
CHLORIDE SERPL-SCNC: 107 MMOL/L (ref 98–107)
CO2 SERPL-SCNC: 28 MMOL/L (ref 21–32)
CREAT SERPL-MCNC: 3.02 MG/DL (ref 0.6–1)
DIFFERENTIAL METHOD BLD: ABNORMAL
EOSINOPHIL # BLD: 0 K/UL (ref 0–0.8)
EOSINOPHIL NFR BLD: 2 % (ref 0.5–7.8)
ERYTHROCYTE [DISTWIDTH] IN BLOOD BY AUTOMATED COUNT: 13.6 % (ref 11.9–14.6)
FERRITIN SERPL-MCNC: 341 NG/ML (ref 8–388)
GLOBULIN SER CALC-MCNC: 3.7 G/DL (ref 2.3–3.5)
GLUCOSE SERPL-MCNC: 126 MG/DL (ref 65–100)
HCT VFR BLD AUTO: 30.6 % (ref 35.8–46.3)
HGB BLD-MCNC: 9.7 G/DL (ref 11.7–15.4)
IMM GRANULOCYTES # BLD AUTO: 0 K/UL (ref 0–0.5)
IMM GRANULOCYTES NFR BLD AUTO: 1 % (ref 0–5)
IRON SATN MFR SERPL: 44 %
IRON SERPL-MCNC: 112 UG/DL (ref 35–150)
LYMPHOCYTES # BLD: 0.5 K/UL (ref 0.5–4.6)
LYMPHOCYTES NFR BLD: 29 % (ref 13–44)
MCH RBC QN AUTO: 33.8 PG (ref 26.1–32.9)
MCHC RBC AUTO-ENTMCNC: 31.7 G/DL (ref 31.4–35)
MCV RBC AUTO: 106.6 FL (ref 79.6–97.8)
MONOCYTES # BLD: 0.1 K/UL (ref 0.1–1.3)
MONOCYTES NFR BLD: 7 % (ref 4–12)
NEUTS SEG # BLD: 1.1 K/UL (ref 1.7–8.2)
NEUTS SEG NFR BLD: 59 % (ref 43–78)
NRBC # BLD: 0 K/UL (ref 0–0.2)
PLATELET # BLD AUTO: 127 K/UL (ref 150–450)
PMV BLD AUTO: 9.6 FL (ref 9.4–12.3)
POTASSIUM SERPL-SCNC: 3.9 MMOL/L (ref 3.5–5.1)
PROT SERPL-MCNC: 7.1 G/DL (ref 6.3–8.2)
RBC # BLD AUTO: 2.87 M/UL (ref 4.05–5.25)
SODIUM SERPL-SCNC: 141 MMOL/L (ref 136–145)
TIBC SERPL-MCNC: 252 UG/DL (ref 250–450)
WBC # BLD AUTO: 1.8 K/UL (ref 4.3–11.1)

## 2020-02-12 PROCEDURE — 85025 COMPLETE CBC W/AUTO DIFF WBC: CPT

## 2020-02-12 PROCEDURE — 74011250636 HC RX REV CODE- 250/636: Performed by: NURSE PRACTITIONER

## 2020-02-12 PROCEDURE — 36415 COLL VENOUS BLD VENIPUNCTURE: CPT

## 2020-02-12 PROCEDURE — 80053 COMPREHEN METABOLIC PANEL: CPT

## 2020-02-12 PROCEDURE — 96372 THER/PROPH/DIAG INJ SC/IM: CPT

## 2020-02-12 PROCEDURE — 82728 ASSAY OF FERRITIN: CPT

## 2020-02-12 PROCEDURE — 83540 ASSAY OF IRON: CPT

## 2020-02-12 RX ORDER — CYANOCOBALAMIN 1000 UG/ML
1000 INJECTION, SOLUTION INTRAMUSCULAR; SUBCUTANEOUS
Status: COMPLETED | OUTPATIENT
Start: 2020-02-12 | End: 2020-02-12

## 2020-02-12 RX ADMIN — EPOETIN ALFA-EPBX 40000 UNITS: 40000 INJECTION, SOLUTION INTRAVENOUS; SUBCUTANEOUS at 11:35

## 2020-02-12 RX ADMIN — CYANOCOBALAMIN 1000 MCG: 1000 INJECTION, SOLUTION INTRAMUSCULAR; SUBCUTANEOUS at 11:30

## 2020-02-12 NOTE — PROGRESS NOTES
Arrived to the [facility]. Vitamin B12 and Retacrit completed.    Provided education on Vitamin B12 and Retacrit-has previously received these medications  Patient instructed to report any side affects to ordering provider-  Patient tolerated well   Any issues or concerns during appointment: No  Patient aware of next infusion appointment onThursday,March 12th @ !030   Discharged home via wheelchair

## 2020-03-12 ENCOUNTER — HOSPITAL ENCOUNTER (OUTPATIENT)
Dept: INFUSION THERAPY | Age: 75
End: 2020-03-12

## 2020-03-18 ENCOUNTER — HOSPITAL ENCOUNTER (OUTPATIENT)
Dept: INFUSION THERAPY | Age: 75
Discharge: HOME | End: 2020-03-18
Payer: MEDICARE

## 2020-03-18 ENCOUNTER — HOSPITAL ENCOUNTER (OUTPATIENT)
Dept: LAB | Age: 75
Discharge: HOME | End: 2020-03-18
Payer: MEDICARE

## 2020-03-18 DIAGNOSIS — D63.1 ANEMIA DUE TO CHRONIC KIDNEY DISEASE TREATED WITH ERYTHROPOIETIN: ICD-10-CM

## 2020-03-18 DIAGNOSIS — D63.1 ANEMIA DUE TO CHRONIC KIDNEY DISEASE TREATED WITH ERYTHROPOIETIN: Primary | ICD-10-CM

## 2020-03-18 DIAGNOSIS — N18.9 ANEMIA DUE TO CHRONIC KIDNEY DISEASE TREATED WITH ERYTHROPOIETIN: ICD-10-CM

## 2020-03-18 DIAGNOSIS — N18.9 ANEMIA DUE TO CHRONIC KIDNEY DISEASE TREATED WITH ERYTHROPOIETIN: Primary | ICD-10-CM

## 2020-03-18 LAB
ALBUMIN SERPL-MCNC: 3.4 G/DL (ref 3.2–4.6)
ALBUMIN/GLOB SERPL: 0.8 {RATIO} (ref 1.2–3.5)
ALP SERPL-CCNC: 114 U/L (ref 50–136)
ALT SERPL-CCNC: 34 U/L (ref 12–65)
ANION GAP SERPL CALC-SCNC: 6 MMOL/L (ref 7–16)
AST SERPL-CCNC: 34 U/L (ref 15–37)
BASOPHILS # BLD: 0 K/UL (ref 0–0.2)
BASOPHILS NFR BLD: 1 % (ref 0–2)
BILIRUB SERPL-MCNC: 0.5 MG/DL (ref 0.2–1.1)
BUN SERPL-MCNC: 24 MG/DL (ref 8–23)
CALCIUM SERPL-MCNC: 10.1 MG/DL (ref 8.3–10.4)
CHLORIDE SERPL-SCNC: 104 MMOL/L (ref 98–107)
CO2 SERPL-SCNC: 29 MMOL/L (ref 21–32)
CREAT SERPL-MCNC: 2.43 MG/DL (ref 0.6–1)
DIFFERENTIAL METHOD BLD: ABNORMAL
EOSINOPHIL # BLD: 0.1 K/UL (ref 0–0.8)
EOSINOPHIL NFR BLD: 2 % (ref 0.5–7.8)
ERYTHROCYTE [DISTWIDTH] IN BLOOD BY AUTOMATED COUNT: 14.4 % (ref 11.9–14.6)
FERRITIN SERPL-MCNC: 394 NG/ML (ref 8–388)
GLOBULIN SER CALC-MCNC: 4.1 G/DL (ref 2.3–3.5)
GLUCOSE SERPL-MCNC: 104 MG/DL (ref 65–100)
HCT VFR BLD AUTO: 30.1 % (ref 35.8–46.3)
HGB BLD-MCNC: 9.9 G/DL (ref 11.7–15.4)
IMM GRANULOCYTES # BLD AUTO: 0 K/UL (ref 0–0.5)
IMM GRANULOCYTES NFR BLD AUTO: 1 % (ref 0–5)
IRON SATN MFR SERPL: 24 %
IRON SERPL-MCNC: 61 UG/DL (ref 35–150)
LYMPHOCYTES # BLD: 0.8 K/UL (ref 0.5–4.6)
LYMPHOCYTES NFR BLD: 28 % (ref 13–44)
MCH RBC QN AUTO: 34.7 PG (ref 26.1–32.9)
MCHC RBC AUTO-ENTMCNC: 32.9 G/DL (ref 31.4–35)
MCV RBC AUTO: 105.6 FL (ref 79.6–97.8)
MONOCYTES # BLD: 0.4 K/UL (ref 0.1–1.3)
MONOCYTES NFR BLD: 13 % (ref 4–12)
NEUTS SEG # BLD: 1.6 K/UL (ref 1.7–8.2)
NEUTS SEG NFR BLD: 55 % (ref 43–78)
NRBC # BLD: 0 K/UL (ref 0–0.2)
PLATELET # BLD AUTO: 143 K/UL (ref 150–450)
PMV BLD AUTO: 9.8 FL (ref 9.4–12.3)
POTASSIUM SERPL-SCNC: 3.8 MMOL/L (ref 3.5–5.1)
PROT SERPL-MCNC: 7.5 G/DL (ref 6.3–8.2)
RBC # BLD AUTO: 2.85 M/UL (ref 4.05–5.25)
SODIUM SERPL-SCNC: 139 MMOL/L (ref 136–145)
TIBC SERPL-MCNC: 256 UG/DL (ref 250–450)
WBC # BLD AUTO: 2.9 K/UL (ref 4.3–11.1)

## 2020-03-18 PROCEDURE — 80053 COMPREHEN METABOLIC PANEL: CPT

## 2020-03-18 PROCEDURE — 74011250636 HC RX REV CODE- 250/636: Performed by: INTERNAL MEDICINE

## 2020-03-18 PROCEDURE — 36415 COLL VENOUS BLD VENIPUNCTURE: CPT

## 2020-03-18 PROCEDURE — 83540 ASSAY OF IRON: CPT

## 2020-03-18 PROCEDURE — 85025 COMPLETE CBC W/AUTO DIFF WBC: CPT

## 2020-03-18 PROCEDURE — 82728 ASSAY OF FERRITIN: CPT

## 2020-03-18 PROCEDURE — 96372 THER/PROPH/DIAG INJ SC/IM: CPT

## 2020-03-18 RX ORDER — CYANOCOBALAMIN 1000 UG/ML
1000 INJECTION, SOLUTION INTRAMUSCULAR; SUBCUTANEOUS
Status: CANCELLED | OUTPATIENT
Start: 2020-03-18

## 2020-03-18 RX ORDER — CYANOCOBALAMIN 1000 UG/ML
1000 INJECTION, SOLUTION INTRAMUSCULAR; SUBCUTANEOUS
Status: COMPLETED | OUTPATIENT
Start: 2020-03-18 | End: 2020-03-18

## 2020-03-18 RX ADMIN — CYANOCOBALAMIN 1000 MCG: 1000 INJECTION, SOLUTION INTRAMUSCULAR; SUBCUTANEOUS at 16:20

## 2020-03-18 RX ADMIN — EPOETIN ALFA-EPBX 40000 UNITS: 40000 INJECTION, SOLUTION INTRAVENOUS; SUBCUTANEOUS at 16:25

## 2020-03-18 NOTE — PROGRESS NOTES
Arrived to the [facility]. Vitamin B12 and Retacrit completed. Provided education on Vitamin B12 and Retacrit-patient has received previously  Patient instructed to report any side affects to ordering provider.   Patient tolerated well  Any issues or concerns during appointment: No  Patient aware of next infusion appointment on Wednesday,April 1st @ 56  Discharged via wheelchair accompanied by daughter

## 2020-04-15 ENCOUNTER — HOSPITAL ENCOUNTER (OUTPATIENT)
Dept: LAB | Age: 75
Discharge: HOME | End: 2020-04-15
Payer: MEDICARE

## 2020-04-15 ENCOUNTER — HOSPITAL ENCOUNTER (OUTPATIENT)
Dept: INFUSION THERAPY | Age: 75
Discharge: HOME | End: 2020-04-15
Payer: MEDICARE

## 2020-04-15 DIAGNOSIS — C79.51 MALIGNANT NEOPLASM METASTATIC TO BONE (HCC): ICD-10-CM

## 2020-04-15 DIAGNOSIS — N18.9 ANEMIA DUE TO CHRONIC KIDNEY DISEASE TREATED WITH ERYTHROPOIETIN: Primary | ICD-10-CM

## 2020-04-15 DIAGNOSIS — C79.51 CARCINOMA OF BREAST METASTATIC TO BONE, UNSPECIFIED LATERALITY (HCC): ICD-10-CM

## 2020-04-15 DIAGNOSIS — D63.1 ANEMIA DUE TO CHRONIC KIDNEY DISEASE TREATED WITH ERYTHROPOIETIN: Primary | ICD-10-CM

## 2020-04-15 DIAGNOSIS — C50.919 CARCINOMA OF BREAST METASTATIC TO BONE, UNSPECIFIED LATERALITY (HCC): ICD-10-CM

## 2020-04-15 LAB
ALBUMIN SERPL-MCNC: 3.2 G/DL (ref 3.2–4.6)
ALBUMIN/GLOB SERPL: 0.8 {RATIO} (ref 1.2–3.5)
ALP SERPL-CCNC: 123 U/L (ref 50–136)
ALT SERPL-CCNC: 29 U/L (ref 12–65)
ANION GAP SERPL CALC-SCNC: 6 MMOL/L (ref 7–16)
AST SERPL-CCNC: 35 U/L (ref 15–37)
BASOPHILS # BLD: 0 K/UL (ref 0–0.2)
BASOPHILS NFR BLD: 1 % (ref 0–2)
BILIRUB SERPL-MCNC: 0.6 MG/DL (ref 0.2–1.1)
BUN SERPL-MCNC: 28 MG/DL (ref 8–23)
CALCIUM SERPL-MCNC: 9.7 MG/DL (ref 8.3–10.4)
CHLORIDE SERPL-SCNC: 105 MMOL/L (ref 98–107)
CO2 SERPL-SCNC: 27 MMOL/L (ref 21–32)
CREAT SERPL-MCNC: 2.37 MG/DL (ref 0.6–1)
DIFFERENTIAL METHOD BLD: ABNORMAL
EOSINOPHIL # BLD: 0.1 K/UL (ref 0–0.8)
EOSINOPHIL NFR BLD: 2 % (ref 0.5–7.8)
ERYTHROCYTE [DISTWIDTH] IN BLOOD BY AUTOMATED COUNT: 14.8 % (ref 11.9–14.6)
FERRITIN SERPL-MCNC: 504 NG/ML (ref 8–388)
GLOBULIN SER CALC-MCNC: 4 G/DL (ref 2.3–3.5)
GLUCOSE SERPL-MCNC: 95 MG/DL (ref 65–100)
HCT VFR BLD AUTO: 29.8 % (ref 35.8–46.3)
HGB BLD-MCNC: 9.7 G/DL (ref 11.7–15.4)
IMM GRANULOCYTES # BLD AUTO: 0 K/UL (ref 0–0.5)
IMM GRANULOCYTES NFR BLD AUTO: 1 % (ref 0–5)
IRON SATN MFR SERPL: 32 %
IRON SERPL-MCNC: 70 UG/DL (ref 35–150)
LYMPHOCYTES # BLD: 0.7 K/UL (ref 0.5–4.6)
LYMPHOCYTES NFR BLD: 26 % (ref 13–44)
MAGNESIUM SERPL-MCNC: 1.7 MG/DL (ref 1.8–2.4)
MCH RBC QN AUTO: 34.8 PG (ref 26.1–32.9)
MCHC RBC AUTO-ENTMCNC: 32.6 G/DL (ref 31.4–35)
MCV RBC AUTO: 106.8 FL (ref 79.6–97.8)
MONOCYTES # BLD: 0.3 K/UL (ref 0.1–1.3)
MONOCYTES NFR BLD: 12 % (ref 4–12)
NEUTS SEG # BLD: 1.5 K/UL (ref 1.7–8.2)
NEUTS SEG NFR BLD: 58 % (ref 43–78)
NRBC # BLD: 0 K/UL (ref 0–0.2)
PLATELET # BLD AUTO: 135 K/UL (ref 150–450)
PMV BLD AUTO: 10 FL (ref 9.4–12.3)
POTASSIUM SERPL-SCNC: 3.6 MMOL/L (ref 3.5–5.1)
PROT SERPL-MCNC: 7.2 G/DL (ref 6.3–8.2)
RBC # BLD AUTO: 2.79 M/UL (ref 4.05–5.25)
SODIUM SERPL-SCNC: 138 MMOL/L (ref 136–145)
TIBC SERPL-MCNC: 219 UG/DL (ref 250–450)
WBC # BLD AUTO: 2.6 K/UL (ref 4.3–11.1)

## 2020-04-15 PROCEDURE — 82728 ASSAY OF FERRITIN: CPT

## 2020-04-15 PROCEDURE — 85025 COMPLETE CBC W/AUTO DIFF WBC: CPT

## 2020-04-15 PROCEDURE — 74011250636 HC RX REV CODE- 250/636: Performed by: NURSE PRACTITIONER

## 2020-04-15 PROCEDURE — 83735 ASSAY OF MAGNESIUM: CPT

## 2020-04-15 PROCEDURE — 80053 COMPREHEN METABOLIC PANEL: CPT

## 2020-04-15 PROCEDURE — 36415 COLL VENOUS BLD VENIPUNCTURE: CPT

## 2020-04-15 PROCEDURE — 96372 THER/PROPH/DIAG INJ SC/IM: CPT

## 2020-04-15 PROCEDURE — 83540 ASSAY OF IRON: CPT

## 2020-04-15 RX ORDER — CYANOCOBALAMIN 1000 UG/ML
1000 INJECTION, SOLUTION INTRAMUSCULAR; SUBCUTANEOUS
Status: COMPLETED | OUTPATIENT
Start: 2020-04-15 | End: 2020-04-15

## 2020-04-15 RX ADMIN — CYANOCOBALAMIN 1000 MCG: 1000 INJECTION, SOLUTION INTRAMUSCULAR; SUBCUTANEOUS at 11:58

## 2020-04-15 RX ADMIN — EPOETIN ALFA-EPBX 40000 UNITS: 40000 INJECTION, SOLUTION INTRAVENOUS; SUBCUTANEOUS at 11:59

## 2020-04-26 ENCOUNTER — APPOINTMENT (OUTPATIENT)
Dept: GENERAL RADIOLOGY | Age: 75
DRG: 481 | End: 2020-04-26
Attending: NURSE PRACTITIONER
Payer: MEDICARE

## 2020-04-26 ENCOUNTER — HOSPITAL ENCOUNTER (INPATIENT)
Age: 75
LOS: 5 days | Discharge: HOME HEALTH | DRG: 481 | End: 2020-05-01
Attending: EMERGENCY MEDICINE | Admitting: INTERNAL MEDICINE
Payer: MEDICARE

## 2020-04-26 DIAGNOSIS — S72.302A CLOSED FRACTURE OF SHAFT OF LEFT FEMUR, UNSPECIFIED FRACTURE MORPHOLOGY, INITIAL ENCOUNTER (HCC): Primary | ICD-10-CM

## 2020-04-26 DIAGNOSIS — S72.002A FRACTURE, PROXIMAL FEMUR, LEFT, CLOSED, INITIAL ENCOUNTER (HCC): ICD-10-CM

## 2020-04-26 LAB
ALBUMIN SERPL-MCNC: 3.3 G/DL (ref 3.2–4.6)
ALBUMIN/GLOB SERPL: 0.8 {RATIO} (ref 1.2–3.5)
ALP SERPL-CCNC: 138 U/L (ref 50–136)
ALT SERPL-CCNC: 29 U/L (ref 12–65)
ANION GAP SERPL CALC-SCNC: 11 MMOL/L (ref 7–16)
AST SERPL-CCNC: 30 U/L (ref 15–37)
ATRIAL RATE: 208 BPM
BASOPHILS # BLD: 0.1 K/UL (ref 0–0.2)
BASOPHILS NFR BLD: 2 % (ref 0–2)
BILIRUB SERPL-MCNC: 0.5 MG/DL (ref 0.2–1.1)
BUN SERPL-MCNC: 43 MG/DL (ref 8–23)
CALCIUM SERPL-MCNC: 9.7 MG/DL (ref 8.3–10.4)
CALCULATED R AXIS, ECG10: 30 DEGREES
CALCULATED T AXIS, ECG11: -40 DEGREES
CHLORIDE SERPL-SCNC: 107 MMOL/L (ref 98–107)
CO2 SERPL-SCNC: 23 MMOL/L (ref 21–32)
CREAT SERPL-MCNC: 2.93 MG/DL (ref 0.6–1)
DIAGNOSIS, 93000: NORMAL
DIFFERENTIAL METHOD BLD: ABNORMAL
EOSINOPHIL # BLD: 0.1 K/UL (ref 0–0.8)
EOSINOPHIL NFR BLD: 2 % (ref 0.5–7.8)
ERYTHROCYTE [DISTWIDTH] IN BLOOD BY AUTOMATED COUNT: 15.2 % (ref 11.9–14.6)
GLOBULIN SER CALC-MCNC: 4.1 G/DL (ref 2.3–3.5)
GLUCOSE SERPL-MCNC: 96 MG/DL (ref 65–100)
HCT VFR BLD AUTO: 31.8 % (ref 35.8–46.3)
HGB BLD-MCNC: 10 G/DL (ref 11.7–15.4)
IMM GRANULOCYTES # BLD AUTO: 0 K/UL (ref 0–0.5)
IMM GRANULOCYTES NFR BLD AUTO: 0 % (ref 0–5)
INR PPP: 1
LYMPHOCYTES # BLD: 0.8 K/UL (ref 0.5–4.6)
LYMPHOCYTES NFR BLD: 25 % (ref 13–44)
MCH RBC QN AUTO: 34.6 PG (ref 26.1–32.9)
MCHC RBC AUTO-ENTMCNC: 31.4 G/DL (ref 31.4–35)
MCV RBC AUTO: 110 FL (ref 79.6–97.8)
MONOCYTES # BLD: 0.2 K/UL (ref 0.1–1.3)
MONOCYTES NFR BLD: 7 % (ref 4–12)
NEUTS SEG # BLD: 1.8 K/UL (ref 1.7–8.2)
NEUTS SEG NFR BLD: 64 % (ref 43–78)
NRBC # BLD: 0 K/UL (ref 0–0.2)
PLATELET # BLD AUTO: 244 K/UL (ref 150–450)
PLATELET COMMENTS,PCOM: ADEQUATE
PMV BLD AUTO: 10 FL (ref 9.4–12.3)
POTASSIUM SERPL-SCNC: 4 MMOL/L (ref 3.5–5.1)
PROT SERPL-MCNC: 7.4 G/DL (ref 6.3–8.2)
PROTHROMBIN TIME: 13.8 SEC (ref 12–14.7)
Q-T INTERVAL, ECG07: 342 MS
QRS DURATION, ECG06: 78 MS
QTC CALCULATION (BEZET), ECG08: 404 MS
RBC # BLD AUTO: 2.89 M/UL (ref 4.05–5.2)
RBC MORPH BLD: ABNORMAL
SODIUM SERPL-SCNC: 141 MMOL/L (ref 136–145)
VENTRICULAR RATE, ECG03: 84 BPM
WBC # BLD AUTO: 3 K/UL (ref 4.3–11.1)
WBC MORPH BLD: ABNORMAL

## 2020-04-26 PROCEDURE — 74011000302 HC RX REV CODE- 302: Performed by: INTERNAL MEDICINE

## 2020-04-26 PROCEDURE — 51702 INSERT TEMP BLADDER CATH: CPT

## 2020-04-26 PROCEDURE — 77030036696 HC BOOT TRACT BUCKS S2SG -A

## 2020-04-26 PROCEDURE — 99283 EMERGENCY DEPT VISIT LOW MDM: CPT

## 2020-04-26 PROCEDURE — 86900 BLOOD TYPING SEROLOGIC ABO: CPT

## 2020-04-26 PROCEDURE — 74011250636 HC RX REV CODE- 250/636: Performed by: PHYSICIAN ASSISTANT

## 2020-04-26 PROCEDURE — 85025 COMPLETE CBC W/AUTO DIFF WBC: CPT

## 2020-04-26 PROCEDURE — 85610 PROTHROMBIN TIME: CPT

## 2020-04-26 PROCEDURE — 86923 COMPATIBILITY TEST ELECTRIC: CPT

## 2020-04-26 PROCEDURE — 96374 THER/PROPH/DIAG INJ IV PUSH: CPT

## 2020-04-26 PROCEDURE — 74011250636 HC RX REV CODE- 250/636: Performed by: INTERNAL MEDICINE

## 2020-04-26 PROCEDURE — 96375 TX/PRO/DX INJ NEW DRUG ADDON: CPT

## 2020-04-26 PROCEDURE — 73552 X-RAY EXAM OF FEMUR 2/>: CPT

## 2020-04-26 PROCEDURE — 77030040361 HC SLV COMPR DVT MDII -B

## 2020-04-26 PROCEDURE — 65270000029 HC RM PRIVATE

## 2020-04-26 PROCEDURE — 71046 X-RAY EXAM CHEST 2 VIEWS: CPT

## 2020-04-26 PROCEDURE — 86580 TB INTRADERMAL TEST: CPT | Performed by: INTERNAL MEDICINE

## 2020-04-26 PROCEDURE — 93005 ELECTROCARDIOGRAM TRACING: CPT | Performed by: PHYSICIAN ASSISTANT

## 2020-04-26 PROCEDURE — 80053 COMPREHEN METABOLIC PANEL: CPT

## 2020-04-26 PROCEDURE — 74011250637 HC RX REV CODE- 250/637: Performed by: INTERNAL MEDICINE

## 2020-04-26 RX ORDER — ONDANSETRON 2 MG/ML
4 INJECTION INTRAMUSCULAR; INTRAVENOUS
Status: COMPLETED | OUTPATIENT
Start: 2020-04-26 | End: 2020-04-26

## 2020-04-26 RX ORDER — SODIUM CHLORIDE 9 MG/ML
100 INJECTION, SOLUTION INTRAVENOUS CONTINUOUS
Status: DISCONTINUED | OUTPATIENT
Start: 2020-04-26 | End: 2020-04-27

## 2020-04-26 RX ORDER — CALCIUM CARBONATE 500(1250)
500 TABLET ORAL 2 TIMES DAILY WITH MEALS
Status: DISCONTINUED | OUTPATIENT
Start: 2020-04-26 | End: 2020-05-01 | Stop reason: HOSPADM

## 2020-04-26 RX ORDER — MORPHINE SULFATE 4 MG/ML
4 INJECTION INTRAVENOUS
Status: COMPLETED | OUTPATIENT
Start: 2020-04-26 | End: 2020-04-26

## 2020-04-26 RX ORDER — HYDROMORPHONE HYDROCHLORIDE 1 MG/ML
0.5 INJECTION, SOLUTION INTRAMUSCULAR; INTRAVENOUS; SUBCUTANEOUS
Status: DISCONTINUED | OUTPATIENT
Start: 2020-04-26 | End: 2020-05-01 | Stop reason: HOSPADM

## 2020-04-26 RX ORDER — ACETAMINOPHEN 325 MG/1
650 TABLET ORAL
Status: DISCONTINUED | OUTPATIENT
Start: 2020-04-26 | End: 2020-05-01 | Stop reason: HOSPADM

## 2020-04-26 RX ORDER — LETROZOLE 2.5 MG/1
2.5 TABLET, FILM COATED ORAL DAILY
Status: DISCONTINUED | OUTPATIENT
Start: 2020-04-27 | End: 2020-05-01 | Stop reason: HOSPADM

## 2020-04-26 RX ORDER — AMLODIPINE BESYLATE 5 MG/1
5 TABLET ORAL DAILY
Status: DISCONTINUED | OUTPATIENT
Start: 2020-04-27 | End: 2020-04-28

## 2020-04-26 RX ORDER — TRIAMTERENE/HYDROCHLOROTHIAZID 37.5-25 MG
1 TABLET ORAL DAILY
Status: DISCONTINUED | OUTPATIENT
Start: 2020-04-27 | End: 2020-04-27

## 2020-04-26 RX ORDER — SODIUM CHLORIDE 0.9 % (FLUSH) 0.9 %
5-40 SYRINGE (ML) INJECTION AS NEEDED
Status: DISCONTINUED | OUTPATIENT
Start: 2020-04-26 | End: 2020-05-01 | Stop reason: HOSPADM

## 2020-04-26 RX ORDER — SODIUM CHLORIDE 0.9 % (FLUSH) 0.9 %
5-40 SYRINGE (ML) INJECTION EVERY 8 HOURS
Status: DISCONTINUED | OUTPATIENT
Start: 2020-04-26 | End: 2020-05-01 | Stop reason: HOSPADM

## 2020-04-26 RX ADMIN — HYDROMORPHONE HYDROCHLORIDE 0.5 MG: 1 INJECTION, SOLUTION INTRAMUSCULAR; INTRAVENOUS; SUBCUTANEOUS at 20:09

## 2020-04-26 RX ADMIN — SODIUM CHLORIDE 250 ML: 900 INJECTION, SOLUTION INTRAVENOUS at 18:44

## 2020-04-26 RX ADMIN — ONDANSETRON 4 MG: 2 INJECTION INTRAMUSCULAR; INTRAVENOUS at 15:08

## 2020-04-26 RX ADMIN — MORPHINE SULFATE 4 MG: 4 INJECTION INTRAVENOUS at 15:08

## 2020-04-26 RX ADMIN — Medication 500 MG: at 20:11

## 2020-04-26 RX ADMIN — Medication 5 ML: at 20:15

## 2020-04-26 RX ADMIN — TUBERCULIN PURIFIED PROTEIN DERIVATIVE 5 UNITS: 5 INJECTION, SOLUTION INTRADERMAL at 20:10

## 2020-04-26 RX ADMIN — SODIUM CHLORIDE 100 ML/HR: 900 INJECTION, SOLUTION INTRAVENOUS at 22:41

## 2020-04-26 NOTE — ED PROVIDER NOTES
Patient is here with left hip and upper thigh pain that started about 3 days ago. She had a fracture in that hip last year when she fell and had surgery Dr. Ankit Bob at that time. She did not fall or reinjure it, it just started hurting. There is no redness or swelling to the area. She has had no fever. No nausea vomiting. No swelling to the leg and no history of blood clots. No weakness to that extremity. No back pain. No chest pain, shortness of breath, abdominal pain, dizziness, weakness, dyspnea on exertion, orthopnea, trouble with urination or bowel movements or other new symptoms. A family member brought her into the emergency room today for evaluation. The history is provided by the patient. Hip Injury    This is a new problem. The current episode started more than 2 days ago. The problem occurs constantly. The problem has been gradually worsening. The pain is present in the left upper leg and left hip. The quality of the pain is described as aching. The pain is at a severity of 6/10. The pain is moderate. Associated symptoms include limited range of motion and stiffness. Pertinent negatives include no numbness, no tingling, no itching, no back pain and no neck pain. The symptoms are aggravated by movement, palpation and activity. She has tried nothing for the symptoms. There has been a history of trauma.         Past Medical History:   Diagnosis Date    Abnormal EKG 2/16/2016    Aortic valve insufficiency 2/16/2016    Cancer ([facility] [address]     breast    Hyperlipidemia 2/16/2016    Hypertension 2/16/2016    UNSPECIFIED     Obesity 2/16/2016    UNSPECIFIED        Past Surgical History:   Procedure Laterality Date    HX TUBAL LIGATION           Family History:   Problem Relation Age of Onset    Coronary Artery Disease Mother     Heart Disease Father        Social History     Socioeconomic History    Marital status:      Spouse name: Not on file    Number of children: Not on file    Years of education: Not on file    Highest education level: Not on file   Occupational History    Not on file   Social Needs    Financial resource strain: Not on file    Food insecurity     Worry: Not on file     Inability: Not on file    Transportation needs     Medical: Not on file     Non-medical: Not on file   Tobacco Use    Smoking status: Never Smoker    Smokeless tobacco: Current User     Types: Snuff   Substance and Sexual Activity    Alcohol use: No    Drug use: Not on file    Sexual activity: Not on file   Lifestyle    Physical activity     Days per week: Not on file     Minutes per session: Not on file    Stress: Not on file   Relationships    Social connections     Talks on phone: Not on file     Gets together: Not on file     Attends Hoahaoism service: Not on file     Active member of club or organization: Not on file     Attends meetings of clubs or organizations: Not on file     Relationship status: Not on file    Intimate partner violence     Fear of current or ex partner: Not on file     Emotionally abused: Not on file     Physically abused: Not on file     Forced sexual activity: Not on file   Other Topics Concern    Not on file   Social History Narrative    Not on file         ALLERGIES: Penicillins    Review of Systems   Constitutional: Negative. HENT: Negative. Eyes: Negative. Respiratory: Negative. Cardiovascular: Negative. Gastrointestinal: Negative. Genitourinary: Negative. Musculoskeletal: Positive for stiffness. Negative for back pain and neck pain. Left hip/upper thigh pain   Skin: Negative. Negative for itching. Neurological: Negative. Negative for tingling and numbness. Psychiatric/Behavioral: Negative. All other systems reviewed and are negative.       Vitals:    04/26/20 1323   BP: 159/86   Pulse: 90   Resp: 20   Temp: 98.5 °F (36.9 °C)   SpO2: 98%   Weight: 109.8 kg (242 lb)   Height: 5' 7\" (1.702 m)            Physical Exam  Vitals signs and nursing note reviewed. Constitutional:       Appearance: She is well-developed. HENT:      Head: Normocephalic and atraumatic. Right Ear: External ear normal.      Left Ear: External ear normal.      Nose: Nose normal.   Eyes:      Conjunctiva/sclera: Conjunctivae normal.      Pupils: Pupils are equal, round, and reactive to light. Neck:      Musculoskeletal: Normal range of motion and neck supple. Cardiovascular:      Rate and Rhythm: Normal rate and regular rhythm. Heart sounds: Normal heart sounds. Pulmonary:      Effort: Pulmonary effort is normal.      Breath sounds: Normal breath sounds. Abdominal:      General: Bowel sounds are normal.      Palpations: Abdomen is soft. Musculoskeletal:        Legs:    Skin:     General: Skin is warm and dry. Neurological:      Mental Status: She is alert and oriented to person, place, and time. GCS: GCS eye subscore is 4. GCS verbal subscore is 5. GCS motor subscore is 6. Cranial Nerves: Cranial nerves are intact. Sensory: Sensation is intact. Motor: Motor function is intact. Coordination: Coordination is intact. Gait: Gait is intact. Deep Tendon Reflexes: Reflexes are normal and symmetric. Reflex Scores:       Tricep reflexes are 2+ on the right side and 2+ on the left side. Bicep reflexes are 2+ on the right side and 2+ on the left side. Brachioradialis reflexes are 2+ on the right side and 2+ on the left side. Patellar reflexes are 2+ on the right side and 2+ on the left side. Achilles reflexes are 2+ on the right side and 2+ on the left side. Psychiatric:         Behavior: Behavior normal.         Thought Content:  Thought content normal.         Judgment: Judgment normal.          MDM  Number of Diagnoses or Management Options     Amount and/or Complexity of Data Reviewed  Tests in the radiology section of CPT®: ordered    Risk of Complications, Morbidity, and/or Mortality  Presenting problems: moderate  Diagnostic procedures: moderate  Management options: moderate           Procedures    1:40 PM Patient declines pain meds here. 2:16 PM Spoke with Dr. Willa Hansen regarding patient. KIERRA Jin on call for Ortho called. 2:39 PM Spoke with KIERRA Villalba, he reviewed the films. He states to have hospitalist admit and they will need to repair it. Will page them now. Work up ordered. Spoke with Dr. Willa Hansen regarding patient. 2:51 PM Spoke with KIERRA Villalba, he said Dr. Erick Johnson wants patient admitted. Dr. Whitehead Ok or on call trauma physician tomorrow will evaluate. Dr. Alex Michael, hospitalist wants us to call them back when workup is completed. 3:24 PM Dr. Alex Michael called, discussed the patient. The patient was observed in the ED. Results Reviewed:      Recent Results (from the past 24 hour(s))   EKG, 12 LEAD, INITIAL    Collection Time: 04/26/20  2:55 PM   Result Value Ref Range    Ventricular Rate 84 BPM    Atrial Rate 208 BPM    QRS Duration 78 ms    Q-T Interval 342 ms    QTC Calculation (Bezet) 404 ms    Calculated R Axis 30 degrees    Calculated T Axis -40 degrees    Diagnosis       !! AGE AND GENDER SPECIFIC ECG ANALYSIS !!   Accelerated Junctional rhythm  Cannot rule out Anterior infarct (cited on or before 07-AUG-2018)  ST & T wave abnormality, consider inferior ischemia  Abnormal ECG  When compared with ECG of 07-AUG-2018 20:25,  Junctional rhythm has replaced Sinus rhythm  Questionable change in initial forces of Septal leads  ST no longer depressed in Inferior leads  Nonspecific T wave abnormality, improved in Lateral leads     TYPE & SCREEN    Collection Time: 04/26/20  2:58 PM   Result Value Ref Range    Crossmatch Expiration 04/29/2020     ABO/Rh(D) A POSITIVE     Antibody screen NEG    CBC WITH AUTOMATED DIFF    Collection Time: 04/26/20  2:59 PM   Result Value Ref Range    WBC 3.0 (L) 4.3 - 11.1 K/uL    RBC 2.89 (L) 4.05 - 5.2 M/uL    HGB 10.0 (L) 11.7 - 15.4 g/dL    HCT 31.8 (L) 35.8 - 46.3 %    .0 (H) 79.6 - 97.8 FL    MCH 34.6 (H) 26.1 - 32.9 PG    MCHC 31.4 31.4 - 35.0 g/dL    RDW 15.2 (H) 11.9 - 14.6 %    PLATELET 607 660 - 613 K/uL    MPV 10.0 9.4 - 12.3 FL    ABSOLUTE NRBC 0.00 0.0 - 0.2 K/uL    DF PENDING    METABOLIC PANEL, COMPREHENSIVE    Collection Time: 04/26/20  2:59 PM   Result Value Ref Range    Sodium 141 136 - 145 mmol/L    Potassium 4.0 3.5 - 5.1 mmol/L    Chloride 107 98 - 107 mmol/L    CO2 23 21 - 32 mmol/L    Anion gap 11 7 - 16 mmol/L    Glucose 96 65 - 100 mg/dL    BUN 43 (H) 8 - 23 MG/DL    Creatinine 2.93 (H) 0.6 - 1.0 MG/DL    GFR est AA 20 (L) >60 ml/min/1.73m2    GFR est non-AA 17 (L) >60 ml/min/1.73m2    Calcium 9.7 8.3 - 10.4 MG/DL    Bilirubin, total 0.5 0.2 - 1.1 MG/DL    ALT (SGPT) 29 12 - 65 U/L    AST (SGOT) 30 15 - 37 U/L    Alk. phosphatase 138 (H) 50 - 136 U/L    Protein, total 7.4 6.3 - 8.2 g/dL    Albumin 3.3 3.2 - 4.6 g/dL    Globulin 4.1 (H) 2.3 - 3.5 g/dL    A-G Ratio 0.8 (L) 1.2 - 3.5     PROTHROMBIN TIME + INR    Collection Time: 04/26/20  2:59 PM   Result Value Ref Range    Prothrombin time 13.8 12.0 - 14.7 sec    INR 1.0       XR CHEST PA LAT   Final Result   Impression:        No acute cardiopulmonary process. 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

## 2020-04-26 NOTE — H&P
Hospitalist H&P Note     Admit Date:  2020  1:28 PM   Name:  Ana Balderas   Age:  67 y.o.  :  1945   MRN:  [id number]   PCP:  Chelly Gregorio NP  Treatment Team: Attending Provider: Micah Sutherland MD; Physician Assistant: KIERRA Marr; Primary Nurse: Yolanda Robledo    HPI:     The patient is a 65-year-old  lady with HTN, CKD stage IV with baseline creatinine between 2.4 and 3.0, breast cancer, presented to the ED at [facility] complaining of left hip pain. The pain started about 3 days ago and has been gradually worsening. It was estimated at 10/10 of intensity and felt mostly in the left hip and left upper thigh. The patient actually denies any fall. She had a fracture on the same hip last year for which she had hip fracture repair with plates and screws. A left femur x-ray noted evidence of hardware failure with fracture at the site of nonunion proximal femoral shaft fracture. The patient is unsure about what happened and thinks she must have broken her hip while sleeping and moving it in the wrong way. Labs did not show any significant abnormalities, except for creatinine of 2.93 which is actually within her baseline range, even if the most recent creatinine on record was 2.4. Based on her clinical presentation, the hospitalist service was contacted and the patient was admitted to the medical floor for left proximal femur fracture and hardware failure. ROS: All systems reviewed and negative except as noted in HPI.     Past Medical History:   Diagnosis Date    Abnormal EKG 2016    Aortic valve insufficiency 2016    Cancer ([facility] [address]     breast    Hyperlipidemia 2016    Hypertension 2016    UNSPECIFIED     Obesity 2016    UNSPECIFIED       Past Surgical History:   Procedure Laterality Date    HX TUBAL LIGATION        Allergies   Allergen Reactions    Penicillins Unknown (comments)     UNKNOWN REACTION Social History     Tobacco Use    Smoking status: Never Smoker    Smokeless tobacco: Current User     Types: Snuff   Substance Use Topics    Alcohol use: No      Family History   Problem Relation Age of Onset    Coronary Artery Disease Mother     Heart Disease Father       Immunization History   Administered Date(s) Administered    TB Skin Test (PPD) Intradermal 08/08/2018     PTA Medications:  Prior to Admission Medications   Prescriptions Last Dose Informant Patient Reported? Taking? amLODIPine (NORVASC) 5 mg tablet   Yes No   Sig: Take 5 mg by mouth daily. calcium carbonate (OS-JORGE) 500 mg calcium (1,250 mg) tablet   No No   Sig: Take 2 tablets at lunch and 2 tablets at dinner. Indications: hypocalcemia   colchicine (MITIGARE) 0.6 mg capsule   No No   Sig: Take 2 caps now, 1 cap in 1 hour, then 1 cap daily x 3  Indications: acute inflammation of the joints due to gout attack   furosemide (LASIX) 20 mg tablet   No No   Sig: TAKE 1 TAB BY MOUTH DAILY AS NEEDED. FOR SWELLING. letrozole (FEMARA) 2.5 mg tablet   No No   Sig: TAKE 1 TABLET BY MOUTH EVERY DAY   melatonin 5 mg cap capsule   Yes No   Sig: Take 5 mg by mouth nightly. ondansetron hcl (Zofran) 8 mg tablet   No No   Sig: Take 1 Tab by mouth every eight (8) hours as needed for Nausea.    palbociclib (Ibrance) 75 mg cap   No No   Sig: Take one capsule by mouthOnce daily with food for21 days on, followed by7 days off during each cyCle of 28 days   triamterene-hydroCHLOROthiazide (MAXZIDE) 37.5-25 mg per tablet   No No   Sig: TAKE 1 TABLET BY MOUTH EVERY DAY      Facility-Administered Medications: None       Objective:     Patient Vitals for the past 24 hrs:   Temp Pulse Resp BP SpO2   04/26/20 1724 98.4 °F (36.9 °C) 86 14 156/78 98 %   04/26/20 1500 98.2 °F (36.8 °C) 79 16 193/84 96 %   04/26/20 1323 98.5 °F (36.9 °C) 90 20 159/86 98 %     Oxygen Therapy  O2 Sat (%): 98 % (04/26/20 1724)  Pulse via Oximetry: 79 beats per minute (04/26/20 1500)  O2 Device: Room air (04/26/20 1724)  No intake or output data in the 24 hours ending 04/26/20 8707    Physical Exam:  General:    Well nourished. Alert. Eyes:   Normal sclera. Extraocular movements intact. ENT:  Normocephalic, atraumatic. Moist mucous membranes  CV:   RRR. No murmur, rub, or gallop. Lungs:  CTAB. No wheezing, rhonchi, or rales. Abdomen: Soft, nontender, nondistended. Bowel sounds normal.   Extremities: Warm and dry. No cyanosis or edema. Tenderness on palpation of the left hip. Left lower extremity is shortened and externally rotated. Neurologic: CN II-XII grossly intact. Sensation intact. Skin:     No rashes or jaundice. Psych:  Normal mood and affect. I reviewed the labs, imaging, EKGs, telemetry, and other studies done this admission. Data Review:   Recent Results (from the past 24 hour(s))   EKG, 12 LEAD, INITIAL    Collection Time: 04/26/20  2:55 PM   Result Value Ref Range    Ventricular Rate 84 BPM    Atrial Rate 208 BPM    QRS Duration 78 ms    Q-T Interval 342 ms    QTC Calculation (Bezet) 404 ms    Calculated R Axis 30 degrees    Calculated T Axis -40 degrees    Diagnosis       !! AGE AND GENDER SPECIFIC ECG ANALYSIS !!   Accelerated Junctional rhythm  Cannot rule out Anterior infarct (cited on or before 07-AUG-2018)  ST & T wave abnormality, consider inferior ischemia  Abnormal ECG  When compared with ECG of 07-AUG-2018 20:25,  Junctional rhythm has replaced Sinus rhythm  Questionable change in initial forces of Septal leads  ST no longer depressed in Inferior leads  Nonspecific T wave abnormality, improved in Lateral leads     TYPE & SCREEN    Collection Time: 04/26/20  2:58 PM   Result Value Ref Range    Crossmatch Expiration 04/29/2020     ABO/Rh(D) A POSITIVE     Antibody screen NEG    CBC WITH AUTOMATED DIFF    Collection Time: 04/26/20  2:59 PM   Result Value Ref Range    WBC 3.0 (L) 4.3 - 11.1 K/uL    RBC 2.89 (L) 4.05 - 5.2 M/uL    HGB 10.0 (L) 11.7 - 15.4 g/dL    HCT 31.8 (L) 35.8 - 46.3 %    .0 (H) 79.6 - 97.8 FL    MCH 34.6 (H) 26.1 - 32.9 PG    MCHC 31.4 31.4 - 35.0 g/dL    RDW 15.2 (H) 11.9 - 14.6 %    PLATELET 732 361 - 165 K/uL    MPV 10.0 9.4 - 12.3 FL    ABSOLUTE NRBC 0.00 0.0 - 0.2 K/uL    NEUTROPHILS 64 43 - 78 %    LYMPHOCYTES 25 13 - 44 %    MONOCYTES 7 4.0 - 12.0 %    EOSINOPHILS 2 0.5 - 7.8 %    BASOPHILS 2 0.0 - 2.0 %    IMMATURE GRANULOCYTES 0 0.0 - 5.0 %    ABS. NEUTROPHILS 1.8 1.7 - 8.2 K/UL    ABS. LYMPHOCYTES 0.8 0.5 - 4.6 K/UL    ABS. MONOCYTES 0.2 0.1 - 1.3 K/UL    ABS. EOSINOPHILS 0.1 0.0 - 0.8 K/UL    ABS. BASOPHILS 0.1 0.0 - 0.2 K/UL    ABS. IMM. GRANS. 0.0 0.0 - 0.5 K/UL    RBC COMMENTS NORMOCYTIC/NORMOCHROMIC      WBC COMMENTS Result Confirmed By Smear      PLATELET COMMENTS ADEQUATE      DF AUTOMATED     METABOLIC PANEL, COMPREHENSIVE    Collection Time: 04/26/20  2:59 PM   Result Value Ref Range    Sodium 141 136 - 145 mmol/L    Potassium 4.0 3.5 - 5.1 mmol/L    Chloride 107 98 - 107 mmol/L    CO2 23 21 - 32 mmol/L    Anion gap 11 7 - 16 mmol/L    Glucose 96 65 - 100 mg/dL    BUN 43 (H) 8 - 23 MG/DL    Creatinine 2.93 (H) 0.6 - 1.0 MG/DL    GFR est AA 20 (L) >60 ml/min/1.73m2    GFR est non-AA 17 (L) >60 ml/min/1.73m2    Calcium 9.7 8.3 - 10.4 MG/DL    Bilirubin, total 0.5 0.2 - 1.1 MG/DL    ALT (SGPT) 29 12 - 65 U/L    AST (SGOT) 30 15 - 37 U/L    Alk. phosphatase 138 (H) 50 - 136 U/L    Protein, total 7.4 6.3 - 8.2 g/dL    Albumin 3.3 3.2 - 4.6 g/dL    Globulin 4.1 (H) 2.3 - 3.5 g/dL    A-G Ratio 0.8 (L) 1.2 - 3.5     PROTHROMBIN TIME + INR    Collection Time: 04/26/20  2:59 PM   Result Value Ref Range    Prothrombin time 13.8 12.0 - 14.7 sec    INR 1.0         All Micro Results     None          Other Studies:  Xr Chest Pa Lat    Result Date: 4/26/2020  Clinical History: The patient is a 67years year old Female presenting with symptoms of hip fracture.  Comparison:  Chest x-ray 8/11/2018 Findings:  Frontal and lateral views of the chest were obtained. There are shallow inspiratory changes. No pleural effusions are seen. The cardiomediastinal silhouette is within normal limits. Atherosclerotic disease is seen throughout the aortic arch. There are no acute osseous abnormalities. Impression:  No acute cardiopulmonary process. CPT code(s) 61955     Xr Femur Lt 2 V    Result Date: 4/26/2020  Clinical History: The patient is a 67years year old Female presenting with symptoms of Left femur pain. Comparison:  none Findings: 3 views of the left femur were obtained. Hardware failure and fracture is demonstrated in the proximal bridging plate at the site of previous probable nonunion fracture of the proximal femoral shaft. Joint spaces are well-maintained. There is incidental vascular calcification. 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 Assessment and Plan:     Hospital Problems as of 4/26/2020 Date Reviewed: 4/15/2020          Codes Class Noted - Resolved POA    * (Principal) Fracture, proximal femur, left, closed, initial encounter [facility]) ICD-10-CM: X87.065Q  ICD-9-CM: 820.8  4/26/2020 - Present Yes            1 - left proximal femur fracture with hardware failure  2 - CKD stage IV  3 - HTN  4 - breast cancer    PLAN:  · Keep patient n.p.o. after midnight for possible surgery tomorrow  · Consult Orthopedics  · Pain control with Dilaudid as needed  · Hydration with IV fluids, NS at 100 cc/hr  · Hold colchicine and furosemide to avoid any worsening of the renal function  · Resume other home medications for chronic conditions    DVT ppx: With SCDs  Anticipated DC needs: May need rehab at discharge  Code status:  Full code  Estimated LOS:  Greater than 2 midnights  Risk:  high    Signed:   Shelby Treviño MD

## 2020-04-26 NOTE — PROGRESS NOTES
04/26/20 1820   Dual Skin Pressure Injury Assessment   Dual Skin Pressure Injury Assessment WDL   Second Care Provider (Based on [address] [place] [address]) Esther Flores RN    Skin Integumentary   Skin Integumentary (WDL) X   Skin Color Red  (sacrum, blanching )   Skin Condition/Temp Warm;Hot;Flaky;Dry   Skin Integrity Other (comment)  (scab to right shin, scar right breast )   Turgor Atrophic   Hair Growth Present   Wound Prevention and Protection Methods   Orientation of Wound Prevention Posterior   Location of Wound Prevention Sacrum/Coccyx   Wound Offloading (Prevention Methods) Bed, pressure reduction mattress   right mastectomy

## 2020-04-26 NOTE — ED TRIAGE NOTES
Pt arrives via POV with daughter. Pt assisted from car into wheelchair and moved into triage. Pt placed in mask. Pt c/o left hip pain x 3 days. Pt denies any recent falls, trauma, or heavy lifting. Pt reports she has a jeri in each upper leg and a plate in the left hip. Pt has previous left hip Fx and weakening in right hip. Pt denies taking any medications for the pain. Pt denies using a walker or cane to ambulate. Pt states, \"I can't walk a whole lot. I mostly walk and hold on to things to help keep me standing. \" Pt reports the pain \"only really happens when I try to move around or walk. \" Pt's daughter Donna Flores is waiting in car in parking lot and can be reached at [phone].

## 2020-04-27 ENCOUNTER — ANESTHESIA EVENT (OUTPATIENT)
Dept: SURGERY | Age: 75
DRG: 481 | End: 2020-04-27
Payer: MEDICARE

## 2020-04-27 ENCOUNTER — ANESTHESIA (OUTPATIENT)
Dept: SURGERY | Age: 75
DRG: 481 | End: 2020-04-27
Payer: MEDICARE

## 2020-04-27 ENCOUNTER — HOME HEALTH ADMISSION (OUTPATIENT)
Dept: HOME HEALTH SERVICES | Facility: HOME HEALTH | Age: 75
End: 2020-04-27
Payer: MEDICARE

## 2020-04-27 ENCOUNTER — APPOINTMENT (OUTPATIENT)
Dept: GENERAL RADIOLOGY | Age: 75
DRG: 481 | End: 2020-04-27
Attending: ORTHOPAEDIC SURGERY
Payer: MEDICARE

## 2020-04-27 ENCOUNTER — APPOINTMENT (OUTPATIENT)
Dept: GENERAL RADIOLOGY | Age: 75
DRG: 481 | End: 2020-04-27
Attending: HOSPITALIST
Payer: MEDICARE

## 2020-04-27 LAB
ANION GAP SERPL CALC-SCNC: 5 MMOL/L (ref 7–16)
BASOPHILS # BLD: 0 K/UL (ref 0–0.2)
BASOPHILS NFR BLD: 1 % (ref 0–2)
BUN SERPL-MCNC: 43 MG/DL (ref 8–23)
CALCIUM SERPL-MCNC: 9 MG/DL (ref 8.3–10.4)
CHLORIDE SERPL-SCNC: 113 MMOL/L (ref 98–107)
CO2 SERPL-SCNC: 25 MMOL/L (ref 21–32)
CREAT SERPL-MCNC: 2.85 MG/DL (ref 0.6–1)
DIFFERENTIAL METHOD BLD: ABNORMAL
EOSINOPHIL # BLD: 0 K/UL (ref 0–0.8)
EOSINOPHIL NFR BLD: 0 % (ref 0.5–7.8)
ERYTHROCYTE [DISTWIDTH] IN BLOOD BY AUTOMATED COUNT: 15.7 % (ref 11.9–14.6)
ERYTHROCYTE [DISTWIDTH] IN BLOOD BY AUTOMATED COUNT: 15.8 % (ref 11.9–14.6)
GLUCOSE SERPL-MCNC: 102 MG/DL (ref 65–100)
HCT VFR BLD AUTO: 26.9 % (ref 35.8–46.3)
HCT VFR BLD AUTO: 27.7 % (ref 35.8–46.3)
HGB BLD-MCNC: 8.3 G/DL (ref 11.7–15.4)
HGB BLD-MCNC: 8.8 G/DL (ref 11.7–15.4)
IMM GRANULOCYTES # BLD AUTO: 0 K/UL (ref 0–0.5)
IMM GRANULOCYTES NFR BLD AUTO: 1 % (ref 0–5)
LYMPHOCYTES # BLD: 0.7 K/UL (ref 0.5–4.6)
LYMPHOCYTES NFR BLD: 15 % (ref 13–44)
MAGNESIUM SERPL-MCNC: 1.8 MG/DL (ref 1.8–2.4)
MCH RBC QN AUTO: 34.6 PG (ref 26.1–32.9)
MCH RBC QN AUTO: 35.6 PG (ref 26.1–32.9)
MCHC RBC AUTO-ENTMCNC: 30.9 G/DL (ref 31.4–35)
MCHC RBC AUTO-ENTMCNC: 31.8 G/DL (ref 31.4–35)
MCV RBC AUTO: 112.1 FL (ref 79.6–97.8)
MCV RBC AUTO: 112.1 FL (ref 79.6–97.8)
MM INDURATION POC: 0 MM (ref 0–5)
MONOCYTES # BLD: 0.1 K/UL (ref 0.1–1.3)
MONOCYTES NFR BLD: 3 % (ref 4–12)
NEUTS SEG # BLD: 3.6 K/UL (ref 1.7–8.2)
NEUTS SEG NFR BLD: 80 % (ref 43–78)
NRBC # BLD: 0 K/UL (ref 0–0.2)
NRBC # BLD: 0 K/UL (ref 0–0.2)
PHOSPHATE SERPL-MCNC: 5.8 MG/DL (ref 2.3–3.7)
PLATELET # BLD AUTO: 184 K/UL (ref 150–450)
PLATELET # BLD AUTO: 273 K/UL (ref 150–450)
PLATELET COMMENTS,PCOM: ADEQUATE
PMV BLD AUTO: 9.9 FL (ref 9.4–12.3)
PMV BLD AUTO: 9.9 FL (ref 9.4–12.3)
POTASSIUM SERPL-SCNC: 4.3 MMOL/L (ref 3.5–5.1)
PPD POC: NEGATIVE NEGATIVE
RBC # BLD AUTO: 2.4 M/UL (ref 4.05–5.2)
RBC # BLD AUTO: 2.47 M/UL (ref 4.05–5.2)
RBC MORPH BLD: ABNORMAL
SODIUM SERPL-SCNC: 143 MMOL/L (ref 136–145)
WBC # BLD AUTO: 2.2 K/UL (ref 4.3–11.1)
WBC # BLD AUTO: 4.4 K/UL (ref 4.3–11.1)
WBC MORPH BLD: ABNORMAL

## 2020-04-27 PROCEDURE — C1769 GUIDE WIRE: HCPCS | Performed by: ORTHOPAEDIC SURGERY

## 2020-04-27 PROCEDURE — C1713 ANCHOR/SCREW BN/BN,TIS/BN: HCPCS | Performed by: ORTHOPAEDIC SURGERY

## 2020-04-27 PROCEDURE — 77030017016 HC DSG ANTIMIC BARR2 S&N -B: Performed by: ORTHOPAEDIC SURGERY

## 2020-04-27 PROCEDURE — 77030019908 HC STETH ESOPH SIMS -A: Performed by: ANESTHESIOLOGY

## 2020-04-27 PROCEDURE — 77030014405 HC GD ROD RMR SYNT -C: Performed by: ORTHOPAEDIC SURGERY

## 2020-04-27 PROCEDURE — 73502 X-RAY EXAM HIP UNI 2-3 VIEWS: CPT

## 2020-04-27 PROCEDURE — 85027 COMPLETE CBC AUTOMATED: CPT

## 2020-04-27 PROCEDURE — 77030018836 HC SOL IRR NACL ICUM -A: Performed by: ORTHOPAEDIC SURGERY

## 2020-04-27 PROCEDURE — 74011250636 HC RX REV CODE- 250/636: Performed by: NURSE ANESTHETIST, CERTIFIED REGISTERED

## 2020-04-27 PROCEDURE — 77030041348: Performed by: ORTHOPAEDIC SURGERY

## 2020-04-27 PROCEDURE — 77030033067 HC SUT PDO STRATFX SPIR J&J -B: Performed by: ORTHOPAEDIC SURGERY

## 2020-04-27 PROCEDURE — 74011000258 HC RX REV CODE- 258: Performed by: ORTHOPAEDIC SURGERY

## 2020-04-27 PROCEDURE — 83735 ASSAY OF MAGNESIUM: CPT

## 2020-04-27 PROCEDURE — 74011250637 HC RX REV CODE- 250/637: Performed by: ANESTHESIOLOGY

## 2020-04-27 PROCEDURE — 74011000250 HC RX REV CODE- 250: Performed by: NURSE ANESTHETIST, CERTIFIED REGISTERED

## 2020-04-27 PROCEDURE — 77030020274 HC MISC IMPL ORTHOPEDIC: Performed by: ORTHOPAEDIC SURGERY

## 2020-04-27 PROCEDURE — 74011250636 HC RX REV CODE- 250/636: Performed by: ANESTHESIOLOGY

## 2020-04-27 PROCEDURE — 76060000036 HC ANESTHESIA 2.5 TO 3 HR: Performed by: ORTHOPAEDIC SURGERY

## 2020-04-27 PROCEDURE — 74011250636 HC RX REV CODE- 250/636: Performed by: ORTHOPAEDIC SURGERY

## 2020-04-27 PROCEDURE — 76010000172 HC OR TIME 2.5 TO 3 HR INTENSV-TIER 1: Performed by: ORTHOPAEDIC SURGERY

## 2020-04-27 PROCEDURE — 77030041347: Performed by: ORTHOPAEDIC SURGERY

## 2020-04-27 PROCEDURE — 36415 COLL VENOUS BLD VENIPUNCTURE: CPT

## 2020-04-27 PROCEDURE — 74011000250 HC RX REV CODE- 250: Performed by: ORTHOPAEDIC SURGERY

## 2020-04-27 PROCEDURE — 94760 N-INVAS EAR/PLS OXIMETRY 1: CPT

## 2020-04-27 PROCEDURE — 85025 COMPLETE CBC W/AUTO DIFF WBC: CPT

## 2020-04-27 PROCEDURE — 84100 ASSAY OF PHOSPHORUS: CPT

## 2020-04-27 PROCEDURE — 74011250637 HC RX REV CODE- 250/637: Performed by: INTERNAL MEDICINE

## 2020-04-27 PROCEDURE — 77030040922 HC BLNKT HYPOTHRM STRY -A: Performed by: ANESTHESIOLOGY

## 2020-04-27 PROCEDURE — 65270000029 HC RM PRIVATE

## 2020-04-27 PROCEDURE — 77030010509 HC AIRWY LMA MSK TELE -A: Performed by: ANESTHESIOLOGY

## 2020-04-27 PROCEDURE — 77030003051 HC NDL BN FIL DEL SYNT -B: Performed by: ORTHOPAEDIC SURGERY

## 2020-04-27 PROCEDURE — 76210000016 HC OR PH I REC 1 TO 1.5 HR: Performed by: ORTHOPAEDIC SURGERY

## 2020-04-27 PROCEDURE — 74011250637 HC RX REV CODE- 250/637: Performed by: ORTHOPAEDIC SURGERY

## 2020-04-27 PROCEDURE — 77030018673: Performed by: ORTHOPAEDIC SURGERY

## 2020-04-27 PROCEDURE — 77030002933 HC SUT MCRYL J&J -A: Performed by: ORTHOPAEDIC SURGERY

## 2020-04-27 PROCEDURE — 0QS706Z REPOSITION LEFT UPPER FEMUR WITH INTRAMEDULLARY INTERNAL FIXATION DEVICE, OPEN APPROACH: ICD-10-PCS | Performed by: ORTHOPAEDIC SURGERY

## 2020-04-27 PROCEDURE — 74011250636 HC RX REV CODE- 250/636: Performed by: INTERNAL MEDICINE

## 2020-04-27 PROCEDURE — 80048 BASIC METABOLIC PNL TOTAL CA: CPT

## 2020-04-27 DEVICE — GRAFT BNE SUB 10CC CA PHSPTE INJ DRILLABLE VOID FILL NORIAN: Type: IMPLANTABLE DEVICE | Site: HIP | Status: FUNCTIONAL

## 2020-04-27 DEVICE — NAIL IM L400MM DIA12MM 125DEG LNG L PROX FEM GRN TI CANN: Type: IMPLANTABLE DEVICE | Site: HIP | Status: FUNCTIONAL

## 2020-04-27 DEVICE — IMPLANTABLE DEVICE: Type: IMPLANTABLE DEVICE | Site: HIP | Status: FUNCTIONAL

## 2020-04-27 RX ORDER — FENTANYL CITRATE 50 UG/ML
100 INJECTION, SOLUTION INTRAMUSCULAR; INTRAVENOUS ONCE
Status: ACTIVE | OUTPATIENT
Start: 2020-04-27 | End: 2020-04-27

## 2020-04-27 RX ORDER — HYDROMORPHONE HYDROCHLORIDE 2 MG/ML
0.5 INJECTION, SOLUTION INTRAMUSCULAR; INTRAVENOUS; SUBCUTANEOUS
Status: DISCONTINUED | OUTPATIENT
Start: 2020-04-27 | End: 2020-04-27 | Stop reason: HOSPADM

## 2020-04-27 RX ORDER — SODIUM CHLORIDE, SODIUM LACTATE, POTASSIUM CHLORIDE, CALCIUM CHLORIDE 600; 310; 30; 20 MG/100ML; MG/100ML; MG/100ML; MG/100ML
75 INJECTION, SOLUTION INTRAVENOUS CONTINUOUS
Status: DISCONTINUED | OUTPATIENT
Start: 2020-04-27 | End: 2020-04-27 | Stop reason: HOSPADM

## 2020-04-27 RX ORDER — MAG HYDROX/ALUMINUM HYD/SIMETH 200-200-20
30 SUSPENSION, ORAL (FINAL DOSE FORM) ORAL
Status: DISCONTINUED | OUTPATIENT
Start: 2020-04-27 | End: 2020-05-01 | Stop reason: HOSPADM

## 2020-04-27 RX ORDER — OXYCODONE HYDROCHLORIDE 5 MG/1
10 TABLET ORAL
Status: DISCONTINUED | OUTPATIENT
Start: 2020-04-27 | End: 2020-04-28

## 2020-04-27 RX ORDER — DEXAMETHASONE SODIUM PHOSPHATE 4 MG/ML
INJECTION, SOLUTION INTRA-ARTICULAR; INTRALESIONAL; INTRAMUSCULAR; INTRAVENOUS; SOFT TISSUE AS NEEDED
Status: DISCONTINUED | OUTPATIENT
Start: 2020-04-27 | End: 2020-04-27 | Stop reason: HOSPADM

## 2020-04-27 RX ORDER — FLUCONAZOLE 2 MG/ML
200 INJECTION, SOLUTION INTRAVENOUS EVERY 24 HOURS
Status: DISCONTINUED | OUTPATIENT
Start: 2020-04-27 | End: 2020-04-28

## 2020-04-27 RX ORDER — CLONIDINE HYDROCHLORIDE 0.2 MG/1
0.2 TABLET ORAL
Status: DISCONTINUED | OUTPATIENT
Start: 2020-04-27 | End: 2020-05-01 | Stop reason: HOSPADM

## 2020-04-27 RX ORDER — ONDANSETRON 2 MG/ML
4 INJECTION INTRAMUSCULAR; INTRAVENOUS
Status: DISCONTINUED | OUTPATIENT
Start: 2020-04-27 | End: 2020-05-01 | Stop reason: HOSPADM

## 2020-04-27 RX ORDER — ONDANSETRON 2 MG/ML
4 INJECTION INTRAMUSCULAR; INTRAVENOUS ONCE
Status: DISCONTINUED | OUTPATIENT
Start: 2020-04-27 | End: 2020-04-27 | Stop reason: HOSPADM

## 2020-04-27 RX ORDER — ONDANSETRON 2 MG/ML
INJECTION INTRAMUSCULAR; INTRAVENOUS AS NEEDED
Status: DISCONTINUED | OUTPATIENT
Start: 2020-04-27 | End: 2020-04-27 | Stop reason: HOSPADM

## 2020-04-27 RX ORDER — MIDAZOLAM HYDROCHLORIDE 1 MG/ML
2 INJECTION, SOLUTION INTRAMUSCULAR; INTRAVENOUS ONCE
Status: ACTIVE | OUTPATIENT
Start: 2020-04-27 | End: 2020-04-27

## 2020-04-27 RX ORDER — LIDOCAINE HYDROCHLORIDE 10 MG/ML
0.1 INJECTION INFILTRATION; PERINEURAL AS NEEDED
Status: DISCONTINUED | OUTPATIENT
Start: 2020-04-27 | End: 2020-05-01 | Stop reason: HOSPADM

## 2020-04-27 RX ORDER — PROPOFOL 10 MG/ML
INJECTION, EMULSION INTRAVENOUS AS NEEDED
Status: DISCONTINUED | OUTPATIENT
Start: 2020-04-27 | End: 2020-04-27 | Stop reason: HOSPADM

## 2020-04-27 RX ORDER — OXYCODONE HYDROCHLORIDE 5 MG/1
10 TABLET ORAL
Status: COMPLETED | OUTPATIENT
Start: 2020-04-27 | End: 2020-04-27

## 2020-04-27 RX ORDER — FENTANYL CITRATE 50 UG/ML
INJECTION, SOLUTION INTRAMUSCULAR; INTRAVENOUS AS NEEDED
Status: DISCONTINUED | OUTPATIENT
Start: 2020-04-27 | End: 2020-04-27 | Stop reason: HOSPADM

## 2020-04-27 RX ORDER — SODIUM CHLORIDE 0.9 % (FLUSH) 0.9 %
5-40 SYRINGE (ML) INJECTION EVERY 8 HOURS
Status: DISCONTINUED | OUTPATIENT
Start: 2020-04-27 | End: 2020-05-01 | Stop reason: HOSPADM

## 2020-04-27 RX ORDER — ACETAMINOPHEN 500 MG
1000 TABLET ORAL ONCE
Status: ACTIVE | OUTPATIENT
Start: 2020-04-27 | End: 2020-04-27

## 2020-04-27 RX ORDER — LIDOCAINE HYDROCHLORIDE 20 MG/ML
INJECTION, SOLUTION EPIDURAL; INFILTRATION; INTRACAUDAL; PERINEURAL AS NEEDED
Status: DISCONTINUED | OUTPATIENT
Start: 2020-04-27 | End: 2020-04-27 | Stop reason: HOSPADM

## 2020-04-27 RX ORDER — ASPIRIN 325 MG
325 TABLET ORAL DAILY
Status: DISCONTINUED | OUTPATIENT
Start: 2020-04-28 | End: 2020-05-01 | Stop reason: HOSPADM

## 2020-04-27 RX ORDER — FERROUS SULFATE, DRIED 160(50) MG
1 TABLET, EXTENDED RELEASE ORAL
Status: DISCONTINUED | OUTPATIENT
Start: 2020-04-27 | End: 2020-05-01 | Stop reason: HOSPADM

## 2020-04-27 RX ORDER — CEFAZOLIN SODIUM/WATER 2 G/20 ML
2 SYRINGE (ML) INTRAVENOUS ONCE
Status: COMPLETED | OUTPATIENT
Start: 2020-04-27 | End: 2020-04-27

## 2020-04-27 RX ORDER — FLUCONAZOLE 2 MG/ML
400 INJECTION, SOLUTION INTRAVENOUS EVERY 24 HOURS
Status: DISCONTINUED | OUTPATIENT
Start: 2020-04-27 | End: 2020-04-27 | Stop reason: DRUGHIGH

## 2020-04-27 RX ORDER — SODIUM CHLORIDE, SODIUM LACTATE, POTASSIUM CHLORIDE, CALCIUM CHLORIDE 600; 310; 30; 20 MG/100ML; MG/100ML; MG/100ML; MG/100ML
100 INJECTION, SOLUTION INTRAVENOUS CONTINUOUS
Status: DISCONTINUED | OUTPATIENT
Start: 2020-04-27 | End: 2020-04-28

## 2020-04-27 RX ORDER — SODIUM CHLORIDE 0.9 % (FLUSH) 0.9 %
5-40 SYRINGE (ML) INJECTION AS NEEDED
Status: DISCONTINUED | OUTPATIENT
Start: 2020-04-27 | End: 2020-05-01 | Stop reason: HOSPADM

## 2020-04-27 RX ORDER — MIDAZOLAM HYDROCHLORIDE 1 MG/ML
2 INJECTION, SOLUTION INTRAMUSCULAR; INTRAVENOUS
Status: DISCONTINUED | OUTPATIENT
Start: 2020-04-27 | End: 2020-04-28

## 2020-04-27 RX ORDER — DIPHENHYDRAMINE HYDROCHLORIDE 50 MG/ML
12.5 INJECTION, SOLUTION INTRAMUSCULAR; INTRAVENOUS ONCE
Status: DISCONTINUED | OUTPATIENT
Start: 2020-04-27 | End: 2020-04-27 | Stop reason: HOSPADM

## 2020-04-27 RX ORDER — HYDROMORPHONE HYDROCHLORIDE 1 MG/ML
0.5 INJECTION, SOLUTION INTRAMUSCULAR; INTRAVENOUS; SUBCUTANEOUS
Status: DISCONTINUED | OUTPATIENT
Start: 2020-04-27 | End: 2020-04-28

## 2020-04-27 RX ORDER — EPHEDRINE SULFATE/0.9% NACL/PF 50 MG/5 ML
SYRINGE (ML) INTRAVENOUS AS NEEDED
Status: DISCONTINUED | OUTPATIENT
Start: 2020-04-27 | End: 2020-04-27 | Stop reason: HOSPADM

## 2020-04-27 RX ORDER — FLUCONAZOLE 2 MG/ML
INJECTION, SOLUTION INTRAVENOUS AS NEEDED
Status: DISCONTINUED | OUTPATIENT
Start: 2020-04-27 | End: 2020-04-27 | Stop reason: HOSPADM

## 2020-04-27 RX ORDER — SODIUM CHLORIDE 9 MG/ML
100 INJECTION, SOLUTION INTRAVENOUS CONTINUOUS
Status: DISPENSED | OUTPATIENT
Start: 2020-04-27 | End: 2020-04-28

## 2020-04-27 RX ORDER — NALOXONE HYDROCHLORIDE 0.4 MG/ML
0.1 INJECTION, SOLUTION INTRAMUSCULAR; INTRAVENOUS; SUBCUTANEOUS AS NEEDED
Status: DISCONTINUED | OUTPATIENT
Start: 2020-04-27 | End: 2020-04-27 | Stop reason: HOSPADM

## 2020-04-27 RX ORDER — HYDROCODONE BITARTRATE AND ACETAMINOPHEN 5; 325 MG/1; MG/1
1 TABLET ORAL
Status: DISCONTINUED | OUTPATIENT
Start: 2020-04-27 | End: 2020-04-27

## 2020-04-27 RX ORDER — OXYCODONE HYDROCHLORIDE 5 MG/1
5 TABLET ORAL
Status: DISCONTINUED | OUTPATIENT
Start: 2020-04-27 | End: 2020-04-27 | Stop reason: HOSPADM

## 2020-04-27 RX ADMIN — SODIUM CHLORIDE 1000 MG: 900 INJECTION, SOLUTION INTRAVENOUS at 20:48

## 2020-04-27 RX ADMIN — Medication 1 TABLET: at 16:59

## 2020-04-27 RX ADMIN — PHENYLEPHRINE HYDROCHLORIDE 120 MCG: 10 INJECTION INTRAVENOUS at 12:32

## 2020-04-27 RX ADMIN — PHENYLEPHRINE HYDROCHLORIDE 120 MCG: 10 INJECTION INTRAVENOUS at 13:09

## 2020-04-27 RX ADMIN — FENTANYL CITRATE 50 MCG: 50 INJECTION INTRAMUSCULAR; INTRAVENOUS at 13:25

## 2020-04-27 RX ADMIN — FENTANYL CITRATE 50 MCG: 50 INJECTION INTRAMUSCULAR; INTRAVENOUS at 11:52

## 2020-04-27 RX ADMIN — AMLODIPINE BESYLATE 5 MG: 5 TABLET ORAL at 08:18

## 2020-04-27 RX ADMIN — Medication 500 MG: at 16:59

## 2020-04-27 RX ADMIN — Medication 10 ML: at 20:50

## 2020-04-27 RX ADMIN — Medication 5 ML: at 05:27

## 2020-04-27 RX ADMIN — PROPOFOL 150 MG: 10 INJECTION, EMULSION INTRAVENOUS at 10:53

## 2020-04-27 RX ADMIN — DEXAMETHASONE SODIUM PHOSPHATE 4 MG: 4 INJECTION, SOLUTION INTRAMUSCULAR; INTRAVENOUS at 11:37

## 2020-04-27 RX ADMIN — SODIUM CHLORIDE, SODIUM LACTATE, POTASSIUM CHLORIDE, AND CALCIUM CHLORIDE 100 ML/HR: 600; 310; 30; 20 INJECTION, SOLUTION INTRAVENOUS at 10:15

## 2020-04-27 RX ADMIN — OXYCODONE HYDROCHLORIDE 10 MG: 5 TABLET ORAL at 14:02

## 2020-04-27 RX ADMIN — LIDOCAINE HYDROCHLORIDE 95 MG: 20 INJECTION, SOLUTION EPIDURAL; INFILTRATION; INTRACAUDAL; PERINEURAL at 10:53

## 2020-04-27 RX ADMIN — PHENYLEPHRINE HYDROCHLORIDE 60 MCG: 10 INJECTION INTRAVENOUS at 12:23

## 2020-04-27 RX ADMIN — FLUCONAZOLE, SODIUM CHLORIDE 200 MG: 2 INJECTION INTRAVENOUS at 11:45

## 2020-04-27 RX ADMIN — Medication 15 MG: at 11:09

## 2020-04-27 RX ADMIN — ONDANSETRON 4 MG: 2 INJECTION INTRAMUSCULAR; INTRAVENOUS at 11:37

## 2020-04-27 RX ADMIN — FENTANYL CITRATE 50 MCG: 50 INJECTION INTRAMUSCULAR; INTRAVENOUS at 10:51

## 2020-04-27 RX ADMIN — SODIUM CHLORIDE, SODIUM LACTATE, POTASSIUM CHLORIDE, AND CALCIUM CHLORIDE: 600; 310; 30; 20 INJECTION, SOLUTION INTRAVENOUS at 10:40

## 2020-04-27 RX ADMIN — FENTANYL CITRATE 50 MCG: 50 INJECTION INTRAMUSCULAR; INTRAVENOUS at 11:19

## 2020-04-27 RX ADMIN — WATER 2 G: 1000 INJECTION, SOLUTION INTRAVENOUS at 11:05

## 2020-04-27 RX ADMIN — HYDROMORPHONE HYDROCHLORIDE 0.5 MG: 2 INJECTION INTRAMUSCULAR; INTRAVENOUS; SUBCUTANEOUS at 13:36

## 2020-04-27 RX ADMIN — SODIUM CHLORIDE 100 ML/HR: 900 INJECTION, SOLUTION INTRAVENOUS at 03:59

## 2020-04-27 RX ADMIN — HYDROMORPHONE HYDROCHLORIDE 0.5 MG: 2 INJECTION INTRAMUSCULAR; INTRAVENOUS; SUBCUTANEOUS at 13:46

## 2020-04-27 NOTE — ANESTHESIA POSTPROCEDURE EVALUATION
Procedure(s):  LEFT HIP REMOVAL OF HARDWARE AND LEFT FEMUR INSERTION INTRA MEDULLARY NAIL. general    Anesthesia Post Evaluation      Multimodal analgesia: multimodal analgesia used between 6 hours prior to anesthesia start to PACU discharge  Patient location during evaluation: bedside  Patient participation: complete - patient participated  Level of consciousness: responsive to verbal stimuli  Pain management: adequate  Airway patency: patent  Anesthetic complications: no  Cardiovascular status: hemodynamically stable  Respiratory status: spontaneous ventilation  Hydration status: stable        Vitals Value Taken Time   BP 99/51 4/27/2020  2:24 PM   Temp 37.1 °C (98.7 °F) 4/27/2020  2:16 PM   Pulse 96 4/27/2020  2:24 PM   Resp 18 4/27/2020  2:16 PM   SpO2 92 % 4/27/2020  2:24 PM   Vitals shown include unvalidated device data.

## 2020-04-27 NOTE — PERIOP NOTES
TRANSFER - IN REPORT:    Verbal report received from [facility] (name) on Radha Ann  being received from 711(unit) for ordered procedure      Report consisted of patients Situation, Background, Assessment and   Recommendations(SBAR). Information from the following report(s) SBAR, Intake/Output, MAR and Med Rec Status was reviewed with the receiving nurse. Opportunity for questions and clarification was provided. Assessment completed upon patients arrival to unit and care assumed.

## 2020-04-27 NOTE — PERIOP NOTES
TRANSFER - OUT REPORT:    Verbal report given to Germania Mcmahon RN on [employer]  being transferred to [facility] [phone] for routine post - op       Report consisted of patients Situation, Background, Assessment and   Recommendations(SBAR). Information from the following report(s) OR Summary, Procedure Summary, Intake/Output and MAR was reviewed with the receiving nurse. Lines:   Peripheral IV 04/26/20 Right Antecubital (Active)   Site Assessment Clean, dry, & intact 4/27/2020  1:27 PM   Phlebitis Assessment 0 4/27/2020  1:27 PM   Infiltration Assessment 0 4/27/2020  1:27 PM   Dressing Status Clean, dry, & intact 4/27/2020  1:27 PM   Dressing Type Tape;Transparent 4/27/2020  1:27 PM   Hub Color/Line Status Blue;Patent 4/27/2020  1:27 PM        Opportunity for questions and clarification was provided. Patient transported with:   O2 @ 2 liters    VTE prophylaxis orders have been written for [employer]. Patient and family given floor number and nurses name. Family updated re: pt status after security code verified.

## 2020-04-27 NOTE — ANESTHESIA PREPROCEDURE EVALUATION
Anesthetic History   No history of anesthetic complications            Review of Systems / Medical History  Patient summary reviewed and pertinent labs reviewed    Pulmonary  Within defined limits                 Neuro/Psych   Within defined limits           Cardiovascular    Hypertension: well controlled  Valvular problems/murmurs: aortic insufficiency              Comments: Denies recent CP, SOB, Palps, Syncope, Fatigue    Echo 2014: EF 50%, Mod AI, impaired relaxation   GI/Hepatic/Renal         Renal disease: CRI       Endo/Other        Morbid obesity, cancer (Breast) and anemia     Other Findings            Physical Exam    Airway  Mallampati: II  TM Distance: 4 - 6 cm  Neck ROM: normal range of motion   Mouth opening: Normal     Cardiovascular    Rhythm: regular  Rate: normal         Dental  No notable dental hx       Pulmonary  Breath sounds clear to auscultation               Abdominal  GI exam deferred       Other Findings            Anesthetic Plan    ASA: 3  Anesthesia type: general            Anesthetic plan and risks discussed with: Patient      Pt request GA

## 2020-04-27 NOTE — CONSULTS
Consult    Patient: Jake Nice MRN: [id number]  SSN: [id number]    YOB: 1945  Age: 67 y.o. Sex: female      Subjective:      Jake Nice is a 67 y.o. female metastatic breast disease. She seems to be doing very well from this. I have reviewed her latest note from when she saw her oncologist.  She was doing very well at that time. In August 2018 she had prophylactic intramedullary nail fixation of the right femur and she had plate and screw fixation of a subtrochanteric left proximal femur fracture. She seemed to have done really well since then. I see no notes indicating any follow-up or any x-rays for follow-up on our system. She was walking really without any assistive devices and then a few days ago she basically just woke up having increased pain in her left lower extremity and inability to bear weight. She came to emergency room where x-rays have revealed that her proximal femoral locking plate is broken. She has had no history of a recent fall or any trauma to the area. She does have chronic kidney disease as well.     Past Medical History:   Diagnosis Date    Abnormal EKG 2/16/2016    Aortic valve insufficiency 2/16/2016    Cancer ([address]     breast    Hyperlipidemia 2/16/2016    Hypertension 2/16/2016    UNSPECIFIED     Obesity 2/16/2016    UNSPECIFIED      Past Surgical History:   Procedure Laterality Date    HX TUBAL LIGATION        FAMHX -No history of inflammatory arthritis   Social History     Tobacco Use    Smoking status: Never Smoker    Smokeless tobacco: Current User     Types: Snuff   Substance Use Topics    Alcohol use: No      Current Facility-Administered Medications   Medication Dose Route Frequency Provider Last Rate Last Dose    cloNIDine HCL (CATAPRES) tablet 0.2 mg  0.2 mg Oral BID PRN Gregory Graham MD        0.9% sodium chloride infusion  100 mL/hr IntraVENous CONTINUOUS Gregory Graham MD        HYDROcodone-acetaminophen [facility]) 5-325 mg per tablet 1 Tab  1 Tab Oral Q4H PRN Eli Cabrera MD        amLODIPine (NORVASC) tablet 5 mg  5 mg Oral DAILY Cindy Pantoja MD        calcium carbonate (OS-JORGE) tablet 500 mg [elemental]  500 mg Oral BID WITH MEALS Cindy Pantoja MD   500 mg at 04/26/20 2011    letrozole [facility]) tablet 2.5 mg (Patient Supplied)  2.5 mg Oral DAILY Cindy Pantoja MD        palbociclib cap 75 mg  75 mg Oral DAILY WITH Cindy Grace MD   75 mg at 04/26/20 2239    sodium chloride (NS) flush 5-40 mL  5-40 mL IntraVENous Q8H Radha COSTA MD   5 mL at 04/27/20 0527    sodium chloride (NS) flush 5-40 mL  5-40 mL IntraVENous PRN Devika Arellano MD        acetaminophen (TYLENOL) tablet 650 mg  650 mg Oral Q4H PRN Devika Arellano MD        tuberculin injection 5 Units  5 Units IntraDERMal ONCE Cindy Pantoja MD   5 Units at 04/26/20 2010    HYDROmorphone (PF) (DILAUDID) injection 0.5 mg  0.5 mg IntraVENous Q4H PRN Devika Arellano MD   0.5 mg at 04/26/20 2009        Allergies   Allergen Reactions    Penicillins Unknown (comments)     UNKNOWN REACTION        Review of Systems:  A comprehensive review of systems was negative except for that written in the History of Present Illness. Objective:     Vitals:    04/26/20 1936 04/26/20 1958 04/27/20 0047 04/27/20 0442   BP: (!) 158/104 148/53 118/56 119/65   Pulse: 73  72 67   Resp: 15  15 15   Temp: 98.1 °F (36.7 °C)  98 °F (36.7 °C) 98.1 °F (36.7 °C)   SpO2: 92%  90% 94%   Weight:       Height:            Physical Exam:  Physical Exam:  General:  Alert, cooperative, no distress, appears stated age. Orientation she is alert and oriented to person place time and situation   Eyes:  Conjunctivae/corneas clear. PERRL, EOMs intact. Fundi benign   Ears:  Normal TMs and external ear canals both ears. Nose: Nares normal. Septum midline. Mucosa normal. No drainage or sinus tenderness.    Mouth/Throat: Lips, mucosa, and tongue normal. Teeth and gums normal.   Neck: Supple, symmetrical, trachea midline, no adenopathy, thyroid: no enlargment/tenderness/nodules, no carotid bruit and no JVD. Back:   Symmetric, no curvature. ROM normal. No CVA tenderness. Lungs:   Clear to auscultation bilaterally. Heart:  Regular rate and rhythm, S1, S2 normal, no murmur, click, rub or gallop. Abdomen:   Soft, non-tender. Bowel sounds normal. No masses,  No organomegaly. No lymphadenopathy in all 4 extremities  Alignment- pt has some obvious deformity of the left hip  Range of motion- with any range of motion leftt hip  Vascular-distal pulses palpable in left lower extremity  Sensory/motor-deep tendon reflexes normal left lower extremity. Motor and sensory function intact. 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 She does appear to have an established nonunion of the left proximal femur. The hardware is a Synthes locking plate and shows good fixation proximal as well as distally. Plan:     I have spoken with the patient at length regarding the overall treatment plan. This is a nonunion in the subtrochanteric region of the proximal femur so this is going to be a rather difficult problem. She will likely need some sort of autologous bone graft as well as revision fixation.   Because of her history of metastatic disease I think a cephalo-medullary nail type device would be a much better choice for her especially with her previous history of plate and screw fixation. The added benefit of using an intramedullary device versus a plate and screw type construct would be that we could likely use the reamings to bone graft the nonunion site. I have spoken with her regarding the overall treatment plan. She did have a Synthes cephalo-medullary nail in her right femur so I have explained that basically this would be the exact same procedure as that one. I have try to make sure she understands that it is still difficult to get the subtrochanteric proximal femur to heal and this will likely be something that we need to watch for a long time with follow-up x-rays and try to make sure that this area is healing well. She seems understand this.   The plan is to proceed today with removal of hardware from the left proximal femur and open treatment of the left proximal femur nonunion with cephalo-medullary nail fixation and bone grafting    Signed By: Remington Johnson MD

## 2020-04-27 NOTE — PROGRESS NOTES
TRANSFER - IN REPORT:    Verbal report received from [address] [place] [address] on [employer]  being received from PACU for routine post - op      Report consisted of patients Situation, Background, Assessment and   Recommendations(SBAR). Information from the following report(s) SBAR was reviewed with the receiving nurse. Opportunity for questions and clarification was provided. Assessment completed upon patients arrival to unit and care assumed.

## 2020-04-27 NOTE — CONSULTS
Received Consult, pt seen prior to OR; adamantly refuses rehab. Patient is cared for by her 3 dtgs. Has a ramp entry to house, utilizes a  and a hospital bed. Suspect poor rehab potential    Will defer consultation at this time.      [address] Shravan Silver

## 2020-04-27 NOTE — OP NOTES
Operative Report    Patient: Lindsey Mijares MRN: [id number]  SSN: [id number]    YOB: 1945  Age: 67 y.o. Sex: female       Date of Surgery: 4/27/2020     History:  Lindsey Mijares is a 67 y.o. female who had fixation of a left proximal femur fracture which was a pathologic fracture with metastatic breast cancer in August 2018. She had essentially done well until recently when she all of a sudden had increased pain and swelling with no history of trauma. She came to the emergency room because she could not put any weight on her left lower extremity. X-rays there revealed failure of her fixation so she likely had a nonunion of her proximal femur that never healed and her plate over time we can to the point that it broke and she then had a completely unstable proximal femur. I talked her about this. I explained that we would remove the hardware from the previous plate fixation of her proximal femur fracture and treat this with intramedullary nail fixation we would also use some of the reamings for bone graft around the nonunion site as well I talked her about this. She seemed understand and wished to proceed. I talked to the patient and/or their representative and explained the exact nature the procedure. I also went through a detailed list of the material risks associated with  the procedure which included risk of bleeding, infection, injury to nearby structures, worsening the situation, as well as the risks associate with anesthesia and finally death. Also talked with him regarding the benefits and alternatives to the procedure.     Preoperative Diagnosis: Closed displaced pathologic left subtrochanteric proximal femur fracture with nonunion    Postoperative Diagnosis: Closed displaced pathologic left subtrochanteric proximal femur fracture with nonunion Surgeon(s) and Role:     * Carmen Chau MD - Primary    Anesthesia: General     Procedure: Procedure(s):  #1removal of hardware left hip, #2 repair of nonunion left subtrochanteric femur fracture with intramedullary nail fixation and autograft bone grafting    Procedure in Detail: After the successful induction of general anesthetic the patient was placed in the right lateral decubitus position and the left lower extremity was prepped and draped in usual sterile fashion. I then made a small incision through her previous area of incision over the lateral aspect of the left hip and dissected down to the plate. The proximal fragment screws were removed and the broken portion of the plate was removed and then I removed the shaft screws through very small incisions and then once all the screws were removed I removed the shaft portion of the plate itself. That point I then addressed the nonunion I used a combination of rongeurs as well as curettes and a 3.2 mm drill bit to take down the area of the nonunion. This appeared to be a well-established nonunion. After doing this I then placed some Collette nonabsorbable bone cement in the tracks within the femoral head to allow for some consolidation of the rather large screw tracks within the femoral head itself. I then placed a cannulated awl the tip of the greater trochanter on both AP and lateral projection and then placed a guidewire across the nonunion site into the shaft portion of the femur. I sequentially reamed up to 15.5 mm and half millimeter increments with the last 4-6 reamers capturing the reamings from the proximal femur and saving these for bone grafting. I then placed a 12 x 400 mm femoral nail and after the nail was in place I then placed a guidewire this of the femoral head on the lateral projection and just below the center the femoral head on the AP projection and once this was in appropriate position I drilled for and placed a lag screw proximally.   After the helical blade was placed proximally I did place a set screw in the proximal portion of the nail and then backed off a half of a turn to allow for dynamic motion. I then placed a guidewire from the Synthes femoral neck fracture system just anterior and superior to the screw for the cephalo-medullary nail. Once this was in appropriate position a very carefully using a  drilled for a 90 mm lag screw and then placed the lag screw as well as the sideplate and placed 2 locking screws in the proximal femur. Both of these appeared clinically to gain some purchase in the cortex just distal to the nonunion site laterally. After these screws were in place I then checked the placement of both of these and it did appear to be in good position. I then placed 2 interlock bolts distally using freehand technique. I should mention that I used a small portion of the bone graft along the medial aspect of the proximal femur fracture before placing the nail and then I placed the remainder of the bone graft around the anterior lateral and posterior lateral aspects of the nonunion after we had the nail and the femoral neck system in place after the bone graft was in place I then closed the deep fascia of my larger incision with #2 strata fix suture and the subtenons tissue was closed with two-point oh strata fix suture and the skin was closed with staples. Smaller wounds were closed with two-point 0 Monocryl and staples dressings were applied. The patient was then awakened and taken to the recovery room in stable condition      Estimated Blood Loss: 300 cc    Tourniquet Time: * No tourniquets in log *      Implants:   Implant Name Type Inv.  Item Serial No.  Lot No. LRB No. Used Action   PUTTY NORIAN DRL INJCT 10ML --  - [id number]  PUTTY NORIAN DRL INJCT 10ML --   [employer] [id number] Left 1 Implanted   NAIL CHETAN 125D [id number] LT -- TFNA STRL - [id number]  NAIL CHETAN 125D [id number] LT -- TFNA STRL  [employer] [place] [id number] Left 1 Implanted   BLADE HELCL FEN 95MM STRL -- TFNA - [id number]  BLADE HELCL FEN 95MM STRL -- TFNA  [employer] [place] [id number] Left 1 Implanted               Specimens: * No specimens in log *        Drains: None                Complications: None    Counts: Sponge and needle counts were correct times two.     Signed By:  Michelle Islas MD     April 27, 2020

## 2020-04-27 NOTE — PROGRESS NOTES
TRANSFER - OUT REPORT:    Verbal report given to Scottie Cordon RN on [employer]  being transferred to Pre-Op for ordered procedure       Report consisted of patients Situation, Background, Assessment and   Recommendations(SBAR). Information from the following report(s) SBAR was reviewed with the receiving nurse. Lines:   Peripheral IV 04/26/20 Right Antecubital (Active)   Site Assessment Clean, dry, & intact 4/26/2020  7:58 PM   Phlebitis Assessment 0 4/26/2020  7:58 PM   Infiltration Assessment 0 4/26/2020  7:58 PM   Dressing Status Clean, dry, & intact 4/26/2020  7:58 PM   Dressing Type Tape;Transparent 4/26/2020  7:58 PM   Hub Color/Line Status Blue; Infusing 4/26/2020  7:58 PM        Opportunity for questions and clarification was provided.       Patient transported with:   [employer]

## 2020-04-27 NOTE — PROGRESS NOTES
Problem: Falls - Risk of  Goal: *Absence of Falls  Description: Document Robin Sol Fall Risk and appropriate interventions in the flowsheet.   Outcome: Progressing Towards Goal  Note: Fall Risk Interventions:            Medication Interventions: Bed/chair exit alarm, Patient to call before getting OOB, Teach patient to arise slowly    Elimination Interventions: Bed/chair exit alarm, Call light in reach, Patient to call for help with toileting needs, Toileting schedule/hourly rounds    History of Falls Interventions: Bed/chair exit alarm, Consult care management for discharge planning, Door open when patient unattended         Problem: Patient Education: Go to Patient Education Activity  Goal: Patient/Family Education  Outcome: Progressing Towards Goal     Problem: Patient Education: Go to Patient Education Activity  Goal: Patient/Family Education  Outcome: Progressing Towards Goal     Problem: Patient Education: Go to Patient Education Activity  Goal: Patient/Family Education  Outcome: Progressing Towards Goal     Problem: Hip Fracture:Day of Admission Pre-op  Goal: Activity/Safety  Outcome: Progressing Towards Goal  Goal: Consults, if ordered  Outcome: Progressing Towards Goal  Goal: Diagnostic Test/Procedures  Outcome: Progressing Towards Goal  Goal: Nutrition/Diet  Outcome: Progressing Towards Goal  Goal: Medications  Outcome: Progressing Towards Goal  Goal: Respiratory  Outcome: Progressing Towards Goal  Goal: Treatments/Interventions/Procedures  Outcome: Progressing Towards Goal  Goal: Psychosocial  Outcome: Progressing Towards Goal  Goal: *Labs/Tests Within Defined Limits in Preparation for Surgery  Outcome: Progressing Towards Goal  Goal: *Optimal pain control at patient's stated goal  Outcome: Progressing Towards Goal  Goal: *Hemodynamically stable  Outcome: Progressing Towards Goal

## 2020-04-27 NOTE — PROGRESS NOTES
SW reviewed patient's chart and met with patient at bedside. Per hospital protocol, SW wore face mask and maintained appropriate social distance. No physical contact with patient. Patient lives with 3 daughters and has all necessary DME at home (1 level with ramp) including wheelchair and hospital bed ([employer]). Patient has declined STR in the past after hip fracture in 2018. At this time, patient is declining STR again in preference for return to home with [facility]. SW will complete [facility] referral on this date in anticipation of DC needs. Surgical procedure pending, PT/OT evaluations anticipated post-operatively. Patient requests ambulance transport to home at time of discharge. Care Management Interventions  Mode of Transport at Discharge: BLS(Will need ambulance transport to home.)  Transition of Care Consult (CM Consult): Home Health, Discharge [facility]: Yes  Discharge Durable Medical Equipment: No  Physical Therapy Consult: Yes  Occupational Therapy Consult: Yes  Speech Therapy Consult: No  Current Support Network: Relative's Home(Lives with 3 daughters)  Confirm Follow Up Transport: Family  The Plan for Transition of Care is Related to the Following Treatment Goals : return to home with continued therapy services.   The Patient and/or Patient Representative was Provided with a Choice of Provider and Agrees with the Discharge Plan?: Yes  Freedom of Choice List was Provided with Basic Dialogue that Supports the Patient's Individualized Plan of Care/Goals, Treatment Preferences and Shares the Quality Data Associated with the Providers?: Yes  Discharge Location  Discharge Placement: Home with home health

## 2020-04-28 LAB
ANION GAP SERPL CALC-SCNC: 10 MMOL/L (ref 7–16)
ANION GAP SERPL CALC-SCNC: 13 MMOL/L (ref 7–16)
BASOPHILS # BLD: 0 K/UL (ref 0–0.2)
BASOPHILS NFR BLD: 0 % (ref 0–2)
BUN SERPL-MCNC: 52 MG/DL (ref 8–23)
BUN SERPL-MCNC: 56 MG/DL (ref 8–23)
CALCIUM SERPL-MCNC: 8.8 MG/DL (ref 8.3–10.4)
CALCIUM SERPL-MCNC: 9.1 MG/DL (ref 8.3–10.4)
CHLORIDE SERPL-SCNC: 111 MMOL/L (ref 98–107)
CHLORIDE SERPL-SCNC: 113 MMOL/L (ref 98–107)
CO2 SERPL-SCNC: 18 MMOL/L (ref 21–32)
CO2 SERPL-SCNC: 20 MMOL/L (ref 21–32)
CREAT SERPL-MCNC: 4.1 MG/DL (ref 0.6–1)
CREAT SERPL-MCNC: 4.36 MG/DL (ref 0.6–1)
DIFFERENTIAL METHOD BLD: ABNORMAL
EOSINOPHIL # BLD: 0 K/UL (ref 0–0.8)
EOSINOPHIL NFR BLD: 0 % (ref 0.5–7.8)
ERYTHROCYTE [DISTWIDTH] IN BLOOD BY AUTOMATED COUNT: 16 % (ref 11.9–14.6)
GLUCOSE SERPL-MCNC: 150 MG/DL (ref 65–100)
GLUCOSE SERPL-MCNC: 164 MG/DL (ref 65–100)
HCT VFR BLD AUTO: 25.2 % (ref 35.8–46.3)
HGB BLD-MCNC: 7.8 G/DL (ref 11.7–15.4)
IMM GRANULOCYTES # BLD AUTO: 0 K/UL (ref 0–0.5)
IMM GRANULOCYTES NFR BLD AUTO: 1 % (ref 0–5)
LYMPHOCYTES # BLD: 0.3 K/UL (ref 0.5–4.6)
LYMPHOCYTES NFR BLD: 8 % (ref 13–44)
MCH RBC QN AUTO: 35 PG (ref 26.1–32.9)
MCHC RBC AUTO-ENTMCNC: 31 G/DL (ref 31.4–35)
MCV RBC AUTO: 113 FL (ref 79.6–97.8)
MM INDURATION POC: 0 MM (ref 0–5)
MONOCYTES # BLD: 0.3 K/UL (ref 0.1–1.3)
MONOCYTES NFR BLD: 6 % (ref 4–12)
NEUTS SEG # BLD: 3.6 K/UL (ref 1.7–8.2)
NEUTS SEG NFR BLD: 85 % (ref 43–78)
NRBC # BLD: 0 K/UL (ref 0–0.2)
PLATELET # BLD AUTO: 258 K/UL (ref 150–450)
PMV BLD AUTO: 10 FL (ref 9.4–12.3)
POTASSIUM SERPL-SCNC: 5.4 MMOL/L (ref 3.5–5.1)
POTASSIUM SERPL-SCNC: 5.8 MMOL/L (ref 3.5–5.1)
PPD POC: NEGATIVE NEGATIVE
RBC # BLD AUTO: 2.23 M/UL (ref 4.05–5.2)
SODIUM SERPL-SCNC: 142 MMOL/L (ref 136–145)
SODIUM SERPL-SCNC: 143 MMOL/L (ref 136–145)
WBC # BLD AUTO: 4.2 K/UL (ref 4.3–11.1)

## 2020-04-28 PROCEDURE — 80048 BASIC METABOLIC PNL TOTAL CA: CPT

## 2020-04-28 PROCEDURE — 74011250636 HC RX REV CODE- 250/636: Performed by: ORTHOPAEDIC SURGERY

## 2020-04-28 PROCEDURE — 74011250636 HC RX REV CODE- 250/636: Performed by: HOSPITALIST

## 2020-04-28 PROCEDURE — 97161 PT EVAL LOW COMPLEX 20 MIN: CPT

## 2020-04-28 PROCEDURE — 74011250637 HC RX REV CODE- 250/637: Performed by: ORTHOPAEDIC SURGERY

## 2020-04-28 PROCEDURE — 36430 TRANSFUSION BLD/BLD COMPNT: CPT

## 2020-04-28 PROCEDURE — 94760 N-INVAS EAR/PLS OXIMETRY 1: CPT

## 2020-04-28 PROCEDURE — 36415 COLL VENOUS BLD VENIPUNCTURE: CPT

## 2020-04-28 PROCEDURE — 97166 OT EVAL MOD COMPLEX 45 MIN: CPT

## 2020-04-28 PROCEDURE — 85025 COMPLETE CBC W/AUTO DIFF WBC: CPT

## 2020-04-28 PROCEDURE — P9016 RBC LEUKOCYTES REDUCED: HCPCS

## 2020-04-28 PROCEDURE — 74011000258 HC RX REV CODE- 258: Performed by: ORTHOPAEDIC SURGERY

## 2020-04-28 PROCEDURE — 97530 THERAPEUTIC ACTIVITIES: CPT

## 2020-04-28 PROCEDURE — P9040 RBC LEUKOREDUCED IRRADIATED: HCPCS

## 2020-04-28 PROCEDURE — 65270000029 HC RM PRIVATE

## 2020-04-28 PROCEDURE — 74011000250 HC RX REV CODE- 250: Performed by: HOSPITALIST

## 2020-04-28 PROCEDURE — 74011250637 HC RX REV CODE- 250/637: Performed by: HOSPITALIST

## 2020-04-28 PROCEDURE — 77010033678 HC OXYGEN DAILY

## 2020-04-28 PROCEDURE — 97535 SELF CARE MNGMENT TRAINING: CPT

## 2020-04-28 RX ORDER — SODIUM POLYSTYRENE SULFONATE 15 G/60ML
30 SUSPENSION ORAL; RECTAL 2 TIMES DAILY
Status: COMPLETED | OUTPATIENT
Start: 2020-04-28 | End: 2020-04-28

## 2020-04-28 RX ORDER — SODIUM CHLORIDE 9 MG/ML
250 INJECTION, SOLUTION INTRAVENOUS AS NEEDED
Status: DISCONTINUED | OUTPATIENT
Start: 2020-04-28 | End: 2020-04-29

## 2020-04-28 RX ORDER — FLUCONAZOLE 100 MG/1
100 TABLET ORAL DAILY
Status: COMPLETED | OUTPATIENT
Start: 2020-04-28 | End: 2020-04-30

## 2020-04-28 RX ORDER — SODIUM CHLORIDE 9 MG/ML
125 INJECTION, SOLUTION INTRAVENOUS CONTINUOUS
Status: DISCONTINUED | OUTPATIENT
Start: 2020-04-28 | End: 2020-04-28

## 2020-04-28 RX ADMIN — SODIUM POLYSTYRENE SULFONATE 30 G: 15 SUSPENSION ORAL; RECTAL at 16:37

## 2020-04-28 RX ADMIN — Medication 1 TABLET: at 07:27

## 2020-04-28 RX ADMIN — Medication 10 ML: at 22:14

## 2020-04-28 RX ADMIN — SODIUM CHLORIDE 1000 MG: 900 INJECTION, SOLUTION INTRAVENOUS at 04:41

## 2020-04-28 RX ADMIN — Medication 500 MG: at 07:27

## 2020-04-28 RX ADMIN — ASPIRIN 325 MG ORAL TABLET 325 MG: 325 PILL ORAL at 07:27

## 2020-04-28 RX ADMIN — Medication 10 ML: at 04:42

## 2020-04-28 RX ADMIN — SODIUM POLYSTYRENE SULFONATE 30 G: 15 SUSPENSION ORAL; RECTAL at 08:49

## 2020-04-28 RX ADMIN — FLUCONAZOLE 100 MG: 100 TABLET ORAL at 09:24

## 2020-04-28 RX ADMIN — SODIUM BICARBONATE: 84 INJECTION, SOLUTION INTRAVENOUS at 16:37

## 2020-04-28 RX ADMIN — Medication 1 TABLET: at 16:43

## 2020-04-28 RX ADMIN — HYDROMORPHONE HYDROCHLORIDE 0.5 MG: 1 INJECTION, SOLUTION INTRAMUSCULAR; INTRAVENOUS; SUBCUTANEOUS at 17:27

## 2020-04-28 RX ADMIN — HYDROMORPHONE HYDROCHLORIDE 0.5 MG: 1 INJECTION, SOLUTION INTRAMUSCULAR; INTRAVENOUS; SUBCUTANEOUS at 07:37

## 2020-04-28 RX ADMIN — Medication 500 MG: at 16:43

## 2020-04-28 RX ADMIN — Medication 1 TABLET: at 11:31

## 2020-04-28 RX ADMIN — SODIUM CHLORIDE 500 ML: 900 INJECTION, SOLUTION INTRAVENOUS at 08:14

## 2020-04-28 NOTE — PROGRESS NOTES
2020         Post Op day: 1 Day Post-Op Procedure(s) (LRB):  LEFT HIP REMOVAL OF HARDWARE AND LEFT FEMUR INSERTION INTRA MEDULLARY NAIL (Left)      Admit Date: 2020  Admit Diagnosis: Fracture, proximal femur, left, closed, initial encounter ([facility] [S72.002A]       Principle Problem: Fracture, proximal femur, left, closed, initial encounter ([facility] Subjective: Doing well, No complaints, No SOB, No Chest Pain, No Nausea or Vomiting     Objective:   Vital Signs are Stable, No Acute Distress, Alert,  Dressing is Dry,  Neurovascular exam is normal.     Assessment / Plan :  Patient Active Problem List   Diagnosis Code    Aortic valve insufficiency I35.1    Hypertension I10    Obesity E66.9    Hyperlipidemia E78.5    Abnormal EKG R94.31    Mass of breast N63.0    Infiltrating ductal carcinoma of female breast ([facility] C50.919    Malignant neoplasm metastatic to bone (HCC) C79.51    Hyperkalemia E87.5    Anemia due to chronic kidney disease treated with erythropoietin N18.9, D63.1    Femur fracture, left ([facility]) S72. 92XA    Stage 3 chronic kidney disease (HCC) N18.3    Hypokalemia E87.6    Severe obesity (HCC) E66.01    Fracture, proximal femur, left, closed, initial encounter ([facility] S72.002A      Patient Vitals for the past 8 hrs:   BP Temp Pulse Resp SpO2   20 0747 122/77 97.6 °F (36.4 °C) 84 18 93 %   20 0404 105/51 97.9 °F (36.6 °C) 94 16 94 %    Temp (24hrs), Av.1 °F (36.7 °C), Min:97.4 °F (36.3 °C), Max:98.8 °F (37.1 °C)    Body mass index is 37.9 kg/m².     Lab Results   Component Value Date/Time    HGB 7.8 (L) 2020 05:35 AM          Medical Mgmt per hospitalist  Anticoagulation plan: ASAS 325mg daily   Continue PT  Fall Precuations  DC disp: rehab placement        Signed By: KIERRA Desai  2020,  8:00 AM

## 2020-04-28 NOTE — PROGRESS NOTES
Hospitalist     Admit Date:  2020  1:28 PM   Name:  Dipti Serna   Age:  67 y.o.  :  1945   MRN:  [id number]   PCP:  Andreia Phelps NP    subj:      69-year-old  lady with HTN, CKD stage IV with baseline creatinine between 2.4 and 3.0, breast cancer, presented to the ED at [facility] complaining of left hip pain. The pain started about 3 days ago and has been gradually worsening. It was estimated at 10/10 of intensity and felt mostly in the left hip and left upper thigh. The patient actually denies any fall. She had a fracture on the same hip last year for which she had hip fracture repair with plates and screws. A left femur x-ray noted evidence of hardware failure with fracture at the site of nonunion proximal femoral shaft fracture. The patient is unsure about what happened and thinks she must have broken her hip while sleeping and moving it in the wrong way. Labs did not show any significant abnormalities, except for creatinine of 2.93 which is actually within her baseline range, even if the most recent creatinine on record was 2.4. Based on her clinical presentation, the hospitalist service was contacted and the patient was admitted to the medical floor for left proximal femur fracture and hardware failure.     Today, stable postop, pain controlled, makes little urine    Objective:     Patient Vitals for the past 24 hrs:   Temp Pulse Resp BP SpO2   20 1526 98.4 °F (36.9 °C) 92 16 94/57 97 %   20 1153 97.7 °F (36.5 °C) 79 17 124/56 95 %   20 0945  78 18 117/75 92 %   20 0928 98.2 °F (36.8 °C) 83 18 104/71 94 %   20 0913 97.5 °F (36.4 °C) 87 18 100/66 93 %   20 0747 97.6 °F (36.4 °C) 84 18 122/77 93 %   20 0404 97.9 °F (36.6 °C) 94 16 105/51 94 %   20 2320 98 °F (36.7 °C) 92 16 96/68 97 %   20 98.2 °F (36.8 °C) 91 16 100/55 97 %   20 1830    113/79    20 1754    94/54    20 [address] (!) 84/42    04/27/20 1710    (!) 125/95      Oxygen Therapy  O2 Sat (%): 97 % (04/28/20 1526)  Pulse via Oximetry: 93 beats per minute (04/27/20 1416)  O2 Device: Nasal cannula (04/28/20 1320)  O2 Flow Rate (L/min): 2 l/min (04/27/20 1416)    Intake/Output Summary (Last 24 hours) at 4/28/2020 1709  Last data filed at 4/28/2020 1159  Gross per 24 hour   Intake 390 ml   Output 50 ml   Net 340 ml       Physical Exam:  General:    Well nourished. Alert. Obese, pale, moderate distress  CV:   RRR. No murmur, rub, or gallop. Lungs:  CTAB. No wheezing, rhonchi, or rales. Abdomen: Soft, nontender, nondistended. Bowel sounds normal.   Extremities: Warm and dry. No cyanosis or edema. Tenderness on palpation of the left hip. Left lower extremity is shortened and externally rotated. Neurologic: grossly intact. Skin:     No rashes or jaundice. Psych:  Normal mood and affect. I reviewed the labs, imaging, EKGs, telemetry, and other studies done this admission.   Data Review:   Recent Results (from the past 24 hour(s))   PLEASE READ & DOCUMENT PPD TEST IN 24 HRS    Collection Time: 04/27/20  8:10 PM   Result Value Ref Range    PPD Negative Negative    mm Induration 0 0 - 5 mm   METABOLIC PANEL, BASIC    Collection Time: 04/28/20  5:35 AM   Result Value Ref Range    Sodium 142 136 - 145 mmol/L    Potassium 5.4 (H) 3.5 - 5.1 mmol/L    Chloride 111 (H) 98 - 107 mmol/L    CO2 18 (L) 21 - 32 mmol/L    Anion gap 13 7 - 16 mmol/L    Glucose 164 (H) 65 - 100 mg/dL    BUN 52 (H) 8 - 23 MG/DL    Creatinine 4.10 (H) 0.6 - 1.0 MG/DL    GFR est AA 14 (L) >60 ml/min/1.73m2    GFR est non-AA 11 (L) >60 ml/min/1.73m2    Calcium 9.1 8.3 - 10.4 MG/DL   CBC WITH AUTOMATED DIFF    Collection Time: 04/28/20  5:35 AM   Result Value Ref Range    WBC 4.2 (L) 4.3 - 11.1 K/uL    RBC 2.23 (L) 4.05 - 5.2 M/uL    HGB 7.8 (L) 11.7 - 15.4 g/dL    HCT 25.2 (L) 35.8 - 46.3 %    .0 (H) 79.6 - 97.8 FL    MCH 35.0 (H) 26.1 - 32.9 PG    MCHC 31.0 (L) 31.4 - 35.0 g/dL    RDW 16.0 (H) 11.9 - 14.6 %    PLATELET 026 751 - 270 K/uL    MPV 10.0 9.4 - 12.3 FL    ABSOLUTE NRBC 0.00 0.0 - 0.2 K/uL    DF AUTOMATED      NEUTROPHILS 85 (H) 43 - 78 %    LYMPHOCYTES 8 (L) 13 - 44 %    MONOCYTES 6 4.0 - 12.0 %    EOSINOPHILS 0 (L) 0.5 - 7.8 %    BASOPHILS 0 0.0 - 2.0 %    IMMATURE GRANULOCYTES 1 0.0 - 5.0 %    ABS. NEUTROPHILS 3.6 1.7 - 8.2 K/UL    ABS. LYMPHOCYTES 0.3 (L) 0.5 - 4.6 K/UL    ABS. MONOCYTES 0.3 0.1 - 1.3 K/UL    ABS. EOSINOPHILS 0.0 0.0 - 0.8 K/UL    ABS. BASOPHILS 0.0 0.0 - 0.2 K/UL    ABS. IMM. GRANS. 0.0 0.0 - 0.5 K/UL   METABOLIC PANEL, BASIC    Collection Time: 04/28/20  2:53 PM   Result Value Ref Range    Sodium 143 136 - 145 mmol/L    Potassium 5.8 (H) 3.5 - 5.1 mmol/L    Chloride 113 (H) 98 - 107 mmol/L    CO2 20 (L) 21 - 32 mmol/L    Anion gap 10 7 - 16 mmol/L    Glucose 150 (H) 65 - 100 mg/dL    BUN 56 (H) 8 - 23 MG/DL    Creatinine 4.36 (H) 0.6 - 1.0 MG/DL    GFR est AA 13 (L) >60 ml/min/1.73m2    GFR est non-AA 10 (L) >60 ml/min/1.73m2    Calcium 8.8 8.3 - 10.4 MG/DL       All Micro Results     Procedure Component Value Units Date/Time    MSSA/MRSA SC BY PCR, NASAL SWAB [[id number]]     Order Status:  Sent Specimen:  Nasal swab           Other Studies:  No results found.     Assessment and Plan:     Dx:  1 - Left proximal femur fracture with hardware failure, stable postop, pain controlled  2 - ALEISHA on CKD stage 4, worsening w/ mild metabolic acidosis and hyperkalemia not responding to oral Rx  3 - Hypotension/ low normal BP, hx of HTN  4 - Hx of breast cancer  5-  Lower Hb    Rx:  NS bolus  Bicarb gtt  [place] cath  IO monitor  PRBC transfusion 1 unit  Stop anti-HTN  Nephrology consulted    Dispo; rehab    Signed:  Tawny Broussard MD

## 2020-04-28 NOTE — PROGRESS NOTES
Problem: Self Care Deficits Care Plan (Adult)  Goal: *Acute Goals and Plan of Care (Insert Text)  Description: 1. Patient will complete grooming in standing with CGA and adaptive equipment as needed. 2. Patient will complete functional transfers with supervision while maintaining WBAT status in LLE and with adaptive equipment as needed. 3. Patient will complete lower body bathing and dressing with minimal assistance and adaptive equipment as needed. 4. Patient will complete toileting and toilet transfer with CGA. 5. Patient will tolerate 20 minutes of OT treatment with as needed rest breaks to increase activity tolerance for ADLs. Timeframe: 7 visits      Outcome: Progressing Towards Goal       OCCUPATIONAL THERAPY: Initial Assessment and Daily Note 4/28/2020  INPATIENT: OT Visit Days: 1  Payor: Monica Quiros / Plan: [address] [place] [address] HMO / Product Type: [employer] /      NAME/AGE/GENDER: Merry Yung is a 67 y.o. female   PRIMARY DIAGNOSIS:  Fracture, proximal femur, left, closed, initial encounter ([facility] [S72.002A] Fracture, proximal femur, left, closed, initial encounter ([facility] Fracture, proximal femur, left, closed, initial encounter ([facility]  Procedure(s) (LRB):  LEFT HIP REMOVAL OF HARDWARE AND LEFT FEMUR INSERTION INTRA MEDULLARY NAIL (Left)  1 Day Post-Op  ICD-10: Treatment Diagnosis:    Pain in left hip (M25.552)  Stiffness of Left Hip, Not elsewhere classified (M25.652)   Precautions/Allergies:    WBAT LLE    Penicillins      ASSESSMENT:     Ms. Nichole Dudley is a 67year old female admitted with spontaneous L hip pain, no fall, who was found to have fracture and is now s/p above procedure and WBAT in LLE. Patient has history of breast cancer with bone mets. At baseline she lives with her daughters who assist with ADLs such as bathing (sponge bath) and dressing as needed. She is usually able to toilet and feed herself.  Daughter assist with all IADLS such as meal prep and laundry. She sleeps in a hospital bed and can usually transfer herself to her wheelchair. She walks minimally and primarily uses w/c for mobility. Patient seated in chair upon arrival, agreeable to OT evaluation. Reports no pain. Appears alert and oriented. BUE assessment via observation reveals ROM, [facility] Patient is currently requiring CGA- minimal assist for transfers and maximal assistance for lower body ADLs. She would benefit from continued occupational therapy to increase independence and safety. Will follow. 4/28/2020: Patient participated in ADL activity from seated position: she required minimal assistance to wash hair with shampoo cap and comb hair. Able to wash face with setup. Able to brush teeth with minimal assistance. Patient declined further ADL activity due to receiving blood. This section established at most recent assessment   PROBLEM LIST (Impairments causing functional limitations):  Decreased Strength  Decreased ADL/Functional Activities  Decreased Transfer Abilities  Decreased Ambulation Ability/Technique  Decreased Balance  Increased Pain  Decreased Activity Tolerance  Decreased Flexibility/Joint Mobility  Decreased Knowledge of Precautions   INTERVENTIONS PLANNED: (Benefits and precautions of occupational therapy have been discussed with the patient.)  Activities of daily living training  Adaptive equipment training  Therapeutic activity  Therapeutic exercise  Wheelchair management     TREATMENT PLAN: Frequency/Duration: Follow patient 6x/ week to address above goals. Rehabilitation Potential For Stated Goals: Good     REHAB RECOMMENDATIONS (at time of discharge pending progress):    Placement: It is my opinion, based on this patient's performance to date, that Ms. Alen Bolivar may benefit from participating in 1-2 additional therapy sessions in order to continue to assess for rehab potential and then make recommendation for disposition at discharge.   Equipment:   None at this time              OCCUPATIONAL PROFILE AND HISTORY:   History of Present Injury/Illness (Reason for Referral):  S/p above procedure   Past Medical History/Comorbidities:   Ms. Lamont Barros  has a past medical history of Abnormal EKG (2/16/2016), Aortic valve insufficiency (2/16/2016), Cancer ([facility] Hyperlipidemia (2/16/2016), Hypertension (2/16/2016), and Obesity (2/16/2016). Ms. Lamont Barros  has a past surgical history that includes hx tubal ligation. Social History/Living Environment:   Home Environment: Trailer/mobile home  # Steps to Enter: 0  Wheelchair Ramp: Yes  One/Two Story Residence: One story  Living Alone: No  Support Systems: Child(ezequiel), Family member(s)  Patient Expects to be Discharged to[de-identified] Private residence  Current DME Used/Available at Home: Walker, rolling, Wheelchair, Commode, bedside, Hospital bed  Tub or Shower Type: (sponge bth )  Prior Level of Function/Work/Activity:  t baseline she lives with her daughters who assist with ADLs such as bathing (sponge bath) and dressing as needed. She is usually able to toilet and feed herself. Daughter assist with all IADLS such as meal prep and laundry. She sleeps in a hospital bed and can usually transfer herself to her wheelchair. She walks minimally and primarily uses w/c for mobility. Number of Personal Factors/Comorbidities that affect the Plan of Care: Expanded review of therapy/medical records (1-2):  MODERATE COMPLEXITY   ASSESSMENT OF OCCUPATIONAL PERFORMANCE[de-identified]   Activities of Daily Living:   Basic ADLs (From Assessment) Complex ADLs (From Assessment)   Feeding: Setup  Oral Facial Hygiene/Grooming: Minimum assistance  Bathing: Moderate assistance  Upper Body Dressing: Setup  Lower Body Dressing: Maximum assistance  Toileting: Maximum assistance Instrumental ADL  Meal Preparation: Total assistance  Homemaking:  Total assistance  Medication Management: Total assistance  Financial Management: Total assistance   Grooming/Bathing/Dressing Activities of Daily Living   Grooming  Grooming Assistance: Minimum assistance  Position Performed: Seated in chair  Washing Face: Set-up  Brushing Teeth: Minimum assistance  Applying Makeup: Minimum assistance(washing hair with shampoo cap)  Brushing/Combing Hair: Minimum assistance Cognitive Retraining  Safety/Judgement: Awareness of environment; Fall prevention     Feeding  Feeding Assistance: Set-up  Drink to Mouth: Set-up                 Bed/Mat Mobility  Supine to Sit: Minimum assistance; Moderate assistance  Sit to Stand: Contact guard assistance  Stand to Sit: Contact guard assistance  Bed to Chair: Contact guard assistance  Scooting: Contact guard assistance;Stand-by assistance     Most Recent Physical Functioning:   Gross Assessment:  AROM: Generally decreased, functional  Strength: Generally decreased, functional               Posture:  Posture (WDL): Exceptions to WDL  Posture Assessment: Forward head, Rounded shoulders, Trunk flexion  Balance:  Sitting: Intact  Standing: Impaired  Standing - Static: Fair  Standing - Dynamic : Fair Bed Mobility:  Supine to Sit: Minimum assistance; Moderate assistance  Scooting: Contact guard assistance;Stand-by assistance  Wheelchair Mobility:     Transfers:  Sit to Stand: Contact guard assistance  Stand to Sit: Contact guard assistance  Bed to Chair: Contact guard assistance  Interventions: Verbal cues; Safety awareness training; Tactile cues  Duration: 10 Minutes            Patient Vitals for the past 6 hrs:   BP BP Patient Position SpO2 Pulse   04/28/20 0404 105/51 -- 94 % 94   04/28/20 0747 122/77 At rest 93 % 84   04/28/20 0913 100/66 Sitting 93 % 87   04/28/20 0928 104/71 Sitting 94 % 83   04/28/20 0945 117/75 Sitting 92 % 78       Mental Status  Neurologic State: Alert  Orientation Level: Oriented X4  Cognition: Follows commands  Perception: Appears intact  Perseveration: No perseveration noted  Safety/Judgement: Awareness of environment, Fall prevention                          Physical Skills Involved:  Balance  Strength  Activity Tolerance  Pain (acute) Cognitive Skills Affected (resulting in the inability to perform in a timely and safe manner):  None   Psychosocial Skills Affected:  Habits/Routines  Environmental Adaptation  Social Roles   Number of elements that affect the Plan of Care: 5+:  HIGH COMPLEXITY   CLINICAL DECISION MAKIN[address] [place] [address] AM-PAC 6 Clicks   Daily Activity Inpatient Short Form  How much help from another person does the patient currently need. .. Total A Lot A Little None   1. Putting on and taking off regular lower body clothing? [] 1   [x] 2   [] 3   [] 4   2. Bathing (including washing, rinsing, drying)? [] 1   [x] 2   [] 3   [] 4   3. Toileting, which includes using toilet, bedpan or urinal?   [] 1   [x] 2   [] 3   [] 4   4. Putting on and taking off regular upper body clothing? [] 1   [] 2   [x] 3   [] 4   5. Taking care of personal grooming such as brushing teeth? [] 1   [] 2   [x] 3   [] 4   6. Eating meals? [] 1   [] 2   [x] 3   [] 4   © , Trustees of [address] [place] [address], under license to [employer]. All rights reserved      Score:  Initial: 15 Most Recent: X (Date: -- )    Interpretation of Tool:  Represents activities that are increasingly more difficult (i.e. Bed mobility, Transfers, Gait). Medical Necessity:     Patient demonstrates   good   rehab potential due to higher previous functional level. Reason for Services/Other Comments:  Patient   continues to require present interventions due to patient's inability to care for self at basleine level of function.     .   Use of outcome tool(s) and clinical judgement create a POC that gives a: HIGH COMPLEXITY         TREATMENT:   (In addition to Assessment/Re-Assessment sessions the following treatments were rendered)     Pre-treatment Symptoms/Complaints:    Pain: Initial:   Pain Intensity 1: 0  Post Session:  none      Self Care: (10 minutes): Procedure(s) (per grid) utilized to improve and/or restore self-care/home management as related to grooming. Required minimal verbal cueing to facilitate activities of daily living skills and compensatory activities. Braces/Orthotics/Lines/Etc:   IV  O2 Device: Room air  Treatment/Session Assessment:    Response to Treatment:  tolerated well   Interdisciplinary Collaboration:   Occupational Therapist  Registered Nurse  After treatment position/precautions:   Up in chair  Bed alarm/tab alert on  Bed/Chair-wheels locked  Call light within reach  RN notified  Nurse at bedside   Compliance with Program/Exercises: Will assess as treatment progresses. Recommendations/Intent for next treatment session: \"Next visit will focus on advancements to more challenging activities and reduction in assistance provided\".   Total Treatment Duration:  OT Patient Time In/Time Out  Time In: 0909  Time Out: NEVILLE Jacobsen/L

## 2020-04-28 NOTE — PROGRESS NOTES
Problem: Mobility Impaired (Adult and Pediatric)  Goal: *Acute Goals and Plan of Care (Insert Text)  Description: LTG:  (1.)Ms. Rafael Lloyd will move from supine to sit and sit to supine, scoot up and down and roll side to side with STAND BY ASSIST within 7 treatment day(s). (2.)Ms. Rafael Lloyd will transfer from bed to chair and chair to bed with STAND BY ASSIST using the least restrictive device within 7 treatment day(s). (3.)Ms. Rafael Lloyd will ambulate with CONTACT GUARD ASSIST for 15+ feet with the least restrictive device within 7 treatment day(s). (4.)Ms. Rafael Lloyd will perform exercises per HEP for 10+ minutes to improve strength and mobility within 7 days. ________________________________________________________________________________________________   Outcome: Progressing Towards Goal     PHYSICAL THERAPY: Initial Assessment and AM 4/28/2020  INPATIENT: PT Visit Days : 1  Payor: Benjamin Oates / Plan: [address] [place] [address] HMO / Product Type: Managed Care Medicare /       NAME/AGE/GENDER: Maura Bobo is a 67 y.o. female   PRIMARY DIAGNOSIS: Fracture, proximal femur, left, closed, initial encounter ([facility] [S72.002A] Fracture, proximal femur, left, closed, initial encounter ([facility] Fracture, proximal femur, left, closed, initial encounter ([facility]  Procedure(s) (LRB):  LEFT HIP REMOVAL OF HARDWARE AND LEFT FEMUR INSERTION INTRA MEDULLARY NAIL (Left)  1 Day Post-Op  ICD-10: Treatment Diagnosis:    Generalized Muscle Weakness (M62.81)  Difficulty in walking, Not elsewhere classified (R26.2)  Other abnormalities of gait and mobility (R26.89)   Precaution/Allergies:  Penicillins      ASSESSMENT:     Ms. Rafael Lloyd is a 67year old female seen for initial therapy evaluation following left hip hardware removal and IM nailing, WBAT per ortho orders. At baseline she lives with two daughters and has been ambulating household distances via furniture walking (hx prior left hip fracture last year).  She presents sitting up in bed without complaints and is agreeable to therapy evaluation, mobility. Transfers to sitting with additional time, min-mod assist for scooting left hip towards edge of bed. Intact seated balance. Close CGA-min assist for sit-stand transfer from edge of bed with cueing for hand/foot placement and sequencing. Stood with walker and fair balance, took a few small steps from bed to chair with min assist. Cueing for slow descent into chair and use of UEs. Pt took short rest break, seems very short of breath. O2 sats initially 88% and quickly improves to 94% on room air,  bpm. Performs seated LE exercises with good participation. Alexy Mujica is functioning below baseline with mobility, strength, balance, transfers, and activity tolerance and will benefit from continued acute PT to address deficits/maximize independence with mobility. Would benefit from rehab at [place] however she is adamant about returning home with family and [facility] PT. This section established at most recent assessment   PROBLEM LIST (Impairments causing functional limitations):  Decreased Strength  Decreased Transfer Abilities  Decreased Ambulation Ability/Technique  Decreased Balance  Increased Pain  Decreased Activity Tolerance  Decreased Flexibility/Joint Mobility   INTERVENTIONS PLANNED: (Benefits and precautions of physical therapy have been discussed with the patient.)  Balance Exercise  Bed Mobility  Gait Training  Home Exercise Program (HEP)  Therapeutic Activites  Therapeutic Exercise/Strengthening  Transfer Training     TREATMENT PLAN: Frequency/Duration: 3 times a week for duration of hospital stay  Rehabilitation Potential For Stated Goals: Good     REHAB RECOMMENDATIONS (at time of discharge pending progress):    Placement: It is my opinion, based on this patient's performance to date, that Ms. Abram Stevenson may benefit from participating in 1-2 additional therapy sessions in order to continue to assess for rehab potential and then make recommendation for disposition at discharge. Recommend rehab however pt wants home with family and HH PT    Equipment:   None at this time              HISTORY:   History of Present Injury/Illness (Reason for Referral):  Per H&P, \"The patient is a 70-year-old  lady with HTN, CKD stage IV with baseline creatinine between 2.4 and 3.0, breast cancer, presented to the ED at [address] [place] [address] complaining of left hip pain. The pain started about 3 days ago and has been gradually worsening. It was estimated at 10/10 of intensity and felt mostly in the left hip and left upper thigh. The patient actually denies any fall. She had a fracture on the same hip last year for which she had hip fracture repair with plates and screws. A left femur x-ray noted evidence of hardware failure with fracture at the site of nonunion proximal femoral shaft fracture. The patient is unsure about what happened and thinks she must have broken her hip while sleeping and moving it in the wrong way. Labs did not show any significant abnormalities, except for creatinine of 2.93 which is actually within her baseline range, even if the most recent creatinine on record was 2.4. Based on her clinical presentation, the hospitalist service was contacted and the patient was admitted to the medical floor for left proximal femur fracture and hardware failure\"    Past Medical History/Comorbidities:   Ms. Sean Davison  has a past medical history of Abnormal EKG (2/16/2016), Aortic valve insufficiency (2/16/2016), Cancer ([address] Hyperlipidemia (2/16/2016), Hypertension (2/16/2016), and Obesity (2/16/2016). Ms. Sean Davison  has a past surgical history that includes hx tubal ligation.   Social History/Living Environment:   Home Environment: Trailer/mobile home  # Steps to Enter: 0  Wheelchair Ramp: Yes  One/Two Story Residence: One story  Living Alone: No  Support Systems: Child(ezequiel), Family member(s)  Patient Expects to be Discharged to[de-identified] Private residence  Current DME Used/Available at Home: [employer] [place], rolling, Wheelchair, Commode, bedside, Hospital bed  Prior Level of Function/Work/Activity:  Lives with two daughters in mobile home with ramp. Had prior left hip sx last year, was ambulating well prior to this incident without DME use. One daughter works, the other is home with pt during the day. Family assists with ADLs. Number of Personal Factors/Comorbidities that affect the Plan of Care: 1-2: MODERATE COMPLEXITY   EXAMINATION:   Most Recent Physical Functioning:   Gross Assessment:  AROM: Generally decreased, functional  Strength: Generally decreased, functional  Coordination: Within functional limits               Posture:  Posture (WDL): Exceptions to WDL  Posture Assessment: Forward head, Rounded shoulders, Trunk flexion  Balance:  Sitting: Intact  Standing: Impaired  Standing - Static: Fair  Standing - Dynamic : Fair Bed Mobility:  Supine to Sit: Minimum assistance; Moderate assistance  Scooting: Contact guard assistance;Stand-by assistance  Wheelchair Mobility:     Transfers:  Sit to Stand: Contact guard assistance  Stand to Sit: Contact guard assistance  Bed to Chair: Contact guard assistance  Interventions: Verbal cues; Safety awareness training; Tactile cues  Duration: 10 Minutes  Gait:  Right Side Weight Bearing: Full  Left Side Weight Bearing: As tolerated  Base of Support: Shift to right  Speed/Madelaine: Pace decreased (<100 feet/min); Slow  Step Length: Left shortened;Right shortened  Gait Abnormalities: Antalgic; Step to gait  Distance (ft): 3 Feet (ft)  Assistive Device: Walker, rolling  Ambulation - Level of Assistance: Minimal assistance  Interventions: Verbal cues; Safety awareness training; Tactile cues      Body Structures Involved:  Bones  Joints  Muscles Body Functions Affected:  Sensory/Pain  Movement Related Activities and Participation Affected:  General Tasks and Demands  Mobility  Domestic Life  Community, Social and [place] Number of elements that affect the Plan of Care: 4+: HIGH COMPLEXITY   CLINICAL PRESENTATION:   Presentation: Stable and uncomplicated: LOW COMPLEXITY   CLINICAL DECISION MAKING:   [facility] AM-PAC 6 Clicks   Basic Mobility Inpatient Short Form  How much difficulty does the patient currently have. .. Unable A Lot A Little None   1. Turning over in bed (including adjusting bedclothes, sheets and blankets)? [] 1   [] 2   [x] 3   [] 4   2. Sitting down on and standing up from a chair with arms ( e.g., wheelchair, bedside commode, etc.)   [] 1   [] 2   [x] 3   [] 4   3. Moving from lying on back to sitting on the side of the bed? [] 1   [x] 2   [] 3   [] 4   How much help from another person does the patient currently need. .. Total A Lot A Little None   4. Moving to and from a bed to a chair (including a wheelchair)? [] 1   [] 2   [x] 3   [] 4   5. Need to walk in hospital room? [] 1   [x] 2   [] 3   [] 4   6. Climbing 3-5 steps with a railing? [x] 1   [] 2   [] 3   [] 4   © 2007, Trustees of [facility], under license to "[employer]. All rights reserved      Score:  Initial: 14 Most Recent: X (Date: -- )    Interpretation of Tool:  Represents activities that are increasingly more difficult (i.e. Bed mobility, Transfers, Gait). Medical Necessity:     Patient demonstrates   good   rehab potential due to higher previous functional level. Reason for Services/Other Comments:  Patient   continues to demonstrate capacity to improve strength, mobility, balance, transfers, and activity tolerance which will   increase independence, decrease amount of assistance required from caregiver, and increase safety  .    Use of outcome tool(s) and clinical judgement create a POC that gives a: Clear prediction of patient's progress: LOW COMPLEXITY            TREATMENT:   (In addition to Assessment/Re-Assessment sessions the following treatments were rendered)   Pre-treatment Symptoms/Complaints: \"thank you\"  Pain: Initial:   Pain Intensity 1: 3  Pain Location 1: Hip  Pain Orientation 1: Left  Pain Intervention(s) 1: Repositioned  Post Session:  0/10     Therapeutic Activity: (  10 Minutes ):  Therapeutic activities including Bed transfers, Chair transfers, Ambulation on level ground, and exercises to improve mobility, strength, balance, and coordination. Required minimal Verbal cues; Safety awareness training; Tactile cues to promote static and dynamic balance in standing and promote motor control of bilateral, lower extremity(s). Date:  4/28/20 Date:   Date:     Activity/Exercise Parameters Parameters Parameters   LAQ 10x AB     Seated marching 10x AB     Ankle pumps 10x AB     Seated hip aBd 8x AB                       A=active, B=bilateral    Braces/Orthotics/Lines/Etc:   IV  O2 Device: Room air  Treatment/Session Assessment:    Response to Treatment:  min-mod A bed mob, CGA-min A transfers and bed to chair  Interdisciplinary Collaboration:   Physical Therapist  Registered Nurse  After treatment position/precautions:   Up in chair  Bed alarm/tab alert on  Bed/Chair-wheels locked  Bed in low position  Call light within reach   Compliance with Program/Exercises: Will assess as treatment progresses  Recommendations/Intent for next treatment session: \"Next visit will focus on advancements to more challenging activities and reduction in assistance provided\".   Total Treatment Duration:  PT Patient Time In/Time Out  Time In: 0830  Time Out: RENO Mishra

## 2020-04-28 NOTE — PROGRESS NOTES
Hospitalist     Admit Date:  2020  1:28 PM   Name:  Long Mendez   Age:  67 y.o.  :  1945   MRN:  [id number]   PCP:  Melonie Reynolds NP    subj:      74-year-old  lady with HTN, CKD stage IV with baseline creatinine between 2.4 and 3.0, breast cancer, presented to the ED at [address] [place] [address] complaining of left hip pain. The pain started about 3 days ago and has been gradually worsening. It was estimated at 10/10 of intensity and felt mostly in the left hip and left upper thigh. The patient actually denies any fall. She had a fracture on the same hip last year for which she had hip fracture repair with plates and screws. A left femur x-ray noted evidence of hardware failure with fracture at the site of nonunion proximal femoral shaft fracture. The patient is unsure about what happened and thinks she must have broken her hip while sleeping and moving it in the wrong way. Labs did not show any significant abnormalities, except for creatinine of 2.93 which is actually within her baseline range, even if the most recent creatinine on record was 2.4. Based on her clinical presentation, the hospitalist service was contacted and the patient was admitted to the medical floor for left proximal femur fracture and hardware failure.     Today, going for surgery, pain controlled    Objective:     Patient Vitals for the past 24 hrs:   Temp Pulse Resp BP SpO2   20 1526 98.4 °F (36.9 °C) 92 16 94/57 97 %   20 1153 97.7 °F (36.5 °C) 79 17 124/56 95 %   20 0945  78 18 117/75 92 %   20 0928 98.2 °F (36.8 °C) 83 18 104/71 94 %   20 0913 97.5 °F (36.4 °C) 87 18 100/66 93 %   20 0747 97.6 °F (36.4 °C) 84 18 122/77 93 %   20 0404 97.9 °F (36.6 °C) 94 16 105/51 94 %   20 2320 98 °F (36.7 °C) 92 16 96/68 97 %   20 1944 °F (36.8 °C) 91 16 100/55 97 %   20 1830    113/79    20 1754    94/54    20 1731    (!) 84/42    04/27/20 1710    (!) 125/95      Oxygen Therapy  O2 Sat (%): 97 % (04/28/20 1526)  Pulse via Oximetry: 93 beats per minute (04/27/20 1416)  O2 Device: Nasal cannula (04/28/20 1320)  O2 Flow Rate (L/min): 2 l/min (04/27/20 1416)    Intake/Output Summary (Last 24 hours) at 4/28/2020 1707  Last data filed at 4/28/2020 1159  Gross per 24 hour   Intake 390 ml   Output 50 ml   Net 340 ml       Physical Exam:  General:    Well nourished. Alert. Obese, pale, moderate distress  CV:   RRR. No murmur, rub, or gallop. Lungs:  CTAB. No wheezing, rhonchi, or rales. Abdomen: Soft, nontender, nondistended. Bowel sounds normal.   Extremities: Warm and dry. No cyanosis or edema. Tenderness on palpation of the left hip. Left lower extremity is shortened and externally rotated. Neurologic: grossly intact. Skin:     No rashes or jaundice. Psych:  Normal mood and affect. I reviewed the labs, imaging, EKGs, telemetry, and other studies done this admission.   Data Review:   Recent Results (from the past 24 hour(s))   PLEASE READ & DOCUMENT PPD TEST IN 24 HRS    Collection Time: 04/27/20  8:10 PM   Result Value Ref Range    PPD Negative Negative    mm Induration 0 0 - 5 mm   METABOLIC PANEL, BASIC    Collection Time: 04/28/20  5:35 AM   Result Value Ref Range    Sodium 142 136 - 145 mmol/L    Potassium 5.4 (H) 3.5 - 5.1 mmol/L    Chloride 111 (H) 98 - 107 mmol/L    CO2 18 (L) 21 - 32 mmol/L    Anion gap 13 7 - 16 mmol/L    Glucose 164 (H) 65 - 100 mg/dL    BUN 52 (H) 8 - 23 MG/DL    Creatinine 4.10 (H) 0.6 - 1.0 MG/DL    GFR est AA 14 (L) >60 ml/min/1.73m2    GFR est non-AA 11 (L) >60 ml/min/1.73m2    Calcium 9.1 8.3 - 10.4 MG/DL   CBC WITH AUTOMATED DIFF    Collection Time: 04/28/20  5:35 AM   Result Value Ref Range    WBC 4.2 (L) 4.3 - 11.1 K/uL    RBC 2.23 (L) 4.05 - 5.2 M/uL    HGB 7.8 (L) 11.7 - 15.4 g/dL    HCT 25.2 (L) 35.8 - 46.3 %    .0 (H) 79.6 - 97.8 FL    MCH 35.0 (H) 26.1 - 32.9 PG MCHC 31.0 (L) 31.4 - 35.0 g/dL    RDW 16.0 (H) 11.9 - 14.6 %    PLATELET 896 457 - 763 K/uL    MPV 10.0 9.4 - 12.3 FL    ABSOLUTE NRBC 0.00 0.0 - 0.2 K/uL    DF AUTOMATED      NEUTROPHILS 85 (H) 43 - 78 %    LYMPHOCYTES 8 (L) 13 - 44 %    MONOCYTES 6 4.0 - 12.0 %    EOSINOPHILS 0 (L) 0.5 - 7.8 %    BASOPHILS 0 0.0 - 2.0 %    IMMATURE GRANULOCYTES 1 0.0 - 5.0 %    ABS. NEUTROPHILS 3.6 1.7 - 8.2 K/UL    ABS. LYMPHOCYTES 0.3 (L) 0.5 - 4.6 K/UL    ABS. MONOCYTES 0.3 0.1 - 1.3 K/UL    ABS. EOSINOPHILS 0.0 0.0 - 0.8 K/UL    ABS. BASOPHILS 0.0 0.0 - 0.2 K/UL    ABS. IMM. GRANS. 0.0 0.0 - 0.5 K/UL   METABOLIC PANEL, BASIC    Collection Time: 04/28/20  2:53 PM   Result Value Ref Range    Sodium 143 136 - 145 mmol/L    Potassium 5.8 (H) 3.5 - 5.1 mmol/L    Chloride 113 (H) 98 - 107 mmol/L    CO2 20 (L) 21 - 32 mmol/L    Anion gap 10 7 - 16 mmol/L    Glucose 150 (H) 65 - 100 mg/dL    BUN 56 (H) 8 - 23 MG/DL    Creatinine 4.36 (H) 0.6 - 1.0 MG/DL    GFR est AA 13 (L) >60 ml/min/1.73m2    GFR est non-AA 10 (L) >60 ml/min/1.73m2    Calcium 8.8 8.3 - 10.4 MG/DL       All Micro Results     Procedure Component Value Units Date/Time    MSSA/MRSA SC BY PCR, NASAL SWAB [[id number]]     Order Status:  Sent Specimen:  Nasal swab           Other Studies:  No results found.     Assessment and Plan:     Hospital Problems as of 4/28/2020 Date Reviewed: 4/15/2020          Codes Class Noted - Resolved POA    * (Principal) Fracture, proximal femur, left, closed, initial encounter [facility]) ICD-10-CM: F53.602U  ICD-9-CM: 820.8  4/26/2020 - Present Yes            1 - Left proximal femur fracture with hardware failure  2 - CKD stage IV, stable  3 - HTN, controlled  4 - Hx of reast cancer    PLAN:  · Keep patient n.p.o. after midnight for possible surgery today  · Consult Orthopedics  · Pain control with Dilaudid as needed  · Hydration with IV fluids, NS at 100 cc/hr  · Hold colchicine and furosemide to avoid any worsening of the renal function  · Resume other home medications for chronic conditions    DVT ppx:  With SCDs  Anticipated DC needs: May need rehab at discharge  Code status:  Full code  Estimated LOS:  Greater than 2 midnights  Risk:  High    Dispo; rehab    Signed:  Tori High MD

## 2020-04-28 NOTE — PROGRESS NOTES
Problem: Mobility Impaired (Adult and Pediatric)  Goal: *Acute Goals and Plan of Care (Insert Text)  Description: LTG:  (1.)Ms. Alverto Zuleta will move from supine to sit and sit to supine, scoot up and down and roll side to side with STAND BY ASSIST within 7 treatment day(s). (2.)Ms. Alverto Zuleta will transfer from bed to chair and chair to bed with STAND BY ASSIST using the least restrictive device within 7 treatment day(s). (3.)Ms. Alverto Zuleta will ambulate with CONTACT GUARD ASSIST for 15+ feet with the least restrictive device within 7 treatment day(s). (4.)Ms. Alverto Zuleta will perform exercises per HEP for 10+ minutes to improve strength and mobility within 7 days. ________________________________________________________________________________________________   Outcome: Progressing Towards Goal     PHYSICAL THERAPY: Daily Note and PM 4/28/2020  INPATIENT: PT Visit Days : 1  Payor: Verenice Abreu / Plan: [address] [place] [address] HMO / Product Type: [employer] Medicare /       NAME/AGE/GENDER: Lindsey Mijares is a 67 y.o. female   PRIMARY DIAGNOSIS: Fracture, proximal femur, left, closed, initial encounter ([facility] [S72.002A] Fracture, proximal femur, left, closed, initial encounter ([facility] Fracture, proximal femur, left, closed, initial encounter ([facility].)  Procedure(s) (LRB):  LEFT HIP REMOVAL OF HARDWARE AND LEFT FEMUR INSERTION INTRA MEDULLARY NAIL (Left)  1 Day Post-Op  ICD-10: Treatment Diagnosis:    · Generalized Muscle Weakness (M62.81)  · Difficulty in walking, Not elsewhere classified (R26.2)  · Other abnormalities of gait and mobility (R26.89)   Precaution/Allergies:  Penicillins      ASSESSMENT:     Ms. Alverto Zuleta is a 67year old female seen for initial therapy evaluation following left hip hardware removal and IM nailing, WBAT per ortho orders. At baseline she lives with two daughters and has been ambulating household distances via furniture walking (hx prior left hip fracture last year).  She presents sitting up in bed without complaints and is agreeable to therapy evaluation, mobility. Transfers to sitting with additional time, min-mod assist for scooting left hip towards edge of bed. Intact seated balance. Close CGA-min assist for sit-stand transfer from edge of bed with cueing for hand/foot placement and sequencing. Stood with walker and fair balance, took a few small steps from bed to chair with min assist. Cueing for slow descent into chair and use of UEs. Pt took short rest break, seems very short of breath. O2 sats initially 88% and quickly improves to 94% on room air,  bpm. Performs seated LE exercises with good participation. Lindsey Mijares is functioning below baseline with mobility, strength, balance, transfers, and activity tolerance and will benefit from continued acute PT to address deficits/maximize independence with mobility. Would benefit from rehab at [place] however she is adamant about returning [facility]. Pm- pt up in chair this afternoon, agreeable to mobility and back to bed. Needs mod-max assist to stand from lower chair and many attempts. Significantly forward flexed posture this afternoon and unable to correct despite cueing. Took a few steps from chair back to bed with min assist and heavy cueing for gait safety, body mechanics. Min-mod assist to return to supine and reposition. Left with needs in reach. This section established at most recent assessment   PROBLEM LIST (Impairments causing functional limitations):  1. Decreased Strength  2. Decreased Transfer Abilities  3. Decreased Ambulation Ability/Technique  4. Decreased Balance  5. Increased Pain  6. Decreased Activity Tolerance  7. Decreased Flexibility/Joint Mobility   INTERVENTIONS PLANNED: (Benefits and precautions of physical therapy have been discussed with the patient.)  1. Balance Exercise  2. Bed Mobility  3. Gait Training  4. Home Exercise Program (HEP)  5. Therapeutic Activites  6.  Therapeutic Exercise/Strengthening  7. Transfer Training     TREATMENT PLAN: Frequency/Duration: 3 times a week for duration of hospital stay  Rehabilitation Potential For Stated Goals: Good     REHAB RECOMMENDATIONS (at time of discharge pending progress):    Placement: It is my opinion, based on this patient's performance to date, that Ms. Dilia Cerda may benefit from participating in 1-2 additional therapy sessions in order to continue to assess for rehab potential and then make recommendation for disposition at discharge. Recommend rehab however pt wants home with family and HH PT    Equipment:    None at this time              HISTORY:   History of Present Injury/Illness (Reason for Referral):  Per H&P, \"The patient is a 74-year-old  lady with HTN, CKD stage IV with baseline creatinine between 2.4 and 3.0, breast cancer, presented to the ED at [facility] complaining of left hip pain. The pain started about 3 days ago and has been gradually worsening. It was estimated at 10/10 of intensity and felt mostly in the left hip and left upper thigh. The patient actually denies any fall. She had a fracture on the same hip last year for which she had hip fracture repair with plates and screws. A left femur x-ray noted evidence of hardware failure with fracture at the site of nonunion proximal femoral shaft fracture. The patient is unsure about what happened and thinks she must have broken her hip while sleeping and moving it in the wrong way. Labs did not show any significant abnormalities, except for creatinine of 2.93 which is actually within her baseline range, even if the most recent creatinine on record was 2.4.   Based on her clinical presentation, the hospitalist service was contacted and the patient was admitted to the medical floor for left proximal femur fracture and hardware failure\"    Past Medical History/Comorbidities:   Ms. Dilia Cerda  has a past medical history of Abnormal EKG (2/16/2016), Aortic valve insufficiency (2/16/2016), Cancer ([facility] Hyperlipidemia (2/16/2016), Hypertension (2/16/2016), and Obesity (2/16/2016). Ms. Lorenzo Harry  has a past surgical history that includes hx tubal ligation. Social History/Living Environment:   Home Environment: Trailer/mobile home  # Steps to Enter: 0  Wheelchair Ramp: Yes  One/Two Story Residence: One story  Living Alone: No  Support Systems: Child(ezequiel), Family member(s)  Patient Expects to be Discharged to[de-identified] Private residence  Current DME Used/Available at Home: [employer] Shallow, rolling, Wheelchair, Commode, bedside, Hospital bed  Tub or Shower Type: (sponge bth )  Prior Level of Function/Work/Activity:  Lives with two daughters in mobile home with ramp. Had prior left hip sx last year, was ambulating well prior to this incident without DME use. One daughter works, the other is home with pt during the day. Family assists with ADLs. Number of Personal Factors/Comorbidities that affect the Plan of Care: 1-2: MODERATE COMPLEXITY   EXAMINATION:   Most Recent Physical Functioning:   Gross Assessment:  AROM: Generally decreased, functional  Strength: Generally decreased, functional  Coordination: Within functional limits               Posture:  Posture (WDL): Exceptions to WDL  Posture Assessment: Forward head, Rounded shoulders, Trunk flexion  Balance:  Sitting: Intact  Standing: Impaired  Standing - Static: Fair  Standing - Dynamic : Fair Bed Mobility:  Supine to Sit: Minimum assistance; Moderate assistance  Sit to Supine: Minimum assistance  Scooting: Minimum assistance  Wheelchair Mobility:     Transfers:  Sit to Stand: Moderate assistance;Maximum assistance  Stand to Sit: Minimum assistance  Bed to Chair: Minimum assistance  Interventions: Verbal cues; Visual cues; Safety awareness training; Tactile cues;Manual cues  Duration: 15 Minutes  Gait:  Right Side Weight Bearing: Full  Left Side Weight Bearing: As tolerated  Base of Support: Shift to right  Speed/Madelaine: Pace decreased (<100 feet/min); Slow  Step Length: Left shortened;Right shortened  Gait Abnormalities: Antalgic; Step to gait  Distance (ft): 3 Feet (ft)  Assistive Device: Walker, rolling  Ambulation - Level of Assistance: Minimal assistance  Interventions: Verbal cues; Safety awareness training; Tactile cues      Body Structures Involved:  1. Bones  2. Joints  3. Muscles Body Functions Affected:  1. Sensory/Pain  2. Movement Related Activities and Participation Affected:  1. General Tasks and Demands  2. Mobility  3. Domestic Life  4. Community, Social and [place]   Number of elements that affect the Plan of Care: 4+: HIGH COMPLEXITY   CLINICAL PRESENTATION:   Presentation: Stable and uncomplicated: LOW COMPLEXITY   CLINICAL DECISION MAKIN [facility] Inpatient Short Form  How much difficulty does the patient currently have. .. Unable A Lot A Little None   1. Turning over in bed (including adjusting bedclothes, sheets and blankets)? [] 1   [] 2   [x] 3   [] 4   2. Sitting down on and standing up from a chair with arms ( e.g., wheelchair, bedside commode, etc.)   [] 1   [] 2   [x] 3   [] 4   3. Moving from lying on back to sitting on the side of the bed? [] 1   [x] 2   [] 3   [] 4   How much help from another person does the patient currently need. .. Total A Lot A Little None   4. Moving to and from a bed to a chair (including a wheelchair)? [] 1   [] 2   [x] 3   [] 4   5. Need to walk in hospital room? [] 1   [x] 2   [] 3   [] 4   6. Climbing 3-5 steps with a railing? [x] 1   [] 2   [] 3   [] 4   © , Trustees of [address] [place] [address] [address] [address], under license to [employer]. All rights reserved      Score:  Initial: 14 Most Recent: X (Date: -- )    Interpretation of Tool:  Represents activities that are increasingly more difficult (i.e. Bed mobility, Transfers, Gait).     Medical Necessity:     · Patient demonstrates   · good  ·  rehab potential due to higher previous functional level.  Reason for Services/Other Comments:  · Patient   · continues to demonstrate capacity to improve strength, mobility, balance, transfers, and activity tolerance which will   · increase independence, decrease amount of assistance required from caregiver, and increase safety  · . Use of outcome tool(s) and clinical judgement create a POC that gives a: Clear prediction of patient's progress: LOW COMPLEXITY            TREATMENT:   (In addition to Assessment/Re-Assessment sessions the following treatments were rendered)   Pre-treatment Symptoms/Complaints:  \"I'm going to nap\"  Pain: Initial:   Pain Intensity 1: 2  Pain Location 1: Hip  Pain Orientation 1: Left  Pain Intervention(s) 1: Repositioned  Post Session:  0/10     Therapeutic Activity: (  15 Minutes ):  Therapeutic activities including Bed mobility, Chair transfers, Ambulation on level ground (few steps back to bed) to improve mobility, strength, balance, and coordination. Required minimal Verbal cues; Safety awareness training; Tactile cues to promote static and dynamic balance in standing and promote motor control of bilateral, lower extremity(s). Date:  4/28/20 Date:   Date:     Activity/Exercise Parameters Parameters Parameters   LAQ 10x AB     Seated marching 10x AB     Ankle pumps 10x AB     Seated hip aBd 8x AB                       A=active, B=bilateral    Braces/Orthotics/Lines/Etc:   · nasal cannula  Treatment/Session Assessment:    · Response to Treatment:  Heavy assist sit-stand from chair, poor mechanics with amb back to bed  · Interdisciplinary Collaboration:   o Physical Therapist  o Registered Nurse  · After treatment position/precautions:   o Supine in bed  o Bed alarm/tab alert on  o Bed/Chair-wheels locked  o Bed in low position  o Call light within reach   · Compliance with Program/Exercises: Will assess as treatment progresses  · Recommendations/Intent for next treatment session:   \"Next visit will focus on advancements to more challenging activities and reduction in assistance provided\".   Total Treatment Duration:  PT Patient Time In/Time Out  Time In: 1320  Time Out: [address], DPT

## 2020-04-28 NOTE — PROGRESS NOTES
Problem: Falls - Risk of  Goal: *Absence of Falls  Description: Document Gary Carlos Fall Risk and appropriate interventions in the flowsheet. Outcome: Progressing Towards Goal  Note: Fall Risk Interventions:  Mobility Interventions: Bed/chair exit alarm, OT consult for ADLs, Patient to call before getting OOB, PT Consult for mobility concerns         Medication Interventions: Bed/chair exit alarm, Patient to call before getting OOB, Teach patient to arise slowly    Elimination Interventions: Bed/chair exit alarm, Call light in reach, Patient to call for help with toileting needs, Toileting schedule/hourly rounds    History of Falls Interventions: Bed/chair exit alarm, Door open when patient unattended         Problem: Patient Education: Go to Patient Education Activity  Goal: Patient/Family Education  Outcome: Progressing Towards Goal     Problem: Pressure Injury - Risk of  Goal: *Prevention of pressure injury  Description: Document Arturo Scale and appropriate interventions in the flowsheet.   Outcome: Progressing Towards Goal  Note: Pressure Injury Interventions:  Sensory Interventions: Assess changes in LOC, Check visual cues for pain         Activity Interventions: Increase time out of bed, PT/OT evaluation    Mobility Interventions: HOB 30 degrees or less, PT/OT evaluation    Nutrition Interventions: Document food/fluid/supplement intake, Discuss nutritional consult with provider, Offer support with meals,snacks and hydration                     Problem: Patient Education: Go to Patient Education Activity  Goal: Patient/Family Education  Outcome: Progressing Towards Goal     Problem: Hip Fracture: Post-Op Day 1  Goal: Off Pathway (Use only if patient is Off Pathway)  Outcome: Progressing Towards Goal  Goal: Activity/Safety  Outcome: Progressing Towards Goal  Goal: Diagnostic Test/Procedures  Outcome: Progressing Towards Goal  Goal: Nutrition/Diet  Outcome: Progressing Towards Goal  Goal: Medications  Outcome: Progressing Towards Goal  Goal: Respiratory  Outcome: Progressing Towards Goal  Goal: Treatments/Interventions/Procedures  Outcome: Progressing Towards Goal  Goal: Psychosocial  Outcome: Progressing Towards Goal  Goal: Discharge Planning  Outcome: Progressing Towards Goal  Goal: *Demonstrates progressive activity  Outcome: Progressing Towards Goal  Goal: *Optimal pain control at patient's stated goal  Outcome: Progressing Towards Goal  Goal: *Hemodynamically stable  Outcome: Progressing Towards Goal  Goal: *Discharge plan identified  Outcome: Progressing Towards Goal  Goal: *Absence of skin breakdown  Outcome: Progressing Towards Goal

## 2020-04-28 NOTE — PROGRESS NOTES
Initial visit by  to convey care and concern and encourage patient that  services are available if desired. No needs were voiced during the visit. Provided 's business card for future reference. Chaplains remain available for support.      Benjamin Quintero 67  Board Certified

## 2020-04-29 ENCOUNTER — APPOINTMENT (OUTPATIENT)
Dept: ULTRASOUND IMAGING | Age: 75
DRG: 481 | End: 2020-04-29
Attending: NURSE PRACTITIONER
Payer: MEDICARE

## 2020-04-29 LAB
ANION GAP SERPL CALC-SCNC: 7 MMOL/L (ref 7–16)
APPEARANCE UR: CLEAR
BACTERIA URNS QL MICRO: ABNORMAL /HPF
BILIRUB UR QL: NEGATIVE
BUN SERPL-MCNC: 58 MG/DL (ref 8–23)
CALCIUM SERPL-MCNC: 8.7 MG/DL (ref 8.3–10.4)
CHLORIDE SERPL-SCNC: 108 MMOL/L (ref 98–107)
CO2 SERPL-SCNC: 29 MMOL/L (ref 21–32)
COLOR UR: YELLOW
CREAT SERPL-MCNC: 3.97 MG/DL (ref 0.6–1)
CREAT UR-MCNC: 79 MG/DL
EPI CELLS #/AREA URNS HPF: ABNORMAL /HPF
GLUCOSE SERPL-MCNC: 131 MG/DL (ref 65–100)
GLUCOSE UR STRIP.AUTO-MCNC: NEGATIVE MG/DL
HGB BLD-MCNC: 6.8 G/DL (ref 11.7–15.4)
HGB UR QL STRIP: ABNORMAL
KETONES UR QL STRIP.AUTO: NEGATIVE MG/DL
LEUKOCYTE ESTERASE UR QL STRIP.AUTO: NEGATIVE
MM INDURATION POC: 0 MM (ref 0–5)
MUCOUS THREADS URNS QL MICRO: ABNORMAL /LPF
NITRITE UR QL STRIP.AUTO: NEGATIVE
OTHER OBSERVATIONS,UCOM: ABNORMAL
PH UR STRIP: 5.5 [PH] (ref 5–9)
POTASSIUM SERPL-SCNC: 3.9 MMOL/L (ref 3.5–5.1)
PPD POC: NEGATIVE NEGATIVE
PROT UR STRIP-MCNC: NEGATIVE MG/DL
RBC #/AREA URNS HPF: ABNORMAL /HPF
SODIUM SERPL-SCNC: 144 MMOL/L (ref 136–145)
SODIUM UR-SCNC: 74 MMOL/L
SP GR UR REFRACTOMETRY: 1.01 (ref 1–1.02)
UROBILINOGEN UR QL STRIP.AUTO: 0.2 EU/DL (ref 0.2–1)
WBC URNS QL MICRO: ABNORMAL /HPF

## 2020-04-29 PROCEDURE — 74011250636 HC RX REV CODE- 250/636: Performed by: HOSPITALIST

## 2020-04-29 PROCEDURE — 80048 BASIC METABOLIC PNL TOTAL CA: CPT

## 2020-04-29 PROCEDURE — 84156 ASSAY OF PROTEIN URINE: CPT

## 2020-04-29 PROCEDURE — 82570 ASSAY OF URINE CREATININE: CPT

## 2020-04-29 PROCEDURE — 65270000029 HC RM PRIVATE

## 2020-04-29 PROCEDURE — 76937 US GUIDE VASCULAR ACCESS: CPT

## 2020-04-29 PROCEDURE — 84300 ASSAY OF URINE SODIUM: CPT

## 2020-04-29 PROCEDURE — 76775 US EXAM ABDO BACK WALL LIM: CPT

## 2020-04-29 PROCEDURE — 36430 TRANSFUSION BLD/BLD COMPNT: CPT

## 2020-04-29 PROCEDURE — 74011250637 HC RX REV CODE- 250/637: Performed by: ORTHOPAEDIC SURGERY

## 2020-04-29 PROCEDURE — 77030037759

## 2020-04-29 PROCEDURE — 97110 THERAPEUTIC EXERCISES: CPT

## 2020-04-29 PROCEDURE — P9040 RBC LEUKOREDUCED IRRADIATED: HCPCS

## 2020-04-29 PROCEDURE — 36415 COLL VENOUS BLD VENIPUNCTURE: CPT

## 2020-04-29 PROCEDURE — 74011250637 HC RX REV CODE- 250/637: Performed by: HOSPITALIST

## 2020-04-29 PROCEDURE — 77030040393 HC DRSG OPTIFOAM GENT MDII -B

## 2020-04-29 PROCEDURE — 77030040361 HC SLV COMPR DVT MDII -B

## 2020-04-29 PROCEDURE — 94760 N-INVAS EAR/PLS OXIMETRY 1: CPT

## 2020-04-29 PROCEDURE — P9016 RBC LEUKOCYTES REDUCED: HCPCS

## 2020-04-29 PROCEDURE — 77010033678 HC OXYGEN DAILY

## 2020-04-29 PROCEDURE — 81001 URINALYSIS AUTO W/SCOPE: CPT

## 2020-04-29 PROCEDURE — 85018 HEMOGLOBIN: CPT

## 2020-04-29 PROCEDURE — 97530 THERAPEUTIC ACTIVITIES: CPT

## 2020-04-29 PROCEDURE — 74011000250 HC RX REV CODE- 250: Performed by: HOSPITALIST

## 2020-04-29 RX ORDER — NYSTATIN 100000 [USP'U]/G
POWDER TOPICAL 2 TIMES DAILY
Status: DISCONTINUED | OUTPATIENT
Start: 2020-04-29 | End: 2020-05-01 | Stop reason: HOSPADM

## 2020-04-29 RX ORDER — SODIUM CHLORIDE 9 MG/ML
250 INJECTION, SOLUTION INTRAVENOUS AS NEEDED
Status: DISCONTINUED | OUTPATIENT
Start: 2020-04-29 | End: 2020-05-01 | Stop reason: HOSPADM

## 2020-04-29 RX ORDER — DIPHENHYDRAMINE HCL 25 MG
25 TABLET ORAL
COMMUNITY

## 2020-04-29 RX ORDER — HYDROCODONE BITARTRATE AND ACETAMINOPHEN 7.5; 325 MG/1; MG/1
1 TABLET ORAL
Status: DISCONTINUED | OUTPATIENT
Start: 2020-04-29 | End: 2020-05-01 | Stop reason: HOSPADM

## 2020-04-29 RX ORDER — SODIUM CHLORIDE 9 MG/ML
100 INJECTION, SOLUTION INTRAVENOUS CONTINUOUS
Status: DISCONTINUED | OUTPATIENT
Start: 2020-04-29 | End: 2020-04-30

## 2020-04-29 RX ADMIN — ASPIRIN 325 MG ORAL TABLET 325 MG: 325 PILL ORAL at 09:39

## 2020-04-29 RX ADMIN — Medication 10 ML: at 22:37

## 2020-04-29 RX ADMIN — NYSTATIN: 100000 POWDER TOPICAL at 23:37

## 2020-04-29 RX ADMIN — Medication 10 ML: at 11:56

## 2020-04-29 RX ADMIN — SODIUM CHLORIDE 100 ML/HR: 900 INJECTION, SOLUTION INTRAVENOUS at 09:39

## 2020-04-29 RX ADMIN — Medication 10 ML: at 04:57

## 2020-04-29 RX ADMIN — SODIUM BICARBONATE: 84 INJECTION, SOLUTION INTRAVENOUS at 06:03

## 2020-04-29 RX ADMIN — Medication 1 TABLET: at 11:54

## 2020-04-29 RX ADMIN — Medication 500 MG: at 09:39

## 2020-04-29 RX ADMIN — SODIUM CHLORIDE 100 ML/HR: 900 INJECTION, SOLUTION INTRAVENOUS at 22:41

## 2020-04-29 RX ADMIN — Medication 1 TABLET: at 17:51

## 2020-04-29 RX ADMIN — HYDROCODONE BITARTRATE AND ACETAMINOPHEN 1 TABLET: 7.5; 325 TABLET ORAL at 11:54

## 2020-04-29 RX ADMIN — Medication 1 TABLET: at 09:39

## 2020-04-29 RX ADMIN — HYDROCODONE BITARTRATE AND ACETAMINOPHEN 1 TABLET: 7.5; 325 TABLET ORAL at 22:38

## 2020-04-29 RX ADMIN — FLUCONAZOLE 100 MG: 100 TABLET ORAL at 09:39

## 2020-04-29 RX ADMIN — Medication 500 MG: at 17:51

## 2020-04-29 NOTE — PROGRESS NOTES
Hospitalist     Admit Date:  2020  1:28 PM   Name:  Alida Duane   Age:  67 y.o.  :  1945   MRN:  [id number]   PCP:  Sunitha Henriquez NP    subj:      79-year-old  lady with HTN, CKD stage IV with baseline creatinine between 2.4 and 3.0, breast cancer, presented to the ED at [facility] complaining of left hip pain. The pain started about 3 days ago and has been gradually worsening. It was estimated at 10/10 of intensity and felt mostly in the left hip and left upper thigh. The patient actually denies any fall. She had a fracture on the same hip last year for which she had hip fracture repair with plates and screws. A left femur x-ray noted evidence of hardware failure with fracture at the site of nonunion proximal femoral shaft fracture. The patient is unsure about what happened and thinks she must have broken her hip while sleeping and moving it in the wrong way. Labs did not show any significant abnormalities, except for creatinine of 2.93 which is actually within her baseline range, even if the most recent creatinine on record was 2.4. Based on her clinical presentation, the hospitalist service was contacted and the patient was admitted to the medical floor for left proximal femur fracture and hardware failure.     Today, stable postop, pain controlled, makes clear urine    Objective:     Patient Vitals for the past 24 hrs:   Temp Pulse Resp BP SpO2   20 1153 98.1 °F (36.7 °C) 81 18 111/54 93 %   20 1138 98.3 °F (36.8 °C) 80 18 143/65 98 %   20 1117     99 %   20 1030 98.3 °F (36.8 °C) 81 18 137/70 99 %   20 0932 98.3 °F (36.8 °C) 84 18 124/58 95 %   20 0913 98.3 °F (36.8 °C) 86 18 137/78 92 %   20 0831 98.5 °F (36.9 °C) 96 16 101/49 91 %   20 0511 98.4 °F (36.9 °C) 92 16 109/74 91 %   04/28/20 2338 98.1 °F (36.7 °C) 96 16 105/56 91 %   20 97.8 °F (36.6 °C) 87 16 126/67 90 %     Oxygen Therapy  O2 Sat (%): 93 % (04/29/20 1153)  Pulse via Oximetry: 73 beats per minute (04/29/20 1117)  O2 Device: Room air (04/29/20 1153)  O2 Flow Rate (L/min): 2 l/min(Hgb 6.8, did not wean oxygen) (04/29/20 1117)    Intake/Output Summary (Last 24 hours) at 4/29/2020 1608  Last data filed at 4/29/2020 1241  Gross per 24 hour   Intake 7529.8 ml   Output 800 ml   Net 6729.8 ml       Physical Exam:  General:    Well nourished. Alert. Obese, pale, moderate distress  CV:   RRR. No murmur, rub, or gallop. Lungs:  CTAB. No wheezing, rhonchi, or rales. Abdomen: Soft, nontender, nondistended. Bowel sounds normal.   Extremities: Warm and dry. No cyanosis or edema. Tenderness on palpation of the left hip. Left lower extremity is shortened and externally rotated. Neurologic: grossly intact. Skin:     No rashes or jaundice. Psych:  Normal mood and affect. I reviewed the labs, imaging, EKGs, telemetry, and other studies done this admission.   Data Review:   Recent Results (from the past 24 hour(s))   PLEASE READ & DOCUMENT PPD TEST IN 48 HRS    Collection Time: 04/28/20  8:10 PM   Result Value Ref Range    PPD Negative Negative    mm Induration 0 0 - 5 mm   METABOLIC PANEL, BASIC    Collection Time: 04/29/20  4:27 AM   Result Value Ref Range    Sodium 144 136 - 145 mmol/L    Potassium 3.9 3.5 - 5.1 mmol/L    Chloride 108 (H) 98 - 107 mmol/L    CO2 29 21 - 32 mmol/L    Anion gap 7 7 - 16 mmol/L    Glucose 131 (H) 65 - 100 mg/dL    BUN 58 (H) 8 - 23 MG/DL    Creatinine 3.97 (H) 0.6 - 1.0 MG/DL    GFR est AA 14 (L) >60 ml/min/1.73m2    GFR est non-AA 12 (L) >60 ml/min/1.73m2    Calcium 8.7 8.3 - 10.4 MG/DL   HEMOGLOBIN    Collection Time: 04/29/20  4:27 AM   Result Value Ref Range    HGB 6.8 (LL) 11.7 - 15.4 g/dL   URINALYSIS W/ RFLX MICROSCOPIC    Collection Time: 04/29/20 12:13 PM   Result Value Ref Range    Color YELLOW      Appearance CLEAR      Specific gravity 1.012 1.001 - 1.023      pH (UA) 5.5 5.0 - 9.0      Protein Negative NEG mg/dL    Glucose Negative mg/dL    Ketone Negative NEG mg/dL    Bilirubin Negative NEG      Blood SMALL (A) NEG      Urobilinogen 0.2 0.2 - 1.0 EU/dL    Nitrites Negative NEG      Leukocyte Esterase Negative NEG      WBC 0-3 0 /hpf    RBC 0-3 0 /hpf    Epithelial cells 0-3 0 /hpf    Bacteria TRACE 0 /hpf    Mucus TRACE 0 /lpf    Other observations RESULTS VERIFIED MANUALLY     SODIUM, UR, RANDOM    Collection Time: 04/29/20 12:13 PM   Result Value Ref Range    Sodium,urine random 74 MMOL/L   CREATININE, UR, RANDOM    Collection Time: 04/29/20 12:13 PM   Result Value Ref Range    Creatinine, urine 79.00 mg/dL       All Micro Results     Procedure Component Value Units Date/Time    MSSA/MRSA SC BY PCR, NASAL SWAB [[id number]] Collected:  04/26/20 1830    Order Status:  Canceled Specimen:  Nasal swab           Other Studies:  [employer]    Result Date: 4/29/2020  EXAM:  [employer] INDICATION:  ALEISHA/CKD IV COMPARISON: None. TECHNIQUE: Real-time sonography of the kidneys, retroperitoneum and bladder was performed with multiple static images obtained. FINDINGS: The kidneys have normal echogenicity with no mass, stone or hydronephrosis. The right kidney measures 7.4 cm and the left kidney measures 8.4 cm in length. The aorta tapers normally. The proximal iliac arteries are normal.  The IVC is normal. No retroperitoneal mass is identified. The urinary bladder is normal.     IMPRESSION: Normal renal ultrasound examination. The evaluation is limited due to the patient's large body habitus.        Assessment and Plan:     Dx:  1 - Left proximal femur fracture with hardware failure, stable postop, pain controlled  2 - ALEISHA on CKD stage 4, improving today w/ resolved metabolic acidosis and hyperkalemia  3 - Hypotension/ low normal BP, hx of HTN  4 - Hx of breast cancer  5-  Lower Hb    Rx:  Bicarb gtt change to NS  [place] cath  IO monitor   PRBC transfusion additional unit today  Stop anti-HTN  Nephrology consulted- thank you    Dispo; home health soon    Signed:  Bijan Boyd MD

## 2020-04-29 NOTE — PROGRESS NOTES
04/29/20 1117   Oxygen Therapy   O2 Sat (%) 99 %   Pulse via Oximetry 73 beats per minute   O2 Device Nasal cannula   O2 Flow Rate (L/min) 2 l/min  (Hgb 6.8, did not wean oxygen)     Did not wean oxygen due to Hgb 6.8. Patient receiving blood at this time.

## 2020-04-29 NOTE — CONSULTS
Nephrology consult    Admission Date:  4/26/2020    Admission Diagnosis  Fracture, proximal femur, left, closed, initial encounter ([facility] [S72.002A]    We are asked by Dr. Jazz Rodriguez      History of Present Illness: Mr. Lamont Barros is a 67 y.o female with PMH significant for  Breast Ca, HLD, HTN, aortic insufficiency, and CKD stage IV admitted with complaints of left hip pain reportedly starting 3 days ago and worsening. She has a hx of left hip fx with internal hardware fixation a year ago. A left femur x-ray noted evidence of hardware failure with fracture at the site of nonunion proximal femoral shaft fracture. S/p left hip removal of hardware and left femur insertion intra medullary nail, post Op Xray revealed stabilized femoral fx with extensive sclerotic lesions seen suggestive of extensive body metastatic disease. We are consulted for ALEISHA/CKD IV. From a renal standpoint her creatinine on admission was 4.10->4.36->3.97, CO2 18, K 5.4->3.9, hgb 6.8. Baseline Cr upper 2s to low 3s, last seen outpatient Nephrology in 2017. No recent contrast exposure. Home meds significant got lasix and HCTZ not on ACEi or ARB. Marginal blood pressure, SBP 90s-120s, was started on bicarb gtts 4/28. Patient seen and examined on rounds she reports left hip tenderness, denies falling at home prior to hospitalization. Reports occasional Advil use, denies SOB, CP, N/V/D/C, fever/chills, edema, dysuria, change in UOP, no urinary frequency or hesitancy.       Past Medical History:   Diagnosis Date    Abnormal EKG 2/16/2016    Aortic valve insufficiency 2/16/2016    [facility]     breast    Hyperlipidemia 2/16/2016    Hypertension 2/16/2016    UNSPECIFIED     Obesity 2/16/2016    UNSPECIFIED       Past Surgical History:   Procedure Laterality Date    HX TUBAL LIGATION        Current Facility-Administered Medications   Medication Dose Route Frequency    0.9% sodium chloride infusion 250 mL  250 mL IntraVENous PRN    fluconazole (DIFLUCAN) tablet 100 mg  100 mg Oral DAILY    sodium bicarbonate (8.4%) 150 mEq in dextrose 5% 1,000 mL infusion   IntraVENous CONTINUOUS    cloNIDine HCL (CATAPRES) tablet 0.2 mg  0.2 mg Oral BID PRN    lidocaine (XYLOCAINE) 10 mg/mL (1 %) injection 0.1 mL  0.1 mL SubCUTAneous PRN    sodium chloride (NS) flush 5-40 mL  5-40 mL IntraVENous Q8H    sodium chloride (NS) flush 5-40 mL  5-40 mL IntraVENous PRN    ondansetron (ZOFRAN) injection 4 mg  4 mg IntraVENous Q4H PRN    alum-mag hydroxide-simeth (MYLANTA) oral suspension 30 mL  30 mL Oral Q4H PRN    calcium-vitamin D (OS-JORGE) 500 mg-200 unit tablet  1 Tab Oral TID WITH MEALS    aspirin tablet 325 mg  325 mg Oral DAILY    calcium carbonate (OS-JORGE) tablet 500 mg [elemental]  500 mg Oral BID WITH MEALS    letrozole (FEMARA) tablet 2.5 mg (Patient Supplied)  2.5 mg Oral DAILY    palbociclib cap 75 mg (Patient Supplied)  75 mg Oral DAILY WITH DINNER    sodium chloride (NS) flush 5-40 mL  5-40 mL IntraVENous Q8H    sodium chloride (NS) flush 5-40 mL  5-40 mL IntraVENous PRN    acetaminophen (TYLENOL) tablet 650 mg  650 mg Oral Q4H PRN    HYDROmorphone (PF) (DILAUDID) injection 0.5 mg  0.5 mg IntraVENous Q4H PRN     Allergies   Allergen Reactions    Penicillins Unknown (comments)     UNKNOWN REACTION       Social History     Tobacco Use    Smoking status: Never Smoker    Smokeless tobacco: Current User     Types: Snuff   Substance Use Topics    Alcohol use: No      Family History   Problem Relation Age of Onset    Coronary Artery Disease Mother     Heart Disease Father         Review of Systems  Gen - no fever, no chills, appetite okay  HEENT - no sore throat, no decreased vision, no hearing loss  Neck - no neck mass  CV - no chest pain, no palpitation, no orthopnea  Lung - no shortness of breath, no cough, no hemoptysis  Abd - no tenderness, no nausea/vomiting, no bloody stool  Ext - no edema, no clubbing, no cyanosis  Musculoskeletal - + left joint pain, no back pain  Neurologic - no headaches, no dizziness, no seizures  Psychiatric - no anxiety, no depression  Skin - no rashes, no pupura  Genitourinary - no decreased urine output, no hematuria, no foamy urine    Objective:     Vitals:    04/28/20 1526 04/28/20 2047 04/28/20 2338 04/29/20 0511   BP: 94/57 126/67 105/56 109/74   Pulse: 92 87 96 92   Resp: 16 16 16 16   Temp: 98.4 °F (36.9 °C) 97.8 °F (36.6 °C) 98.1 °F (36.7 °C) 98.4 °F (36.9 °C)   SpO2: 97% 90% 91% 91%   Weight:       Height:           Intake/Output Summary (Last 24 hours) at 4/29/2020 0739  Last data filed at 4/29/2020 0604  Gross per 24 hour   Intake 7254 ml   Output 300 ml   Net 6954 ml       Physical Exam  GEN :in no distress, alert and oriented  HEENT: anicteric sclerae,  Mucous membranes are moist.  Neck - supple without JVD, No lymphadenopathy. CV - regular rate and rhythm, no murmur, no rub  Lung - clear bilaterally, lungs expand symmetrically  Abd - soft, nontender, bowel sounds present, no hepatosplenomegaly  Ext - no clubbing, no cyanosis, no edema  Neurologic - nonfocal  Genitourinary - bladder nonpalpable, rodriguez   Skin - no rashes, no purpura  Psychiatric: Normal mood and affect. Data Review:   Recent Labs     04/29/20  0427 04/28/20  0535 04/27/20  1344 04/27/20  0521   WBC  --  4.2* 4.4 2.2*   HGB 6.8* 7.8* 8.8* 8.3*   HCT  --  25.2* 27.7* 26.9*   PLT  --  258 273 184     Recent Labs     04/29/20  0427 04/28/20  1453 04/28/20  0535 04/27/20  0521    143 142 143   K 3.9 5.8* 5.4* 4.3   * 113* 111* 113*   CO2 29 20* 18* 25   BUN 58* 56* 52* 43*   CREA 3.97* 4.36* 4.10* 2.85*   * 150* 164* 102*   CA 8.7 8.8 9.1 9.0   MG  --   --   --  1.8   PHOS  --   --   --  5.8*     No results for input(s): PH, PCO2, PO2, PCO2 in the last 72 hours.     Problem List:     Patient Active Problem List    Diagnosis Date Noted    Fracture, proximal femur, left, closed, initial encounter ([facility] 04/26/2020    Severe obesity ([facility] 09/24/2019    Stage 3 chronic kidney disease ([facility] 08/08/2018    Hypokalemia 08/08/2018    Femur fracture, left ([facility] 08/07/2018    Anemia due to chronic kidney disease treated with erythropoietin 07/25/2017    Hyperkalemia 07/18/2017    Malignant neoplasm metastatic to bone ([facility] 07/31/2016    Infiltrating ductal carcinoma of female breast ([place] [address].) 07/25/2016    Mass of breast 07/08/2016    Aortic valve insufficiency 02/16/2016    Hypertension 02/16/2016    Obesity 02/16/2016    Hyperlipidemia 02/16/2016    Abnormal EKG 02/16/2016       Impression:    Plan:   1. ALEISHA/CKD IV likely pre renal azotemia improving with IVF, bicarb gtts changed to NS. Baseline Cr in the upper 2s low 3s. Lasix and HCTZ not ordered  -will obtain renal US, UA, protein/creatinine ratio, Walter, urine creatinine  -will set up follow up with outpatient Nephrology, last seen in 2017    2. Left proximal fracture with hardware failure- s/p left femur insertion intra medullary nail    3. Anemia PRBC today per primary     4.  HTN- marginal BP, hold antihypertensive

## 2020-04-29 NOTE — PROGRESS NOTES
Problem: Mobility Impaired (Adult and Pediatric)  Goal: *Acute Goals and Plan of Care (Insert Text)  Description: LTG:  (1.)Ms. Hari Kyle will move from supine to sit and sit to supine, scoot up and down and roll side to side with STAND BY ASSIST within 7 treatment day(s). (2.)Ms. Hari Kyle will transfer from bed to chair and chair to bed with STAND BY ASSIST using the least restrictive device within 7 treatment day(s). (3.)Ms. Hari Kyle will ambulate with CONTACT GUARD ASSIST for 15+ feet with the least restrictive device within 7 treatment day(s). (4.)Ms. Hari Kyle will perform exercises per HEP for 10+ minutes to improve strength and mobility within 7 days. ________________________________________________________________________________________________   Outcome: Progressing Towards Goal     PHYSICAL THERAPY: Daily Note and PM 4/29/2020  INPATIENT: PT Visit Days : 2  Payor: Rebecca Echols / Plan: [address] [place] [address] HMO / Product Type: [employer] /       NAME/AGE/GENDER: Sunita Mars is a 67 y.o. female   PRIMARY DIAGNOSIS: Fracture, proximal femur, left, closed, initial encounter ([facility] [S72.002A] Fracture, proximal femur, left, closed, initial encounter ([facility] Fracture, proximal femur, left, closed, initial encounter ([facility]  Procedure(s) (LRB):  LEFT HIP REMOVAL OF HARDWARE AND LEFT FEMUR INSERTION INTRA MEDULLARY NAIL (Left)  2 Days Post-Op  ICD-10: Treatment Diagnosis:    · Generalized Muscle Weakness (M62.81)  · Difficulty in walking, Not elsewhere classified (R26.2)  · Other abnormalities of gait and mobility (R26.89)   Precaution/Allergies:  Penicillins      ASSESSMENT:     Ms. Hari Kyle is a 67year old female following left hip hardware removal and IM nailing, WBAT per ortho orders. At baseline she lives with two daughters and has been ambulating household distances via furniture walking (hx prior left hip fracture last year).      The patient is seated in the recliner chair upon contact and agreeable to PT treatment. The patient scooted forward in the chair with min A and demonstrated intact sitting balance. She performed sit to stand with min Ax2 and additional time and ambulated 3' with RW and min Ax2. She moved with improved ease this PM and responded better to cues for posture, hand placement, and gait mechanics. The patient then sat on the edge of the bed with min A. She performed sit to supine with min A for elevating the LLE. After a supine rest break she attempted to scoot up in the bed with max verbal cues for hand placement and technique. The patient was then positioned for comfort with needs in reach. Overall the patient is making slow progress toward therapy goals as indicated by consistent activity tolerance, but was somewhat fatigued from procedures today and in the chair all day. Will continue skilled PT treatment as patient is still below functional baseline. This section established at most recent assessment   PROBLEM LIST (Impairments causing functional limitations):  1. Decreased Strength  2. Decreased Transfer Abilities  3. Decreased Ambulation Ability/Technique  4. Decreased Balance  5. Increased Pain  6. Decreased Activity Tolerance  7. Decreased Flexibility/Joint Mobility   INTERVENTIONS PLANNED: (Benefits and precautions of physical therapy have been discussed with the patient.)  1. Balance Exercise  2. Bed Mobility  3. Gait Training  4. Home Exercise Program (HEP)  5. Therapeutic Activites  6. Therapeutic Exercise/Strengthening  7. Transfer Training     TREATMENT PLAN: Frequency/Duration: 3 times a week for duration of hospital stay  Rehabilitation Potential For Stated Goals: Good     REHAB RECOMMENDATIONS (at time of discharge pending progress):    Placement: It is my opinion, based on this patient's performance to date, that Ms. Tom Foley may benefit from intensive therapy at a [address] [place] [address] after discharge due to the functional deficits listed above that are likely to improve with skilled rehabilitation and concerns that he/she may be unsafe to be unsupervised at home due to increased need for assistance to safely transfer and ambulate. Recommend rehab however pt wants home with family and HH PT    Equipment:    None at this time              HISTORY:   History of Present Injury/Illness (Reason for Referral):  Per H&P, \"The patient is a 21-year-old  lady with HTN, CKD stage IV with baseline creatinine between 2.4 and 3.0, breast cancer, presented to the ED at [address] [place] [address] complaining of left hip pain. The pain started about 3 days ago and has been gradually worsening. It was estimated at 10/10 of intensity and felt mostly in the left hip and left upper thigh. The patient actually denies any fall. She had a fracture on the same hip last year for which she had hip fracture repair with plates and screws. A left femur x-ray noted evidence of hardware failure with fracture at the site of nonunion proximal femoral shaft fracture. The patient is unsure about what happened and thinks she must have broken her hip while sleeping and moving it in the wrong way. Labs did not show any significant abnormalities, except for creatinine of 2.93 which is actually within her baseline range, even if the most recent creatinine on record was 2.4. Based on her clinical presentation, the hospitalist service was contacted and the patient was admitted to the medical floor for left proximal femur fracture and hardware failure\"    Past Medical History/Comorbidities:   Ms. Julia Choe  has a past medical history of Abnormal EKG (2/16/2016), Aortic valve insufficiency (2/16/2016), Cancer ([facility] Hyperlipidemia (2/16/2016), Hypertension (2/16/2016), and Obesity (2/16/2016). Ms. Julia Choe  has a past surgical history that includes hx tubal ligation.   Social History/Living Environment:   Home Environment: Trailer/mobile home  # Steps to Enter: 0  Wheelchair Ramp: Yes  One/Two Story Residence: One story  Living Alone: No  Support Systems: Child(ezequiel), Family member(s)  Patient Expects to be Discharged to[de-identified] Private residence  Current DME Used/Available at Home: [employer] [place], rolling, Wheelchair, Commode, bedside, Hospital bed  Tub or Shower Type: (sponge bth )  Prior Level of Function/Work/Activity:  Lives with two daughters in mobile home with ramp. Had prior left hip sx last year, was ambulating well prior to this incident without DME use. One daughter works, the other is home with pt during the day. Family assists with ADLs. Number of Personal Factors/Comorbidities that affect the Plan of Care: 1-2: MODERATE COMPLEXITY   EXAMINATION:   Most Recent Physical Functioning:   Gross Assessment:                  Posture:     Balance:  Sitting: Intact  Standing: Impaired  Standing - Static: Fair  Standing - Dynamic : Fair Bed Mobility:  Supine to Sit: Minimum assistance; Moderate assistance  Sit to Supine: Minimum assistance  Scooting: Minimum assistance  Interventions: Verbal cues  Wheelchair Mobility:     Transfers:  Sit to Stand: Minimum assistance;Assist x2; Additional time  Stand to Sit: Minimum assistance  Bed to Chair: Minimum assistance;Assist x2  Interventions: Verbal cues; Safety awareness training  Gait:     Base of Support: Shift to right  Speed/Madelaine: Slow;Shuffled  Step Length: Right shortened;Left shortened  Gait Abnormalities: Antalgic; Step to gait;Trunk sway increased;Decreased step clearance  Distance (ft): 3 Feet (ft)  Assistive Device: Walker, rolling;Gait belt  Ambulation - Level of Assistance: Minimal assistance;Assist x2  Interventions: Verbal cues; Safety awareness training      Body Structures Involved:  1. Bones  2. Joints  3. Muscles Body Functions Affected:  1. Sensory/Pain  2. Movement Related Activities and Participation Affected:  1. General Tasks and Demands  2. Mobility  3. Domestic Life  4.  Community, Social and [place]   Number of elements that affect the Plan of Care: 4+: HIGH COMPLEXITY   CLINICAL PRESENTATION:   Presentation: Stable and uncomplicated: LOW COMPLEXITY   CLINICAL DECISION MAKIN[address] [place] [address] [address] [id number] AM-PAC 6 Clicks   Basic Mobility Inpatient Short Form  How much difficulty does the patient currently have. .. Unable A Lot A Little None   1. Turning over in bed (including adjusting bedclothes, sheets and blankets)? [] 1   [] 2   [x] 3   [] 4   2. Sitting down on and standing up from a chair with arms ( e.g., wheelchair, bedside commode, etc.)   [] 1   [] 2   [x] 3   [] 4   3. Moving from lying on back to sitting on the side of the bed? [] 1   [x] 2   [] 3   [] 4   How much help from another person does the patient currently need. .. Total A Lot A Little None   4. Moving to and from a bed to a chair (including a wheelchair)? [] 1   [] 2   [x] 3   [] 4   5. Need to walk in hospital room? [] 1   [x] 2   [] 3   [] 4   6. Climbing 3-5 steps with a railing? [x] 1   [] 2   [] 3   [] 4   © 2007, Trustees of [address] [place] [address] [address] [id number], under license to [employer]. All rights reserved      Score:  Initial: 14 Most Recent: X (Date: -- )    Interpretation of Tool:  Represents activities that are increasingly more difficult (i.e. Bed mobility, Transfers, Gait). Medical Necessity:     · Patient demonstrates   · good  ·  rehab potential due to higher previous functional level. Reason for Services/Other Comments:  · Patient   · continues to demonstrate capacity to improve strength, mobility, balance, transfers, and activity tolerance which will   · increase independence, decrease amount of assistance required from caregiver, and increase safety  · .    Use of outcome tool(s) and clinical judgement create a POC that gives a: Clear prediction of patient's progress: LOW COMPLEXITY            TREATMENT:   (In addition to Assessment/Re-Assessment sessions the following treatments were rendered)   Pre-treatment Symptoms/Complaints:  \"I'm ready\"  Pain: Initial: Pain Intensity 1: 0  Post Session:  0/10, comfortable in bed     Therapeutic Activity: (    23 min): Therapeutic activities including Bed mobility, Chair transfers, Ambulation on level ground, and static/dynamic standing and sitting balance to improve mobility, strength, balance, and coordination. Required minimal Verbal cues; Safety awareness training to promote static and dynamic balance in standing and promote motor control of bilateral, lower extremity(s). Therapeutic Exercise: (   min):  Exercises per grid below to improve mobility, strength and balance. Required minimal verbal cues to promote proper body alignment, promote proper body posture, promote proper body mechanics and promote proper body breathing techniques. Progressed resistance, range and repetitions as indicated. Date:  4/28/20 Date:  4/28/20 AM Date:     Activity/Exercise Parameters Parameters Parameters   LAQ 10x AB x10    Seated marching 10x AB x10    Ankle pumps 10x AB x10    Seated hip aBd 8x AB x10 B AA L                      A=active, B=bilateral    Braces/Orthotics/Lines/Etc:   · nasal cannula  Treatment/Session Assessment:    · Response to Treatment:   See above  · Interdisciplinary Collaboration:   o Physical Therapy Assistant  o Registered Nurse  o Rehabilitation Attendant  · After treatment position/precautions:   o Supine in bed  o Bed/Chair-wheels locked  o Bed in low position  o Call light within reach  o RN notified   · Compliance with Program/Exercises: Will assess as treatment progresses  · Recommendations/Intent for next treatment session: \"Next visit will focus on advancements to more challenging activities and reduction in assistance provided\".   Total Treatment Duration:  PT Patient Time In/Time Out  Time In: 1540  Time Out: [address] [facility]

## 2020-04-29 NOTE — PROGRESS NOTES
2020         Post Op day: 2 Days Post-Op Procedure(s) (LRB):  LEFT HIP REMOVAL OF HARDWARE AND LEFT FEMUR INSERTION INTRA MEDULLARY NAIL (Left)      Admit Date: 2020  Admit Diagnosis: Fracture, proximal femur, left, closed, initial encounter ([facility] [S72.002A]       Principle Problem: Fracture, proximal femur, left, closed, initial encounter ([facility] Subjective: Doing well, No complaints, No SOB, No Chest Pain, No Nausea or Vomiting     Objective:   Vital Signs are Stable, No Acute Distress, Alert,  Dressing is Dry,  Neurovascular exam is normal.     Assessment / Plan :  Patient Active Problem List   Diagnosis Code    Aortic valve insufficiency I35.1    Hypertension I10    Obesity E66.9    Hyperlipidemia E78.5    Abnormal EKG R94.31    Mass of breast N63.0    Infiltrating ductal carcinoma of female breast ([facility] C50.919    Malignant neoplasm metastatic to bone (H[facility]) C79.51    Hyperkalemia E87.5    Anemia due to chronic kidney disease treated with erythropoietin N18.9, D63.1    Femur fracture, left ([facility]) S72. 92XA    Stage 3 chronic kidney disease ([facility] N18.3    Hypokalemia E87.6    Severe obesity ([facility]) E66.01    Fracture, proximal femur, left, closed, initial encounter ([facility] S72.002A      Patient Vitals for the past 8 hrs:   BP Temp Pulse Resp SpO2   20 0511 109/74 98.4 °F (36.9 °C) 92 16 91 %    Temp (24hrs), Av °F (36.7 °C), Min:97.5 °F (36.4 °C), Max:98.4 °F (36.9 °C)    Body mass index is 37.9 kg/m².     Lab Results   Component Value Date/Time    HGB 6.8 (LL) 2020 04:27 AM          Medical Mgmt per hospitalist  Anticoagulation plan: ASAS 325mg daily   Continue PT  Fall Precuations  DC disp: rehab placement        Signed By: KIERRA Jim  2020,  7:58 AM

## 2020-04-29 NOTE — PROGRESS NOTES
Problem: Mobility Impaired (Adult and Pediatric)  Goal: *Acute Goals and Plan of Care (Insert Text)  Description: LTG:  (1.)Ms. Hermila Mckeon will move from supine to sit and sit to supine, scoot up and down and roll side to side with STAND BY ASSIST within 7 treatment day(s). (2.)Ms. Hermila Mckeon will transfer from bed to chair and chair to bed with STAND BY ASSIST using the least restrictive device within 7 treatment day(s). (3.)Ms. Hermila Mckeon will ambulate with CONTACT GUARD ASSIST for 15+ feet with the least restrictive device within 7 treatment day(s). (4.)Ms. Hermila Mckeon will perform exercises per HEP for 10+ minutes to improve strength and mobility within 7 days. ________________________________________________________________________________________________   Outcome: Progressing Towards Goal     PHYSICAL THERAPY: Daily Note and AM 4/29/2020  INPATIENT: PT Visit Days : 2  Payor: Bree Gonzales / Plan: [address] [place] [address] HMO / Product Type: Managed Care Medicare /       NAME/AGE/GENDER: Bhaskar Aquino is a 67 y.o. female   PRIMARY DIAGNOSIS: Fracture, proximal femur, left, closed, initial encounter ([facility] [S72.002A] Fracture, proximal femur, left, closed, initial encounter ([facility] Fracture, proximal femur, left, closed, initial encounter ([facility].)  Procedure(s) (LRB):  LEFT HIP REMOVAL OF HARDWARE AND LEFT FEMUR INSERTION INTRA MEDULLARY NAIL (Left)  2 Days Post-Op  ICD-10: Treatment Diagnosis:    · Generalized Muscle Weakness (M62.81)  · Difficulty in walking, Not elsewhere classified (R26.2)  · Other abnormalities of gait and mobility (R26.89)   Precaution/Allergies:  Penicillins      ASSESSMENT:     Ms. Hermila Mckeon is a 67year old female following left hip hardware removal and IM nailing, WBAT per ortho orders. At baseline she lives with two daughters and has been ambulating household distances via furniture walking (hx prior left hip fracture last year).      The patient is supine in bed upon contact, receiving blood, and is agreeable to PT treatment. The patient performed supine to sit with min-mod A and additional time with cueing for hand placement and technique. The patient demonstrated intact sitting balance at the edge of the bed and scooted forward with min A, additional time, and verbal cues for technique. The patient then performed sit to stand with mod Ax2 and additional time with max cueing for hand placement, posture, and technique. She ambulated 2' to the recliner chair with min-mod Ax2 and verbal cueing for upright posture and gait mechanics. She remained with a forward flexed posture throughout ambulation and was unable to fully clear feet from the floor during swing phase. The patient then sat in the recliner chair with min Ax2 and verbal cueing for hand placement, sequencing, and safety awareness. She scooted back with min A. The patient participated in therapeutic exercises as per the chart below to improve strength, mobility, and activity tolerance. She was then positioned for comfort with needs in reach and O2 on 2L with O2 sats at 99% despite some reports of SOB. Overall the patient is making good progress toward therapy goals as indicated by increased activity tolerance. Will continue skilled PT treatment as patient is still below functional baseline. This section established at most recent assessment   PROBLEM LIST (Impairments causing functional limitations):  1. Decreased Strength  2. Decreased Transfer Abilities  3. Decreased Ambulation Ability/Technique  4. Decreased Balance  5. Increased Pain  6. Decreased Activity Tolerance  7. Decreased Flexibility/Joint Mobility   INTERVENTIONS PLANNED: (Benefits and precautions of physical therapy have been discussed with the patient.)  1. Balance Exercise  2. Bed Mobility  3. Gait Training  4. Home Exercise Program (HEP)  5. Therapeutic Activites  6. Therapeutic Exercise/Strengthening  7.  Transfer Training     TREATMENT PLAN: Frequency/Duration: 3 times a week for duration of hospital stay  Rehabilitation Potential For Stated Goals: Good     REHAB RECOMMENDATIONS (at time of discharge pending progress):    Placement: It is my opinion, based on this patient's performance to date, that Ms. Rinku Churchill may benefit from intensive therapy at a [address] [place] [address] after discharge due to the functional deficits listed above that are likely to improve with skilled rehabilitation and concerns that he/she may be unsafe to be unsupervised at home due to increased need for assistance to safely transfer and ambulate. Recommend rehab however pt wants home with family and  PT    Equipment:    None at this time              HISTORY:   History of Present Injury/Illness (Reason for Referral):  Per H&P, \"The patient is a 62-year-old  lady with HTN, CKD stage IV with baseline creatinine between 2.4 and 3.0, breast cancer, presented to the ED at [address] [place] [address] complaining of left hip pain. The pain started about 3 days ago and has been gradually worsening. It was estimated at 10/10 of intensity and felt mostly in the left hip and left upper thigh. The patient actually denies any fall. She had a fracture on the same hip last year for which she had hip fracture repair with plates and screws. A left femur x-ray noted evidence of hardware failure with fracture at the site of nonunion proximal femoral shaft fracture. The patient is unsure about what happened and thinks she must have broken her hip while sleeping and moving it in the wrong way. Labs did not show any significant abnormalities, except for creatinine of 2.93 which is actually within her baseline range, even if the most recent creatinine on record was 2.4.   Based on her clinical presentation, the hospitalist service was contacted and the patient was admitted to the medical floor for left proximal femur fracture and hardware failure\"    Past Medical History/Comorbidities:   Ms. Rinku Churchill  has a past medical history of Abnormal EKG (2/16/2016), Aortic valve insufficiency (2/16/2016), Cancer ([facility] Hyperlipidemia (2/16/2016), Hypertension (2/16/2016), and Obesity (2/16/2016). Ms. Tom Foley  has a past surgical history that includes hx tubal ligation. Social History/Living Environment:   Home Environment: Trailer/mobile home  # Steps to Enter: 0  Wheelchair Ramp: Yes  One/Two Story Residence: One story  Living Alone: No  Support Systems: Child(ezequiel), Family member(s)  Patient Expects to be Discharged to[de-identified] Private residence  Current DME Used/Available at Home: Yandy Clos, rolling, Wheelchair, Commode, bedside, Hospital bed  Tub or Shower Type: (sponge bth )  Prior Level of Function/Work/Activity:  Lives with two daughters in mobile home with ramp. Had prior left hip sx last year, was ambulating well prior to this incident without DME use. One daughter works, the other is home with pt during the day. Family assists with ADLs. Number of Personal Factors/Comorbidities that affect the Plan of Care: 1-2: MODERATE COMPLEXITY   EXAMINATION:   Most Recent Physical Functioning:   Gross Assessment:                  Posture:     Balance:  Standing: Intact; Impaired  Standing - Static: Fair  Standing - Dynamic : Fair Bed Mobility:  Supine to Sit: Minimum assistance; Moderate assistance  Scooting: Minimum assistance  Wheelchair Mobility:     Transfers:  Sit to Stand: Moderate assistance;Assist x2; Additional time  Stand to Sit: Minimum assistance  Bed to Chair: Moderate assistance  Interventions: Verbal cues; Safety awareness training;Manual cues  Gait:     Base of Support: Shift to right  Speed/Madelaine: Slow;Shuffled  Step Length: Right shortened;Left shortened  Gait Abnormalities: Antalgic; Step to gait; Shuffling gait(forward flexed trunk)  Distance (ft): 2 Feet (ft)  Assistive Device: Walker, rolling;Gait belt  Ambulation - Level of Assistance: Moderate assistance;Minimal assistance  Interventions: Verbal cues; Safety awareness training      Body Structures Involved:  1. Bones  2. Joints  3. Muscles Body Functions Affected:  1. Sensory/Pain  2. Movement Related Activities and Participation Affected:  1. General Tasks and Demands  2. Mobility  3. Domestic Life  4. Community, Social and [place]   Number of elements that affect the Plan of Care: 4+: HIGH COMPLEXITY   CLINICAL PRESENTATION:   Presentation: Stable and uncomplicated: LOW COMPLEXITY   CLINICAL DECISION MAKIN [facility] Mobility Inpatient Short Form  How much difficulty does the patient currently have. .. Unable A Lot A Little None   1. Turning over in bed (including adjusting bedclothes, sheets and blankets)? [] 1   [] 2   [x] 3   [] 4   2. Sitting down on and standing up from a chair with arms ( e.g., wheelchair, bedside commode, etc.)   [] 1   [] 2   [x] 3   [] 4   3. Moving from lying on back to sitting on the side of the bed? [] 1   [x] 2   [] 3   [] 4   How much help from another person does the patient currently need. .. Total A Lot A Little None   4. Moving to and from a bed to a chair (including a wheelchair)? [] 1   [] 2   [x] 3   [] 4   5. Need to walk in hospital room? [] 1   [x] 2   [] 3   [] 4   6. Climbing 3-5 steps with a railing? [x] 1   [] 2   [] 3   [] 4   © , Trustees of [facility] [employer], under license to [employer]. All rights reserved      Score:  Initial: 14 Most Recent: X (Date: -- )    Interpretation of Tool:  Represents activities that are increasingly more difficult (i.e. Bed mobility, Transfers, Gait). Medical Necessity:     · Patient demonstrates   · good  ·  rehab potential due to higher previous functional level. Reason for Services/Other Comments:  · Patient   · continues to demonstrate capacity to improve strength, mobility, balance, transfers, and activity tolerance which will   · increase independence, decrease amount of assistance required from caregiver, and increase safety  · . Use of outcome tool(s) and clinical judgement create a POC that gives a: Clear prediction of patient's progress: LOW COMPLEXITY            TREATMENT:   (In addition to Assessment/Re-Assessment sessions the following treatments were rendered)   Pre-treatment Symptoms/Complaints:  \"I'm trying\"  Pain: Initial:   Pain Intensity 1: 5  Post Session:  5/10, unchanged     Therapeutic Activity: (    14 min): Therapeutic activities including Bed mobility, Chair transfers, Ambulation on level ground (few steps to chair), and static/dynamic standing balance to improve mobility, strength, balance, and coordination. Required minimal Verbal cues; Safety awareness training to promote static and dynamic balance in standing and promote motor control of bilateral, lower extremity(s). Therapeutic Exercise: (  10 min):  Exercises per grid below to improve mobility, strength and balance. Required minimal verbal cues to promote proper body alignment, promote proper body posture, promote proper body mechanics and promote proper body breathing techniques. Progressed resistance, range and repetitions as indicated. Date:  4/28/20 Date:  4/28/20 AM Date:     Activity/Exercise Parameters Parameters Parameters   LAQ 10x AB x10    Seated marching 10x AB x10    Ankle pumps 10x AB x10    Seated hip aBd 8x AB x10 B AA L                      A=active, B=bilateral    Braces/Orthotics/Lines/Etc:   · nasal cannula  Treatment/Session Assessment:    · Response to Treatment:   See above, ambulates with forward flexed posture. · Interdisciplinary Collaboration:   o Physical Therapy Assistant  o Registered Nurse  o Rehabilitation Attendant  · After treatment position/precautions:   o Up in chair  o Bed alarm/tab alert on  o Bed/Chair-wheels locked  o Bed in low position  o Call light within reach  o RN notified   · Compliance with Program/Exercises:  Will assess as treatment progresses  · Recommendations/Intent for next treatment session: \"Next visit will focus on advancements to more challenging activities and reduction in assistance provided\".   Total Treatment Duration:  PT Patient Time In/Time Out  Time In: 0938  Time Out: [address], [place]

## 2020-04-29 NOTE — PROGRESS NOTES
Placed 20g 2.25 inch Accucath into left upper arm with ultrasound assistance.     Accucath lot# [id number]

## 2020-04-30 LAB
ABO + RH BLD: NORMAL
ANION GAP SERPL CALC-SCNC: 5 MMOL/L (ref 7–16)
BLD PROD TYP BPU: NORMAL
BLD PROD TYP BPU: NORMAL
BLOOD GROUP ANTIBODIES SERPL: NORMAL
BPU ID: NORMAL
BPU ID: NORMAL
BUN SERPL-MCNC: 49 MG/DL (ref 8–23)
CALCIUM SERPL-MCNC: 8.7 MG/DL (ref 8.3–10.4)
CHLORIDE SERPL-SCNC: 107 MMOL/L (ref 98–107)
CO2 SERPL-SCNC: 30 MMOL/L (ref 21–32)
CREAT SERPL-MCNC: 3.03 MG/DL (ref 0.6–1)
CREAT UR-MCNC: 77.9 MG/DL
CROSSMATCH RESULT,%XM: NORMAL
CROSSMATCH RESULT,%XM: NORMAL
GLUCOSE BLD STRIP.AUTO-MCNC: 100 MG/DL (ref 65–100)
GLUCOSE BLD STRIP.AUTO-MCNC: 104 MG/DL (ref 65–100)
GLUCOSE BLD STRIP.AUTO-MCNC: 97 MG/DL (ref 65–100)
GLUCOSE BLD STRIP.AUTO-MCNC: 98 MG/DL (ref 65–100)
GLUCOSE SERPL-MCNC: 98 MG/DL (ref 65–100)
HGB BLD-MCNC: 7.4 G/DL (ref 11.7–15.4)
POTASSIUM SERPL-SCNC: 3.2 MMOL/L (ref 3.5–5.1)
PROT UR-MCNC: 28 MG/DL
PROT/CREAT UR-RTO: 0.4
SODIUM SERPL-SCNC: 142 MMOL/L (ref 136–145)
SPECIMEN EXP DATE BLD: NORMAL
STATUS OF UNIT,%ST: NORMAL
STATUS OF UNIT,%ST: NORMAL
UNIT DIVISION, %UDIV: 0
UNIT DIVISION, %UDIV: 0

## 2020-04-30 PROCEDURE — P9016 RBC LEUKOCYTES REDUCED: HCPCS

## 2020-04-30 PROCEDURE — 97110 THERAPEUTIC EXERCISES: CPT

## 2020-04-30 PROCEDURE — 86900 BLOOD TYPING SEROLOGIC ABO: CPT

## 2020-04-30 PROCEDURE — 86923 COMPATIBILITY TEST ELECTRIC: CPT

## 2020-04-30 PROCEDURE — 97530 THERAPEUTIC ACTIVITIES: CPT

## 2020-04-30 PROCEDURE — 74011250637 HC RX REV CODE- 250/637: Performed by: HOSPITALIST

## 2020-04-30 PROCEDURE — 36430 TRANSFUSION BLD/BLD COMPNT: CPT

## 2020-04-30 PROCEDURE — 82962 GLUCOSE BLOOD TEST: CPT

## 2020-04-30 PROCEDURE — 80048 BASIC METABOLIC PNL TOTAL CA: CPT

## 2020-04-30 PROCEDURE — 74011250637 HC RX REV CODE- 250/637: Performed by: ORTHOPAEDIC SURGERY

## 2020-04-30 PROCEDURE — 65270000029 HC RM PRIVATE

## 2020-04-30 PROCEDURE — 85018 HEMOGLOBIN: CPT

## 2020-04-30 PROCEDURE — 36415 COLL VENOUS BLD VENIPUNCTURE: CPT

## 2020-04-30 RX ORDER — POTASSIUM CHLORIDE 20 MEQ/1
40 TABLET, EXTENDED RELEASE ORAL
Status: COMPLETED | OUTPATIENT
Start: 2020-04-30 | End: 2020-04-30

## 2020-04-30 RX ORDER — SODIUM CHLORIDE 9 MG/ML
250 INJECTION, SOLUTION INTRAVENOUS AS NEEDED
Status: DISCONTINUED | OUTPATIENT
Start: 2020-04-30 | End: 2020-05-01 | Stop reason: HOSPADM

## 2020-04-30 RX ADMIN — Medication 500 MG: at 09:26

## 2020-04-30 RX ADMIN — Medication 10 ML: at 21:00

## 2020-04-30 RX ADMIN — ASPIRIN 325 MG ORAL TABLET 325 MG: 325 PILL ORAL at 09:26

## 2020-04-30 RX ADMIN — FLUCONAZOLE 100 MG: 100 TABLET ORAL at 09:26

## 2020-04-30 RX ADMIN — Medication 1 TABLET: at 09:26

## 2020-04-30 RX ADMIN — NYSTATIN: 100000 POWDER TOPICAL at 09:30

## 2020-04-30 RX ADMIN — Medication 10 ML: at 14:00

## 2020-04-30 RX ADMIN — NYSTATIN: 100000 POWDER TOPICAL at 17:09

## 2020-04-30 RX ADMIN — POTASSIUM CHLORIDE 40 MEQ: 20 TABLET, EXTENDED RELEASE ORAL at 09:26

## 2020-04-30 NOTE — PROGRESS NOTES
2020         Post Op day: 3 Days Post-Op Procedure(s) (LRB):  LEFT HIP REMOVAL OF HARDWARE AND LEFT FEMUR INSERTION INTRA MEDULLARY NAIL (Left)      Admit Date: 2020  Admit Diagnosis: Fracture, proximal femur, left, closed, initial encounter ([facility] [S72.002A]       Principle Problem: Fracture, proximal femur, left, closed, initial encounter ([facility] Subjective: Doing well and wants to go home, No complaints, No SOB, No Chest Pain, No Nausea or Vomiting     Objective:   Vital Signs are Stable, No Acute Distress, Alert,  Dressing is Dry,  Neurovascular exam is normal.     Assessment / Plan :  Patient Active Problem List   Diagnosis Code    Aortic valve insufficiency I35.1    Hypertension I10    Obesity E66.9    Hyperlipidemia E78.5    Abnormal EKG R94.31    Mass of breast N63.0    Infiltrating ductal carcinoma of female breast ([facility] C50.919    Malignant neoplasm metastatic to bone (HCC) C79.51    Hyperkalemia E87.5    Anemia due to chronic kidney disease treated with erythropoietin N18.9, D63.1    Femur fracture, left ([facility]) S72. 92XA    Stage 3 chronic kidney disease (HCC) N18.3    Hypokalemia E87.6    Severe obesity (HCC) E66.01    Fracture, proximal femur, left, closed, initial encounter ([facility] S72.002A      Patient Vitals for the past 8 hrs:   BP Temp Pulse Resp SpO2   20 0746 127/55 98 °F (36.7 °C) 73 16 91 %   20 0346 113/51 98.1 °F (36.7 °C) 75 16 90 %    Temp (24hrs), Av.2 °F (36.8 °C), Min:97.9 °F (36.6 °C), Max:98.5 °F (36.9 °C)    Body mass index is 37.9 kg/m².     Lab Results   Component Value Date/Time    HGB 7.4 (L) 2020 04:49 AM          Medical Mgmt per hospitalist  Anticoagulation plan: ASA 325mg   Continue PT  Fall Precuations  DC disp: home health        Signed By: KIERRA Salamanca  2020,  8:10 AM

## 2020-04-30 NOTE — PROGRESS NOTES
Problem: Mobility Impaired (Adult and Pediatric)  Goal: *Acute Goals and Plan of Care (Insert Text)  Description: LTG:  (1.)Ms. Ellie Mccabe will move from supine to sit and sit to supine, scoot up and down and roll side to side with STAND BY ASSIST within 7 treatment day(s). (2.)Ms. Ellie Mccabe will transfer from bed to chair and chair to bed with STAND BY ASSIST using the least restrictive device within 7 treatment day(s). (3.)Ms. Ellie Mccabe will ambulate with CONTACT GUARD ASSIST for 15+ feet with the least restrictive device within 7 treatment day(s). (4.)Ms. Ellie Mccabe will perform exercises per HEP for 10+ minutes to improve strength and mobility within 7 days. ________________________________________________________________________________________________   Outcome: Progressing Towards Goal     PHYSICAL THERAPY: Daily Note and AM 4/30/2020  INPATIENT: PT Visit Days : 3  Payor: Selvin Grullon / Plan: [address] [place] [address] HMO / Product Type: [employer] Care Medicare /       NAME/AGE/GENDER: Guillermina Loza is a 67 y.o. female   PRIMARY DIAGNOSIS: Fracture, proximal femur, left, closed, initial encounter ([facility] [S72.002A] Fracture, proximal femur, left, closed, initial encounter ([facility] Fracture, proximal femur, left, closed, initial encounter ([facility]  Procedure(s) (LRB):  LEFT HIP REMOVAL OF HARDWARE AND LEFT FEMUR INSERTION INTRA MEDULLARY NAIL (Left)  3 Days Post-Op  ICD-10: Treatment Diagnosis:    · Generalized Muscle Weakness (M62.81)  · Difficulty in walking, Not elsewhere classified (R26.2)  · Other abnormalities of gait and mobility (R26.89)   Precaution/Allergies:  Penicillins      ASSESSMENT:     Ms. Ellie Mccabe is a 67year old female following left hip hardware removal and IM nailing, WBAT per ortho orders. At baseline she lives with two daughters and has been ambulating household distances via furniture walking (hx prior left hip fracture last year).        Patient was supine upon contact and agreeable to PT. Patient performs supine to sit and transfer to standing with mod assist, additional time, and cues for improved/proper technique. Once standing patient ambulates 2' with rolling walker, min assist, forward flexed posture, and cues for sequencing with rolling walker. Patient then participates in therapeutic strengthening exercises to improve functional strength for transfers, gait and overall mobility. Patient requires cues to perform exercises correctly. Overall slow progress towards physical therapy goals. Patient's goals listed above are still appropriate. Will continue skilled PT to address remaining deficits. This section established at most recent assessment   PROBLEM LIST (Impairments causing functional limitations):  1. Decreased Strength  2. Decreased Transfer Abilities  3. Decreased Ambulation Ability/Technique  4. Decreased Balance  5. Increased Pain  6. Decreased Activity Tolerance  7. Decreased Flexibility/Joint Mobility   INTERVENTIONS PLANNED: (Benefits and precautions of physical therapy have been discussed with the patient.)  1. Balance Exercise  2. Bed Mobility  3. Gait Training  4. Home Exercise Program (HEP)  5. Therapeutic Activites  6. Therapeutic Exercise/Strengthening  7. Transfer Training     TREATMENT PLAN: Frequency/Duration: 3 times a week for duration of hospital stay  Rehabilitation Potential For Stated Goals: Good     REHAB RECOMMENDATIONS (at time of discharge pending progress):    Placement: It is my opinion, based on this patient's performance to date, that Ms. Lianne Gregory may benefit from intensive therapy at a [address] [place] [address] after discharge due to the functional deficits listed above that are likely to improve with skilled rehabilitation and concerns that he/she may be unsafe to be unsupervised at home due to increased need for assistance to safely transfer and ambulate.  Recommend rehab however pt wants home with family and [facility] PT    Equipment:    None at this time HISTORY:   History of Present Injury/Illness (Reason for Referral):  Per H&P, \"The patient is a 65-year-old  lady with HTN, CKD stage IV with baseline creatinine between 2.4 and 3.0, breast cancer, presented to the ED at [facility] complaining of left hip pain. The pain started about 3 days ago and has been gradually worsening. It was estimated at 10/10 of intensity and felt mostly in the left hip and left upper thigh. The patient actually denies any fall. She had a fracture on the same hip last year for which she had hip fracture repair with plates and screws. A left femur x-ray noted evidence of hardware failure with fracture at the site of nonunion proximal femoral shaft fracture. The patient is unsure about what happened and thinks she must have broken her hip while sleeping and moving it in the wrong way. Labs did not show any significant abnormalities, except for creatinine of 2.93 which is actually within her baseline range, even if the most recent creatinine on record was 2.4. Based on her clinical presentation, the hospitalist service was contacted and the patient was admitted to the medical floor for left proximal femur fracture and hardware failure\"    Past Medical History/Comorbidities:   Ms. Chi Trujillo  has a past medical history of Abnormal EKG (2/16/2016), Aortic valve insufficiency (2/16/2016), Cancer ([facility] Hyperlipidemia (2/16/2016), Hypertension (2/16/2016), and Obesity (2/16/2016). Ms. Chi Trujillo  has a past surgical history that includes hx tubal ligation.   Social History/Living Environment:   Home Environment: Trailer/mobile home  # Steps to Enter: 0  Wheelchair Ramp: Yes  One/Two Story Residence: One story  Living Alone: No  Support Systems: Child(ezequiel), Family member(s)  Patient Expects to be Discharged to[de-identified] Private residence  Current DME Used/Available at Home: Lara Favia, rolling, Wheelchair, Commode, bedside, Hospital bed  Tub or Shower Type: (sponge bth )  Prior Level of Function/Work/Activity:  Lives with two daughters in mobile home with ramp. Had prior left hip sx last year, was ambulating well prior to this incident without DME use. One daughter works, the other is home with pt during the day. Family assists with ADLs. Number of Personal Factors/Comorbidities that affect the Plan of Care: 1-2: MODERATE COMPLEXITY   EXAMINATION:   Most Recent Physical Functioning:   Gross Assessment:                  Posture:     Balance:  Sitting: Intact  Standing: Impaired  Standing - Static: Fair  Standing - Dynamic : Fair Bed Mobility:  Supine to Sit: Moderate assistance  Wheelchair Mobility:     Transfers:  Sit to Stand: Moderate assistance  Stand to Sit: Minimum assistance  Gait:     Base of Support: Shift to right  Speed/Madelaine: Slow;Shuffled  Step Length: Left shortened;Right shortened  Gait Abnormalities: Decreased step clearance; Step to gait; Shuffling gait;Trunk sway increased  Distance (ft): 2 Feet (ft)  Assistive Device: Walker, rolling;Gait belt  Ambulation - Level of Assistance: Minimal assistance  Interventions: Safety awareness training; Tactile cues; Verbal cues      Body Structures Involved:  1. Bones  2. Joints  3. Muscles Body Functions Affected:  1. Sensory/Pain  2. Movement Related Activities and Participation Affected:  1. General Tasks and Demands  2. Mobility  3. Domestic Life  4. Community, Social and [place]   Number of elements that affect the Plan of Care: 4+: HIGH COMPLEXITY   CLINICAL PRESENTATION:   Presentation: Stable and uncomplicated: LOW COMPLEXITY   CLINICAL DECISION MAKIN [facility] In[facility] Short Form  How much difficulty does the patient currently have. .. Unable A Lot A Little None   1. Turning over in bed (including adjusting bedclothes, sheets and blankets)? [] 1   [] 2   [x] 3   [] 4   2.   Sitting down on and standing up from a chair with arms ( e.g., wheelchair, bedside commode, etc.) [] 1   [] 2   [x] 3   [] 4   3. Moving from lying on back to sitting on the side of the bed? [] 1   [x] 2   [] 3   [] 4   How much help from another person does the patient currently need. .. Total A Lot A Little None   4. Moving to and from a bed to a chair (including a wheelchair)? [] 1   [] 2   [x] 3   [] 4   5. Need to walk in hospital room? [] 1   [x] 2   [] 3   [] 4   6. Climbing 3-5 steps with a railing? [x] 1   [] 2   [] 3   [] 4   © 2007, Trustees of [address] [place] [address] [facility], under license to [employer]. All rights reserved      Score:  Initial: 14 Most Recent: X (Date: -- )    Interpretation of Tool:  Represents activities that are increasingly more difficult (i.e. Bed mobility, Transfers, Gait). Medical Necessity:     · Patient demonstrates   · good  ·  rehab potential due to higher previous functional level. Reason for Services/Other Comments:  · Patient   · continues to demonstrate capacity to improve strength, mobility, balance, transfers, and activity tolerance which will   · increase independence, decrease amount of assistance required from caregiver, and increase safety  · . Use of outcome tool(s) and clinical judgement create a POC that gives a: Clear prediction of patient's progress: LOW COMPLEXITY            TREATMENT:   (In addition to Assessment/Re-Assessment sessions the following treatments were rendered)   Pre-treatment Symptoms/Complaints:  None  Pain: Initial:   Pain Intensity 1: 0  Post Session:  0/10     Therapeutic Activity: (    13 Minutes): Therapeutic activities including bed mobility training, transfer training, ambulation on level ground, posture training, instruction in sequencing with rolling walker, scooting, and patient education to improve mobility, strength, balance, and coordination. Required minimal Safety awareness training; Tactile cues; Verbal cues to promote static and dynamic balance in standing and promote motor control of bilateral, lower extremity(s). Therapeutic Exercise: (   10 Minutes):  Exercises per grid below to improve mobility, strength and balance. Required minimal verbal cues to promote proper body alignment, promote proper body posture, promote proper body mechanics and promote proper body breathing techniques. Progressed resistance, range and repetitions as indicated. Date:  4/28/20 Date:  4/28/20 AM Date:  4/30/20 - AM   Activity/Exercise Parameters Parameters Parameters   LAQ 10x AB x10 2x10B A   Seated marching 10x AB x10 2x10B A   Ankle pumps 10x AB x10 2x10B A   Seated hip aBd 8x AB x10 B AA L 2x10B  AA-L, A-R                     A=active, B=bilateral    Braces/Orthotics/Lines/Etc:   · nasal cannula  Treatment/Session Assessment:    · Response to Treatment:   See above  · Interdisciplinary Collaboration:   o Physical Therapy Assistant  o Registered Nurse  o Rehabilitation Attendant  · After treatment position/precautions:   o Up in chair  o Bed alarm/tab alert on  o Bed/Chair-wheels locked  o Call light within reach  o RN notified   · Compliance with Program/Exercises: Will assess as treatment progresses  · Recommendations/Intent for next treatment session: \"Next visit will focus on advancements to more challenging activities and reduction in assistance provided\".   Total Treatment Duration:  PT Patient Time In/Time Out  Time In: 0838  Time Out: 0901    Coco Rain PTA

## 2020-04-30 NOTE — PROGRESS NOTES
Guillermina Loza  Admission Date: 4/26/2020             Renal Daily Progress Note: 4/30/2020    The patient's chart is reviewed and the patient is discussed with the staff. Follow ALEISHA/CKD  Subjective:   Patient seen and examined on rounds, reclining in chair denies new complaints, + exertional SOB at baseline, pain controlled.     Labs and chart reviewed    ROS:  General: no fever/chills, appetite ok  CV: no CP  Lung: + exertional SOB- stable, no cough  GI: no N/V/D  Ext: trace edema     Current Facility-Administered Medications   Medication Dose Route Frequency    0.9% sodium chloride infusion 250 mL  250 mL IntraVENous PRN    0.9% sodium chloride infusion 250 mL  250 mL IntraVENous PRN    0.9% sodium chloride infusion 250 mL  250 mL IntraVENous PRN    0.9% sodium chloride infusion  100 mL/hr IntraVENous CONTINUOUS    HYDROcodone-acetaminophen (NORCO) 7.5-325 mg per tablet 1 Tab  1 Tab Oral Q4H PRN    nystatin (MYCOSTATIN) 100,000 unit/gram powder   Topical BID    cloNIDine HCL (CATAPRES) tablet 0.2 mg  0.2 mg Oral BID PRN    lidocaine (XYLOCAINE) 10 mg/mL (1 %) injection 0.1 mL  0.1 mL SubCUTAneous PRN    sodium chloride (NS) flush 5-40 mL  5-40 mL IntraVENous Q8H    sodium chloride (NS) flush 5-40 mL  5-40 mL IntraVENous PRN    ondansetron (ZOFRAN) injection 4 mg  4 mg IntraVENous Q4H PRN    alum-mag hydroxide-simeth (MYLANTA) oral suspension 30 mL  30 mL Oral Q4H PRN    calcium-vitamin D (OS-JORGE) 500 mg-200 unit tablet  1 Tab Oral TID WITH MEALS    aspirin tablet 325 mg  325 mg Oral DAILY    calcium carbonate (OS-JORGE) tablet 500 mg [elemental]  500 mg Oral BID WITH MEALS    letrozole (FEMARA) tablet 2.5 mg (Patient Supplied)  2.5 mg Oral DAILY    palbociclib cap 75 mg (Patient Supplied)  75 mg Oral DAILY WITH DINNER    sodium chloride (NS) flush 5-40 mL  5-40 mL IntraVENous Q8H    sodium chloride (NS) flush 5-40 mL  5-40 mL IntraVENous PRN    acetaminophen (TYLENOL) tablet 650 mg  650 mg Oral Q4H PRN    HYDROmorphone (PF) (DILAUDID) injection 0.5 mg  0.5 mg IntraVENous Q4H PRN         Objective:     Vitals:    04/29/20 2310 04/30/20 0346 04/30/20 0746 04/30/20 1056   BP: 162/64 113/51 127/55 116/41   Pulse: 84 75 73 69   Resp: 18 16 16 20   Temp: 98.2 °F (36.8 °C) 98.1 °F (36.7 °C) 98 °F (36.7 °C) 98.2 °F (36.8 °C)   SpO2: 92% 90% 91% 90%   Weight:       Height:         Intake and Output:   04/28 1901 - 04/30 0700  In: 7529.8 [P.O.:240; I.V.:6924]  Out: 1025 [Urine:1025]  04/30 0701 - 04/30 1900  In: 240 [P.O.:240]  Out: -     Physical Exam:   Constitutional:  the patient is well developed and in no acute distress  HEENT:  Sclera clear, pupils equal, oral mucosa moist  Lungs: clear bilaterally, no wheezes  Cardiovascular:  Regular rate, S1, S2, no Rub  Abd/GI: soft and non-tender; with positive bowel sounds. Ext: warm without cyanosis. There is trace lower leg edema. Skin:  no jaundice or rashes  Neuro: no gross neuro deficits   Psychiatric: Calm. LAB  Recent Labs     04/30/20  0449 04/29/20 0427 04/28/20  0535 04/27/20  1344   WBC  --   --  4.2* 4.4   HGB 7.4* 6.8* 7.8* 8.8*   HCT  --   --  25.2* 27.7*   PLT  --   --  258 273     Recent Labs     04/30/20  0449 04/29/20  0427 04/28/20  1453    144 143   K 3.2* 3.9 5.8*    108* 113*   CO2 30 29 20*   GLU 98 131* 150*   BUN 49* 58* 56*   CREA 3.03* 3.97* 4.36*     No results for input(s): PH, PCO2, PO2, HCO3 in the last 72 hours. Assessment:  (Medical Decision Making)     Hospital Problems  Date Reviewed: 4/15/2020          Codes Class Noted POA    * (Principal) Fracture, proximal femur, left, closed, initial encounter [facility]) ICD-10-CM: J91.878C  ICD-9-CM: 820.8  4/26/2020 Yes              Plan:  (Medical Decision Making)   1. ALEISHA/CKD IV likely pre renal azotemia improving with IVF   Baseline Cr in the upper 2s low 3s.  Lasix and HCTZ not ordered  -Renal US no evidence of obstructive nephropathy, UA bland, protein/creatinine ratio 0.4, FENa 2.6% suggests intrarenal, d/c IVF with good intake  -will set up follow up with outpatient Nephrology, last seen in 2017    2. Left proximal fracture with hardware failure- s/p left femur insertion intra medullary nail     3. Anemia PRBC 4/29 per primary      4.  HTN- marginal BP, hold antihypertensive    Will sign off, thank you for the courtacy of this consultation, please call with additional Nephrology questions, follow up as above.        Blanchie Mcburney, NP

## 2020-04-30 NOTE — PROGRESS NOTES
Problem: Mobility Impaired (Adult and Pediatric)  Goal: *Acute Goals and Plan of Care (Insert Text)  Description: LTG:  (1.)Ms. Ellie Mccabe will move from supine to sit and sit to supine, scoot up and down and roll side to side with STAND BY ASSIST within 7 treatment day(s). (2.)Ms. Ellie Mccabe will transfer from bed to chair and chair to bed with STAND BY ASSIST using the least restrictive device within 7 treatment day(s). (3.)Ms. Ellie Mccabe will ambulate with CONTACT GUARD ASSIST for 15+ feet with the least restrictive device within 7 treatment day(s). (4.)Ms. Ellie Mccabe will perform exercises per HEP for 10+ minutes to improve strength and mobility within 7 days. ________________________________________________________________________________________________   Outcome: Progressing Towards Goal     PHYSICAL THERAPY: Daily Note and PM 4/30/2020  INPATIENT: PT Visit Days : 3  Payor: Selvin Grullon / Plan: [address] [place] [address] HMO / Product Type: [employer] Medicare /       NAME/AGE/GENDER: Guillermina Loza is a 67 y.o. female   PRIMARY DIAGNOSIS: Fracture, proximal femur, left, closed, initial encounter ([facility] [S72.002A] Fracture, proximal femur, left, closed, initial encounter ([facility] Fracture, proximal femur, left, closed, initial encounter ([facility]  Procedure(s) (LRB):  LEFT HIP REMOVAL OF HARDWARE AND LEFT FEMUR INSERTION INTRA MEDULLARY NAIL (Left)  3 Days Post-Op  ICD-10: Treatment Diagnosis:    · Generalized Muscle Weakness (M62.81)  · Difficulty in walking, Not elsewhere classified (R26.2)  · Other abnormalities of gait and mobility (R26.89)   Precaution/Allergies:  Penicillins      ASSESSMENT:     Ms. Ellie Mccabe is a 67year old female following left hip hardware removal and IM nailing, WBAT per ortho orders. At baseline she lives with two daughters and has been ambulating household distances via furniture walking (hx prior left hip fracture last year).        Patient was sitting up in recliner chair upon contact and agreeable to PT. Patient participates in therapeutic strengthening/ROM exercises to improve functional strength for transfers, gait and overall mobility. Patient requires cues to perform exercises correctly. Patient then transfers to standing with mod assist, additional time, and cues for improved/proper technique. Once standing patient ambulates 2' back to bed with rolling walker, min assist, forward flexed posture, and cues for sequencing with rolling walker. Patient sits on EOB and returns to supine with mod assist and cues for technique. Overall slow progress towards physical therapy goals. Patient's goals listed above are still appropriate. Will continue skilled PT to address remaining deficits. This section established at most recent assessment   PROBLEM LIST (Impairments causing functional limitations):  1. Decreased Strength  2. Decreased Transfer Abilities  3. Decreased Ambulation Ability/Technique  4. Decreased Balance  5. Increased Pain  6. Decreased Activity Tolerance  7. Decreased Flexibility/Joint Mobility   INTERVENTIONS PLANNED: (Benefits and precautions of physical therapy have been discussed with the patient.)  1. Balance Exercise  2. Bed Mobility  3. Gait Training  4. Home Exercise Program (HEP)  5. Therapeutic Activites  6. Therapeutic Exercise/Strengthening  7. Transfer Training     TREATMENT PLAN: Frequency/Duration: 3 times a week for duration of hospital stay  Rehabilitation Potential For Stated Goals: Good     REHAB RECOMMENDATIONS (at time of discharge pending progress):    Placement: It is my opinion, based on this patient's performance to date, that Ms. Jean-Pierre Cowart may benefit from intensive therapy at a [address] [place] [address] after discharge due to the functional deficits listed above that are likely to improve with skilled rehabilitation and concerns that he/she may be unsafe to be unsupervised at home due to increased need for assistance to safely transfer and ambulate.  Recommend rehab however pt wants home with family and [facility] PT    Equipment:    None at this time              HISTORY:   History of Present Injury/Illness (Reason for Referral):  Per H&P, \"The patient is a 56-year-old  lady with HTN, CKD stage IV with baseline creatinine between 2.4 and 3.0, breast cancer, presented to the ED at [facility] complaining of left hip pain. The pain started about 3 days ago and has been gradually worsening. It was estimated at 10/10 of intensity and felt mostly in the left hip and left upper thigh. The patient actually denies any fall. She had a fracture on the same hip last year for which she had hip fracture repair with plates and screws. A left femur x-ray noted evidence of hardware failure with fracture at the site of nonunion proximal femoral shaft fracture. The patient is unsure about what happened and thinks she must have broken her hip while sleeping and moving it in the wrong way. Labs did not show any significant abnormalities, except for creatinine of 2.93 which is actually within her baseline range, even if the most recent creatinine on record was 2.4. Based on her clinical presentation, the hospitalist service was contacted and the patient was admitted to the medical floor for left proximal femur fracture and hardware failure\"    Past Medical History/Comorbidities:   Ms. Grazyna Louise  has a past medical history of Abnormal EKG (2/16/2016), Aortic valve insufficiency (2/16/2016), Cancer ([address] Hyperlipidemia (2/16/2016), Hypertension (2/16/2016), and Obesity (2/16/2016). Ms. Grazyna Louise  has a past surgical history that includes hx tubal ligation.   Social History/Living Environment:   Home Environment: Trailer/mobile home  # Steps to Enter: 0  Wheelchair Ramp: Yes  One/Two Story Residence: One story  Living Alone: No  Support Systems: Child(ezequiel), Family member(s)  Patient Expects to be Discharged to[de-identified] Private residence  Current DME Used/Available at Home: Jhony Rios rolling, Wheelchair, Commode, bedside, Hospital bed  Tub or Shower Type: (sponge bth )  Prior Level of Function/Work/Activity:  Lives with two daughters in mobile home with ramp. Had prior left hip sx last year, was ambulating well prior to this incident without DME use. One daughter works, the other is home with pt during the day. Family assists with ADLs. Number of Personal Factors/Comorbidities that affect the Plan of Care: 1-2: MODERATE COMPLEXITY   EXAMINATION:   Most Recent Physical Functioning:   Gross Assessment:                  Posture:     Balance:  Sitting: Intact  Standing: Impaired  Standing - Static: Fair  Standing - Dynamic : Fair Bed Mobility:  Supine to Sit: Moderate assistance  Sit to Supine: Moderate assistance  Wheelchair Mobility:     Transfers:  Sit to Stand: Moderate assistance  Stand to Sit: Minimum assistance  Gait:     Base of Support: Shift to right  Speed/Madelaine: Slow;Shuffled  Step Length: Left shortened;Right shortened  Gait Abnormalities: Decreased step clearance; Step to gait; Shuffling gait;Trunk sway increased  Distance (ft): 2 Feet (ft)  Assistive Device: Walker, rolling;Gait belt  Ambulation - Level of Assistance: Minimal assistance  Interventions: Safety awareness training; Tactile cues; Verbal cues      Body Structures Involved:  1. Bones  2. Joints  3. Muscles Body Functions Affected:  1. Sensory/Pain  2. Movement Related Activities and Participation Affected:  1. General Tasks and Demands  2. Mobility  3. Domestic Life  4. Community, Social and Cope [place]   Number of elements that affect the Plan of Care: 4+: HIGH COMPLEXITY   CLINICAL PRESENTATION:   Presentation: Stable and uncomplicated: LOW COMPLEXITY   CLINICAL DECISION MAKIN [facility] Mobility Inpatient Short Form  How much difficulty does the patient currently have. .. Unable A Lot A Little None   1. Turning over in bed (including adjusting bedclothes, sheets and blankets)?    [] 1   [] 2   [x] 3   [] 4   2. Sitting down on and standing up from a chair with arms ( e.g., wheelchair, bedside commode, etc.)   [] 1   [] 2   [x] 3   [] 4   3. Moving from lying on back to sitting on the side of the bed? [] 1   [x] 2   [] 3   [] 4   How much help from another person does the patient currently need. .. Total A Lot A Little None   4. Moving to and from a bed to a chair (including a wheelchair)? [] 1   [] 2   [x] 3   [] 4   5. Need to walk in hospital room? [] 1   [x] 2   [] 3   [] 4   6. Climbing 3-5 steps with a railing? [x] 1   [] 2   [] 3   [] 4   © 2007, Trustees of [address] [place] [address], under license to [employer]. All rights reserved      Score:  Initial: 14 Most Recent: X (Date: -- )    Interpretation of Tool:  Represents activities that are increasingly more difficult (i.e. Bed mobility, Transfers, Gait). Medical Necessity:     · Patient demonstrates   · good  ·  rehab potential due to higher previous functional level. Reason for Services/Other Comments:  · Patient   · continues to demonstrate capacity to improve strength, mobility, balance, transfers, and activity tolerance which will   · increase independence, decrease amount of assistance required from caregiver, and increase safety  · . Use of outcome tool(s) and clinical judgement create a POC that gives a: Clear prediction of patient's progress: LOW COMPLEXITY            TREATMENT:   (In addition to Assessment/Re-Assessment sessions the following treatments were rendered)   Pre-treatment Symptoms/Complaints:  None  Pain: Initial:   Pain Intensity 1: 0  Post Session:  0/10     Therapeutic Activity: (    10 Minutes): Therapeutic activities including bed mobility training, transfer training, ambulation on level ground, posture training, instruction in sequencing with rolling walker, scooting, and patient education to improve mobility, strength, balance, and coordination. Required minimal Safety awareness training; Tactile cues;Verbal cues to promote static and dynamic balance in standing and promote motor control of bilateral, lower extremity(s). Therapeutic Exercise: (   13 Minutes):  Exercises per grid below to improve mobility, strength and balance. Required minimal verbal cues to promote proper body alignment, promote proper body posture, promote proper body mechanics and promote proper body breathing techniques. Progressed resistance, range and repetitions as indicated. Date:  4/28/20 AM Date:  4/30/20 - AM Date:  4/30/20 - PM   Activity/Exercise Parameters Parameters    LAQ x10 2x10B A 2x10B A   Seated marching x10 2x10B A 2x10B A   Ankle pumps x10 2x10B A 2x10B A   Seated hip aBd x10 B AA L 2x10B  AA-L, A-R 2x10B  AA-L, A-R   Glut sets   2x10 A   Quad sets   2x10B A         A=active, B=bilateral    Braces/Orthotics/Lines/Etc:   · nasal cannula  Treatment/Session Assessment:    · Response to Treatment:   See above  · Interdisciplinary Collaboration:   o Physical Therapy Assistant  o Registered Nurse  o Rehabilitation Attendant  · After treatment position/precautions:   o Supine in bed  o Bed alarm/tab alert on  o Bed/Chair-wheels locked  o Bed in low position  o Call light within reach  o RN notified   · Compliance with Program/Exercises: Will assess as treatment progresses  · Recommendations/Intent for next treatment session: \"Next visit will focus on advancements to more challenging activities and reduction in assistance provided\".   Total Treatment Duration:  PT Patient Time In/Time Out  Time In: 1308  Time Out: Elis Rain PTA

## 2020-04-30 NOTE — PROGRESS NOTES
Hospitalist     Admit Date:  2020  1:28 PM   Name:  Ofelia Ortega   Age:  67 y.o.  :  1945   MRN:  [id number]   PCP:  Nathaniel Jane NP    subj:      58-year-old  lady with HTN, CKD stage IV with baseline creatinine between 2.4 and 3.0, breast cancer, presented to the ED at [facility] complaining of left hip pain. The pain started about 3 days ago and has been gradually worsening. It was estimated at 10/10 of intensity and felt mostly in the left hip and left upper thigh. The patient actually denies any fall. She had a fracture on the same hip last year for which she had hip fracture repair with plates and screws. A left femur x-ray noted evidence of hardware failure with fracture at the site of nonunion proximal femoral shaft fracture. The patient is unsure about what happened and thinks she must have broken her hip while sleeping and moving it in the wrong way. Labs did not show any significant abnormalities, except for creatinine of 2.93 which is actually within her baseline range, even if the most recent creatinine on record was 2.4. Based on her clinical presentation, the hospitalist service was contacted and the patient was admitted to the medical floor for left proximal femur fracture and hardware failure.     Today, stable postop, pain controlled, makes clear urine, no changes, sit in a chair    Objective:     Patient Vitals for the past 24 hrs:   Temp Pulse Resp BP SpO2   20 1056 98.2 °F (36.8 °C) 69 20 116/41 90 %   20 0746 98 °F (36.7 °C) 73 16 127/55 91 %   20 0346 98.1 °F (36.7 °C) 75 16 113/51 90 %   20 2310 98.2 °F (36.8 °C) 84 18 162/64 92 %   20 1943 97.9 °F (36.6 °C) 84 18 126/72 93 %   20 1628 98.1 °F (36.7 °C) 82 18 126/70 95 %     Oxygen Therapy  O2 Sat (%): 90 % (20 1056)  Pulse via Oximetry: 73 beats per minute (20 1117)  O2 Device: Room air (20 8925)  O2 Flow Rate (L/min): 2 l/min(Hgb 6.8, did not wean oxygen) (04/29/20 1117)    Intake/Output Summary (Last 24 hours) at 4/30/2020 1438  Last data filed at 4/30/2020 0840  Gross per 24 hour   Intake 240 ml   Output 225 ml   Net 15 ml       Physical Exam:  General:    Well nourished. Alert. Obese, pale, mild distress  CV:   RRR. No murmur, rub, or gallop. Lungs:  CTAB. No wheezing, rhonchi, or rales. Abdomen: Soft, nontender, nondistended. Bowel sounds normal.   Extremities: Warm and dry. No cyanosis or edema. Tenderness on palpation of the left hip. Left lower extremity is shortened and externally rotated. Neurologic: grossly intact. Skin:     No rashes or jaundice. Psych:  Normal mood and affect. I reviewed the labs, imaging, EKGs, telemetry, and other studies done this admission.   Data Review:   Recent Results (from the past 24 hour(s))   PLEASE READ & DOCUMENT PPD TEST IN 72 HRS    Collection Time: 04/29/20  5:05 PM   Result Value Ref Range    PPD Negative Negative    mm Induration 0 0 - 5 mm   METABOLIC PANEL, BASIC    Collection Time: 04/30/20  4:49 AM   Result Value Ref Range    Sodium 142 136 - 145 mmol/L    Potassium 3.2 (L) 3.5 - 5.1 mmol/L    Chloride 107 98 - 107 mmol/L    CO2 30 21 - 32 mmol/L    Anion gap 5 (L) 7 - 16 mmol/L    Glucose 98 65 - 100 mg/dL    BUN 49 (H) 8 - 23 MG/DL    Creatinine 3.03 (H) 0.6 - 1.0 MG/DL    GFR est AA 19 (L) >60 ml/min/1.73m2    GFR est non-AA 16 (L) >60 ml/min/1.73m2    Calcium 8.7 8.3 - 10.4 MG/DL   HEMOGLOBIN    Collection Time: 04/30/20  4:49 AM   Result Value Ref Range    HGB 7.4 (L) 11.7 - 15.4 g/dL   GLUCOSE, POC    Collection Time: 04/30/20  7:02 AM   Result Value Ref Range    Glucose (POC) 100 65 - 100 mg/dL   TYPE + CROSSMATCH    Collection Time: 04/30/20  9:49 AM   Result Value Ref Range    Crossmatch Expiration 05/03/2020     ABO/Rh(D) A POSITIVE     Antibody screen NEG     Unit number [id number]     Blood component type  LR     Unit division 00     Status of unit ISSUED Crossmatch result Compatible    GLUCOSE, POC    Collection Time: 04/30/20 10:32 AM   Result Value Ref Range    Glucose (POC) 104 (H) 65 - 100 mg/dL       All Micro Results     Procedure Component Value Units Date/Time    MSSA/MRSA SC BY PCR, NASAL SWAB [[id number]] Collected:  04/26/20 1830    Order Status:  Canceled Specimen:  Nasal swab           Other Studies:  No results found. Assessment and Plan:     Dx:  1 - Left proximal femur fracture with hardware failure, stable postop, pain controlled  2 - ALEISHA on CKD stage 4, improved close to baseline, seen by Nephrology. Resolved metabolic acidosis and hyperkalemia  3 - Hypotension/ low normal BP, hx of HTN  4 - Hx of breast cancer  5-  Lower Hb postop, s/p 2U PRBC transfusion    Rx:  Bicarb gtt change to NS  [place] cath  IO monitor   PRBC transfusion additional  3rd unit today  Stop anti-HTN  Nephrology consulted- thank you    Dispo; DC  tomorrow if Hb stable.  Pt wants home health and not rehab    Signed:  Demi Brooks MD

## 2020-04-30 NOTE — PROGRESS NOTES
Attempted to see pt for OT treatment this PM. Pt with RN in room and about to receive blood and unable to be seen at this time. Will attempt to see at later time and as schedule permits.     Thank you,    Theresa Lopez OTR/L

## 2020-04-30 NOTE — PROGRESS NOTES
Problem: Falls - Risk of  Goal: *Absence of Falls  Description: Document [place] Fall Risk and appropriate interventions in the flowsheet. Outcome: Progressing Towards Goal  Note: Fall Risk Interventions:  Mobility Interventions: Bed/chair exit alarm         Medication Interventions: Patient to call before getting OOB    Elimination Interventions: Bed/chair exit alarm    History of Falls Interventions: Bed/chair exit alarm         Problem: Patient Education: Go to Patient Education Activity  Goal: Patient/Family Education  Outcome: Progressing Towards Goal     Problem: Pressure Injury - Risk of  Goal: *Prevention of pressure injury  Description: Document Arturo Scale and appropriate interventions in the flowsheet.   Outcome: Progressing Towards Goal  Note: Pressure Injury Interventions:  Sensory Interventions: Assess changes in LOC, Check visual cues for pain         Activity Interventions: Increase time out of bed    Mobility Interventions: HOB 30 degrees or less    Nutrition Interventions: Document food/fluid/supplement intake                     Problem: Patient Education: Go to Patient Education Activity  Goal: Patient/Family Education  Outcome: Progressing Towards Goal     Problem: Patient Education: Go to Patient Education Activity  Goal: Patient/Family Education  Outcome: Progressing Towards Goal     Problem: Hip Fracture: Post-Op Day 1  Goal: Off Pathway (Use only if patient is Off Pathway)  Outcome: Progressing Towards Goal  Goal: Activity/Safety  Outcome: Progressing Towards Goal  Goal: Diagnostic Test/Procedures  Outcome: Progressing Towards Goal  Goal: Nutrition/Diet  Outcome: Progressing Towards Goal  Goal: Medications  Outcome: Progressing Towards Goal  Goal: Respiratory  Outcome: Progressing Towards Goal  Goal: Treatments/Interventions/Procedures  Outcome: Progressing Towards Goal  Goal: Psychosocial  Outcome: Progressing Towards Goal  Goal: Discharge Planning  Outcome: Progressing Towards Goal  Goal: *Demonstrates progressive activity  Outcome: Progressing Towards Goal  Goal: *Optimal pain control at patient's stated goal  Outcome: Progressing Towards Goal  Goal: *Hemodynamically stable  Outcome: Progressing Towards Goal  Goal: *Discharge plan identified  Outcome: Progressing Towards Goal  Goal: *Absence of skin breakdown  Outcome: Progressing Towards Goal     Problem: Hip Fracture: Post-Op Day 2  Goal: Off Pathway (Use only if patient is Off Pathway)  Outcome: Progressing Towards Goal  Goal: Activity/Safety  Outcome: Progressing Towards Goal  Goal: Diagnostic Test/Procedures  Outcome: Progressing Towards Goal  Goal: Nutrition/Diet  Outcome: Progressing Towards Goal  Goal: Medications  Outcome: Progressing Towards Goal  Goal: Respiratory  Outcome: Progressing Towards Goal  Goal: Treatments/Interventions/Procedures  Outcome: Progressing Towards Goal  Goal: Psychosocial  Outcome: Progressing Towards Goal  Goal: Discharge Planning  Outcome: Progressing Towards Goal  Goal: *Optimal pain control at patient's stated goal  Outcome: Progressing Towards Goal  Goal: *Hemodynamically stable  Outcome: Progressing Towards Goal  Goal: *Adequate oxygenation  Outcome: Progressing Towards Goal  Goal: *Tolerates increased activity  Outcome: Progressing Towards Goal  Goal: *Tolerates nutrition therapy  Outcome: Progressing Towards Goal  Goal: *Absence of skin breakdown  Outcome: Progressing Towards Goal     Problem: Hip Fracture: Post-Op Day 3  Goal: Off Pathway (Use only if patient is Off Pathway)  Outcome: Progressing Towards Goal  Goal: Activity/Safety  Outcome: Progressing Towards Goal  Goal: Diagnostic Test/Procedures  Outcome: Progressing Towards Goal  Goal: Nutrition/Diet  Outcome: Progressing Towards Goal  Goal: Medications  Outcome: Progressing Towards Goal  Goal: Respiratory  Outcome: Progressing Towards Goal  Goal: Treatments/Interventions/Procedures  Outcome: Progressing Towards Goal  Goal: Psychosocial  Outcome: Progressing Towards Goal  Goal: Discharge Planning  Outcome: Progressing Towards Goal  Goal: *Met physical therapy criteria for discharge to next level of care  Outcome: Progressing Towards Goal  Goal: *Optimal pain control at patient's stated goal  Outcome: Progressing Towards Goal  Goal: *Hemodynamically stable  Outcome: Progressing Towards Goal  Goal: *Tolerating diet  Outcome: Progressing Towards Goal  Goal: *Active bowel function  Outcome: Progressing Towards Goal  Goal: *Adequate urinary output  Outcome: Progressing Towards Goal  Goal: *Absence of skin breakdown  Outcome: Progressing Towards Goal  Goal: *Patient verbalizes understanding of discharge instructions  Outcome: Progressing Towards Goal     Problem: Hip Fracture: Post-Op Day 4  Goal: Off Pathway (Use only if patient is Off Pathway)  Outcome: Progressing Towards Goal  Goal: Activity/Safety  Outcome: Progressing Towards Goal  Goal: Diagnostic Test/Procedures  Outcome: Progressing Towards Goal  Goal: Nutrition/Diet  Outcome: Progressing Towards Goal  Goal: Medications  Outcome: Progressing Towards Goal  Goal: Respiratory  Outcome: Progressing Towards Goal  Goal: Treatments/Interventions/Procedures  Outcome: Progressing Towards Goal  Goal: Psychosocial  Outcome: Progressing Towards Goal  Goal: Discharge Planning  Outcome: Progressing Towards Goal  Goal: *Met physical therapy criteria for discharge to next level of care  Outcome: Progressing Towards Goal  Goal: *Optimal pain control at patient's stated goal  Outcome: Progressing Towards Goal  Goal: *Hemodynamically stable  Outcome: Progressing Towards Goal  Goal: *Tolerating diet  Outcome: Progressing Towards Goal  Goal: *Active bowel function  Outcome: Progressing Towards Goal  Goal: *Adequate urinary output  Outcome: Progressing Towards Goal  Goal: *Absence of skin breakdown  Outcome: Progressing Towards Goal  Goal: *Patient verbalizes understanding of discharge instructions  Outcome: Progressing Towards Goal

## 2020-05-01 VITALS
SYSTOLIC BLOOD PRESSURE: 141 MMHG | HEART RATE: 70 BPM | OXYGEN SATURATION: 94 % | TEMPERATURE: 97.9 F | BODY MASS INDEX: 37.98 KG/M2 | HEIGHT: 67 IN | DIASTOLIC BLOOD PRESSURE: 70 MMHG | WEIGHT: 242 LBS | RESPIRATION RATE: 20 BRPM

## 2020-05-01 PROBLEM — E87.20 METABOLIC ACIDEMIA: Status: ACTIVE | Noted: 2020-05-01

## 2020-05-01 PROBLEM — N17.9 AKI (ACUTE KIDNEY INJURY) (HCC): Status: RESOLVED | Noted: 2020-05-01 | Resolved: 2020-05-01

## 2020-05-01 PROBLEM — N17.9 AKI (ACUTE KIDNEY INJURY) (HCC): Status: ACTIVE | Noted: 2020-05-01

## 2020-05-01 PROBLEM — E87.20 METABOLIC ACIDEMIA: Status: RESOLVED | Noted: 2020-05-01 | Resolved: 2020-05-01

## 2020-05-01 PROBLEM — D62 POSTOPERATIVE ANEMIA DUE TO ACUTE BLOOD LOSS: Status: ACTIVE | Noted: 2020-05-01

## 2020-05-01 PROBLEM — N18.4 STAGE 4 CHRONIC KIDNEY DISEASE (HCC): Status: ACTIVE | Noted: 2020-05-01

## 2020-05-01 PROBLEM — S72.002A FRACTURE, PROXIMAL FEMUR, LEFT, CLOSED, INITIAL ENCOUNTER (HCC): Status: RESOLVED | Noted: 2020-04-26 | Resolved: 2020-05-01

## 2020-05-01 PROBLEM — N18.4 STAGE 4 CHRONIC KIDNEY DISEASE (HCC): Chronic | Status: ACTIVE | Noted: 2020-05-01

## 2020-05-01 PROBLEM — D62 POSTOPERATIVE ANEMIA DUE TO ACUTE BLOOD LOSS: Status: RESOLVED | Noted: 2020-05-01 | Resolved: 2020-05-01

## 2020-05-01 LAB
ABO + RH BLD: NORMAL
ANION GAP SERPL CALC-SCNC: 5 MMOL/L (ref 7–16)
BLD PROD TYP BPU: NORMAL
BLOOD GROUP ANTIBODIES SERPL: NORMAL
BPU ID: NORMAL
BUN SERPL-MCNC: 40 MG/DL (ref 8–23)
CALCIUM SERPL-MCNC: 8.8 MG/DL (ref 8.3–10.4)
CHLORIDE SERPL-SCNC: 108 MMOL/L (ref 98–107)
CO2 SERPL-SCNC: 28 MMOL/L (ref 21–32)
CREAT SERPL-MCNC: 2.31 MG/DL (ref 0.6–1)
CROSSMATCH RESULT,%XM: NORMAL
GLUCOSE BLD STRIP.AUTO-MCNC: 92 MG/DL (ref 65–100)
GLUCOSE BLD STRIP.AUTO-MCNC: 94 MG/DL (ref 65–100)
GLUCOSE SERPL-MCNC: 88 MG/DL (ref 65–100)
HGB BLD-MCNC: 8.7 G/DL (ref 11.7–15.4)
POTASSIUM SERPL-SCNC: 3.3 MMOL/L (ref 3.5–5.1)
SODIUM SERPL-SCNC: 141 MMOL/L (ref 136–145)
SPECIMEN EXP DATE BLD: NORMAL
STATUS OF UNIT,%ST: NORMAL
UNIT DIVISION, %UDIV: 0

## 2020-05-01 PROCEDURE — 36415 COLL VENOUS BLD VENIPUNCTURE: CPT

## 2020-05-01 PROCEDURE — 80048 BASIC METABOLIC PNL TOTAL CA: CPT

## 2020-05-01 PROCEDURE — 97110 THERAPEUTIC EXERCISES: CPT

## 2020-05-01 PROCEDURE — 74011250637 HC RX REV CODE- 250/637: Performed by: INTERNAL MEDICINE

## 2020-05-01 PROCEDURE — 82962 GLUCOSE BLOOD TEST: CPT

## 2020-05-01 PROCEDURE — 97530 THERAPEUTIC ACTIVITIES: CPT

## 2020-05-01 PROCEDURE — 85018 HEMOGLOBIN: CPT

## 2020-05-01 PROCEDURE — 74011250637 HC RX REV CODE- 250/637: Performed by: ORTHOPAEDIC SURGERY

## 2020-05-01 RX ORDER — ASPIRIN 325 MG
325 TABLET ORAL DAILY
Qty: 5 TAB | Refills: 0 | Status: SHIPPED | OUTPATIENT
Start: 2020-05-02 | End: 2020-05-07

## 2020-05-01 RX ORDER — OXYCODONE HYDROCHLORIDE 5 MG/1
5 TABLET ORAL
Qty: 12 TAB | Refills: 0 | Status: SHIPPED | OUTPATIENT
Start: 2020-05-01 | End: 2020-05-04

## 2020-05-01 RX ADMIN — ASPIRIN 325 MG ORAL TABLET 325 MG: 325 PILL ORAL at 08:18

## 2020-05-01 RX ADMIN — NYSTATIN: 100000 POWDER TOPICAL at 09:00

## 2020-05-01 RX ADMIN — Medication 10 ML: at 05:10

## 2020-05-01 RX ADMIN — POTASSIUM BICARBONATE 40 MEQ: 782 TABLET, EFFERVESCENT ORAL at 11:14

## 2020-05-01 RX ADMIN — Medication 10 ML: at 05:11

## 2020-05-01 RX ADMIN — Medication 10 ML: at 13:40

## 2020-05-01 RX ADMIN — Medication 10 ML: at 04:48

## 2020-05-01 RX ADMIN — Medication 10 ML: at 13:41

## 2020-05-01 RX ADMIN — ACETAMINOPHEN 650 MG: 325 TABLET, FILM COATED ORAL at 04:48

## 2020-05-01 NOTE — DISCHARGE INSTRUCTIONS
Patient Education        Iron Deficiency Anemia: Care Instructions  Your Care Instructions    Anemia means that you do not have enough red blood cells. Red blood cells carry oxygen around your body. When you have anemia, it can make you pale, weak, and tired. Many things can cause anemia. The most common cause is loss of blood. This can happen if you have heavy menstrual periods. It can also happen if you have bleeding in your stomach or bowel. You can also get anemia if you don't have enough iron in your diet or if it's hard for your body to absorb iron. In some cases, pregnancy causes anemia. That's because a pregnant woman needs more iron. Your doctor may do more tests to find the cause of your anemia. If a disease or other health problem is causing it, your doctor will treat that problem. It's important to follow up with your doctor to make sure that your iron level returns to normal.  Follow-up care is a key part of your treatment and safety. Be sure to make and go to all appointments, and call your doctor if you are having problems. It's also a good idea to know your test results and keep a list of the medicines you take. How can you care for yourself at home? · If your doctor recommended iron pills, take them as directed. ? Try to take the pills on an empty stomach. You can do this about 1 hour before or 2 hours after meals. But you may need to take iron with food to avoid an upset stomach. ? Do not take antacids or drink milk or anything with caffeine within 2 hours of when you take your iron. They can keep your body from absorbing the iron well. ? Vitamin C helps your body absorb iron. You may want to take iron pills with a glass of orange juice or some other food high in vitamin C.  ? Iron pills may cause stomach problems. These include heartburn, nausea, diarrhea, constipation, and cramps. It can help to drink plenty of fluids and include fruits, vegetables, and fiber in your diet.   ? It's normal for iron pills to make your stool a greenish or grayish black. But internal bleeding can also cause dark stool. So it's important to tell your doctor about any color changes. ? Call your doctor if you think you are having a problem with your iron pills. Even after you start to feel better, it will take several months for your body to build up its supply of iron. ? If you miss a pill, don't take a double dose. ? Keep iron pills out of the reach of small children. Too much iron can be very dangerous. · Eat foods with a lot of iron. These include red meat, shellfish, poultry, and eggs. They also include beans, raisins, whole-grain bread, and leafy green vegetables. · Steam your vegetables. This is the best way to prepare them if you want to get as much iron as possible. · Be safe with medicines. Do not take nonsteroidal anti-inflammatory pain relievers unless your doctor tells you to. These include aspirin, naproxen (Aleve), and ibuprofen (Advil, Motrin). · Liquid iron can stain your teeth. But you can mix it with water or juice and drink it with a straw. Then it won't get on your teeth. When should you call for help? Call 911 anytime you think you may need emergency care. For example, call if:    · You passed out (lost consciousness).    Call your doctor now or seek immediate medical care if:    · You are short of breath.     · You are dizzy or light-headed, or you feel like you may faint.     · You have new or worse bleeding.    Watch closely for changes in your health, and be sure to contact your doctor if:    · You feel weaker or more tired than usual.     · You do not get better as expected. Where can you learn more? Go to http://ayush-nury.info/  Enter Z825 in the search box to learn more about \"Iron Deficiency Anemia: Care Instructions. \"  Current as of: November 7, 2019Content Version: 12.4  © 8933-9379 [employer].   Care instructions adapted under license by [facility] (which disclaims liability or warranty for this information). If you have questions about a medical condition or this instruction, always ask your healthcare professional. [address] any warranty or liability for your use of this information. Patient Education        Blood Urea Nitrogen (BUN) Test: About This Test  What is it? A blood urea nitrogen (BUN) test measures the amount of nitrogen in your blood that comes from the waste product urea. Urea is made in the liver and passed out of your body in the urine. If your kidneys are not able to remove urea from the blood normally, your BUN level rises. Dehydration can also make your BUN level higher. A BUN test may be done with a blood creatinine test. The level of creatinine in your blood also tells how well your kidneys are working. A high creatinine level may mean your kidneys are not working properly. BUN and creatinine tests can be used together to find the BUN-to-creatinine ratio. Why is this test done? A BUN test is done to:  · See if your kidneys are working normally. · See if your kidney disease is getting worse. · See if treatment of your kidney disease is working. · Check for severe dehydration. Dehydration generally causes BUN levels to rise more than creatinine levels. This causes a high BUN-to-creatinine ratio. How can you prepare for the test?  · Do not eat a lot of meat or other protein in the 24 hours before having a BUN test.  What happens during the test?  · A health professional takes a sample of your blood. What else should you know about the test?  · Your results will include an explanation of what a \"normal\" result is. This is called a \"reference range. \" It is just a guide. Your doctor will evaluate your results based on your health and other factors. This means that a value that falls outside the normal values listed may still be normal for you.   · Make sure your doctor knows all of the medicines, vitamins, herbal products, and supplements you take. What happens after the test?  · You will probably be able to go home right away. · You can go back to your usual activities right away. Follow-up care is a key part of your treatment and safety. Be sure to make and go to all appointments, and call your doctor if you are having problems. It's also a good idea to keep a list of the medicines you take. Ask your doctor when you can expect to have your test results. Where can you learn more? Go to http://ayush-nury.info/  Enter Q522 in the search box to learn more about \"Blood Urea Nitrogen (BUN) Test: About This Test.\"  Current as of: August 11, 2019Content Version: 12.4  © 9591-8847 [employer]. Care instructions adapted under license by [employer] (which disclaims liability or warranty for this information). If you have questions about a medical condition or this instruction, always ask your healthcare professional. [address] any warranty or liability for your use of this information.

## 2020-05-01 NOTE — ROUTINE PROCESS
Patient given discharge instructions given with time to asks questions and have them answered. Patient left with EMS with belonging in hand.

## 2020-05-01 NOTE — CDMP QUERY
Pt admitted with femur fracture. Pt noted to have drop in Hgb, hypotension, and received a blood transfusion. If possible, please document in the progress notes and d/c summary if you are evaluating and / or treating any of the following: ? Anemia due to acute blood loss ? Anemia due to chronic blood loss ? Anemia due to iron deficiency ? Anemia due to postoperative blood loss (please specify if expected or a complication of the surgery) ? Anemia due to chronic disease, please specify disease ? Dilutional anemia 
? Other, please specify ? Clinically unable to determine The medical record reflects the following: 
   Risk Factors: ALEISHA on CKD, surgery- autologous bone graft as well as revision fixation and hardware removal 4/27, HTN Clinical Indicators:  
--4/27 through 5/1 Hgb: 8.8>7.8>6.8>7.4>8.7 
--4/27 @1731 BP 84/42 
--4/30 pn stating, \" DC  tomorrow if Hb stable. Pt wants home health and not rehab\" Treatment: PRBC transfusion x2 unit, held antihypertensive meds Thank you, Margarita Thompson, [address] [place] [address] RN 
[phone]

## 2020-05-01 NOTE — PROGRESS NOTES
May 1, 2020         Post Op day: 4 Days Post-Op Procedure(s) (LRB):  LEFT HIP REMOVAL OF HARDWARE AND LEFT FEMUR INSERTION INTRA MEDULLARY NAIL (Left)      Admit Date: 2020  Admit Diagnosis: Fracture, proximal femur, left, closed, initial encounter ([facility] [S72.002A]       Principle Problem: Fracture, proximal femur, left, closed, initial encounter ([facility] Subjective: Doing well, No complaints, No SOB, No Chest Pain, No Nausea or Vomiting     Objective:   Vital Signs are Stable, No Acute Distress, Alert,  Dressing is Dry,  Neurovascular exam is normal.     Assessment / Plan :  Patient Active Problem List   Diagnosis Code    Aortic valve insufficiency I35.1    Hypertension I10    Obesity E66.9    Hyperlipidemia E78.5    Abnormal EKG R94.31    Mass of breast N63.0    Infiltrating ductal carcinoma of female breast ([facility] C50.919    Malignant neoplasm metastatic to bone (HCC) C79.51    Hyperkalemia E87.5    Anemia due to chronic kidney disease treated with erythropoietin N18.9, D63.1    Femur fracture, left ([facility]) S72. 92XA    Stage 3 chronic kidney disease (HCC) N18.3    Hypokalemia E87.6    Severe obesity (HCC) E66.01    Fracture, proximal femur, left, closed, initial encounter ([facility] S72.002A      Patient Vitals for the past 8 hrs:   BP Temp Pulse Resp SpO2   20 0703 145/78 98 °F (36.7 °C) 67 20 92 %   20 0519 166/79 98 °F (36.7 °C) 76 20 94 %    Temp (24hrs), Av.3 °F (36.8 °C), Min:98 °F (36.7 °C), Max:98.6 °F (37 °C)    Body mass index is 37.9 kg/m².     Lab Results   Component Value Date/Time    HGB 8.7 (L) 2020 04:41 AM             Medical Mgmt per hospitalist  Anticoagulation plan: ASA 325mg   Continue PT  Fall Precuations  DC disp: home health           Signed By: KIERRA Gil  2020,  8:41 AM

## 2020-05-01 NOTE — DISCHARGE SUMMARY
Hospitalist Discharge Summary     Admit Date:  2020  1:28 PM   Name:  Brenda Aviles   Age:  67 y.o.  :  1945   MRN:  [id number]   PCP:  Kenny Hernandez NP  Treatment Team: Attending Provider: Mynor Bernabe MD; Consulting Provider: Aneesh Kelley MD; Consulting Provider: Afshin Nash MD; Utilization Review: Jyoti Borrero RN; Care Manager: Rd Zimmer LMSW; Consulting Provider: Randy Neil MD; Charge Nurse: Cleve Muir; Physical Therapy Assistant: Sunshine Pinedo PTA; Occupational Therapist: Eleonora Arteaga OTR/MART    Problem List for this Hospitalization:  Hospital Problems as of 2020 Date Reviewed: 4/15/2020          Codes Class Noted - Resolved POA    Stage 4 chronic kidney disease ([facility] (Chronic) ICD-10-CM: N18.4  ICD-9-CM: 585.4  2020 - Present Yes        RESOLVED: Postoperative anemia due to acute blood loss ICD-10-CM: D62  ICD-9-CM: 285.1  2020 - 2020 Yes        RESOLVED: ALEISHA (acute kidney injury) ([facility] ICD-10-CM: N17.9  ICD-9-CM: 584.9  2020 - 2020 Yes        RESOLVED: Metabolic acidemia ZXZ-33-AA: T55.2  ICD-9-CM: 276.2  2020 - 2020 Yes        * (Principal) RESOLVED: Fracture, proximal femur, left, closed, initial encounter [facility]) ICD-10-CM: G70.467I  ICD-9-CM: 820.8  2020 - 2020 Yes                Admission HPI from 2020:    \"  The patient is a 65-year-old  lady with HTN, CKD stage IV with baseline creatinine between 2.4 and 3.0, breast cancer, presented to the ED at [facility] complaining of left hip pain. The pain started about 3 days ago and has been gradually worsening. It was estimated at 10/10 of intensity and felt mostly in the left hip and left upper thigh. The patient actually denies any fall. She had a fracture on the same hip last year for which she had hip fracture repair with plates and screws.   A left femur x-ray noted evidence of hardware failure with fracture at the site of nonunion proximal femoral shaft fracture. The patient is unsure about what happened and thinks she must have broken her hip while sleeping and moving it in the wrong way. Labs did not show any significant abnormalities, except for creatinine of 2.93 which is actually within her baseline range, even if the most recent creatinine on record was 2.4. Based on her clinical presentation, the hospitalist service was contacted and the patient was admitted to the medical floor for left proximal femur fracture and hardware failure. \"    Hospital Course:  Pt admitted with AoCKD, femur fracture. Went to OR 4/27. No complications except acute blood loss anemia. Better after transfusion. ALEISHA back to baseline. Pain controlled but still needs a little oxycodone to permit sleep. She wants to go home with [facility] and did well with that last time, per her report. Will arrange today    Disposition: Home Health Care Svc  Activity: PT/OT per Home Health  Diet: DIET REGULAR  Code Status: Full Code    Follow Up Orders: Follow-up Appointments   Procedures    FOLLOW UP VISIT Appointment in: Two Weeks Please set up 2 week hospital follow up with [facility] upon hosp d/c with renal panel and cbc w/o diff prior to appointment. [phone] Thanks     Please set up 2 week hospital follow up with [facility] upon hosp d/c with renal panel and cbc w/o diff prior to appointment. [phone]  Thanks     Standing Status:   Standing     Number of Occurrences:   1     Order Specific Question:   Appointment in     Answer: Two Weeks       Follow-up Information     Follow up With Specialties Details Why [address]  On 5/12/2020 9:45 am [address] Kaylynn Patel [address] [place] [address] [facility]    Kenny Hernandez NP Family Practice Call As needed [address] [place] [address]  [phone]            Discharge meds at bottom of this note.   Plan was discussed with pt. All questions answered. Patient was stable at time of discharge. Given instructions to call a physician or return if any concerns. Discharge summary and encounter summary was sent to PCP electronically via \"Comm Mgt\" link in [facility], if possible. Diagnostic Imaging/Tests:   Xr Chest Pa Lat    Result Date: 4/26/2020  Clinical History: The patient is a 67years year old Female presenting with symptoms of hip fracture. Comparison:  Chest x-ray 8/11/2018 Findings:  Frontal and lateral views of the chest were obtained. There are shallow inspiratory changes. No pleural effusions are seen. The cardiomediastinal silhouette is within normal limits. Atherosclerotic disease is seen throughout the aortic arch. There are no acute osseous abnormalities. Impression:  No acute cardiopulmonary process. CPT code(s) 46337     Xr Hip Lt W Or Wo Pelv 2-3 Vws    Result Date: 4/27/2020  LEFT HIP RADIOGRAPHS, 4/27/2020. CLINICAL HISTORY: Postop. COMPARISON STUDY: Left femur radiographs 4/26/2020. FINDINGS: A frontal view of the pelvis and AP and lateral view of the left hip are submitted for evaluation. Abnormal sclerotic changes are seen of the sacrum, and bilateral pelvic bones. Given the patient's history of breast cancer, these are concerning for bony metastatic lesions. Additional sclerosis is seen a partially visualized lower lumbar vertebrae. The patient's prior left hip prosthesis has been revised. There is now an intramedullary jeri extending through the diaphysis of the left femur. Proximally, this is stabilized with two metallic pins extending through the left femoral neck as well as 2 additional screws connecting one of the pins to the intramedullary jeri. The distal extent of intramedullary jeri is not included in the field of imaging. These stabilize a fracture involving the most proximal diaphysis of the left femur.  Additional cortical lucencies are seen which appear to represent radiolucent tunnel from the prior prosthesis. A lateral staple line, and minimal scattered soft tissue gas are seen consistent with recent surgery. An additional fracture is seen of the greater trochanter of the proximal left femur which is poorly assessed although could represent an additional acute fracture. IMPRESSION: 1. Postoperative changes of the left femur. These stabilize a fracture involving the most proximal diaphysis of the left femur. An additional age-indeterminate fracture is seen of the greater trochanter of the proximal left femur. Extensive sclerotic lesions are seen suggesting extensive bony metastatic disease. Xr Femur Lt 2 V    Result Date: 4/26/2020  Clinical History: The patient is a 67years year old Female presenting with symptoms of Left femur pain. Comparison:  none Findings: 3 views of the left femur were obtained. Hardware failure and fracture is demonstrated in the proximal bridging plate at the site of previous probable nonunion fracture of the proximal femoral shaft. Joint spaces are well-maintained. There is incidental vascular calcification. 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 Us Retroperitoneum Ltd    Result Date: 4/29/2020  EXAM:  US RETROPERITONEUM LTD INDICATION:  ALEISHA/CKD IV COMPARISON: None. TECHNIQUE: Real-time sonography of the kidneys, retroperitoneum and bladder was performed with multiple static images obtained. FINDINGS: The kidneys have normal echogenicity with no mass, stone or hydronephrosis. The right kidney measures 7.4 cm and the left kidney measures 8.4 cm in length. The aorta tapers normally. The proximal iliac arteries are normal.  The IVC is normal. No retroperitoneal mass is identified. The urinary bladder is normal.     IMPRESSION: Normal renal ultrasound examination. The evaluation is limited due to the patient's large body habitus.        Echocardiogram results:  No results found for this visit on 04/26/20. Procedures done this admission:  Procedure(s):  LEFT HIP REMOVAL OF HARDWARE AND LEFT FEMUR INSERTION INTRA MEDULLARY NAIL    All Micro Results     Procedure Component Value Units Date/Time    MSSA/MRSA SC BY PCR, NASAL SWAB [[id number]] Collected:  04/26/20 1830    Order Status:  Canceled Specimen:  Nasal swab           SARS-CoV-2 LAB Results  \"Novel Coronavirus\" Test: No results found for: COV2NT   \"Emergent Disease\" Test: No results found for: EDPR  As of: 10:26 AM on 5/1/2020      Labs: Results:       BMP, Mg, Phos Recent Labs     05/01/20 0441 04/30/20 0449 04/29/20 0427    142 144   K 3.3* 3.2* 3.9   * 107 108*   CO2 28 30 29   AGAP 5* 5* 7   BUN 40* 49* 58*   CREA 2.31* 3.03* 3.97*   CA 8.8 8.7 8.7   GLU 88 98 131*      CBC Recent Labs     05/01/20 0441 04/30/20 0449 04/29/20 0427   HGB 8.7* 7.4* 6.8*      LFT No results for input(s): SGOT, ALT, TBIL, AP, TP, ALB, GLOB, AGRAT, GPT in the last 72 hours.    Cardiac Testing No results found for: BNPP, BNP, CPK, RCK1, RCK2, RCK3, RCK4, CKMB, CKNDX, CKND1, TROPT, TROIQ   Coagulation Tests Lab Results   Component Value Date/Time    Prothrombin time 13.8 04/26/2020 02:59 PM    Prothrombin time 14.0 08/07/2018 07:27 PM    INR 1.0 04/26/2020 02:59 PM    INR 1.1 08/07/2018 07:27 PM      A1c No results found for: HBA1C, HGBE8, [id number]   Lipid Panel No results found for: CHOL, CHOLPOCT, CHOLX, CHLST, CHOLV, 321784, HDL, HDLP, LDL, LDLC, DLDLP, 770180, VLDLC, VLDL, TGLX, TRIGL, TRIGP, TGLPOCT, CHHD, [facility]   Thyroid Panel Lab Results   Component Value Date/Time    TSH 1.010 04/18/2017 11:15 AM        Most Recent UA Lab Results   Component Value Date/Time    Color YELLOW 04/29/2020 12:13 PM    Appearance CLEAR 04/29/2020 12:13 PM    Specific gravity 1.012 04/29/2020 12:13 PM    pH (UA) 5.5 04/29/2020 12:13 PM    Protein Negative 04/29/2020 12:13 PM    Glucose Negative 04/29/2020 12:13 PM    Ketone Negative 04/29/2020 12:13 PM Bilirubin Negative 04/29/2020 12:13 PM    Blood SMALL (A) 04/29/2020 12:13 PM    Urobilinogen 0.2 04/29/2020 12:13 PM    Nitrites Negative 04/29/2020 12:13 PM    Leukocyte Esterase Negative 04/29/2020 12:13 PM    WBC 0-3 04/29/2020 12:13 PM    RBC 0-3 04/29/2020 12:13 PM    Epithelial cells 0-3 04/29/2020 12:13 PM    Bacteria TRACE 04/29/2020 12:13 PM    Casts 0 06/07/2019 02:10 PM    Crystals, urine 0 06/07/2019 02:10 PM    Mucus TRACE 04/29/2020 12:13 PM    Other observations RESULTS VERIFIED MANUALLY 04/29/2020 12:13 PM        Allergies   Allergen Reactions    Penicillins Unknown (comments)     UNKNOWN REACTION      Immunization History   Administered Date(s) Administered    TB Skin Test (PPD) Intradermal 08/08/2018, 04/26/2020       All Labs from Last 24 Hrs:  Recent Results (from the past 24 hour(s))   GLUCOSE, POC    Collection Time: 04/30/20 10:32 AM   Result Value Ref Range    Glucose (POC) 104 (H) 65 - 100 mg/dL   GLUCOSE, POC    Collection Time: 04/30/20  4:22 PM   Result Value Ref Range    Glucose (POC) 97 65 - 100 mg/dL   GLUCOSE, POC    Collection Time: 04/30/20  8:55 PM   Result Value Ref Range    Glucose (POC) 98 65 - 713 mg/dL   METABOLIC PANEL, BASIC    Collection Time: 05/01/20  4:41 AM   Result Value Ref Range    Sodium 141 136 - 145 mmol/L    Potassium 3.3 (L) 3.5 - 5.1 mmol/L    Chloride 108 (H) 98 - 107 mmol/L    CO2 28 21 - 32 mmol/L    Anion gap 5 (L) 7 - 16 mmol/L    Glucose 88 65 - 100 mg/dL    BUN 40 (H) 8 - 23 MG/DL    Creatinine 2.31 (H) 0.6 - 1.0 MG/DL    GFR est AA 26 (L) >60 ml/min/1.73m2    GFR est non-AA 22 (L) >60 ml/min/1.73m2    Calcium 8.8 8.3 - 10.4 MG/DL   HEMOGLOBIN    Collection Time: 05/01/20  4:41 AM   Result Value Ref Range    HGB 8.7 (L) 11.7 - 15.4 g/dL   GLUCOSE, POC    Collection Time: 05/01/20  7:04 AM   Result Value Ref Range    Glucose (POC) 92 65 - 100 mg/dL       Discharge Exam:  Patient Vitals for the past 24 hrs:   Temp Pulse Resp BP SpO2   05/01/20 0703 98 °F (36.7 °C) 67 20 145/78 92 %   05/01/20 0519 98 °F (36.7 °C) 76 20 166/79 94 %   04/30/20 2310 98.2 °F (36.8 °C) 77 20 161/62 90 %   04/30/20 2020 98.3 °F (36.8 °C) 78 20 160/73 92 %   04/30/20 1654    150/79    04/30/20 1553  81  144/62    04/30/20 1454 98.6 °F (37 °C) 80  135/67 93 %   04/30/20 1436 98.6 °F (37 °C) 83  148/73 91 %   04/30/20 1354 98.2 °F (36.8 °C) 78  144/62 94 %   04/30/20 1056 98.2 °F (36.8 °C) 69 20 116/41 90 %     Oxygen Therapy  O2 Sat (%): 92 % (05/01/20 0703)  Pulse via Oximetry: 73 beats per minute (04/29/20 1117)  O2 Device: Room air (04/29/20 1655)  O2 Flow Rate (L/min): 2 l/min(Hgb 6.8, did not wean oxygen) (04/29/20 1117)    Estimated body mass index is 37.9 kg/m² as calculated from the following:    Height as of this encounter: 5' 7\" (1.702 m). Weight as of this encounter: 109.8 kg (242 lb). Intake/Output Summary (Last 24 hours) at 5/1/2020 1026  Last data filed at 5/1/2020 0525  Gross per 24 hour   Intake 650 ml   Output 2025 ml   Net -1375 ml       *Note that automatically entered I/Os may not be accurate; dependent on patient compliance with collection and accurate  by assistants. General:    Well nourished. Alert. Eyes:   Normal sclerae. Extraocular movements intact. ENT:  Normocephalic, atraumatic. Moist mucous membranes  CV:   Regular rate and rhythm. No murmur, rub, or gallop. Lungs:  Clear to auscultation bilaterally. No wheezing, rhonchi, or rales. Abdomen: Soft, nontender, nondistended. Extremities: Warm and dry. No cyanosis or edema. Neurologic: CN II-XII grossly intact. No gross focal deficits   Skin:     No rashes or jaundice. Psych:  Normal mood and affect.     Current Med List in Hospital:   Current Facility-Administered Medications   Medication Dose Route Frequency    potassium bicarb-citric acid (EFFER-K) tablet 40 mEq  40 mEq Oral NOW    0.9% sodium chloride infusion 250 mL  250 mL IntraVENous PRN    0.9% sodium chloride infusion 250 mL  250 mL IntraVENous PRN    0.9% sodium chloride infusion 250 mL  250 mL IntraVENous PRN    HYDROcodone-acetaminophen (NORCO) 7.5-325 mg per tablet 1 Tab  1 Tab Oral Q4H PRN    nystatin (MYCOSTATIN) 100,000 unit/gram powder   Topical BID    cloNIDine HCL (CATAPRES) tablet 0.2 mg  0.2 mg Oral BID PRN    lidocaine (XYLOCAINE) 10 mg/mL (1 %) injection 0.1 mL  0.1 mL SubCUTAneous PRN    sodium chloride (NS) flush 5-40 mL  5-40 mL IntraVENous Q8H    sodium chloride (NS) flush 5-40 mL  5-40 mL IntraVENous PRN    ondansetron (ZOFRAN) injection 4 mg  4 mg IntraVENous Q4H PRN    alum-mag hydroxide-simeth (MYLANTA) oral suspension 30 mL  30 mL Oral Q4H PRN    calcium-vitamin D (OS-JORGE) 500 mg-200 unit tablet  1 Tab Oral TID WITH MEALS    aspirin tablet 325 mg  325 mg Oral DAILY    calcium carbonate (OS-JORGE) tablet 500 mg [elemental]  500 mg Oral BID WITH MEALS    letrozole (FEMARA) tablet 2.5 mg (Patient Supplied)  2.5 mg Oral DAILY    palbociclib cap 75 mg (Patient Supplied)  75 mg Oral DAILY WITH DINNER    sodium chloride (NS) flush 5-40 mL  5-40 mL IntraVENous Q8H    sodium chloride (NS) flush 5-40 mL  5-40 mL IntraVENous PRN    acetaminophen (TYLENOL) tablet 650 mg  650 mg Oral Q4H PRN    HYDROmorphone (PF) (DILAUDID) injection 0.5 mg  0.5 mg IntraVENous Q4H PRN       Discharge Info:   Current Discharge Medication List      START taking these medications    Details   aspirin (ASPIRIN) 325 mg tablet Take 1 Tab by mouth daily for 5 days. Qty: 5 Tab, Refills: 0      oxyCODONE IR (ROXICODONE) 5 mg immediate release tablet Take 1 Tab by mouth every six (6) hours as needed for Pain for up to 3 days.  Max Daily Amount: 20 mg.  Qty: 12 Tab, Refills: 0    Associated Diagnoses: Fracture, proximal femur, left, closed, initial encounter ([facility]         CONTINUE these medications which have NOT CHANGED    Details   diphenhydrAMINE (BENADRYL) 25 mg tablet Take 25 mg by mouth every six (6) hours as needed for Allergies. ondansetron hcl (Zofran) 8 mg tablet Take 1 Tab by mouth every eight (8) hours as needed for Nausea. Qty: 90 Tab, Refills: 2    Associated Diagnoses: Malignant neoplasm metastatic to bone (HCC)      triamterene-hydroCHLOROthiazide (MAXZIDE) 37.5-25 mg per tablet TAKE 1 TABLET BY MOUTH EVERY DAY  Qty: 30 Tab, Refills: 1    Associated Diagnoses: Edema, unspecified type      melatonin 5 mg cap capsule Take 5 mg by mouth nightly. amLODIPine (NORVASC) 5 mg tablet Take 5 mg by mouth daily. colchicine (MITIGARE) 0.6 mg capsule Take 2 caps now, 1 cap in 1 hour, then 1 cap daily x 3  Indications: acute inflammation of the joints due to gout attack  Qty: 6 Cap, Refills: 0      palbociclib (Ibrance) 75 mg cap Take one capsule by mouthOnce daily with food for21 days on, followed by7 days off during each cyCle of 28 days  Qty: 21 Cap, Refills: 5    Associated Diagnoses: Malignant neoplasm metastatic to bone ([facility] Infiltrating ductal carcinoma of female breast, unspecified laterality (HCC)      letrozole (FEMARA) 2.5 mg tablet TAKE 1 TABLET BY MOUTH EVERY DAY  Qty: 90 Tab, Refills: 3    Associated Diagnoses: Infiltrating ductal carcinoma of female breast, unspecified laterality (HCC)      furosemide (LASIX) 20 mg tablet TAKE 1 TAB BY MOUTH DAILY AS NEEDED. FOR SWELLING. Qty: 30 Tab, Refills: 1    Associated Diagnoses: Edema, unspecified type      calcium carbonate (OS-JORGE) 500 mg calcium (1,250 mg) tablet Take 2 tablets at lunch and 2 tablets at dinner. Indications: hypocalcemia  Qty: 4 Tab, Refills: 0    Associated Diagnoses: Hypocalcemia               Time spent in patient discharge planning and coordination 35 minutes.     Signed:  Dom Flowers MD

## 2020-05-01 NOTE — PROGRESS NOTES
Problem: Mobility Impaired (Adult and Pediatric)  Goal: *Acute Goals and Plan of Care (Insert Text)  Description: LTG:  (1.)Ms. Lianne Gregory will move from supine to sit and sit to supine, scoot up and down and roll side to side with STAND BY ASSIST within 7 treatment day(s). (2.)Ms. Lianne Gregory will transfer from bed to chair and chair to bed with STAND BY ASSIST using the least restrictive device within 7 treatment day(s). (3.)Ms. Lianne Gregory will ambulate with CONTACT GUARD ASSIST for 15+ feet with the least restrictive device within 7 treatment day(s). (4.)Ms. Lianne Gregory will perform exercises per HEP for 10+ minutes to improve strength and mobility within 7 days. ________________________________________________________________________________________________   Outcome: Progressing Towards Goal     PHYSICAL THERAPY: Daily Note and AM 5/1/2020  INPATIENT: PT Visit Days : 4  Payor: Khadra Choudhary / Plan: [address] [place] [address] HMO / Product Type: [employer] /       NAME/AGE/GENDER: Sarah Beth Light is a 67 y.o. female   PRIMARY DIAGNOSIS: Fracture, proximal femur, left, closed, initial encounter ([facility] [S72.002A] Fracture, proximal femur, left, closed, initial encounter ([facility] Fracture, proximal femur, left, closed, initial encounter ([facility]  Procedure(s) (LRB):  LEFT HIP REMOVAL OF HARDWARE AND LEFT FEMUR INSERTION INTRA MEDULLARY NAIL (Left)  4 Days Post-Op  ICD-10: Treatment Diagnosis:    · Generalized Muscle Weakness (M62.81)  · Difficulty in walking, Not elsewhere classified (R26.2)  · Other abnormalities of gait and mobility (R26.89)   Precaution/Allergies:  Penicillins      ASSESSMENT:     Ms. Lianne Gregory is a 67year old female following left hip hardware removal and IM nailing, WBAT per ortho orders. At baseline she lives with two daughters and has been ambulating household distances via furniture walking (hx prior left hip fracture last year).         Patient was supine upon contact and agreeable to PT. Patient performs supine to sit and transfer to standing with mod assist, additional time, and cues for improved/proper technique. Once standing patient ambulates 2' with rolling walker, min assist, forward flexed posture, and cues for sequencing with rolling walker. Patient then participates in therapeutic strengthening exercises to improve functional strength for transfers, gait and overall mobility. Patient requires cues to perform exercises correctly. Overall slow progress towards physical therapy goals. Patient's goals listed above are still appropriate. Will continue skilled PT to address remaining deficits. This section established at most recent assessment   PROBLEM LIST (Impairments causing functional limitations):  1. Decreased Strength  2. Decreased Transfer Abilities  3. Decreased Ambulation Ability/Technique  4. Decreased Balance  5. Increased Pain  6. Decreased Activity Tolerance  7. Decreased Flexibility/Joint Mobility   INTERVENTIONS PLANNED: (Benefits and precautions of physical therapy have been discussed with the patient.)  1. Balance Exercise  2. Bed Mobility  3. Gait Training  4. Home Exercise Program (HEP)  5. Therapeutic Activites  6. Therapeutic Exercise/Strengthening  7. Transfer Training     TREATMENT PLAN: Frequency/Duration: 3 times a week for duration of hospital stay  Rehabilitation Potential For Stated Goals: Good     REHAB RECOMMENDATIONS (at time of discharge pending progress):    Placement: It is my opinion, based on this patient's performance to date, that Ms. Hermila Mckeon may benefit from intensive therapy at a [address] [place] [address] after discharge due to the functional deficits listed above that are likely to improve with skilled rehabilitation and concerns that he/she may be unsafe to be unsupervised at home due to increased need for assistance to safely transfer and ambulate.  Recommend rehab however pt wants home with family and [facility] PT    Equipment:    None at this time HISTORY:   History of Present Injury/Illness (Reason for Referral):  Per H&P, \"The patient is a 79-year-old  lady with HTN, CKD stage IV with baseline creatinine between 2.4 and 3.0, breast cancer, presented to the ED at [facility] complaining of left hip pain. The pain started about 3 days ago and has been gradually worsening. It was estimated at 10/10 of intensity and felt mostly in the left hip and left upper thigh. The patient actually denies any fall. She had a fracture on the same hip last year for which she had hip fracture repair with plates and screws. A left femur x-ray noted evidence of hardware failure with fracture at the site of nonunion proximal femoral shaft fracture. The patient is unsure about what happened and thinks she must have broken her hip while sleeping and moving it in the wrong way. Labs did not show any significant abnormalities, except for creatinine of 2.93 which is actually within her baseline range, even if the most recent creatinine on record was 2.4. Based on her clinical presentation, the hospitalist service was contacted and the patient was admitted to the medical floor for left proximal femur fracture and hardware failure\"    Past Medical History/Comorbidities:   Ms. Grazyna Louise  has a past medical history of Abnormal EKG (2/16/2016), Aortic valve insufficiency (2/16/2016), Cancer ([facility] Hyperlipidemia (2/16/2016), Hypertension (2/16/2016), and Obesity (2/16/2016). Ms. Grazyna Louise  has a past surgical history that includes hx tubal ligation.   Social History/Living Environment:   Home Environment: Trailer/mobile home  # Steps to Enter: 0  Wheelchair Ramp: Yes  One/Two Story Residence: One story  Living Alone: No  Support Systems: Child(ezequiel), Family member(s)  Patient Expects to be Discharged to[de-identified] Private residence  Current DME Used/Available at Home: Izell Sarks, rolling, Wheelchair, Commode, bedside, Hospital bed  Tub or Shower Type: (sponge bth )  Prior Level of Function/Work/Activity:  Lives with two daughters in mobile home with ramp. Had prior left hip sx last year, was ambulating well prior to this incident without DME use. One daughter works, the other is home with pt during the day. Family assists with ADLs. Number of Personal Factors/Comorbidities that affect the Plan of Care: 1-2: MODERATE COMPLEXITY   EXAMINATION:   Most Recent Physical Functioning:   Gross Assessment:                  Posture:     Balance:  Sitting: Intact  Standing: Impaired  Standing - Static: Fair  Standing - Dynamic : Fair Bed Mobility:  Supine to Sit: Moderate assistance  Wheelchair Mobility:     Transfers:  Sit to Stand: Moderate assistance  Stand to Sit: Minimum assistance  Gait:     Base of Support: Shift to right  Speed/Madelaine: Slow;Shuffled  Step Length: Left shortened;Right shortened  Gait Abnormalities: Decreased step clearance;Shuffling gait; Step to gait;Trunk sway increased  Distance (ft): 2 Feet (ft)  Assistive Device: Walker, rolling;Gait belt  Ambulation - Level of Assistance: Minimal assistance  Interventions: Tactile cues; Safety awareness training;Verbal cues      Body Structures Involved:  1. Bones  2. Joints  3. Muscles Body Functions Affected:  1. Sensory/Pain  2. Movement Related Activities and Participation Affected:  1. General Tasks and Demands  2. Mobility  3. Domestic Life  4. Community, Social and [place]   Number of elements that affect the Plan of Care: 4+: HIGH COMPLEXITY   CLINICAL PRESENTATION:   Presentation: Stable and uncomplicated: LOW COMPLEXITY   CLINICAL DECISION MAKIN [facility] Inpatient Short Form  How much difficulty does the patient currently have. .. Unable A Lot A Little None   1. Turning over in bed (including adjusting bedclothes, sheets and blankets)? [] 1   [] 2   [x] 3   [] 4   2.   Sitting down on and standing up from a chair with arms ( e.g., wheelchair, bedside commode, etc.) [] 1   [] 2   [x] 3   [] 4   3. Moving from lying on back to sitting on the side of the bed? [] 1   [x] 2   [] 3   [] 4   How much help from another person does the patient currently need. .. Total A Lot A Little None   4. Moving to and from a bed to a chair (including a wheelchair)? [] 1   [] 2   [x] 3   [] 4   5. Need to walk in hospital room? [] 1   [x] 2   [] 3   [] 4   6. Climbing 3-5 steps with a railing? [x] 1   [] 2   [] 3   [] 4   © 2007, Trustees of [address] [place] [address] [employer], under license to [employer]. All rights reserved      Score:  Initial: 14 Most Recent: X (Date: -- )    Interpretation of Tool:  Represents activities that are increasingly more difficult (i.e. Bed mobility, Transfers, Gait). Medical Necessity:     · Patient demonstrates   · good  ·  rehab potential due to higher previous functional level. Reason for Services/Other Comments:  · Patient   · continues to demonstrate capacity to improve strength, mobility, balance, transfers, and activity tolerance which will   · increase independence, decrease amount of assistance required from caregiver, and increase safety  · . Use of outcome tool(s) and clinical judgement create a POC that gives a: Clear prediction of patient's progress: LOW COMPLEXITY            TREATMENT:   (In addition to Assessment/Re-Assessment sessions the following treatments were rendered)   Pre-treatment Symptoms/Complaints:  None  Pain: Initial:   Pain Intensity 1: 0  Post Session:  0/10     Therapeutic Activity: (    10 Minutes): Therapeutic activities including bed mobility training, transfer training, ambulation on level ground, posture training, instruction in sequencing with rolling walker, scooting, and patient education to improve mobility, strength, balance, and coordination. Required minimal Tactile cues; Safety awareness training;Verbal cues to promote static and dynamic balance in standing and promote motor control of bilateral, lower extremity(s). Therapeutic Exercise: (   14 Minutes):  Exercises per grid below to improve mobility, strength and balance. Required minimal verbal cues to promote proper body alignment, promote proper body posture, promote proper body mechanics and promote proper body breathing techniques. Progressed resistance, range and repetitions as indicated. Date:  4/28/20 AM Date:  4/30/20 - AM Date:  4/30/20 - PM Date:  5/1/20   Activity/Exercise Parameters Parameters     LAQ x10 2x10B A 2x10B A 2x10B A   Seated marching x10 2x10B A 2x10B A 2x10B A   Ankle pumps x10 2x10B A 2x10B A 2x10B A   Seated hip aBd x10 B AA L 2x10B  AA-L, A-R 2x10B  AA-L, A-R 2x10B  AA-L, A-R   Glut sets   2x10 A 2x10A   Quad sets   2x10B A 2x10B A          A=active, B=bilateral    Braces/Orthotics/Lines/Etc:   · nasal cannula  Treatment/Session Assessment:    · Response to Treatment:   See above  · Interdisciplinary Collaboration:   o Physical Therapy Assistant  o Registered Nurse  o Rehabilitation Attendant  · After treatment position/precautions:   o Up in chair  o Bed alarm/tab alert on  o Bed/Chair-wheels locked  o Call light within reach  o RN notified   · Compliance with Program/Exercises: Will assess as treatment progresses  · Recommendations/Intent for next treatment session: \"Next visit will focus on advancements to more challenging activities and reduction in assistance provided\".   Total Treatment Duration:  PT Patient Time In/Time Out  Time In: 0845  Time Out: [address] [place]

## 2020-05-01 NOTE — PROGRESS NOTES
Pt is medically cleared for discharge to home today with [facility] services through [facility]. Transport arranged for 230pm through [employer]. No other dc needs or concerns identified or reported. SW remains available to assist as needed. Care Management Interventions  Mode of Transport at Discharge: BLS([facility])  Hospital Transport Time of Discharge: 1430  Transition of Care Consult (CM Consult): Home Health, Discharge [address]: Yes  Discharge Durable Medical Equipment: No  Physical Therapy Consult: Yes  Occupational Therapy Consult: Yes  Speech Therapy Consult: No  Current Support Network: Relative's Home(Lives with 3 daughters)  Confirm Follow Up Transport: Family  The Plan for Transition of Care is Related to the Following Treatment Goals : return to home with continued therapy services.   The Patient and/or Patient Representative was Provided with a Choice of Provider and Agrees with the Discharge Plan?: Yes  Freedom of Choice List was Provided with Basic Dialogue that Supports the Patient's Individualized Plan of Care/Goals, Treatment Preferences and Shares the Quality Data Associated with the Providers?: Yes  Discharge Location  Discharge Placement: Home with home health

## 2020-05-04 ENCOUNTER — PATIENT OUTREACH (OUTPATIENT)
Dept: CASE MANAGEMENT | Age: 75
End: 2020-05-04

## 2020-05-04 ENCOUNTER — HOME CARE VISIT (OUTPATIENT)
Dept: SCHEDULING | Facility: HOME HEALTH | Age: 75
End: 2020-05-04
Payer: MEDICARE

## 2020-05-04 VITALS
DIASTOLIC BLOOD PRESSURE: 74 MMHG | RESPIRATION RATE: 18 BRPM | SYSTOLIC BLOOD PRESSURE: 110 MMHG | TEMPERATURE: 98.9 F | OXYGEN SATURATION: 97 % | HEART RATE: 63 BPM

## 2020-05-04 PROCEDURE — 400013 HH SOC

## 2020-05-04 PROCEDURE — 3331090001 HH PPS REVENUE CREDIT

## 2020-05-04 PROCEDURE — A6260 WOUND CLEANSER ANY TYPE/SIZE: HCPCS

## 2020-05-04 PROCEDURE — G0299 HHS/HOSPICE OF RN EA 15 MIN: HCPCS

## 2020-05-04 PROCEDURE — 3331090002 HH PPS REVENUE DEBIT

## 2020-05-04 NOTE — PROGRESS NOTES
Initial [employer] COVID monitoring outreach attempt to patient's home number was unsuccessful. Left message to return call. Will attempt second outreach within 24 hours.

## 2020-05-04 NOTE — PROGRESS NOTES
Second [employer] COVID monitoring outreach attempt made to home numbers. Left message to return calls. Unable to reach, will close case. Will reopen if call is returned.

## 2020-05-05 ENCOUNTER — HOME CARE VISIT (OUTPATIENT)
Dept: SCHEDULING | Facility: HOME HEALTH | Age: 75
End: 2020-05-05
Payer: MEDICARE

## 2020-05-05 PROCEDURE — 3331090002 HH PPS REVENUE DEBIT

## 2020-05-05 PROCEDURE — 3331090001 HH PPS REVENUE CREDIT

## 2020-05-05 PROCEDURE — G0151 HHCP-SERV OF PT,EA 15 MIN: HCPCS

## 2020-05-06 ENCOUNTER — HOME CARE VISIT (OUTPATIENT)
Dept: SCHEDULING | Facility: HOME HEALTH | Age: 75
End: 2020-05-06
Payer: MEDICARE

## 2020-05-06 VITALS
TEMPERATURE: 97.9 F | RESPIRATION RATE: 18 BRPM | HEART RATE: 83 BPM | DIASTOLIC BLOOD PRESSURE: 82 MMHG | SYSTOLIC BLOOD PRESSURE: 132 MMHG

## 2020-05-06 VITALS
SYSTOLIC BLOOD PRESSURE: 142 MMHG | RESPIRATION RATE: 18 BRPM | TEMPERATURE: 98.1 F | DIASTOLIC BLOOD PRESSURE: 82 MMHG | HEART RATE: 70 BPM | OXYGEN SATURATION: 99 %

## 2020-05-06 VITALS — OXYGEN SATURATION: 94 % | TEMPERATURE: 97.6 F | RESPIRATION RATE: 18 BRPM | HEART RATE: 67 BPM

## 2020-05-06 PROCEDURE — G0299 HHS/HOSPICE OF RN EA 15 MIN: HCPCS

## 2020-05-06 PROCEDURE — 3331090002 HH PPS REVENUE DEBIT

## 2020-05-06 PROCEDURE — 3331090001 HH PPS REVENUE CREDIT

## 2020-05-06 PROCEDURE — G0152 HHCP-SERV OF OT,EA 15 MIN: HCPCS

## 2020-05-07 ENCOUNTER — HOME CARE VISIT (OUTPATIENT)
Dept: SCHEDULING | Facility: HOME HEALTH | Age: 75
End: 2020-05-07
Payer: MEDICARE

## 2020-05-07 PROCEDURE — G0151 HHCP-SERV OF PT,EA 15 MIN: HCPCS

## 2020-05-07 PROCEDURE — 3331090001 HH PPS REVENUE CREDIT

## 2020-05-07 PROCEDURE — 3331090002 HH PPS REVENUE DEBIT

## 2020-05-08 PROCEDURE — 3331090002 HH PPS REVENUE DEBIT

## 2020-05-08 PROCEDURE — 3331090001 HH PPS REVENUE CREDIT

## 2020-05-09 ENCOUNTER — HOME CARE VISIT (OUTPATIENT)
Dept: SCHEDULING | Facility: HOME HEALTH | Age: 75
End: 2020-05-09
Payer: MEDICARE

## 2020-05-09 VITALS
RESPIRATION RATE: 15 BRPM | SYSTOLIC BLOOD PRESSURE: 130 MMHG | TEMPERATURE: 98 F | DIASTOLIC BLOOD PRESSURE: 70 MMHG | HEART RATE: 78 BPM | OXYGEN SATURATION: 98 %

## 2020-05-09 PROCEDURE — 3331090001 HH PPS REVENUE CREDIT

## 2020-05-09 PROCEDURE — 3331090002 HH PPS REVENUE DEBIT

## 2020-05-09 PROCEDURE — G0299 HHS/HOSPICE OF RN EA 15 MIN: HCPCS

## 2020-05-10 PROCEDURE — 3331090001 HH PPS REVENUE CREDIT

## 2020-05-10 PROCEDURE — 3331090002 HH PPS REVENUE DEBIT

## 2020-05-11 VITALS
HEART RATE: 77 BPM | SYSTOLIC BLOOD PRESSURE: 134 MMHG | TEMPERATURE: 97.1 F | DIASTOLIC BLOOD PRESSURE: 78 MMHG | RESPIRATION RATE: 18 BRPM

## 2020-05-11 PROCEDURE — 3331090001 HH PPS REVENUE CREDIT

## 2020-05-11 PROCEDURE — 3331090002 HH PPS REVENUE DEBIT

## 2020-05-12 ENCOUNTER — HOME CARE VISIT (OUTPATIENT)
Dept: SCHEDULING | Facility: HOME HEALTH | Age: 75
End: 2020-05-12
Payer: MEDICARE

## 2020-05-12 VITALS
DIASTOLIC BLOOD PRESSURE: 64 MMHG | TEMPERATURE: 97.1 F | HEART RATE: 70 BPM | SYSTOLIC BLOOD PRESSURE: 110 MMHG | RESPIRATION RATE: 18 BRPM

## 2020-05-12 PROCEDURE — 3331090001 HH PPS REVENUE CREDIT

## 2020-05-12 PROCEDURE — G0157 HHC PT ASSISTANT EA 15: HCPCS

## 2020-05-12 PROCEDURE — 3331090002 HH PPS REVENUE DEBIT

## 2020-05-13 ENCOUNTER — HOME CARE VISIT (OUTPATIENT)
Dept: SCHEDULING | Facility: HOME HEALTH | Age: 75
End: 2020-05-13
Payer: MEDICARE

## 2020-05-13 ENCOUNTER — HOSPITAL ENCOUNTER (INPATIENT)
Age: 75
LOS: 8 days | Discharge: HOME HEALTH | DRG: 481 | End: 2020-05-21
Attending: EMERGENCY MEDICINE | Admitting: FAMILY MEDICINE
Payer: MEDICARE

## 2020-05-13 ENCOUNTER — APPOINTMENT (OUTPATIENT)
Dept: GENERAL RADIOLOGY | Age: 75
DRG: 481 | End: 2020-05-13
Attending: EMERGENCY MEDICINE
Payer: MEDICARE

## 2020-05-13 VITALS
HEART RATE: 73 BPM | SYSTOLIC BLOOD PRESSURE: 142 MMHG | TEMPERATURE: 97.7 F | OXYGEN SATURATION: 97 % | DIASTOLIC BLOOD PRESSURE: 70 MMHG | RESPIRATION RATE: 18 BRPM

## 2020-05-13 DIAGNOSIS — R50.9 FEVER, UNSPECIFIED FEVER CAUSE: Primary | ICD-10-CM

## 2020-05-13 DIAGNOSIS — T81.49XA POSTOPERATIVE WOUND INFECTION: ICD-10-CM

## 2020-05-13 DIAGNOSIS — M25.552 LEFT HIP PAIN: ICD-10-CM

## 2020-05-13 PROBLEM — N18.30 STAGE 3 CHRONIC KIDNEY DISEASE (HCC): Status: RESOLVED | Noted: 2018-08-08 | Resolved: 2020-05-13

## 2020-05-13 PROBLEM — E87.5 HYPERKALEMIA: Status: RESOLVED | Noted: 2017-07-18 | Resolved: 2020-05-13

## 2020-05-13 PROBLEM — S72.92XA FEMUR FRACTURE, LEFT (HCC): Status: RESOLVED | Noted: 2018-08-07 | Resolved: 2020-05-13

## 2020-05-13 PROBLEM — E66.01 SEVERE OBESITY (HCC): Chronic | Status: ACTIVE | Noted: 2019-09-24

## 2020-05-13 PROBLEM — E87.6 HYPOKALEMIA: Status: RESOLVED | Noted: 2018-08-08 | Resolved: 2020-05-13

## 2020-05-13 LAB
ALBUMIN SERPL-MCNC: 2.9 G/DL (ref 3.2–4.6)
ALBUMIN/GLOB SERPL: 0.8 {RATIO} (ref 1.2–3.5)
ALP SERPL-CCNC: 182 U/L (ref 50–136)
ALT SERPL-CCNC: 28 U/L (ref 12–65)
ANION GAP SERPL CALC-SCNC: 8 MMOL/L (ref 7–16)
APPEARANCE UR: CLEAR
AST SERPL-CCNC: 51 U/L (ref 15–37)
BASOPHILS # BLD: 0 K/UL (ref 0–0.2)
BASOPHILS NFR BLD: 1 % (ref 0–2)
BILIRUB SERPL-MCNC: 1.1 MG/DL (ref 0.2–1.1)
BILIRUB UR QL: NEGATIVE
BUN SERPL-MCNC: 20 MG/DL (ref 8–23)
CALCIUM SERPL-MCNC: 9 MG/DL (ref 8.3–10.4)
CHLORIDE SERPL-SCNC: 102 MMOL/L (ref 98–107)
CO2 SERPL-SCNC: 30 MMOL/L (ref 21–32)
COLOR UR: YELLOW
CREAT SERPL-MCNC: 2.4 MG/DL (ref 0.6–1)
CRP SERPL-MCNC: 4.8 MG/DL (ref 0–0.9)
DIFFERENTIAL METHOD BLD: ABNORMAL
EOSINOPHIL # BLD: 0 K/UL (ref 0–0.8)
EOSINOPHIL NFR BLD: 0 % (ref 0.5–7.8)
ERYTHROCYTE [DISTWIDTH] IN BLOOD BY AUTOMATED COUNT: 18.7 % (ref 11.9–14.6)
GLOBULIN SER CALC-MCNC: 3.8 G/DL (ref 2.3–3.5)
GLUCOSE SERPL-MCNC: 136 MG/DL (ref 65–100)
GLUCOSE UR STRIP.AUTO-MCNC: NEGATIVE MG/DL
HCT VFR BLD AUTO: 32.9 % (ref 35.8–46.3)
HGB BLD-MCNC: 10.2 G/DL (ref 11.7–15.4)
HGB UR QL STRIP: NEGATIVE
IMM GRANULOCYTES # BLD AUTO: 0.1 K/UL (ref 0–0.5)
IMM GRANULOCYTES NFR BLD AUTO: 1 % (ref 0–5)
KETONES UR QL STRIP.AUTO: NEGATIVE MG/DL
LACTATE SERPL-SCNC: 1.7 MMOL/L (ref 0.4–2)
LEUKOCYTE ESTERASE UR QL STRIP.AUTO: NEGATIVE
LYMPHOCYTES # BLD: 0.4 K/UL (ref 0.5–4.6)
LYMPHOCYTES NFR BLD: 8 % (ref 13–44)
MAGNESIUM SERPL-MCNC: 1.4 MG/DL (ref 1.8–2.4)
MCH RBC QN AUTO: 31.7 PG (ref 26.1–32.9)
MCHC RBC AUTO-ENTMCNC: 31 G/DL (ref 31.4–35)
MCV RBC AUTO: 102.2 FL (ref 79.6–97.8)
MONOCYTES # BLD: 0.3 K/UL (ref 0.1–1.3)
MONOCYTES NFR BLD: 7 % (ref 4–12)
NEUTS SEG # BLD: 4.1 K/UL (ref 1.7–8.2)
NEUTS SEG NFR BLD: 84 % (ref 43–78)
NITRITE UR QL STRIP.AUTO: NEGATIVE
NRBC # BLD: 0 K/UL (ref 0–0.2)
PH UR STRIP: 7.5 [PH] (ref 5–9)
PLATELET # BLD AUTO: 238 K/UL (ref 150–450)
PMV BLD AUTO: 10.6 FL (ref 9.4–12.3)
POTASSIUM SERPL-SCNC: 4.1 MMOL/L (ref 3.5–5.1)
PROCALCITONIN SERPL-MCNC: 0.09 NG/ML
PROT SERPL-MCNC: 6.7 G/DL (ref 6.3–8.2)
PROT UR STRIP-MCNC: NEGATIVE MG/DL
RBC # BLD AUTO: 3.22 M/UL (ref 4.05–5.2)
SODIUM SERPL-SCNC: 140 MMOL/L (ref 136–145)
SP GR UR REFRACTOMETRY: 1.01 (ref 1–1.02)
UROBILINOGEN UR QL STRIP.AUTO: 1 EU/DL (ref 0.2–1)
WBC # BLD AUTO: 5 K/UL (ref 4.3–11.1)

## 2020-05-13 PROCEDURE — 94760 N-INVAS EAR/PLS OXIMETRY 1: CPT

## 2020-05-13 PROCEDURE — 80053 COMPREHEN METABOLIC PANEL: CPT

## 2020-05-13 PROCEDURE — 74011250637 HC RX REV CODE- 250/637: Performed by: EMERGENCY MEDICINE

## 2020-05-13 PROCEDURE — 86140 C-REACTIVE PROTEIN: CPT

## 2020-05-13 PROCEDURE — 74011250637 HC RX REV CODE- 250/637: Performed by: FAMILY MEDICINE

## 2020-05-13 PROCEDURE — 87040 BLOOD CULTURE FOR BACTERIA: CPT

## 2020-05-13 PROCEDURE — 81003 URINALYSIS AUTO W/O SCOPE: CPT

## 2020-05-13 PROCEDURE — 83735 ASSAY OF MAGNESIUM: CPT

## 2020-05-13 PROCEDURE — 3331090001 HH PPS REVENUE CREDIT

## 2020-05-13 PROCEDURE — 87077 CULTURE AEROBIC IDENTIFY: CPT

## 2020-05-13 PROCEDURE — 3331090002 HH PPS REVENUE DEBIT

## 2020-05-13 PROCEDURE — 87186 SC STD MICRODIL/AGAR DIL: CPT

## 2020-05-13 PROCEDURE — 65270000029 HC RM PRIVATE

## 2020-05-13 PROCEDURE — 73502 X-RAY EXAM HIP UNI 2-3 VIEWS: CPT

## 2020-05-13 PROCEDURE — 51702 INSERT TEMP BLADDER CATH: CPT

## 2020-05-13 PROCEDURE — 84145 PROCALCITONIN (PCT): CPT

## 2020-05-13 PROCEDURE — G0299 HHS/HOSPICE OF RN EA 15 MIN: HCPCS

## 2020-05-13 PROCEDURE — 74011250636 HC RX REV CODE- 250/636: Performed by: FAMILY MEDICINE

## 2020-05-13 PROCEDURE — 87205 SMEAR GRAM STAIN: CPT

## 2020-05-13 PROCEDURE — 85025 COMPLETE CBC W/AUTO DIFF WBC: CPT

## 2020-05-13 PROCEDURE — 71045 X-RAY EXAM CHEST 1 VIEW: CPT

## 2020-05-13 PROCEDURE — 99285 EMERGENCY DEPT VISIT HI MDM: CPT

## 2020-05-13 PROCEDURE — 87150 DNA/RNA AMPLIFIED PROBE: CPT

## 2020-05-13 PROCEDURE — 73552 X-RAY EXAM OF FEMUR 2/>: CPT

## 2020-05-13 PROCEDURE — 83605 ASSAY OF LACTIC ACID: CPT

## 2020-05-13 RX ORDER — SODIUM CHLORIDE 9 MG/ML
25 INJECTION, SOLUTION INTRAVENOUS CONTINUOUS
Status: DISCONTINUED | OUTPATIENT
Start: 2020-05-13 | End: 2020-05-21 | Stop reason: HOSPADM

## 2020-05-13 RX ORDER — CALCIUM CARBONATE 500(1250)
500 TABLET ORAL 2 TIMES DAILY WITH MEALS
Status: DISCONTINUED | OUTPATIENT
Start: 2020-05-14 | End: 2020-05-21 | Stop reason: HOSPADM

## 2020-05-13 RX ORDER — TRIAMTERENE/HYDROCHLOROTHIAZID 37.5-25 MG
1 TABLET ORAL DAILY
Status: DISCONTINUED | OUTPATIENT
Start: 2020-05-14 | End: 2020-05-21 | Stop reason: HOSPADM

## 2020-05-13 RX ORDER — DOCUSATE SODIUM 100 MG/1
100 CAPSULE, LIQUID FILLED ORAL
Status: DISCONTINUED | OUTPATIENT
Start: 2020-05-13 | End: 2020-05-21 | Stop reason: HOSPADM

## 2020-05-13 RX ORDER — VANCOMYCIN HYDROCHLORIDE 1 G/20ML
INJECTION, POWDER, LYOPHILIZED, FOR SOLUTION INTRAVENOUS ONCE
Status: DISCONTINUED | OUTPATIENT
Start: 2020-05-13 | End: 2020-05-13 | Stop reason: SDUPTHER

## 2020-05-13 RX ORDER — OXYCODONE HYDROCHLORIDE 5 MG/1
5 TABLET ORAL
Status: DISCONTINUED | OUTPATIENT
Start: 2020-05-13 | End: 2020-05-21 | Stop reason: HOSPADM

## 2020-05-13 RX ORDER — ASPIRIN 325 MG
325 TABLET ORAL DAILY
Status: DISCONTINUED | OUTPATIENT
Start: 2020-05-14 | End: 2020-05-14

## 2020-05-13 RX ORDER — ENOXAPARIN SODIUM 100 MG/ML
30 INJECTION SUBCUTANEOUS EVERY 24 HOURS
Status: DISCONTINUED | OUTPATIENT
Start: 2020-05-14 | End: 2020-05-14

## 2020-05-13 RX ORDER — AMLODIPINE BESYLATE 5 MG/1
5 TABLET ORAL DAILY
Status: DISCONTINUED | OUTPATIENT
Start: 2020-05-14 | End: 2020-05-21 | Stop reason: HOSPADM

## 2020-05-13 RX ORDER — ACETAMINOPHEN 500 MG
1000 TABLET ORAL
Status: COMPLETED | OUTPATIENT
Start: 2020-05-13 | End: 2020-05-13

## 2020-05-13 RX ORDER — MORPHINE SULFATE 10 MG/ML
5 INJECTION, SOLUTION INTRAMUSCULAR; INTRAVENOUS
Status: DISCONTINUED | OUTPATIENT
Start: 2020-05-13 | End: 2020-05-20

## 2020-05-13 RX ORDER — SODIUM CHLORIDE 0.9 % (FLUSH) 0.9 %
5-40 SYRINGE (ML) INJECTION EVERY 8 HOURS
Status: DISCONTINUED | OUTPATIENT
Start: 2020-05-13 | End: 2020-05-21 | Stop reason: HOSPADM

## 2020-05-13 RX ORDER — ACETAMINOPHEN 325 MG/1
650 TABLET ORAL
Status: DISCONTINUED | OUTPATIENT
Start: 2020-05-13 | End: 2020-05-21 | Stop reason: HOSPADM

## 2020-05-13 RX ORDER — SODIUM CHLORIDE 0.9 % (FLUSH) 0.9 %
5-40 SYRINGE (ML) INJECTION AS NEEDED
Status: DISCONTINUED | OUTPATIENT
Start: 2020-05-13 | End: 2020-05-21 | Stop reason: HOSPADM

## 2020-05-13 RX ADMIN — SODIUM CHLORIDE 75 ML/HR: 900 INJECTION, SOLUTION INTRAVENOUS at 22:49

## 2020-05-13 RX ADMIN — ACETAMINOPHEN 1000 MG: 500 TABLET ORAL at 19:04

## 2020-05-13 RX ADMIN — VANCOMYCIN HYDROCHLORIDE 2500 MG: 10 INJECTION, POWDER, LYOPHILIZED, FOR SOLUTION INTRAVENOUS at 22:48

## 2020-05-13 RX ADMIN — OXYCODONE 5 MG: 5 TABLET ORAL at 23:06

## 2020-05-13 NOTE — ED NOTES
This RN attempted IV/bloodwork x 1, Magalis RN attempted bloodwork/ IV x 2, Janene RN attempting IV and bloodwork at this time.

## 2020-05-13 NOTE — ED TRIAGE NOTES
Patient arrives via [facility] with mask in place. Patient with left hip replacement 2 weeks ago. Had stitches removed yesterday. Patient active yesterday. Patient reports left hip pain today and drainage to incision site. bgl 161 per EMS.

## 2020-05-13 NOTE — ED PROVIDER NOTES
Patient presents to the ER complaint of left hip pain. Patient history of remote hip fracture. States she had been doing well at home with home health. Did have sutures removed on yesterday. States she had been ambulating with assistance. Try to get up from the toilet today when she felt immediate pain in her hip. Has noted increased drainage from left hip wound. Denies fevers but does report subjective chills    The history is provided by the patient. Post OP Complication   This is a new problem. The current episode started 6 to 12 hours ago. The problem occurs constantly. Pertinent negatives include no chest pain and no abdominal pain.         Past Medical History:   Diagnosis Date    Abnormal EKG 2/16/2016    Aortic valve insufficiency 2/16/2016    Cancer ([facility]     breast    Hyperlipidemia 2/16/2016    Hypertension 2/16/2016    UNSPECIFIED     Obesity 2/16/2016    UNSPECIFIED        Past Surgical History:   Procedure Laterality Date    HX TUBAL LIGATION           Family History:   Problem Relation Age of Onset    Coronary Artery Disease Mother     Heart Disease Father        Social History     Socioeconomic History    Marital status:      Spouse name: Not on file    Number of children: Not on file    Years of education: Not on file    Highest education level: Not on file   Occupational History    Not on file   Social Needs    Financial resource strain: Not on file    Food insecurity     Worry: Not on file     Inability: Not on file    Transportation needs     Medical: Not on file     Non-medical: Not on file   Tobacco Use    Smoking status: Never Smoker    Smokeless tobacco: Current User     Types: Snuff   Substance and Sexual Activity    Alcohol use: No    Drug use: Not on file    Sexual activity: Not on file   Lifestyle    Physical activity     Days per week: Not on file     Minutes per session: Not on file    Stress: Not on file   Relationships    Social connections Talks on phone: Not on file     Gets together: Not on file     Attends Faith service: Not on file     Active member of club or organization: Not on file     Attends meetings of clubs or organizations: Not on file     Relationship status: Not on file    Intimate partner violence     Fear of current or ex partner: Not on file     Emotionally abused: Not on file     Physically abused: Not on file     Forced sexual activity: Not on file   Other Topics Concern    Not on file   Social History Narrative    Not on file         ALLERGIES: Penicillins    Review of Systems   Constitutional: Negative for diaphoresis and fatigue. HENT: Negative for congestion and dental problem. Eyes: Negative for photophobia and visual disturbance. Respiratory: Negative for chest tightness. Cardiovascular: Negative for chest pain. Gastrointestinal: Negative for abdominal pain and anal bleeding. Genitourinary: Negative for decreased urine volume and urgency. Musculoskeletal: Negative for back pain. Skin: Negative for pallor. Neurological: Negative for weakness. Hematological: Negative for adenopathy. Psychiatric/Behavioral: Negative for behavioral problems and confusion. All other systems reviewed and are negative. There were no vitals filed for this visit. Physical Exam  Vitals signs reviewed. Constitutional:       Appearance: Normal appearance. HENT:      Head: Normocephalic and atraumatic. Mouth/Throat:      Mouth: Mucous membranes are moist.      Pharynx: No oropharyngeal exudate or posterior oropharyngeal erythema. Eyes:      Extraocular Movements: Extraocular movements intact. Conjunctiva/sclera: Conjunctivae normal.      Pupils: Pupils are equal, round, and reactive to light. Neck:      Musculoskeletal: Normal range of motion and neck supple. Cardiovascular:      Rate and Rhythm: Normal rate and regular rhythm. Pulses: Normal pulses.       Heart sounds: Normal heart sounds. Pulmonary:      Effort: Pulmonary effort is normal.      Breath sounds: Normal breath sounds. Abdominal:      General: Abdomen is flat. Bowel sounds are normal.      Palpations: Abdomen is soft. Skin:     General: Skin is warm and dry. Capillary Refill: Capillary refill takes less than 2 seconds. Coloration: Skin is not jaundiced. Neurological:      Mental Status: She is alert. Mental status is at baseline. Psychiatric:         Mood and Affect: Mood normal.              MDM  Number of Diagnoses or Management Options  Diagnosis management comments: Patient febrile here. Concern for possible infection. Could be related to wound infection. Will obtain x-ray here as patient does endorse significant pain and difficulty ambulating.        Amount and/or Complexity of Data Reviewed  Clinical lab tests: ordered and reviewed  Tests in the radiology section of CPT®: ordered and reviewed    Risk of Complications, Morbidity, and/or Mortality  Presenting problems: moderate  Diagnostic procedures: moderate  Management options: moderate    Patient Progress  Patient progress: stable         Procedures

## 2020-05-14 ENCOUNTER — HOSPITAL ENCOUNTER (OUTPATIENT)
Dept: INFUSION THERAPY | Age: 75
End: 2020-05-14

## 2020-05-14 ENCOUNTER — ANESTHESIA EVENT (OUTPATIENT)
Dept: SURGERY | Age: 75
DRG: 481 | End: 2020-05-14
Payer: MEDICARE

## 2020-05-14 LAB
ABO + RH BLD: NORMAL
ALBUMIN SERPL-MCNC: 2.2 G/DL (ref 3.2–4.6)
ALBUMIN/GLOB SERPL: 0.7 {RATIO} (ref 1.2–3.5)
ALP SERPL-CCNC: 139 U/L (ref 50–136)
ALT SERPL-CCNC: 21 U/L (ref 12–65)
ANION GAP SERPL CALC-SCNC: 9 MMOL/L (ref 7–16)
AST SERPL-CCNC: 33 U/L (ref 15–37)
BASOPHILS # BLD: 0 K/UL (ref 0–0.2)
BASOPHILS NFR BLD: 1 % (ref 0–2)
BILIRUB SERPL-MCNC: 1.1 MG/DL (ref 0.2–1.1)
BLOOD GROUP ANTIBODIES SERPL: NORMAL
BUN SERPL-MCNC: 23 MG/DL (ref 8–23)
CALCIUM SERPL-MCNC: 8.6 MG/DL (ref 8.3–10.4)
CHLORIDE SERPL-SCNC: 104 MMOL/L (ref 98–107)
CO2 SERPL-SCNC: 27 MMOL/L (ref 21–32)
CREAT SERPL-MCNC: 2.33 MG/DL (ref 0.6–1)
DIFFERENTIAL METHOD BLD: ABNORMAL
EOSINOPHIL # BLD: 0 K/UL (ref 0–0.8)
EOSINOPHIL NFR BLD: 1 % (ref 0.5–7.8)
ERYTHROCYTE [DISTWIDTH] IN BLOOD BY AUTOMATED COUNT: 18.6 % (ref 11.9–14.6)
GLOBULIN SER CALC-MCNC: 3.2 G/DL (ref 2.3–3.5)
GLUCOSE SERPL-MCNC: 134 MG/DL (ref 65–100)
HCT VFR BLD AUTO: 26.8 % (ref 35.8–46.3)
HGB BLD-MCNC: 8.3 G/DL (ref 11.7–15.4)
IMM GRANULOCYTES # BLD AUTO: 0.1 K/UL (ref 0–0.5)
IMM GRANULOCYTES NFR BLD AUTO: 1 % (ref 0–5)
LYMPHOCYTES # BLD: 0.5 K/UL (ref 0.5–4.6)
LYMPHOCYTES NFR BLD: 10 % (ref 13–44)
MCH RBC QN AUTO: 31.6 PG (ref 26.1–32.9)
MCHC RBC AUTO-ENTMCNC: 31 G/DL (ref 31.4–35)
MCV RBC AUTO: 101.9 FL (ref 79.6–97.8)
MONOCYTES # BLD: 0.5 K/UL (ref 0.1–1.3)
MONOCYTES NFR BLD: 10 % (ref 4–12)
NEUTS SEG # BLD: 4.3 K/UL (ref 1.7–8.2)
NEUTS SEG NFR BLD: 79 % (ref 43–78)
NRBC # BLD: 0 K/UL (ref 0–0.2)
PLATELET # BLD AUTO: 196 K/UL (ref 150–450)
PMV BLD AUTO: 10.8 FL (ref 9.4–12.3)
POTASSIUM SERPL-SCNC: 4.5 MMOL/L (ref 3.5–5.1)
PROT SERPL-MCNC: 5.4 G/DL (ref 6.3–8.2)
RBC # BLD AUTO: 2.63 M/UL (ref 4.05–5.2)
SODIUM SERPL-SCNC: 140 MMOL/L (ref 136–145)
SPECIMEN EXP DATE BLD: NORMAL
WBC # BLD AUTO: 5.5 K/UL (ref 4.3–11.1)

## 2020-05-14 PROCEDURE — 80053 COMPREHEN METABOLIC PANEL: CPT

## 2020-05-14 PROCEDURE — 65270000029 HC RM PRIVATE

## 2020-05-14 PROCEDURE — 85025 COMPLETE CBC W/AUTO DIFF WBC: CPT

## 2020-05-14 PROCEDURE — 3331090002 HH PPS REVENUE DEBIT

## 2020-05-14 PROCEDURE — 3331090001 HH PPS REVENUE CREDIT

## 2020-05-14 PROCEDURE — 86900 BLOOD TYPING SEROLOGIC ABO: CPT

## 2020-05-14 PROCEDURE — 87040 BLOOD CULTURE FOR BACTERIA: CPT

## 2020-05-14 PROCEDURE — 36415 COLL VENOUS BLD VENIPUNCTURE: CPT

## 2020-05-14 PROCEDURE — 74011250637 HC RX REV CODE- 250/637: Performed by: FAMILY MEDICINE

## 2020-05-14 PROCEDURE — 77030041247 HC PROTECTOR HEEL HEELMEDIX MDII -B

## 2020-05-14 RX ORDER — LANOLIN ALCOHOL/MO/W.PET/CERES
400 CREAM (GRAM) TOPICAL 2 TIMES DAILY
Status: DISCONTINUED | OUTPATIENT
Start: 2020-05-14 | End: 2020-05-21 | Stop reason: HOSPADM

## 2020-05-14 RX ORDER — VANCOMYCIN/0.9 % SOD CHLORIDE 1.5G/250ML
1500 PLASTIC BAG, INJECTION (ML) INTRAVENOUS EVERY 24 HOURS
Status: DISCONTINUED | OUTPATIENT
Start: 2020-05-14 | End: 2020-05-14

## 2020-05-14 RX ORDER — VANCOMYCIN HYDROCHLORIDE 1 G/20ML
INJECTION, POWDER, LYOPHILIZED, FOR SOLUTION INTRAVENOUS EVERY 24 HOURS
Status: DISCONTINUED | OUTPATIENT
Start: 2020-05-14 | End: 2020-05-14 | Stop reason: ALTCHOICE

## 2020-05-14 RX ORDER — VANCOMYCIN/0.9 % SOD CHLORIDE 1.5G/250ML
1500 PLASTIC BAG, INJECTION (ML) INTRAVENOUS EVERY 24 HOURS
Status: DISCONTINUED | OUTPATIENT
Start: 2020-05-14 | End: 2020-05-15

## 2020-05-14 RX ADMIN — Medication 500 MG: at 09:37

## 2020-05-14 RX ADMIN — Medication 400 MG: at 03:43

## 2020-05-14 RX ADMIN — Medication 400 MG: at 09:37

## 2020-05-14 RX ADMIN — AMLODIPINE BESYLATE 5 MG: 5 TABLET ORAL at 09:37

## 2020-05-14 RX ADMIN — Medication 10 ML: at 16:19

## 2020-05-14 RX ADMIN — TRIAMTERENE AND HYDROCHLOROTHIAZIDE 1 TABLET: 37.5; 25 TABLET ORAL at 09:40

## 2020-05-14 RX ADMIN — OXYCODONE 5 MG: 5 TABLET ORAL at 21:54

## 2020-05-14 RX ADMIN — Medication 400 MG: at 17:18

## 2020-05-14 NOTE — PROGRESS NOTES
Physical Therapy Note:    Physical therapy evaluation orders received and chart reviewed. Patient discussed with primary RN and OT. Patient scheduled for I&D of left hip tomorrow per orthopedic surgery. Attempted to contact orthopedic surgery over [employer]. Will hold mobility assessment until post operatively and/or clarifications on restrictions. Will follow.  Thank you,    Chapo Danielson, PT, DPT  5/14/2020

## 2020-05-14 NOTE — ED NOTES
TRANSFER - OUT REPORT:    Verbal report given to Laura(name) on Guillermina Loza  being transferred to [facility](unit) for routine progression of care       Report consisted of patients Situation, Background, Assessment and   Recommendations(SBAR). Information from the following report(s) SBAR was reviewed with the receiving nurse. Lines:   Peripheral IV 05/13/20 Right Forearm (Active)        Opportunity for questions and clarification was provided.       Patient transported with:  transportation

## 2020-05-14 NOTE — CONSULTS
[facility]  Consultation Note    Patient ID:  Name: Hermila Rai  MRN: [id number]  AGE: 67 y.o.  : 1945    Date of Consultation:  May 14, 2020  Referring Physician:  Hospitalist     Subjective: Hermila Rai is a 67 y.o. female who had fixation of a left proximal femur fracture which was a pathologic fracture with metastatic breast cancer in 2018 and was seen for  failure of her fixation by Dr. Payton Ocampo and underwent  #1removal of hardware left hip, #2 repair of nonunion left subtrochanteric femur fracture with intramedullary nail fixation and autograft bone grafting on . She was seen in our office Jolie Valdovinos for staple removal and at that time had serous drainage from the most proximal wound. A new dressing was placed. While at home she went to get up yesterday morning from the toilet and had increased pain and drainage.  She presented to the [facility] ER and was admitted by the hospitalist.      Past Medical History Includes:   Past Medical History:   Diagnosis Date    Abnormal EKG 2016    Anemia due to chronic kidney disease treated with erythropoietin 2017    Aortic valve insufficiency 2016    Cancer ([facility] [address]     breast    Hyperlipidemia 2016    Hypertension 2016    UNSPECIFIED     Obesity 2016    UNSPECIFIED    ,   Past Surgical History:   Procedure Laterality Date    HX TUBAL LIGATION       Family History:   Family History   Problem Relation Age of Onset    Coronary Artery Disease Mother     Heart Disease Father       Social History:   Social History     Tobacco Use    Smoking status: Never Smoker    Smokeless tobacco: Current User     Types: Snuff   Substance Use Topics    Alcohol use: No       ALLERGIES:   Allergies   Allergen Reactions    Penicillins Unknown (comments)     UNKNOWN REACTION         Patient Medications    Current Facility-Administered Medications   Medication Dose Route Frequency    magnesium oxide (MAG-OX) tablet 400 mg  400 mg Oral BID    amLODIPine (NORVASC) tablet 5 mg  5 mg Oral DAILY    aspirin tablet 325 mg  325 mg Oral DAILY    calcium carbonate (OS-JORGE) tablet 500 mg [elemental]  500 mg Oral BID WITH MEALS    docusate sodium (COLACE) capsule 100 mg  100 mg Oral BID PRN    . PHARMACY TO SUBSTITUTE PER PROTOCOL (Reordered from: letrozole (FEMARA) 2.5 mg tablet)    Per Protocol    oxyCODONE IR (ROXICODONE) tablet 5 mg  5 mg Oral Q4H PRN    . PHARMACY TO SUBSTITUTE PER PROTOCOL (Reordered from: palbociclib (Ibrance) 75 mg cap)    Per Protocol    triamterene-hydroCHLOROthiazide (MAXZIDE) 37.5-25 mg per tablet 1 Tab  1 Tab Oral DAILY    sodium chloride (NS) flush 5-40 mL  5-40 mL IntraVENous Q8H    sodium chloride (NS) flush 5-40 mL  5-40 mL IntraVENous PRN    acetaminophen (TYLENOL) tablet 650 mg  650 mg Oral Q4H PRN    enoxaparin (LOVENOX) injection 30 mg  30 mg SubCUTAneous Q24H    0.9% sodium chloride infusion  75 mL/hr IntraVENous CONTINUOUS    morphine 10 mg/ml injection 5 mg  5 mg IntraVENous Q4H PRN         Review of Systems:  Pertinent items are noted in HPI. Physical Exam:      General: NAD, Alert, Oriented x 3   Mental Status: Appropriate   Psych: Normal Affect, Normal Mood    HEENT: Normal Cephalic/Atraumatic, PERRL   Lungs: Respirations even and unlabored, Breath Sounds were clear, no respiratory distress   Heart: Regular Rate and Rhythm   Vascular: Distal pulses intact, good capillary refill   Skin: No redness, No Rashes, Skin is dry   Musculoskeletal: exam of both lower extremities reveal  Dressing saturation of the most proximal dressing of the left hip.  ROM not assessed   Lymphatic: No lympahdenopathy, No distal edema   Neuro: No gross deficits   Abdomen: Soft, Non tender, No distension      VITALS:   Patient Vitals for the past 8 hrs:   BP Temp Pulse Resp SpO2   20 0339 106/50 98.2 °F (36.8 °C) 69 20 94 %    , Temp (24hrs), Av.8 °F (37.1 °C), Min:97.7 °F (36.5 °C), Max:102 °F (38.9 °C)         X-ray: reviewed on EMR, no changes from postop    Diagnosis   Patient Active Problem List   Diagnosis Code    Hypertension I10    Infiltrating ductal carcinoma of female breast ([facility] [address] C50.919    Malignant neoplasm metastatic to bone (HCC) C79.51    Anemia due to chronic kidney disease treated with erythropoietin N18.9, D63.1    Severe obesity (HCC) E66.01    Stage 4 chronic kidney disease (HCC) N18.4    Left hip postoperative wound infection T81.49XA          Assessment and Plan:   Left hip Postoperative wound infection/dehiscense. Patient will require I & D of the left hip. Will discuss with Dr. Zohaib Peña this AM. Patient was eating breakfast when I saw her this morning so will likely need to plan for surgery tomorrow. Will hold lovenox and ASA.      KIERRA Smith  5/14/2020,  8:12 AM

## 2020-05-14 NOTE — H&P
HOSPITALIST H&P  NAME:  Marely Nino   Age:  67 y.o.  :   1945   MRN:   [id number]  PCP: Sophie Andino NP  Treatment Team: Attending Provider: González Street MD; Primary Nurse: Denice Sullivan RN    Prior     CC: Reason for admission is: possible post op wound infection of left hip    HPI:   Patient history was obtained from the ER provider prior to seeing the patient. Patient is a 67 y.o. female who presents to the ER due to pain and drainage from left hip wound. She had a left hip replacement 2 weeks ago and had stitches removed on . Today, she developed pain after getting up from toilet and has had increased drainage. She had been ambulating prior to this with some assistance but now states she cannot. We are asked to admit her for antibiotics, PT/OT, and wound evaluation. She denies fevers, but does report chills. Denies n/v/d, chest or abdominal pain. Her daughters help care for her at home. ROS:  All systems have been reviewed and are negative except as stated in HPI or elsewhere.       Past Medical History:   Diagnosis Date    Abnormal EKG 2016    Anemia due to chronic kidney disease treated with erythropoietin 2017    Aortic valve insufficiency 2016    Cancer (HCC)     breast    Hyperlipidemia 2016    Hypertension 2016    UNSPECIFIED     Obesity 2016    UNSPECIFIED       Past Surgical History:   Procedure Laterality Date    HX TUBAL LIGATION        Social History     Tobacco Use    Smoking status: Never Smoker    Smokeless tobacco: Current User     Types: Snuff   Substance Use Topics    Alcohol use: No      Family History   Problem Relation Age of Onset    Coronary Artery Disease Mother     Heart Disease Father        FH Reviewed and non-contributory to admitting diagnosis    Allergies   Allergen Reactions    Penicillins Unknown (comments)     UNKNOWN REACTION       Prior to Admission Medications   Prescriptions Last Dose Informant Patient Reported? Taking? amLODIPine (NORVASC) 5 mg tablet   Yes No   Sig: Take 5 mg by mouth daily. aspirin (ASPIRIN) 325 mg tablet   Yes No   Sig: Take 325 mg by mouth daily. calcium carbonate (OS-JORGE) 500 mg calcium (1,250 mg) tablet   No No   Sig: Take 2 tablets at lunch and 2 tablets at dinner. Indications: hypocalcemia   colchicine (MITIGARE) 0.6 mg capsule   No No   Sig: Take 2 caps now, 1 cap in 1 hour, then 1 cap daily x 3  Indications: acute inflammation of the joints due to gout attack   Patient taking differently: Take 2 caps now, 1 cap in 1 hour, then 1 cap daily x 3 by mouth  Indications: acute inflammation of the joints due to gout attack   diphenhydrAMINE (BENADRYL) 25 mg tablet   Yes No   Sig: Take 25 mg by mouth every six (6) hours as needed for Allergies. docusate sodium (COLACE) 50 mg capsule   Yes No   Sig: Take 50 mg by mouth two (2) times daily as needed for Constipation. furosemide (LASIX) 20 mg tablet   No No   Sig: TAKE 1 TAB BY MOUTH DAILY AS NEEDED. FOR SWELLING. letrozole (FEMARA) 2.5 mg tablet   No No   Sig: TAKE 1 TABLET BY MOUTH EVERY DAY   melatonin 5 mg cap capsule   Yes No   Sig: Take 10 mg by mouth nightly. ondansetron hcl (Zofran) 8 mg tablet   No No   Sig: Take 1 Tab by mouth every eight (8) hours as needed for Nausea. oxyCODONE IR (ROXICODONE) 5 mg immediate release tablet   Yes No   Sig: Take 5 mg by mouth every six (6) hours as needed for Pain. palbociclib (Ibrance) 75 mg cap   No No   Sig: Take one capsule by mouthOnce daily with food for21 days on, followed by7 days off during each cyCle of 28 days   sennosides 25 mg tab   Yes No   Sig: Take 1 Tab by mouth nightly as needed for Other (constipation).    triamterene-hydroCHLOROthiazide (MAXZIDE) 37.5-25 mg per tablet   No No   Sig: TAKE 1 TABLET BY MOUTH EVERY DAY      Facility-Administered Medications: None         Objective:     No intake or output data in the 24 hours ending 05/13/20 2031 Temp (24hrs), Av.9 °F (37.7 °C), Min:97.7 °F (36.5 °C), Max:102 °F (38.9 °C)    Oxygen Therapy  O2 Sat (%): 92 % (20)  Pulse via Oximetry: 91 beats per minute (20)  O2 Device: Room air (20 165)   Body mass index is 37.9 kg/m². Patient Vitals for the past 24 hrs:   Temp Pulse Resp BP SpO2   20    170/68 92 %   20 1657 (!) 102 °F (38.9 °C) 96 18 151/55 96 %     Physical Exam:    General:    WD and WN, No apparent distress. Head:   Normocephalic, without obvious abnormality, atraumatic. Eyes:  PERRL; EOMI; sclera normal/non-icteric  ENT:  Hearing is normal.  Oropharynx is clear with tacky mucous membranes   Resp:    Clear to auscultation bilaterally. No Wheezing or Rhonchi. Resp are even and unlabored  Heart[de-identified]  Regular rate and rhythm,  no murmur,   No LE edema  Abdomen:   Soft, non-tender. Not distended. Bowel sounds normal.  hepato-splenomegaly cannot be assessed due to obesity    Musc/SK: Muscle strength is fair and tone normal; No cyanosis. No clubbing  Skin:     Texture, turgor normal. No significant rashes or lesions.   Capillary refill < 2 sec  Neurologic: CN II - XII are grossly intact - Eye exam as noted above  Psych: Alert and oriented x 4;  Judgement and insight are normal     Data Review:   Recent Results (from the past 24 hour(s))   CBC WITH AUTOMATED DIFF    Collection Time: 20  5:22 PM   Result Value Ref Range    WBC 5.0 4.3 - 11.1 K/uL    RBC 3.22 (L) 4.05 - 5.2 M/uL    HGB 10.2 (L) 11.7 - 15.4 g/dL    HCT 32.9 (L) 35.8 - 46.3 %    .2 (H) 79.6 - 97.8 FL    MCH 31.7 26.1 - 32.9 PG    MCHC 31.0 (L) 31.4 - 35.0 g/dL    RDW 18.7 (H) 11.9 - 14.6 %    PLATELET 295 130 - 951 K/uL    MPV 10.6 9.4 - 12.3 FL    ABSOLUTE NRBC 0.00 0.0 - 0.2 K/uL    DF AUTOMATED      NEUTROPHILS 84 (H) 43 - 78 %    LYMPHOCYTES 8 (L) 13 - 44 %    MONOCYTES 7 4.0 - 12.0 %    EOSINOPHILS 0 (L) 0.5 - 7.8 %    BASOPHILS 1 0.0 - 2.0 %    IMMATURE GRANULOCYTES 1 0.0 - 5.0 %    ABS. NEUTROPHILS 4.1 1.7 - 8.2 K/UL    ABS. LYMPHOCYTES 0.4 (L) 0.5 - 4.6 K/UL    ABS. MONOCYTES 0.3 0.1 - 1.3 K/UL    ABS. EOSINOPHILS 0.0 0.0 - 0.8 K/UL    ABS. BASOPHILS 0.0 0.0 - 0.2 K/UL    ABS. IMM. GRANS. 0.1 0.0 - 0.5 K/UL   METABOLIC PANEL, COMPREHENSIVE    Collection Time: 05/13/20  5:22 PM   Result Value Ref Range    Sodium 140 136 - 145 mmol/L    Potassium 4.1 3.5 - 5.1 mmol/L    Chloride 102 98 - 107 mmol/L    CO2 30 21 - 32 mmol/L    Anion gap 8 7 - 16 mmol/L    Glucose 136 (H) 65 - 100 mg/dL    BUN 20 8 - 23 MG/DL    Creatinine 2.40 (H) 0.6 - 1.0 MG/DL    GFR est AA 25 (L) >60 ml/min/1.73m2    GFR est non-AA 21 (L) >60 ml/min/1.73m2    Calcium 9.0 8.3 - 10.4 MG/DL    Bilirubin, total 1.1 0.2 - 1.1 MG/DL    ALT (SGPT) 28 12 - 65 U/L    AST (SGOT) 51 (H) 15 - 37 U/L    Alk.  phosphatase 182 (H) 50 - 136 U/L    Protein, total 6.7 6.3 - 8.2 g/dL    Albumin 2.9 (L) 3.2 - 4.6 g/dL    Globulin 3.8 (H) 2.3 - 3.5 g/dL    A-G Ratio 0.8 (L) 1.2 - 3.5     MAGNESIUM    Collection Time: 05/13/20  5:22 PM   Result Value Ref Range    Magnesium 1.4 (LL) 1.8 - 2.4 mg/dL   C REACTIVE PROTEIN, QT    Collection Time: 05/13/20  5:22 PM   Result Value Ref Range    C-Reactive protein 4.8 (H) 0.0 - 0.9 mg/dL   LACTIC ACID    Collection Time: 05/13/20  5:22 PM   Result Value Ref Range    Lactic acid 1.7 0.4 - 2.0 MMOL/L   PROCALCITONIN    Collection Time: 05/13/20  5:22 PM   Result Value Ref Range    Procalcitonin 0.09 ng/mL   URINALYSIS W/ RFLX MICROSCOPIC    Collection Time: 05/13/20  5:40 PM   Result Value Ref Range    Color YELLOW      Appearance CLEAR      Specific gravity 1.013 1.001 - 1.023      pH (UA) 7.5 5.0 - 9.0      Protein Negative NEG mg/dL    Glucose Negative mg/dL    Ketone Negative NEG mg/dL    Bilirubin Negative NEG      Blood Negative NEG      Urobilinogen 1.0 0.2 - 1.0 EU/dL    Nitrites Negative NEG      Leukocyte Esterase Negative NEG       CXR Results  (Last 48 hours)               05/13/20 1811  XR CHEST SNGL V Final result    Impression:  Impression:  No significant interval change. Narrative:  AP chest radiograph       History: fever, 75 years Female       Comparison: Chest radiograph April 26, 2020       Findings:   Normal cardiomediastinal silhouette. Low lung volumes unchanged. Nonspecific mild diffuse interstitial prominence similar to prior. Persistent   mild patchy left basilar atelectasis and or consolidation. No evidence of   pneumothorax. Visualized soft tissue and osseous structures otherwise   unremarkable. CT Results  (Last 48 hours)    None              Assessment and Plan: Active Hospital Problems    Diagnosis Date Noted    Left hip postoperative wound infection 05/13/2020    Stage 4 chronic kidney disease ([facility] [address] 05/01/2020    Severe obesity ([facility] [address] 09/24/2019    Hypertension 02/16/2016     UNSPECIFIED        Principal Problem:    Left hip postoperative wound infection (5/13/2020)    Continue with IV vancomycin; wound culture obtained in ER; consult Ortho and wound care    Active Problems:    Hypertension (2/16/2016)    Continue home meds and add prn hydralazine, if needed. Severe obesity ([facility] [address] (9/24/2019)     pt; increases morbidity and mortality; can impede treatment and recovery      Stage 4 chronic kidney disease ([facility] [address] (5/1/2020)    avoid nephrotoxic medications; monitor labs      · PLAN General  · DVT prophylaxis:  Lovenox  · Code status: Full;  HCPOA:   · Risk: high  · Anticipated DC needs:  · Estimated LOS:  Greater than 2 midnights  · Plans discussed with patient and/or caregiver; questions answered. Parts of this document were created using dragon voice recognition software.  The chart has been reviewed but errors may still be present    Med records reviewed if applicable; findings:     Critical care time if applicable:      Signed By: Halle Larry MD     May 13, 2020

## 2020-05-14 NOTE — PROGRESS NOTES
Pharmacokinetic Consult to Pharmacist    Alida Duane is a 67 y.o. female being treated for surgical site infection with vancomycin. Planning I & D of hip by ortho soon. Height: 5' 7\" (170.2 cm)  Weight: 109.8 kg (242 lb)  Lab Results   Component Value Date/Time    BUN 23 05/14/2020 05:04 AM    Creatinine 2.33 (H) 05/14/2020 05:04 AM    WBC 5.5 05/14/2020 05:04 AM    Procalcitonin 0.09 05/13/2020 05:22 PM    Lactic acid 1.7 05/13/2020 05:22 PM      Estimated Creatinine Clearance: 26.2 mL/min (A) (based on SCr of 2.33 mg/dL (H)). Day 2 of vancomycin. Goal trough is 15-20. Vancomycin dose initiated at 2500 mg X 1, then 1500 mg Q24H. Plan trough prior to the 3rd dose due to CKD. Will continue to follow patient and order levels when clinically indicated.     Thank you,  Angel Maradiaga, PharmD, [address]  Clinical Pharmacist  [phone]

## 2020-05-14 NOTE — ANESTHESIA PREPROCEDURE EVALUATION
Anesthetic History   No history of anesthetic complications            Review of Systems / Medical History  Patient summary reviewed and pertinent labs reviewed    Pulmonary  Within defined limits                 Neuro/Psych   Within defined limits           Cardiovascular    Hypertension: well controlled  Valvular problems/murmurs: aortic insufficiency            Exercise tolerance[de-identified] Activity has been limited by arthritis and pain  Comments: Echo 2014: EF 50%, Mod AI, impaired relaxation   GI/Hepatic/Renal         Renal disease (CKD IV): CRI       Endo/Other        Obesity, cancer (Breast) and anemia (Hb 8.3)     Other Findings            Physical Exam    Airway  Mallampati: II  TM Distance: 4 - 6 cm  Neck ROM: normal range of motion   Mouth opening: Normal     Cardiovascular    Rhythm: regular  Rate: normal    Murmur: Grade 1, Aortic area     Dental         Pulmonary  Breath sounds clear to auscultation               Abdominal  GI exam deferred       Other Findings            Anesthetic Plan    ASA: 3  Anesthesia type: general            Anesthetic plan and risks discussed with: Patient      Of note, patient has chronic anemia and most recent Hb is 8.3. Type and screen ordered and discussed possibility of a blood transfusion. Patient endorsed understanding and all questions/concerns addressed.

## 2020-05-14 NOTE — PROGRESS NOTES
05/13/20 2200   Dual Skin Pressure Injury Assessment   Dual Skin Pressure Injury Assessment X   Second Care Provider (Based on [address] [place] [address]) Bam RN    Skin Integumentary   Skin Integumentary (WDL) X   Skin Color Appropriate for ethnicity; Ecchymosis (comment)   Skin Condition/Temp Warm;Dry;Fragile   Skin Integrity Incision (comment)  (left hip fx incision drainage, right forearm skin tear)   Turgor Epidermis thin w/ loss of subcut tissue   Hair Growth Absent   Varicosities Present    Pressure  Injury Documentation No Pressure Injury Noted-Pressure Ulcer Prevention Initiated   Wound Prevention and Protection Methods   Orientation of Wound Prevention Posterior   Location of Wound Prevention Sacrum/Coccyx   Dressing Present  No   Wound Offloading (Prevention Methods) Bed, pressure reduction mattress;Pillows

## 2020-05-14 NOTE — PROGRESS NOTES
Discharge Location  Discharge Placement: Unable to determine at this time    Chart screened by  for discharge planning. No needs identified at this time. Please consult  if any new issues arise.

## 2020-05-14 NOTE — PROGRESS NOTES
OT orders received and chart reviewed. Per chart review, pt scheduled for I&D of left hip. Discussed pt with PT and PT contacted orthopedic surgery via [address] [place] [address]. Will hold OT evaluation until post op or further clarification is received.      Thank you,    Theresa Lopez OTR/L

## 2020-05-14 NOTE — WOUND CARE
Wound Consult for left hip noted. Noted ortho consult for same as is post-op site, ortho plans for surgery later this week for left hip I&D. Recommend dry dressings daily until surgery, defer to ortho for dressing choice. Available if needed to assist with post-op wound care after after surgery later this week, please call or reconsult.

## 2020-05-14 NOTE — PROGRESS NOTES
Initial visit by  to convey care and concern and encourage patient that  services are available if desired. Ms. Alverto Zuleta was receiving care from staff. Chaplains remain available for support.      Benjamin Coffman  Board Certified

## 2020-05-14 NOTE — PROGRESS NOTES
Hospitalist Note     Admit Date:  2020  4:49 PM   Name:  Radha Ann   Age:  67 y.o.  :  1945   MRN:  [id number]   PCP:  Issac Campos NP  Treatment Team: Attending Provider: Kristina Gamino MD; Consulting Provider: Kimo Wilkins MD; Charge Nurse: Kaye Wills; Physical Therapist: ASHU BuschT; Occupational Therapist: Raheem Cervantes; Utilization Review: Kaveh De La Garza RN    HPI/Subjective:   67year old female with HTN, HLD, Obesity, HTN, CKD stage IV (baseline Cr 2.4-3.0), hx of metastatic breast Ca(on letrozole and palbociclib), recent pathological fracture of left femur s/p intramedullary nail insertion () who was admitted due to worsening pain and serous drainage from her wound. She was started on IV antibiotics. :   Patient is seen and examined at bedside. Reports drainage and pain on her left hip. States she had chills prior to arrival at ED but has no chills since admission. Pain is controlled with pain meds at this time. Temp of 102.0F yesterday evening. Objective:     Patient Vitals for the past 24 hrs:   Temp Pulse Resp BP SpO2   20 1153 99.1 °F (37.3 °C) 72 18 117/57 95 %   20 0843 98.5 °F (36.9 °C) 88 19 120/74 96 %   20 0339 98.2 °F (36.8 °C) 69 20 106/50 94 %   20 2335 98.3 °F (36.8 °C) 73 22 100/51 94 %   20 2212 97.9 °F (36.6 °C) 74 18 110/71 93 %   20 2044 98.7 °F (37.1 °C)       20 2041    147/66 93 %   20 1742    170/68 92 %   20 1657 (!) 102 °F (38.9 °C) 96 18 151/55 96 %     Oxygen Therapy  O2 Sat (%): 95 % (20 1153)  Pulse via Oximetry: 78 beats per minute (20)  O2 Device: Room air (20 2690)    Estimated body mass index is 37.9 kg/m² as calculated from the following:    Height as of this encounter: 5' 7\" (1.702 m). Weight as of this encounter: 109.8 kg (242 lb).     No intake or output data in the 24 hours ending 20 1413    *Note that automatically entered I/Os may not be accurate; dependent on patient compliance with collection and accurate  by techs. Physical Exam:   General:     alert, awake, no acute distress. Well nourished. Obese. Head:   normocephalic, atraumatic  Eyes, Ears, nose: PERRL, EOMI. Normal conjunctiva  Neck:    supple, non-tender. Trachea midline. Lungs:   CTAB, no wheezing, rhonchi, rales  Cardiac:   RRR, Normal S1 and S2. No S3, S4 or murmurs. Abdomen:   Soft, non distended, nontender, +BS, no guarding/rebound  Extremities:   Warm, dry. No edema. Left hip wound dressed. Skin:   No rashes, no jaundice  Neuro:  AAOx3. No gross focal neurological deficit  Psychiatric:  No anxiety, calm, cooperative    Data Review:  I have reviewed all labs, meds, and studies from the last 24 hours:    Recent Results (from the past 24 hour(s))   CBC WITH AUTOMATED DIFF    Collection Time: 05/13/20  5:22 PM   Result Value Ref Range    WBC 5.0 4.3 - 11.1 K/uL    RBC 3.22 (L) 4.05 - 5.2 M/uL    HGB 10.2 (L) 11.7 - 15.4 g/dL    HCT 32.9 (L) 35.8 - 46.3 %    .2 (H) 79.6 - 97.8 FL    MCH 31.7 26.1 - 32.9 PG    MCHC 31.0 (L) 31.4 - 35.0 g/dL    RDW 18.7 (H) 11.9 - 14.6 %    PLATELET 187 994 - 042 K/uL    MPV 10.6 9.4 - 12.3 FL    ABSOLUTE NRBC 0.00 0.0 - 0.2 K/uL    DF AUTOMATED      NEUTROPHILS 84 (H) 43 - 78 %    LYMPHOCYTES 8 (L) 13 - 44 %    MONOCYTES 7 4.0 - 12.0 %    EOSINOPHILS 0 (L) 0.5 - 7.8 %    BASOPHILS 1 0.0 - 2.0 %    IMMATURE GRANULOCYTES 1 0.0 - 5.0 %    ABS. NEUTROPHILS 4.1 1.7 - 8.2 K/UL    ABS. LYMPHOCYTES 0.4 (L) 0.5 - 4.6 K/UL    ABS. MONOCYTES 0.3 0.1 - 1.3 K/UL    ABS. EOSINOPHILS 0.0 0.0 - 0.8 K/UL    ABS. BASOPHILS 0.0 0.0 - 0.2 K/UL    ABS. IMM.  GRANS. 0.1 0.0 - 0.5 K/UL   METABOLIC PANEL, COMPREHENSIVE    Collection Time: 05/13/20  5:22 PM   Result Value Ref Range    Sodium 140 136 - 145 mmol/L    Potassium 4.1 3.5 - 5.1 mmol/L    Chloride 102 98 - 107 mmol/L    CO2 30 21 - 32 mmol/L    Anion gap 8 7 - 16 mmol/L    Glucose 136 (H) 65 - 100 mg/dL    BUN 20 8 - 23 MG/DL    Creatinine 2.40 (H) 0.6 - 1.0 MG/DL    GFR est AA 25 (L) >60 ml/min/1.73m2    GFR est non-AA 21 (L) >60 ml/min/1.73m2    Calcium 9.0 8.3 - 10.4 MG/DL    Bilirubin, total 1.1 0.2 - 1.1 MG/DL    ALT (SGPT) 28 12 - 65 U/L    AST (SGOT) 51 (H) 15 - 37 U/L    Alk. phosphatase 182 (H) 50 - 136 U/L    Protein, total 6.7 6.3 - 8.2 g/dL    Albumin 2.9 (L) 3.2 - 4.6 g/dL    Globulin 3.8 (H) 2.3 - 3.5 g/dL    A-G Ratio 0.8 (L) 1.2 - 3.5     MAGNESIUM    Collection Time: 05/13/20  5:22 PM   Result Value Ref Range    Magnesium 1.4 (LL) 1.8 - 2.4 mg/dL   C REACTIVE PROTEIN, QT    Collection Time: 05/13/20  5:22 PM   Result Value Ref Range    C-Reactive protein 4.8 (H) 0.0 - 0.9 mg/dL   CULTURE, BLOOD    Collection Time: 05/13/20  5:22 PM   Result Value Ref Range    Special Requests: LEFT  Antecubital        Culture result: NO GROWTH AFTER 15 HOURS     LACTIC ACID    Collection Time: 05/13/20  5:22 PM   Result Value Ref Range    Lactic acid 1.7 0.4 - 2.0 MMOL/L   PROCALCITONIN    Collection Time: 05/13/20  5:22 PM   Result Value Ref Range    Procalcitonin 0.09 ng/mL   CULTURE, WOUND W GRAM STAIN    Collection Time: 05/13/20  5:23 PM   Result Value Ref Range    Special Requests: SWAB L HIP WOUND     GRAM STAIN 0 TO 1 WBCS SEEN PER OIF     GRAM STAIN MODERATE GRAM POSITIVE COCCI      Culture result:        NO GROWTH AFTER SHORT PERIOD OF INCUBATION. FURTHER RESULTS TO FOLLOW AFTER OVERNIGHT INCUBATION.    URINALYSIS W/ RFLX MICROSCOPIC    Collection Time: 05/13/20  5:40 PM   Result Value Ref Range    Color YELLOW      Appearance CLEAR      Specific gravity 1.013 1.001 - 1.023      pH (UA) 7.5 5.0 - 9.0      Protein Negative NEG mg/dL    Glucose Negative mg/dL    Ketone Negative NEG mg/dL    Bilirubin Negative NEG      Blood Negative NEG      Urobilinogen 1.0 0.2 - 1.0 EU/dL    Nitrites Negative NEG      Leukocyte Esterase Negative NEG     CULTURE, BLOOD Collection Time: 05/13/20  8:40 PM   Result Value Ref Range    Special Requests: NO SPECIAL REQUESTS  LEFT  HAND        Culture result: NO GROWTH AFTER 11 HOURS     METABOLIC PANEL, COMPREHENSIVE    Collection Time: 05/14/20  5:04 AM   Result Value Ref Range    Sodium 140 136 - 145 mmol/L    Potassium 4.5 3.5 - 5.1 mmol/L    Chloride 104 98 - 107 mmol/L    CO2 27 21 - 32 mmol/L    Anion gap 9 7 - 16 mmol/L    Glucose 134 (H) 65 - 100 mg/dL    BUN 23 8 - 23 MG/DL    Creatinine 2.33 (H) 0.6 - 1.0 MG/DL    GFR est AA 26 (L) >60 ml/min/1.73m2    GFR est non-AA 22 (L) >60 ml/min/1.73m2    Calcium 8.6 8.3 - 10.4 MG/DL    Bilirubin, total 1.1 0.2 - 1.1 MG/DL    ALT (SGPT) 21 12 - 65 U/L    AST (SGOT) 33 15 - 37 U/L    Alk. phosphatase 139 (H) 50 - 136 U/L    Protein, total 5.4 (L) 6.3 - 8.2 g/dL    Albumin 2.2 (L) 3.2 - 4.6 g/dL    Globulin 3.2 2.3 - 3.5 g/dL    A-G Ratio 0.7 (L) 1.2 - 3.5     CBC WITH AUTOMATED DIFF    Collection Time: 05/14/20  5:04 AM   Result Value Ref Range    WBC 5.5 4.3 - 11.1 K/uL    RBC 2.63 (L) 4.05 - 5.2 M/uL    HGB 8.3 (L) 11.7 - 15.4 g/dL    HCT 26.8 (L) 35.8 - 46.3 %    .9 (H) 79.6 - 97.8 FL    MCH 31.6 26.1 - 32.9 PG    MCHC 31.0 (L) 31.4 - 35.0 g/dL    RDW 18.6 (H) 11.9 - 14.6 %    PLATELET 542 338 - 885 K/uL    MPV 10.8 9.4 - 12.3 FL    ABSOLUTE NRBC 0.00 0.0 - 0.2 K/uL    DF AUTOMATED      NEUTROPHILS 79 (H) 43 - 78 %    LYMPHOCYTES 10 (L) 13 - 44 %    MONOCYTES 10 4.0 - 12.0 %    EOSINOPHILS 1 0.5 - 7.8 %    BASOPHILS 1 0.0 - 2.0 %    IMMATURE GRANULOCYTES 1 0.0 - 5.0 %    ABS. NEUTROPHILS 4.3 1.7 - 8.2 K/UL    ABS. LYMPHOCYTES 0.5 0.5 - 4.6 K/UL    ABS. MONOCYTES 0.5 0.1 - 1.3 K/UL    ABS. EOSINOPHILS 0.0 0.0 - 0.8 K/UL    ABS. BASOPHILS 0.0 0.0 - 0.2 K/UL    ABS. IMM.  GRANS. 0.1 0.0 - 0.5 K/UL        All Micro Results     Procedure Component Value Units Date/Time    CULTURE, Chiquis Alberts [[id number]] Collected:  05/13/20 9718    Order Status:  Completed Specimen:  Wound Drainage Updated:  05/14/20 1134     Special Requests: SWAB L HIP WOUND     GRAM STAIN 0 TO 1 WBCS SEEN PER OIF            MODERATE GRAM POSITIVE COCCI           Culture result:       NO GROWTH AFTER SHORT PERIOD OF INCUBATION. FURTHER RESULTS TO FOLLOW AFTER OVERNIGHT INCUBATION. CULTURE, BLOOD [[id number]] Collected:  05/13/20 1722    Order Status:  Completed Specimen:  Blood Updated:  05/14/20 0929     Special Requests: --        LEFT  Antecubital       Culture result: NO GROWTH AFTER 15 HOURS       CULTURE, BLOOD [[id number]] Collected:  05/13/20 2040    Order Status:  Completed Specimen:  Blood Updated:  05/14/20 0929     Special Requests: --        NO SPECIAL REQUESTS  LEFT  HAND       Culture result: NO GROWTH AFTER 11 HOURS             Current Meds:  Current Facility-Administered Medications   Medication Dose Route Frequency    magnesium oxide (MAG-OX) tablet 400 mg  400 mg Oral BID    amLODIPine (NORVASC) tablet 5 mg  5 mg Oral DAILY    calcium carbonate (OS-JORGE) tablet 500 mg [elemental]  500 mg Oral BID WITH MEALS    docusate sodium (COLACE) capsule 100 mg  100 mg Oral BID PRN    . PHARMACY TO SUBSTITUTE PER PROTOCOL (Reordered from: letrozole (FEMARA) 2.5 mg tablet)    Per Protocol    oxyCODONE IR (ROXICODONE) tablet 5 mg  5 mg Oral Q4H PRN    . PHARMACY TO SUBSTITUTE PER PROTOCOL (Reordered from: palbociclib (Ibrance) 75 mg cap)    Per Protocol    triamterene-hydroCHLOROthiazide (MAXZIDE) 37.5-25 mg per tablet 1 Tab  1 Tab Oral DAILY    sodium chloride (NS) flush 5-40 mL  5-40 mL IntraVENous Q8H    sodium chloride (NS) flush 5-40 mL  5-40 mL IntraVENous PRN    acetaminophen (TYLENOL) tablet 650 mg  650 mg Oral Q4H PRN    0.9% sodium chloride infusion  75 mL/hr IntraVENous CONTINUOUS    morphine 10 mg/ml injection 5 mg  5 mg IntraVENous Q4H PRN       Other Studies:    Xr Chest Sngl V    Result Date: 5/13/2020  AP chest radiograph History: fever, 75 years Female Comparison: Chest radiograph April 26, 2020 Findings:   Normal cardiomediastinal silhouette. Low lung volumes unchanged. Nonspecific mild diffuse interstitial prominence similar to prior. Persistent mild patchy left basilar atelectasis and or consolidation. No evidence of pneumothorax. Visualized soft tissue and osseous structures otherwise unremarkable. Impression:  No significant interval change. Xr Hip Lt W Or Wo Pelv 2-3 Vws    Result Date: 5/13/2020  Exam:  AP pelvis and left hip, left femur radiographs History:  pain, hip pain, 75 years Female Comparison: Left hip radiographs April 27, 2020 Findings: Again visualized is intramedullary jeri fixation of the left femur and relative anatomic alignment with multiple ghost tracts of previous surgical screws. The visualized distal left femur appears intact. Again visualized is intramedullary jeri fixation of the right femur partially visualized. Mild degenerative change of the bilateral hip joint similar to prior. No evidence of new acute fracture or dislocation. Normal alignment. Scattered sclerotic foci in the visualized axial skeleton which may represent osseous metastases appear similar to prior. Visualized soft tissues otherwise unremarkable. Impression:  No evidence of new acute injury. Other chronic and postoperative findings as above. Xr Femur Lt 2 V    Result Date: 5/13/2020  Exam:  AP pelvis and left hip, left femur radiographs History:  pain, hip pain, 75 years Female Comparison: Left hip radiographs April 27, 2020 Findings: Again visualized is intramedullary jeri fixation of the left femur and relative anatomic alignment with multiple ghost tracts of previous surgical screws. The visualized distal left femur appears intact. Again visualized is intramedullary jeri fixation of the right femur partially visualized. Mild degenerative change of the bilateral hip joint similar to prior. No evidence of new acute fracture or dislocation.   Normal alignment. Scattered sclerotic foci in the visualized axial skeleton which may represent osseous metastases appear similar to prior. Visualized soft tissues otherwise unremarkable. Impression:  No evidence of new acute injury. Other chronic and postoperative findings as above. Assessment and Plan: Active Hospital Problems    Diagnosis Date Noted    Left hip postoperative wound infection 05/13/2020    Stage 4 chronic kidney disease ([facility] [address] 05/01/2020    Severe obesity ([facility] [address] 09/24/2019    Hypertension 02/16/2016     UNSPECIFIED          Plan:  Left Hip post-op wound infection  - wound Cx (5/13) : GPC  - BCx (5/13): NGTD  - c/w IV vancomycin  - orthopedics for I&D 5/15    CKD Stage 4: stable    HTN // HLD: c/w home meds; ASA on hold    Severe Obesity: Body mass index is 37.9 kg/m². Diet:  DIET REGULAR  DIET NPO  DVT PPx: lovenox (on hold)  Code: Full Code      Medical Decision Making:    Labs/Imaging reviewed. Additional information obtained from nursing staff. Patient is high risk due to medical condition and comorbidities. In addition, requires monitoring due to receiving medications with high toxic profiles. Plan discussed with nursing staff. Plan discussed with patient/family. All questions/concerns were addressed. Pt/family agrees with the plan.          Signed By: Nicole Zazueta MD     May 14, 2020

## 2020-05-15 ENCOUNTER — APPOINTMENT (OUTPATIENT)
Dept: GENERAL RADIOLOGY | Age: 75
DRG: 481 | End: 2020-05-15
Attending: ORTHOPAEDIC SURGERY
Payer: MEDICARE

## 2020-05-15 ENCOUNTER — HOME CARE VISIT (OUTPATIENT)
Dept: SCHEDULING | Facility: HOME HEALTH | Age: 75
End: 2020-05-15
Payer: MEDICARE

## 2020-05-15 ENCOUNTER — ANESTHESIA (OUTPATIENT)
Dept: SURGERY | Age: 75
DRG: 481 | End: 2020-05-15
Payer: MEDICARE

## 2020-05-15 LAB
ACC. NO. FROM MICRO ORDER, ACCP: ABNORMAL
INTERPRETATION: ABNORMAL
STREPTOCOCCUS , STPSP: DETECTED

## 2020-05-15 PROCEDURE — 74750000023 HC WOUND THERAPY

## 2020-05-15 PROCEDURE — 74011250637 HC RX REV CODE- 250/637: Performed by: ORTHOPAEDIC SURGERY

## 2020-05-15 PROCEDURE — 73502 X-RAY EXAM HIP UNI 2-3 VIEWS: CPT

## 2020-05-15 PROCEDURE — 74011250636 HC RX REV CODE- 250/636: Performed by: NURSE ANESTHETIST, CERTIFIED REGISTERED

## 2020-05-15 PROCEDURE — 87186 SC STD MICRODIL/AGAR DIL: CPT

## 2020-05-15 PROCEDURE — 97161 PT EVAL LOW COMPLEX 20 MIN: CPT | Performed by: PHYSICAL THERAPIST

## 2020-05-15 PROCEDURE — 77030019934 HC DRSG VAC ASST KCON -B: Performed by: ORTHOPAEDIC SURGERY

## 2020-05-15 PROCEDURE — 77030031139 HC SUT VCRL2 J&J -A: Performed by: ORTHOPAEDIC SURGERY

## 2020-05-15 PROCEDURE — 0QB70ZZ EXCISION OF LEFT UPPER FEMUR, OPEN APPROACH: ICD-10-PCS | Performed by: ORTHOPAEDIC SURGERY

## 2020-05-15 PROCEDURE — 74011250637 HC RX REV CODE- 250/637: Performed by: ANESTHESIOLOGY

## 2020-05-15 PROCEDURE — 87075 CULTR BACTERIA EXCEPT BLOOD: CPT

## 2020-05-15 PROCEDURE — 77030018717 HC DRSG GRNUFM KCON -B: Performed by: ORTHOPAEDIC SURGERY

## 2020-05-15 PROCEDURE — 74011250637 HC RX REV CODE- 250/637: Performed by: FAMILY MEDICINE

## 2020-05-15 PROCEDURE — 77030018836 HC SOL IRR NACL ICUM -A: Performed by: ORTHOPAEDIC SURGERY

## 2020-05-15 PROCEDURE — 97166 OT EVAL MOD COMPLEX 45 MIN: CPT

## 2020-05-15 PROCEDURE — 76210000006 HC OR PH I REC 0.5 TO 1 HR: Performed by: ORTHOPAEDIC SURGERY

## 2020-05-15 PROCEDURE — 74011250636 HC RX REV CODE- 250/636: Performed by: ORTHOPAEDIC SURGERY

## 2020-05-15 PROCEDURE — 74011250636 HC RX REV CODE- 250/636: Performed by: ANESTHESIOLOGY

## 2020-05-15 PROCEDURE — 3331090001 HH PPS REVENUE CREDIT

## 2020-05-15 PROCEDURE — 76010000161 HC OR TIME 1 TO 1.5 HR INTENSV-TIER 1: Performed by: ORTHOPAEDIC SURGERY

## 2020-05-15 PROCEDURE — 74011000250 HC RX REV CODE- 250: Performed by: NURSE ANESTHETIST, CERTIFIED REGISTERED

## 2020-05-15 PROCEDURE — 87077 CULTURE AEROBIC IDENTIFY: CPT

## 2020-05-15 PROCEDURE — 3331090002 HH PPS REVENUE DEBIT

## 2020-05-15 PROCEDURE — 97535 SELF CARE MNGMENT TRAINING: CPT

## 2020-05-15 PROCEDURE — 87205 SMEAR GRAM STAIN: CPT

## 2020-05-15 PROCEDURE — 0SPB04Z REMOVAL OF INTERNAL FIXATION DEVICE FROM LEFT HIP JOINT, OPEN APPROACH: ICD-10-PCS | Performed by: ORTHOPAEDIC SURGERY

## 2020-05-15 PROCEDURE — 77030002966 HC SUT PDS J&J -A: Performed by: ORTHOPAEDIC SURGERY

## 2020-05-15 PROCEDURE — 97530 THERAPEUTIC ACTIVITIES: CPT | Performed by: PHYSICAL THERAPIST

## 2020-05-15 PROCEDURE — 87176 TISSUE HOMOGENIZATION CULTR: CPT

## 2020-05-15 PROCEDURE — 65270000029 HC RM PRIVATE

## 2020-05-15 PROCEDURE — 77030013708 HC HNDPC SUC IRR PULS STRY –B: Performed by: ORTHOPAEDIC SURGERY

## 2020-05-15 PROCEDURE — 74011250636 HC RX REV CODE- 250/636: Performed by: INTERNAL MEDICINE

## 2020-05-15 PROCEDURE — 76060000033 HC ANESTHESIA 1 TO 1.5 HR: Performed by: ORTHOPAEDIC SURGERY

## 2020-05-15 PROCEDURE — 77030010509 HC AIRWY LMA MSK TELE -A: Performed by: NURSE ANESTHETIST, CERTIFIED REGISTERED

## 2020-05-15 RX ORDER — MIDAZOLAM HYDROCHLORIDE 1 MG/ML
2 INJECTION, SOLUTION INTRAMUSCULAR; INTRAVENOUS
Status: DISCONTINUED | OUTPATIENT
Start: 2020-05-15 | End: 2020-05-15 | Stop reason: HOSPADM

## 2020-05-15 RX ORDER — SODIUM CHLORIDE 0.9 % (FLUSH) 0.9 %
5-40 SYRINGE (ML) INJECTION AS NEEDED
Status: DISCONTINUED | OUTPATIENT
Start: 2020-05-15 | End: 2020-05-15 | Stop reason: HOSPADM

## 2020-05-15 RX ORDER — FENTANYL CITRATE 50 UG/ML
100 INJECTION, SOLUTION INTRAMUSCULAR; INTRAVENOUS ONCE
Status: DISCONTINUED | OUTPATIENT
Start: 2020-05-15 | End: 2020-05-15 | Stop reason: HOSPADM

## 2020-05-15 RX ORDER — DEXAMETHASONE SODIUM PHOSPHATE 4 MG/ML
INJECTION, SOLUTION INTRA-ARTICULAR; INTRALESIONAL; INTRAMUSCULAR; INTRAVENOUS; SOFT TISSUE AS NEEDED
Status: DISCONTINUED | OUTPATIENT
Start: 2020-05-15 | End: 2020-05-15 | Stop reason: HOSPADM

## 2020-05-15 RX ORDER — EPHEDRINE SULFATE/0.9% NACL/PF 50 MG/5 ML
SYRINGE (ML) INTRAVENOUS AS NEEDED
Status: DISCONTINUED | OUTPATIENT
Start: 2020-05-15 | End: 2020-05-15 | Stop reason: HOSPADM

## 2020-05-15 RX ORDER — ONDANSETRON 2 MG/ML
INJECTION INTRAMUSCULAR; INTRAVENOUS AS NEEDED
Status: DISCONTINUED | OUTPATIENT
Start: 2020-05-15 | End: 2020-05-15 | Stop reason: HOSPADM

## 2020-05-15 RX ORDER — OXYCODONE HYDROCHLORIDE 5 MG/1
5 TABLET ORAL
Status: DISCONTINUED | OUTPATIENT
Start: 2020-05-15 | End: 2020-05-15 | Stop reason: HOSPADM

## 2020-05-15 RX ORDER — SODIUM CHLORIDE 0.9 % (FLUSH) 0.9 %
5-40 SYRINGE (ML) INJECTION EVERY 8 HOURS
Status: DISCONTINUED | OUTPATIENT
Start: 2020-05-15 | End: 2020-05-15 | Stop reason: HOSPADM

## 2020-05-15 RX ORDER — NALOXONE HYDROCHLORIDE 0.4 MG/ML
0.1 INJECTION, SOLUTION INTRAMUSCULAR; INTRAVENOUS; SUBCUTANEOUS AS NEEDED
Status: DISCONTINUED | OUTPATIENT
Start: 2020-05-15 | End: 2020-05-15 | Stop reason: HOSPADM

## 2020-05-15 RX ORDER — LIDOCAINE HYDROCHLORIDE 20 MG/ML
INJECTION, SOLUTION EPIDURAL; INFILTRATION; INTRACAUDAL; PERINEURAL AS NEEDED
Status: DISCONTINUED | OUTPATIENT
Start: 2020-05-15 | End: 2020-05-15 | Stop reason: HOSPADM

## 2020-05-15 RX ORDER — VANCOMYCIN/0.9 % SOD CHLORIDE 1.5G/250ML
1500 PLASTIC BAG, INJECTION (ML) INTRAVENOUS EVERY 24 HOURS
Status: DISCONTINUED | OUTPATIENT
Start: 2020-05-15 | End: 2020-05-17

## 2020-05-15 RX ORDER — LIDOCAINE HYDROCHLORIDE 10 MG/ML
0.1 INJECTION INFILTRATION; PERINEURAL AS NEEDED
Status: DISCONTINUED | OUTPATIENT
Start: 2020-05-15 | End: 2020-05-15 | Stop reason: HOSPADM

## 2020-05-15 RX ORDER — FENTANYL CITRATE 50 UG/ML
INJECTION, SOLUTION INTRAMUSCULAR; INTRAVENOUS AS NEEDED
Status: DISCONTINUED | OUTPATIENT
Start: 2020-05-15 | End: 2020-05-15 | Stop reason: HOSPADM

## 2020-05-15 RX ORDER — PROPOFOL 10 MG/ML
INJECTION, EMULSION INTRAVENOUS AS NEEDED
Status: DISCONTINUED | OUTPATIENT
Start: 2020-05-15 | End: 2020-05-15 | Stop reason: HOSPADM

## 2020-05-15 RX ORDER — HYDROMORPHONE HYDROCHLORIDE 2 MG/ML
0.5 INJECTION, SOLUTION INTRAMUSCULAR; INTRAVENOUS; SUBCUTANEOUS
Status: DISCONTINUED | OUTPATIENT
Start: 2020-05-15 | End: 2020-05-15 | Stop reason: HOSPADM

## 2020-05-15 RX ORDER — SODIUM CHLORIDE, SODIUM LACTATE, POTASSIUM CHLORIDE, CALCIUM CHLORIDE 600; 310; 30; 20 MG/100ML; MG/100ML; MG/100ML; MG/100ML
100 INJECTION, SOLUTION INTRAVENOUS CONTINUOUS
Status: DISCONTINUED | OUTPATIENT
Start: 2020-05-15 | End: 2020-05-15 | Stop reason: HOSPADM

## 2020-05-15 RX ORDER — SODIUM CHLORIDE 9 MG/ML
25 INJECTION, SOLUTION INTRAVENOUS CONTINUOUS
Status: DISCONTINUED | OUTPATIENT
Start: 2020-05-15 | End: 2020-05-15 | Stop reason: HOSPADM

## 2020-05-15 RX ORDER — FAMOTIDINE 20 MG/1
20 TABLET, FILM COATED ORAL ONCE
Status: COMPLETED | OUTPATIENT
Start: 2020-05-15 | End: 2020-05-15

## 2020-05-15 RX ADMIN — HYDROMORPHONE HYDROCHLORIDE 0.5 MG: 2 INJECTION INTRAMUSCULAR; INTRAVENOUS; SUBCUTANEOUS at 09:33

## 2020-05-15 RX ADMIN — Medication 5 MG: at 08:44

## 2020-05-15 RX ADMIN — MORPHINE SULFATE 5 MG: 10 INJECTION INTRAVENOUS at 22:35

## 2020-05-15 RX ADMIN — Medication 10 ML: at 16:51

## 2020-05-15 RX ADMIN — Medication 5 MG: at 08:51

## 2020-05-15 RX ADMIN — PHENYLEPHRINE HYDROCHLORIDE 100 MCG: 10 INJECTION INTRAVENOUS at 08:37

## 2020-05-15 RX ADMIN — SODIUM CHLORIDE, SODIUM LACTATE, POTASSIUM CHLORIDE, AND CALCIUM CHLORIDE 100 ML/HR: 600; 310; 30; 20 INJECTION, SOLUTION INTRAVENOUS at 06:16

## 2020-05-15 RX ADMIN — FENTANYL CITRATE 25 MCG: 50 INJECTION INTRAMUSCULAR; INTRAVENOUS at 08:58

## 2020-05-15 RX ADMIN — Medication 5 MG: at 09:07

## 2020-05-15 RX ADMIN — ONDANSETRON 4 MG: 2 INJECTION INTRAMUSCULAR; INTRAVENOUS at 08:33

## 2020-05-15 RX ADMIN — FENTANYL CITRATE 25 MCG: 50 INJECTION INTRAMUSCULAR; INTRAVENOUS at 08:31

## 2020-05-15 RX ADMIN — Medication 500 MG: at 17:39

## 2020-05-15 RX ADMIN — Medication 10 ML: at 22:12

## 2020-05-15 RX ADMIN — PHENYLEPHRINE HYDROCHLORIDE 50 MCG: 10 INJECTION INTRAVENOUS at 08:32

## 2020-05-15 RX ADMIN — Medication 5 MG: at 08:34

## 2020-05-15 RX ADMIN — HYDROMORPHONE HYDROCHLORIDE 0.5 MG: 2 INJECTION INTRAMUSCULAR; INTRAVENOUS; SUBCUTANEOUS at 09:38

## 2020-05-15 RX ADMIN — PROPOFOL 100 MG: 10 INJECTION, EMULSION INTRAVENOUS at 08:21

## 2020-05-15 RX ADMIN — Medication 5 MG: at 08:37

## 2020-05-15 RX ADMIN — Medication 400 MG: at 17:39

## 2020-05-15 RX ADMIN — SODIUM CHLORIDE 500 ML: 900 INJECTION, SOLUTION INTRAVENOUS at 14:30

## 2020-05-15 RX ADMIN — PHENYLEPHRINE HYDROCHLORIDE 100 MCG: 10 INJECTION INTRAVENOUS at 08:44

## 2020-05-15 RX ADMIN — LIDOCAINE HYDROCHLORIDE 80 MG: 20 INJECTION, SOLUTION EPIDURAL; INFILTRATION; INTRACAUDAL; PERINEURAL at 08:21

## 2020-05-15 RX ADMIN — VANCOMYCIN HYDROCHLORIDE 1500 MG: 10 INJECTION, POWDER, LYOPHILIZED, FOR SOLUTION INTRAVENOUS at 16:52

## 2020-05-15 RX ADMIN — PHENYLEPHRINE HYDROCHLORIDE 100 MCG: 10 INJECTION INTRAVENOUS at 08:51

## 2020-05-15 RX ADMIN — AMLODIPINE BESYLATE 5 MG: 5 TABLET ORAL at 05:57

## 2020-05-15 RX ADMIN — PHENYLEPHRINE HYDROCHLORIDE 100 MCG: 10 INJECTION INTRAVENOUS at 08:58

## 2020-05-15 RX ADMIN — DEXAMETHASONE SODIUM PHOSPHATE 4 MG: 4 INJECTION, SOLUTION INTRAMUSCULAR; INTRAVENOUS at 08:33

## 2020-05-15 RX ADMIN — PHENYLEPHRINE HYDROCHLORIDE 100 MCG: 10 INJECTION INTRAVENOUS at 09:07

## 2020-05-15 RX ADMIN — FAMOTIDINE 20 MG: 20 TABLET, FILM COATED ORAL at 06:18

## 2020-05-15 NOTE — PROGRESS NOTES
I have spoken with the wound care nurse.   The plan will be to begin dressing changes on Monday and she can just continue her vancomycin over the weekend and allow the wound VAC to work over the weekend and not change until Monday as long as it is working well

## 2020-05-15 NOTE — WOUND CARE
Noted wound vac used in surgery of left hip, discussed with jaden Escobar to begin dressing changes Monday. Verbal order for dressing changes added to chart. Will plan to change Monday unless told otherwise by ortho. Will need long term wound care for this wound.

## 2020-05-15 NOTE — PROGRESS NOTES
Problem: Self Care Deficits Care Plan (Adult)  Goal: *Acute Goals and Plan of Care (Insert Text)  Description:   1. Patient will complete lower body bathing and dressing with Min A and adaptive equipment as needed. 2. Patient will complete toileting and toilet transfer with Min A and adaptive equipment as needed. 3. Patient will complete bed mobility with SBA and minimal verbal cueing for placement of BUE and BLE to assist.  4. Patient will complete seated ADL with good static and good dynamic seated balance. 5. Patient will complete functional transfers with Min A and adaptive equipment as needed. Timeframe: 7 visits      Outcome: Progressing Towards Goal     OCCUPATIONAL THERAPY: Initial Assessment, Daily Note, and PM 5/15/2020  INPATIENT: OT Visit Days: 1  Payor: Claudette Clinton / Plan: [address] [place] [address] HMO / Product Type: [employer] Care Medicare /      NAME/AGE/GENDER: Alexy Mujica is a 67 y.o. female   PRIMARY DIAGNOSIS:  Left hip postoperative wound infection [T81.49XA] Left hip postoperative wound infection Left hip postoperative wound infection  Procedure(s) (LRB):  LEFT HIP WOUND DEBRIDEMENT WITH HARDWARE REMOVAL AND APPLICATION OF WOUND VAC (Left)  Day of Surgery  ICD-10: Treatment Diagnosis:    · Generalized Muscle Weakness (M62.81)  · Difficulty in walking, Not elsewhere classified (R26.2)   Precautions/Allergies:    Penicillins      ASSESSMENT:     Ms. Abram Stevenson is a 67 y.o. female admitted for L Hip Post Operative Wound Infection and is now L Hip I&D. At baseline, patient lives with two daughters in trailer home with 0 ANGELIC. Pt receives assistance for ADLs and is dependent on daughters for IADLs. Pt sleeps in hospital bed and reports minimal in home mobility. Pt declines use of DME for transfers or mobility and reports that she \"furniture walks\" from bed to [facility] and uses her R LE to scoot her L LE.      Alexy Mujica found supine in bed upon arrival and agreeable to initial OT/PT evaluation to increase activity tolerance. Reports no pain prior to or following functional mobility. Per chart review, pt has been hypotensive, thus Bp assessed prior to mobility and 72/50. Pt required Min A x 2 for supine to sit. Bp assessed again and 77/35. RN notified and in room. Pt with no adverse reaction and per RN pt okay to complete seated ADLs at this time. Bp continued to be monitored throughout session and noted below. BUE generally decreased for ROM and strength with increase in L UE compared to R UE. Pt reports L hand dominance. PT worked on functional transfers while OT facilitated self-care tasks. Pt required Total A for lower body dressing and Mod A for lower body bathing with assist needed to clean below both knees. Pt observed to have fair static and dynamic seated balance with use of propped sitting. Pt required Mod A x 2 for sit to supine. Pt left supine in bed with all needs met and within reach. Pt did not desaturate during session breathing room air and per RN pt okay to be left on room air. Bp    Supine          72/50  Seated          77/35  Supine 1       103/50  Supine 2       123/61    Edith Mccabe is currently functioning below baseline for ADLs and functional mobility and would benefit from acute OT to increase independence and safety with ADLs and functional mobility. Will follow for duration of hospital stay to address the above goals. Patient was educated on role and plan of care of occupational therapy. This section established at most recent assessment   PROBLEM LIST (Impairments causing functional limitations):  1. Decreased Strength  2. Decreased ADL/Functional Activities  3. Decreased Transfer Abilities  4. Decreased Ambulation Ability/Technique  5. Decreased Balance  6. Increased Fatigue  7.  Decreased Flexibility/Joint Mobility   INTERVENTIONS PLANNED: (Benefits and precautions of occupational therapy have been discussed with the patient.)  1. Activities of daily living training  2. Adaptive equipment training  3. Balance training  4. Clothing management  5. Neuromuscular re-eduation  6. Re-evaluation  7. Therapeutic activity  8. Therapeutic exercise     TREATMENT PLAN: Frequency/Duration: Follow patient 4x/week to address above goals. Rehabilitation Potential For Stated Goals: [facility] (at time of discharge pending progress):    Placement: It is my opinion, based on this patient's performance to date, that Ms. Nichole Dudley may benefit from [address] after discharge due to the functional deficits listed above that are likely to improve with skilled rehabilitation because he/she has multiple medical issues that affect his/her functional mobility in the community. Equipment:    TBD              OCCUPATIONAL PROFILE AND HISTORY:   History of Present Injury/Illness (Reason for Referral):  See H & P  Past Medical History/Comorbidities:   Ms. Nichole Dudley  has a past medical history of Abnormal EKG (2/16/2016), Anemia due to chronic kidney disease treated with erythropoietin (7/25/2017), Aortic valve insufficiency (2/16/2016), Cancer ([facility] [address] Hyperlipidemia (2/16/2016), Hypertension (2/16/2016), and Obesity (2/16/2016). Ms. Nichole Dudley  has a past surgical history that includes hx tubal ligation. Social History/Living Environment:   Home Environment: Trailer/mobile home  # Steps to Enter: 0  One/Two Story Residence: One story  Living Alone: No  Support Systems: Child(ezequiel)  Current DME Used/Available at Home: Wheelchair, Walker, rolling, Commode, bedside, Hospital bed  Tub or Shower Type: Tub/Shower combination  Prior Level of Function/Work/Activity:  At baseline, patient lives with two daughters in trailer home with 0 ANGELIC. Pt receives assistance for ADLs and is dependent on daughters for IADLs. Pt sleeps in hospital bed and reports minimal in home mobility.  Pt declines use of DME for transfers or mobility and reports that she \"furniture walks\" from bed to [facility] and uses her R LE to scoot her L LE. Personal Factors:          Sex:  female        Age:  67 y.o. Number of Personal Factors/Comorbidities that affect the Plan of Care: Extensive review of physical, cognitive, and psychosocial performance (3+):  HIGH COMPLEXITY   ASSESSMENT OF OCCUPATIONAL PERFORMANCE[de-identified]   Activities of Daily Living:   Basic ADLs (From Assessment) Complex ADLs (From Assessment)   Feeding: Setup  Oral Facial Hygiene/Grooming: Setup, Minimum assistance  Bathing: Moderate assistance  Upper Body Dressing: Minimum assistance  Lower Body Dressing: Maximum assistance  Toileting: Maximum assistance Instrumental ADL  Meal Preparation: Total assistance  Homemaking: Total assistance   Grooming/Bathing/Dressing Activities of Daily Living               Lower Body Bathing  Bathing Assistance: Moderate assistance  Position Performed: (Seated edge of bed)           Lower Body Dressing Assistance  Socks: Total assistance (dependent) Bed/Mat Mobility  Rolling: Maximum assistance  Supine to Sit: Minimum assistance;Assist x2  Sit to Supine: Moderate assistance;Assist x2     Most Recent Physical Functioning:   Gross Assessment:  AROM: Generally decreased, functional  Strength: Generally decreased, functional  Coordination: Generally decreased, functional               Posture:     Balance:  Sitting: Impaired  Sitting - Static: Fair (occasional); Prop sitting  Sitting - Dynamic: Fair (occasional); Prop sitting Bed Mobility:  Rolling: Maximum assistance  Supine to Sit: Minimum assistance;Assist x2  Sit to Supine:  Moderate assistance;Assist x2  Duration: 25 Minutes  Wheelchair Mobility:     Transfers:               Patient Vitals for the past 6 hrs:   BP BP Patient Position SpO2 Pulse   05/15/20 1300 (!) 89/62      05/15/20 1315 97/60 At rest 94 % 94   05/15/20 1330 102/66 At rest 95 % 87   05/15/20 1345 119/71 At rest 95 % 94   05/15/20 1429 (!) 72/50 At rest     05/15/20 1509 (!) 72/50      05/15/20 1512 105/50      05/15/20 1543 100/65 At rest 95 % 90       Mental Status  Neurologic State: Alert  Orientation Level: Oriented X4  Cognition: Follows commands                          Physical Skills Involved:  1. Range of Motion  2. Balance  3. Strength  4. Activity Tolerance  5. Gross Motor Control  6. Skin Integrity Cognitive Skills Affected (resulting in the inability to perform in a timely and safe manner):  1. None noted Psychosocial Skills Affected:  1. Habits/Routines  2. Environmental Adaptation   Number of elements that affect the Plan of Care: 5+:  HIGH COMPLEXITY   CLINICAL DECISION MAKIN[address] [place] [address] AM-PAC 6 Clicks   Daily Activity Inpatient Short Form  How much help from another person does the patient currently need. .. Total A Lot A Little None   1. Putting on and taking off regular lower body clothing? [] 1   [x] 2   [] 3   [] 4   2. Bathing (including washing, rinsing, drying)? [] 1   [x] 2   [] 3   [] 4   3. Toileting, which includes using toilet, bedpan or urinal?   [] 1   [x] 2   [] 3   [] 4   4. Putting on and taking off regular upper body clothing? [] 1   [] 2   [x] 3   [] 4   5. Taking care of personal grooming such as brushing teeth? [] 1   [] 2   [x] 3   [] 4   6. Eating meals? [] 1   [] 2   [] 3   [x] 4   © , Trustees of [address] [place] [address], under license to [employer]. All rights reserved      Score:  Initial: 16, completed, 5/15/2020 Most Recent: X (Date: -- )    Interpretation of Tool:  Represents activities that are increasingly more difficult (i.e. Bed mobility, Transfers, Gait). Medical Necessity:     · Skilled intervention continues to be required due to decreased independence, safety, balance, strength, ROM, and activity tolerance for performance of ADL and functional mobility. .  Reason for Services/Other Comments:  · Patient continues to require skilled intervention due to medical complications and patient unable to attend/participate in therapy as expected. Use of outcome tool(s) and clinical judgement create a POC that gives a: MODERATE COMPLEXITY         TREATMENT:   (In addition to Assessment/Re-Assessment sessions the following treatments were rendered)     Pre-treatment Symptoms/Complaints: Pt reports no pain but increased fatigue. Pain: Initial:   Pain Intensity 1: 0  Post Session:  Unchanged     Self Care: (25 minutes): Procedure(s) utilized to improve and/or restore self-care/home management as related to dressing and bathing. Required moderate visual, verbal, manual and tactile cueing to facilitate activities of daily living skills. Pt required Total A for lower body dressing and Mod A for lower body bathing with assist needed to clean below both knees. Today's treatment session addressed Decreased ADL/Functional Activities and Decreased Activity Tolerance to progress towards achieving goal(s) listed above. During this session, Physical Therapy addressed  Functional Transfers to progress towards their discipline specific goal(s). Co-treatment was necessary to improve patient's ability to follow higher level commands, ability to increase activity demands and ability to return to normal functional activity. Braces/Orthotics/Lines/Etc:   · rodriguez catheter  · Nasal cannula to begin but pt saturating well and removed prior to completion of session. · Wound Vac L Hip  Treatment/Session Assessment:    · Response to Treatment:  Pt with improved Bp following functional mobility. · Interdisciplinary Collaboration:   o Physical Therapist  o Occupational Therapist  o Registered Nurse  · After treatment position/precautions:   o Supine in bed  o Bed alarm/tab alert on  o Bed/Chair-wheels locked  o Bed in low position  o Call light within reach  o RN notified   · Compliance with Program/Exercises: Compliant most of the time, Will assess as treatment progresses.   · Recommendations/Intent for next treatment session: \"Next visit will focus on advancements to more challenging activities and reduction in assistance provided\".   Total Treatment Duration:  OT Patient Time In/Time Out  Time In: 1429  Time Out: 1518     Theresa Lopez

## 2020-05-15 NOTE — PROGRESS NOTES
Problem: Mobility Impaired (Adult and Pediatric)  Goal: *Acute Goals and Plan of Care (Insert Text)  Description:     LTG:  (1.)Ms. Wayne Ruiz will move from supine to sit and sit to supine , scoot up and down and roll side to side in bed with MODIFIED INDEPENDENCE within 7 treatment day(s). (2.)Ms. Wayne Ruiz will transfer from bed to chair and chair to bed with MINIMAL ASSIST using the least restrictive device within 7 treatment day(s). (3.)Ms. Wayne Ruiz will ambulate with MINIMAL ASSIST for 5 feet with the least restrictive device within 7 treatment day(s). ________________________________________________________________________________________________     Outcome: Progressing Towards Goal     PHYSICAL THERAPY: Initial Assessment, Daily Note, and PM 5/15/2020  INPATIENT: PT Visit Days : 1  Payor: Pavel Pang / Plan: [address] [place] [address] HMO / Product Type: [employer] Care Medicare /       NAME/AGE/GENDER: Joey Castillo is a 67 y.o. female   PRIMARY DIAGNOSIS: Left hip postoperative wound infection [T81.49XA] Left hip postoperative wound infection Left hip postoperative wound infection  Procedure(s) (LRB):  LEFT HIP WOUND DEBRIDEMENT WITH HARDWARE REMOVAL AND APPLICATION OF WOUND VAC (Left)  Day of Surgery  ICD-10: Treatment Diagnosis:    · Generalized Muscle Weakness (M62.81)  · Other lack of cordination (R27.8)  · Difficulty in walking, Not elsewhere classified (R26.2)  · Other abnormalities of gait and mobility (R26.89)  · History of falling (Z91.81)  · Unspecified Lack of Coordination (R27.9)   Precaution/Allergies:  Penicillins      ASSESSMENT:     Ms. Wayne Ruiz is a 67year old F who presents I&D of left hip. Prior to hospital admission pt lives with her two adult daughters in 3 story home with no step(s) to enter. Pt endorses 2-3 falls in past 6 months, and reports being nearly bedbound at baseline. Prior to admission Ms. Wayne Ruiz uses furniture walking for mobility, limited to distances of less than 3-5 ft. She reports having a w/c, RW, and hospital bed. Upon entering, pt supine, agreeable to PT evaluation. Supine BP is taken at 72/50. she reports no pain in left hip at rest. BLE assessment indicates sensation to light touch intact, AROM reduced, and strength generally decreased in left LE, but 3+/5 in R LE. Pt performed supine > sit with min A x2, sitting EOB with prop sitting balance control. While OT focused on self-care in sitting, PT focused on bed mobility. At EOB, seated BP is taken 77/35. Nursing is notified and brought into the room. BP is taken again at 103/50. Pnt then performed sit to supine with mod Ax2, and bilateral rolling w/ max A. Supine BP taken at 123/61. At end of session pt supine in bed with all needs within reach, alarm activated for safety, RN notified. Pt presents as functioning below her baseline, with deficits in mobility including transfers, gait, balance, and activity tolerance. Pt will benefit from skilled therapy services to address stated deficits to promote return to highest level of function, independence, and safety. Will continue to follow. This section established at most recent assessment   PROBLEM LIST (Impairments causing functional limitations):  1. Decreased Strength  2. Decreased ADL/Functional Activities  3. Decreased Transfer Abilities  4. Decreased Ambulation Ability/Technique  5. Decreased Balance  6. Decreased Activity Tolerance   INTERVENTIONS PLANNED: (Benefits and precautions of physical therapy have been discussed with the patient.)  1. Balance Exercise  2. Bed Mobility  3. Gait Training  4. Neuromuscular Re-education/Strengthening  5. Range of Motion (ROM)  6. Therapeutic Activites  7. Therapeutic Exercise/Strengthening  8. Transfer Training     TREATMENT PLAN: Frequency/Duration: daily for duration of hospital stay  Rehabilitation Potential For Stated Goals: Good     REHAB RECOMMENDATIONS (at time of discharge pending progress):    Placement:   It is my opinion, based on this patient's performance to date, that Ms. Danna Crigler may benefit from [address] after discharge due to the functional deficits listed above that are likely to improve with skilled rehabilitation because he/she has multiple medical issues that affect his/her functional mobility in the community. Equipment:    None at this time              HISTORY:   History of Present Injury/Illness (Reason for Referral):  Patient is a 67 y.o. female who presents to the ER due to pain and drainage from left hip wound. She had a left hip replacement 2 weeks ago and had stitches removed on 5/12. Today, she developed pain after getting up from toilet and has had increased drainage. She had been ambulating prior to this with some assistance but now states she cannot. We are asked to admit her for antibiotics, PT/OT, and wound evaluation. She denies fevers, but does report chills. Denies n/v/d, chest or abdominal pain. Her daughters help care for her at home. Past Medical History/Comorbidities:   Ms. Danna Crigler  has a past medical history of Abnormal EKG (2/16/2016), Anemia due to chronic kidney disease treated with erythropoietin (7/25/2017), Aortic valve insufficiency (2/16/2016), Cancer ([facility] [address] Hyperlipidemia (2/16/2016), Hypertension (2/16/2016), and Obesity (2/16/2016). Ms. Danna Crigler  has a past surgical history that includes hx tubal ligation. Social History/Living Environment:      Prior Level of Function/Work/Activity:  Close to bedbound: ambulation limited to bedside transfers. Has RW but doesn't use it. Adult daughters assist with changing/ bathing.      Number of Personal Factors/Comorbidities that affect the Plan of Care: 1-2: MODERATE COMPLEXITY   EXAMINATION:   Most Recent Physical Functioning:   Gross Assessment:  AROM: Generally decreased, functional  PROM: Generally decreased, functional  Strength: Generally decreased, functional               Posture:     Balance:  Sitting: Impaired  Sitting - Static: Fair (occasional); Prop sitting  Sitting - Dynamic: Fair (occasional); Prop sitting Bed Mobility:  Rolling: Maximum assistance  Supine to Sit: Minimum assistance;Assist x2  Sit to Supine: Moderate assistance;Assist x2  Duration: 25 Minutes  Wheelchair Mobility:     Transfers:     Gait:  Left Side Weight Bearing: As tolerated         Body Structures Involved:  1. Bones  2. Joints  3. Muscles Body Functions Affected:  1. Neuromusculoskeletal  2. Movement Related  3. Skin Related Activities and Participation Affected:  1. General Tasks and Demands  2. Mobility  3. Self Care  4. Domestic Life  5. Interpersonal Interactions and Relationships  6. Community, Social and [place]   Number of elements that affect the Plan of Care: 4+: HIGH COMPLEXITY   CLINICAL PRESENTATION:   Presentation: Stable and uncomplicated: LOW COMPLEXITY   CLINICAL DECISION MAKIN [facility] Inpatient Short Form  How much difficulty does the patient currently have. .. Unable A Lot A Little None   1. Turning over in bed (including adjusting bedclothes, sheets and blankets)? [] 1   [x] 2   [] 3   [] 4   2. Sitting down on and standing up from a chair with arms ( e.g., wheelchair, bedside commode, etc.)   [] 1   [x] 2   [] 3   [] 4   3. Moving from lying on back to sitting on the side of the bed? [] 1   [] 2   [x] 3   [] 4   How much help from another person does the patient currently need. .. Total A Lot A Little None   4. Moving to and from a bed to a chair (including a wheelchair)? [] 1   [x] 2   [] 3   [] 4   5. Need to walk in hospital room? [] 1   [x] 2   [] 3   [] 4   6. Climbing 3-5 steps with a railing? [x] 1   [] 2   [] 3   [] 4   © , Trustees of [address] [place] [address] [address] [address], under license to [employer].  All rights reserved      Score:  Initial: 12 Most Recent: X (Date: -- )    Interpretation of Tool:  Represents activities that are increasingly more difficult (i.e. Bed mobility, Transfers, Gait). Medical Necessity:     · Skilled intervention continues to be required due to decreased safety w/ functional mobility. Reason for Services/Other Comments:  ·    Use of outcome tool(s) and clinical judgement create a POC that gives a: Clear prediction of patient's progress: LOW COMPLEXITY            TREATMENT:   (In addition to Assessment/Re-Assessment sessions the following treatments were rendered)   Pre-treatment Symptoms/Complaints:  \"I'm a little bit dizzy, but I'm okay\"  Pain: Initial:   Pain Intensity 1: 0  Post Session:       Today's treatment session addressed Decreased Strength, Decreased ADL/Functional Activities, Decreased Transfer Abilities, Decreased Ambulation Ability/Technique, Decreased Balance, Decreased Activity Tolerance and Increased Fatigue to progress towards achieving goal(s) all. During this session, Occupational Therapy addressed selfcare to progress towards their discipline specific goal(s). Co-treatment was necessary to improve patient's ability to increase activity demands and ability to return to normal functional activity. Therapeutic Activity: (25 Minutes   ):  Therapeutic activities including Bed transfers and rolling to improve mobility, strength, balance and coordination. Required mod-max support for bed mobility at this time. Braces/Orthotics/Lines/Etc:   · IV  · rodriguez catheter  · wound vac  · O2 Device: Nasal cannula  Treatment/Session Assessment:    · Response to Treatment:  Mild dizziness and fatigue  · Interdisciplinary Collaboration:   o Physical Therapist  o Occupational Therapist  o Registered Nurse  · After treatment position/precautions:   o Supine in bed  o Bed/Chair-wheels locked  o Bed in low position  o Call light within reach  o RN notified   · Compliance with Program/Exercises: Will assess as treatment progresses  · Recommendations/Intent for next treatment session:   \"Next visit will focus on advancements to more challenging activities\".   Total Treatment Duration:  PT Patient Time In/Time Out  Time In: [facility]  Time Out: [facility]

## 2020-05-15 NOTE — OP NOTES
Operative Report    Patient: Chalo Alejandro MRN: [id number]  SSN: [id number]    YOB: 1945  Age: 67 y.o. Sex: female       Date of Surgery: 5/15/2020     History:  Chalo Alejandro is a 67 y.o. female noted approximately 2 to 3 weeks prior with failure of a plate and screw construct with a left proximal femur fracture which was in the subtrochanteric region of the proximal femur. She was taken to the operating room where this was removed and she underwent intramedullary nail fixation of her nonunion. She has presented now with copious drainage from her left hip. I told her that obviously this was very concerning clinically I did feel the first order care would be to go back to the operating room and drained this hip wound debrided and possibly remove some of the hardware that was in place. After talking her about this she seemed to feel comfortable consenting. I talked to the patient and/or their representative and explained the exact nature the procedure. I also went through a detailed list of the material risks associated with  the procedure which included risk of bleeding, infection, injury to nearby structures, worsening the situation, as well as the risks associate with anesthesia and finally death. Also talked with him regarding the benefits and alternatives to the procedure.     Preoperative Diagnosis: left hip retained hardware     Postoperative Diagnosis: Left hip postoperative wound infection, left subtrochanteric femur fracture with nonunion    Surgeon(s) and Role:     * Izzy Courtney MD - Primary    Anesthesia: General     Procedure: Procedure(s):  #1removal of hardware left hip, #2 excisional debridement of skin subcutaneous tissue muscle and bone with wound measuring 15 cm x 8 cm     Procedure in Detail: After the successful induction of general anesthesia the patient was placed in the right lateral decubitus position and the left hip area is prepped and draped in usual sterile fashion. I then ellipsed out a very small skin bridge over the lateral aspect of the left hip wound and immediately encountered a significant amount of what appeared to be infected hematoma. There was probably 200 cc or more of hematoma in the subcutaneous area some of this was captured and sent for culture. Also captured some tissue and sent this for culture. I then dissected down to the area of the fascia and there was a separate pocket little more proximally in the most proximal aspect of the wound. I used a 15 blade to debride some of this to a depth of approximately 6 to 8 cm I also used a rondure as well as a curette to debride the subcutaneous fat in this area. The fascia appeared to be intact but I did open the fascia near the area of the subtrochanteric portion of the femur and there was some cloudy fluid in this area as well. I removed the small plate and screw construct that have been added as an adjunct to her intramedullary nail device and after this was removed I curetted the bone and irrigated this thoroughly as well. After thorough irrigation I then closed a portion of the fascia with figure-of-eight type 0 PDS sutures and then placed a wound VAC dressing over the remainder of the wound.   Patient was then awakened and taken to the recovery room in stable condition      Estimated Blood Loss: 120 cc    Tourniquet Time: * No tourniquets in log *      Implants: * No implants in log *            Specimens:   ID Type Source Tests Collected by Time Destination   1 : Culture of Tissue Tissue Hip, left CULTURE, ANAEROBIC, CULTURE, TISSUE W GRAM STAIN Greg Curtis MD 5/15/2020 8450 Microbiology   2 : Culture of Fluid Body Fluid Hip, left CULTURE, ANAEROBIC, CULTURE, BODY FLUID, Jam Centeno MD 5/15/2020 4634 Microbiology   3 : Culture of bone Tissue Hip, left CULTURE, ANAEROBIC, CULTURE, TISSUE W Jam Centeno MD 5/15/2020 5477 Microbiology Drains: None                Complications: None    Counts: Sponge and needle counts were correct times two.     Signed By:  Stoney Mercado MD     May 15, 2020

## 2020-05-15 NOTE — PROGRESS NOTES
Hospitalist Note     Admit Date:  2020  4:49 PM   Name:  Merry Yung   Age:  67 y.o.  :  1945   MRN:  [id number]   PCP:  Emeka Florentino NP  Treatment Team: Attending Provider: Chante Garrison MD; Consulting Provider: Katlyn Hartley MD; Utilization Review: Beena Lucio RN; Care Manager: Ofelia Reyes RN; Utilization Review: Maggie Valdes RN    HPI/Subjective:   67year old female with HTN, HLD, Obesity, HTN, CKD stage IV (baseline Cr 2.4-3.0), hx of metastatic breast Ca(on letrozole and palbociclib), recent pathological fracture of left femur s/p intramedullary nail insertion () who was admitted due to worsening pain and serous drainage from her wound. She was started on IV antibiotics. 5/15:   Patient is seen and examined at bedside. S/p I&D of left hip wound in OR.  Afebrile since arrival.     Objective:     Patient Vitals for the past 24 hrs:   Temp Pulse Resp BP SpO2   05/15/20 1012  90  136/63 94 %   05/15/20 1007  91  132/60 93 %   05/15/20 1002  92  130/61 93 %   05/15/20 0958 98.8 °F (37.1 °C) 95 16 125/60 93 %   05/15/20 0953  90 16 127/63 94 %   05/15/20 0948  90 16 130/59 94 %   05/15/20 0943  87 16 143/65 94 %   05/15/20 0938  96 15 146/67 94 %   05/15/20 0933  95 15 138/87 95 %   05/15/20 0929 98.6 °F (37 °C) 90 14 (!) 155/92 99 %   05/15/20 0927  89  155/68 94 %   05/15/20 0610 98.9 °F (37.2 °C) 75 18 157/67 95 %   05/15/20 0414 98.4 °F (36.9 °C) 76 17 102/61 93 %   05/15/20 0030    106/67    20 2354    90/54    20 2353 98.5 °F (36.9 °C) 81 17 (!) 89/42 94 %   20 2036 99.3 °F (37.4 °C) 79 17 104/66 90 %   20 1612 99 °F (37.2 °C) 70 18 121/62 96 %   20 1153 99.1 °F (37.3 °C) 72 18 117/57 95 %     Oxygen Therapy  O2 Sat (%): 94 % (05/15/20 1012)  Pulse via Oximetry: 83 beats per minute (05/15/20 1012)  O2 Device: Nasal cannula (05/15/20 09)  O2 Flow Rate (L/min): 3 l/min (05/15/20 0958)    Estimated body mass index is 37.9 kg/m² as calculated from the following:    Height as of this encounter: 5' 7\" (1.702 m). Weight as of this encounter: 109.8 kg (242 lb). Intake/Output Summary (Last 24 hours) at 5/15/2020 1043  Last data filed at 5/15/2020 0854  Gross per 24 hour   Intake 400 ml   Output 865 ml   Net -465 ml       *Note that automatically entered I/Os may not be accurate; dependent on patient compliance with collection and accurate  by techs. Physical Exam:   General:     alert, awake, no acute distress. Well nourished. Obese. Head:   normocephalic, atraumatic  Eyes, Ears, nose: PERRL, EOMI. Normal conjunctiva  Neck:    supple, non-tender. Trachea midline. Lungs:   CTAB, no wheezing, rhonchi, rales  Cardiac:   RRR, Normal S1 and S2. No S3, S4 or murmurs. Abdomen:   Soft, non distended, nontender, +BS, no guarding/rebound  Extremities:   Warm, dry. No edema. Left hip wound dressed. Skin:   No rashes, no jaundice  Neuro:  AAOx3.  No gross focal neurological deficit  Psychiatric:  No anxiety, calm, cooperative    Data Review:  I have reviewed all labs, meds, and studies from the last 24 hours:    Recent Results (from the past 24 hour(s))   TYPE & SCREEN    Collection Time: 05/14/20  7:07 PM   Result Value Ref Range    Crossmatch Expiration 05/17/2020     ABO/Rh(D) A POSITIVE     Antibody screen NEG    CULTURE, BLOOD    Collection Time: 05/14/20  7:26 PM   Result Value Ref Range    Special Requests: NO SPECIAL REQUESTS  LEFT  HAND        Culture result: NO GROWTH AFTER 14 HOURS     CULTURE, BLOOD    Collection Time: 05/14/20  7:26 PM   Result Value Ref Range    Special Requests: LEFT  ARM        Culture result: NO GROWTH AFTER 14 HOURS          All Micro Results     Procedure Component Value Units Date/Time    CULTURE, ANAEROBIC [[id number]] Collected:  05/15/20 0910    Order Status:  Completed Specimen:  Hip Updated:  05/15/20 1040    CULTURE, TISSUE Mliss Lynne STAIN [[id number]] Collected: 05/15/20 0910    Order Status:  Completed Specimen:  Hip Updated:  05/15/20 1040    CULTURE, BLOOD [[id number]] Collected:  05/14/20 1926    Order Status:  Completed Specimen:  Blood Updated:  05/15/20 1007     Special Requests: --        NO SPECIAL REQUESTS  LEFT  HAND       Culture result: NO GROWTH AFTER 14 HOURS       CULTURE, BLOOD [[id number]] Collected:  05/14/20 1926    Order Status:  Completed Specimen:  Blood Updated:  05/15/20 1007     Special Requests: --        LEFT  ARM       Culture result: NO GROWTH AFTER 14 HOURS       CULTURE, BLOOD [[id number]] Collected:  05/13/20 1722    Order Status:  Completed Specimen:  Blood Updated:  05/15/20 1007     Special Requests: --        LEFT  Antecubital       Culture result: NO GROWTH 2 DAYS       CULTURE, WOUND Coit Lute STAIN [[id number]]     Order Status:  Sent Specimen:  Wound from Hip     CULTURE, ANAEROBIC [[id number]]     Order Status:  Sent Specimen:  Hip     CULTURE, ANAEROBIC [[id number]]     Order Status:  Sent Specimen:  Hip     CULTURE, TISSUE Coit Lute STAIN [[id number]]     Order Status:  Sent Specimen:  Hip     CULTURE, Paddy Ards STAIN [[id number]]  (Abnormal) Collected:  05/13/20 1723    Order Status:  Completed Specimen:  Wound Drainage Updated:  05/15/20 0814     Special Requests: SWAB L HIP WOUND     GRAM STAIN 0 TO 1 WBCS SEEN PER OIF            MODERATE GRAM POSITIVE COCCI           Culture result:       HEAVY STAPHYLOCOCCUS AUREUS SENSITIVITY TO FOLLOW                  HEAVY PRESUMPTIVE ENTEROCOCCUS SPECIES SUBCULTURE IN PROGRESS            LIGHT  NORMAL SKIN HOMA ISOLATED       CULTURE, BLOOD [[id number]]  (Abnormal) Collected:  05/13/20 2040    Order Status:  Completed Specimen:  Blood Updated:  05/15/20 0810     Special Requests: --        NO SPECIAL REQUESTS  LEFT  HAND       GRAM STAIN AEROBIC BOTTLE POSITIVE         GRAM POS COCCI IN CHAINS               RESULTS VERIFIED, PHONED TO AND READ BACK BY MERLENE QUZEADA RN @5750 5.14.2020 EOR Culture result:       ALPHA STREPTOCOCCUS SUBCULTURE IN PROGRESS            REFER TO [address] [id number]    BLOOD CULTURE ID PANEL [[id number]]  (Abnormal) Collected:  05/13/20 2040    Order Status:  Completed Specimen:  Blood Updated:  05/15/20 0756     Acc. no. from Micro Order [id number]     Streptococcus Detected        Comment: RESULTS VERIFIED, PHONED TO AND READ BACK BY  Esdras Dale RN @3392 5/15/20 HF          INTERPRETATION       Gram positive cocci. Identified by realtime PCR as Streptococcus species (not agalactiae, pyogenes, or pneumoniae). Comment: Consider discontinuation of IV vancomycin and using an anti-streptococcal beta-lactam (ceftriaxone/cefotaxime (peds))             Current Meds:  Current Facility-Administered Medications   Medication Dose Route Frequency    magnesium oxide (MAG-OX) tablet 400 mg  400 mg Oral BID    vancomycin (VANCOCIN) 1500 mg in  ml infusion  1,500 mg IntraVENous Q24H    amLODIPine (NORVASC) tablet 5 mg  5 mg Oral DAILY    calcium carbonate (OS-JORGE) tablet 500 mg [elemental]  500 mg Oral BID WITH MEALS    docusate sodium (COLACE) capsule 100 mg  100 mg Oral BID PRN    . PHARMACY TO SUBSTITUTE PER PROTOCOL (Reordered from: letrozole (FEMARA) 2.5 mg tablet)    Per Protocol    oxyCODONE IR (ROXICODONE) tablet 5 mg  5 mg Oral Q4H PRN    . PHARMACY TO SUBSTITUTE PER PROTOCOL (Reordered from: palbociclib (Ibrance) 75 mg cap)    Per Protocol    triamterene-hydroCHLOROthiazide (MAXZIDE) 37.5-25 mg per tablet 1 Tab  1 Tab Oral DAILY    sodium chloride (NS) flush 5-40 mL  5-40 mL IntraVENous Q8H    sodium chloride (NS) flush 5-40 mL  5-40 mL IntraVENous PRN    acetaminophen (TYLENOL) tablet 650 mg  650 mg Oral Q4H PRN    0.9% sodium chloride infusion  75 mL/hr IntraVENous CONTINUOUS    morphine 10 mg/ml injection 5 mg  5 mg IntraVENous Q4H PRN       Other Studies:    Xr Chest Sngl V    Result Date: 5/13/2020  AP chest radiograph History: fever, 75 years Female Comparison: Chest radiograph April 26, 2020 Findings:   Normal cardiomediastinal silhouette. Low lung volumes unchanged. Nonspecific mild diffuse interstitial prominence similar to prior. Persistent mild patchy left basilar atelectasis and or consolidation. No evidence of pneumothorax. Visualized soft tissue and osseous structures otherwise unremarkable. Impression:  No significant interval change. Xr Hip Lt W Or Wo Pelv 2-3 Vws    Result Date: 5/13/2020  Exam:  AP pelvis and left hip, left femur radiographs History:  pain, hip pain, 75 years Female Comparison: Left hip radiographs April 27, 2020 Findings: Again visualized is intramedullary jeri fixation of the left femur and relative anatomic alignment with multiple ghost tracts of previous surgical screws. The visualized distal left femur appears intact. Again visualized is intramedullary jeri fixation of the right femur partially visualized. Mild degenerative change of the bilateral hip joint similar to prior. No evidence of new acute fracture or dislocation. Normal alignment. Scattered sclerotic foci in the visualized axial skeleton which may represent osseous metastases appear similar to prior. Visualized soft tissues otherwise unremarkable. Impression:  No evidence of new acute injury. Other chronic and postoperative findings as above. Xr Femur Lt 2 V    Result Date: 5/13/2020  Exam:  AP pelvis and left hip, left femur radiographs History:  pain, hip pain, 75 years Female Comparison: Left hip radiographs April 27, 2020 Findings: Again visualized is intramedullary jeri fixation of the left femur and relative anatomic alignment with multiple ghost tracts of previous surgical screws. The visualized distal left femur appears intact. Again visualized is intramedullary jeri fixation of the right femur partially visualized. Mild degenerative change of the bilateral hip joint similar to prior.   No evidence of new acute fracture or dislocation. Normal alignment. Scattered sclerotic foci in the visualized axial skeleton which may represent osseous metastases appear similar to prior. Visualized soft tissues otherwise unremarkable. Impression:  No evidence of new acute injury. Other chronic and postoperative findings as above. Assessment and Plan: Active Hospital Problems    Diagnosis Date Noted    Left hip postoperative wound infection 05/13/2020    Stage 4 chronic kidney disease ([facility] [address] 05/01/2020    Severe obesity ([facility] [address] 09/24/2019    Hypertension 02/16/2016     UNSPECIFIED          Plan:  Left Hip post-op wound infection s/p I&D (5/15)  - wound Cx (5/13) : GPC   - BCx (5/13): NGTD  - intra-op wound/tissue cultures (5/15)  - c/w IV vancomycin  - ID consult for recommendation for ABx    CKD Stage 4: stable    HTN // HLD: c/w home meds; ASA on hold    Severe Obesity: Body mass index is 37.9 kg/m². Diet:  DIET NPO  DIET REGULAR  DVT PPx: lovenox (on hold)  Code: Full Code      Medical Decision Making:    Labs/Imaging reviewed. Additional information obtained from nursing staff. Patient is high risk due to medical condition and comorbidities. In addition, requires monitoring due to receiving medications with high toxic profiles. Plan discussed with nursing staff. Plan discussed with patient/family. All questions/concerns were addressed. Pt/family agrees with the plan. *ADDENDUM:  - pt has been slightly hypotensive since returning from OR after I&D. Currently, BP is at 89/62 but other vitals are stable. Asymptomatic. Will bolus her 500cc and re-evaluate.     Signed By: Mame Ocampo MD     May 15, 2020

## 2020-05-15 NOTE — PROGRESS NOTES
Pt is known to CM from previous care. LMSW met with pt today about care planning. Pt verbalizes that she is not interested in going to a SNF for rehab. She prefers to go home with a referral back to [facility]. Noted that pt may need a wound vac for home. Will coordinate for this as orders received. Remain available to assist with care coordination as appropriate. Care Management Interventions  PCP Verified by CM: (Dr Sierra Churchill)  Palliative Care Criteria Met (RRAT>21 & CHF Dx)?: No  Mode of Transport at Discharge: Other (see comment)(family)  Transition of Care Consult (CM Consult): Home Health(Pt is insured with pharmacy benefits.  )  [facility] - INPATIENT: Yes  Reason Outside [facility])  Discharge Durable Medical Equipment: No(Pt has a hospital bed, W/C, BDC and a walker)  Physical Therapy Consult: Yes  Occupational Therapy Consult: Yes  Speech Therapy Consult: No  Current Support Network: Relative's Home(Pt lives with daughter. FAmily assisits pt with her care as needed.  )  Confirm Follow Up Transport: Family  The Plan for Transition of Care is Related to the Following Treatment Goals : Pt will need supportive care to return to her functional baseline.   The Patient and/or Patient Representative was Provided with a Choice of Provider and Agrees with the Discharge Plan?: Yes  Name of the Patient Representative Who was Provided with a Choice of Provider and Agrees with the Discharge Plan: pt  Freedom of Choice List was Provided with Basic Dialogue that Supports the Patient's Individualized Plan of Care/Goals, Treatment Preferences and Shares the Quality Data Associated with the Providers?: Yes   Resource Information Provided?: No  Discharge Location  Discharge Placement: Home with home health

## 2020-05-15 NOTE — ANESTHESIA POSTPROCEDURE EVALUATION
Procedure(s):  LEFT HIP WOUND DEBRIDEMENT WITH HARDWARE REMOVAL AND APPLICATION OF WOUND VAC. general    Anesthesia Post Evaluation      Multimodal analgesia: multimodal analgesia used between 6 hours prior to anesthesia start to PACU discharge  Patient location during evaluation: PACU  Patient participation: complete - patient participated  Level of consciousness: awake and alert  Pain management: adequate  Airway patency: patent  Anesthetic complications: no  Cardiovascular status: acceptable  Respiratory status: acceptable  Hydration status: acceptable  Post anesthesia nausea and vomiting:  none      Vitals Value Taken Time   /60 5/15/2020  9:58 AM   Temp 37 °C (98.6 °F) 5/15/2020  9:29 AM   Pulse 90 5/15/2020  9:59 AM   Resp 14 5/15/2020  9:29 AM   SpO2 93 % 5/15/2020  9:59 AM   Vitals shown include unvalidated device data.

## 2020-05-15 NOTE — PERIOP NOTES
TRANSFER - IN REPORT:    Verbal report received from [facility] on [employer] being received from [phone] for ordered procedure      Report consisted of patients Situation, Background, Assessment and Recommendations(SBAR). Information from the following report(s) SBAR, Kardex, ED Summary, MAR, Recent Results and Procedure Verification was reviewed with the receiving nurse. Opportunity for questions and clarification was provided. Assessment completed upon patients arrival to unit and care assumed.

## 2020-05-15 NOTE — PROGRESS NOTES
TRANSFER - IN REPORT:    Verbal report received from [facility] RN(name) on [employer]  being received from PACU(unit) for routine progression of care      Report consisted of patients Situation, Background, Assessment and   Recommendations(SBAR). Information from the following report(s) SBAR, Intake/Output, MAR and Recent Results was reviewed with the receiving nurse. Opportunity for questions and clarification was provided. Assessment completed upon patients arrival to unit and care assumed.

## 2020-05-15 NOTE — CONSULTS
Infectious Disease Consult    Impression:   · L hip surgical site infection, superficial cultures with S aureus and enterococcus, susceptibilties pending  · S/p partial hardware removal (rods retained) and I&D 5/15, op cultures pending  · Blood cultures with 1/4 bottles strep species, potential contaminant  · History of pathologic L hip fracture requiring repair 2018 c/b hardware failure and recurrent fracture 5/2020 s/p hardware exchange   · IDC with mets to bone  · CKD stage 4, baseline Cr around 2.8    Plan:   · Continue vancomycin for now, will adjust regimen based on susceptibility testing  · Follow up susceptibility testing  · Recommend adding rifampin if susceptible as she has retained hardware and presuming hardware was involved  · Planned duration of therapy 6 weeks from 5/15 and she may require PO suppression thereafter    Thank you for allowing us to participate in the care of this patient. ID will follow up again Monday unless called. Anti-infectives:   1. Vancomycin 5/13-present    Subjective:   Date of Consultation:  May 15, 2020  Date of Admission: 5/13/2020   Referring Physician: Roger Mcdaniels    Patient is a 67 y.o. female with stage 4 CKD and metastatic breast cancer with pathologic L hip fracture s/p repair in 2018, admitted again in early May for hardware failure and recurrent fracture, taken to OR 4/27 for removal of old hardware, IMN placement and autobone graft, admitted again 5/13 with pain and drainage c/f surgical site infection. Stitches were removed 5/12 and was otherwise doing well then developed pain and increased drainage after getting up from the toilet. She actually tells me there had been heavy drainage from the surgical site prior to suture removal but it increased after they were removed. Chills but no fevers. Taken to the OR again 5/15 for partial hardware removal and I&D. Cultures are pending from the OR but superficial cultures from 5/13 with SA and enterococcus.  Blood cultures from admission also with a strep species in 1/4 bottles. She was febrile on admission but other vitals stable. Started empirically on vanc. Cr 2.4 which is around her baseline. Patient Active Problem List   Diagnosis Code    Hypertension I10    [facility] C50.919    Malignant neoplasm metastatic to bone (HCC) C79.51    Anemia due to chronic kidney disease treated with erythropoietin N18.9, D63.1    Severe obesity (HCC) E66.01    Stage 4 chronic kidney disease (HCC) N18.4    Left hip postoperative wound infection T81.49XA     Past Medical History:   Diagnosis Date    Abnormal EKG 2/16/2016    Anemia due to chronic kidney disease treated with erythropoietin 7/25/2017    Aortic valve insufficiency 2/16/2016    Cancer ([facility]     breast    Hyperlipidemia 2/16/2016    Hypertension 2/16/2016    UNSPECIFIED     Obesity 2/16/2016    UNSPECIFIED       Family History   Problem Relation Age of Onset    Coronary Artery Disease Mother     Heart Disease Father       Social History     Tobacco Use    Smoking status: Never Smoker    Smokeless tobacco: Current User     Types: Snuff   Substance Use Topics    Alcohol use: No     Past Surgical History:   Procedure Laterality Date    HX TUBAL LIGATION        Prior to Admission medications    Medication Sig Start Date End Date Taking? Authorizing Provider   aspirin (ASPIRIN) 325 mg tablet Take 325 mg by mouth daily. Provider, Historical   oxyCODONE IR (ROXICODONE) 5 mg immediate release tablet Take 5 mg by mouth every six (6) hours as needed for Pain. Provider, Historical   docusate sodium (COLACE) 50 mg capsule Take 50 mg by mouth two (2) times daily as needed for Constipation. Provider, Historical   sennosides 25 mg tab Take 1 Tab by mouth nightly as needed for Other (constipation). Provider, Historical   diphenhydrAMINE (BENADRYL) 25 mg tablet Take 25 mg by mouth every six (6) hours as needed for Allergies. Provider, Historical   colchicine (MITIGARE) 0.6 mg capsule Take 2 caps now, 1 cap in 1 hour, then 1 cap daily x 3  Indications: acute inflammation of the joints due to gout attack  Patient taking differently: Take 2 caps now, 1 cap in 1 hour, then 1 cap daily x 3 by mouth  Indications: acute inflammation of the joints due to gout attack 20   Sonia Pittman NP   palbociclib Ha[facility] Hardik) 75 mg cap Take one capsule by mouthOnce daily with food for21 days on, followed by7 days off during each cyCle of 28 days 20   Helene Negrete MD   ondansetron hcl (Zofran) 8 mg tablet Take 1 Tab by mouth every eight (8) hours as needed for Nausea. 4/15/20   Sonia Pittman NP   triamterene-hydroCHLOROthiazide (MAXZIDE) 37.5-25 mg per tablet TAKE 1 TABLET BY MOUTH EVERY DAY 3/18/20   Helene Negrete MD   letrozole [facility]) 2.5 mg tablet TAKE 1 TABLET BY MOUTH EVERY DAY 20   Helene Negrete MD   melatonin 5 mg cap capsule Take 10 mg by mouth nightly. Provider, Historical   furosemide (LASIX) 20 mg tablet TAKE 1 TAB BY MOUTH DAILY AS NEEDED. FOR SWELLING. 18   Helene Negrete MD   calcium carbonate (OS-JORGE) 500 mg calcium (1,250 mg) tablet Take 2 tablets at lunch and 2 tablets at dinner. Indications: hypocalcemia 17   Laya Mcnulty NP   amLODIPine (NORVASC) 5 mg tablet Take 5 mg by mouth daily. Provider, Historical     Allergies   Allergen Reactions    Penicillins Rash        Review of Systems:  A comprehensive review of systems was negative except for that written in the History of Present Illness. Objective:   Blood pressure 98/63, pulse 86, temperature 98.2 °F (36.8 °C), resp. rate 18, height 5' 7\" (1.702 m), weight 109.8 kg (242 lb), SpO2 96 %. Temp (24hrs), Av.7 °F (37.1 °C), Min:98.2 °F (36.8 °C), Max:99.3 °F (37.4 °C)       Exam:    General:  Alert, cooperative, not distressed   Eyes:  Sclera anicteric. Pupils equally round and reactive to light.    Mouth/Throat: Mucous membranes normal, poor dentition   Neck: Supple   Lungs:   Clear to auscultation bilaterally, good effort   CV:  Regular rate and rhythm,no murmur, click, rub or gallop   Abdomen:   Soft, non-tender, obese, bowel sounds normal. non-distended   Extremities: Feet in boots, L hip with wound vac in place   Skin: Skin color, texture, turgor normal. no acute rash or lesions   Lymph nodes: Cervical and supraclavicular normal   Musculoskeletal: No swelling or deformity   Lines/Devices:  Intact, no erythema, drainage or tenderness   Psych: Alert and oriented, normal mood affect given the setting       Data Review:   Recent Results (from the past 24 hour(s))   TYPE & SCREEN    Collection Time: 05/14/20  7:07 PM   Result Value Ref Range    Crossmatch Expiration 05/17/2020     ABO/Rh(D) A POSITIVE     Antibody screen NEG    CULTURE, BLOOD    Collection Time: 05/14/20  7:26 PM   Result Value Ref Range    Special Requests: NO SPECIAL REQUESTS  LEFT  HAND        Culture result: NO GROWTH AFTER 14 HOURS     CULTURE, BLOOD    Collection Time: 05/14/20  7:26 PM   Result Value Ref Range    Special Requests: LEFT  ARM        Culture result: NO GROWTH AFTER 14 HOURS     CULTURE, TISSUE W GRAM STAIN    Collection Time: 05/15/20  9:03 AM   Result Value Ref Range    Special Requests: NO SPECIAL REQUESTS      GRAM STAIN PENDING     Culture result: PENDING    CULTURE, ANAEROBIC    Collection Time: 05/15/20  9:10 AM   Result Value Ref Range    Special Requests: NO SPECIAL REQUESTS      Culture result: PENDING    CULTURE, TISSUE Baljinder Skyler STAIN    Collection Time: 05/15/20  9:10 AM   Result Value Ref Range    Special Requests: NO SPECIAL REQUESTS      GRAM STAIN PENDING     Culture result: PENDING         Microbiology:    All Micro Results     Procedure Component Value Units Date/Time    CULTURE, ANAEROBIC [[id number]] Collected:  05/15/20 0904    Order Status:  Completed Specimen:  Hip Updated:  05/15/20 1110    CULTURE, BODY FLUID W GRAM STAIN [[id number]] Collected:  05/15/20 0904    Order Status:  Completed Updated:  05/15/20 1110    CULTURE, ANAEROBIC [[id number]] Collected:  05/15/20 0903    Order Status:  Completed Specimen:  Hip Updated:  05/15/20 1110    CULTURE, TISSUE W Martina Wright [[id number]] Collected:  05/15/20 0903    Order Status:  Completed Specimen:  Hip Updated:  05/15/20 1110     Special Requests: NO SPECIAL REQUESTS        GRAM STAIN PENDING     Culture result: PENDING    CULTURE, TISSUE W Martina Wright [[id number]] Collected:  05/15/20 0910    Order Status:  Completed Specimen:  Hip Updated:  05/15/20 1110     Special Requests: NO SPECIAL REQUESTS        GRAM STAIN PENDING     Culture result: PENDING    CULTURE, ANAEROBIC [[id number]] Collected:  05/15/20 0910    Order Status:  Completed Specimen:  Hip Updated:  05/15/20 1109     Special Requests: NO SPECIAL REQUESTS        Culture result: PENDING    CULTURE, BLOOD [[id number]] Collected:  05/14/20 1926    Order Status:  Completed Specimen:  Blood Updated:  05/15/20 1007     Special Requests: --        NO SPECIAL REQUESTS  LEFT  HAND       Culture result: NO GROWTH AFTER 14 HOURS       CULTURE, BLOOD [[id number]] Collected:  05/14/20 1926    Order Status:  Completed Specimen:  Blood Updated:  05/15/20 1007     Special Requests: --        LEFT  ARM       Culture result: NO GROWTH AFTER 14 HOURS       CULTURE, BLOOD [[id number]] Collected:  05/13/20 1722    Order Status:  Completed Specimen:  Blood Updated:  05/15/20 1007     Special Requests: --        LEFT  Antecubital       Culture result: NO GROWTH 2 DAYS       CULTURE, Simon Anjel STAIN [[id number]] Collected:  05/15/20 0900    Order Status:  Canceled Specimen:  Wound from Hip     CULTURE, [place] Anjel STAIN [[id number]]  (Abnormal) Collected:  05/13/20 1723    Order Status:  Completed Specimen:  Wound Drainage Updated:  05/15/20 0814     Special Requests: SWAB L HIP WOUND     GRAM STAIN 0 TO 1 WBCS SEEN PER OIF            MODERATE GRAM POSITIVE COCCI           Culture result:       HEAVY STAPHYLOCOCCUS AUREUS SENSITIVITY TO FOLLOW                  HEAVY PRESUMPTIVE ENTEROCOCCUS SPECIES SUBCULTURE IN PROGRESS            LIGHT  NORMAL SKIN HOMA ISOLATED       CULTURE, BLOOD [[id number]]  (Abnormal) Collected:  05/13/20 2040    Order Status:  Completed Specimen:  Blood Updated:  05/15/20 0810     Special Requests: --        NO SPECIAL REQUESTS  LEFT  HAND       GRAM STAIN AEROBIC BOTTLE POSITIVE         GRAM POS COCCI IN CHAINS               RESULTS VERIFIED, PHONED TO AND READ BACK BY Dre Tomlinson @[phone] EOR           Culture result:       ALPHA STREPTOCOCCUS SUBCULTURE IN PROGRESS            REFER TO [address] [id number]    BLOOD CULTURE ID PANEL [[id number]]  (Abnormal) Collected:  05/13/20 2040    Order Status:  Completed Specimen:  Blood Updated:  05/15/20 0756     Acc. no. from Micro Order [id number]     Streptococcus Detected        Comment: RESULTS VERIFIED, PHONED TO AND READ BACK BY  Roseann Min RN @7054 5/15/20 HF          INTERPRETATION       Gram positive cocci. Identified by realtime PCR as Streptococcus species (not agalactiae, pyogenes, or pneumoniae). Comment: Consider discontinuation of IV vancomycin and using an anti-streptococcal beta-lactam (ceftriaxone/cefotaxime (peds))             Studies:    5/15 XR LT  Hip  IMPRESSION:   1. Partial left femoral hardware removal with a redemonstrated subtrochanteric  subacute appearing fracture of the left femur with a left femoral  cephalomedullary jeri.     Signed By: Leslie Leyva MD     May 15, 2020

## 2020-05-15 NOTE — PERIOP NOTES
TRANSFER - OUT REPORT:    Verbal report given to Deshawn Lowe RN on [employer]  being transferred to [phone] for routine post - op       Report consisted of patients Situation, Background, Assessment and   Recommendations(SBAR). Information from the following report(s) OR Summary, Procedure Summary, Intake/Output and MAR was reviewed with the receiving nurse. Lines:   Peripheral IV 05/13/20 Right Forearm (Active)   Site Assessment Clean, dry, & intact 5/15/2020  9:29 AM   Phlebitis Assessment 0 5/15/2020  9:29 AM   Infiltration Assessment 0 5/15/2020  9:29 AM   Dressing Status Clean, dry, & intact 5/15/2020  9:29 AM   Dressing Type Tape;Transparent 5/15/2020  9:29 AM   Hub Color/Line Status Patent;Pink 5/15/2020  9:29 AM   Alcohol Cap Used No 5/15/2020  9:29 AM        Opportunity for questions and clarification was provided. Patient transported with:   O2 @ 3 liters    VTE prophylaxis orders have not been written for [employer]. Patient and family given floor number and nurses name. Family updated re: pt status after security code verified.

## 2020-05-16 LAB
ALBUMIN SERPL-MCNC: 1.7 G/DL (ref 3.2–4.6)
ALBUMIN/GLOB SERPL: 0.4 {RATIO} (ref 1.2–3.5)
ALP SERPL-CCNC: 123 U/L (ref 50–136)
ALT SERPL-CCNC: 14 U/L (ref 12–65)
ANION GAP SERPL CALC-SCNC: 7 MMOL/L (ref 7–16)
AST SERPL-CCNC: 18 U/L (ref 15–37)
BACTERIA SPEC CULT: ABNORMAL
BASOPHILS # BLD: 0 K/UL (ref 0–0.2)
BASOPHILS NFR BLD: 0 % (ref 0–2)
BILIRUB SERPL-MCNC: 0.6 MG/DL (ref 0.2–1.1)
BUN SERPL-MCNC: 37 MG/DL (ref 8–23)
CALCIUM SERPL-MCNC: 8.4 MG/DL (ref 8.3–10.4)
CHLORIDE SERPL-SCNC: 106 MMOL/L (ref 98–107)
CO2 SERPL-SCNC: 26 MMOL/L (ref 21–32)
CREAT SERPL-MCNC: 2.5 MG/DL (ref 0.6–1)
DIFFERENTIAL METHOD BLD: ABNORMAL
EOSINOPHIL # BLD: 0 K/UL (ref 0–0.8)
EOSINOPHIL NFR BLD: 0 % (ref 0.5–7.8)
ERYTHROCYTE [DISTWIDTH] IN BLOOD BY AUTOMATED COUNT: 18.5 % (ref 11.9–14.6)
GLOBULIN SER CALC-MCNC: 4.1 G/DL (ref 2.3–3.5)
GLUCOSE SERPL-MCNC: 119 MG/DL (ref 65–100)
GRAM STN SPEC: ABNORMAL
HCT VFR BLD AUTO: 24.9 % (ref 35.8–46.3)
HGB BLD-MCNC: 7.5 G/DL (ref 11.7–15.4)
IMM GRANULOCYTES # BLD AUTO: 0.1 K/UL (ref 0–0.5)
IMM GRANULOCYTES NFR BLD AUTO: 2 % (ref 0–5)
LYMPHOCYTES # BLD: 0.4 K/UL (ref 0.5–4.6)
LYMPHOCYTES NFR BLD: 8 % (ref 13–44)
MCH RBC QN AUTO: 31.3 PG (ref 26.1–32.9)
MCHC RBC AUTO-ENTMCNC: 30.1 G/DL (ref 31.4–35)
MCV RBC AUTO: 103.8 FL (ref 79.6–97.8)
MONOCYTES # BLD: 0.3 K/UL (ref 0.1–1.3)
MONOCYTES NFR BLD: 7 % (ref 4–12)
NEUTS SEG # BLD: 3.8 K/UL (ref 1.7–8.2)
NEUTS SEG NFR BLD: 83 % (ref 43–78)
NRBC # BLD: 0 K/UL (ref 0–0.2)
PLATELET # BLD AUTO: 208 K/UL (ref 150–450)
PLATELET COMMENTS,PCOM: ADEQUATE
PMV BLD AUTO: 10.8 FL (ref 9.4–12.3)
POTASSIUM SERPL-SCNC: 4.7 MMOL/L (ref 3.5–5.1)
PROT SERPL-MCNC: 5.8 G/DL (ref 6.3–8.2)
RBC # BLD AUTO: 2.4 M/UL (ref 4.05–5.2)
RBC MORPH BLD: ABNORMAL
RBC MORPH BLD: ABNORMAL
SERVICE CMNT-IMP: ABNORMAL
SODIUM SERPL-SCNC: 139 MMOL/L (ref 136–145)
WBC # BLD AUTO: 4.6 K/UL (ref 4.3–11.1)
WBC MORPH BLD: ABNORMAL

## 2020-05-16 PROCEDURE — 3331090001 HH PPS REVENUE CREDIT

## 2020-05-16 PROCEDURE — 74011250637 HC RX REV CODE- 250/637: Performed by: INTERNAL MEDICINE

## 2020-05-16 PROCEDURE — 65270000029 HC RM PRIVATE

## 2020-05-16 PROCEDURE — 36415 COLL VENOUS BLD VENIPUNCTURE: CPT

## 2020-05-16 PROCEDURE — 74011250636 HC RX REV CODE- 250/636: Performed by: ORTHOPAEDIC SURGERY

## 2020-05-16 PROCEDURE — 85025 COMPLETE CBC W/AUTO DIFF WBC: CPT

## 2020-05-16 PROCEDURE — 80053 COMPREHEN METABOLIC PANEL: CPT

## 2020-05-16 PROCEDURE — 74750000023 HC WOUND THERAPY

## 2020-05-16 PROCEDURE — 3331090002 HH PPS REVENUE DEBIT

## 2020-05-16 PROCEDURE — 74011250637 HC RX REV CODE- 250/637: Performed by: ORTHOPAEDIC SURGERY

## 2020-05-16 PROCEDURE — 97530 THERAPEUTIC ACTIVITIES: CPT | Performed by: PHYSICAL THERAPIST

## 2020-05-16 PROCEDURE — 74011250636 HC RX REV CODE- 250/636: Performed by: INTERNAL MEDICINE

## 2020-05-16 RX ORDER — POLYETHYLENE GLYCOL 3350 17 G/17G
17 POWDER, FOR SOLUTION ORAL DAILY
Status: DISCONTINUED | OUTPATIENT
Start: 2020-05-16 | End: 2020-05-21 | Stop reason: HOSPADM

## 2020-05-16 RX ADMIN — ACETAMINOPHEN 650 MG: 325 TABLET, FILM COATED ORAL at 08:39

## 2020-05-16 RX ADMIN — Medication 400 MG: at 17:43

## 2020-05-16 RX ADMIN — VANCOMYCIN HYDROCHLORIDE 1500 MG: 10 INJECTION, POWDER, LYOPHILIZED, FOR SOLUTION INTRAVENOUS at 15:36

## 2020-05-16 RX ADMIN — MORPHINE SULFATE 5 MG: 10 INJECTION INTRAVENOUS at 20:28

## 2020-05-16 RX ADMIN — Medication 400 MG: at 08:36

## 2020-05-16 RX ADMIN — Medication 500 MG: at 17:44

## 2020-05-16 RX ADMIN — TRIAMTERENE AND HYDROCHLOROTHIAZIDE 1 TABLET: 37.5; 25 TABLET ORAL at 08:36

## 2020-05-16 RX ADMIN — POLYETHYLENE GLYCOL 3350 17 G: 17 POWDER, FOR SOLUTION ORAL at 12:11

## 2020-05-16 RX ADMIN — Medication 10 ML: at 06:03

## 2020-05-16 RX ADMIN — Medication 5 ML: at 15:36

## 2020-05-16 RX ADMIN — Medication 10 ML: at 20:32

## 2020-05-16 RX ADMIN — AMLODIPINE BESYLATE 5 MG: 5 TABLET ORAL at 08:36

## 2020-05-16 NOTE — PROGRESS NOTES
May 16, 2020         Post Op day: 1 Day Post-Op Procedure(s) (LRB):  LEFT HIP WOUND DEBRIDEMENT WITH HARDWARE REMOVAL AND APPLICATION OF WOUND VAC (Left)      Admit Date: 2020  Admit Diagnosis: Left hip postoperative wound infection [T81.49XA]       Principle Problem: Left hip postoperative wound infection. Subjective: Doing well, No complaints, No SOB, No Chest Pain, No Nausea or Vomiting     Objective:   Vital Signs are Stable, No Acute Distress, Alert and Oriented, Dressing is Dry,  Neurovascular exam is normal.     Assessment / Plan :  Patient Active Problem List   Diagnosis Code    Hypertension I10    Infiltrating ductal carcinoma of female breast ([facility] C50.919    Malignant neoplasm metastatic to bone ([facility] C79.51    Anemia due to chronic kidney disease treated with erythropoietin N18.9, D63.1    Severe obesity (HCC) E66.01    Stage 4 chronic kidney disease (HCC) N18.4    Left hip postoperative wound infection T81.49XA      Patient Vitals for the past 8 hrs:   BP Temp Pulse Resp SpO2   20 0347 113/71 97.6 °F (36.4 °C) 74 20 97 %   20 0023 109/73 98.5 °F (36.9 °C) 75 18 93 %    Temp (24hrs), Av.3 °F (36.8 °C), Min:97.6 °F (36.4 °C), Max:98.8 °F (37.1 °C)    Body mass index is 37.9 kg/m².     Lab Results   Component Value Date/Time    HGB 7.5 (L) 2020 05:22 AM        Continue current treatment plan       Signed By: Galo Mejia MD

## 2020-05-16 NOTE — PROGRESS NOTES
Problem: Mobility Impaired (Adult and Pediatric)  Goal: *Acute Goals and Plan of Care (Insert Text)  Description:     LTG:  (1.)Ms. Vaishnavi Lewis will move from supine to sit and sit to supine , scoot up and down and roll side to side in bed with MODIFIED INDEPENDENCE within 7 treatment day(s). (2.)Ms. Vaishnavi Lewis will transfer from bed to chair and chair to bed with MINIMAL ASSIST using the least restrictive device within 7 treatment day(s). (3.)Ms. Vaishnavi Lewis will ambulate with MINIMAL ASSIST for 5 feet with the least restrictive device within 7 treatment day(s). ________________________________________________________________________________________________     Outcome: Progressing Towards Goal     PHYSICAL THERAPY: Daily Note and AM 5/16/2020  INPATIENT: PT Visit Days : 2  Payor: Chalo Castro / Plan: [address] [place] [address] HMO / Product Type: [employer] Care Medicare /       NAME/AGE/GENDER: Hermila Rai is a 67 y.o. female   PRIMARY DIAGNOSIS: Left hip postoperative wound infection [T81.49XA] Left hip postoperative wound infection Left hip postoperative wound infection  Procedure(s) (LRB):  LEFT HIP WOUND DEBRIDEMENT WITH HARDWARE REMOVAL AND APPLICATION OF WOUND VAC (Left)  1 Day Post-Op  ICD-10: Treatment Diagnosis:    · Generalized Muscle Weakness (M62.81)  · Other lack of cordination (R27.8)  · Difficulty in walking, Not elsewhere classified (R26.2)  · Other abnormalities of gait and mobility (R26.89)  · History of falling (Z91.81)  · Unspecified Lack of Coordination (R27.9)   Precaution/Allergies:  Penicillins      ASSESSMENT:     Ms. Vaishnavi Lewis is a 67year old F who presents I&D of left hip. Prior to hospital admission pt lives with her two adult daughters in 3 story home with no step(s) to enter. Pt endorses 2-3 falls in past 6 months, and reports being nearly bedbound at baseline. Prior to admission Ms. Vaishnavi Lewis uses furniture walking for mobility, limited to distances of less than 3-5 ft.  She reports having a w/c, RW, and hospital bed.  5/16: Upon entering, pt supine, agreeable to PT treatment. Supine BP is taken at 115/57. Pt performed supine > sit with min A, sitting EOB with fair sitting balance control. At EOB, seated BP is taken at 100/66. STS is performed w/ Mod A and RW and standing BP is taken at 137/68. Pnt took a brief seated rest break, then stood again w/ mod A. Pnt then performed sit to supine with min A, and bilateral rolling w/ mod A. At end of session pt supine in bed with all needs within reach, alarm activated for safety, RN notified. Pt presents as functioning below her baseline, with deficits in mobility including transfers, gait, balance, and activity tolerance. Pt will benefit from skilled therapy services to address stated deficits to promote return to highest level of function, independence, and safety. Will continue to follow. This section established at most recent assessment   PROBLEM LIST (Impairments causing functional limitations):  1. Decreased Strength  2. Decreased ADL/Functional Activities  3. Decreased Transfer Abilities  4. Decreased Ambulation Ability/Technique  5. Decreased Balance  6. Decreased Activity Tolerance   INTERVENTIONS PLANNED: (Benefits and precautions of physical therapy have been discussed with the patient.)  1. Balance Exercise  2. Bed Mobility  3. Gait Training  4. Neuromuscular Re-education/Strengthening  5. Range of Motion (ROM)  6. Therapeutic Activites  7. Therapeutic Exercise/Strengthening  8. Transfer Training     TREATMENT PLAN: Frequency/Duration: daily for duration of hospital stay  Rehabilitation Potential For Stated Goals: Good     REHAB RECOMMENDATIONS (at time of discharge pending progress):    Placement: It is my opinion, based on this patient's performance to date, that Ms. Stephanie Munoz may benefit from [address] after discharge due to the functional deficits listed above that are likely to improve with skilled rehabilitation because he/she has multiple medical issues that affect his/her functional mobility in the community. Equipment:    None at this time              HISTORY:   History of Present Injury/Illness (Reason for Referral):  Patient is a 67 y.o. female who presents to the ER due to pain and drainage from left hip wound. She had a left hip replacement 2 weeks ago and had stitches removed on 5/12. Today, she developed pain after getting up from toilet and has had increased drainage. She had been ambulating prior to this with some assistance but now states she cannot. We are asked to admit her for antibiotics, PT/OT, and wound evaluation. She denies fevers, but does report chills. Denies n/v/d, chest or abdominal pain. Her daughters help care for her at home. Past Medical History/Comorbidities:   Ms. Lamont Barros  has a past medical history of Abnormal EKG (2/16/2016), Anemia due to chronic kidney disease treated with erythropoietin (7/25/2017), Aortic valve insufficiency (2/16/2016), [facility] Hyperlipidemia (2/16/2016), Hypertension (2/16/2016), and Obesity (2/16/2016). Ms. Lamont Barros  has a past surgical history that includes hx tubal ligation. Social History/Living Environment:   Home Environment: Trailer/mobile home  # Steps to Enter: 0  One/Two Story Residence: One story  Living Alone: No  Support Systems: Child(ezequiel)  Current DME Used/Available at Home: Wheelchair, Walker, rolling, Commode, bedside, Hospital bed  Tub or Shower Type: Tub/Shower combination  Prior Level of Function/Work/Activity:  Close to bedbound: ambulation limited to bedside transfers. Has RW but doesn't use it. Adult daughters assist with changing/ bathing.      Number of Personal Factors/Comorbidities that affect the Plan of Care: 1-2: MODERATE COMPLEXITY   EXAMINATION:   Most Recent Physical Functioning:   Gross Assessment:  AROM: Generally decreased, functional  PROM: Generally decreased, functional  Strength: Generally decreased, functional Posture:  Posture (WDL): Exceptions to WDL  Posture Assessment: Forward head, Rounded shoulders  Balance:  Sitting: Impaired  Sitting - Static: Good (unsupported); Fair (occasional)  Sitting - Dynamic: Fair (occasional)  Standing: Impaired  Standing - Static: Fair  Standing - Dynamic : Fair Bed Mobility:  Rolling: Moderate assistance  Supine to Sit: Minimum assistance  Sit to Supine: Minimum assistance  Duration: 30 Minutes  Wheelchair Mobility:     Transfers:  Sit to Stand: Moderate assistance  Stand to Sit: Moderate assistance  Gait:  Left Side Weight Bearing: As tolerated         Body Structures Involved:  1. Bones  2. Joints  3. Muscles Body Functions Affected:  1. Neuromusculoskeletal  2. Movement Related  3. Skin Related Activities and Participation Affected:  1. General Tasks and Demands  2. Mobility  3. Self Care  4. Domestic Life  5. Interpersonal Interactions and Relationships  6. Community, Social and [place] Lamar   Number of elements that affect the Plan of Care: 4+: HIGH COMPLEXITY   CLINICAL PRESENTATION:   Presentation: Stable and uncomplicated: LOW COMPLEXITY   CLINICAL DECISION MAKIN [facility] Inpatient Short Form  How much difficulty does the patient currently have. .. Unable A Lot A Little None   1. Turning over in bed (including adjusting bedclothes, sheets and blankets)? [] 1   [x] 2   [] 3   [] 4   2. Sitting down on and standing up from a chair with arms ( e.g., wheelchair, bedside commode, etc.)   [] 1   [x] 2   [] 3   [] 4   3. Moving from lying on back to sitting on the side of the bed? [] 1   [] 2   [x] 3   [] 4   How much help from another person does the patient currently need. .. Total A Lot A Little None   4. Moving to and from a bed to a chair (including a wheelchair)? [] 1   [x] 2   [] 3   [] 4   5. Need to walk in hospital room? [] 1   [x] 2   [] 3   [] 4   6. Climbing 3-5 steps with a railing?    [x] 1   [] 2   [] 3   [] 4 © 2007, Trustees of [facility] [place] [facility] [employer], under license to [employer]. All rights reserved      Score:  Initial: 12 Most Recent: X (Date: -- )    Interpretation of Tool:  Represents activities that are increasingly more difficult (i.e. Bed mobility, Transfers, Gait). Medical Necessity:     · Skilled intervention continues to be required due to decreased safety w/ functional mobility. Reason for Services/Other Comments:  ·    Use of outcome tool(s) and clinical judgement create a POC that gives a: Clear prediction of patient's progress: LOW COMPLEXITY            TREATMENT:   (In addition to Assessment/Re-Assessment sessions the following treatments were rendered)   Pre-treatment Symptoms/Complaints:  \"I'm pretty sore all over today\"  Pain: Initial:   Pain Intensity 1: 0  Post Session:           Therapeutic Activity: (30 Minutes   ):  Therapeutic activities including Bed transfers, transfers, and rolling to improve mobility, strength, balance and coordination. Required mod support for bed mobility and standing  at this time. Braces/Orthotics/Lines/Etc:   · IV  · rodriguez catheter  · wound vac  Treatment/Session Assessment:    · Response to Treatment:  Mild dizziness and fatigue  · Interdisciplinary Collaboration:   o Physical Therapist  o Registered Nurse  · After treatment position/precautions:   o Supine in bed  o Bed/Chair-wheels locked  o Bed in low position  o Call light within reach  o RN notified   · Compliance with Program/Exercises: Will assess as treatment progresses  · Recommendations/Intent for next treatment session: \"Next visit will focus on advancements to more challenging activities\".   Total Treatment Duration:  PT Patient Time In/Time Out  Time In: 1125  Time Out: 8939 SoPost

## 2020-05-16 NOTE — PROGRESS NOTES
Problem: Pressure Injury - Risk of  Goal: *Prevention of pressure injury  Description: Document Arturo Scale and appropriate interventions in the flowsheet. Outcome: Progressing Towards Goal  Note: Pressure Injury Interventions:  Sensory Interventions: Minimize linen layers         Activity Interventions: Increase time out of bed, Pressure redistribution bed/mattress(bed type)    Mobility Interventions: Pressure redistribution bed/mattress (bed type)    Nutrition Interventions: Offer support with meals,snacks and hydration    Friction and Shear Interventions: Apply protective barrier, creams and emollients                Problem: Patient Education: Go to Patient Education Activity  Goal: Patient/Family Education  Outcome: Progressing Towards Goal     Problem: Falls - Risk of  Goal: *Absence of Falls  Description: Document Maurizio Fall Risk and appropriate interventions in the flowsheet.   Outcome: Progressing Towards Goal  Note: Fall Risk Interventions:  Mobility Interventions: Bed/chair exit alarm    Mentation Interventions: Adequate sleep, hydration, pain control    Medication Interventions: Bed/chair exit alarm    Elimination Interventions: Call light in reach, Patient to call for help with toileting needs    History of Falls Interventions: Bed/chair exit alarm         Problem: Patient Education: Go to Patient Education Activity  Goal: Patient/Family Education  Outcome: Progressing Towards Goal     Problem: Infection - Risk of, Surgical Site Infection  Goal: *Absence of surgical site infection signs and symptoms  Outcome: Progressing Towards Goal     Problem: Patient Education: Go to Patient Education Activity  Goal: Patient/Family Education  Outcome: Progressing Towards Goal     Problem: Patient Education: Go to Patient Education Activity  Goal: Patient/Family Education  Outcome: Progressing Towards Goal     Problem: Patient Education: Go to Patient Education Activity  Goal: Patient/Family Education  Outcome: Progressing Towards Goal     Problem: Patient Education: Go to Patient Education Activity  Goal: Patient/Family Education  Outcome: Progressing Towards Goal     Problem: Hip Fracture: Post-Op Day 2  Goal: Off Pathway (Use only if patient is Off Pathway)  Outcome: Progressing Towards Goal  Goal: Activity/Safety  Outcome: Progressing Towards Goal  Goal: Diagnostic Test/Procedures  Outcome: Progressing Towards Goal  Goal: Nutrition/Diet  Outcome: Progressing Towards Goal  Goal: Medications  Outcome: Progressing Towards Goal  Goal: Respiratory  Outcome: Progressing Towards Goal  Goal: Treatments/Interventions/Procedures  Outcome: Progressing Towards Goal  Goal: Psychosocial  Outcome: Progressing Towards Goal  Goal: Discharge Planning  Outcome: Progressing Towards Goal  Goal: *Optimal pain control at patient's stated goal  Outcome: Progressing Towards Goal  Goal: *Hemodynamically stable  Outcome: Progressing Towards Goal  Goal: *Adequate oxygenation  Outcome: Progressing Towards Goal  Goal: *Tolerates increased activity  Outcome: Progressing Towards Goal  Goal: *Tolerates nutrition therapy  Outcome: Progressing Towards Goal  Goal: *Absence of skin breakdown  Outcome: Progressing Towards Goal     Problem: Hip Fracture: Post-Op Day 3  Goal: Off Pathway (Use only if patient is Off Pathway)  Outcome: Progressing Towards Goal  Goal: Activity/Safety  Outcome: Progressing Towards Goal  Goal: Diagnostic Test/Procedures  Outcome: Progressing Towards Goal  Goal: Nutrition/Diet  Outcome: Progressing Towards Goal  Goal: Medications  Outcome: Progressing Towards Goal  Goal: Respiratory  Outcome: Progressing Towards Goal  Goal: Treatments/Interventions/Procedures  Outcome: Progressing Towards Goal  Goal: Psychosocial  Outcome: Progressing Towards Goal  Goal: Discharge Planning  Outcome: Progressing Towards Goal  Goal: *Met physical therapy criteria for discharge to next level of care  Outcome: Progressing Towards Goal  Goal: *Optimal pain control at patient's stated goal  Outcome: Progressing Towards Goal  Goal: *Hemodynamically stable  Outcome: Progressing Towards Goal  Goal: *Tolerating diet  Outcome: Progressing Towards Goal  Goal: *Active bowel function  Outcome: Progressing Towards Goal  Goal: *Adequate urinary output  Outcome: Progressing Towards Goal  Goal: *Absence of skin breakdown  Outcome: Progressing Towards Goal  Goal: *Patient verbalizes understanding of discharge instructions  Outcome: Progressing Towards Goal     Problem: Hip Fracture: Post-Op Day 4  Goal: Off Pathway (Use only if patient is Off Pathway)  Outcome: Progressing Towards Goal  Goal: Activity/Safety  Outcome: Progressing Towards Goal  Goal: Diagnostic Test/Procedures  Outcome: Progressing Towards Goal  Goal: Nutrition/Diet  Outcome: Progressing Towards Goal  Goal: Medications  Outcome: Progressing Towards Goal  Goal: Respiratory  Outcome: Progressing Towards Goal  Goal: Treatments/Interventions/Procedures  Outcome: Progressing Towards Goal  Goal: Psychosocial  Outcome: Progressing Towards Goal  Goal: Discharge Planning  Outcome: Progressing Towards Goal  Goal: *Met physical therapy criteria for discharge to next level of care  Outcome: Progressing Towards Goal  Goal: *Optimal pain control at patient's stated goal  Outcome: Progressing Towards Goal  Goal: *Hemodynamically stable  Outcome: Progressing Towards Goal  Goal: *Tolerating diet  Outcome: Progressing Towards Goal  Goal: *Active bowel function  Outcome: Progressing Towards Goal  Goal: *Adequate urinary output  Outcome: Progressing Towards Goal  Goal: *Absence of skin breakdown  Outcome: Progressing Towards Goal  Goal: *Patient verbalizes understanding of discharge instructions  Outcome: Progressing Towards Goal

## 2020-05-16 NOTE — PROGRESS NOTES
Hospitalist Note     Admit Date:  2020  4:49 PM   Name:  Gordon Costello   Age:  67 y.o.  :  1945   MRN:  [id number]   PCP:  Ottoniel Collazo NP  Treatment Team: Attending Provider: Casa Barboza MD; Consulting Provider: Sarah Magallon MD; Utilization Review: Sabine Dumont RN; Care Manager: Poornima Bettencourt RN; Utilization Review: Kristina Barrios RN; Consulting Provider: Jovita Franz MD; Physical Therapist: Eric Chu    HPI/Subjective:   67year old female with HTN, HLD, Obesity, HTN, CKD stage IV (baseline Cr 2.4-3.0), hx of metastatic breast Ca(on letrozole and palbociclib), recent pathological fracture of left femur s/p intramedullary nail insertion () who was admitted due to worsening pain and serous drainage from her wound. She was started on IV antibiotics. She underwent I&D of left hip wound by Orthopedics on  and wound vac was placed. Initial BCx and Wound Cx showed MSSA and strep. :   Patient is seen and examined at bedside. S/p I&D of left hip wound in OR. Afebrile since arrival.   States she has no BM in 2 days but otherwise feeling ok. Minimal pain on the left hip.     Objective:     Patient Vitals for the past 24 hrs:   Temp Pulse Resp BP SpO2   20 1110 98.4 °F (36.9 °C) 77 18 106/53 95 %   20 0803 98 °F (36.7 °C) 75 18 111/77 94 %   20 0347 97.6 °F (36.4 °C) 74 20 113/71 97 %   20 0023 98.5 °F (36.9 °C) 75 18 109/73 93 %   05/15/20 2048 98.1 °F (36.7 °C) 84 20 111/47 93 %   05/15/20 1543 98 °F (36.7 °C) 90 18 100/65 95 %   05/15/20 1512    105/50    05/15/20 1509    (!) 72/50    05/15/20 1429    (!) 72/50    05/15/20 1345  94 17 119/71 95 %   05/15/20 1330  87 17 102/66 95 %   05/15/20 1315  94 18 97/60 94 %     Oxygen Therapy  O2 Sat (%): 95 % (20 1110)  Pulse via Oximetry: 83 beats per minute (05/15/20 1012)  O2 Device: Nasal cannula (05/15/20 0958)  O2 Flow Rate (L/min): 3 l/min (05/15/20 9167)    Estimated body mass index is 37.9 kg/m² as calculated from the following:    Height as of this encounter: 5' 7\" (1.702 m). Weight as of this encounter: 109.8 kg (242 lb). Intake/Output Summary (Last 24 hours) at 5/16/2020 1312  Last data filed at 5/16/2020 0555  Gross per 24 hour   Intake 120 ml   Output 300 ml   Net -180 ml       *Note that automatically entered I/Os may not be accurate; dependent on patient compliance with collection and accurate  by techs. Physical Exam:   General:     alert, awake, no acute distress. Well nourished. Obese. Head:   normocephalic, atraumatic  Eyes, Ears, nose: PERRL, EOMI. Normal conjunctiva  Neck:    supple, non-tender. Trachea midline. Lungs:   CTAB, no wheezing, rhonchi, rales  Cardiac:   RRR, Normal S1 and S2. No S3, S4 or murmurs. Abdomen:   Soft, non distended, nontender, +BS, no guarding/rebound  Extremities:   Warm, dry. No edema. Left hip wound vac with bloody drainage  Skin:   No rashes, no jaundice  Neuro:  AAOx3. No gross focal neurological deficit  Psychiatric:  No anxiety, calm, cooperative    Data Review:  I have reviewed all labs, meds, and studies from the last 24 hours:    Recent Results (from the past 24 hour(s))   CBC WITH AUTOMATED DIFF    Collection Time: 05/16/20  5:22 AM   Result Value Ref Range    WBC 4.6 4.3 - 11.1 K/uL    RBC 2.40 (L) 4.05 - 5.2 M/uL    HGB 7.5 (L) 11.7 - 15.4 g/dL    HCT 24.9 (L) 35.8 - 46.3 %    .8 (H) 79.6 - 97.8 FL    MCH 31.3 26.1 - 32.9 PG    MCHC 30.1 (L) 31.4 - 35.0 g/dL    RDW 18.5 (H) 11.9 - 14.6 %    PLATELET 855 241 - 767 K/uL    MPV 10.8 9.4 - 12.3 FL    ABSOLUTE NRBC 0.00 0.0 - 0.2 K/uL    NEUTROPHILS 83 (H) 43 - 78 %    LYMPHOCYTES 8 (L) 13 - 44 %    MONOCYTES 7 4.0 - 12.0 %    EOSINOPHILS 0 (L) 0.5 - 7.8 %    BASOPHILS 0 0.0 - 2.0 %    IMMATURE GRANULOCYTES 2 0.0 - 5.0 %    ABS. NEUTROPHILS 3.8 1.7 - 8.2 K/UL    ABS. LYMPHOCYTES 0.4 (L) 0.5 - 4.6 K/UL    ABS.  MONOCYTES 0.3 0.1 - 1.3 K/UL    ABS. EOSINOPHILS 0.0 0.0 - 0.8 K/UL    ABS. BASOPHILS 0.0 0.0 - 0.2 K/UL    ABS. IMM. GRANS. 0.1 0.0 - 0.5 K/UL    RBC COMMENTS SLIGHT  ANISOCYTOSIS + POIKILOCYTOSIS        RBC COMMENTS OCCASIONAL  TEARDROP CELLS        WBC COMMENTS Result Confirmed By Smear      PLATELET COMMENTS ADEQUATE      DF AUTOMATED     METABOLIC PANEL, COMPREHENSIVE    Collection Time: 05/16/20  5:22 AM   Result Value Ref Range    Sodium 139 136 - 145 mmol/L    Potassium 4.7 3.5 - 5.1 mmol/L    Chloride 106 98 - 107 mmol/L    CO2 26 21 - 32 mmol/L    Anion gap 7 7 - 16 mmol/L    Glucose 119 (H) 65 - 100 mg/dL    BUN 37 (H) 8 - 23 MG/DL    Creatinine 2.50 (H) 0.6 - 1.0 MG/DL    GFR est AA 24 (L) >60 ml/min/1.73m2    GFR est non-AA 20 (L) >60 ml/min/1.73m2    Calcium 8.4 8.3 - 10.4 MG/DL    Bilirubin, total 0.6 0.2 - 1.1 MG/DL    ALT (SGPT) 14 12 - 65 U/L    AST (SGOT) 18 15 - 37 U/L    Alk.  phosphatase 123 50 - 136 U/L    Protein, total 5.8 (L) 6.3 - 8.2 g/dL    Albumin 1.7 (L) 3.2 - 4.6 g/dL    Globulin 4.1 (H) 2.3 - 3.5 g/dL    A-G Ratio 0.4 (L) 1.2 - 3.5          All Micro Results     Procedure Component Value Units Date/Time    CULTURE, TISSUE [place] STAIN [[id number]]  (Abnormal) Collected:  05/15/20 0903    Order Status:  Completed Specimen:  Hip Updated:  05/16/20 1046     Special Requests: NO SPECIAL REQUESTS        GRAM STAIN 0 TO 1 WBCS/OIF      FEW GRAM POSITIVE COCCI        Culture result:       HEAVY STAPHYLOCOCCUS AUREUS SUBCULTURE IN PROGRESS          CULTURE, BODY FLUID W Rosa Iesla Hem [[id number]]  (Abnormal) Collected:  05/15/20 0904    Order Status:  Completed Specimen:  Hip Updated:  05/16/20 1046     Special Requests: LEFT        GRAM STAIN 50  WBCS/OIF            MODERATE GRAM POSITIVE COCCI           Culture result:       HEAVY STAPHYLOCOCCUS AUREUS SUBCULTURE IN PROGRESS          CULTURE, TISSUE [place] L[place] STAIN [[id number]]  (Abnormal) Collected:  05/15/20 0910    Order Status:  Completed Specimen:  Hip Updated:  05/16/20 1044     Special Requests: NO SPECIAL REQUESTS        GRAM STAIN 0 TO 1 WBCS/OIF      FEW GRAM POSITIVE COCCI        Culture result:       MODERATE STAPHYLOCOCCUS AUREUS SUBCULTURE IN PROGRESS          CULTURE, [place] STAIN [[id number]]  (Abnormal)  (Susceptibility) Collected:  05/13/20 1723    Order Status:  Completed Specimen:  Wound Drainage Updated:  05/16/20 0820     Special Requests: SWAB L HIP WOUND     GRAM STAIN 0 TO 1 WBCS SEEN PER OIF            MODERATE GRAM POSITIVE COCCI           Culture result:       HEAVY STAPHYLOCOCCUS AUREUS                  HEAVY PRESUMPTIVE ENTEROCOCCUS SPECIES IDENTIFICATION AND SUSCEPTIBILITY TO FOLLOW            LIGHT  NORMAL SKIN HOMA ISOLATED       CULTURE, BLOOD [[id number]]  (Abnormal) Collected:  05/13/20 2040    Order Status:  Completed Specimen:  Blood Updated:  05/16/20 0642     Special Requests: --        NO SPECIAL REQUESTS  LEFT  HAND       GRAM STAIN AEROBIC BOTTLE POSITIVE         GRAM POS COCCI IN CHAINS               RESULTS VERIFIED, PHONED TO AND READ BACK BY MERLENE QUEZADA RN @497 5.14.2020 EOR           Culture result:       ALPHA STREPTOCOCCUS, NOT S. PNEUMONIAE            REFER TO BIOFIRE ACC [id number]            THIS ORGANISM MAY BE INDICATIVE OF CULTURE CONTAMINATION, HOWEVER, CLINICAL CORRELATION NEEDS TO BE EVALUATED, AS EACH CASE IS UNIQUE.           CULTURE, BLOOD [[id number]] Collected:  05/14/20 1926    Order Status:  Completed Specimen:  Blood Updated:  05/16/20 0627     Special Requests: --        NO SPECIAL REQUESTS  LEFT  HAND       Culture result: NO GROWTH 2 DAYS       CULTURE, BLOOD [[id number]] Collected:  05/14/20 1926    Order Status:  Completed Specimen:  Blood Updated:  05/16/20 0627     Special Requests: --        LEFT  ARM       Culture result: NO GROWTH 2 DAYS       CULTURE, BLOOD [[id number]] Collected:  05/13/20 1722    Order Status:  Completed Specimen:  Blood Updated:  05/16/20 0626     Special Requests: -- LEFT  Antecubital       Culture result: NO GROWTH 3 DAYS       CULTURE, ANAEROBIC [[id number]] Collected:  05/15/20 0904    Order Status:  Completed Specimen:  Hip Updated:  05/15/20 1110    CULTURE, ANAEROBIC [[id number]] Collected:  05/15/20 0903    Order Status:  Completed Specimen:  Hip Updated:  05/15/20 1110    CULTURE, ANAEROBIC [[id number]] Collected:  05/15/20 0910    Order Status:  Completed Specimen:  Hip Updated:  05/15/20 1109     Special Requests: NO SPECIAL REQUESTS        Culture result: PENDING    CULTURE, Joey Dianna STAIN [[id number]] Collected:  05/15/20 0900    Order Status:  Canceled Specimen:  Wound from Hip     BLOOD CULTURE ID PANEL [[id number]]  (Abnormal) Collected:  05/13/20 2040    Order Status:  Completed Specimen:  Blood Updated:  05/15/20 0756     Acc. no. from Micro Order [id number]     Streptococcus Detected        Comment: RESULTS VERIFIED, PHONED TO AND READ BACK BY  Shannon Burch RN @9758 5/15/20 HF          INTERPRETATION       Gram positive cocci. Identified by realtime PCR as Streptococcus species (not agalactiae, pyogenes, or pneumoniae).            Comment: Consider discontinuation of IV vancomycin and using an anti-streptococcal beta-lactam (ceftriaxone/cefotaxime (peds))             Current Meds:  Current Facility-Administered Medications   Medication Dose Route Frequency    polyethylene glycol (MIRALAX) packet 17 g  17 g Oral DAILY    vancomycin (VANCOCIN) 1500 mg in  ml infusion  1,500 mg IntraVENous Q24H    magnesium oxide (MAG-OX) tablet 400 mg  400 mg Oral BID    amLODIPine (NORVASC) tablet 5 mg  5 mg Oral DAILY    calcium carbonate (OS-JORGE) tablet 500 mg [elemental]  500 mg Oral BID WITH MEALS    docusate sodium (COLACE) capsule 100 mg  100 mg Oral BID PRN    oxyCODONE IR (ROXICODONE) tablet 5 mg  5 mg Oral Q4H PRN    [START ON 5/21/2020] palbociclib cap 75 mg (Patient Supplied)  75 mg Oral DAILY    triamterene-hydroCHLOROthiazide (MAXZIDE) 37.5-25 mg per tablet 1 Tab  1 Tab Oral DAILY    sodium chloride (NS) flush 5-40 mL  5-40 mL IntraVENous Q8H    sodium chloride (NS) flush 5-40 mL  5-40 mL IntraVENous PRN    acetaminophen (TYLENOL) tablet 650 mg  650 mg Oral Q4H PRN    0.9% sodium chloride infusion  75 mL/hr IntraVENous CONTINUOUS    morphine 10 mg/ml injection 5 mg  5 mg IntraVENous Q4H PRN       Other Studies:    Xr Chest Sngl V    Result Date: 5/13/2020  AP chest radiograph History: fever, 75 years Female Comparison: Chest radiograph April 26, 2020 Findings:   Normal cardiomediastinal silhouette. Low lung volumes unchanged. Nonspecific mild diffuse interstitial prominence similar to prior. Persistent mild patchy left basilar atelectasis and or consolidation. No evidence of pneumothorax. Visualized soft tissue and osseous structures otherwise unremarkable. Impression:  No significant interval change. Xr Hip Lt W Or Wo Pelv 2-3 Vws    Result Date: 5/13/2020  Exam:  AP pelvis and left hip, left femur radiographs History:  pain, hip pain, 75 years Female Comparison: Left hip radiographs April 27, 2020 Findings: Again visualized is intramedullary jeri fixation of the left femur and relative anatomic alignment with multiple ghost tracts of previous surgical screws. The visualized distal left femur appears intact. Again visualized is intramedullary jeri fixation of the right femur partially visualized. Mild degenerative change of the bilateral hip joint similar to prior. No evidence of new acute fracture or dislocation. Normal alignment. Scattered sclerotic foci in the visualized axial skeleton which may represent osseous metastases appear similar to prior. Visualized soft tissues otherwise unremarkable. Impression:  No evidence of new acute injury. Other chronic and postoperative findings as above.      Xr Femur Lt 2 V    Result Date: 5/13/2020  Exam:  AP pelvis and left hip, left femur radiographs History:  pain, hip pain, 75 years Female Comparison: Left hip radiographs April 27, 2020 Findings: Again visualized is intramedullary jeri fixation of the left femur and relative anatomic alignment with multiple ghost tracts of previous surgical screws. The visualized distal left femur appears intact. Again visualized is intramedullary jeri fixation of the right femur partially visualized. Mild degenerative change of the bilateral hip joint similar to prior. No evidence of new acute fracture or dislocation. Normal alignment. Scattered sclerotic foci in the visualized axial skeleton which may represent osseous metastases appear similar to prior. Visualized soft tissues otherwise unremarkable. Impression:  No evidence of new acute injury. Other chronic and postoperative findings as above. Assessment and Plan: Active Hospital Problems    Diagnosis Date Noted    Left hip postoperative wound infection 05/13/2020    Stage 4 chronic kidney disease ([facility] [address] 05/01/2020    Severe obesity ([facility] [address] 09/24/2019    Hypertension 02/16/2016     UNSPECIFIED          Plan:  Left Hip post-op wound infection s/p I&D (5/15)  - wound Cx (5/13) and BCx (5/13) show MSSA and alpha strep  - intra-op wound/tissue cultures (5/15) and BCx (5/15): showing staph aureus  - ID consult for recommendation for ABx: c/w Vancomycin   - wound vac per Ortho  - wound care    CKD Stage 4: stable    HTN // HLD: c/w home meds; ASA on hold    Severe Obesity: Body mass index is 37.9 kg/m². Diet:  DIET REGULAR  DVT PPx: lovenox (on hold)  Code: Full Code      Medical Decision Making:    Labs/Imaging reviewed. Additional information obtained from nursing staff. Patient is high risk due to medical condition and comorbidities. In addition, requires monitoring due to receiving medications with high toxic profiles. Plan discussed with nursing staff. Plan discussed with patient/family. All questions/concerns were addressed. Pt/family agrees with the plan.          *ADDENDUM:  - pt has been slightly hypotensive since returning from OR after I&D. Currently, BP is at 89/62 but other vitals are stable. Asymptomatic. Will bolus her 500cc and re-evaluate.     Signed By: Goldy Hawkins MD     May 16, 2020

## 2020-05-17 LAB
ALBUMIN SERPL-MCNC: 2.2 G/DL (ref 3.2–4.6)
ALBUMIN/GLOB SERPL: 0.6 {RATIO} (ref 1.2–3.5)
ALP SERPL-CCNC: 128 U/L (ref 50–136)
ALT SERPL-CCNC: 20 U/L (ref 12–65)
ANION GAP SERPL CALC-SCNC: 5 MMOL/L (ref 7–16)
AST SERPL-CCNC: 31 U/L (ref 15–37)
BACTERIA SPEC CULT: ABNORMAL
BASOPHILS # BLD: 0 K/UL (ref 0–0.2)
BASOPHILS NFR BLD: 1 % (ref 0–2)
BILIRUB SERPL-MCNC: 0.5 MG/DL (ref 0.2–1.1)
BUN SERPL-MCNC: 43 MG/DL (ref 8–23)
CALCIUM SERPL-MCNC: 8.5 MG/DL (ref 8.3–10.4)
CHLORIDE SERPL-SCNC: 110 MMOL/L (ref 98–107)
CO2 SERPL-SCNC: 27 MMOL/L (ref 21–32)
CREAT SERPL-MCNC: 2.58 MG/DL (ref 0.6–1)
DIFFERENTIAL METHOD BLD: ABNORMAL
EOSINOPHIL # BLD: 0.1 K/UL (ref 0–0.8)
EOSINOPHIL NFR BLD: 3 % (ref 0.5–7.8)
ERYTHROCYTE [DISTWIDTH] IN BLOOD BY AUTOMATED COUNT: 18.7 % (ref 11.9–14.6)
GLOBULIN SER CALC-MCNC: 3.4 G/DL (ref 2.3–3.5)
GLUCOSE SERPL-MCNC: 86 MG/DL (ref 65–100)
GRAM STN SPEC: ABNORMAL
GRAM STN SPEC: ABNORMAL
HCT VFR BLD AUTO: 24.1 % (ref 35.8–46.3)
HGB BLD-MCNC: 7.3 G/DL (ref 11.7–15.4)
IMM GRANULOCYTES # BLD AUTO: 0.2 K/UL (ref 0–0.5)
IMM GRANULOCYTES NFR BLD AUTO: 4 % (ref 0–5)
LYMPHOCYTES # BLD: 0.5 K/UL (ref 0.5–4.6)
LYMPHOCYTES NFR BLD: 12 % (ref 13–44)
MCH RBC QN AUTO: 31.7 PG (ref 26.1–32.9)
MCHC RBC AUTO-ENTMCNC: 30.3 G/DL (ref 31.4–35)
MCV RBC AUTO: 104.8 FL (ref 79.6–97.8)
MONOCYTES # BLD: 0.4 K/UL (ref 0.1–1.3)
MONOCYTES NFR BLD: 11 % (ref 4–12)
NEUTS SEG # BLD: 2.8 K/UL (ref 1.7–8.2)
NEUTS SEG NFR BLD: 69 % (ref 43–78)
NRBC # BLD: 0.02 K/UL (ref 0–0.2)
PLATELET # BLD AUTO: 181 K/UL (ref 150–450)
PMV BLD AUTO: 10.8 FL (ref 9.4–12.3)
POTASSIUM SERPL-SCNC: 4.7 MMOL/L (ref 3.5–5.1)
PROT SERPL-MCNC: 5.6 G/DL (ref 6.3–8.2)
RBC # BLD AUTO: 2.3 M/UL (ref 4.05–5.2)
SERVICE CMNT-IMP: ABNORMAL
SODIUM SERPL-SCNC: 142 MMOL/L (ref 136–145)
VANCOMYCIN TROUGH SERPL-MCNC: 32.1 UG/ML (ref 5–20)
WBC # BLD AUTO: 4.1 K/UL (ref 4.3–11.1)

## 2020-05-17 PROCEDURE — 74011250637 HC RX REV CODE- 250/637: Performed by: ORTHOPAEDIC SURGERY

## 2020-05-17 PROCEDURE — 77030038269 HC DRN EXT URIN PURWCK BARD -A

## 2020-05-17 PROCEDURE — 80053 COMPREHEN METABOLIC PANEL: CPT

## 2020-05-17 PROCEDURE — 3331090002 HH PPS REVENUE DEBIT

## 2020-05-17 PROCEDURE — 74750000023 HC WOUND THERAPY

## 2020-05-17 PROCEDURE — 80202 ASSAY OF VANCOMYCIN: CPT

## 2020-05-17 PROCEDURE — 74011250636 HC RX REV CODE- 250/636: Performed by: ORTHOPAEDIC SURGERY

## 2020-05-17 PROCEDURE — 36415 COLL VENOUS BLD VENIPUNCTURE: CPT

## 2020-05-17 PROCEDURE — 3331090001 HH PPS REVENUE CREDIT

## 2020-05-17 PROCEDURE — 65270000029 HC RM PRIVATE

## 2020-05-17 PROCEDURE — 74011250637 HC RX REV CODE- 250/637: Performed by: INTERNAL MEDICINE

## 2020-05-17 PROCEDURE — 85025 COMPLETE CBC W/AUTO DIFF WBC: CPT

## 2020-05-17 RX ORDER — BISACODYL 5 MG
5 TABLET, DELAYED RELEASE (ENTERIC COATED) ORAL DAILY
Status: DISPENSED | OUTPATIENT
Start: 2020-05-17 | End: 2020-05-20

## 2020-05-17 RX ADMIN — SODIUM CHLORIDE 75 ML/HR: 900 INJECTION, SOLUTION INTRAVENOUS at 22:27

## 2020-05-17 RX ADMIN — TRIAMTERENE AND HYDROCHLOROTHIAZIDE 1 TABLET: 37.5; 25 TABLET ORAL at 09:00

## 2020-05-17 RX ADMIN — AMLODIPINE BESYLATE 5 MG: 5 TABLET ORAL at 09:00

## 2020-05-17 RX ADMIN — Medication 10 ML: at 22:28

## 2020-05-17 RX ADMIN — SODIUM CHLORIDE 75 ML/HR: 900 INJECTION, SOLUTION INTRAVENOUS at 08:59

## 2020-05-17 RX ADMIN — BISACODYL 5 MG: 5 TABLET, COATED ORAL at 11:52

## 2020-05-17 RX ADMIN — Medication 400 MG: at 09:00

## 2020-05-17 RX ADMIN — POLYETHYLENE GLYCOL 3350 17 G: 17 POWDER, FOR SOLUTION ORAL at 09:00

## 2020-05-17 RX ADMIN — ACETAMINOPHEN 650 MG: 325 TABLET, FILM COATED ORAL at 22:18

## 2020-05-17 RX ADMIN — Medication 400 MG: at 17:12

## 2020-05-17 RX ADMIN — Medication 5 ML: at 15:58

## 2020-05-17 RX ADMIN — Medication 10 ML: at 05:55

## 2020-05-17 NOTE — PROGRESS NOTES
Hospitalist Note     Admit Date:  2020  4:49 PM   Name:  Omar Cueva   Age:  67 y.o.  :  1945   MRN:  [id number]   PCP:  Phil Ramirez NP  Treatment Team: Attending Provider: Gonzalo Louie MD; Consulting Provider: Jenny Caruso MD; Utilization Review: Ana Luisa Carlson RN; Care Manager: Casandra Peterson RN; Utilization Review: Sherri Morgan RN; Consulting Provider: Kameron Sahu MD; Charge Nurse: Monster Gannon; Physical Therapy Assistant: Malik Good PTA    HPI/Subjective:   67year old female with HTN, HLD, Obesity, HTN, CKD stage IV (baseline Cr 2.4-3.0), hx of metastatic breast Ca(on letrozole and palbociclib), recent pathological fracture of left femur s/p intramedullary nail insertion () who was admitted due to worsening pain and serous drainage from her wound. She was started on IV antibiotics. She underwent I&D of left hip wound by Orthopedics on  and wound vac was placed. Initial BCx and Wound Cx showed MSSA and strep. :  Patient is seen and examined at bedside. S/p I&D of left hip wound in OR. Afebrile since arrival.   Tolerating diet but no BM yet despite starting on miralax yesterday.     Objective:     Patient Vitals for the past 24 hrs:   Temp Pulse Resp BP SpO2   20 0814 98 °F (36.7 °C) 70 20 121/58 92 %   20 0442 97.9 °F (36.6 °C) 72 18 109/50 95 %   20 2359 98.4 °F (36.9 °C) 75 18 124/65 94 %   20 2002 98.2 °F (36.8 °C) 75 20 126/77 99 %   20 1748  86  131/54    20 1523  75  (!) 105/36    20 1506 98 °F (36.7 °C) 74 20 95/46 95 %   20 1110 98.4 °F (36.9 °C) 77 18 106/53 95 %     Oxygen Therapy  O2 Sat (%): 92 % (20 0814)  Pulse via Oximetry: 83 beats per minute (05/15/20 1012)  O2 Device: Nasal cannula (05/15/20 0958)  O2 Flow Rate (L/min): 3 l/min (05/15/20 0958)    Estimated body mass index is 37.9 kg/m² as calculated from the following:    Height as of this encounter: 5' 7\" (1.702 m). Weight as of this encounter: 109.8 kg (242 lb). Intake/Output Summary (Last 24 hours) at 5/17/2020 0905  Last data filed at 5/17/2020 0447  Gross per 24 hour   Intake 340 ml   Output 1710 ml   Net -1370 ml       *Note that automatically entered I/Os may not be accurate; dependent on patient compliance with collection and accurate  by techs. Physical Exam:   General:     alert, awake, no acute distress. Well nourished. Obese. Head:   normocephalic, atraumatic  Eyes, Ears, nose: PERRL, EOMI. Normal conjunctiva  Neck:    supple, non-tender. Trachea midline. Lungs:   CTAB, no wheezing, rhonchi, rales  Cardiac:   RRR, Normal S1 and S2. No S3, S4 or murmurs. Abdomen:   Soft, non distended, nontender, +BS, no guarding/rebound  Extremities:   Warm, dry. No edema. Left hip wound vac with bloody drainage  Skin:   No rashes, no jaundice  Neuro:  AAOx3. No gross focal neurological deficit  Psychiatric:  No anxiety, calm, cooperative    Data Review:  I have reviewed all labs, meds, and studies from the last 24 hours:    Recent Results (from the past 24 hour(s))   CBC WITH AUTOMATED DIFF    Collection Time: 05/17/20  5:55 AM   Result Value Ref Range    WBC 4.1 (L) 4.3 - 11.1 K/uL    RBC 2.30 (L) 4.05 - 5.2 M/uL    HGB 7.3 (L) 11.7 - 15.4 g/dL    HCT 24.1 (L) 35.8 - 46.3 %    .8 (H) 79.6 - 97.8 FL    MCH 31.7 26.1 - 32.9 PG    MCHC 30.3 (L) 31.4 - 35.0 g/dL    RDW 18.7 (H) 11.9 - 14.6 %    PLATELET 012 884 - 012 K/uL    MPV 10.8 9.4 - 12.3 FL    ABSOLUTE NRBC 0.02 0.0 - 0.2 K/uL    DF AUTOMATED      NEUTROPHILS 69 43 - 78 %    LYMPHOCYTES 12 (L) 13 - 44 %    MONOCYTES 11 4.0 - 12.0 %    EOSINOPHILS 3 0.5 - 7.8 %    BASOPHILS 1 0.0 - 2.0 %    IMMATURE GRANULOCYTES 4 0.0 - 5.0 %    ABS. NEUTROPHILS 2.8 1.7 - 8.2 K/UL    ABS. LYMPHOCYTES 0.5 0.5 - 4.6 K/UL    ABS. MONOCYTES 0.4 0.1 - 1.3 K/UL    ABS. EOSINOPHILS 0.1 0.0 - 0.8 K/UL    ABS. BASOPHILS 0.0 0.0 - 0.2 K/UL    ABS. IMM. GRANS. 0.2 0.0 - 0.5 K/UL   METABOLIC PANEL, COMPREHENSIVE    Collection Time: 05/17/20  5:55 AM   Result Value Ref Range    Sodium 142 136 - 145 mmol/L    Potassium 4.7 3.5 - 5.1 mmol/L    Chloride 110 (H) 98 - 107 mmol/L    CO2 27 21 - 32 mmol/L    Anion gap 5 (L) 7 - 16 mmol/L    Glucose 86 65 - 100 mg/dL    BUN 43 (H) 8 - 23 MG/DL    Creatinine 2.58 (H) 0.6 - 1.0 MG/DL    GFR est AA 23 (L) >60 ml/min/1.73m2    GFR est non-AA 19 (L) >60 ml/min/1.73m2    Calcium 8.5 8.3 - 10.4 MG/DL    Bilirubin, total 0.5 0.2 - 1.1 MG/DL    ALT (SGPT) 20 12 - 65 U/L    AST (SGOT) 31 15 - 37 U/L    Alk.  phosphatase 128 50 - 136 U/L    Protein, total 5.6 (L) 6.3 - 8.2 g/dL    Albumin 2.2 (L) 3.2 - 4.6 g/dL    Globulin 3.4 2.3 - 3.5 g/dL    A-G Ratio 0.6 (L) 1.2 - 3.5          All Micro Results     Procedure Component Value Units Date/Time    CULTURE, TISSUE Diamond Alosa STAIN [[id number]]  (Abnormal) Collected:  05/15/20 0903    Order Status:  Completed Specimen:  Hip Updated:  05/17/20 0859     Special Requests: NO SPECIAL REQUESTS        GRAM STAIN 0 TO 1 WBCS/OIF      FEW GRAM POSITIVE COCCI        Culture result:       HEAVY STAPHYLOCOCCUS AUREUS SENSITIVITY TO FOLLOW          CULTURE, BODY FLUID [address] [[id number]]  (Abnormal) Collected:  05/15/20 0904    Order Status:  Completed Specimen:  Hip Updated:  05/17/20 0849     Special Requests: LEFT        GRAM STAIN 50  WBCS/OIF            MODERATE GRAM POSITIVE COCCI           Culture result:       HEAVY STAPHYLOCOCCUS AUREUS SENSITIVITY TO FOLLOW          CULTURE, WOUND Diamond Alosa STAIN [[id number]]  (Abnormal)  (Susceptibility) Collected:  05/13/20 1723    Order Status:  Completed Specimen:  Wound Drainage Updated:  05/17/20 0727     Special Requests: SWAB L HIP WOUND     GRAM STAIN 0 TO 1 WBCS SEEN PER OIF            MODERATE GRAM POSITIVE COCCI           Culture result:       HEAVY STAPHYLOCOCCUS AUREUS                  HEAVY ENTEROCOCCUS FAECALIS GROUP D            LIGHT  NORMAL SKIN HOMA ISOLATED       CULTURE, BLOOD [[id number]] Collected:  05/14/20 1926    Order Status:  Completed Specimen:  Blood Updated:  05/17/20 0650     Special Requests: --        LEFT  ARM       Culture result: NO GROWTH 3 DAYS       CULTURE, BLOOD [[id number]] Collected:  05/14/20 1926    Order Status:  Completed Specimen:  Blood Updated:  05/17/20 0650     Special Requests: --        NO SPECIAL REQUESTS  LEFT  HAND       Culture result: NO GROWTH 3 DAYS       CULTURE, BLOOD [[id number]] Collected:  05/13/20 1722    Order Status:  Completed Specimen:  Blood Updated:  05/17/20 0650     Special Requests: --        LEFT  Antecubital       Culture result: NO GROWTH 4 DAYS       CULTURE, TISSUE [place] [address] STAIN [[id number]]  (Abnormal) Collected:  05/15/20 0910    Order Status:  Completed Specimen:  Hip Updated:  05/16/20 1044     Special Requests: NO SPECIAL REQUESTS        GRAM STAIN 0 TO 1 WBCS/OIF      FEW GRAM POSITIVE COCCI        Culture result:       MODERATE STAPHYLOCOCCUS AUREUS SUBCULTURE IN PROGRESS          CULTURE, BLOOD [[id number]]  (Abnormal) Collected:  05/13/20 2040    Order Status:  Completed Specimen:  Blood Updated:  05/16/20 0642     Special Requests: --        NO SPECIAL REQUESTS  LEFT  HAND       GRAM STAIN AEROBIC BOTTLE POSITIVE         GRAM POS COCCI IN CHAINS               RESULTS VERIFIED, PHONED TO AND READ BACK BY MERLENE QUEZADA RN @1126 5.14.2020 EOR           Culture result:       ALPHA STREPTOCOCCUS, NOT S. PNEUMONIAE            REFER TO BIOFIRE ACC [id number]            THIS ORGANISM MAY BE INDICATIVE OF CULTURE CONTAMINATION, HOWEVER, CLINICAL CORRELATION NEEDS TO BE EVALUATED, AS EACH CASE IS UNIQUE.           Yulia Range [[id number]] Collected:  05/15/20 0904    Order Status:  Completed Specimen:  Hip Updated:  05/15/20 1110    CULTURE, ANAEROBIC [[id number]] Collected:  05/15/20 0903    Order Status:  Completed Specimen:  Hip Updated:  05/15/20 1110    CULTURE, ANAEROBIC [[id number]] Collected:  05/15/20 0910    Order Status:  Completed Specimen:  Hip Updated:  05/15/20 1109     Special Requests: NO SPECIAL REQUESTS        Culture result: PENDING    CULTURE, Samantha Ayoub STAIN [[id number]] Collected:  05/15/20 0900    Order Status:  Canceled Specimen:  Wound from Hip     BLOOD CULTURE ID PANEL [[id number]]  (Abnormal) Collected:  05/13/20 2040    Order Status:  Completed Specimen:  Blood Updated:  05/15/20 0756     Acc. no. from Micro Order [id number]     Streptococcus Detected        Comment: RESULTS VERIFIED, PHONED TO AND READ BACK BY  Tom Quispe RN @0602 5/15/20 HF          INTERPRETATION       Gram positive cocci. Identified by realtime PCR as Streptococcus species (not agalactiae, pyogenes, or pneumoniae).            Comment: Consider discontinuation of IV vancomycin and using an anti-streptococcal beta-lactam (ceftriaxone/cefotaxime (peds))             Current Meds:  Current Facility-Administered Medications   Medication Dose Route Frequency    Vancomycin Trough Level Reminder   Other ONCE    bisacodyL (DULCOLAX) tablet 5 mg  5 mg Oral DAILY    polyethylene glycol (MIRALAX) packet 17 g  17 g Oral DAILY    vancomycin (VANCOCIN) 1500 mg in  ml infusion  1,500 mg IntraVENous Q24H    magnesium oxide (MAG-OX) tablet 400 mg  400 mg Oral BID    amLODIPine (NORVASC) tablet 5 mg  5 mg Oral DAILY    calcium carbonate (OS-JORGE) tablet 500 mg [elemental]  500 mg Oral BID WITH MEALS    docusate sodium (COLACE) capsule 100 mg  100 mg Oral BID PRN    oxyCODONE IR (ROXICODONE) tablet 5 mg  5 mg Oral Q4H PRN    [START ON 5/21/2020] palbociclib cap 75 mg (Patient Supplied)  75 mg Oral DAILY    triamterene-hydroCHLOROthiazide (MAXZIDE) 37.5-25 mg per tablet 1 Tab  1 Tab Oral DAILY    sodium chloride (NS) flush 5-40 mL  5-40 mL IntraVENous Q8H    sodium chloride (NS) flush 5-40 mL  5-40 mL IntraVENous PRN    acetaminophen (TYLENOL) tablet 650 mg  650 mg Oral Q4H PRN    0.9% sodium chloride infusion  75 mL/hr IntraVENous CONTINUOUS    morphine 10 mg/ml injection 5 mg  5 mg IntraVENous Q4H PRN       Other Studies:    Xr Chest Sngl V    Result Date: 5/13/2020  AP chest radiograph History: fever, 75 years Female Comparison: Chest radiograph April 26, 2020 Findings:   Normal cardiomediastinal silhouette. Low lung volumes unchanged. Nonspecific mild diffuse interstitial prominence similar to prior. Persistent mild patchy left basilar atelectasis and or consolidation. No evidence of pneumothorax. Visualized soft tissue and osseous structures otherwise unremarkable. Impression:  No significant interval change. Xr Hip Lt W Or Wo Pelv 2-3 Vws    Result Date: 5/13/2020  Exam:  AP pelvis and left hip, left femur radiographs History:  pain, hip pain, 75 years Female Comparison: Left hip radiographs April 27, 2020 Findings: Again visualized is intramedullary jeri fixation of the left femur and relative anatomic alignment with multiple ghost tracts of previous surgical screws. The visualized distal left femur appears intact. Again visualized is intramedullary jeri fixation of the right femur partially visualized. Mild degenerative change of the bilateral hip joint similar to prior. No evidence of new acute fracture or dislocation. Normal alignment. Scattered sclerotic foci in the visualized axial skeleton which may represent osseous metastases appear similar to prior. Visualized soft tissues otherwise unremarkable. Impression:  No evidence of new acute injury. Other chronic and postoperative findings as above. Xr Femur Lt 2 V    Result Date: 5/13/2020  Exam:  AP pelvis and left hip, left femur radiographs History:  pain, hip pain, 75 years Female Comparison: Left hip radiographs April 27, 2020 Findings: Again visualized is intramedullary jeri fixation of the left femur and relative anatomic alignment with multiple ghost tracts of previous surgical screws.   The visualized distal left femur appears intact. Again visualized is intramedullary jeri fixation of the right femur partially visualized. Mild degenerative change of the bilateral hip joint similar to prior. No evidence of new acute fracture or dislocation. Normal alignment. Scattered sclerotic foci in the visualized axial skeleton which may represent osseous metastases appear similar to prior. Visualized soft tissues otherwise unremarkable. Impression:  No evidence of new acute injury. Other chronic and postoperative findings as above. Assessment and Plan: Active Hospital Problems    Diagnosis Date Noted    Left hip postoperative wound infection 05/13/2020    Stage 4 chronic kidney disease ([facility] [address] 05/01/2020    Severe obesity ([facility] [address] 09/24/2019    Hypertension 02/16/2016     UNSPECIFIED          Plan:  Left Hip post-op wound infection s/p I&D (5/15)  - wound Cx (5/13) and BCx (5/13) show MSSA and alpha strep  - intra-op wound/tissue cultures (5/15) and BCx (5/15): showing staph aureus  - ID consult for recommendation for ABx: c/w Vancomycin   - wound vac per Ortho  - wound care  - ASA on hold    Constipation: Bowel Regimen    CKD Stage 4: stable    HTN // HLD: c/w home meds; ASA on hold    Severe Obesity: Body mass index is 37.9 kg/m². Diet:  DIET REGULAR  DVT PPx: SCDs  Code: Full Code    DISPO: pending ID recs. Patient DOES NOT want to go to Rehab. Medical Decision Making:    Labs/Imaging reviewed. Additional information obtained from nursing staff. Patient is moderate risk due to medical condition and comorbidities. In addition, requires monitoring due to receiving medications with high toxic profiles. Plan discussed with nursing staff. Plan discussed with patient/family. All questions/concerns were addressed. Pt/family agrees with the plan.          Signed By: Nia Villalba MD     May 17, 2020

## 2020-05-17 NOTE — PROGRESS NOTES
Pharmacokinetic Consult to Pharmacist    Erasmo Barnett is a 67 y.o. female being treated for surgical site infection with vancomycin. Planning I & D of hip by ortho soon. Height: 5' 7\" (170.2 cm)  Weight: 109.8 kg (242 lb)  Lab Results   Component Value Date/Time    BUN 43 (H) 05/17/2020 05:55 AM    Creatinine 2.58 (H) 05/17/2020 05:55 AM    WBC 4.1 (L) 05/17/2020 05:55 AM    Procalcitonin 0.09 05/13/2020 05:22 PM    Lactic acid 1.7 05/13/2020 05:22 PM      Estimated Creatinine Clearance: 23.7 mL/min (A) (based on SCr of 2.58 mg/dL (H)). Lab Results   Component Value Date/Time    Vancomycin,trough 32.1 (HH) 05/17/2020 01:27 PM       Day 5 of vancomycin. Goal trough is 15-20. Vancomycin dose initiated at 2500 mg X 1, then 1500 mg Q24H. Trough obtained today is supratherapeutic at 32.1, drawn appropriately. Will change dosing to intermittent. Will continue to follow patient and order levels when clinically indicated.     Thank you,  Sang Paz, PharmD, [id number]  Clinical Pharmacy Specialist  [phone]

## 2020-05-17 NOTE — PROGRESS NOTES
May 17, 2020         Post Op day: 2 Days Post-OpProcedure(s) (LRB):  LEFT HIP WOUND DEBRIDEMENT WITH HARDWARE REMOVAL AND APPLICATION OF WOUND VAC (Left)      Admit Date: 5/13/2020  Admit Diagnosis: Left hip postoperative wound infection [T81.49XA]    LAB:    Recent Results (from the past 24 hour(s))   CBC WITH AUTOMATED DIFF    Collection Time: 05/17/20  5:55 AM   Result Value Ref Range    WBC 4.1 (L) 4.3 - 11.1 K/uL    RBC 2.30 (L) 4.05 - 5.2 M/uL    HGB 7.3 (L) 11.7 - 15.4 g/dL    HCT 24.1 (L) 35.8 - 46.3 %    .8 (H) 79.6 - 97.8 FL    MCH 31.7 26.1 - 32.9 PG    MCHC 30.3 (L) 31.4 - 35.0 g/dL    RDW 18.7 (H) 11.9 - 14.6 %    PLATELET 976 124 - 596 K/uL    MPV 10.8 9.4 - 12.3 FL    ABSOLUTE NRBC 0.02 0.0 - 0.2 K/uL    DF AUTOMATED      NEUTROPHILS 69 43 - 78 %    LYMPHOCYTES 12 (L) 13 - 44 %    MONOCYTES 11 4.0 - 12.0 %    EOSINOPHILS 3 0.5 - 7.8 %    BASOPHILS 1 0.0 - 2.0 %    IMMATURE GRANULOCYTES 4 0.0 - 5.0 %    ABS. NEUTROPHILS 2.8 1.7 - 8.2 K/UL    ABS. LYMPHOCYTES 0.5 0.5 - 4.6 K/UL    ABS. MONOCYTES 0.4 0.1 - 1.3 K/UL    ABS. EOSINOPHILS 0.1 0.0 - 0.8 K/UL    ABS. BASOPHILS 0.0 0.0 - 0.2 K/UL    ABS. IMM. GRANS. 0.2 0.0 - 0.5 K/UL   METABOLIC PANEL, COMPREHENSIVE    Collection Time: 05/17/20  5:55 AM   Result Value Ref Range    Sodium 142 136 - 145 mmol/L    Potassium 4.7 3.5 - 5.1 mmol/L    Chloride 110 (H) 98 - 107 mmol/L    CO2 27 21 - 32 mmol/L    Anion gap 5 (L) 7 - 16 mmol/L    Glucose 86 65 - 100 mg/dL    BUN 43 (H) 8 - 23 MG/DL    Creatinine 2.58 (H) 0.6 - 1.0 MG/DL    GFR est AA 23 (L) >60 ml/min/1.73m2    GFR est non-AA 19 (L) >60 ml/min/1.73m2    Calcium 8.5 8.3 - 10.4 MG/DL    Bilirubin, total 0.5 0.2 - 1.1 MG/DL    ALT (SGPT) 20 12 - 65 U/L    AST (SGOT) 31 15 - 37 U/L    Alk.  phosphatase 128 50 - 136 U/L    Protein, total 5.6 (L) 6.3 - 8.2 g/dL    Albumin 2.2 (L) 3.2 - 4.6 g/dL    Globulin 3.4 2.3 - 3.5 g/dL    A-G Ratio 0.6 (L) 1.2 - 3.5       Vital Signs:    Patient Vitals for the past 8 hrs:   BP Temp Pulse Resp SpO2   20 0814 121/58 98 °F (36.7 °C) 70 20 92 %   20 0442 109/50 97.9 °F (36.6 °C) 72 18 95 %     Temp (24hrs), Av.2 °F (36.8 °C), Min:97.9 °F (36.6 °C), Max:98.4 °F (36.9 °C)    Body mass index is 37.9 kg/m². Pain Control:   Pain Assessment  Pain Scale 1: Numeric (0 - 10)  Pain Intensity 1: 0  Pain Onset 1: chronic  Pain Location 1: Back  Pain Orientation 1: Lower  Pain Description 1: Aching  Pain Intervention(s) 1: Medication (see MAR)    Subjective: Doing well, No complaints, No SOB, No Chest Pain, No nausea or vomiting     Objective: Vital Signs are Stable, No Acute Distress, Alert and Oriented, Wound vac in place,  Neurovascular exam is normal.       PT/OT:         Activity Response: Fairly tolerated  Assistive Device: Fall prevention device                Wieght Bearing Status: WBAT    Meds:  [unfilled]  [unfilled]  [unfilled]    Assessment:   Patient Active Problem List   Diagnosis Code    Hypertension I10    Infiltrating ductal carcinoma of female breast ([facility] [address] C50.919    Malignant neoplasm metastatic to bone (HCC) C79.51    Anemia due to chronic kidney disease treated with erythropoietin N18.9, D63.1    Severe obesity (HCC) E66.01    Stage 4 chronic kidney disease (HCC) N18.4    Left hip postoperative wound infection T81.49XA             Plan: Continue Physical Therapy, Monitor labs, Patient wants discharge home.         Signed By: Wilder Puentes MD

## 2020-05-17 NOTE — PROGRESS NOTES
Problem: Pressure Injury - Risk of  Goal: *Prevention of pressure injury  Description: Document Arturo Scale and appropriate interventions in the flowsheet. Outcome: Progressing Towards Goal  Note: Pressure Injury Interventions:  Sensory Interventions: Assess changes in LOC         Activity Interventions: Increase time out of bed    Mobility Interventions: Pressure redistribution bed/mattress (bed type)    Nutrition Interventions: Offer support with meals,snacks and hydration    Friction and Shear Interventions: Apply protective barrier, creams and emollients                Problem: Patient Education: Go to Patient Education Activity  Goal: Patient/Family Education  Outcome: Progressing Towards Goal     Problem: Falls - Risk of  Goal: *Absence of Falls  Description: Document Maurizio Fall Risk and appropriate interventions in the flowsheet.   Outcome: Progressing Towards Goal  Note: Fall Risk Interventions:  Mobility Interventions: Bed/chair exit alarm    Mentation Interventions: Adequate sleep, hydration, pain control    Medication Interventions: Bed/chair exit alarm    Elimination Interventions: Bed/chair exit alarm, Call light in reach    History of Falls Interventions: Bed/chair exit alarm         Problem: Patient Education: Go to Patient Education Activity  Goal: Patient/Family Education  Outcome: Progressing Towards Goal     Problem: Infection - Risk of, Surgical Site Infection  Goal: *Absence of surgical site infection signs and symptoms  Outcome: Progressing Towards Goal     Problem: Patient Education: Go to Patient Education Activity  Goal: Patient/Family Education  Outcome: Progressing Towards Goal     Problem: Patient Education: Go to Patient Education Activity  Goal: Patient/Family Education  Outcome: Progressing Towards Goal     Problem: Patient Education: Go to Patient Education Activity  Goal: Patient/Family Education  Outcome: Progressing Towards Goal     Problem: Patient Education: Go to Patient Education Activity  Goal: Patient/Family Education  Outcome: Progressing Towards Goal     Problem: Hip Fracture: Post-Op Day 2  Goal: Off Pathway (Use only if patient is Off Pathway)  Outcome: Progressing Towards Goal  Goal: Activity/Safety  Outcome: Progressing Towards Goal  Goal: Diagnostic Test/Procedures  Outcome: Progressing Towards Goal  Goal: Nutrition/Diet  Outcome: Progressing Towards Goal  Goal: Medications  Outcome: Progressing Towards Goal  Goal: Respiratory  Outcome: Progressing Towards Goal  Goal: Treatments/Interventions/Procedures  Outcome: Progressing Towards Goal  Goal: Psychosocial  Outcome: Progressing Towards Goal  Goal: Discharge Planning  Outcome: Progressing Towards Goal  Goal: *Optimal pain control at patient's stated goal  Outcome: Progressing Towards Goal  Goal: *Hemodynamically stable  Outcome: Progressing Towards Goal  Goal: *Adequate oxygenation  Outcome: Progressing Towards Goal  Goal: *Tolerates increased activity  Outcome: Progressing Towards Goal  Goal: *Tolerates nutrition therapy  Outcome: Progressing Towards Goal  Goal: *Absence of skin breakdown  Outcome: Progressing Towards Goal     Problem: Hip Fracture: Post-Op Day 3  Goal: Off Pathway (Use only if patient is Off Pathway)  Outcome: Progressing Towards Goal  Goal: Activity/Safety  Outcome: Progressing Towards Goal  Goal: Diagnostic Test/Procedures  Outcome: Progressing Towards Goal  Goal: Nutrition/Diet  Outcome: Progressing Towards Goal  Goal: Medications  Outcome: Progressing Towards Goal  Goal: Respiratory  Outcome: Progressing Towards Goal  Goal: Treatments/Interventions/Procedures  Outcome: Progressing Towards Goal  Goal: Psychosocial  Outcome: Progressing Towards Goal  Goal: Discharge Planning  Outcome: Progressing Towards Goal  Goal: *Met physical therapy criteria for discharge to next level of care  Outcome: Progressing Towards Goal  Goal: *Optimal pain control at patient's stated goal  Outcome: Progressing Towards Goal  Goal: *Hemodynamically stable  Outcome: Progressing Towards Goal  Goal: *Tolerating diet  Outcome: Progressing Towards Goal  Goal: *Active bowel function  Outcome: Progressing Towards Goal  Goal: *Adequate urinary output  Outcome: Progressing Towards Goal  Goal: *Absence of skin breakdown  Outcome: Progressing Towards Goal  Goal: *Patient verbalizes understanding of discharge instructions  Outcome: Progressing Towards Goal     Problem: Hip Fracture: Post-Op Day 4  Goal: Off Pathway (Use only if patient is Off Pathway)  Outcome: Progressing Towards Goal  Goal: Activity/Safety  Outcome: Progressing Towards Goal  Goal: Diagnostic Test/Procedures  Outcome: Progressing Towards Goal  Goal: Nutrition/Diet  Outcome: Progressing Towards Goal  Goal: Medications  Outcome: Progressing Towards Goal  Goal: Respiratory  Outcome: Progressing Towards Goal  Goal: Treatments/Interventions/Procedures  Outcome: Progressing Towards Goal  Goal: Psychosocial  Outcome: Progressing Towards Goal  Goal: Discharge Planning  Outcome: Progressing Towards Goal  Goal: *Met physical therapy criteria for discharge to next level of care  Outcome: Progressing Towards Goal  Goal: *Optimal pain control at patient's stated goal  Outcome: Progressing Towards Goal  Goal: *Hemodynamically stable  Outcome: Progressing Towards Goal  Goal: *Tolerating diet  Outcome: Progressing Towards Goal  Goal: *Active bowel function  Outcome: Progressing Towards Goal  Goal: *Adequate urinary output  Outcome: Progressing Towards Goal  Goal: *Absence of skin breakdown  Outcome: Progressing Towards Goal  Goal: *Patient verbalizes understanding of discharge instructions  Outcome: Progressing Towards Goal

## 2020-05-18 LAB
ALBUMIN SERPL-MCNC: 1.9 G/DL (ref 3.2–4.6)
ALBUMIN/GLOB SERPL: 0.6 {RATIO} (ref 1.2–3.5)
ALP SERPL-CCNC: 181 U/L (ref 50–136)
ALT SERPL-CCNC: 24 U/L (ref 12–65)
ANION GAP SERPL CALC-SCNC: 9 MMOL/L (ref 7–16)
AST SERPL-CCNC: 47 U/L (ref 15–37)
BACTERIA SPEC CULT: ABNORMAL
BACTERIA SPEC CULT: NORMAL
BASOPHILS # BLD: 0 K/UL (ref 0–0.2)
BASOPHILS NFR BLD: 1 % (ref 0–2)
BILIRUB DIRECT SERPL-MCNC: 0.3 MG/DL
BILIRUB SERPL-MCNC: 0.6 MG/DL (ref 0.2–1.1)
BUN SERPL-MCNC: 38 MG/DL (ref 8–23)
CALCIUM SERPL-MCNC: 8.5 MG/DL (ref 8.3–10.4)
CHLORIDE SERPL-SCNC: 109 MMOL/L (ref 98–107)
CO2 SERPL-SCNC: 23 MMOL/L (ref 21–32)
CREAT SERPL-MCNC: 2.17 MG/DL (ref 0.6–1)
DIFFERENTIAL METHOD BLD: ABNORMAL
EOSINOPHIL # BLD: 0.1 K/UL (ref 0–0.8)
EOSINOPHIL NFR BLD: 2 % (ref 0.5–7.8)
ERYTHROCYTE [DISTWIDTH] IN BLOOD BY AUTOMATED COUNT: 18.2 % (ref 11.9–14.6)
GLOBULIN SER CALC-MCNC: 3 G/DL (ref 2.3–3.5)
GLUCOSE SERPL-MCNC: 81 MG/DL (ref 65–100)
GRAM STN SPEC: ABNORMAL
HCT VFR BLD AUTO: 25.4 % (ref 35.8–46.3)
HGB BLD-MCNC: 7.6 G/DL (ref 11.7–15.4)
IMM GRANULOCYTES # BLD AUTO: 0.2 K/UL (ref 0–0.5)
IMM GRANULOCYTES NFR BLD AUTO: 7 % (ref 0–5)
LYMPHOCYTES # BLD: 0.5 K/UL (ref 0.5–4.6)
LYMPHOCYTES NFR BLD: 15 % (ref 13–44)
MCH RBC QN AUTO: 31.5 PG (ref 26.1–32.9)
MCHC RBC AUTO-ENTMCNC: 29.9 G/DL (ref 31.4–35)
MCV RBC AUTO: 105.4 FL (ref 79.6–97.8)
MONOCYTES # BLD: 0.5 K/UL (ref 0.1–1.3)
MONOCYTES NFR BLD: 14 % (ref 4–12)
NEUTS SEG # BLD: 2.2 K/UL (ref 1.7–8.2)
NEUTS SEG NFR BLD: 61 % (ref 43–78)
NRBC # BLD: 0.02 K/UL (ref 0–0.2)
PLATELET # BLD AUTO: 165 K/UL (ref 150–450)
PLATELET COMMENTS,PCOM: ADEQUATE
PMV BLD AUTO: 10.9 FL (ref 9.4–12.3)
POTASSIUM SERPL-SCNC: 4.7 MMOL/L (ref 3.5–5.1)
PROT SERPL-MCNC: 4.9 G/DL (ref 6.3–8.2)
RBC # BLD AUTO: 2.41 M/UL (ref 4.05–5.2)
RBC MORPH BLD: ABNORMAL
SERVICE CMNT-IMP: ABNORMAL
SERVICE CMNT-IMP: NORMAL
SODIUM SERPL-SCNC: 141 MMOL/L (ref 136–145)
VANCOMYCIN SERPL-MCNC: 25.8 UG/ML
WBC # BLD AUTO: 3.5 K/UL (ref 4.3–11.1)

## 2020-05-18 PROCEDURE — 77030019934 HC DRSG VAC ASST KCON -B

## 2020-05-18 PROCEDURE — 97606 NEG PRS WND THER DME>50 SQCM: CPT

## 2020-05-18 PROCEDURE — 3331090002 HH PPS REVENUE DEBIT

## 2020-05-18 PROCEDURE — 74011250636 HC RX REV CODE- 250/636: Performed by: ORTHOPAEDIC SURGERY

## 2020-05-18 PROCEDURE — 77030019952 HC CANSTR VAC ASST KCON -B

## 2020-05-18 PROCEDURE — 74011250636 HC RX REV CODE- 250/636: Performed by: INTERNAL MEDICINE

## 2020-05-18 PROCEDURE — 80053 COMPREHEN METABOLIC PANEL: CPT

## 2020-05-18 PROCEDURE — 74011250637 HC RX REV CODE- 250/637: Performed by: ORTHOPAEDIC SURGERY

## 2020-05-18 PROCEDURE — 74750000023 HC WOUND THERAPY

## 2020-05-18 PROCEDURE — 80202 ASSAY OF VANCOMYCIN: CPT

## 2020-05-18 PROCEDURE — 74011000258 HC RX REV CODE- 258: Performed by: INTERNAL MEDICINE

## 2020-05-18 PROCEDURE — 85025 COMPLETE CBC W/AUTO DIFF WBC: CPT

## 2020-05-18 PROCEDURE — 65270000029 HC RM PRIVATE

## 2020-05-18 PROCEDURE — 3331090001 HH PPS REVENUE CREDIT

## 2020-05-18 PROCEDURE — 82248 BILIRUBIN DIRECT: CPT

## 2020-05-18 PROCEDURE — 74011250637 HC RX REV CODE- 250/637: Performed by: INTERNAL MEDICINE

## 2020-05-18 PROCEDURE — 97530 THERAPEUTIC ACTIVITIES: CPT

## 2020-05-18 PROCEDURE — 36415 COLL VENOUS BLD VENIPUNCTURE: CPT

## 2020-05-18 RX ORDER — FACIAL-BODY WIPES
10 EACH TOPICAL ONCE
Status: COMPLETED | OUTPATIENT
Start: 2020-05-18 | End: 2020-05-18

## 2020-05-18 RX ORDER — ASPIRIN 325 MG
325 TABLET ORAL DAILY
Status: DISCONTINUED | OUTPATIENT
Start: 2020-05-18 | End: 2020-05-21 | Stop reason: HOSPADM

## 2020-05-18 RX ADMIN — Medication 10 ML: at 14:18

## 2020-05-18 RX ADMIN — Medication 400 MG: at 16:36

## 2020-05-18 RX ADMIN — MORPHINE SULFATE 5 MG: 10 INJECTION INTRAVENOUS at 12:36

## 2020-05-18 RX ADMIN — Medication 10 ML: at 06:02

## 2020-05-18 RX ADMIN — SODIUM CHLORIDE 1000 MG: 900 INJECTION, SOLUTION INTRAVENOUS at 16:36

## 2020-05-18 RX ADMIN — OXYCODONE 5 MG: 5 TABLET ORAL at 11:20

## 2020-05-18 RX ADMIN — Medication 10 ML: at 22:06

## 2020-05-18 RX ADMIN — BISACODYL 5 MG: 5 TABLET, COATED ORAL at 08:21

## 2020-05-18 RX ADMIN — POLYETHYLENE GLYCOL 3350 17 G: 17 POWDER, FOR SOLUTION ORAL at 08:21

## 2020-05-18 RX ADMIN — ASPIRIN 325 MG ORAL TABLET 325 MG: 325 PILL ORAL at 10:02

## 2020-05-18 RX ADMIN — AMLODIPINE BESYLATE 5 MG: 5 TABLET ORAL at 08:21

## 2020-05-18 RX ADMIN — OXYCODONE 5 MG: 5 TABLET ORAL at 22:05

## 2020-05-18 RX ADMIN — ACETAMINOPHEN 650 MG: 325 TABLET, FILM COATED ORAL at 12:37

## 2020-05-18 RX ADMIN — TRIAMTERENE AND HYDROCHLOROTHIAZIDE 1 TABLET: 37.5; 25 TABLET ORAL at 08:21

## 2020-05-18 RX ADMIN — BISACODYL 10 MG: 10 SUPPOSITORY RECTAL at 16:38

## 2020-05-18 RX ADMIN — Medication 400 MG: at 08:21

## 2020-05-18 NOTE — PROGRESS NOTES
Problem: Pressure Injury - Risk of  Goal: *Prevention of pressure injury  Description: Document Arturo Scale and appropriate interventions in the flowsheet. Outcome: Progressing Towards Goal  Note: Pressure Injury Interventions:  Sensory Interventions: Assess changes in LOC         Activity Interventions: Increase time out of bed, Pressure redistribution bed/mattress(bed type)    Mobility Interventions: Pressure redistribution bed/mattress (bed type), HOB 30 degrees or less    Nutrition Interventions: Offer support with meals,snacks and hydration    Friction and Shear Interventions: Apply protective barrier, creams and emollients                Problem: Patient Education: Go to Patient Education Activity  Goal: Patient/Family Education  Outcome: Progressing Towards Goal     Problem: Falls - Risk of  Goal: *Absence of Falls  Description: Document Maurizio Fall Risk and appropriate interventions in the flowsheet.   Outcome: Progressing Towards Goal  Note: Fall Risk Interventions:  Mobility Interventions: Bed/chair exit alarm    Mentation Interventions: Adequate sleep, hydration, pain control    Medication Interventions: Bed/chair exit alarm    Elimination Interventions: Bed/chair exit alarm, Call light in reach    History of Falls Interventions: Bed/chair exit alarm         Problem: Patient Education: Go to Patient Education Activity  Goal: Patient/Family Education  Outcome: Progressing Towards Goal     Problem: Infection - Risk of, Surgical Site Infection  Goal: *Absence of surgical site infection signs and symptoms  Outcome: Progressing Towards Goal     Problem: Patient Education: Go to Patient Education Activity  Goal: Patient/Family Education  Outcome: Progressing Towards Goal     Problem: Patient Education: Go to Patient Education Activity  Goal: Patient/Family Education  Outcome: Progressing Towards Goal     Problem: Patient Education: Go to Patient Education Activity  Goal: Patient/Family Education  Outcome: Progressing Towards Goal     Problem: Patient Education: Go to Patient Education Activity  Goal: Patient/Family Education  Outcome: Progressing Towards Goal     Problem: Hip Fracture: Post-Op Day 4  Goal: Off Pathway (Use only if patient is Off Pathway)  Outcome: Progressing Towards Goal  Goal: Activity/Safety  Outcome: Progressing Towards Goal  Goal: Diagnostic Test/Procedures  Outcome: Progressing Towards Goal  Goal: Nutrition/Diet  Outcome: Progressing Towards Goal  Goal: Medications  Outcome: Progressing Towards Goal  Goal: Respiratory  Outcome: Progressing Towards Goal  Goal: Treatments/Interventions/Procedures  Outcome: Progressing Towards Goal  Goal: Psychosocial  Outcome: Progressing Towards Goal  Goal: Discharge Planning  Outcome: Progressing Towards Goal  Goal: *Met physical therapy criteria for discharge to next level of care  Outcome: Progressing Towards Goal  Goal: *Optimal pain control at patient's stated goal  Outcome: Progressing Towards Goal  Goal: *Hemodynamically stable  Outcome: Progressing Towards Goal  Goal: *Tolerating diet  Outcome: Progressing Towards Goal  Goal: *Active bowel function  Outcome: Progressing Towards Goal  Goal: *Adequate urinary output  Outcome: Progressing Towards Goal  Goal: *Absence of skin breakdown  Outcome: Progressing Towards Goal  Goal: *Patient verbalizes understanding of discharge instructions  Outcome: Progressing Towards Goal

## 2020-05-18 NOTE — PROGRESS NOTES
Spoke with NP for ID who states she would like Intamed to quote Ancef 2 G qday for potential home IV antibiotic need. NP will need to discuss line placement with nephrology. Patient will also need home health including wound vac care. CM sent message to [facility] liaison, Itaro.

## 2020-05-18 NOTE — PROGRESS NOTES
Progress Note    Patient: Gordon Costello MRN: [id number]  SSN: [id number]    YOB: 1945  Age: 67 y.o. Sex: female      Admit Date: 5/13/2020    LOS: 5 days     Subjective:     Patient reports she is feeling much better today    Objective:     Vitals:    05/18/20 0013 05/18/20 0450 05/18/20 0746 05/18/20 1130   BP: 113/51 138/59 146/82 129/72   Pulse: 74 72 73 76   Resp: 17 18 20 20   Temp: 98.1 °F (36.7 °C) 97.5 °F (36.4 °C) 99.2 °F (37.3 °C) 98.1 °F (36.7 °C)   SpO2: 91% 92% 95% 98%   Weight:       Height:            Intake and Output:  Current Shift: 05/18 0701 - 05/18 1900  In: 240 [P.O.:240]  Out: -   Last three shifts: 05/16 1901 - 05/18 0700  In: 100 [P.O.:100]  Out: 2310 [Urine:1810]    alert and oriented to person place time and situation  Skin - dressing clean dry and intact  Motor and sensory function intact in LEFT LOWER extremity  Pulses palpable in LEFT LOWER extremity       Lab/Data Review:  CBC:   Lab Results   Component Value Date/Time    WBC 3.5 (L) 05/18/2020 05:18 AM    HGB 7.6 (L) 05/18/2020 05:18 AM    HCT 25.4 (L) 05/18/2020 05:18 AM     05/18/2020 05:18 AM          Assessment:     Acute postop blood loss anemia with hemoglobin stable at 7.6, POD #3 from I&D and removal of hardware left femur    Plan:     Her white count is normal this morning. She is currently getting IV vancomycin. It appears her cultures are most sensitive with methicillin sensitive staph aureus. I will leave the choice of antibiotics up to the infectious disease and primary care teams. I would anticipate something different than the vancomycin since this is methicillin sensitive.   She can get her wound VAC changed today    Signed By: Brittnee Quinones MD     May 18, 2020

## 2020-05-18 NOTE — PROGRESS NOTES
Problem: Mobility Impaired (Adult and Pediatric)  Goal: *Acute Goals and Plan of Care (Insert Text)  Description:     LTG:  (1.)Ms. Julia Choe will move from supine to sit and sit to supine , scoot up and down and roll side to side in bed with MODIFIED INDEPENDENCE within 7 treatment day(s). (2.)Ms. Julia Choe will transfer from bed to chair and chair to bed with MINIMAL ASSIST using the least restrictive device within 7 treatment day(s). (3.)Ms. Julia Choe will ambulate with MINIMAL ASSIST for 5 feet with the least restrictive device within 7 treatment day(s). ________________________________________________________________________________________________     Outcome: Progressing Towards Goal     PHYSICAL THERAPY: Daily Note and AM 5/18/2020  INPATIENT: PT Visit Days : 3  Payor: Zuri Miner / Plan: [address] [place] [address] HMO / Product Type: Managed Care Medicare /       NAME/AGE/GENDER: [employer] is a 67 y.o. female   PRIMARY DIAGNOSIS: Left hip postoperative wound infection [T81.49XA] Left hip postoperative wound infection Left hip postoperative wound infection  Procedure(s) (LRB):  LEFT HIP WOUND DEBRIDEMENT WITH HARDWARE REMOVAL AND APPLICATION OF WOUND VAC (Left)  3 Days Post-Op  ICD-10: Treatment Diagnosis:    · Generalized Muscle Weakness (M62.81)  · Other lack of cordination (R27.8)  · Difficulty in walking, Not elsewhere classified (R26.2)  · Other abnormalities of gait and mobility (R26.89)  · History of falling (Z91.81)  · Unspecified Lack of Coordination (R27.9)   Precaution/Allergies:  Penicillins      ASSESSMENT:     Ms. Julia Choe is a 67year old F who presents I&D of left hip. Prior to hospital admission pt lives with her two adult daughters in 3 story home with no step(s) to enter. Pt endorses 2-3 falls in past 6 months, and reports being nearly bedbound at baseline. Prior to admission Ms. Dumont Pro uses furniture walking for mobility, limited to distances of less than 3-5 ft.  She reports having a w/c, RW, and hospital bed.  5/18- Patient seen today for physical therapy treatment session: presents supine in bed, endorses 0/10 pain, and motivated to participate. Performed bed mobility with moderate assistance and address sitting balance, activity pacing/tolerance, and posture. Performed sit to stand training x2 and stepping x3ft from bed to chair with RW and moderate assistance x2. Patient demonstrated a flexed posture, decreased step clearance bilaterally and poor dynamic standing balance. C/o fatigue and weakness today. Felt \"much better\" sitting up in chair and positioned to comfort. Patient's preference is to return home at d/c where she has support with functional mobility and DME. Recommend resuming HHPT services to address transfers and functional activity tolerance. Goals remain ongoing an appropriate; continue plan of care. This section established at most recent assessment   PROBLEM LIST (Impairments causing functional limitations):  1. Decreased Strength  2. Decreased ADL/Functional Activities  3. Decreased Transfer Abilities  4. Decreased Ambulation Ability/Technique  5. Decreased Balance  6. Decreased Activity Tolerance   INTERVENTIONS PLANNED: (Benefits and precautions of physical therapy have been discussed with the patient.)  1. Balance Exercise  2. Bed Mobility  3. Gait Training  4. Neuromuscular Re-education/Strengthening  5. Range of Motion (ROM)  6. Therapeutic Activites  7. Therapeutic Exercise/Strengthening  8. Transfer Training     TREATMENT PLAN: Frequency/Duration: daily for duration of hospital stay  Rehabilitation Potential For Stated Goals: Good     REHAB RECOMMENDATIONS (at time of discharge pending progress):    Placement: It is my opinion, based on this patient's performance to date, that Ms. Alen Bolivar may benefit from [address] after discharge due to the functional deficits listed above that are likely to improve with skilled rehabilitation because he/she has multiple medical issues that affect his/her functional mobility in the community. Equipment:    None at this time              HISTORY:   History of Present Injury/Illness (Reason for Referral):  Patient is a 67 y.o. female who presents to the ER due to pain and drainage from left hip wound. She had a left hip replacement 2 weeks ago and had stitches removed on 5/12. Today, she developed pain after getting up from toilet and has had increased drainage. She had been ambulating prior to this with some assistance but now states she cannot. We are asked to admit her for antibiotics, PT/OT, and wound evaluation. She denies fevers, but does report chills. Denies n/v/d, chest or abdominal pain. Her daughters help care for her at home. Past Medical History/Comorbidities:   Ms. Torie Morales  has a past medical history of Abnormal EKG (2/16/2016), Anemia due to chronic kidney disease treated with erythropoietin (7/25/2017), Aortic valve insufficiency (2/16/2016), [facility] Hyperlipidemia (2/16/2016), Hypertension (2/16/2016), and Obesity (2/16/2016). Ms. Torie Morales  has a past surgical history that includes hx tubal ligation. Social History/Living Environment:   Home Environment: Trailer/mobile home  # Steps to Enter: 0  One/Two Story Residence: One story  Living Alone: No  Support Systems: Child(ezequiel)  Current DME Used/Available at Home: Wheelchair, Walker, rolling, Commode, bedside, Hospital bed  Tub or Shower Type: Tub/Shower combination  Prior Level of Function/Work/Activity:  Close to bedbound: ambulation limited to bedside transfers. Has RW but doesn't use it. Adult daughters assist with changing/ bathing.      Number of Personal Factors/Comorbidities that affect the Plan of Care: 1-2: MODERATE COMPLEXITY   EXAMINATION:   Most Recent Physical Functioning:   Gross Assessment:                  Posture:     Balance:  Sitting: Intact  Sitting - Static: Good (unsupported)  Sitting - Dynamic: Good (unsupported)  Standing: Impaired  Standing - Static: Fair  Standing - Dynamic : Poor Bed Mobility:  Rolling: Moderate assistance  Supine to Sit: Moderate assistance  Scooting: Moderate assistance  Wheelchair Mobility:     Transfers:  Sit to Stand: Moderate assistance;Assist x2  Stand to Sit: Moderate assistance;Assist x2  Bed to Chair: Moderate assistance;Assist x2  Interventions: Safety awareness training; Tactile cues; Verbal cues  Gait:            Body Structures Involved:  1. Bones  2. Joints  3. Muscles Body Functions Affected:  1. Neuromusculoskeletal  2. Movement Related  3. Skin Related Activities and Participation Affected:  1. General Tasks and Demands  2. Mobility  3. Self Care  4. Domestic Life  5. Interpersonal Interactions and Relationships  6. Community, Social and [place] [address]   Number of elements that affect the Plan of Care: 4+: HIGH COMPLEXITY   CLINICAL PRESENTATION:   Presentation: Stable and uncomplicated: LOW COMPLEXITY   CLINICAL DECISION MAKIN [facility] Inpatient Short Form  How much difficulty does the patient currently have. .. Unable A Lot A Little None   1. Turning over in bed (including adjusting bedclothes, sheets and blankets)? [] 1   [x] 2   [] 3   [] 4   2. Sitting down on and standing up from a chair with arms ( e.g., wheelchair, bedside commode, etc.)   [] 1   [x] 2   [] 3   [] 4   3. Moving from lying on back to sitting on the side of the bed? [] 1   [] 2   [x] 3   [] 4   How much help from another person does the patient currently need. .. Total A Lot A Little None   4. Moving to and from a bed to a chair (including a wheelchair)? [] 1   [x] 2   [] 3   [] 4   5. Need to walk in hospital room? [] 1   [x] 2   [] 3   [] 4   6. Climbing 3-5 steps with a railing? [x] 1   [] 2   [] 3   [] 4   © , Trustees of [address] [place] [address] [address] [address], under license to [employer]  All rights reserved      Score:  Initial: 12 Most Recent: X (Date: -- )    Interpretation of Tool:  Represents activities that are increasingly more difficult (i.e. Bed mobility, Transfers, Gait). Medical Necessity:     · Skilled intervention continues to be required due to decreased safety w/ functional mobility. Reason for Services/Other Comments:  ·    Use of outcome tool(s) and clinical judgement create a POC that gives a: Clear prediction of patient's progress: LOW COMPLEXITY            TREATMENT:   (In addition to Assessment/Re-Assessment sessions the following treatments were rendered)   Pre-treatment Symptoms/Complaints:  \"I'm a little bit dizzy, but I'm okay\"  Pain: Initial:   Pain Intensity 1: 0  Post Session:  0/10; endorses comfort in bedside chair     Therapeutic Activity (25 Minutes) : Therapeutic activities including bed mobility, transfer training, balance training, postural retraining, activity pacing/tolerance, safety awareness training, ambulation on level ground x3ft, and patient education  to improve mobility, strength and balance. Required moderate visual, verbal and manual cues to promote static and dynamic balance in standing. Braces/Orthotics/Lines/Etc:   · IV  · rodriguez catheter  · wound vac L hip  Treatment/Session Assessment:    · Response to Treatment:  Mild dizziness and fatigue  · Interdisciplinary Collaboration:   o Physical Therapist  o Registered Nurse  o Rehabilitation Attendant  · After treatment position/precautions:   o Up in chair  o Bed alarm/tab alert on  o Bed/Chair-wheels locked  o Bed in low position  o Call light within reach  o RN notified  o Patient in NAD: needs met; chair alarm activated; discussed patient with PCT   · Compliance with Program/Exercises: Will assess as treatment progresses  · Recommendations/Intent for next treatment session: \"Next visit will focus on advancements to more challenging activities\".   Total Treatment Duration:  PT Patient Time In/Time Out  Time In: 0918  Time Out: 1400 Vfw Junior, DPT

## 2020-05-18 NOTE — WOUND CARE
Patient given PO pain medication prior to starting, moved self with assistance x2 from chair to bed and positioned on right side. Wound vac dressing removed, replaced, using sterile technique, wound base with some granulation, muscle and adipose exposed, fascia sutures visible and approximated, moderate amount serosanguinous drainage, 23x6x7.5cm. Plan next change Wednesday. Will monitor.

## 2020-05-18 NOTE — PROGRESS NOTES
Patient rested well throughout the night. Complained of pain once this shift. Wound vac canister changed. Currently, patient is resting in bed. No further needs at this time.

## 2020-05-18 NOTE — PROGRESS NOTES
May 18, 2020         Post Op day: 3 Days Post-Op Procedure(s) (LRB):  LEFT HIP WOUND DEBRIDEMENT WITH HARDWARE REMOVAL AND APPLICATION OF WOUND VAC (Left)      Admit Date: 2020  Admit Diagnosis: Left hip postoperative wound infection [T81.49XA]       Principle Problem: Left hip postoperative wound infection. Subjective: Doing well, No complaints, No SOB, No Chest Pain, No Nausea or Vomiting     Objective:   Vital Signs are Stable, No Acute Distress, Alert,  Wound vac functioning with low output,  Neurovascular exam is normal.     Assessment / Plan :  Patient Active Problem List   Diagnosis Code    Hypertension I10    Infiltrating ductal carcinoma of female breast ([facility] C50.919    Malignant neoplasm metastatic to bone (HCC) C79.51    Anemia due to chronic kidney disease treated with erythropoietin N18.9, D63.1    Severe obesity (HCC) E66.01    Stage 4 chronic kidney disease (HCC) N18.4    Left hip postoperative wound infection T81.49XA      Patient Vitals for the past 8 hrs:   BP Temp Pulse Resp SpO2   20 0746 146/82 99.2 °F (37.3 °C) 73 20 95 %   20 0450 138/59 97.5 °F (36.4 °C) 72 18 92 %    Temp (24hrs), Av.1 °F (36.7 °C), Min:97.5 °F (36.4 °C), Max:99.2 °F (37.3 °C)    Body mass index is 37.9 kg/m².     Lab Results   Component Value Date/Time    HGB 7.6 (L) 2020 05:18 AM          Medical Mgmt per hospitalist  Anticoagulation plan: ASA  Continue PT for ambulation   Continue wound care   Fall Precuations  DC disp: Home when cleared        Signed By: KIERRA Jim  2020,  8:51 AM

## 2020-05-18 NOTE — PROGRESS NOTES
Hospitalist Note     Admit Date:  2020  4:49 PM   Name:  Bhaskar Aquino   Age:  67 y.o.  :  1945   MRN:  [id number]   PCP:  Suman Barboza NP  Treatment Team: Attending Provider: Jennifer Almaraz MD; Consulting Provider: Lidya Ding MD; Utilization Review: Edna Hanna RN; Care Manager: Leon Deluna RN; Utilization Review: Humberto Little RN; Consulting Provider: Britney Clayton MD; Charge Nurse: Lacey Choi Primary Nurse: Clifton Tam; Physical Therapist: Robin Pace DPT; Occupational Therapist: Brittney Morelos    HPI/Subjective:   67year old female with HTN, HLD, Obesity, HTN, CKD stage IV (baseline Cr 2.4-3.0), hx of metastatic breast Ca(on letrozole and palbociclib), recent pathological fracture of left femur s/p intramedullary nail insertion () who was admitted due to worsening pain and serous drainage from her wound. She was started on IV antibiotics. She underwent I&D of left hip wound by Orthopedics on  and wound vac was placed. Initial BCx and Wound Cx showed MSSA and strep. :  Patient is seen and examined at bedside. Reports good pain control with current regimen. Objective:     Patient Vitals for the past 24 hrs:   Temp Pulse Resp BP SpO2   20 0746 99.2 °F (37.3 °C) 73 20 146/82 95 %   20 0450 97.5 °F (36.4 °C) 72 18 138/59 92 %   20 0013 98.1 °F (36.7 °C) 74 17 113/51 91 %   20 97.8 °F (36.6 °C) 78 18 122/75 95 %   20 1545 98.4 °F (36.9 °C) 76 20 137/73 93 %   20 1141 97.6 °F (36.4 °C) 90 19 111/68 97 %     Oxygen Therapy  O2 Sat (%): 95 % (20 0746)  Pulse via Oximetry: 83 beats per minute (05/15/20 1012)  O2 Device: Nasal cannula (05/15/20 0958)  O2 Flow Rate (L/min): 3 l/min (05/15/20 0958)    Estimated body mass index is 37.9 kg/m² as calculated from the following:    Height as of this encounter: 5' 7\" (1.702 m).     Weight as of this encounter: 109.8 kg (242 lb).      Intake/Output Summary (Last 24 hours) at 5/18/2020 0824  Last data filed at 5/18/2020 0501  Gross per 24 hour   Intake    Output 1500 ml   Net -1500 ml       *Note that automatically entered I/Os may not be accurate; dependent on patient compliance with collection and accurate  by techs. Physical Exam:   General:     alert, awake, no acute distress. Well nourished. Obese. Head:   normocephalic, atraumatic  Eyes, Ears, nose: PERRL, EOMI. Normal conjunctiva  Neck:    supple, non-tender. Trachea midline. Lungs:   CTAB, no wheezing, rhonchi, rales  Cardiac:   RRR, Normal S1 and S2. No S3, S4 or murmurs. Abdomen:   Soft, non distended, nontender, +BS, no guarding/rebound  Extremities:   Warm, dry. No edema. Left hip wound vac with bloody drainage  Skin:   No rashes, no jaundice  Neuro:  AAOx3. No gross focal neurological deficit  Psychiatric:  No anxiety, calm, cooperative    Data Review:  I have reviewed all labs, meds, and studies from the last 24 hours:    Recent Results (from the past 24 hour(s))   VANCOMYCIN, TROUGH    Collection Time: 05/17/20  1:27 PM   Result Value Ref Range    Vancomycin,trough 32.1 (HH) 5 - 20 ug/mL   CBC WITH AUTOMATED DIFF    Collection Time: 05/18/20  5:18 AM   Result Value Ref Range    WBC 3.5 (L) 4.3 - 11.1 K/uL    RBC 2.41 (L) 4.05 - 5.2 M/uL    HGB 7.6 (L) 11.7 - 15.4 g/dL    HCT 25.4 (L) 35.8 - 46.3 %    .4 (H) 79.6 - 97.8 FL    MCH 31.5 26.1 - 32.9 PG    MCHC 29.9 (L) 31.4 - 35.0 g/dL    RDW 18.2 (H) 11.9 - 14.6 %    PLATELET 564 902 - 339 K/uL    MPV 10.9 9.4 - 12.3 FL    ABSOLUTE NRBC 0.02 0.0 - 0.2 K/uL    NEUTROPHILS 61 43 - 78 %    LYMPHOCYTES 15 13 - 44 %    MONOCYTES 14 (H) 4.0 - 12.0 %    EOSINOPHILS 2 0.5 - 7.8 %    BASOPHILS 1 0.0 - 2.0 %    IMMATURE GRANULOCYTES 7 (H) 0.0 - 5.0 %    ABS. NEUTROPHILS 2.2 1.7 - 8.2 K/UL    ABS. LYMPHOCYTES 0.5 0.5 - 4.6 K/UL    ABS. MONOCYTES 0.5 0.1 - 1.3 K/UL    ABS. EOSINOPHILS 0.1 0.0 - 0.8 K/UL    ABS. BASOPHILS 0.0 0.0 - 0.2 K/UL    ABS. IMM. GRANS. 0.2 0.0 - 0.5 K/UL    RBC COMMENTS SLIGHT  ANISOCYTOSIS + POIKILOCYTOSIS        PLATELET COMMENTS ADEQUATE      DF AUTOMATED     METABOLIC PANEL, COMPREHENSIVE    Collection Time: 05/18/20  5:18 AM   Result Value Ref Range    Sodium 141 136 - 145 mmol/L    Potassium 4.7 3.5 - 5.1 mmol/L    Chloride 109 (H) 98 - 107 mmol/L    CO2 23 21 - 32 mmol/L    Anion gap 9 7 - 16 mmol/L    Glucose 81 65 - 100 mg/dL    BUN 38 (H) 8 - 23 MG/DL    Creatinine 2.17 (H) 0.6 - 1.0 MG/DL    GFR est AA 28 (L) >60 ml/min/1.73m2    GFR est non-AA 23 (L) >60 ml/min/1.73m2    Calcium 8.5 8.3 - 10.4 MG/DL    Bilirubin, total 0.6 0.2 - 1.1 MG/DL    ALT (SGPT) 24 12 - 65 U/L    AST (SGOT) 47 (H) 15 - 37 U/L    Alk.  phosphatase 181 (H) 50 - 136 U/L    Protein, total 4.9 (L) 6.3 - 8.2 g/dL    Albumin 1.9 (L) 3.2 - 4.6 g/dL    Globulin 3.0 2.3 - 3.5 g/dL    A-G Ratio 0.6 (L) 1.2 - 3.5     BILIRUBIN, DIRECT    Collection Time: 05/18/20  5:18 AM   Result Value Ref Range    Bilirubin, direct 0.3 <0.4 MG/DL        All Micro Results     Procedure Component Value Units Date/Time    CULTURE, TISSUE Mary Sprout STAIN [[id number]]  (Abnormal) Collected:  05/15/20 0910    Order Status:  Completed Specimen:  Hip Updated:  05/18/20 0737     Special Requests: NO SPECIAL REQUESTS        GRAM STAIN 0 TO 1 WBCS/OIF      FEW GRAM POSITIVE COCCI        Culture result:       MODERATE STAPHYLOCOCCUS AUREUS                  For Susceptibility Refer to Culture  [id number]      CULTURE, TISSUE Mary Sprout STAIN [[id number]]  (Abnormal)  (Susceptibility) Collected:  05/15/20 0903    Order Status:  Completed Specimen:  Hip Updated:  05/18/20 0734     Special Requests: NO SPECIAL REQUESTS        GRAM STAIN 0 TO 1 WBCS/OIF      FEW GRAM POSITIVE COCCI        Culture result:       HEAVY STAPHYLOCOCCUS AUREUS          CULTURE, BODY FLUID W Wilfredsulemancheikh Crys [[id number]]  (Abnormal)  (Susceptibility) Collected:  05/15/20 0904    Order Status: Completed Specimen:  Hip Updated:  05/18/20 0732     Special Requests: LEFT        GRAM STAIN 50  WBCS/OIF            MODERATE GRAM POSITIVE COCCI           Culture result:       HEAVY STAPHYLOCOCCUS AUREUS          CULTURE, BLOOD [[id number]] Collected:  05/14/20 1926    Order Status:  Completed Specimen:  Blood Updated:  05/18/20 0710     Special Requests: --        NO SPECIAL REQUESTS  LEFT  HAND       Culture result: NO GROWTH 4 DAYS       CULTURE, BLOOD [[id number]] Collected:  05/14/20 1926    Order Status:  Completed Specimen:  Blood Updated:  05/18/20 0710     Special Requests: --        LEFT  ARM       Culture result: NO GROWTH 4 DAYS       CULTURE, BLOOD [[id number]] Collected:  05/13/20 1722    Order Status:  Completed Specimen:  Blood Updated:  05/18/20 0709     Special Requests: --        LEFT  Antecubital       Culture result: NO GROWTH 5 DAYS       CULTURE, WOUND Terrie Mulch STAIN [[id number]]  (Abnormal)  (Susceptibility) Collected:  05/13/20 1723    Order Status:  Completed Specimen:  Wound Drainage Updated:  05/17/20 0727     Special Requests: SWAB L HIP WOUND     GRAM STAIN 0 TO 1 WBCS SEEN PER OIF            MODERATE GRAM POSITIVE COCCI           Culture result:       HEAVY STAPHYLOCOCCUS AUREUS                  HEAVY ENTEROCOCCUS FAECALIS GROUP D            LIGHT  NORMAL SKIN HOMA ISOLATED       CULTURE, BLOOD [[id number]]  (Abnormal) Collected:  05/13/20 2040    Order Status:  Completed Specimen:  Blood Updated:  05/16/20 0642     Special Requests: --        NO SPECIAL REQUESTS  LEFT  HAND       GRAM STAIN AEROBIC BOTTLE POSITIVE         GRAM POS COCCI IN CHAINS               RESULTS VERIFIED, PHONED TO AND READ BACK BY MERLENE QUEZADA RN @828 5.14.2020 EOR           Culture result:       ALPHA STREPTOCOCCUS, NOT S.  PNEUMONIAE            REFER TO BIOFIRE ACC [id number]            THIS ORGANISM MAY BE INDICATIVE OF CULTURE CONTAMINATION, HOWEVER, CLINICAL CORRELATION NEEDS TO BE EVALUATED, AS EACH CASE IS UNIQUE. CULTURE, ANAEROBIC [[id number]] Collected:  05/15/20 0904    Order Status:  Completed Specimen:  Hip Updated:  05/15/20 1110    CULTURE, ANAEROBIC [[id number]] Collected:  05/15/20 0903    Order Status:  Completed Specimen:  Hip Updated:  05/15/20 1110    CULTURE, ANAEROBIC [[id number]] Collected:  05/15/20 0910    Order Status:  Completed Specimen:  Hip Updated:  05/15/20 1109     Special Requests: NO SPECIAL REQUESTS        Culture result: PENDING    CULTURE, Dub Jose J STAIN [[id number]] Collected:  05/15/20 0900    Order Status:  Canceled Specimen:  Wound from Hip     BLOOD CULTURE ID PANEL [[id number]]  (Abnormal) Collected:  05/13/20 2040    Order Status:  Completed Specimen:  Blood Updated:  05/15/20 0756     Acc. no. from Micro Order [id number]     Streptococcus Detected        Comment: RESULTS VERIFIED, PHONED TO AND READ BACK BY  Charisse Echevarria RN @[facility] 5/15/20 HF          INTERPRETATION       Gram positive cocci. Identified by realtime PCR as Streptococcus species (not agalactiae, pyogenes, or pneumoniae).            Comment: Consider discontinuation of IV vancomycin and using an anti-streptococcal beta-lactam (ceftriaxone/cefotaxime (peds))             Current Meds:  Current Facility-Administered Medications   Medication Dose Route Frequency    bisacodyL (DULCOLAX) suppository 10 mg  10 mg Rectal ONCE    bisacodyL (DULCOLAX) tablet 5 mg  5 mg Oral DAILY    VANCOMYCIN INTERMITTENT DOSING PER RANDOM LEVELS   Other Rx Dosing/Monitoring    polyethylene glycol (MIRALAX) packet 17 g  17 g Oral DAILY    magnesium oxide (MAG-OX) tablet 400 mg  400 mg Oral BID    amLODIPine (NORVASC) tablet 5 mg  5 mg Oral DAILY    calcium carbonate (OS-JORGE) tablet 500 mg [elemental]  500 mg Oral BID WITH MEALS    docusate sodium (COLACE) capsule 100 mg  100 mg Oral BID PRN    oxyCODONE IR (ROXICODONE) tablet 5 mg  5 mg Oral Q4H PRN    [START ON 5/21/2020] palbociclib cap 75 mg (Patient Supplied)  75 mg Oral DAILY    triamterene-hydroCHLOROthiazide (MAXZIDE) 37.5-25 mg per tablet 1 Tab  1 Tab Oral DAILY    sodium chloride (NS) flush 5-40 mL  5-40 mL IntraVENous Q8H    sodium chloride (NS) flush 5-40 mL  5-40 mL IntraVENous PRN    acetaminophen (TYLENOL) tablet 650 mg  650 mg Oral Q4H PRN    0.9% sodium chloride infusion  75 mL/hr IntraVENous CONTINUOUS    morphine 10 mg/ml injection 5 mg  5 mg IntraVENous Q4H PRN       Other Studies:    Xr Chest Sngl V    Result Date: 5/13/2020  AP chest radiograph History: fever, 75 years Female Comparison: Chest radiograph April 26, 2020 Findings:   Normal cardiomediastinal silhouette. Low lung volumes unchanged. Nonspecific mild diffuse interstitial prominence similar to prior. Persistent mild patchy left basilar atelectasis and or consolidation. No evidence of pneumothorax. Visualized soft tissue and osseous structures otherwise unremarkable. Impression:  No significant interval change. Xr Hip Lt W Or Wo Pelv 2-3 Vws    Result Date: 5/13/2020  Exam:  AP pelvis and left hip, left femur radiographs History:  pain, hip pain, 75 years Female Comparison: Left hip radiographs April 27, 2020 Findings: Again visualized is intramedullary jeri fixation of the left femur and relative anatomic alignment with multiple ghost tracts of previous surgical screws. The visualized distal left femur appears intact. Again visualized is intramedullary jeri fixation of the right femur partially visualized. Mild degenerative change of the bilateral hip joint similar to prior. No evidence of new acute fracture or dislocation. Normal alignment. Scattered sclerotic foci in the visualized axial skeleton which may represent osseous metastases appear similar to prior. Visualized soft tissues otherwise unremarkable. Impression:  No evidence of new acute injury. Other chronic and postoperative findings as above.      Xr Femur Lt 2 V    Result Date: 5/13/2020  Exam:  AP pelvis and left hip, left femur radiographs History:  pain, hip pain, 75 years Female Comparison: Left hip radiographs April 27, 2020 Findings: Again visualized is intramedullary jeri fixation of the left femur and relative anatomic alignment with multiple ghost tracts of previous surgical screws. The visualized distal left femur appears intact. Again visualized is intramedullary jeri fixation of the right femur partially visualized. Mild degenerative change of the bilateral hip joint similar to prior. No evidence of new acute fracture or dislocation. Normal alignment. Scattered sclerotic foci in the visualized axial skeleton which may represent osseous metastases appear similar to prior. Visualized soft tissues otherwise unremarkable. Impression:  No evidence of new acute injury. Other chronic and postoperative findings as above. Assessment and Plan: Active Hospital Problems    Diagnosis Date Noted    Left hip postoperative wound infection 05/13/2020    Stage 4 chronic kidney disease ([facility] [address] 05/01/2020    Severe obesity ([facility] [address] 09/24/2019    Hypertension 02/16/2016     UNSPECIFIED          Plan:  Left Hip post-op wound infection s/p I&D (5/15)  - wound Cx (5/13) and BCx (5/13) show MSSA and alpha strep  - intra-op wound/tissue cultures (5/15) and BCx (5/15): showing MSSA  - ID consult for recommendation for ABx: c/w Vancomycin   - anticipating 6 wks of IV ABx. ID to contact nephrology for PICC approval  - wound vac per Ortho  - wound care  - ASA 325mg for DVT PPx per Ortho(from outpt recs)    Constipation: Bowel Regimen    CKD Stage 4: stable    HTN // HLD: c/w home meds    Severe Obesity: Body mass index is 37.9 kg/m². Diet:  DIET REGULAR  DVT PPx: SCDs  Code: Full Code    DISPO: pending ID recs. Patient DOES NOT want to go to Rehab. Medical Decision Making:    Labs/Imaging reviewed. Additional information obtained from nursing staff.   Patient is moderate risk due to medical condition and comorbidities. In addition, requires monitoring due to receiving medications with high toxic profiles. Plan discussed with nursing staff. Plan discussed with patient/family. All questions/concerns were addressed. Pt/family agrees with the plan.          Signed By: Juliane Jasso MD     May 18, 2020

## 2020-05-19 ENCOUNTER — APPOINTMENT (OUTPATIENT)
Dept: INTERVENTIONAL RADIOLOGY/VASCULAR | Age: 75
DRG: 481 | End: 2020-05-19
Payer: MEDICARE

## 2020-05-19 LAB
ALBUMIN SERPL-MCNC: 2.3 G/DL (ref 3.2–4.6)
ALBUMIN/GLOB SERPL: 0.7 {RATIO} (ref 1.2–3.5)
ALP SERPL-CCNC: 211 U/L (ref 50–136)
ALT SERPL-CCNC: 28 U/L (ref 12–65)
ANION GAP SERPL CALC-SCNC: 7 MMOL/L (ref 7–16)
AST SERPL-CCNC: 40 U/L (ref 15–37)
BACTERIA SPEC CULT: NORMAL
BACTERIA SPEC CULT: NORMAL
BASOPHILS # BLD: 0 K/UL (ref 0–0.2)
BASOPHILS NFR BLD: 1 % (ref 0–2)
BILIRUB SERPL-MCNC: 0.6 MG/DL (ref 0.2–1.1)
BUN SERPL-MCNC: 50 MG/DL (ref 8–23)
CALCIUM SERPL-MCNC: 8.9 MG/DL (ref 8.3–10.4)
CHLORIDE SERPL-SCNC: 107 MMOL/L (ref 98–107)
CO2 SERPL-SCNC: 26 MMOL/L (ref 21–32)
CREAT SERPL-MCNC: 2.16 MG/DL (ref 0.6–1)
DIFFERENTIAL METHOD BLD: ABNORMAL
EOSINOPHIL # BLD: 0.1 K/UL (ref 0–0.8)
EOSINOPHIL NFR BLD: 2 % (ref 0.5–7.8)
ERYTHROCYTE [DISTWIDTH] IN BLOOD BY AUTOMATED COUNT: 18.5 % (ref 11.9–14.6)
GLOBULIN SER CALC-MCNC: 3.5 G/DL (ref 2.3–3.5)
GLUCOSE SERPL-MCNC: 87 MG/DL (ref 65–100)
HCT VFR BLD AUTO: 26.5 % (ref 35.8–46.3)
HGB BLD-MCNC: 8 G/DL (ref 11.7–15.4)
IMM GRANULOCYTES # BLD AUTO: 0.4 K/UL (ref 0–0.5)
IMM GRANULOCYTES NFR BLD AUTO: 8 % (ref 0–5)
LYMPHOCYTES # BLD: 0.9 K/UL (ref 0.5–4.6)
LYMPHOCYTES NFR BLD: 19 % (ref 13–44)
MCH RBC QN AUTO: 31.6 PG (ref 26.1–32.9)
MCHC RBC AUTO-ENTMCNC: 30.2 G/DL (ref 31.4–35)
MCV RBC AUTO: 104.7 FL (ref 79.6–97.8)
MONOCYTES # BLD: 0.6 K/UL (ref 0.1–1.3)
MONOCYTES NFR BLD: 14 % (ref 4–12)
NEUTS SEG # BLD: 2.5 K/UL (ref 1.7–8.2)
NEUTS SEG NFR BLD: 56 % (ref 43–78)
NRBC # BLD: 0 K/UL (ref 0–0.2)
PLATELET # BLD AUTO: 199 K/UL (ref 150–450)
PLATELET COMMENTS,PCOM: ADEQUATE
PMV BLD AUTO: 11 FL (ref 9.4–12.3)
POTASSIUM SERPL-SCNC: 4.4 MMOL/L (ref 3.5–5.1)
PROT SERPL-MCNC: 5.8 G/DL (ref 6.3–8.2)
RBC # BLD AUTO: 2.53 M/UL (ref 4.05–5.2)
RBC MORPH BLD: ABNORMAL
SERVICE CMNT-IMP: NORMAL
SERVICE CMNT-IMP: NORMAL
SODIUM SERPL-SCNC: 140 MMOL/L (ref 136–145)
WBC # BLD AUTO: 4.5 K/UL (ref 4.3–11.1)
WBC MORPH BLD: ABNORMAL

## 2020-05-19 PROCEDURE — 77030002916 HC SUT ETHLN J&J -A

## 2020-05-19 PROCEDURE — 85025 COMPLETE CBC W/AUTO DIFF WBC: CPT

## 2020-05-19 PROCEDURE — 02HV33Z INSERTION OF INFUSION DEVICE INTO SUPERIOR VENA CAVA, PERCUTANEOUS APPROACH: ICD-10-PCS | Performed by: NURSE PRACTITIONER

## 2020-05-19 PROCEDURE — 74011000250 HC RX REV CODE- 250: Performed by: NURSE PRACTITIONER

## 2020-05-19 PROCEDURE — 3331090002 HH PPS REVENUE DEBIT

## 2020-05-19 PROCEDURE — 97530 THERAPEUTIC ACTIVITIES: CPT

## 2020-05-19 PROCEDURE — 97535 SELF CARE MNGMENT TRAINING: CPT

## 2020-05-19 PROCEDURE — 74750000023 HC WOUND THERAPY

## 2020-05-19 PROCEDURE — 77001 FLUOROGUIDE FOR VEIN DEVICE: CPT

## 2020-05-19 PROCEDURE — 94760 N-INVAS EAR/PLS OXIMETRY 1: CPT

## 2020-05-19 PROCEDURE — 74011000258 HC RX REV CODE- 258: Performed by: INTERNAL MEDICINE

## 2020-05-19 PROCEDURE — 77030010507 HC ADH SKN DERMBND J&J -B

## 2020-05-19 PROCEDURE — 3331090001 HH PPS REVENUE CREDIT

## 2020-05-19 PROCEDURE — 74011250636 HC RX REV CODE- 250/636: Performed by: RADIOLOGY

## 2020-05-19 PROCEDURE — 36415 COLL VENOUS BLD VENIPUNCTURE: CPT

## 2020-05-19 PROCEDURE — 80053 COMPREHEN METABOLIC PANEL: CPT

## 2020-05-19 PROCEDURE — C1751 CATH, INF, PER/CENT/MIDLINE: HCPCS

## 2020-05-19 PROCEDURE — 74011250636 HC RX REV CODE- 250/636: Performed by: NURSE PRACTITIONER

## 2020-05-19 PROCEDURE — 74011000250 HC RX REV CODE- 250: Performed by: RADIOLOGY

## 2020-05-19 PROCEDURE — 76937 US GUIDE VASCULAR ACCESS: CPT

## 2020-05-19 PROCEDURE — 74011250637 HC RX REV CODE- 250/637: Performed by: INTERNAL MEDICINE

## 2020-05-19 PROCEDURE — 74011250637 HC RX REV CODE- 250/637: Performed by: ORTHOPAEDIC SURGERY

## 2020-05-19 PROCEDURE — 65270000029 HC RM PRIVATE

## 2020-05-19 PROCEDURE — 77030018719 HC DRSG PTCH ANTIMIC J&J -A

## 2020-05-19 PROCEDURE — 74011250636 HC RX REV CODE- 250/636: Performed by: INTERNAL MEDICINE

## 2020-05-19 RX ORDER — LIDOCAINE HYDROCHLORIDE 20 MG/ML
2-20 INJECTION, SOLUTION INFILTRATION; PERINEURAL
Status: DISCONTINUED | OUTPATIENT
Start: 2020-05-19 | End: 2020-05-19

## 2020-05-19 RX ORDER — CEFAZOLIN SODIUM/WATER 2 G/20 ML
2 SYRINGE (ML) INTRAVENOUS EVERY 12 HOURS
Status: DISCONTINUED | OUTPATIENT
Start: 2020-05-19 | End: 2020-05-21 | Stop reason: HOSPADM

## 2020-05-19 RX ORDER — MIDAZOLAM HYDROCHLORIDE 1 MG/ML
.5-2 INJECTION, SOLUTION INTRAMUSCULAR; INTRAVENOUS
Status: DISCONTINUED | OUTPATIENT
Start: 2020-05-19 | End: 2020-05-19

## 2020-05-19 RX ORDER — FENTANYL CITRATE 50 UG/ML
25-100 INJECTION, SOLUTION INTRAMUSCULAR; INTRAVENOUS
Status: DISCONTINUED | OUTPATIENT
Start: 2020-05-19 | End: 2020-05-19

## 2020-05-19 RX ORDER — SODIUM CHLORIDE 9 MG/ML
25 INJECTION, SOLUTION INTRAVENOUS CONTINUOUS
Status: DISCONTINUED | OUTPATIENT
Start: 2020-05-19 | End: 2020-05-19

## 2020-05-19 RX ADMIN — SODIUM CHLORIDE 1000 MG: 900 INJECTION, SOLUTION INTRAVENOUS at 04:35

## 2020-05-19 RX ADMIN — TRIAMTERENE AND HYDROCHLOROTHIAZIDE 1 TABLET: 37.5; 25 TABLET ORAL at 10:34

## 2020-05-19 RX ADMIN — SODIUM CHLORIDE 25 ML/HR: 900 INJECTION, SOLUTION INTRAVENOUS at 08:30

## 2020-05-19 RX ADMIN — ASPIRIN 325 MG ORAL TABLET 325 MG: 325 PILL ORAL at 10:34

## 2020-05-19 RX ADMIN — Medication 400 MG: at 17:15

## 2020-05-19 RX ADMIN — Medication 10 ML: at 04:35

## 2020-05-19 RX ADMIN — Medication 10 ML: at 11:18

## 2020-05-19 RX ADMIN — LIDOCAINE HYDROCHLORIDE 40 MG: 20 INJECTION, SOLUTION INFILTRATION; PERINEURAL at 08:31

## 2020-05-19 RX ADMIN — CEFAZOLIN 2 G: 10 INJECTION, POWDER, FOR SOLUTION INTRAVENOUS at 17:17

## 2020-05-19 RX ADMIN — OXYCODONE 5 MG: 5 TABLET ORAL at 21:20

## 2020-05-19 RX ADMIN — LIDOCAINE HYDROCHLORIDE 100 MG: 20 INJECTION, SOLUTION INFILTRATION; PERINEURAL at 08:32

## 2020-05-19 RX ADMIN — MIDAZOLAM 0.5 MG: 1 INJECTION INTRAMUSCULAR; INTRAVENOUS at 08:24

## 2020-05-19 RX ADMIN — Medication 10 ML: at 06:45

## 2020-05-19 RX ADMIN — FENTANYL CITRATE 25 MCG: 50 INJECTION INTRAMUSCULAR; INTRAVENOUS at 08:24

## 2020-05-19 RX ADMIN — Medication 400 MG: at 10:34

## 2020-05-19 RX ADMIN — Medication 5 ML: at 21:20

## 2020-05-19 RX ADMIN — AMLODIPINE BESYLATE 5 MG: 5 TABLET ORAL at 10:34

## 2020-05-19 NOTE — DISCHARGE INSTRUCTIONS
[address]  Department of Interventional Radiology  [facility]  [phone] Office  [phone] Fax    Tunneled or Non Tunneled Catheter Discharge Instructions    General Information:   A catheter, either tunneled (permanent) or non-tunneled (temporary) catheter was inserted into your neck today for the purpose of Cancer treatment (apheresis) or dialysis. Your catheter will be used for the length of your apheresis, or until you get a fistula placed in surgery for dialysis. After this time, your catheter may be removed. You may return to our department for the catheter removal.  A non-tunneled catheter will exit at the neck. There will be three ports, two of which will be used for dialysis or apheresis. The one smaller port in the middle can be used like a regular IV. It ideally is used only for about two weeks. A tunneled catheter will exit lower down on the chest wall, and can be used for a longer period of time. There is no IV port on these. The tunneled catheter is used only for dialysis. In case of emergencies only can drugs be given through these catheters and only with written permission from your doctor. Home Care Instructions: You can resume your regular diet. Do not drink alcohol, drive, or make any important legal decisions in the next 24 hours. Watch the site carefully for signs of infection, like fever, drainage, redness, and/or swelling. Your catheter should be \"packed\" with heparin after each use or at least once a week if it is not being used. This \"packing\" should only be done by nurses experienced with caring for this type of catheter. You may shower in 24 hours, but you need to cover the catheter with plastic wrap and tape to keep it dry while you are showering. Keep the site clean, dry, and protected. Do not immerse yourself in water like in the case of tub baths or swimming as long as you have the catheter. Call If:   You should call your Physician and/or the Radiology Nurse if you have any signs of infection, shortness of breath, or if the dressing should come off or become moist.  You will be instructed on where to go for a new dressing. You should not have to change the dressings yourself, as that will be done by the nurses who access the catheter. Follow-Up Instructions:  Please see your ordering doctor as he/she has requested. To Reach Us: If you have any questions about your procedure, please call the Interventional Radiology department at [phone]. After business hours (5pm) and weekends, call the answering service at [phone] and ask for the Radiologist on call to be paged. Si tiene Preguntas acerca del procedimiento, por favor llame al departamento de Radiología Intervencional al [phone]. Después de horas de oficina (5 pm) y los fines de [place], llamar al Cecelia Edwards al [phone] y pregunte por el Radiologo de [facility]. Interventional Radiology General Nurse Discharge    After general anesthesia or intravenous sedation, for 24 hours or while taking prescription Narcotics:  · Limit your activities  · Do not drive and operate hazardous machinery  · Do not make important personal or business decisions  · Do  not drink alcoholic beverages  · If you have not urinated within 8 hours after discharge, please contact your surgeon on call. * Please give a list of your current medications to your Primary Care Provider. * Please update this list whenever your medications are discontinued, doses are     changed, or new medications (including over-the-counter products) are added. * Please carry medication information at all times in case of emergency situations.     These are general instructions for a healthy lifestyle:    No smoking/ No tobacco products/ Avoid exposure to second hand smoke  Surgeon General's Warning:  Quitting smoking now greatly reduces serious risk to your health. Obesity, smoking, and sedentary lifestyle greatly increases your risk for illness  A healthy diet, regular physical exercise & weight monitoring are important for maintaining a healthy lifestyle    You may be retaining fluid if you have a history of heart failure or if you experience any of the following symptoms:  Weight gain of 3 pounds or more overnight or 5 pounds in a week, increased swelling in our hands or feet or shortness of breath while lying flat in bed. Please call your doctor as soon as you notice any of these symptoms; do not wait until your next office visit. Recognize signs and symptoms of STROKE:  F-face looks uneven    A-arms unable to move or move unevenly    S-speech slurred or non-existent    T-time-call 911 as soon as signs and symptoms begin-DO NOT go       Back to bed or wait to see if you get better-TIME IS BRAIN.     Patient Signature:  Date: 5/19/2020  Discharging Nurse: Crisoforo Kawasaki, RN

## 2020-05-19 NOTE — PROGRESS NOTES
Problem: Pressure Injury - Risk of  Goal: *Prevention of pressure injury  Description: Document Arturo Scale and appropriate interventions in the flowsheet. Outcome: Progressing Towards Goal  Note: Pressure Injury Interventions:  Sensory Interventions: Use 30-degree side-lying position, Sit a 90-degree angle/use footstool if needed, Pressure redistribution bed/mattress (bed type), Check visual cues for pain, Avoid rigorous massage over bony prominences, Assess need for specialty bed, Assess changes in LOC, Chair cushion, Discuss PT/OT consult with provider, Float heels, Keep linens dry and wrinkle-free, Maintain/enhance activity level, Monitor skin under medical devices, Pad between skin to skin, Minimize linen layers, Suspension boots, Turn and reposition approx. every two hours (pillows and wedges if needed)         Activity Interventions: Increase time out of bed    Mobility Interventions: Pressure redistribution bed/mattress (bed type)    Nutrition Interventions: Document food/fluid/supplement intake, Offer support with meals,snacks and hydration    Friction and Shear Interventions: Lift team/patient mobility team, Foam dressings/transparent film/skin sealants, HOB 30 degrees or less, Lift sheet                Problem: Patient Education: Go to Patient Education Activity  Goal: Patient/Family Education  Outcome: Progressing Towards Goal     Problem: Falls - Risk of  Goal: *Absence of Falls  Description: Document Maurizio Fall Risk and appropriate interventions in the flowsheet.   Outcome: Progressing Towards Goal  Note: Fall Risk Interventions:  Mobility Interventions: Bed/chair exit alarm    Mentation Interventions: Adequate sleep, hydration, pain control    Medication Interventions: Bed/chair exit alarm    Elimination Interventions: Call light in reach    History of Falls Interventions: Bed/chair exit alarm         Problem: Patient Education: Go to Patient Education Activity  Goal: Patient/Family Education  Outcome: Progressing Towards Goal     Problem: Infection - Risk of, Surgical Site Infection  Goal: *Absence of surgical site infection signs and symptoms  Outcome: Progressing Towards Goal     Problem: Patient Education: Go to Patient Education Activity  Goal: Patient/Family Education  Outcome: Progressing Towards Goal     Problem: Patient Education: Go to Patient Education Activity  Goal: Patient/Family Education  Outcome: Progressing Towards Goal     Problem: Patient Education: Go to Patient Education Activity  Goal: Patient/Family Education  Outcome: Progressing Towards Goal     Problem: Patient Education: Go to Patient Education Activity  Goal: Patient/Family Education  Outcome: Progressing Towards Goal     Problem: Hip Fracture: Post-Op Day 4  Goal: Activity/Safety  Outcome: Progressing Towards Goal  Goal: Diagnostic Test/Procedures  Outcome: Progressing Towards Goal  Goal: Nutrition/Diet  Outcome: Progressing Towards Goal  Goal: Medications  Outcome: Progressing Towards Goal  Goal: Respiratory  Outcome: Progressing Towards Goal  Goal: Treatments/Interventions/Procedures  Outcome: Progressing Towards Goal  Goal: Psychosocial  Outcome: Progressing Towards Goal  Goal: Discharge Planning  Outcome: Progressing Towards Goal  Goal: *Met physical therapy criteria for discharge to next level of care  Outcome: Progressing Towards Goal  Goal: *Optimal pain control at patient's stated goal  Outcome: Progressing Towards Goal  Goal: *Hemodynamically stable  Outcome: Progressing Towards Goal  Goal: *Tolerating diet  Outcome: Progressing Towards Goal  Goal: *Active bowel function  Outcome: Progressing Towards Goal  Goal: *Adequate urinary output  Outcome: Progressing Towards Goal  Goal: *Absence of skin breakdown  Outcome: Progressing Towards Goal  Goal: *Patient verbalizes understanding of discharge instructions  Outcome: Progressing Towards Goal

## 2020-05-19 NOTE — PROGRESS NOTES
Infectious Disease Progress Note    Impression:   · Early onset L hip surgical site HW associated infection, superficial cultures with MSSA, superficial cx with MSSA/enterococcus  · S/p partial hardware removal (significant HW retained) and I&D 5/15, op cultures pending  · Blood cultures with 1/4 bottles strep species, potential contaminant  · History of pathologic L hip fracture requiring HW repair  c/b hardware failure and recurrent fracture 2020 s/p hardware exchange   · IDC with mets to bone  · CKD stage 4, baseline Cr around 2.8    Plan:   · Continue Cefazolin: 2g IV Q12hrs to complete 6 weeks EOT 20. At EOT plan transition to longterm suppression given retained HW  · Rifampin not used sec to interaction with breast ca/BP meds  · Send OPAT orders  · Tunneled PICC placed today  · Get CM to assist with outpatient antbx  · ID follow up TBD    Anti-infectives:   1. Vancomycin -present    Subjective:   Denies fevers, chills, sweats, nausea, vomiting or diarrhea. Extremely eager for DC, ID treatment plan reviewed. Spent 45 min seeing patient today, > 50%  was spent in counseling, coordination of care, educated pt on Infection, ID treatment options and medication side effects/directions. Allergies   Allergen Reactions    Penicillins Rash        Review of Systems:  A comprehensive review of systems was negative except for that written in the History of Present Illness. Objective:   Blood pressure 149/63, pulse 74, temperature 98.2 °F (36.8 °C), resp. rate 17, height 5' 7\" (1.702 m), weight 109.8 kg (242 lb), SpO2 96 %.   Temp (24hrs), Av.2 °F (36.8 °C), Min:97.8 °F (36.6 °C), Max:98.8 °F (37.1 °C)     General:  Alert, cooperative, no acute distress, appears stated age   Head:  Normocephalic, atraumatic    Eyes:  Anicteric, no drainage, not injected, EOMI   Throat: Mucus membranes moist OP clear   Neck: Supple, symmetrical, trachea midline, no JVD   Lungs:   Clear throughout lung fields without increased work of breathing or audible wheezes   Heart:  Regular rate and rhythm, without audible murmur, rub, or gallop   Abdomen:   Soft, non-tender, no guarding, no distention, bowel sounds active   Extremities: Extremities normal, atraumatic, no cyanosis, mild left hip edema: wound vac intact   Pulses: 2+ and symmetric   Skin: Skin color, texture, turgor normal, no rashes or lesions. Lines/Devices: R chest tunneled PICC             Data Review:   Recent Results (from the past 24 hour(s))   VANCOMYCIN, RANDOM    Collection Time: 05/18/20  2:01 PM   Result Value Ref Range    Vancomycin, random 25.8 UG/ML   CBC WITH AUTOMATED DIFF    Collection Time: 05/19/20  4:48 AM   Result Value Ref Range    WBC 4.5 4.3 - 11.1 K/uL    RBC 2.53 (L) 4.05 - 5.2 M/uL    HGB 8.0 (L) 11.7 - 15.4 g/dL    HCT 26.5 (L) 35.8 - 46.3 %    .7 (H) 79.6 - 97.8 FL    MCH 31.6 26.1 - 32.9 PG    MCHC 30.2 (L) 31.4 - 35.0 g/dL    RDW 18.5 (H) 11.9 - 14.6 %    PLATELET 822 277 - 630 K/uL    MPV 11.0 9.4 - 12.3 FL    ABSOLUTE NRBC 0.00 0.0 - 0.2 K/uL    NEUTROPHILS 56 43 - 78 %    LYMPHOCYTES 19 13 - 44 %    MONOCYTES 14 (H) 4.0 - 12.0 %    EOSINOPHILS 2 0.5 - 7.8 %    BASOPHILS 1 0.0 - 2.0 %    IMMATURE GRANULOCYTES 8 (H) 0.0 - 5.0 %    ABS. NEUTROPHILS 2.5 1.7 - 8.2 K/UL    ABS. LYMPHOCYTES 0.9 0.5 - 4.6 K/UL    ABS. MONOCYTES 0.6 0.1 - 1.3 K/UL    ABS. EOSINOPHILS 0.1 0.0 - 0.8 K/UL    ABS. BASOPHILS 0.0 0.0 - 0.2 K/UL    ABS. IMM. GRANS.  0.4 0.0 - 0.5 K/UL    RBC COMMENTS SLIGHT  ANISOCYTOSIS + POIKILOCYTOSIS        WBC COMMENTS Result Confirmed By Smear      PLATELET COMMENTS ADEQUATE      DF AUTOMATED     METABOLIC PANEL, COMPREHENSIVE    Collection Time: 05/19/20  4:48 AM   Result Value Ref Range    Sodium 140 136 - 145 mmol/L    Potassium 4.4 3.5 - 5.1 mmol/L    Chloride 107 98 - 107 mmol/L    CO2 26 21 - 32 mmol/L    Anion gap 7 7 - 16 mmol/L    Glucose 87 65 - 100 mg/dL    BUN 50 (H) 8 - 23 MG/DL    Creatinine 2.16 (H) 0.6 - 1.0 MG/DL    GFR est AA 29 (L) >60 ml/min/1.73m2    GFR est non-AA 24 (L) >60 ml/min/1.73m2    Calcium 8.9 8.3 - 10.4 MG/DL    Bilirubin, total 0.6 0.2 - 1.1 MG/DL    ALT (SGPT) 28 12 - 65 U/L    AST (SGOT) 40 (H) 15 - 37 U/L    Alk. phosphatase 211 (H) 50 - 136 U/L    Protein, total 5.8 (L) 6.3 - 8.2 g/dL    Albumin 2.3 (L) 3.2 - 4.6 g/dL    Globulin 3.5 2.3 - 3.5 g/dL    A-G Ratio 0.7 (L) 1.2 - 3.5          Microbiology:    All Micro Results     Procedure Component Value Units Date/Time    CULTURE, BLOOD [[id number]] Collected:  05/14/20 1926    Order Status:  Completed Specimen:  Blood Updated:  05/19/20 0730     Special Requests: --        LEFT  ARM       Culture result: NO GROWTH 5 DAYS       CULTURE, BLOOD [[id number]] Collected:  05/14/20 1926    Order Status:  Completed Specimen:  Blood Updated:  05/19/20 0730     Special Requests: --        NO SPECIAL REQUESTS  LEFT  HAND       Culture result: NO GROWTH 5 DAYS       CULTURE, ANAEROBIC [[id number]] Collected:  05/15/20 0904    Order Status:  Completed Specimen:  Hip Updated:  05/18/20 0829     Special Requests: LEFT        Culture result:       SUBCULTURE IS NECESSARY TO DETERMINE PRESENCE OR ABSENCE OF ANAEROBIC BACTERIA IN THIS CULTURE. FURTHER REPORT TO FOLLOW AFTER INCUBATION OF SUBCULTURE. CULTURE, ANAEROBIC [[id number]] Collected:  05/15/20 0903    Order Status:  Completed Specimen:  Hip Updated:  05/18/20 0829     Special Requests: LEFT        Culture result:       SUBCULTURE IS NECESSARY TO DETERMINE PRESENCE OR ABSENCE OF ANAEROBIC BACTERIA IN THIS CULTURE. FURTHER REPORT TO FOLLOW AFTER INCUBATION OF SUBCULTURE. CULTURE, ANAEROBIC [[id number]] Collected:  05/15/20 0910    Order Status:  Completed Specimen:  Hip Updated:  05/18/20 0828     Special Requests: NO SPECIAL REQUESTS        Culture result:       SUBCULTURE IS NECESSARY TO DETERMINE PRESENCE OR ABSENCE OF ANAEROBIC BACTERIA IN THIS CULTURE.   FURTHER REPORT TO FOLLOW AFTER INCUBATION OF SUBCULTURE.           CULTURE, TISSUE Still Fredrick STAIN [[id number]]  (Abnormal) Collected:  05/15/20 0910    Order Status:  Completed Specimen:  Hip Updated:  05/18/20 0737     Special Requests: NO SPECIAL REQUESTS        GRAM STAIN 0 TO 1 WBCS/OIF      FEW GRAM POSITIVE COCCI        Culture result:       MODERATE STAPHYLOCOCCUS AUREUS                  For Susceptibility Refer to Culture  [id number]      CULTURE, TISSUE Still Fredrick STAIN [[id number]]  (Abnormal)  (Susceptibility) Collected:  05/15/20 0903    Order Status:  Completed Specimen:  Hip Updated:  05/18/20 0734     Special Requests: NO SPECIAL REQUESTS        GRAM STAIN 0 TO 1 WBCS/OIF      FEW GRAM POSITIVE COCCI        Culture result:       HEAVY STAPHYLOCOCCUS AUREUS          CULTURE, BODY FLUID W Ana Magallanes [[id number]]  (Abnormal)  (Susceptibility) Collected:  05/15/20 0904    Order Status:  Completed Specimen:  Hip Updated:  05/18/20 0732     Special Requests: LEFT        GRAM STAIN 50  WBCS/OIF            MODERATE GRAM POSITIVE COCCI           Culture result:       HEAVY STAPHYLOCOCCUS AUREUS          CULTURE, BLOOD [[id number]] Collected:  05/13/20 1722    Order Status:  Completed Specimen:  Blood Updated:  05/18/20 0709     Special Requests: --        LEFT  Antecubital       Culture result: NO GROWTH 5 DAYS       CULTURE, WOUND Still Fredrick STAIN [[id number]]  (Abnormal)  (Susceptibility) Collected:  05/13/20 1723    Order Status:  Completed Specimen:  Wound Drainage Updated:  05/17/20 0727     Special Requests: SWAB L HIP WOUND     GRAM STAIN 0 TO 1 WBCS SEEN PER OIF            MODERATE GRAM POSITIVE COCCI           Culture result:       HEAVY STAPHYLOCOCCUS AUREUS                  HEAVY ENTEROCOCCUS FAECALIS GROUP D            LIGHT  NORMAL SKIN HOMA ISOLATED       CULTURE, BLOOD [[id number]]  (Abnormal) Collected:  05/13/20 2040    Order Status:  Completed Specimen:  Blood Updated:  05/16/20 0642     Special Requests: --        NO SPECIAL REQUESTS  LEFT  HAND       GRAM STAIN AEROBIC BOTTLE POSITIVE         GRAM POS COCCI IN CHAINS               RESULTS VERIFIED, PHONED TO AND READ BACK BY MERLENE QUEZADA RN @9241 5.14.2020 EOR           Culture result:       ALPHA STREPTOCOCCUS, NOT S. PNEUMONIAE            REFER TO BIOFIRE ACC [id number]            THIS ORGANISM MAY BE INDICATIVE OF CULTURE CONTAMINATION, HOWEVER, CLINICAL CORRELATION NEEDS TO BE EVALUATED, AS EACH CASE IS UNIQUE. CULTURE, Jorge Merl STAIN [[id number]] Collected:  05/15/20 0900    Order Status:  Canceled Specimen:  Wound from Hip     BLOOD CULTURE ID PANEL [[id number]]  (Abnormal) Collected:  05/13/20 2040    Order Status:  Completed Specimen:  Blood Updated:  05/15/20 0756     Acc. no. from Micro Order [id number]     Streptococcus Detected        Comment: RESULTS VERIFIED, PHONED TO AND READ BACK BY  Mariano Heredia RN @0521 5/15/20 HF          INTERPRETATION       Gram positive cocci. Identified by realtime PCR as Streptococcus species (not agalactiae, pyogenes, or pneumoniae). Comment: Consider discontinuation of IV vancomycin and using an anti-streptococcal beta-lactam (ceftriaxone/cefotaxime (peds))             Studies:    5/15 XR LT  Hip  IMPRESSION:   1. Partial left femoral hardware removal with a redemonstrated subtrochanteric  subacute appearing fracture of the left femur with a left femoral  cephalomedullary jeri.     Signed By: Baljit Knox NP     May 19, 2020

## 2020-05-19 NOTE — PROGRESS NOTES
Problem: Self Care Deficits Care Plan (Adult)  Goal: *Acute Goals and Plan of Care (Insert Text)  Description:   1. Patient will complete lower body bathing and dressing with Min A and adaptive equipment as needed. 2. Patient will complete toileting and toilet transfer with Min A and adaptive equipment as needed. 3. Patient will complete bed mobility with SBA and minimal verbal cueing for placement of BUE and BLE to assist.  4. Patient will complete seated ADL with good static and good dynamic seated balance. 5. Patient will complete functional transfers with Min A and adaptive equipment as needed. Timeframe: 7 visits      Outcome: Progressing Towards Goal     OCCUPATIONAL THERAPY: Daily Note and PM 5/19/2020  INPATIENT: OT Visit Days: 2  Payor: Rebecca Echols / Plan: [address] [place] [address] HMO / Product Type: [employer] /      NAME/AGE/GENDER: Sunita Mars is a 67 y.o. female   PRIMARY DIAGNOSIS:  Left hip postoperative wound infection [T81.49XA] Left hip postoperative wound infection Left hip postoperative wound infection  Procedure(s) (LRB):  LEFT HIP WOUND DEBRIDEMENT WITH HARDWARE REMOVAL AND APPLICATION OF WOUND VAC (Left)  4 Days Post-Op  ICD-10: Treatment Diagnosis:    · Generalized Muscle Weakness (M62.81)  · Difficulty in walking, Not elsewhere classified (R26.2)   Precautions/Allergies:    Penicillins      ASSESSMENT:     Ms. Hari Kyle is a 67 y.o. female admitted for L Hip Post Operative Wound Infection and is now L Hip I&D. At baseline, patient lives with two daughters in trailer home with 0 ANGELIC. Pt receives assistance for ADLs and is dependent on daughters for IADLs. Pt sleeps in hospital bed and reports minimal in home mobility. Pt declines use of DME for transfers or mobility and reports that she \"furniture walks\" from bed to [facility] and uses her R LE to scoot her L LE.     5/19/2020: Pt found supine in bed upon arrival and agreeable to OT treatment.  Bp assessed and 127/71 as pt was hypotensive during prior OT treatment session. Reports no pain prior to or following functional mobility. Pt requires Min A and increased time to complete supine to sit. Seated edge of bed, pt with intact static and dynamic seated balance. Pt requires Set Up to brush teeth seated edge of bed. No loss of balance observed during completion of self care task. Pt requires SBA to scoot to edge of bed. Pt requires Min A x 2 with use of RW to perform initial sit to stand. Upon standing, pt with poor static and dynamic standing balance with anterior lean. Pt provided with moderate verbal, visual, and tactile cueing to hold head upright and bring pelvis forward to improve posture and balance. Pt unable to complete and required Min A x 2 for stand to sit. Pt rested x 2 minutes. Pt with increased fatigue and required Mod A x 2 with use of RW for second sit to stand. Pt with more impaired static and dynamic standing balance and required Mod A x 2 for stand to sit. Pt required Min A for sit to supine. Pt left supine in bed with head of bed elevated to 90 degrees to proceed with self-feeding as dinner recently arrived. All needs met and within reach. RN notified. Will continue POC. This section established at most recent assessment   PROBLEM LIST (Impairments causing functional limitations):  1. Decreased Strength  2. Decreased ADL/Functional Activities  3. Decreased Transfer Abilities  4. Decreased Ambulation Ability/Technique  5. Decreased Balance  6. Increased Fatigue  7. Decreased Flexibility/Joint Mobility   INTERVENTIONS PLANNED: (Benefits and precautions of occupational therapy have been discussed with the patient.)  1. Activities of daily living training  2. Adaptive equipment training  3. Balance training  4. Clothing management  5. Neuromuscular re-eduation  6. Re-evaluation  7. Therapeutic activity  8.  Therapeutic exercise     TREATMENT PLAN: Frequency/Duration: Follow patient 4x/week to address above goals.  Rehabilitation Potential For Stated Goals: [facility] (at time of discharge pending progress):    Placement: It is my opinion, based on this patient's performance to date, that Ms. Rafael Lloyd may benefit from [address] after discharge due to the functional deficits listed above that are likely to improve with skilled rehabilitation because he/she has multiple medical issues that affect his/her functional mobility in the community. Equipment:    TBD              OCCUPATIONAL PROFILE AND HISTORY:   History of Present Injury/Illness (Reason for Referral):  See H & P  Past Medical History/Comorbidities:   Ms. Rafael Lloyd  has a past medical history of Abnormal EKG (2/16/2016), Anemia due to chronic kidney disease treated with erythropoietin (7/25/2017), Aortic valve insufficiency (2/16/2016), Cancer ([facility] [address] Hyperlipidemia (2/16/2016), Hypertension (2/16/2016), and Obesity (2/16/2016). Ms. Rafael Lloyd  has a past surgical history that includes hx tubal ligation and ir insert tunl cvc w/o port over 5 yr (5/19/2020). Social History/Living Environment:   Home Environment: Trailer/mobile home  # Steps to Enter: 0  One/Two Story Residence: One story  Living Alone: No  Support Systems: Child(ezequiel)  Current DME Used/Available at Home: Wheelchair, Walker, rolling, Commode, bedside, Hospital bed  Tub or Shower Type: Tub/Shower combination  Prior Level of Function/Work/Activity:  At baseline, patient lives with two daughters in trailer home with 0 ANGELIC. Pt receives assistance for ADLs and is dependent on daughters for IADLs. Pt sleeps in hospital bed and reports minimal in home mobility. Pt declines use of DME for transfers or mobility and reports that she \"furniture walks\" from bed to [facility] and uses her R LE to scoot her L LE. Personal Factors:          Sex:  female        Age:  67 y.o.    Number of Personal Factors/Comorbidities that affect the Plan of Care: Extensive review of physical, cognitive, and psychosocial performance (3+):  HIGH COMPLEXITY   ASSESSMENT OF OCCUPATIONAL PERFORMANCE[de-identified]   Activities of Daily Living:   Basic ADLs (From Assessment) Complex ADLs (From Assessment)   Feeding: Setup  Oral Facial Hygiene/Grooming: Setup, Minimum assistance  Bathing: Moderate assistance  Upper Body Dressing: Minimum assistance  Lower Body Dressing: Maximum assistance  Toileting: Maximum assistance Instrumental ADL  Meal Preparation: Total assistance  Homemaking: Total assistance   Grooming/Bathing/Dressing Activities of Daily Living   Grooming  Grooming Assistance: Set-up  Position Performed: Seated edge of bed  Brushing Teeth: Set-up                         Bed/Mat Mobility  Supine to Sit: Minimum assistance  Sit to Supine: Minimum assistance  Sit to Stand: Minimum assistance; Moderate assistance;Assist x2  Stand to Sit: Minimum assistance; Moderate assistance;Assist x2  Scooting: Stand-by assistance     Most Recent Physical Functioning:   Gross Assessment:  AROM: Generally decreased, functional  Strength: Generally decreased, functional  Coordination: Generally decreased, functional               Posture:  Posture (WDL): Exceptions to WDL  Posture Assessment: Forward head, Rounded shoulders  Balance:  Sitting: Intact  Sitting - Static: Good (unsupported)  Sitting - Dynamic: Good (unsupported)  Standing: Impaired  Standing - Static: Poor  Standing - Dynamic : Poor Bed Mobility:  Supine to Sit: Minimum assistance  Sit to Supine: Minimum assistance  Scooting: Stand-by assistance  Wheelchair Mobility:     Transfers:  Sit to Stand: Minimum assistance; Moderate assistance;Assist x2  Stand to Sit: Minimum assistance; Moderate assistance;Assist x2            Patient Vitals for the past 6 hrs:   BP BP Patient Position SpO2 Pulse   05/19/20 1248 139/72 At rest 97 % 70   05/19/20 1410   96 %        Mental Status  Neurologic State: Alert  Orientation Level: Oriented X4  Cognition: Follows commands Physical Skills Involved:  1. Range of Motion  2. Balance  3. Strength  4. Activity Tolerance  5. Gross Motor Control  6. Skin Integrity Cognitive Skills Affected (resulting in the inability to perform in a timely and safe manner):  1. None noted Psychosocial Skills Affected:  1. Habits/Routines  2. Environmental Adaptation   Number of elements that affect the Plan of Care: 5+:  HIGH COMPLEXITY   CLINICAL DECISION MAKIN[address] [place] [address] AM-PAC 6 Clicks   Daily Activity Inpatient Short Form  How much help from another person does the patient currently need. .. Total A Lot A Little None   1. Putting on and taking off regular lower body clothing? [] 1   [x] 2   [] 3   [] 4   2. Bathing (including washing, rinsing, drying)? [] 1   [x] 2   [] 3   [] 4   3. Toileting, which includes using toilet, bedpan or urinal?   [] 1   [x] 2   [] 3   [] 4   4. Putting on and taking off regular upper body clothing? [] 1   [] 2   [x] 3   [] 4   5. Taking care of personal grooming such as brushing teeth? [] 1   [] 2   [x] 3   [] 4   6. Eating meals? [] 1   [] 2   [] 3   [x] 4   © , Trustees of [address] [place] [address], under license to [employer]. All rights reserved      Score:  Initial: 16, completed, 5/15/2020 Most Recent: X (Date: -- )    Interpretation of Tool:  Represents activities that are increasingly more difficult (i.e. Bed mobility, Transfers, Gait). Medical Necessity:     · Skilled intervention continues to be required due to decreased independence, safety, balance, strength, ROM, and activity tolerance for performance of ADL and functional mobility. .  Reason for Services/Other Comments:  · Patient continues to require skilled intervention due to medical complications and patient unable to attend/participate in therapy as expected.    Use of outcome tool(s) and clinical judgement create a POC that gives a: MODERATE COMPLEXITY         TREATMENT:   (In addition to Assessment/Re-Assessment sessions the following treatments were rendered)     Pre-treatment Symptoms/Complaints: \"I can try. \" Pt response to attempt standing. Pain: Initial:   Pain Intensity 1: 0  Post Session:  Unchanged     Self Care: (8 minutes): Procedure(s) utilized to improve and/or restore self-care/home management as related to grooming. Required moderate visual, verbal, manual and tactile cueing to facilitate activities of daily living skills. Pt required Set Up to brush teeth seated edge of bed. Therapeutic Activity: (17 minutes): Therapeutic activities including Bed transfers to improve mobility, strength, balance and coordination. Required minimal to moderate to promote static and dynamic balance in standing. Pt required Min A x 2 with use of RW for sit to stand. Upon standing, pt with poor static and dynamic standing balance with anterior lean. Pt provided with moderate verbal, visual, and tactile cueing to hold head upright and bring pelvis forward to improve posture and balance. Pt unable to complete and required Min A x 2 for stand to sit. Pt rested x 2 minutes. Pt with increased fatigue and required Mod A x 2 with use of RW for second sit to stand. Pt with more impaired static and dynamic standing balance and required Mod A x 2 for stand to sit. Braces/Orthotics/Lines/Etc:   · O2 Device: Room Air   · Wound Vac L Hip  · Noland Catheter at start of session. CNA in room and removed prior to end of OT treatment session. Treatment/Session Assessment:    · Response to Treatment:  Pt not observed to be hypotensive during session. · Interdisciplinary Collaboration:   o Occupational Therapist  o Registered Nurse  o Rehabilitation Attendant  o Certified Nursing Assistant/Patient Care Technician  · After treatment position/precautions:   o Supine in bed  o Bed/Chair-wheels locked  o Bed in low position  o Call light within reach  o RN notified  o Head of bed elevated for increased safety for self-feeding.    · Compliance with Program/Exercises: Compliant most of the time, Will assess as treatment progresses. · Recommendations/Intent for next treatment session: \"Next visit will focus on advancements to more challenging activities and reduction in assistance provided\".   Total Treatment Duration:  OT Patient Time In/Time Out  Time In: 1603  Time Out: 1628     [place] Me[place]

## 2020-05-19 NOTE — PROGRESS NOTES
Physical Therapy Note:    Attempted to see patient this AM for physical therapy treatment session. Patient currently off of the floor at IR for PICC placement. Will follow and re-attempt as schedule permits/patient available.  Thank you,    Anne Cavazos, PT, DPT  5/19/2020

## 2020-05-19 NOTE — PROGRESS NOTES
Progress Note    Patient: Maura Boob MRN: [id number]  SSN: [id number]    YOB: 1945  Age: 67 y.o. Sex: female      Admit Date: 5/13/2020    LOS: 6 days     Subjective:   F/U MSSA wound    \"67year old female with HTN, HLD, Obesity, HTN, CKD stage IV (baseline Cr 2.4-3.0), hx of metastatic breast Ca(on letrozole and palbociclib), recent pathological fracture of left femur s/p intramedullary nail insertion (4/27) who was admitted due to worsening pain and serous drainage from her wound. She was started on IV antibiotics. She underwent I&D of left hip wound by Orthopedics on 5/15 and wound vac was placed. Initial BCx and Wound Cx showed MSSA and strep. \" Left hip hardware also removed during surgery. PICC inserted on 5/19. Last vanc trough normal. No chest pain or SOB.  We are waiting to get abx approved for home health   Current Facility-Administered Medications   Medication Dose Route Frequency    aspirin tablet 325 mg  325 mg Oral DAILY    ceFAZolin (ANCEF) 1,000 mg in 0.9% sodium chloride (MBP/ADV) 50 mL ADV  1 g IntraVENous Q12H    bisacodyL (DULCOLAX) tablet 5 mg  5 mg Oral DAILY    polyethylene glycol (MIRALAX) packet 17 g  17 g Oral DAILY    magnesium oxide (MAG-OX) tablet 400 mg  400 mg Oral BID    amLODIPine (NORVASC) tablet 5 mg  5 mg Oral DAILY    calcium carbonate (OS-JORGE) tablet 500 mg [elemental]  500 mg Oral BID WITH MEALS    docusate sodium (COLACE) capsule 100 mg  100 mg Oral BID PRN    oxyCODONE IR (ROXICODONE) tablet 5 mg  5 mg Oral Q4H PRN    [START ON 5/21/2020] palbociclib cap 75 mg (Patient Supplied)  75 mg Oral DAILY    triamterene-hydroCHLOROthiazide (MAXZIDE) 37.5-25 mg per tablet 1 Tab  1 Tab Oral DAILY    sodium chloride (NS) flush 5-40 mL  5-40 mL IntraVENous Q8H    sodium chloride (NS) flush 5-40 mL  5-40 mL IntraVENous PRN    acetaminophen (TYLENOL) tablet 650 mg  650 mg Oral Q4H PRN    0.9% sodium chloride infusion  75 mL/hr IntraVENous CONTINUOUS    morphine 10 mg/ml injection 5 mg  5 mg IntraVENous Q4H PRN       Objective:     Vitals:    05/19/20 0905 05/19/20 0909 05/19/20 0924 05/19/20 0939   BP: 185/72 175/74 156/67 169/74   Pulse: 69 69 69 70   Resp: 16 16 18 18   Temp:       SpO2: 96% 94% 95% 95%   Weight:       Height:             Intake and Output:  Current Shift: No intake/output data recorded. Last three shifts: 05/17 1901 - 05/19 0700  In: 240 [P.O.:240]  Out: 2075 [Urine:1575]    Physical Exam:   General:  Alert, cooperative, no distress, appears stated age. Eyes:  Conjunctivae/corneas clear. Ears:  Normal TMs and external ear canals both ears. Nose: Nares normal. Septum midline. Mouth/Throat: Lips, mucosa, and tongue normal.    Neck:  no JVD. Back:   deferred. Lungs:   Clear to auscultation bilaterally. Heart:  Regular rate and rhythm, S1, S2 normal   Abdomen:   Soft, non-tender. Bowel sounds normal. Obese. Extremities: Extremities normal, atraumatic, no cyanosis or edema. Pulses: 2+ and symmetric all extremities. Skin: Healing left lateral hip wound that is 9CM X 1/2CM with wound vac in place. Good suctioning seen. Non tender to area of concern. Right anterior central line also seen   Lymph nodes: Cervical, supraclavicular, and axillary nodes normal.   Neurologic: CNII-XII intact. Limited ROM in bed.         Lab/Data Review:    Recent Results (from the past 24 hour(s))   VANCOMYCIN, RANDOM    Collection Time: 05/18/20  2:01 PM   Result Value Ref Range    Vancomycin, random 25.8 UG/ML   CBC WITH AUTOMATED DIFF    Collection Time: 05/19/20  4:48 AM   Result Value Ref Range    WBC 4.5 4.3 - 11.1 K/uL    RBC 2.53 (L) 4.05 - 5.2 M/uL    HGB 8.0 (L) 11.7 - 15.4 g/dL    HCT 26.5 (L) 35.8 - 46.3 %    .7 (H) 79.6 - 97.8 FL    MCH 31.6 26.1 - 32.9 PG    MCHC 30.2 (L) 31.4 - 35.0 g/dL    RDW 18.5 (H) 11.9 - 14.6 %    PLATELET 553 771 - 909 K/uL    MPV 11.0 9.4 - 12.3 FL    ABSOLUTE NRBC 0.00 0.0 - 0.2 K/uL NEUTROPHILS 56 43 - 78 %    LYMPHOCYTES 19 13 - 44 %    MONOCYTES 14 (H) 4.0 - 12.0 %    EOSINOPHILS 2 0.5 - 7.8 %    BASOPHILS 1 0.0 - 2.0 %    IMMATURE GRANULOCYTES 8 (H) 0.0 - 5.0 %    ABS. NEUTROPHILS 2.5 1.7 - 8.2 K/UL    ABS. LYMPHOCYTES 0.9 0.5 - 4.6 K/UL    ABS. MONOCYTES 0.6 0.1 - 1.3 K/UL    ABS. EOSINOPHILS 0.1 0.0 - 0.8 K/UL    ABS. BASOPHILS 0.0 0.0 - 0.2 K/UL    ABS. IMM. GRANS. 0.4 0.0 - 0.5 K/UL    RBC COMMENTS SLIGHT  ANISOCYTOSIS + POIKILOCYTOSIS        WBC COMMENTS Result Confirmed By Smear      PLATELET COMMENTS ADEQUATE      DF AUTOMATED     METABOLIC PANEL, COMPREHENSIVE    Collection Time: 05/19/20  4:48 AM   Result Value Ref Range    Sodium 140 136 - 145 mmol/L    Potassium 4.4 3.5 - 5.1 mmol/L    Chloride 107 98 - 107 mmol/L    CO2 26 21 - 32 mmol/L    Anion gap 7 7 - 16 mmol/L    Glucose 87 65 - 100 mg/dL    BUN 50 (H) 8 - 23 MG/DL    Creatinine 2.16 (H) 0.6 - 1.0 MG/DL    GFR est AA 29 (L) >60 ml/min/1.73m2    GFR est non-AA 24 (L) >60 ml/min/1.73m2    Calcium 8.9 8.3 - 10.4 MG/DL    Bilirubin, total 0.6 0.2 - 1.1 MG/DL    ALT (SGPT) 28 12 - 65 U/L    AST (SGOT) 40 (H) 15 - 37 U/L    Alk. phosphatase 211 (H) 50 - 136 U/L    Protein, total 5.8 (L) 6.3 - 8.2 g/dL    Albumin 2.3 (L) 3.2 - 4.6 g/dL    Globulin 3.5 2.3 - 3.5 g/dL    A-G Ratio 0.7 (L) 1.2 - 3.5         Assessment/ Plan:     Principal Problem:    Left hip postoperative wound infection (5/13/2020)    Active Problems:    Hypertension (2/16/2016)      Overview: UNSPECIFIED       Severe obesity ([address] (9/24/2019)      Stage 4 chronic kidney disease ([facility] [address] (5/1/2020)    MSSA wound after left total hip repair s/p I&D on 5/15- Would culture growing MSSA. ID following. Will need antibiotics for 6 weeks. Wound vac in place. Currently on Ancef. CKD stage 4 - stable    HTN - amlodipine 5mg daily.  Triamterene HCTZ    Hx of breast cancer - Letrozole that I will restart since patient states she still takes this at home and is due to have and Ibrance     Likely dc tomorrow if we have home health abx and wound vac in place. Dc jennifer    DVT prophylaxis - ASA high dose.    Signed By: Ladarius Matute DO     May 19, 2020

## 2020-05-19 NOTE — PROGRESS NOTES
TRANSFER - OUT REPORT:    Verbal report given to Marek Burns RN(name) on [place]  being transferred to IR recovery(unit) for routine progression of care       Report consisted of patients Situation, Background, Assessment and   Recommendations(SBAR). Information from the following report(s) SBAR, Procedure Summary and MAR was reviewed with the receiving nurse. Opportunity for questions and clarification was provided. Conscious Sedation:   25Mcg of Fentanyl administered  0.5 Mg of Versed administered    Pt tolerated procedure well.      Visit Vitals  /77   Pulse 73   Temp 98.8 °F (37.1 °C)   Resp 13   Ht 5' 7\" (1.702 m)   Wt 109.8 kg (242 lb)   SpO2 100%   BMI 37.90 kg/m²     Past Medical History:   Diagnosis Date    Abnormal EKG 2/16/2016    Anemia due to chronic kidney disease treated with erythropoietin 7/25/2017    Aortic valve insufficiency 2/16/2016    Cancer ([facility] [address]     breast    Hyperlipidemia 2/16/2016    Hypertension 2/16/2016    UNSPECIFIED     Obesity 2/16/2016    UNSPECIFIED      Peripheral IV 05/13/20 Right Forearm (Active)   Site Assessment Clean, dry, & intact 5/18/2020  7:40 PM   Phlebitis Assessment 0 5/18/2020  7:40 PM   Infiltration Assessment 0 5/18/2020  7:40 PM   Dressing Status Clean, dry, & intact 5/18/2020  7:40 PM   Dressing Type Tape;Transparent 5/18/2020  7:40 PM   Hub Color/Line Status Pink;Capped 5/18/2020  7:40 PM   Alcohol Cap Used No 5/15/2020  9:58 AM     Single Lumen Venous Catheter tunneled  05/19/20 Right Internal jugular (Active)

## 2020-05-19 NOTE — PROGRESS NOTES
TRANSFER - OUT REPORT:    Verbal report given to [facility] RN(name) on [employer]  being transferred to [facility](unit) for routine progression of care       Report consisted of patients Situation, Background, Assessment and   Recommendations(SBAR). Information from the following report(s) SBAR, Procedure Summary and MAR was reviewed with the receiving nurse. Opportunity for questions and clarification was provided. Conscious Sedation:   25 Mcg of Fentanyl administered  0.5 Mg of Versed administered    Pt tolerated procedure well.      Visit Vitals  /77   Pulse 73   Temp 98.8 °F (37.1 °C)   Resp 13   Ht 5' 7\" (1.702 m)   Wt 109.8 kg (242 lb)   SpO2 100%   BMI 37.90 kg/m²     Past Medical History:   Diagnosis Date    Abnormal EKG 2/16/2016    Anemia due to chronic kidney disease treated with erythropoietin 7/25/2017    Aortic valve insufficiency 2/16/2016    Cancer ([facility] [address]     breast    Hyperlipidemia 2/16/2016    Hypertension 2/16/2016    UNSPECIFIED     Obesity 2/16/2016    UNSPECIFIED      Peripheral IV 05/13/20 Right Forearm (Active)   Site Assessment Clean, dry, & intact 5/18/2020  7:40 PM   Phlebitis Assessment 0 5/18/2020  7:40 PM   Infiltration Assessment 0 5/18/2020  7:40 PM   Dressing Status Clean, dry, & intact 5/18/2020  7:40 PM   Dressing Type Tape;Transparent 5/18/2020  7:40 PM   Hub Color/Line Status Pink;Capped 5/18/2020  7:40 PM   Alcohol Cap Used No 5/15/2020  9:58 AM     Single Lumen Venous Catheter tunneled  05/19/20 Right Internal jugular (Active)

## 2020-05-19 NOTE — PROGRESS NOTES
Referral sent to [facility], [place] per ID orders. [employer] liaison updated on dosing change and she will obtain a new quote. Message has been sent via [employer] for Dr. Danielle Carter to sign wound vac orders.

## 2020-05-19 NOTE — PROCEDURES
Department of Interventional Radiology  [phone]        Interventional Radiology Brief Procedure Note    Patient: Maggie Rivera MRN: [id number]  SSN: [id number]    YOB: 1945  Age: 67 y.o. Sex: female      Date of Procedure: 5/19/2020    Pre-Procedure Diagnosis: Need venous access for long term antibiotics    Post-Procedure Diagnosis: SAME    Procedure(s): Tunneled Central Venous Catheter    Brief Description of Procedure: Successful right IJ approach tunneled CVC (5 Fr SL powerline). Catheter tip in proximal RA. Ready for use.      Performed By: Bunny Danielle MD     Assistants: None    Anesthesia:Moderate Sedation    Estimated Blood Loss: Less than 10ml    Specimens:  None    Implants:  Tunnelled Central Venous Catheter    Findings: As above    Complications: None    Signed By: Bunny Danielle MD     May 19, 2020

## 2020-05-20 PROCEDURE — 3331090002 HH PPS REVENUE DEBIT

## 2020-05-20 PROCEDURE — 3331090001 HH PPS REVENUE CREDIT

## 2020-05-20 PROCEDURE — 74011250636 HC RX REV CODE- 250/636: Performed by: ORTHOPAEDIC SURGERY

## 2020-05-20 PROCEDURE — 74011250636 HC RX REV CODE- 250/636: Performed by: NURSE PRACTITIONER

## 2020-05-20 PROCEDURE — 65270000029 HC RM PRIVATE

## 2020-05-20 PROCEDURE — 74011250637 HC RX REV CODE- 250/637: Performed by: ORTHOPAEDIC SURGERY

## 2020-05-20 PROCEDURE — 77030019952 HC CANSTR VAC ASST KCON -B

## 2020-05-20 PROCEDURE — 74750000023 HC WOUND THERAPY

## 2020-05-20 PROCEDURE — 74011250637 HC RX REV CODE- 250/637: Performed by: INTERNAL MEDICINE

## 2020-05-20 PROCEDURE — 74011000250 HC RX REV CODE- 250: Performed by: NURSE PRACTITIONER

## 2020-05-20 PROCEDURE — 97530 THERAPEUTIC ACTIVITIES: CPT

## 2020-05-20 PROCEDURE — 97110 THERAPEUTIC EXERCISES: CPT

## 2020-05-20 PROCEDURE — 77030019934 HC DRSG VAC ASST KCON -B

## 2020-05-20 PROCEDURE — 97606 NEG PRS WND THER DME>50 SQCM: CPT

## 2020-05-20 RX ADMIN — CEFAZOLIN 2 G: 10 INJECTION, POWDER, FOR SOLUTION INTRAVENOUS at 04:50

## 2020-05-20 RX ADMIN — MORPHINE SULFATE 5 MG: 10 INJECTION INTRAVENOUS at 10:07

## 2020-05-20 RX ADMIN — Medication 400 MG: at 08:38

## 2020-05-20 RX ADMIN — Medication 5 ML: at 04:50

## 2020-05-20 RX ADMIN — Medication 5 ML: at 23:35

## 2020-05-20 RX ADMIN — AMLODIPINE BESYLATE 5 MG: 5 TABLET ORAL at 08:38

## 2020-05-20 RX ADMIN — CEFAZOLIN 2 G: 10 INJECTION, POWDER, FOR SOLUTION INTRAVENOUS at 16:47

## 2020-05-20 RX ADMIN — OXYCODONE 5 MG: 5 TABLET ORAL at 20:39

## 2020-05-20 RX ADMIN — TRIAMTERENE AND HYDROCHLOROTHIAZIDE 1 TABLET: 37.5; 25 TABLET ORAL at 08:38

## 2020-05-20 RX ADMIN — ASPIRIN 325 MG ORAL TABLET 325 MG: 325 PILL ORAL at 08:38

## 2020-05-20 RX ADMIN — POLYETHYLENE GLYCOL 3350 17 G: 17 POWDER, FOR SOLUTION ORAL at 08:38

## 2020-05-20 RX ADMIN — Medication 400 MG: at 16:47

## 2020-05-20 RX ADMIN — Medication 10 ML: at 14:18

## 2020-05-20 NOTE — PROGRESS NOTES
Infectious Disease Progress Note    Impression:   · Early onset L hip surgical site HW associated infection, superficial cultures with MSSA, superficial cx with MSSA/enterococcus  · S/p partial hardware removal (significant HW retained) and I&D 5/15, op cultures pending  · Blood cultures with 1/4 bottles strep species, potential contaminant  · History of pathologic L hip fracture requiring HW repair  c/b hardware failure and recurrent fracture 2020 s/p hardware exchange   · IDC with mets to bone  · CKD stage 4, baseline Cr around 2.8    Plan:   · Continue Cefazolin 2g IV Q12hrs for 6 weeks, EOT 20  · OPAT orders sent/ tunneled PICC placed yesterday  · ID follow up 20 @ 10:00am and 20 @ 2:20pm  · Ok to DC from ID standpoint  · ID will sign off, please call with questions or concerns      Anti-infectives:   1. Vancomycin -  2. Cefazolin -    Subjective:   Denies fevers, chills, sweats, nausea, vomiting or diarrhea. Tolerated Cefazolin. ID treatment plan reviewed. Allergies   Allergen Reactions    Penicillins Rash        Review of Systems:  A comprehensive review of systems was negative except for that written in the History of Present Illness. Objective:   Blood pressure 130/77, pulse 68, temperature 98 °F (36.7 °C), resp. rate 17, height 5' 7\" (1.702 m), weight 109.8 kg (242 lb), SpO2 95 %.   Temp (24hrs), Av.2 °F (36.8 °C), Min:98 °F (36.7 °C), Max:98.6 °F (37 °C)     General:  Alert, cooperative, no acute distress, appears stated age, obese   Head:  Normocephalic, atraumatic    Eyes:  Anicteric, no drainage, not injected, EOMI   Neck: Supple, symmetrical, trachea midline, no JVD   Lungs:   Clear throughout lung fields without increased work of breathing or audible wheezes   Heart:  Regular rate and rhythm, without audible murmur, rub, or gallop   Abdomen:   Soft, non-tender, no guarding, no distention, bowel sounds active   Extremities: Extremities normal, atraumatic, no cyanosis, left thigh edema: wound vac intact   Skin: Skin color, texture, turgor normal, no rashes or lesions. Lines/Devices: R chest tunneled PICC                 Data Review:   No results found for this or any previous visit (from the past 24 hour(s)). Microbiology:    All Micro Results     Procedure Component Value Units Date/Time    CULTURE, ANAEROBIC [[id number]] Collected:  05/15/20 0910    Order Status:  Completed Specimen:  Hip Updated:  05/20/20 0954     Special Requests: NO SPECIAL REQUESTS        Culture result:       NO ANAEROBIC GROWTH IN 5 DAYS          CULTURE, ANAEROBIC [[id number]] Collected:  05/15/20 0903    Order Status:  Completed Specimen:  Hip Updated:  05/20/20 0953     Special Requests: LEFT        Culture result:       NO ANAEROBIC GROWTH IN 5 DAYS          CULTURE, BLOOD [[id number]] Collected:  05/14/20 1926    Order Status:  Completed Specimen:  Blood Updated:  05/19/20 0730     Special Requests: --        LEFT  ARM       Culture result: NO GROWTH 5 DAYS       CULTURE, BLOOD [[id number]] Collected:  05/14/20 1926    Order Status:  Completed Specimen:  Blood Updated:  05/19/20 0730     Special Requests: --        NO SPECIAL REQUESTS  LEFT  HAND       Culture result: NO GROWTH 5 DAYS       CULTURE, ANAEROBIC [[id number]] Collected:  05/15/20 0904    Order Status:  Completed Specimen:  Hip Updated:  05/18/20 0829     Special Requests: LEFT        Culture result:       SUBCULTURE IS NECESSARY TO DETERMINE PRESENCE OR ABSENCE OF ANAEROBIC BACTERIA IN THIS CULTURE. FURTHER REPORT TO FOLLOW AFTER INCUBATION OF SUBCULTURE.           CULTURE, TISSUE Coit Lute STAIN [[id number]]  (Abnormal) Collected:  05/15/20 0910    Order Status:  Completed Specimen:  Hip Updated:  05/18/20 0737     Special Requests: NO SPECIAL REQUESTS        GRAM STAIN 0 TO 1 WBCS/OIF      FEW GRAM POSITIVE COCCI        Culture result:       MODERATE STAPHYLOCOCCUS AUREUS                  For Susceptibility Refer to Culture  [id number]      CULTURE, TISSUE Nadira Nicolas STAIN [[id number]]  (Abnormal)  (Susceptibility) Collected:  05/15/20 0903    Order Status:  Completed Specimen:  Hip Updated:  05/18/20 0734     Special Requests: NO SPECIAL REQUESTS        GRAM STAIN 0 TO 1 WBCS/OIF      FEW GRAM POSITIVE COCCI        Culture result:       HEAVY STAPHYLOCOCCUS AUREUS          CULTURE, BODY FLUID W Caridad Mad [[id number]]  (Abnormal)  (Susceptibility) Collected:  05/15/20 0904    Order Status:  Completed Specimen:  Hip Updated:  05/18/20 0732     Special Requests: LEFT        GRAM STAIN 50  WBCS/OIF            MODERATE GRAM POSITIVE COCCI           Culture result:       HEAVY STAPHYLOCOCCUS AUREUS          CULTURE, BLOOD [[id number]] Collected:  05/13/20 1722    Order Status:  Completed Specimen:  Blood Updated:  05/18/20 0709     Special Requests: --        LEFT  Antecubital       Culture result: NO GROWTH 5 DAYS       CULTURE, WOUND Nadira Nicolas STAIN [[id number]]  (Abnormal)  (Susceptibility) Collected:  05/13/20 1723    Order Status:  Completed Specimen:  Wound Drainage Updated:  05/17/20 0727     Special Requests: SWAB L HIP WOUND     GRAM STAIN 0 TO 1 WBCS SEEN PER OIF            MODERATE GRAM POSITIVE COCCI           Culture result:       HEAVY STAPHYLOCOCCUS AUREUS                  HEAVY ENTEROCOCCUS FAECALIS GROUP D            LIGHT  NORMAL SKIN HOMA ISOLATED       CULTURE, BLOOD [[id number]]  (Abnormal) Collected:  05/13/20 2040    Order Status:  Completed Specimen:  Blood Updated:  05/16/20 0642     Special Requests: --        NO SPECIAL REQUESTS  LEFT  HAND       GRAM STAIN AEROBIC BOTTLE POSITIVE         GRAM POS COCCI IN CHAINS               RESULTS VERIFIED, PHONED TO AND READ BACK BY MERLENE QUEZADA RN @4777 5.14.2020 EOR           Culture result:       ALPHA STREPTOCOCCUS, NOT S.  PNEUMONIAE            REFER TO BIOFIRE ACC [id number]            THIS ORGANISM MAY BE INDICATIVE OF CULTURE CONTAMINATION, HOWEVER, CLINICAL CORRELATION NEEDS TO BE EVALUATED, AS EACH CASE IS UNIQUE. CULTURE, Cesar Kelch STAIN [[id number]] Collected:  05/15/20 0900    Order Status:  Canceled Specimen:  Wound from Hip     BLOOD CULTURE ID PANEL [[id number]]  (Abnormal) Collected:  05/13/20 2040    Order Status:  Completed Specimen:  Blood Updated:  05/15/20 0756     Acc. no. from Micro Order [id number]     Streptococcus Detected        Comment: RESULTS VERIFIED, PHONED TO AND READ BACK BY  Emmer Hamman RN @0038 5/15/20 HF          INTERPRETATION       Gram positive cocci. Identified by realtime PCR as Streptococcus species (not agalactiae, pyogenes, or pneumoniae). Comment: Consider discontinuation of IV vancomycin and using an anti-streptococcal beta-lactam (ceftriaxone/cefotaxime (peds))             Studies:    5/15 XR LT  Hip  IMPRESSION:   1. Partial left femoral hardware removal with a redemonstrated subtrochanteric  subacute appearing fracture of the left femur with a left femoral  cephalomedullary jeri.     Signed By: Baron Michelle NP     May 20, 2020

## 2020-05-20 NOTE — PROGRESS NOTES
Problem: Pressure Injury - Risk of  Goal: *Prevention of pressure injury  Description: Document Arturo Scale and appropriate interventions in the flowsheet. Outcome: Progressing Towards Goal  Note: Pressure Injury Interventions:  Sensory Interventions: Use 30-degree side-lying position, Sit a 90-degree angle/use footstool if needed, Pressure redistribution bed/mattress (bed type), Check visual cues for pain, Avoid rigorous massage over bony prominences, Assess need for specialty bed, Assess changes in LOC, Chair cushion, Discuss PT/OT consult with provider, Float heels, Keep linens dry and wrinkle-free, Maintain/enhance activity level, Monitor skin under medical devices, Pad between skin to skin, Minimize linen layers, Suspension boots, Turn and reposition approx. every two hours (pillows and wedges if needed)    Moisture Interventions: Absorbent underpads, Internal/External urinary devices    Activity Interventions: Increase time out of bed    Mobility Interventions: Pressure redistribution bed/mattress (bed type)    Nutrition Interventions: Document food/fluid/supplement intake, Offer support with meals,snacks and hydration    Friction and Shear Interventions: Lift team/patient mobility team, Foam dressings/transparent film/skin sealants, HOB 30 degrees or less, Lift sheet                Problem: Patient Education: Go to Patient Education Activity  Goal: Patient/Family Education  Outcome: Progressing Towards Goal     Problem: Falls - Risk of  Goal: *Absence of Falls  Description: Document Maurizio Fall Risk and appropriate interventions in the flowsheet.   Outcome: Progressing Towards Goal  Note: Fall Risk Interventions:  Mobility Interventions: Bed/chair exit alarm    Mentation Interventions: Adequate sleep, hydration, pain control    Medication Interventions: Patient to call before getting OOB    Elimination Interventions: Call light in reach    History of Falls Interventions: Bed/chair exit alarm Problem: Patient Education: Go to Patient Education Activity  Goal: Patient/Family Education  Outcome: Progressing Towards Goal     Problem: Infection - Risk of, Surgical Site Infection  Goal: *Absence of surgical site infection signs and symptoms  Outcome: Progressing Towards Goal     Problem: Patient Education: Go to Patient Education Activity  Goal: Patient/Family Education  Outcome: Progressing Towards Goal     Problem: Patient Education: Go to Patient Education Activity  Goal: Patient/Family Education  Outcome: Progressing Towards Goal     Problem: Patient Education: Go to Patient Education Activity  Goal: Patient/Family Education  Outcome: Progressing Towards Goal     Problem: Patient Education: Go to Patient Education Activity  Goal: Patient/Family Education  Outcome: Progressing Towards Goal     Problem: Hip Fracture: Post-Op Day 4  Goal: Activity/Safety  Outcome: Progressing Towards Goal  Goal: Diagnostic Test/Procedures  Outcome: Progressing Towards Goal  Goal: Nutrition/Diet  Outcome: Progressing Towards Goal  Goal: Medications  Outcome: Progressing Towards Goal  Goal: Respiratory  Outcome: Progressing Towards Goal  Goal: Treatments/Interventions/Procedures  Outcome: Progressing Towards Goal  Goal: Psychosocial  Outcome: Progressing Towards Goal  Goal: Discharge Planning  Outcome: Progressing Towards Goal  Goal: *Met physical therapy criteria for discharge to next level of care  Outcome: Progressing Towards Goal  Goal: *Optimal pain control at patient's stated goal  Outcome: Progressing Towards Goal  Goal: *Hemodynamically stable  Outcome: Progressing Towards Goal  Goal: *Tolerating diet  Outcome: Progressing Towards Goal  Goal: *Active bowel function  Outcome: Progressing Towards Goal  Goal: *Adequate urinary output  Outcome: Progressing Towards Goal  Goal: *Absence of skin breakdown  Outcome: Progressing Towards Goal  Goal: *Patient verbalizes understanding of discharge instructions  Outcome: Progressing Towards Goal

## 2020-05-20 NOTE — PROGRESS NOTES
The documentation for this period is being entered following the guidelines as defined in the [address] [place] [address] downtime policy by Kirsten Truong RN. Scheduled downtime [id number].

## 2020-05-20 NOTE — PROGRESS NOTES
Problem: Mobility Impaired (Adult and Pediatric)  Goal: *Acute Goals and Plan of Care (Insert Text)  Description:     LTG:  (1.)Ms. Abram Stevenson will move from supine to sit and sit to supine , scoot up and down and roll side to side in bed with MODIFIED INDEPENDENCE within 7 treatment day(s). (2.)Ms. Abram Stevenson will transfer from bed to chair and chair to bed with MINIMAL ASSIST using the least restrictive device within 7 treatment day(s). (3.)Ms. Abram Stevenson will ambulate with MINIMAL ASSIST for 5 feet with the least restrictive device within 7 treatment day(s). ________________________________________________________________________________________________     Outcome: Progressing Towards Goal     PHYSICAL THERAPY: Daily Note and AM 5/20/2020  INPATIENT: PT Visit Days : 4  Payor: Claudette Clinton / Plan: [address] [place] [address] HMO / Product Type: [employer] /       NAME/AGE/GENDER: Alexy Mujica is a 67 y.o. female   PRIMARY DIAGNOSIS: Left hip postoperative wound infection [T81.49XA] Left hip postoperative wound infection Left hip postoperative wound infection  Procedure(s) (LRB):  LEFT HIP WOUND DEBRIDEMENT WITH HARDWARE REMOVAL AND APPLICATION OF WOUND VAC (Left)  5 Days Post-Op  ICD-10: Treatment Diagnosis:    · Generalized Muscle Weakness (M62.81)  · Other lack of cordination (R27.8)  · Difficulty in walking, Not elsewhere classified (R26.2)  · Other abnormalities of gait and mobility (R26.89)  · History of falling (Z91.81)  · Unspecified Lack of Coordination (R27.9)   Precaution/Allergies:  Penicillins      ASSESSMENT:     Ms. Abram Stevenson is a 67year old F who presents I&D of left hip. Prior to hospital admission pt lives with her two adult daughters in 3 story home with no step(s) to enter. Pt endorses 2-3 falls in past 6 months, and reports being nearly bedbound at baseline. Prior to admission Ms. Abram Stevenson uses furniture walking for mobility, limited to distances of less than 3-5 ft.  She reports having a w/c, RW, and hospital bed.  5/20- Patient seen today for physical therapy treatment session: presents supine in bed, is very agreeable to therapy. Pt performed supine to sit with mod assist.  Pt stood with min/mod assist x 2 and ambulated about 3' to chair using rolling walker and min assist x 2. Pt does ambulate with increased trunk flexion, slow gait speed, and shuffling steps. Pt is standing more upright today. Pt sat in chair and performed below LE exercises. Pt was left sitting up with needs in reach and alarm on. Pt is making progress towards goals with improved mobility and requiring less assistance today. Will continue with POC. Recommend resuming HHPT services to address transfers and functional activity tolerance. This section established at most recent assessment   PROBLEM LIST (Impairments causing functional limitations):  1. Decreased Strength  2. Decreased ADL/Functional Activities  3. Decreased Transfer Abilities  4. Decreased Ambulation Ability/Technique  5. Decreased Balance  6. Decreased Activity Tolerance   INTERVENTIONS PLANNED: (Benefits and precautions of physical therapy have been discussed with the patient.)  1. Balance Exercise  2. Bed Mobility  3. Gait Training  4. Neuromuscular Re-education/Strengthening  5. Range of Motion (ROM)  6. Therapeutic Activites  7. Therapeutic Exercise/Strengthening  8. Transfer Training     TREATMENT PLAN: Frequency/Duration: daily for duration of hospital stay  Rehabilitation Potential For Stated Goals: Good     REHAB RECOMMENDATIONS (at time of discharge pending progress):    Placement: It is my opinion, based on this patient's performance to date, that Ms. Vladislav James may benefit from [address] after discharge due to the functional deficits listed above that are likely to improve with skilled rehabilitation because he/she has multiple medical issues that affect his/her functional mobility in the community.   Equipment:    None at this time              HISTORY:   History of Present Injury/Illness (Reason for Referral):  Patient is a 67 y.o. female who presents to the ER due to pain and drainage from left hip wound. She had a left hip replacement 2 weeks ago and had stitches removed on 5/12. Today, she developed pain after getting up from toilet and has had increased drainage. She had been ambulating prior to this with some assistance but now states she cannot. We are asked to admit her for antibiotics, PT/OT, and wound evaluation. She denies fevers, but does report chills. Denies n/v/d, chest or abdominal pain. Her daughters help care for her at home. Past Medical History/Comorbidities:   Ms. Torie Morlaes  has a past medical history of Abnormal EKG (2/16/2016), Anemia due to chronic kidney disease treated with erythropoietin (7/25/2017), Aortic valve insufficiency (2/16/2016), Cancer ([facility] [address] Hyperlipidemia (2/16/2016), Hypertension (2/16/2016), and Obesity (2/16/2016). Ms. Torie Morales  has a past surgical history that includes hx tubal ligation and ir insert tunl cvc w/o port over 5 yr (5/19/2020). Social History/Living Environment:   Home Environment: Trailer/mobile home  # Steps to Enter: 0  One/Two Story Residence: One story  Living Alone: No  Support Systems: Child(ezequiel)  Current DME Used/Available at Home: Wheelchair, Walker, rolling, Commode, bedside, Hospital bed  Tub or Shower Type: Tub/Shower combination  Prior Level of Function/Work/Activity:  Close to bedbound: ambulation limited to bedside transfers. Has RW but doesn't use it. Adult daughters assist with changing/ bathing.      Number of Personal Factors/Comorbidities that affect the Plan of Care: 1-2: MODERATE COMPLEXITY   EXAMINATION:   Most Recent Physical Functioning:   Gross Assessment:                  Posture:     Balance:  Sitting: Intact  Sitting - Static: Good (unsupported)  Sitting - Dynamic: Good (unsupported)  Standing - Static: Fair  Standing - Dynamic : Fair Bed Mobility:  Supine to Sit: Minimum assistance; Moderate assistance  Scooting: Stand-by assistance  Wheelchair Mobility:     Transfers:  Sit to Stand: Minimum assistance; Moderate assistance;Assist x2  Stand to Sit: Minimum assistance;Assist x2  Gait:  Left Side Weight Bearing: As tolerated  Base of Support: Widened  Speed/Madelaine: Delayed;Slow;Shuffled  Step Length: Left shortened;Right shortened  Gait Abnormalities: Decreased step clearance;Shuffling gait(increased trunk flexion)  Distance (ft): 3 Feet (ft)  Assistive Device: Walker, rolling  Ambulation - Level of Assistance: Minimal assistance;Assist x2      Body Structures Involved:  1. Bones  2. Joints  3. Muscles Body Functions Affected:  1. Neuromusculoskeletal  2. Movement Related  3. Skin Related Activities and Participation Affected:  1. General Tasks and Demands  2. Mobility  3. Self Care  4. Domestic Life  5. Interpersonal Interactions and Relationships  6. Community, Social and [place]   Number of elements that affect the Plan of Care: 4+: HIGH COMPLEXITY   CLINICAL PRESENTATION:   Presentation: Stable and uncomplicated: LOW COMPLEXITY   CLINICAL DECISION MAKIN [facility] Inpatient Short Form  How much difficulty does the patient currently have. .. Unable A Lot A Little None   1. Turning over in bed (including adjusting bedclothes, sheets and blankets)? [] 1   [x] 2   [] 3   [] 4   2. Sitting down on and standing up from a chair with arms ( e.g., wheelchair, bedside commode, etc.)   [] 1   [x] 2   [] 3   [] 4   3. Moving from lying on back to sitting on the side of the bed? [] 1   [] 2   [x] 3   [] 4   How much help from another person does the patient currently need. .. Total A Lot A Little None   4. Moving to and from a bed to a chair (including a wheelchair)? [] 1   [x] 2   [] 3   [] 4   5. Need to walk in hospital room? [] 1   [x] 2   [] 3   [] 4   6. Climbing 3-5 steps with a railing?    [x] 1   [] 2   [] 3   [] 4   © 2007, Trustees of [address] [place] [address] [address] [address], under license to [employer]. All rights reserved      Score:  Initial: 12 Most Recent: X (Date: -- )    Interpretation of Tool:  Represents activities that are increasingly more difficult (i.e. Bed mobility, Transfers, Gait). Medical Necessity:     · Skilled intervention continues to be required due to decreased safety w/ functional mobility. Reason for Services/Other Comments:  ·    Use of outcome tool(s) and clinical judgement create a POC that gives a: Clear prediction of patient's progress: LOW COMPLEXITY            TREATMENT:   (In addition to Assessment/Re-Assessment sessions the following treatments were rendered)   Pre-treatment Symptoms/Complaints:  \"I'm a little bit dizzy, but I'm okay\"  Pain: Initial:      Post Session:  0/10; endorses comfort in bedside chair     Therapeutic Activity (15 Minutes) : Therapeutic activities including bed mobility, transfer training, balance training, postural retraining, activity pacing/tolerance, safety awareness training, ambulation on level ground x3ft, and patient education  to improve mobility, strength and balance. Required moderate visual, verbal and manual cues to promote static and dynamic balance in standing. Therapeutic Exercise: (  9 minutes):  Exercises per grid below to improve mobility and strength. Required minimal visual and verbal cues to promote proper body posture and promote proper body mechanics. Progressed range and repetitions as indicated.      Date:  5/20/20 Date:   Date:     ACTIVITY/EXERCISE AM PM AM PM AM PM   Ambulation:           Distance  Device  Duration         Seated Heel Raises X 15 B        Seated Toe Raises X 15 B        Seated Long Arc Quads X 10 B        Seated Marching X 10 B, AA-L        Seated Hip Abduction X 10 B                 B = bilateral; AA = active assistive; A = active; P = passive        Braces/Orthotics/Lines/Etc:   · IV  · jennifer catheter  · wound vac L hip  Treatment/Session Assessment:    · Response to Treatment:  Mild fatigue  · Interdisciplinary Collaboration:   o Physical Therapy Assistant  o Registered Nurse  o Rehabilitation Attendant  · After treatment position/precautions:   o Up in chair  o Bed alarm/tab alert on  o Bed/Chair-wheels locked  o Bed in low position  o Call light within reach  o RN notified  o Patient in NAD: needs met; chair alarm activated; discussed patient with PCT   · Compliance with Program/Exercises: Will assess as treatment progresses  · Recommendations/Intent for next treatment session: \"Next visit will focus on advancements to more challenging activities\".   Total Treatment Duration:  PT Patient Time In/Time Out  Time In: 1038  Time Out: R Da Raymond 106, PTA

## 2020-05-20 NOTE — DISCHARGE SUMMARY
Hospitalist Discharge Summary     Patient ID:  Maura Bobo  [id number]  67 y.o.  1945  Admit date: 5/13/2020  4:49 PM  Discharge date and time: 5/21/20  Attending: Elijah Khanna DO  PCP:  Daniella Zee NP  Treatment Team: Attending Provider: Elijah Khanna DO; Consulting Provider: Austin Zacarias MD; Utilization Review: Levi Tamayo RN; Care Manager: Gagan Stauffer RN; Utilization Review: Chelsey Bashir RN; Charge Nurse: Lamin Hernandez; Physical Therapy Assistant: Lyudmila Shah PTA; Occupational Therapist: Tom Salinas    Principal Diagnosis Left hip postoperative wound infection   Principal Problem:    Left hip postoperative wound infection (5/13/2020)    Active Problems:    Hypertension (2/16/2016)      Overview: UNSPECIFIED       Severe obesity ([facility] [address] (9/24/2019)      Stage 4 chronic kidney disease ([facility] [address] (5/1/2020)     Hospital Course: \"71 year old female with HTN, HLD, Obesity, HTN, CKD stage IV (baseline Cr 2.4-3.0), hx of metastatic breast Ca(on letrozole and palbociclib), recent pathological fracture of left femur s/p intramedullary nail insertion (4/27) who was admitted due to worsening pain and serous drainage from her wound. She was started on IV antibiotics.  She underwent I&D of left hip wound by Orthopedics on 5/15 and wound vac was placed. Initial BCx and Wound Cx showed MSSA. \" Wound culture also grew enterococcus. Left hip hardware also removed during surgery. PICC inserted on 5/19. ID would like patient to start Cefazolin 2g BID until 6/26/20. There was concern about pain control on days of wound vac dressing change but patient states she wants to go home today without trying to control pain with oral medication during dressing changes. Stated to take two of her oxycodone 5mg IR 30 minutes before scheduled dressing changes. When dressing is not changed, her left hip pain is well controlled.      Continue high dose aspirin until 6/15/20 for DVT prophylaxis. If worsening SOB, chest pain, or AMS, please come back to the ED. Recommend to repeat Mg in one week. Weekly CBC with diff and BMP for the next 4 weeks. Please refer to the admission H&P for details of presentation. In summary, the patient is stable for discharge. Significant Diagnostic Studies:       Labs: Results:       Chemistry Recent Labs     05/19/20  0448   GLU 87      K 4.4      CO2 26   BUN 50*   CREA 2.16*   CA 8.9   AGAP 7   *   TP 5.8*   ALB 2.3*   GLOB 3.5   AGRAT 0.7*      CBC w/Diff Recent Labs     05/19/20  0448   WBC 4.5   RBC 2.53*   HGB 8.0*   HCT 26.5*      GRANS 56   LYMPH 19   EOS 2      Cardiac Enzymes No results for input(s): CPK, CKND1, REED in the last 72 hours. No lab exists for component: CKRMB, TROIP   Coagulation No results for input(s): PTP, INR, APTT, INREXT, INREXT in the last 72 hours. Lipid Panel No results found for: CHOL, CHOLPOCT, CHOLX, CHLST, CHOLV, 916132, HDL, HDLP, LDL, LDLC, DLDLP, 549162, VLDLC, VLDL, TGLX, TRIGL, TRIGP, TGLPOCT, CHHD, CHHDX   BNP No results for input(s): BNPP in the last 72 hours. Liver Enzymes Recent Labs     05/19/20  0448   TP 5.8*   ALB 2.3*   *   SGOT 40*      Thyroid Studies Lab Results   Component Value Date/Time    TSH 1.010 04/18/2017 11:15 AM            Discharge Exam:  Visit Vitals  /61 (BP 1 Location: Left arm, BP Patient Position: At rest)   Pulse 74   Temp 98.1 °F (36.7 °C)   Resp 18   Ht 5' 7\" (1.702 m)   Wt 109.8 kg (242 lb)   SpO2 96%   BMI 37.90 kg/m²     General appearance: alert, cooperative, no distress  Lungs: clear to auscultation bilaterally  Heart: regular rate and rhythm, S1, S2 normal  Abdomen: soft, non-tender. Bowel sounds normal. No masses,  no organomegaly  Extremities: left lateral hip with wound vac in place. Good suction.  Wound is 9CM X 1 CM  Neurologic: Grossly normal but limited ROM in chair     Disposition: Home with Home health PT  Discharge Condition: stable  Patient Instructions: As above   Current Discharge Medication List      START taking these medications    Details   polyethylene glycol (MIRALAX) 17 gram packet Take 1 Packet by mouth daily. Qty: 30 Packet, Refills: 0      magnesium oxide (MAG-OX) 400 mg tablet Take 1 Tab by mouth two (2) times a day. Qty: 60 Tab, Refills: 0      ceFAZolin (ANCEF) in sterile water 2 gram/20 mL 2 g IV syringe 20 mL by IntraVENous route every twelve (12) hours every twelve (12) hours for 36 days. Qty: 1200 mL, Refills: 0         CONTINUE these medications which have CHANGED    Details   oxyCODONE IR (ROXICODONE) 5 mg immediate release tablet Take 1 Tab by mouth every six (6) hours as needed for Pain for up to 7 days. Max Daily Amount: 20 mg. Take two tablets 30 minutes before wound vac dressing changes on Monday, Wednesday, Friday. Qty: 28 Tab, Refills: 0    Associated Diagnoses: Left hip pain         CONTINUE these medications which have NOT CHANGED    Details   aspirin (ASPIRIN) 325 mg tablet Take 325 mg by mouth daily. docusate sodium (COLACE) 50 mg capsule Take 50 mg by mouth two (2) times daily as needed for Constipation. sennosides 25 mg tab Take 1 Tab by mouth nightly as needed for Other (constipation). diphenhydrAMINE (BENADRYL) 25 mg tablet Take 25 mg by mouth every six (6) hours as needed for Allergies. palbociclib (Ibrance) 75 mg cap Take one capsule by mouthOnce daily with food for21 days on, followed by7 days off during each cyCle of 28 days  Qty: 21 Cap, Refills: 5    Associated Diagnoses: Malignant neoplasm metastatic to bone ([facility] [address] Infiltrating ductal carcinoma of female breast, unspecified laterality (HCC)      ondansetron hcl (Zofran) 8 mg tablet Take 1 Tab by mouth every eight (8) hours as needed for Nausea.   Qty: 90 Tab, Refills: 2    Associated Diagnoses: Malignant neoplasm metastatic to bone (HCC)      triamterene-hydroCHLOROthiazide (MAXZIDE) 37.5-25 mg per tablet TAKE 1 TABLET BY MOUTH EVERY DAY  Qty: 30 Tab, Refills: 1    Associated Diagnoses: Edema, unspecified type      letrozole (FEMARA) 2.5 mg tablet TAKE 1 TABLET BY MOUTH EVERY DAY  Qty: 90 Tab, Refills: 3    Associated Diagnoses: Infiltrating ductal carcinoma of female breast, unspecified laterality (HCC)      melatonin 5 mg cap capsule Take 10 mg by mouth nightly. furosemide (LASIX) 20 mg tablet TAKE 1 TAB BY MOUTH DAILY AS NEEDED. FOR SWELLING. Qty: 30 Tab, Refills: 1    Associated Diagnoses: Edema, unspecified type      calcium carbonate (OS-JORGE) 500 mg calcium (1,250 mg) tablet Take 2 tablets at lunch and 2 tablets at dinner. Indications: hypocalcemia  Qty: 4 Tab, Refills: 0    Associated Diagnoses: Hypocalcemia      amLODIPine (NORVASC) 5 mg tablet Take 5 mg by mouth daily. STOP taking these medications       colchicine (MITIGARE) 0.6 mg capsule Comments:   Reason for Stopping:               Activity:Up and sen with assistance   Diet:Regular   Wound Care: MWF change left hip dressing. Wound vac for left hip. Follow-up PCP in one week. ID on 6/25/20 at 2:20pm. Dr. Roscoe Ramirez in two weeks.    ·     Time spent to discharge patient 35 minutes  Signed:  Nohemi Alford DO  5/21/20  6:56 AM

## 2020-05-20 NOTE — PROGRESS NOTES
Problem: Pressure Injury - Risk of  Goal: *Prevention of pressure injury  Description: Document Arturo Scale and appropriate interventions in the flowsheet. Outcome: Progressing Towards Goal  Note: Pressure Injury Interventions:  Sensory Interventions: Assess changes in LOC, Avoid rigorous massage over bony prominences    Moisture Interventions: Absorbent underpads, Check for incontinence Q2 hours and as needed    Activity Interventions: Increase time out of bed, PT/OT evaluation, Pressure redistribution bed/mattress(bed type)    Mobility Interventions: HOB 30 degrees or less, Pressure redistribution bed/mattress (bed type), PT/OT evaluation    Nutrition Interventions: Document food/fluid/supplement intake, Offer support with meals,snacks and hydration    Friction and Shear Interventions: HOB 30 degrees or less                Problem: Patient Education: Go to Patient Education Activity  Goal: Patient/Family Education  Outcome: Progressing Towards Goal     Problem: Falls - Risk of  Goal: *Absence of Falls  Description: Document Maurizio Fall Risk and appropriate interventions in the flowsheet.   Outcome: Progressing Towards Goal  Note: Fall Risk Interventions:  Mobility Interventions: Bed/chair exit alarm, Communicate number of staff needed for ambulation/transfer, Patient to call before getting OOB    Mentation Interventions: Adequate sleep, hydration, pain control    Medication Interventions: Bed/chair exit alarm, Evaluate medications/consider consulting pharmacy, Patient to call before getting OOB, Teach patient to arise slowly    Elimination Interventions: Bed/chair exit alarm, Call light in reach, Patient to call for help with toileting needs, Stay With Me (per policy), Toilet paper/wipes in reach, Toileting schedule/hourly rounds    History of Falls Interventions: Bed/chair exit alarm, Consult care management for discharge planning, Door open when patient unattended, Investigate reason for fall, Room close to nurse's station, Utilize gait belt for transfer/ambulation         Problem: Patient Education: Go to Patient Education Activity  Goal: Patient/Family Education  Outcome: Progressing Towards Goal     Problem: Infection - Risk of, Surgical Site Infection  Goal: *Absence of surgical site infection signs and symptoms  Outcome: Progressing Towards Goal     Problem: Patient Education: Go to Patient Education Activity  Goal: Patient/Family Education  Outcome: Progressing Towards Goal     Problem: Patient Education: Go to Patient Education Activity  Goal: Patient/Family Education  Outcome: Progressing Towards Goal     Problem: Patient Education: Go to Patient Education Activity  Goal: Patient/Family Education  Outcome: Progressing Towards Goal     Problem: Patient Education: Go to Patient Education Activity  Goal: Patient/Family Education  Outcome: Progressing Towards Goal     Problem: Hip Fracture: Post-Op Day 4  Goal: Off Pathway (Use only if patient is Off Pathway)  Outcome: Progressing Towards Goal  Goal: Activity/Safety  Outcome: Progressing Towards Goal  Goal: Diagnostic Test/Procedures  Outcome: Progressing Towards Goal  Goal: Nutrition/Diet  Outcome: Progressing Towards Goal  Goal: Medications  Outcome: Progressing Towards Goal  Goal: Respiratory  Outcome: Progressing Towards Goal  Goal: Treatments/Interventions/Procedures  Outcome: Progressing Towards Goal  Goal: Psychosocial  Outcome: Progressing Towards Goal  Goal: Discharge Planning  Outcome: Progressing Towards Goal  Goal: *Met physical therapy criteria for discharge to next level of care  Outcome: Progressing Towards Goal  Goal: *Optimal pain control at patient's stated goal  Outcome: Progressing Towards Goal  Goal: *Hemodynamically stable  Outcome: Progressing Towards Goal  Goal: *Tolerating diet  Outcome: Progressing Towards Goal  Goal: *Active bowel function  Outcome: Progressing Towards Goal  Goal: *Adequate urinary output  Outcome: Progressing Towards Goal  Goal: *Absence of skin breakdown  Outcome: Progressing Towards Goal  Goal: *Patient verbalizes understanding of discharge instructions  Outcome: Progressing Towards Goal

## 2020-05-20 NOTE — WOUND CARE
Patient premedicated with Morphine per Primary nurse (see Felix Lewis). Positioned on right side, Left hip wound vac dressing changed, wound is granular and pink, deeper area on upper portion of wound. 4 pieces of vac foam used. Will monitor.

## 2020-05-20 NOTE — PROGRESS NOTES
Wound vac delivered to pts room today, Intramed plus will be in pts room in am to train daughter to give IV anbx and and home care arranged.

## 2020-05-20 NOTE — PROGRESS NOTES
Problem: Mobility Impaired (Adult and Pediatric)  Goal: *Acute Goals and Plan of Care (Insert Text)  Description:     LTG:  (1.)Ms. Staci Oneil will move from supine to sit and sit to supine , scoot up and down and roll side to side in bed with MODIFIED INDEPENDENCE within 7 treatment day(s). (2.)Ms. Staci Oneil will transfer from bed to chair and chair to bed with MINIMAL ASSIST using the least restrictive device within 7 treatment day(s). (3.)Ms. Staci Oneil will ambulate with MINIMAL ASSIST for 5 feet with the least restrictive device within 7 treatment day(s). ________________________________________________________________________________________________     Outcome: Progressing Towards Goal     PHYSICAL THERAPY: Daily Note and PM 5/20/2020  INPATIENT: PT Visit Days : 4  Payor: Venus Nobles / Plan: [address] [place] [address] HMO / Product Type: [employer] Medicare /       NAME/AGE/GENDER: Chandrakant Darnell is a 67 y.o. female   PRIMARY DIAGNOSIS: Left hip postoperative wound infection [T81.49XA] Left hip postoperative wound infection Left hip postoperative wound infection  Procedure(s) (LRB):  LEFT HIP WOUND DEBRIDEMENT WITH HARDWARE REMOVAL AND APPLICATION OF WOUND VAC (Left)  5 Days Post-Op  ICD-10: Treatment Diagnosis:    · Generalized Muscle Weakness (M62.81)  · Other lack of cordination (R27.8)  · Difficulty in walking, Not elsewhere classified (R26.2)  · Other abnormalities of gait and mobility (R26.89)  · History of falling (Z91.81)  · Unspecified Lack of Coordination (R27.9)   Precaution/Allergies:  Penicillins      ASSESSMENT:     Ms. Staci Oneil is a 67year old F who presents I&D of left hip. Prior to hospital admission pt lives with her two adult daughters in 3 story home with no step(s) to enter. Pt endorses 2-3 falls in past 6 months, and reports being nearly bedbound at baseline. Prior to admission Ms. Staci Oneil uses furniture walking for mobility, limited to distances of less than 3-5 ft.  She reports having a w/c, RW, and hospital bed.  5/20- this afternoon, pt ready to get back to bed. Pt stood from chair with min assist x 2 and ambulated about 3' using rolling walker and min assist x 2 to bed. Pt does ambulate with increased trunk flexion, slow gait speed, and shuffling steps. Pt continues to have improved upright posture. pt returned supine in bed with mod assist for LEs. Pt left supine with needs in reach. Pt is making good progress towards goals. Will continue with POC. Recommend resuming HHPT services to address transfers and functional activity tolerance. This section established at most recent assessment   PROBLEM LIST (Impairments causing functional limitations):  1. Decreased Strength  2. Decreased ADL/Functional Activities  3. Decreased Transfer Abilities  4. Decreased Ambulation Ability/Technique  5. Decreased Balance  6. Decreased Activity Tolerance   INTERVENTIONS PLANNED: (Benefits and precautions of physical therapy have been discussed with the patient.)  1. Balance Exercise  2. Bed Mobility  3. Gait Training  4. Neuromuscular Re-education/Strengthening  5. Range of Motion (ROM)  6. Therapeutic Activites  7. Therapeutic Exercise/Strengthening  8. Transfer Training     TREATMENT PLAN: Frequency/Duration: daily for duration of hospital stay  Rehabilitation Potential For Stated Goals: Good     REHAB RECOMMENDATIONS (at time of discharge pending progress):    Placement: It is my opinion, based on this patient's performance to date, that Ms. Ellie Mccabe may benefit from [address] after discharge due to the functional deficits listed above that are likely to improve with skilled rehabilitation because he/she has multiple medical issues that affect his/her functional mobility in the community.   Equipment:    None at this time              HISTORY:   History of Present Injury/Illness (Reason for Referral):  Patient is a 67 y.o. female who presents to the ER due to pain and drainage from left hip wound. She had a left hip replacement 2 weeks ago and had stitches removed on 5/12. Today, she developed pain after getting up from toilet and has had increased drainage. She had been ambulating prior to this with some assistance but now states she cannot. We are asked to admit her for antibiotics, PT/OT, and wound evaluation. She denies fevers, but does report chills. Denies n/v/d, chest or abdominal pain. Her daughters help care for her at home. Past Medical History/Comorbidities:   Ms. Joey Sawyer  has a past medical history of Abnormal EKG (2/16/2016), Anemia due to chronic kidney disease treated with erythropoietin (7/25/2017), Aortic valve insufficiency (2/16/2016), [facility] Hyperlipidemia (2/16/2016), Hypertension (2/16/2016), and Obesity (2/16/2016). Ms. Joey Sawyer  has a past surgical history that includes hx tubal ligation and ir insert tunl cvc w/o port over 5 yr (5/19/2020). Social History/Living Environment:   Home Environment: Trailer/mobile home  # Steps to Enter: 0  One/Two Story Residence: One story  Living Alone: No  Support Systems: Child(ezequiel)  Current DME Used/Available at Home: Wheelchair, Walker, rolling, Commode, bedside, Hospital bed  Tub or Shower Type: Tub/Shower combination  Prior Level of Function/Work/Activity:  Close to bedbound: ambulation limited to bedside transfers. Has RW but doesn't use it. Adult daughters assist with changing/ bathing. Number of Personal Factors/Comorbidities that affect the Plan of Care: 1-2: MODERATE COMPLEXITY   EXAMINATION:   Most Recent Physical Functioning:   Gross Assessment:                  Posture:     Balance:  Sitting: Intact  Sitting - Static: Good (unsupported)  Sitting - Dynamic: Good (unsupported)  Standing - Static: Fair  Standing - Dynamic : Fair Bed Mobility:  Supine to Sit: Minimum assistance; Moderate assistance  Scooting: Stand-by assistance  Wheelchair Mobility:     Transfers:  Sit to Stand: Minimum assistance; Moderate assistance;Assist x2  Stand to Sit: Minimum assistance;Assist x2  Gait:  Left Side Weight Bearing: As tolerated  Base of Support: Widened  Speed/Madelaine: Delayed;Slow;Shuffled  Step Length: Left shortened;Right shortened  Gait Abnormalities: Decreased step clearance;Shuffling gait(increased trunk flexion)  Distance (ft): 3 Feet (ft)  Assistive Device: Walker, rolling  Ambulation - Level of Assistance: Minimal assistance;Assist x2      Body Structures Involved:  1. Bones  2. Joints  3. Muscles Body Functions Affected:  1. Neuromusculoskeletal  2. Movement Related  3. Skin Related Activities and Participation Affected:  1. General Tasks and Demands  2. Mobility  3. Self Care  4. Domestic Life  5. Interpersonal Interactions and Relationships  6. Community, Social and [place]   Number of elements that affect the Plan of Care: 4+: HIGH COMPLEXITY   CLINICAL PRESENTATION:   Presentation: Stable and uncomplicated: LOW COMPLEXITY   CLINICAL DECISION MAKIN [facility] Mobility Inpatient Short Form  How much difficulty does the patient currently have. .. Unable A Lot A Little None   1. Turning over in bed (including adjusting bedclothes, sheets and blankets)? [] 1   [x] 2   [] 3   [] 4   2. Sitting down on and standing up from a chair with arms ( e.g., wheelchair, bedside commode, etc.)   [] 1   [x] 2   [] 3   [] 4   3. Moving from lying on back to sitting on the side of the bed? [] 1   [] 2   [x] 3   [] 4   How much help from another person does the patient currently need. .. Total A Lot A Little None   4. Moving to and from a bed to a chair (including a wheelchair)? [] 1   [x] 2   [] 3   [] 4   5. Need to walk in hospital room? [] 1   [x] 2   [] 3   [] 4   6. Climbing 3-5 steps with a railing? [x] 1   [] 2   [] 3   [] 4   © , Trustees of [address] [place] [address] [address] [address], under license to [employer].  All rights reserved      Score:  Initial: 12 Most Recent: X (Date: -- ) Interpretation of Tool:  Represents activities that are increasingly more difficult (i.e. Bed mobility, Transfers, Gait). Medical Necessity:     · Skilled intervention continues to be required due to decreased safety w/ functional mobility. Reason for Services/Other Comments:  ·    Use of outcome tool(s) and clinical judgement create a POC that gives a: Clear prediction of patient's progress: LOW COMPLEXITY            TREATMENT:   (In addition to Assessment/Re-Assessment sessions the following treatments were rendered)   Pre-treatment Symptoms/Complaints:  \"I'm a little bit dizzy, but I'm okay\"  Pain: Initial:      Post Session:  0/10; endorses comfort in bedside chair     Therapeutic Activity (12 Minutes) : Therapeutic activities including bed mobility, transfer training, balance training, postural retraining, activity pacing/tolerance, safety awareness training, ambulation on level ground x3ft, and patient education  to improve mobility, strength and balance. Required moderate visual, verbal and manual cues to promote static and dynamic balance in standing. Therapeutic Exercise: (  0 minutes):  Exercises per grid below to improve mobility and strength. Required minimal visual and verbal cues to promote proper body posture and promote proper body mechanics. Progressed range and repetitions as indicated.      Date:  5/20/20 Date:   Date:     ACTIVITY/EXERCISE AM PM AM PM AM PM   Ambulation:           Distance  Device  Duration         Seated Heel Raises X 15 B        Seated Toe Raises X 15 B        Seated Long Arc Quads X 10 B        Seated Marching X 10 B, AA-L        Seated Hip Abduction X 10 B                 B = bilateral; AA = active assistive; A = active; P = passive        Braces/Orthotics/Lines/Etc:   · IV  · rodriguez catheter  · wound vac L hip  Treatment/Session Assessment:    · Response to Treatment:  Mild fatigue  · Interdisciplinary Collaboration:   o Physical Therapy Assistant  o Registered Nurse  o Certified Nursing Assistant/Patient Care Technician  · After treatment position/precautions:   o Supine in bed  o Bed alarm/tab alert on  o Bed/Chair-wheels locked  o Bed in low position  o Call light within reach  o RN notified  o Patient in NAD: needs met; chair alarm activated; discussed patient with PCT   · Compliance with Program/Exercises: Will assess as treatment progresses  · Recommendations/Intent for next treatment session: \"Next visit will focus on advancements to more challenging activities\".   Total Treatment Duration:  PT Patient Time In/Time Out  Time In: 1405  Time Out: [address], PTA

## 2020-05-20 NOTE — PROGRESS NOTES
Progress Note    Patient: Brenda Aviles MRN: [id number]  SSN: [id number]    YOB: 1945  Age: 67 y.o. Sex: female      Admit Date: 5/13/2020    LOS: 7 days     Subjective:   F/U MSSA/enterococcus wound    \"67year old female with HTN, HLD, Obesity, HTN, CKD stage IV (baseline Cr 2.4-3.0), hx of metastatic breast Ca(on letrozole and palbociclib), recent pathological fracture of left femur s/p intramedullary nail insertion (4/27) who was admitted due to worsening pain and serous drainage from her wound. She was started on IV antibiotics. She underwent I&D of left hip wound by Orthopedics on 5/15 and wound vac was placed. Initial BCx and Wound Cx showed MSSA. \" Left hip hardware also removed during surgery. Wound culture also grew enterococcus. PICC inserted on 5/19. ID following and would like Cefazolin until 6/26/20     No chest pain or SOB.  We are waiting to get abx approved for home health   Current Facility-Administered Medications   Medication Dose Route Frequency    ceFAZolin (ANCEF) 2 g/20 mL in sterile water IV syringe  2 g IntraVENous Q12H    aspirin tablet 325 mg  325 mg Oral DAILY    polyethylene glycol (MIRALAX) packet 17 g  17 g Oral DAILY    magnesium oxide (MAG-OX) tablet 400 mg  400 mg Oral BID    amLODIPine (NORVASC) tablet 5 mg  5 mg Oral DAILY    calcium carbonate (OS-JORGE) tablet 500 mg [elemental]  500 mg Oral BID WITH MEALS    docusate sodium (COLACE) capsule 100 mg  100 mg Oral BID PRN    oxyCODONE IR (ROXICODONE) tablet 5 mg  5 mg Oral Q4H PRN    [START ON 5/21/2020] palbociclib cap 75 mg (Patient Supplied)  75 mg Oral DAILY    triamterene-hydroCHLOROthiazide (MAXZIDE) 37.5-25 mg per tablet 1 Tab  1 Tab Oral DAILY    sodium chloride (NS) flush 5-40 mL  5-40 mL IntraVENous Q8H    sodium chloride (NS) flush 5-40 mL  5-40 mL IntraVENous PRN    acetaminophen (TYLENOL) tablet 650 mg  650 mg Oral Q4H PRN    0.9% sodium chloride infusion  25 mL/hr IntraVENous CONTINUOUS    morphine 10 mg/ml injection 5 mg  5 mg IntraVENous Q4H PRN       Objective:     Vitals:    05/19/20 1948 05/19/20 2353 05/20/20 0548 05/20/20 0820   BP: 140/70 134/55 127/47 130/77   Pulse: 76 75 68 68   Resp: 18 18 18 17   Temp: 98.1 °F (36.7 °C) 98 °F (36.7 °C) 98.6 °F (37 °C) 98 °F (36.7 °C)   SpO2: 97% 95% 95% 95%   Weight:       Height:             Intake and Output:  Current Shift: No intake/output data recorded. Last three shifts: 05/18 1901 - 05/20 0700  In: 210 [P.O.:210]  Out: 1900 [Urine:1900]    Physical Exam:   General:  Alert, cooperative, sleepy. Eyes:  Conjunctivae/corneas clear. Ears:  Normal TMs and external ear canals both ears. Nose: Nares normal. Septum midline. Mouth/Throat: Lips, mucosa, and tongue normal.    Neck:  no JVD. Back:   deferred. Lungs:   Clear to auscultation bilaterally. Heart:  Regular rate and rhythm, S1, S2 normal   Abdomen:   Soft, non-tender. Bowel sounds normal. Obese. Extremities: Extremities normal, atraumatic, no cyanosis or edema. Pulses: 2+ and symmetric all extremities. Skin: Healing left lateral hip wound that is 10CM X 1CM with wound vac in place. Good suctioning seen. Non tender to area of concern. Right anterior central line also seen   Lymph nodes: Cervical, supraclavicular, and axillary nodes normal.   Neurologic: CNII-XII intact. Limited ROM in bed. Lab/Data Review:    No results found for this or any previous visit (from the past 24 hour(s)). Assessment/ Plan:     Principal Problem:    Left hip postoperative wound infection (5/13/2020)    Active Problems:    Hypertension (2/16/2016)      Overview: UNSPECIFIED       Severe obesity ([address] (9/24/2019)      Stage 4 chronic kidney disease ([facility] (5/1/2020)    MSSA wound after left total hip repair s/p I&D on 5/15- Would culture growing MSSA and enterococcus. ID following. Will need antibiotics for 6 weeks. Wound vac in place. Currently on Ancef.      CKD stage 4 - stable    HTN - amlodipine 5mg daily. Triamterene HCTZ    Hx of breast cancer - Letrozole and Ibrance     Likely dc tomorrow if we have home health abx and wound vac in place. I have updated the daughter. DVT prophylaxis - ASA high dose.  Plan for month total of DVT prophylaxis  Signed By: Nikki Amor,      May 20, 2020

## 2020-05-20 NOTE — PROGRESS NOTES
Progress Note    Patient: Sowmya Maxwell MRN: [id number]  SSN: [id number]    YOB: 1945  Age: 67 y.o. Sex: female      Admit Date: 5/13/2020    LOS: 7 days     Subjective:     Doing very well still having decreasing pain in the left hip    Objective:     Vitals:    05/19/20 1948 05/19/20 2353 05/20/20 0548 05/20/20 0820   BP: 140/70 134/55 127/47 130/77   Pulse: 76 75 68 68   Resp: 18 18 18 17   Temp: 98.1 °F (36.7 °C) 98 °F (36.7 °C) 98.6 °F (37 °C) 98 °F (36.7 °C)   SpO2: 97% 95% 95% 95%   Weight:       Height:            Intake and Output:  Current Shift: No intake/output data recorded. Last three shifts: 05/18 1901 - 05/20 0700  In: 210 [P.O.:210]  Out: 1900 [Urine:1900]    alert and oriented to at least person  Skin -wound VAC in place  Motor and sensory function intact in LEFT LOWER extremity  Pulses palpable in LEFT LOWER extremity       Lab/Data Review:  CBC: No results found for: WBC, HGB, HGBEXT, HCT, HCTEXT, PLT, PLTEXT, HGBEXT, HCTEXT, PLTEXT       Assessment:      POD #5 from I&D and hardware removal left hip    Plan:      The wound VAC changes and continue IV antibiotics    Signed By: Chelsea Manning MD     May 20, 2020

## 2020-05-21 VITALS
RESPIRATION RATE: 18 BRPM | HEART RATE: 71 BPM | TEMPERATURE: 97.7 F | BODY MASS INDEX: 37.98 KG/M2 | SYSTOLIC BLOOD PRESSURE: 127 MMHG | OXYGEN SATURATION: 100 % | HEIGHT: 67 IN | DIASTOLIC BLOOD PRESSURE: 86 MMHG | WEIGHT: 242 LBS

## 2020-05-21 PROCEDURE — 97110 THERAPEUTIC EXERCISES: CPT

## 2020-05-21 PROCEDURE — 74011250636 HC RX REV CODE- 250/636: Performed by: NURSE PRACTITIONER

## 2020-05-21 PROCEDURE — 3331090001 HH PPS REVENUE CREDIT

## 2020-05-21 PROCEDURE — 74011000250 HC RX REV CODE- 250: Performed by: NURSE PRACTITIONER

## 2020-05-21 PROCEDURE — 97530 THERAPEUTIC ACTIVITIES: CPT

## 2020-05-21 PROCEDURE — 74011250637 HC RX REV CODE- 250/637: Performed by: ORTHOPAEDIC SURGERY

## 2020-05-21 PROCEDURE — 3331090002 HH PPS REVENUE DEBIT

## 2020-05-21 PROCEDURE — 74750000023 HC WOUND THERAPY

## 2020-05-21 PROCEDURE — 74011250637 HC RX REV CODE- 250/637: Performed by: INTERNAL MEDICINE

## 2020-05-21 RX ORDER — POLYETHYLENE GLYCOL 3350 17 G/17G
17 POWDER, FOR SOLUTION ORAL DAILY
Qty: 30 PACKET | Refills: 0 | Status: SHIPPED | OUTPATIENT
Start: 2020-05-22

## 2020-05-21 RX ORDER — LANOLIN ALCOHOL/MO/W.PET/CERES
400 CREAM (GRAM) TOPICAL 2 TIMES DAILY
Qty: 60 TAB | Refills: 0 | Status: SHIPPED | OUTPATIENT
Start: 2020-05-21 | End: 2020-08-19

## 2020-05-21 RX ORDER — CEFAZOLIN SODIUM/WATER 2 G/20 ML
2 SYRINGE (ML) INTRAVENOUS EVERY 12 HOURS
Qty: 1200 ML | Refills: 0 | Status: SHIPPED | OUTPATIENT
Start: 2020-05-21 | End: 2020-06-26

## 2020-05-21 RX ORDER — OXYCODONE HYDROCHLORIDE 5 MG/1
5 TABLET ORAL
Qty: 28 TAB | Refills: 0 | Status: SHIPPED | OUTPATIENT
Start: 2020-05-21 | End: 2020-05-28

## 2020-05-21 RX ADMIN — Medication 400 MG: at 09:17

## 2020-05-21 RX ADMIN — CEFAZOLIN 2 G: 10 INJECTION, POWDER, FOR SOLUTION INTRAVENOUS at 16:48

## 2020-05-21 RX ADMIN — CEFAZOLIN 2 G: 10 INJECTION, POWDER, FOR SOLUTION INTRAVENOUS at 04:02

## 2020-05-21 RX ADMIN — ASPIRIN 325 MG ORAL TABLET 325 MG: 325 PILL ORAL at 09:16

## 2020-05-21 RX ADMIN — POLYETHYLENE GLYCOL 3350 17 G: 17 POWDER, FOR SOLUTION ORAL at 09:16

## 2020-05-21 RX ADMIN — TRIAMTERENE AND HYDROCHLOROTHIAZIDE 1 TABLET: 37.5; 25 TABLET ORAL at 09:16

## 2020-05-21 RX ADMIN — Medication 5 ML: at 04:02

## 2020-05-21 RX ADMIN — AMLODIPINE BESYLATE 5 MG: 5 TABLET ORAL at 09:17

## 2020-05-21 RX ADMIN — OXYCODONE 5 MG: 5 TABLET ORAL at 12:14

## 2020-05-21 RX ADMIN — Medication 10 ML: at 14:49

## 2020-05-21 RX ADMIN — Medication 400 MG: at 16:49

## 2020-05-21 NOTE — PROGRESS NOTES
[employer] liaison left another message for daughter explaining thjat patient cannot discharge until education is completed.

## 2020-05-21 NOTE — PROGRESS NOTES
CM spoke with [employer] liaison, Jessika Nesbitt, regarding training. Patient's daughter is to be present for training as she will be administering medication. Chilo Barba has left a message for the daughter, Justine Bello. CM also tried to call and had to leave voicemail for carlos. Wound vac is in room and home health has been set up. Dr. Matthew Dove notified of plan.

## 2020-05-21 NOTE — PROGRESS NOTES
Problem: Mobility Impaired (Adult and Pediatric)  Goal: *Acute Goals and Plan of Care (Insert Text)  Description:     LTG:  (1.)Ms. Vladislav James will move from supine to sit and sit to supine , scoot up and down and roll side to side in bed with MODIFIED INDEPENDENCE within 7 treatment day(s). (2.)Ms. Vladislav James will transfer from bed to chair and chair to bed with MINIMAL ASSIST using the least restrictive device within 7 treatment day(s). (3.)Ms. Vladislav James will ambulate with MINIMAL ASSIST for 5 feet with the least restrictive device within 7 treatment day(s). ________________________________________________________________________________________________     Outcome: Progressing Towards Goal     PHYSICAL THERAPY: Daily Note and AM 5/21/2020  INPATIENT: PT Visit Days : 5  Payor: Simi Pagan / Plan: [address] [place] [address] HMO / Product Type: [employer] /       NAME/AGE/GENDER: Marisela Castillo is a 67 y.o. female   PRIMARY DIAGNOSIS: Left hip postoperative wound infection [T81.49XA] Left hip postoperative wound infection Left hip postoperative wound infection  Procedure(s) (LRB):  LEFT HIP WOUND DEBRIDEMENT WITH HARDWARE REMOVAL AND APPLICATION OF WOUND VAC (Left)  6 Days Post-Op  ICD-10: Treatment Diagnosis:    · Generalized Muscle Weakness (M62.81)  · Other lack of cordination (R27.8)  · Difficulty in walking, Not elsewhere classified (R26.2)  · Other abnormalities of gait and mobility (R26.89)  · History of falling (Z91.81)  · Unspecified Lack of Coordination (R27.9)   Precaution/Allergies:  Penicillins      ASSESSMENT:     Ms. Vladislav James is a 67year old F who presents I&D of left hip. Prior to hospital admission pt lives with her two adult daughters in 3 story home with no step(s) to enter. Pt endorses 2-3 falls in past 6 months, and reports being nearly bedbound at baseline. Prior to admission Ms. Vladislav James uses furniture walking for mobility, limited to distances of less than 3-5 ft.  She reports having a w/c, RW, and hospital bed.  5/21- this morning, pt presents supine in bed, agreeable to therapy. Pt performed supine to sit with min assist and additional time. Pt stood with min/mod assist x 2 and ambulated 3' using rolling walker and min assist x 2. Pt shuffles her feet and gets fatigued easily, continues to have increased trunk flexion during ambulation. Pt sat in chair and performed below LE exercises. Pt did require assist to complete exercises correctly. Pt was left sitting up in chair with needs in reach. pt is making slow progress towards goals. Will continue with POC. Recommend resuming HHPT services to address transfers and functional activity tolerance. This section established at most recent assessment   PROBLEM LIST (Impairments causing functional limitations):  1. Decreased Strength  2. Decreased ADL/Functional Activities  3. Decreased Transfer Abilities  4. Decreased Ambulation Ability/Technique  5. Decreased Balance  6. Decreased Activity Tolerance   INTERVENTIONS PLANNED: (Benefits and precautions of physical therapy have been discussed with the patient.)  1. Balance Exercise  2. Bed Mobility  3. Gait Training  4. Neuromuscular Re-education/Strengthening  5. Range of Motion (ROM)  6. Therapeutic Activites  7. Therapeutic Exercise/Strengthening  8. Transfer Training     TREATMENT PLAN: Frequency/Duration: daily for duration of hospital stay  Rehabilitation Potential For Stated Goals: Good     REHAB RECOMMENDATIONS (at time of discharge pending progress):    Placement: It is my opinion, based on this patient's performance to date, that Ms. Jean-Pierre Cowart may benefit from [address] after discharge due to the functional deficits listed above that are likely to improve with skilled rehabilitation because he/she has multiple medical issues that affect his/her functional mobility in the community.   Equipment:    None at this time              HISTORY:   History of Present Injury/Illness (Reason for Referral):  Patient is a 67 y.o. female who presents to the ER due to pain and drainage from left hip wound. She had a left hip replacement 2 weeks ago and had stitches removed on 5/12. Today, she developed pain after getting up from toilet and has had increased drainage. She had been ambulating prior to this with some assistance but now states she cannot. We are asked to admit her for antibiotics, PT/OT, and wound evaluation. She denies fevers, but does report chills. Denies n/v/d, chest or abdominal pain. Her daughters help care for her at home. Past Medical History/Comorbidities:   Ms. Abram Stevenson  has a past medical history of Abnormal EKG (2/16/2016), Anemia due to chronic kidney disease treated with erythropoietin (7/25/2017), Aortic valve insufficiency (2/16/2016), Cancer ([facility] [address] Hyperlipidemia (2/16/2016), Hypertension (2/16/2016), and Obesity (2/16/2016). Ms. Abram Stevenson  has a past surgical history that includes hx tubal ligation and ir insert tunl cvc w/o port over 5 yr (5/19/2020). Social History/Living Environment:   Home Environment: Trailer/mobile home  # Steps to Enter: 0  One/Two Story Residence: One story  Living Alone: No  Support Systems: Child(ezequiel)  Current DME Used/Available at Home: Wheelchair, Walker, rolling, Commode, bedside, Hospital bed  Tub or Shower Type: Tub/Shower combination  Prior Level of Function/Work/Activity:  Close to bedbound: ambulation limited to bedside transfers. Has RW but doesn't use it. Adult daughters assist with changing/ bathing.      Number of Personal Factors/Comorbidities that affect the Plan of Care: 1-2: MODERATE COMPLEXITY   EXAMINATION:   Most Recent Physical Functioning:   Gross Assessment:                  Posture:     Balance:  Sitting - Static: Good (unsupported)  Sitting - Dynamic: Good (unsupported)  Standing - Static: Fair  Standing - Dynamic : Fair Bed Mobility:  Supine to Sit: Minimum assistance  Scooting: Stand-by assistance  Wheelchair Mobility:     Transfers:  Sit to Stand: Minimum assistance; Moderate assistance;Assist x2  Stand to Sit: Minimum assistance;Assist x2  Gait:  Left Side Weight Bearing: As tolerated  Base of Support: Widened  Speed/Madelaine: Delayed; Shuffled; Slow  Step Length: Left shortened;Right shortened  Gait Abnormalities: Decreased step clearance  Distance (ft): 3 Feet (ft)  Assistive Device: Walker, rolling  Ambulation - Level of Assistance: Minimal assistance;Assist x2      Body Structures Involved:  1. Bones  2. Joints  3. Muscles Body Functions Affected:  1. Neuromusculoskeletal  2. Movement Related  3. Skin Related Activities and Participation Affected:  1. General Tasks and Demands  2. Mobility  3. Self Care  4. Domestic Life  5. Interpersonal Interactions and Relationships  6. Community, Social and [place]   Number of elements that affect the Plan of Care: 4+: HIGH COMPLEXITY   CLINICAL PRESENTATION:   Presentation: Stable and uncomplicated: LOW COMPLEXITY   CLINICAL DECISION MAKIN [facility] Mobility Inpatient Short Form  How much difficulty does the patient currently have. .. Unable A Lot A Little None   1. Turning over in bed (including adjusting bedclothes, sheets and blankets)? [] 1   [x] 2   [] 3   [] 4   2. Sitting down on and standing up from a chair with arms ( e.g., wheelchair, bedside commode, etc.)   [] 1   [x] 2   [] 3   [] 4   3. Moving from lying on back to sitting on the side of the bed? [] 1   [] 2   [x] 3   [] 4   How much help from another person does the patient currently need. .. Total A Lot A Little None   4. Moving to and from a bed to a chair (including a wheelchair)? [] 1   [x] 2   [] 3   [] 4   5. Need to walk in hospital room? [] 1   [x] 2   [] 3   [] 4   6. Climbing 3-5 steps with a railing? [x] 1   [] 2   [] 3   [] 4   © , Trustees of [address] [place] [address] [address] [address], under license to [employer].  All rights reserved      Score:  Initial: 12 Most Recent: X (Date: -- )    Interpretation of Tool:  Represents activities that are increasingly more difficult (i.e. Bed mobility, Transfers, Gait). Medical Necessity:     · Skilled intervention continues to be required due to decreased safety w/ functional mobility. Reason for Services/Other Comments:  ·    Use of outcome tool(s) and clinical judgement create a POC that gives a: Clear prediction of patient's progress: LOW COMPLEXITY            TREATMENT:   (In addition to Assessment/Re-Assessment sessions the following treatments were rendered)   Pre-treatment Symptoms/Complaints:  \"I'm a little bit dizzy, but I'm okay\"  Pain: Initial:      Post Session:  0/10; endorses comfort in bedside chair     Therapeutic Activity (16 Minutes) : Therapeutic activities including bed mobility, transfer training, balance training, postural retraining, activity pacing/tolerance, safety awareness training, ambulation on level ground x3ft, and patient education  to improve mobility, strength and balance. Required moderate visual, verbal and manual cues to promote static and dynamic balance in standing. Therapeutic Exercise: ( 8 minutes):  Exercises per grid below to improve mobility and strength. Required minimal visual and verbal cues to promote proper body posture and promote proper body mechanics. Progressed range and repetitions as indicated.      Date:  5/20/20 Date:  5/21/20 Date:     ACTIVITY/EXERCISE AM PM AM PM AM PM   Ambulation:           Distance  Device  Duration         Seated Heel Raises X 15 B  X 15 B      Seated Toe Raises X 15 B  X 15 B      Seated Long Arc Quads X 10 B  X 10 B      Seated Marching X 10 B, AA-L  X 10 B, AA-L      Seated Hip Abduction X 10 B  X 10 B               B = bilateral; AA = active assistive; A = active; P = passive        Braces/Orthotics/Lines/Etc:   · IV  · rodriguez catheter  · wound vac L hip  Treatment/Session Assessment:    · Response to Treatment:  Mild fatigue  · Interdisciplinary Collaboration:   o Physical Therapy Assistant  o Registered Nurse  o Certified Nursing Assistant/Patient Care Technician  · After treatment position/precautions:   o Up in chair  o Bed alarm/tab alert on  o Bed/Chair-wheels locked  o Bed in low position  o Call light within reach  o RN notified  o Patient in NAD: needs met; chair alarm activated; discussed patient with PCT   · Compliance with Program/Exercises: Will assess as treatment progresses  · Recommendations/Intent for next treatment session: \"Next visit will focus on advancements to more challenging activities\".   Total Treatment Duration:  PT Patient Time In/Time Out  Time In: 0932  Time Out: [address] [facility], [facility]

## 2020-05-21 NOTE — WOUND CARE
Home wound vac applied to left hip, ready for discharge home, today with home health. Bandage is intact, seal is achieved and machine is currently working.

## 2020-05-21 NOTE — PROGRESS NOTES
May 21, 2020         Post Op day: 6 Days Post-Op Procedure(s) (LRB):  LEFT HIP WOUND DEBRIDEMENT WITH HARDWARE REMOVAL AND APPLICATION OF WOUND VAC (Left)      Admit Date: 2020  Admit Diagnosis: Left hip postoperative wound infection [T81.49XA]       Principle Problem: Left hip postoperative wound infection. Subjective: Doing well, No complaints, No SOB, No Chest Pain, No Nausea or Vomiting     Objective:   Vital Signs are Stable, No Acute Distress, Alert,  Wound vac in place and functioning  Neurovascular exam is normal.     Assessment / Plan :  Patient Active Problem List   Diagnosis Code    Hypertension I10    Infiltrating ductal carcinoma of female breast ([facility] [address] C50.919    Malignant neoplasm metastatic to bone (HCC) C79.51    Anemia due to chronic kidney disease treated with erythropoietin N18.9, D63.1    Severe obesity (HCC) E66.01    Stage 4 chronic kidney disease (HCC) N18.4    Left hip postoperative wound infection T81.49XA      Patient Vitals for the past 8 hrs:   BP Temp Pulse Resp SpO2   20 0743 143/61 98.1 °F (36.7 °C) 74 18 96 %   20 0404 182/66 98.3 °F (36.8 °C) 74 17 93 %   20 0016 143/59 98.2 °F (36.8 °C) 77 17 93 %    Temp (24hrs), Av.1 °F (36.7 °C), Min:97.8 °F (36.6 °C), Max:98.3 °F (36.8 °C)    Body mass index is 37.9 kg/m².     Lab Results   Component Value Date/Time    HGB 8.0 (L) 2020 04:48 AM          Medical Mgmt per hospitalist  Anticoagulation plan: on ASA 325mg daily  Continue PT  Fall Precuations  DC disp: home when cleared medically and home antibiotics and wound care arranged        Signed By: KIERRA Porras  2020,  8:11 AM

## 2020-05-21 NOTE — PROGRESS NOTES
Problem: Pressure Injury - Risk of  Goal: *Prevention of pressure injury  Description: Document Arturo Scale and appropriate interventions in the flowsheet. Outcome: Progressing Towards Goal  Note: Pressure Injury Interventions:  Sensory Interventions: Assess changes in LOC, Avoid rigorous massage over bony prominences    Moisture Interventions: Absorbent underpads, Internal/External urinary devices    Activity Interventions: Increase time out of bed    Mobility Interventions: Pressure redistribution bed/mattress (bed type)    Nutrition Interventions: Document food/fluid/supplement intake, Offer support with meals,snacks and hydration    Friction and Shear Interventions: HOB 30 degrees or less                Problem: Patient Education: Go to Patient Education Activity  Goal: Patient/Family Education  Outcome: Progressing Towards Goal     Problem: Falls - Risk of  Goal: *Absence of Falls  Description: Document Maurizio Fall Risk and appropriate interventions in the flowsheet.   Outcome: Progressing Towards Goal  Note: Fall Risk Interventions:  Mobility Interventions: Bed/chair exit alarm    Mentation Interventions: Adequate sleep, hydration, pain control    Medication Interventions: Bed/chair exit alarm    Elimination Interventions: Call light in reach    History of Falls Interventions: Bed/chair exit alarm         Problem: Patient Education: Go to Patient Education Activity  Goal: Patient/Family Education  Outcome: Progressing Towards Goal     Problem: Infection - Risk of, Surgical Site Infection  Goal: *Absence of surgical site infection signs and symptoms  Outcome: Progressing Towards Goal     Problem: Patient Education: Go to Patient Education Activity  Goal: Patient/Family Education  Outcome: Progressing Towards Goal     Problem: Patient Education: Go to Patient Education Activity  Goal: Patient/Family Education  Outcome: Progressing Towards Goal     Problem: Patient Education: Go to Patient Education Activity  Goal: Patient/Family Education  Outcome: Progressing Towards Goal     Problem: Patient Education: Go to Patient Education Activity  Goal: Patient/Family Education  Outcome: Progressing Towards Goal     Problem: Hip Fracture: Post-Op Day 4  Goal: Activity/Safety  Outcome: Progressing Towards Goal  Goal: Diagnostic Test/Procedures  Outcome: Progressing Towards Goal  Goal: Nutrition/Diet  Outcome: Progressing Towards Goal  Goal: Medications  Outcome: Progressing Towards Goal  Goal: Respiratory  Outcome: Progressing Towards Goal  Goal: Treatments/Interventions/Procedures  Outcome: Progressing Towards Goal  Goal: Psychosocial  Outcome: Progressing Towards Goal  Goal: Discharge Planning  Outcome: Progressing Towards Goal  Goal: *Met physical therapy criteria for discharge to next level of care  Outcome: Progressing Towards Goal  Goal: *Optimal pain control at patient's stated goal  Outcome: Progressing Towards Goal  Goal: *Hemodynamically stable  Outcome: Progressing Towards Goal  Goal: *Tolerating diet  Outcome: Progressing Towards Goal  Goal: *Active bowel function  Outcome: Progressing Towards Goal  Goal: *Adequate urinary output  Outcome: Progressing Towards Goal  Goal: *Absence of skin breakdown  Outcome: Progressing Towards Goal  Goal: *Patient verbalizes understanding of discharge instructions  Outcome: Progressing Towards Goal

## 2020-05-21 NOTE — PROGRESS NOTES
[facility] to do antibiotic [facility]. Patient will call transport set up with [employer] [facility] and [facility] liaison have spoken regarding patient's discharge tonight. Care Management Interventions  PCP Verified by CM: (Dr Marilou Dimas)  Palliative Care Criteria Met (RRAT>21 & CHF Dx)?: No  Mode of Transport at Discharge: Other (see comment)(family)  Transition of Care Consult (CM Consult): Home Health(Pt is insured with pharmacy benefits.  )  [address]: Yes  [facility]: ([facility] is [facility] with [facility])  Discharge Durable Medical Equipment: No(Pt has a hospital bed, W/C, BDC and a walker)  Physical Therapy Consult: Yes  Occupational Therapy Consult: Yes  Speech Therapy Consult: No  Current Support Network: Relative's Home(Pt lives with daughter. FAmily assisits pt with her care as needed.  )  Confirm Follow Up Transport: Family  The Plan for Transition of Care is Related to the Following Treatment Goals : Pt will need supportive care to return to her functional baseline.   The Patient and/or Patient Representative was Provided with a Choice of Provider and Agrees with the Discharge Plan?: Yes  Name of the Patient Representative Who was Provided with a Choice of Provider and Agrees with the Discharge Plan: pt  Freedom of Choice List was Provided with Basic Dialogue that Supports the Patient's Individualized Plan of Care/Goals, Treatment Preferences and Shares the Quality Data Associated with the Providers?: Yes   Resource Information Provided?: No  Discharge Location  Discharge Placement: Home with home health

## 2020-05-22 ENCOUNTER — PATIENT OUTREACH (OUTPATIENT)
Dept: CASE MANAGEMENT | Age: 75
End: 2020-05-22

## 2020-05-22 ENCOUNTER — HOME CARE VISIT (OUTPATIENT)
Dept: SCHEDULING | Facility: HOME HEALTH | Age: 75
End: 2020-05-22
Payer: MEDICARE

## 2020-05-22 VITALS
RESPIRATION RATE: 11 BRPM | DIASTOLIC BLOOD PRESSURE: 72 MMHG | TEMPERATURE: 98.2 F | HEART RATE: 74 BPM | SYSTOLIC BLOOD PRESSURE: 142 MMHG | OXYGEN SATURATION: 94 %

## 2020-05-22 LAB
BACTERIA SPEC CULT: NORMAL
SERVICE CMNT-IMP: NORMAL

## 2020-05-22 PROCEDURE — 3331090002 HH PPS REVENUE DEBIT

## 2020-05-22 PROCEDURE — 3331090001 HH PPS REVENUE CREDIT

## 2020-05-22 PROCEDURE — G0299 HHS/HOSPICE OF RN EA 15 MIN: HCPCS

## 2020-05-22 NOTE — PROGRESS NOTES
Second [facility] outreach attempt made to patient's home/mobile number was unsuccessful. Left message to return call. Will attempt third outreach within 5 business days.

## 2020-05-22 NOTE — PROGRESS NOTES
Initial [facility] outreach attempt to patient's home/mobile number was unsuccessful. Left message to return call. Will attempt second outreach within 24 hours.

## 2020-05-23 ENCOUNTER — PATIENT OUTREACH (OUTPATIENT)
Dept: CASE MANAGEMENT | Age: 75
End: 2020-05-23

## 2020-05-23 PROCEDURE — 3331090002 HH PPS REVENUE DEBIT

## 2020-05-23 PROCEDURE — 3331090001 HH PPS REVENUE CREDIT

## 2020-05-23 NOTE — PROGRESS NOTES
Third [facility] outreach attempt made to home/mobile numbers. Left message to return calls. Unable to reach for [facility] program, will close case. Will reopen if call is returned.

## 2020-05-24 ENCOUNTER — HOME CARE VISIT (OUTPATIENT)
Dept: HOME HEALTH SERVICES | Facility: HOME HEALTH | Age: 75
End: 2020-05-24
Payer: MEDICARE

## 2020-05-24 PROCEDURE — 3331090001 HH PPS REVENUE CREDIT

## 2020-05-24 PROCEDURE — 3331090002 HH PPS REVENUE DEBIT

## 2020-05-25 ENCOUNTER — HOME CARE VISIT (OUTPATIENT)
Dept: SCHEDULING | Facility: HOME HEALTH | Age: 75
End: 2020-05-25
Payer: MEDICARE

## 2020-05-25 ENCOUNTER — HOSPITAL ENCOUNTER (OUTPATIENT)
Dept: LAB | Age: 75
Discharge: HOME | End: 2020-05-25
Payer: MEDICARE

## 2020-05-25 VITALS
DIASTOLIC BLOOD PRESSURE: 74 MMHG | TEMPERATURE: 98 F | HEART RATE: 68 BPM | OXYGEN SATURATION: 98 % | SYSTOLIC BLOOD PRESSURE: 140 MMHG | RESPIRATION RATE: 18 BRPM

## 2020-05-25 VITALS
SYSTOLIC BLOOD PRESSURE: 140 MMHG | OXYGEN SATURATION: 94 % | DIASTOLIC BLOOD PRESSURE: 70 MMHG | HEART RATE: 64 BPM | TEMPERATURE: 98.4 F | RESPIRATION RATE: 18 BRPM

## 2020-05-25 LAB
CREAT SERPL-MCNC: 2.48 MG/DL (ref 0.6–1)
CRP SERPL-MCNC: 7.6 MG/DL (ref 0–0.9)
ERYTHROCYTE [SEDIMENTATION RATE] IN BLOOD: 96 MM/HR (ref 0–30)

## 2020-05-25 PROCEDURE — G0152 HHCP-SERV OF OT,EA 15 MIN: HCPCS

## 2020-05-25 PROCEDURE — 82565 ASSAY OF CREATININE: CPT

## 2020-05-25 PROCEDURE — 3331090001 HH PPS REVENUE CREDIT

## 2020-05-25 PROCEDURE — 86140 C-REACTIVE PROTEIN: CPT

## 2020-05-25 PROCEDURE — 3331090002 HH PPS REVENUE DEBIT

## 2020-05-25 PROCEDURE — 85652 RBC SED RATE AUTOMATED: CPT

## 2020-05-25 PROCEDURE — G0299 HHS/HOSPICE OF RN EA 15 MIN: HCPCS

## 2020-05-25 PROCEDURE — 85025 COMPLETE CBC W/AUTO DIFF WBC: CPT

## 2020-05-26 ENCOUNTER — HOME CARE VISIT (OUTPATIENT)
Dept: SCHEDULING | Facility: HOME HEALTH | Age: 75
End: 2020-05-26
Payer: MEDICARE

## 2020-05-26 ENCOUNTER — HOME CARE VISIT (OUTPATIENT)
Dept: HOME HEALTH SERVICES | Facility: HOME HEALTH | Age: 75
End: 2020-05-26
Payer: MEDICARE

## 2020-05-26 VITALS
HEART RATE: 72 BPM | TEMPERATURE: 98.7 F | DIASTOLIC BLOOD PRESSURE: 60 MMHG | SYSTOLIC BLOOD PRESSURE: 140 MMHG | RESPIRATION RATE: 18 BRPM

## 2020-05-26 LAB
BASOPHILS # BLD: 0 K/UL (ref 0–0.2)
BASOPHILS NFR BLD: 1 % (ref 0–2)
DIFFERENTIAL METHOD BLD: ABNORMAL
EOSINOPHIL # BLD: 0 K/UL (ref 0–0.8)
EOSINOPHIL NFR BLD: 1 % (ref 0.5–7.8)
ERYTHROCYTE [DISTWIDTH] IN BLOOD BY AUTOMATED COUNT: 17.6 % (ref 11.9–14.6)
HCT VFR BLD AUTO: 22.7 % (ref 35.8–46.3)
HGB BLD-MCNC: 6.8 G/DL (ref 11.7–15.4)
IMM GRANULOCYTES # BLD AUTO: 0 K/UL (ref 0–0.5)
IMM GRANULOCYTES NFR BLD AUTO: 1 % (ref 0–5)
LYMPHOCYTES # BLD: 0.5 K/UL (ref 0.5–4.6)
LYMPHOCYTES NFR BLD: 13 % (ref 13–44)
MCH RBC QN AUTO: 31.3 PG (ref 26.1–32.9)
MCHC RBC AUTO-ENTMCNC: 30 G/DL (ref 31.4–35)
MCV RBC AUTO: 104.6 FL (ref 79.6–97.8)
MONOCYTES # BLD: 0.3 K/UL (ref 0.1–1.3)
MONOCYTES NFR BLD: 10 % (ref 4–12)
NEUTS SEG # BLD: 2.6 K/UL (ref 1.7–8.2)
NEUTS SEG NFR BLD: 74 % (ref 43–78)
NRBC # BLD: 0 K/UL (ref 0–0.2)
PLATELET # BLD AUTO: 139 K/UL (ref 150–450)
PMV BLD AUTO: 11.3 FL (ref 9.4–12.3)
RBC # BLD AUTO: 2.17 M/UL (ref 4.05–5.2)
WBC # BLD AUTO: 3.5 K/UL (ref 4.3–11.1)

## 2020-05-26 PROCEDURE — G0151 HHCP-SERV OF PT,EA 15 MIN: HCPCS

## 2020-05-26 PROCEDURE — 3331090001 HH PPS REVENUE CREDIT

## 2020-05-26 PROCEDURE — 3331090002 HH PPS REVENUE DEBIT

## 2020-05-27 ENCOUNTER — HOME CARE VISIT (OUTPATIENT)
Dept: SCHEDULING | Facility: HOME HEALTH | Age: 75
End: 2020-05-27
Payer: MEDICARE

## 2020-05-27 ENCOUNTER — HOSPITAL ENCOUNTER (OUTPATIENT)
Dept: LAB | Age: 75
Discharge: HOME | End: 2020-05-27
Payer: MEDICARE

## 2020-05-27 ENCOUNTER — HOME CARE VISIT (OUTPATIENT)
Dept: HOME HEALTH SERVICES | Facility: HOME HEALTH | Age: 75
End: 2020-05-27
Payer: MEDICARE

## 2020-05-27 VITALS
DIASTOLIC BLOOD PRESSURE: 78 MMHG | OXYGEN SATURATION: 93 % | SYSTOLIC BLOOD PRESSURE: 138 MMHG | TEMPERATURE: 97.9 F | HEART RATE: 68 BPM | RESPIRATION RATE: 18 BRPM

## 2020-05-27 VITALS
OXYGEN SATURATION: 98 % | RESPIRATION RATE: 18 BRPM | HEART RATE: 74 BPM | SYSTOLIC BLOOD PRESSURE: 136 MMHG | TEMPERATURE: 97.8 F | DIASTOLIC BLOOD PRESSURE: 72 MMHG

## 2020-05-27 LAB
BASOPHILS # BLD: 0 K/UL (ref 0–0.2)
BASOPHILS NFR BLD: 1 % (ref 0–2)
DIFFERENTIAL METHOD BLD: ABNORMAL
EOSINOPHIL # BLD: 0.1 K/UL (ref 0–0.8)
EOSINOPHIL NFR BLD: 2 % (ref 0.5–7.8)
ERYTHROCYTE [DISTWIDTH] IN BLOOD BY AUTOMATED COUNT: 17.6 % (ref 11.9–14.6)
HCT VFR BLD AUTO: 21.6 % (ref 35.8–46.3)
HGB BLD-MCNC: 6.8 G/DL (ref 11.7–15.4)
IMM GRANULOCYTES # BLD AUTO: 0 K/UL (ref 0–0.5)
IMM GRANULOCYTES NFR BLD AUTO: 1 % (ref 0–5)
LYMPHOCYTES # BLD: 0.8 K/UL (ref 0.5–4.6)
LYMPHOCYTES NFR BLD: 22 % (ref 13–44)
MCH RBC QN AUTO: 32.4 PG (ref 26.1–32.9)
MCHC RBC AUTO-ENTMCNC: 31.5 G/DL (ref 31.4–35)
MCV RBC AUTO: 102.9 FL (ref 79.6–97.8)
MONOCYTES # BLD: 0.3 K/UL (ref 0.1–1.3)
MONOCYTES NFR BLD: 9 % (ref 4–12)
NEUTS SEG # BLD: 2.2 K/UL (ref 1.7–8.2)
NEUTS SEG NFR BLD: 66 % (ref 43–78)
NRBC # BLD: 0 K/UL (ref 0–0.2)
PLATELET # BLD AUTO: 140 K/UL (ref 150–450)
PMV BLD AUTO: 11.4 FL (ref 9.4–12.3)
RBC # BLD AUTO: 2.1 M/UL (ref 4.05–5.2)
WBC # BLD AUTO: 3.4 K/UL (ref 4.3–11.1)

## 2020-05-27 PROCEDURE — 3331090001 HH PPS REVENUE CREDIT

## 2020-05-27 PROCEDURE — 3331090002 HH PPS REVENUE DEBIT

## 2020-05-27 PROCEDURE — G0299 HHS/HOSPICE OF RN EA 15 MIN: HCPCS

## 2020-05-27 PROCEDURE — 85025 COMPLETE CBC W/AUTO DIFF WBC: CPT

## 2020-05-28 ENCOUNTER — HOSPITAL ENCOUNTER (EMERGENCY)
Age: 75
Discharge: HOME | End: 2020-05-28
Attending: EMERGENCY MEDICINE
Payer: MEDICARE

## 2020-05-28 VITALS
WEIGHT: 242 LBS | HEART RATE: 66 BPM | SYSTOLIC BLOOD PRESSURE: 142 MMHG | RESPIRATION RATE: 16 BRPM | OXYGEN SATURATION: 96 % | TEMPERATURE: 98.3 F | DIASTOLIC BLOOD PRESSURE: 65 MMHG | BODY MASS INDEX: 37.9 KG/M2

## 2020-05-28 DIAGNOSIS — D53.9 MACROCYTIC ANEMIA: Primary | ICD-10-CM

## 2020-05-28 LAB
ALBUMIN SERPL-MCNC: 2.1 G/DL (ref 3.2–4.6)
ALBUMIN/GLOB SERPL: 0.6 {RATIO} (ref 1.2–3.5)
ALP SERPL-CCNC: 194 U/L (ref 50–136)
ALT SERPL-CCNC: <6 U/L (ref 12–65)
ANION GAP SERPL CALC-SCNC: 5 MMOL/L (ref 7–16)
APTT PPP: 28.2 SEC (ref 24.3–35.4)
AST SERPL-CCNC: 28 U/L (ref 15–37)
BASOPHILS # BLD: 0 K/UL (ref 0–0.2)
BASOPHILS NFR BLD: 1 % (ref 0–2)
BILIRUB SERPL-MCNC: 0.4 MG/DL (ref 0.2–1.1)
BUN SERPL-MCNC: 24 MG/DL (ref 8–23)
CALCIUM SERPL-MCNC: 8.6 MG/DL (ref 8.3–10.4)
CHLORIDE SERPL-SCNC: 107 MMOL/L (ref 98–107)
CO2 SERPL-SCNC: 29 MMOL/L (ref 21–32)
CREAT SERPL-MCNC: 2.52 MG/DL (ref 0.6–1)
DIFFERENTIAL METHOD BLD: ABNORMAL
EOSINOPHIL # BLD: 0.1 K/UL (ref 0–0.8)
EOSINOPHIL NFR BLD: 5 % (ref 0.5–7.8)
ERYTHROCYTE [DISTWIDTH] IN BLOOD BY AUTOMATED COUNT: 18 % (ref 11.9–14.6)
GLOBULIN SER CALC-MCNC: 3.7 G/DL (ref 2.3–3.5)
GLUCOSE SERPL-MCNC: 90 MG/DL (ref 65–100)
HCT VFR BLD AUTO: 23 % (ref 35.8–46.3)
HGB BLD-MCNC: 7.2 G/DL (ref 11.7–15.4)
IMM GRANULOCYTES # BLD AUTO: 0 K/UL (ref 0–0.5)
IMM GRANULOCYTES NFR BLD AUTO: 1 % (ref 0–5)
INR PPP: 1
LYMPHOCYTES # BLD: 0.5 K/UL (ref 0.5–4.6)
LYMPHOCYTES NFR BLD: 18 % (ref 13–44)
MCH RBC QN AUTO: 32.6 PG (ref 26.1–32.9)
MCHC RBC AUTO-ENTMCNC: 31.3 G/DL (ref 31.4–35)
MCV RBC AUTO: 104.1 FL (ref 79.6–97.8)
MONOCYTES # BLD: 0.2 K/UL (ref 0.1–1.3)
MONOCYTES NFR BLD: 7 % (ref 4–12)
NEUTS SEG # BLD: 2 K/UL (ref 1.7–8.2)
NEUTS SEG NFR BLD: 69 % (ref 43–78)
NRBC # BLD: 0 K/UL (ref 0–0.2)
PLATELET # BLD AUTO: 157 K/UL (ref 150–450)
PMV BLD AUTO: 11 FL (ref 9.4–12.3)
POTASSIUM SERPL-SCNC: 4.6 MMOL/L (ref 3.5–5.1)
PROT SERPL-MCNC: 5.8 G/DL (ref 6.3–8.2)
PROTHROMBIN TIME: 13.9 SEC (ref 12–14.7)
RBC # BLD AUTO: 2.21 M/UL (ref 4.05–5.2)
SODIUM SERPL-SCNC: 141 MMOL/L (ref 136–145)
WBC # BLD AUTO: 2.9 K/UL (ref 4.3–11.1)

## 2020-05-28 PROCEDURE — 99284 EMERGENCY DEPT VISIT MOD MDM: CPT

## 2020-05-28 PROCEDURE — 74011000258 HC RX REV CODE- 258: Performed by: EMERGENCY MEDICINE

## 2020-05-28 PROCEDURE — 86900 BLOOD TYPING SEROLOGIC ABO: CPT

## 2020-05-28 PROCEDURE — P9016 RBC LEUKOCYTES REDUCED: HCPCS

## 2020-05-28 PROCEDURE — 80053 COMPREHEN METABOLIC PANEL: CPT

## 2020-05-28 PROCEDURE — 3331090002 HH PPS REVENUE DEBIT

## 2020-05-28 PROCEDURE — 74011250637 HC RX REV CODE- 250/637: Performed by: EMERGENCY MEDICINE

## 2020-05-28 PROCEDURE — 85025 COMPLETE CBC W/AUTO DIFF WBC: CPT

## 2020-05-28 PROCEDURE — 96372 THER/PROPH/DIAG INJ SC/IM: CPT

## 2020-05-28 PROCEDURE — 96374 THER/PROPH/DIAG INJ IV PUSH: CPT

## 2020-05-28 PROCEDURE — 36430 TRANSFUSION BLD/BLD COMPNT: CPT

## 2020-05-28 PROCEDURE — 85610 PROTHROMBIN TIME: CPT

## 2020-05-28 PROCEDURE — 85730 THROMBOPLASTIN TIME PARTIAL: CPT

## 2020-05-28 PROCEDURE — 3331090001 HH PPS REVENUE CREDIT

## 2020-05-28 PROCEDURE — 74011000250 HC RX REV CODE- 250: Performed by: EMERGENCY MEDICINE

## 2020-05-28 PROCEDURE — 86923 COMPATIBILITY TEST ELECTRIC: CPT

## 2020-05-28 PROCEDURE — 74011250636 HC RX REV CODE- 250/636: Performed by: EMERGENCY MEDICINE

## 2020-05-28 RX ORDER — FOLIC ACID 5 MG/ML
1 INJECTION, SOLUTION INTRAMUSCULAR; INTRAVENOUS; SUBCUTANEOUS ONCE
Status: DISCONTINUED | OUTPATIENT
Start: 2020-05-28 | End: 2020-05-28 | Stop reason: SDUPTHER

## 2020-05-28 RX ORDER — CYANOCOBALAMIN 1000 UG/ML
1000 INJECTION, SOLUTION INTRAMUSCULAR; SUBCUTANEOUS ONCE
Status: COMPLETED | OUTPATIENT
Start: 2020-05-28 | End: 2020-05-28

## 2020-05-28 RX ORDER — SODIUM CHLORIDE 9 MG/ML
250 INJECTION, SOLUTION INTRAVENOUS AS NEEDED
Status: DISCONTINUED | OUTPATIENT
Start: 2020-05-28 | End: 2020-05-29 | Stop reason: HOSPADM

## 2020-05-28 RX ORDER — ACETAMINOPHEN 500 MG
1000 TABLET ORAL
Status: COMPLETED | OUTPATIENT
Start: 2020-05-28 | End: 2020-05-28

## 2020-05-28 RX ORDER — THIAMINE HYDROCHLORIDE 100 MG/ML
100 INJECTION, SOLUTION INTRAMUSCULAR; INTRAVENOUS
Status: DISCONTINUED | OUTPATIENT
Start: 2020-05-28 | End: 2020-05-28 | Stop reason: SDUPTHER

## 2020-05-28 RX ADMIN — ACETAMINOPHEN 1000 MG: 500 TABLET ORAL at 11:36

## 2020-05-28 RX ADMIN — FOLIC ACID: 5 INJECTION, SOLUTION INTRAMUSCULAR; INTRAVENOUS; SUBCUTANEOUS at 11:36

## 2020-05-28 RX ADMIN — CYANOCOBALAMIN 1000 MCG: 1000 INJECTION, SOLUTION INTRAMUSCULAR at 11:36

## 2020-05-28 RX ADMIN — SODIUM CHLORIDE 250 ML: 900 INJECTION, SOLUTION INTRAVENOUS at 12:48

## 2020-05-28 NOTE — ED TRIAGE NOTES
Pt arrives via EMS from home with hgb 6.8. results in computer. Pt told to come due to hgb 6.8. NAD. Mask on     Being treated for cancer currently.

## 2020-05-28 NOTE — DISCHARGE INSTRUCTIONS
Your anemia is likely secondary to your chemotherapy treatments. In the future, should your blood levels continue to trend down the way that they have over the past weekand you feel well, please work with your care team to schedule a transfusion at the infusion center. This way you can avoid being exposed to everyone in the emergency department.

## 2020-05-28 NOTE — ED PROVIDER NOTES
13-year-old female presenting for anemia. Patient reports a chronic anemia but had blood work performed yesterday by her primary care doctor which showed a hemoglobin of 6.8. This was confirmed. Notified and sent to the emergency department. She denies any other complaint such as chest pain, shortness of breath, nausea, vomiting, or diarrhea. She is noticed no change in the color of her stools. She is currently being treated for chronic left hip wound with a wound VAC and long-term antibiotics. The history is provided by the patient. Abnormal Lab Results   This is a new problem. The current episode started yesterday. The problem occurs constantly. The problem has not changed since onset. Pertinent negatives include no chest pain, no abdominal pain, no headaches and no shortness of breath. Nothing aggravates the symptoms. Nothing relieves the symptoms. She has tried nothing for the symptoms. The treatment provided no relief.         Past Medical History:   Diagnosis Date    Abnormal EKG 2/16/2016    Anemia due to chronic kidney disease treated with erythropoietin 7/25/2017    Aortic valve insufficiency 2/16/2016    Cancer (HCC)     breast    Hyperlipidemia 2/16/2016    Hypertension 2/16/2016    UNSPECIFIED     Obesity 2/16/2016    UNSPECIFIED        Past Surgical History:   Procedure Laterality Date    HX TUBAL LIGATION      IR INSERT TUNL CVC W/O PORT OVER 5 YR  5/19/2020         Family History:   Problem Relation Age of Onset    Coronary Artery Disease Mother     Heart Disease Father        Social History     Socioeconomic History    Marital status:      Spouse name: Not on file    Number of children: Not on file    Years of education: Not on file    Highest education level: Not on file   Occupational History    Not on file   Social Needs    Financial resource strain: Not on file    Food insecurity     Worry: Not on file     Inability: Not on file   [email or web address] needs Medical: Not on file     Non-medical: Not on file   Tobacco Use    Smoking status: Never Smoker    Smokeless tobacco: Current User     Types: Snuff   Substance and Sexual Activity    Alcohol use: No    Drug use: Not on file    Sexual activity: Not on file   Lifestyle    Physical activity     Days per week: Not on file     Minutes per session: Not on file    Stress: Not on file   Relationships    Social connections     Talks on phone: Not on file     Gets together: Not on file     Attends Church service: Not on file     Active member of club or organization: Not on file     Attends meetings of clubs or organizations: Not on file     Relationship status: Not on file    Intimate partner violence     Fear of current or ex partner: Not on file     Emotionally abused: Not on file     Physically abused: Not on file     Forced sexual activity: Not on file   Other Topics Concern    Not on file   Social History Narrative    Not on file         ALLERGIES: Penicillins    Review of Systems   Respiratory: Negative for shortness of breath. Cardiovascular: Negative for chest pain. Gastrointestinal: Negative for abdominal pain. Skin: Positive for wound. Neurological: Negative for headaches. All other systems reviewed and are negative. Vitals:    05/28/20 1003   BP: 154/65   Pulse: 63   Resp: 16   Temp: 98.4 °F (36.9 °C)   SpO2: 96%   Weight: 109.8 kg (242 lb)            Physical Exam  Vitals signs and nursing note reviewed. Constitutional:       Appearance: She is well-developed. She is obese. HENT:      Head: Normocephalic and atraumatic. Eyes:      Conjunctiva/sclera: Conjunctivae normal.      Pupils: Pupils are equal, round, and reactive to light. Neck:      Musculoskeletal: Neck supple. Cardiovascular:      Rate and Rhythm: Normal rate and regular rhythm.       Comments: PICC line in the right upper chest  Pulmonary:      Effort: Pulmonary effort is normal.      Breath sounds: Normal breath sounds. Abdominal:      General: Bowel sounds are normal.      Palpations: Abdomen is soft. Musculoskeletal: Normal range of motion. Skin:     General: Skin is warm and dry. Comments: Wound VAC in the left hip appears intact draining clear fluid   Neurological:      Mental Status: She is alert and oriented to person, place, and time. MDM  Number of Diagnoses or Management Options  Macrocytic anemia:   Diagnosis management comments: 76-year-old female presenting for acute anemia acute on chronic anemia. We will repeat labs and type and screen. Patient likely only needs a unit of blood as her hemoglobin tends to run between 7.5 and 8.5. Yesterday's hemoglobin was 6.8 and I have no reason to believe that it will be again today.        Amount and/or Complexity of Data Reviewed  Clinical lab tests: ordered and reviewed (Results for orders placed or performed during the hospital encounter of 05/28/20  -CBC WITH AUTOMATED DIFF       Result                      Value             Ref Range           WBC                         2.9 (L)           4.3 - 11.1 K*       RBC                         2.21 (L)          4.05 - 5.2 M*       HGB                         7.2 (L)           11.7 - 15.4 *       HCT                         23.0 (L)          35.8 - 46.3 %       MCV                         104.1 (H)         79.6 - 97.8 *       MCH                         32.6              26.1 - 32.9 *       MCHC                        31.3 (L)          31.4 - 35.0 *       RDW                         18.0 (H)          11.9 - 14.6 %       PLATELET                    157               150 - 450 K/*       MPV                         11.0              9.4 - 12.3 FL       ABSOLUTE NRBC               0.00              0.0 - 0.2 K/*       DF                          AUTOMATED                             NEUTROPHILS                 69                43 - 78 %           LYMPHOCYTES                 18                13 - 44 % MONOCYTES                   7                 4.0 - 12.0 %        EOSINOPHILS                 5                 0.5 - 7.8 %         BASOPHILS                   1                 0.0 - 2.0 %         IMMATURE GRANULOCYTES       1                 0.0 - 5.0 %         ABS. NEUTROPHILS            2.0               1.7 - 8.2 K/*       ABS. LYMPHOCYTES            0.5               0.5 - 4.6 K/*       ABS. MONOCYTES              0.2               0.1 - 1.3 K/*       ABS. EOSINOPHILS            0.1               0.0 - 0.8 K/*       ABS. BASOPHILS              0.0               0.0 - 0.2 K/*       ABS. IMM. GRANS.            0.0               0.0 - 0.5 K/*  -METABOLIC PANEL, COMPREHENSIVE       Result                      Value             Ref Range           Sodium                      141               136 - 145 mm*       Potassium                   4.6               3.5 - 5.1 mm*       Chloride                    107               98 - 107 mmo*       CO2                         29                21 - 32 mmol*       Anion gap                   5 (L)             7 - 16 mmol/L       Glucose                     90                65 - 100 mg/*       BUN                         24 (H)            8 - 23 MG/DL        Creatinine                  2.52 (H)          0.6 - 1.0 MG*       GFR est AA                  24 (L)            >60 ml/min/1*       GFR est non-AA              20 (L)            >60 ml/min/1*       Calcium                     8.6               8.3 - 10.4 M*       Bilirubin, total            0.4               0.2 - 1.1 MG*       ALT (SGPT)                  <6 (L)            12 - 65 U/L         AST (SGOT)                  28                15 - 37 U/L         Alk.  phosphatase            194 (H)           50 - 136 U/L        Protein, total              5.8 (L)           6.3 - 8.2 g/*       Albumin                     2.1 (L)           3.2 - 4.6 g/*       Globulin                    3.7 (H)           2.3 - 3.5 g/* A-G Ratio                   0.6 (L)           1.2 - 3.5      -PROTHROMBIN TIME + INR       Result                      Value             Ref Range           Prothrombin time            13.9              12.0 - 14.7 *       INR                         1.0                              -PTT       Result                      Value             Ref Range           aPTT                        28.2              24.3 - 35.4 *  -TYPE & SCREEN       Result                      Value             Ref Range           Crossmatch Expiration       05/31/2020                            ABO/Rh(D)                   A POSITIVE                            Antibody screen             NEG                                   Comment                                                       BLOOD READY CALLED TO [facility] AT 1128 5/28 SB       Unit number                 [id number]                         Blood component type        RC LR                                 Unit division               00                                    Status of unit              TRANSFUSED                            Crossmatch result           Compatible                       )  Tests in the radiology section of CPT®: ordered and reviewed (Xr Chest Sngl V    Result Date: 5/13/2020  AP chest radiograph History: fever, 75 years Female Comparison: Chest radiograph April 26, 2020 Findings:   Normal cardiomediastinal silhouette. Low lung volumes unchanged. Nonspecific mild diffuse interstitial prominence similar to prior. Persistent mild patchy left basilar atelectasis and or consolidation. No evidence of pneumothorax. Visualized soft tissue and osseous structures otherwise unremarkable. Impression:  No significant interval change. Xr Hip Lt W Or Wo Pelv 2-3 Vws    Result Date: 5/15/2020  EXAM: Single view pelvis and 2 view left hip. INDICATION: Posterior patient and hardware removal. COMPARISON: Left hip films dated May 13, 2020.  FINDINGS: Bilateral femoral supplement medullary rods with 2 distal locking screws of the left distal femoral slight backing out of the proximal screw measuring approximately 6 mm which appears chronic without evidence of lucency about the screw to suggest loosening. Interval partial hardware removal of the proximal left femur. No evidence of acute fracture although there is a redemonstrated subtrochanteric fracture of the left femur     IMPRESSION: 1. Partial left femoral hardware removal with a redemonstrated subtrochanteric subacute appearing fracture of the left femur with a left femoral cephalomedullary jeri. Xr Hip Lt W Or Wo Pelv 2-3 Vws    Result Date: 5/13/2020  Exam:  AP pelvis and left hip, left femur radiographs History:  pain, hip pain, 75 years Female Comparison: Left hip radiographs April 27, 2020 Findings: Again visualized is intramedullary jeri fixation of the left femur and relative anatomic alignment with multiple ghost tracts of previous surgical screws. The visualized distal left femur appears intact. Again visualized is intramedullary jeri fixation of the right femur partially visualized. Mild degenerative change of the bilateral hip joint similar to prior. No evidence of new acute fracture or dislocation. Normal alignment. Scattered sclerotic foci in the visualized axial skeleton which may represent osseous metastases appear similar to prior. Visualized soft tissues otherwise unremarkable. Impression:  No evidence of new acute injury. Other chronic and postoperative findings as above. Xr Femur Lt 2 V    Result Date: 5/13/2020  Exam:  AP pelvis and left hip, left femur radiographs History:  pain, hip pain, 75 years Female Comparison: Left hip radiographs April 27, 2020 Findings: Again visualized is intramedullary jeri fixation of the left femur and relative anatomic alignment with multiple ghost tracts of previous surgical screws. The visualized distal left femur appears intact.   Again visualized is intramedullary jeri fixation of the right femur partially visualized. Mild degenerative change of the bilateral hip joint similar to prior. No evidence of new acute fracture or dislocation. Normal alignment. Scattered sclerotic foci in the visualized axial skeleton which may represent osseous metastases appear similar to prior. Visualized soft tissues otherwise unremarkable. Impression:  No evidence of new acute injury. Other chronic and postoperative findings as above. Ir Insert Renan Cleverly Cvc W/o Port Over 5 Yr    Result Date: 5/19/2020  Title: Tunneled central venous catheter placement. Ultrasound guidance for vascular access. Indication:   Central venous access for long-term antibiotics Consent: Informed written and oral consent was obtained from the patient after explanation of benefits and risks (including, but not limited to: Infection, hemorrhage, pneumothorax). The patient's questions were answered to their satisfaction. The patient stated understanding and requested that we proceed. Procedure:  Maximal sterile barrier technique (including:  cap, mask, sterile gown, sterile gloves, sterile sheet, hand hygiene, chlorhexidene for antiseptic skin preparation, sterile ultrasound techniques including sterile gel and sterile ultrasound probe cover) was used. A local field block with lidocaine was achieved. Ultrasound evaluation of all potential access sites was performed due to lack of a palpable vein. No veins were palpable due to overlying adipose tissue. Using real-time ultrasound guidance, with appropriate image recording and visualization of vascular needle entry, the patent right internal jugular vein was accessed using micropuncture technique. Using fluoroscopy, a peel-away sheath was placed over a wire. A subcutaneous tunnel was anesthetized on the chest wall.  A 5 English single lumen power line catheter was then brought through the subcutaneous tunnel and passed down the peel-away sheath positioning the tip in the Right Atrium. A permanent radiograph image was recorded. All lumens aspirated easily and were flushed. The venotomy incision was closed with surgical glue. The catheter was secured with nonabsorbable suture. Complications: None. Radiation Exposure Indices: Dose Area Product/Kerma Area Product (DAP/MICHELL/PKA) = DAP/MICHELL  cGy-cm2 Contrast: None. Medications:  Under physician supervision, 11 minutes of moderate intravenous conscious sedation was performed by administering Versed, Fentanyl, and Benadryl intravenously. Continuous pulse oximetry, heart rate, and blood pressure monitoring was performed with an independent, trained observer present. Findings:  Patent right internal jugular vein. Catheter tip in the right atrium. Depression: Successful right IJ tunneled central venous catheter (5 [employer] single lumen Powerline). Ready for use.    )  Tests in the medicine section of CPT®: ordered and reviewed  Decide to obtain previous medical records or to obtain history from someone other than the patient: yes  Independent visualization of images, tracings, or specimens: yes    Risk of Complications, Morbidity, and/or Mortality  Presenting problems: high  Diagnostic procedures: high  Management options: moderate  General comments: I personally reviewed the patient's vital signs, laboratory tests, and/or radiological findings. I discussed these findings with the patient and their significance. I answered all questions and gave the patient clear return precautions. The patient was discharged from the emergency department in stable condition        Patient Progress  Patient progress: improved    ED Course as of Jun 03 0955   Thu May 28, 2020   1108 Reviewed the patient's medical records and she has had a macrocytic anemia for many years. I am giving the patient some folic acid, F54 and thiamine. I will also provide the patient 1 unit of packed red blood cells and likely discharge home. [JS]      ED Course User Index  [JS] Tay Aviles MD       Procedures

## 2020-05-28 NOTE — ED NOTES
Patient sitting up in the bed eating a sandwich and drinking ginger ale. Tolerating blood transfusion well at this time. Will continue to monitor.

## 2020-05-29 ENCOUNTER — HOME CARE VISIT (OUTPATIENT)
Dept: HOME HEALTH SERVICES | Facility: HOME HEALTH | Age: 75
End: 2020-05-29
Payer: MEDICARE

## 2020-05-29 ENCOUNTER — HOME CARE VISIT (OUTPATIENT)
Dept: SCHEDULING | Facility: HOME HEALTH | Age: 75
End: 2020-05-29
Payer: MEDICARE

## 2020-05-29 VITALS
SYSTOLIC BLOOD PRESSURE: 138 MMHG | OXYGEN SATURATION: 93 % | TEMPERATURE: 98.1 F | HEART RATE: 69 BPM | RESPIRATION RATE: 18 BRPM | DIASTOLIC BLOOD PRESSURE: 62 MMHG

## 2020-05-29 LAB
ABO + RH BLD: NORMAL
BLD PROD TYP BPU: NORMAL
BLOOD BANK CMNT PATIENT-IMP: NORMAL
BLOOD GROUP ANTIBODIES SERPL: NORMAL
BPU ID: NORMAL
CROSSMATCH RESULT,%XM: NORMAL
SPECIMEN EXP DATE BLD: NORMAL
STATUS OF UNIT,%ST: NORMAL
UNIT DIVISION, %UDIV: 0

## 2020-05-29 PROCEDURE — 3331090002 HH PPS REVENUE DEBIT

## 2020-05-29 PROCEDURE — G0299 HHS/HOSPICE OF RN EA 15 MIN: HCPCS

## 2020-05-29 PROCEDURE — 3331090001 HH PPS REVENUE CREDIT

## 2020-05-30 PROCEDURE — 3331090001 HH PPS REVENUE CREDIT

## 2020-05-30 PROCEDURE — 3331090002 HH PPS REVENUE DEBIT

## 2020-05-31 PROCEDURE — 3331090002 HH PPS REVENUE DEBIT

## 2020-05-31 PROCEDURE — 3331090001 HH PPS REVENUE CREDIT

## 2020-06-01 ENCOUNTER — HOME CARE VISIT (OUTPATIENT)
Dept: SCHEDULING | Facility: HOME HEALTH | Age: 75
End: 2020-06-01
Payer: MEDICARE

## 2020-06-01 ENCOUNTER — HOSPITAL ENCOUNTER (OUTPATIENT)
Dept: LAB | Age: 75
Discharge: HOME | End: 2020-06-01
Payer: MEDICARE

## 2020-06-01 VITALS
OXYGEN SATURATION: 94 % | HEART RATE: 81 BPM | RESPIRATION RATE: 19 BRPM | TEMPERATURE: 98.3 F | DIASTOLIC BLOOD PRESSURE: 76 MMHG | SYSTOLIC BLOOD PRESSURE: 134 MMHG

## 2020-06-01 VITALS
DIASTOLIC BLOOD PRESSURE: 60 MMHG | HEART RATE: 70 BPM | SYSTOLIC BLOOD PRESSURE: 140 MMHG | RESPIRATION RATE: 18 BRPM | TEMPERATURE: 97.9 F | OXYGEN SATURATION: 92 %

## 2020-06-01 LAB
BASOPHILS # BLD: 0 K/UL (ref 0–0.2)
BASOPHILS NFR BLD: 1 % (ref 0–2)
CREAT SERPL-MCNC: 1.85 MG/DL (ref 0.6–1)
CRP SERPL-MCNC: 6.1 MG/DL (ref 0–0.9)
DIFFERENTIAL METHOD BLD: ABNORMAL
EOSINOPHIL # BLD: 0.1 K/UL (ref 0–0.8)
EOSINOPHIL NFR BLD: 3 % (ref 0.5–7.8)
ERYTHROCYTE [DISTWIDTH] IN BLOOD BY AUTOMATED COUNT: 18.2 % (ref 11.9–14.6)
ERYTHROCYTE [SEDIMENTATION RATE] IN BLOOD: 82 MM/HR (ref 0–30)
HCT VFR BLD AUTO: 26.8 % (ref 35.8–46.3)
HGB BLD-MCNC: 8.2 G/DL (ref 11.7–15.4)
IMM GRANULOCYTES # BLD AUTO: 0 K/UL (ref 0–0.5)
IMM GRANULOCYTES NFR BLD AUTO: 0 % (ref 0–5)
LYMPHOCYTES # BLD: 0.5 K/UL (ref 0.5–4.6)
LYMPHOCYTES NFR BLD: 21 % (ref 13–44)
MCH RBC QN AUTO: 31.4 PG (ref 26.1–32.9)
MCHC RBC AUTO-ENTMCNC: 30.6 G/DL (ref 31.4–35)
MCV RBC AUTO: 102.7 FL (ref 79.6–97.8)
MONOCYTES # BLD: 0.2 K/UL (ref 0.1–1.3)
MONOCYTES NFR BLD: 8 % (ref 4–12)
NEUTS SEG # BLD: 1.5 K/UL (ref 1.7–8.2)
NEUTS SEG NFR BLD: 67 % (ref 43–78)
NRBC # BLD: 0 K/UL (ref 0–0.2)
PLATELET # BLD AUTO: 134 K/UL (ref 150–450)
PMV BLD AUTO: 11 FL (ref 9.4–12.3)
RBC # BLD AUTO: 2.61 M/UL (ref 4.05–5.2)
WBC # BLD AUTO: 2.3 K/UL (ref 4.3–11.1)

## 2020-06-01 PROCEDURE — 3331090002 HH PPS REVENUE DEBIT

## 2020-06-01 PROCEDURE — G0151 HHCP-SERV OF PT,EA 15 MIN: HCPCS

## 2020-06-01 PROCEDURE — 86140 C-REACTIVE PROTEIN: CPT

## 2020-06-01 PROCEDURE — 82565 ASSAY OF CREATININE: CPT

## 2020-06-01 PROCEDURE — G0299 HHS/HOSPICE OF RN EA 15 MIN: HCPCS

## 2020-06-01 PROCEDURE — 3331090001 HH PPS REVENUE CREDIT

## 2020-06-01 PROCEDURE — 85025 COMPLETE CBC W/AUTO DIFF WBC: CPT

## 2020-06-01 PROCEDURE — 85652 RBC SED RATE AUTOMATED: CPT

## 2020-06-02 PROCEDURE — 3331090001 HH PPS REVENUE CREDIT

## 2020-06-02 PROCEDURE — 3331090002 HH PPS REVENUE DEBIT

## 2020-06-03 ENCOUNTER — HOME CARE VISIT (OUTPATIENT)
Dept: SCHEDULING | Facility: HOME HEALTH | Age: 75
End: 2020-06-03
Payer: MEDICARE

## 2020-06-03 VITALS
RESPIRATION RATE: 18 BRPM | SYSTOLIC BLOOD PRESSURE: 132 MMHG | TEMPERATURE: 97.8 F | OXYGEN SATURATION: 92 % | DIASTOLIC BLOOD PRESSURE: 80 MMHG | HEART RATE: 78 BPM

## 2020-06-03 PROCEDURE — 400013 HH SOC

## 2020-06-03 PROCEDURE — G0299 HHS/HOSPICE OF RN EA 15 MIN: HCPCS

## 2020-06-03 PROCEDURE — 3331090001 HH PPS REVENUE CREDIT

## 2020-06-03 PROCEDURE — 3331090002 HH PPS REVENUE DEBIT

## 2020-06-04 PROCEDURE — 3331090001 HH PPS REVENUE CREDIT

## 2020-06-04 PROCEDURE — 3331090002 HH PPS REVENUE DEBIT

## 2020-06-05 ENCOUNTER — HOME CARE VISIT (OUTPATIENT)
Dept: SCHEDULING | Facility: HOME HEALTH | Age: 75
End: 2020-06-05
Payer: MEDICARE

## 2020-06-05 VITALS
OXYGEN SATURATION: 94 % | DIASTOLIC BLOOD PRESSURE: 60 MMHG | SYSTOLIC BLOOD PRESSURE: 122 MMHG | RESPIRATION RATE: 18 BRPM | TEMPERATURE: 97.3 F | HEART RATE: 75 BPM

## 2020-06-05 PROCEDURE — G0299 HHS/HOSPICE OF RN EA 15 MIN: HCPCS

## 2020-06-05 PROCEDURE — G0151 HHCP-SERV OF PT,EA 15 MIN: HCPCS

## 2020-06-05 PROCEDURE — 3331090002 HH PPS REVENUE DEBIT

## 2020-06-05 PROCEDURE — 3331090001 HH PPS REVENUE CREDIT

## 2020-06-06 PROCEDURE — 3331090001 HH PPS REVENUE CREDIT

## 2020-06-06 PROCEDURE — 3331090002 HH PPS REVENUE DEBIT

## 2020-06-07 PROCEDURE — 3331090002 HH PPS REVENUE DEBIT

## 2020-06-07 PROCEDURE — 3331090001 HH PPS REVENUE CREDIT

## 2020-06-08 ENCOUNTER — HOSPITAL ENCOUNTER (OUTPATIENT)
Dept: LAB | Age: 75
Discharge: HOME | End: 2020-06-08
Payer: MEDICARE

## 2020-06-08 ENCOUNTER — HOME CARE VISIT (OUTPATIENT)
Dept: SCHEDULING | Facility: HOME HEALTH | Age: 75
End: 2020-06-08
Payer: MEDICARE

## 2020-06-08 VITALS
SYSTOLIC BLOOD PRESSURE: 118 MMHG | OXYGEN SATURATION: 92 % | HEART RATE: 69 BPM | TEMPERATURE: 97.3 F | DIASTOLIC BLOOD PRESSURE: 60 MMHG | RESPIRATION RATE: 18 BRPM

## 2020-06-08 VITALS
HEART RATE: 81 BPM | TEMPERATURE: 97.9 F | SYSTOLIC BLOOD PRESSURE: 122 MMHG | DIASTOLIC BLOOD PRESSURE: 78 MMHG | RESPIRATION RATE: 18 BRPM

## 2020-06-08 LAB
BASOPHILS # BLD: 0 K/UL (ref 0–0.2)
BASOPHILS NFR BLD: 1 % (ref 0–2)
CREAT SERPL-MCNC: 2.58 MG/DL (ref 0.6–1)
CRP SERPL-MCNC: 6.2 MG/DL (ref 0–0.9)
DIFFERENTIAL METHOD BLD: ABNORMAL
EOSINOPHIL # BLD: 0.1 K/UL (ref 0–0.8)
EOSINOPHIL NFR BLD: 2 % (ref 0.5–7.8)
ERYTHROCYTE [DISTWIDTH] IN BLOOD BY AUTOMATED COUNT: 18.3 % (ref 11.9–14.6)
ERYTHROCYTE [SEDIMENTATION RATE] IN BLOOD: 77 MM/HR (ref 0–30)
HCT VFR BLD AUTO: 26.2 % (ref 35.8–46.3)
HGB BLD-MCNC: 7.9 G/DL (ref 11.7–15.4)
IMM GRANULOCYTES # BLD AUTO: 0 K/UL (ref 0–0.5)
IMM GRANULOCYTES NFR BLD AUTO: 1 % (ref 0–5)
LYMPHOCYTES # BLD: 1 K/UL (ref 0.5–4.6)
LYMPHOCYTES NFR BLD: 36 % (ref 13–44)
MCH RBC QN AUTO: 31.3 PG (ref 26.1–32.9)
MCHC RBC AUTO-ENTMCNC: 30.2 G/DL (ref 31.4–35)
MCV RBC AUTO: 104 FL (ref 79.6–97.8)
MONOCYTES # BLD: 0.4 K/UL (ref 0.1–1.3)
MONOCYTES NFR BLD: 14 % (ref 4–12)
NEUTS SEG # BLD: 1.3 K/UL (ref 1.7–8.2)
NEUTS SEG NFR BLD: 46 % (ref 43–78)
NRBC # BLD: 0 K/UL (ref 0–0.2)
PLATELET # BLD AUTO: 126 K/UL (ref 150–450)
PMV BLD AUTO: 10.9 FL (ref 9.4–12.3)
RBC # BLD AUTO: 2.52 M/UL (ref 4.05–5.2)
WBC # BLD AUTO: 2.7 K/UL (ref 4.3–11.1)

## 2020-06-08 PROCEDURE — 86140 C-REACTIVE PROTEIN: CPT

## 2020-06-08 PROCEDURE — 3331090002 HH PPS REVENUE DEBIT

## 2020-06-08 PROCEDURE — G0299 HHS/HOSPICE OF RN EA 15 MIN: HCPCS

## 2020-06-08 PROCEDURE — 82565 ASSAY OF CREATININE: CPT

## 2020-06-08 PROCEDURE — 85652 RBC SED RATE AUTOMATED: CPT

## 2020-06-08 PROCEDURE — 85025 COMPLETE CBC W/AUTO DIFF WBC: CPT

## 2020-06-08 PROCEDURE — 3331090001 HH PPS REVENUE CREDIT

## 2020-06-09 ENCOUNTER — HOME CARE VISIT (OUTPATIENT)
Dept: SCHEDULING | Facility: HOME HEALTH | Age: 75
End: 2020-06-09
Payer: MEDICARE

## 2020-06-09 VITALS
SYSTOLIC BLOOD PRESSURE: 120 MMHG | OXYGEN SATURATION: 94 % | TEMPERATURE: 97.7 F | HEART RATE: 70 BPM | DIASTOLIC BLOOD PRESSURE: 70 MMHG | RESPIRATION RATE: 18 BRPM

## 2020-06-09 VITALS
RESPIRATION RATE: 19 BRPM | DIASTOLIC BLOOD PRESSURE: 84 MMHG | HEART RATE: 82 BPM | SYSTOLIC BLOOD PRESSURE: 128 MMHG | TEMPERATURE: 97.9 F

## 2020-06-09 PROCEDURE — 3331090001 HH PPS REVENUE CREDIT

## 2020-06-09 PROCEDURE — G0151 HHCP-SERV OF PT,EA 15 MIN: HCPCS

## 2020-06-09 PROCEDURE — 3331090002 HH PPS REVENUE DEBIT

## 2020-06-09 PROCEDURE — G0299 HHS/HOSPICE OF RN EA 15 MIN: HCPCS

## 2020-06-10 ENCOUNTER — HOME CARE VISIT (OUTPATIENT)
Dept: SCHEDULING | Facility: HOME HEALTH | Age: 75
End: 2020-06-10
Payer: MEDICARE

## 2020-06-10 VITALS
DIASTOLIC BLOOD PRESSURE: 80 MMHG | TEMPERATURE: 97.6 F | RESPIRATION RATE: 18 BRPM | OXYGEN SATURATION: 92 % | HEART RATE: 72 BPM | SYSTOLIC BLOOD PRESSURE: 130 MMHG

## 2020-06-10 PROCEDURE — 3331090002 HH PPS REVENUE DEBIT

## 2020-06-10 PROCEDURE — 3331090001 HH PPS REVENUE CREDIT

## 2020-06-10 PROCEDURE — G0299 HHS/HOSPICE OF RN EA 15 MIN: HCPCS

## 2020-06-11 PROCEDURE — 3331090001 HH PPS REVENUE CREDIT

## 2020-06-11 PROCEDURE — 3331090002 HH PPS REVENUE DEBIT

## 2020-06-12 ENCOUNTER — HOME CARE VISIT (OUTPATIENT)
Dept: SCHEDULING | Facility: HOME HEALTH | Age: 75
End: 2020-06-12
Payer: MEDICARE

## 2020-06-12 VITALS
SYSTOLIC BLOOD PRESSURE: 128 MMHG | TEMPERATURE: 97.3 F | OXYGEN SATURATION: 95 % | HEART RATE: 67 BPM | RESPIRATION RATE: 18 BRPM | DIASTOLIC BLOOD PRESSURE: 84 MMHG

## 2020-06-12 PROCEDURE — 3331090002 HH PPS REVENUE DEBIT

## 2020-06-12 PROCEDURE — 3331090001 HH PPS REVENUE CREDIT

## 2020-06-12 PROCEDURE — G0299 HHS/HOSPICE OF RN EA 15 MIN: HCPCS

## 2020-06-13 PROCEDURE — 3331090002 HH PPS REVENUE DEBIT

## 2020-06-13 PROCEDURE — 3331090001 HH PPS REVENUE CREDIT

## 2020-06-14 PROCEDURE — 3331090001 HH PPS REVENUE CREDIT

## 2020-06-14 PROCEDURE — 3331090002 HH PPS REVENUE DEBIT

## 2020-06-15 ENCOUNTER — HOME CARE VISIT (OUTPATIENT)
Dept: SCHEDULING | Facility: HOME HEALTH | Age: 75
End: 2020-06-15
Payer: MEDICARE

## 2020-06-15 ENCOUNTER — HOSPITAL ENCOUNTER (OUTPATIENT)
Dept: LAB | Age: 75
Discharge: HOME | End: 2020-06-15
Payer: MEDICARE

## 2020-06-15 VITALS
DIASTOLIC BLOOD PRESSURE: 60 MMHG | OXYGEN SATURATION: 92 % | TEMPERATURE: 97.4 F | RESPIRATION RATE: 18 BRPM | HEART RATE: 66 BPM | SYSTOLIC BLOOD PRESSURE: 132 MMHG

## 2020-06-15 LAB
BASOPHILS # BLD: 0 K/UL (ref 0–0.2)
BASOPHILS NFR BLD: 1 % (ref 0–2)
CREAT SERPL-MCNC: 2.38 MG/DL (ref 0.6–1)
CRP SERPL-MCNC: 4.3 MG/DL (ref 0–0.9)
DIFFERENTIAL METHOD BLD: ABNORMAL
EOSINOPHIL # BLD: 0 K/UL (ref 0–0.8)
EOSINOPHIL NFR BLD: 1 % (ref 0.5–7.8)
ERYTHROCYTE [DISTWIDTH] IN BLOOD BY AUTOMATED COUNT: 18.2 % (ref 11.9–14.6)
ERYTHROCYTE [SEDIMENTATION RATE] IN BLOOD: 59 MM/HR (ref 0–30)
HCT VFR BLD AUTO: 26.5 % (ref 35.8–46.3)
HGB BLD-MCNC: 8.2 G/DL (ref 11.7–15.4)
IMM GRANULOCYTES # BLD AUTO: 0.1 K/UL (ref 0–0.5)
IMM GRANULOCYTES NFR BLD AUTO: 4 % (ref 0–5)
LYMPHOCYTES # BLD: 0.9 K/UL (ref 0.5–4.6)
LYMPHOCYTES NFR BLD: 29 % (ref 13–44)
MCH RBC QN AUTO: 31.9 PG (ref 26.1–32.9)
MCHC RBC AUTO-ENTMCNC: 30.9 G/DL (ref 31.4–35)
MCV RBC AUTO: 103.1 FL (ref 79.6–97.8)
MONOCYTES # BLD: 0.4 K/UL (ref 0.1–1.3)
MONOCYTES NFR BLD: 12 % (ref 4–12)
NEUTS SEG # BLD: 1.6 K/UL (ref 1.7–8.2)
NEUTS SEG NFR BLD: 53 % (ref 43–78)
NRBC # BLD: 0 K/UL (ref 0–0.2)
PLATELET # BLD AUTO: 144 K/UL (ref 150–450)
PMV BLD AUTO: 11.2 FL (ref 9.4–12.3)
RBC # BLD AUTO: 2.57 M/UL (ref 4.05–5.2)
WBC # BLD AUTO: 3.1 K/UL (ref 4.3–11.1)

## 2020-06-15 PROCEDURE — 85025 COMPLETE CBC W/AUTO DIFF WBC: CPT

## 2020-06-15 PROCEDURE — 86140 C-REACTIVE PROTEIN: CPT

## 2020-06-15 PROCEDURE — G0299 HHS/HOSPICE OF RN EA 15 MIN: HCPCS

## 2020-06-15 PROCEDURE — 3331090001 HH PPS REVENUE CREDIT

## 2020-06-15 PROCEDURE — 85652 RBC SED RATE AUTOMATED: CPT

## 2020-06-15 PROCEDURE — 3331090002 HH PPS REVENUE DEBIT

## 2020-06-15 PROCEDURE — 82565 ASSAY OF CREATININE: CPT

## 2020-06-16 PROCEDURE — 3331090002 HH PPS REVENUE DEBIT

## 2020-06-16 PROCEDURE — 3331090001 HH PPS REVENUE CREDIT

## 2020-06-17 ENCOUNTER — HOME CARE VISIT (OUTPATIENT)
Dept: SCHEDULING | Facility: HOME HEALTH | Age: 75
End: 2020-06-17
Payer: MEDICARE

## 2020-06-17 VITALS
SYSTOLIC BLOOD PRESSURE: 118 MMHG | DIASTOLIC BLOOD PRESSURE: 62 MMHG | OXYGEN SATURATION: 93 % | TEMPERATURE: 98 F | RESPIRATION RATE: 18 BRPM | HEART RATE: 75 BPM

## 2020-06-17 PROCEDURE — G0299 HHS/HOSPICE OF RN EA 15 MIN: HCPCS

## 2020-06-17 PROCEDURE — 3331090001 HH PPS REVENUE CREDIT

## 2020-06-17 PROCEDURE — 3331090002 HH PPS REVENUE DEBIT

## 2020-06-18 PROCEDURE — 3331090002 HH PPS REVENUE DEBIT

## 2020-06-18 PROCEDURE — 3331090001 HH PPS REVENUE CREDIT

## 2020-06-19 ENCOUNTER — HOME CARE VISIT (OUTPATIENT)
Dept: SCHEDULING | Facility: HOME HEALTH | Age: 75
End: 2020-06-19
Payer: MEDICARE

## 2020-06-19 VITALS
SYSTOLIC BLOOD PRESSURE: 142 MMHG | DIASTOLIC BLOOD PRESSURE: 60 MMHG | TEMPERATURE: 98 F | RESPIRATION RATE: 18 BRPM | OXYGEN SATURATION: 92 % | HEART RATE: 69 BPM

## 2020-06-19 PROCEDURE — 3331090001 HH PPS REVENUE CREDIT

## 2020-06-19 PROCEDURE — G0299 HHS/HOSPICE OF RN EA 15 MIN: HCPCS

## 2020-06-19 PROCEDURE — 3331090002 HH PPS REVENUE DEBIT

## 2020-06-19 RX ORDER — CALCIUM CARBONATE 500(1250)
TABLET ORAL
Qty: 4 TAB | Refills: 0 | Status: CANCELLED | OUTPATIENT
Start: 2020-06-19

## 2020-06-19 RX ORDER — POLYETHYLENE GLYCOL 3350 17 G/17G
17 POWDER, FOR SOLUTION ORAL DAILY
Qty: 30 PACKET | Refills: 0 | Status: CANCELLED | OUTPATIENT
Start: 2020-06-19

## 2020-06-20 PROCEDURE — 3331090001 HH PPS REVENUE CREDIT

## 2020-06-20 PROCEDURE — 3331090002 HH PPS REVENUE DEBIT

## 2020-06-21 PROCEDURE — 3331090002 HH PPS REVENUE DEBIT

## 2020-06-21 PROCEDURE — 3331090001 HH PPS REVENUE CREDIT

## 2020-06-22 ENCOUNTER — HOSPITAL ENCOUNTER (OUTPATIENT)
Dept: LAB | Age: 75
Discharge: HOME | End: 2020-06-22
Payer: MEDICARE

## 2020-06-22 ENCOUNTER — HOME CARE VISIT (OUTPATIENT)
Dept: SCHEDULING | Facility: HOME HEALTH | Age: 75
End: 2020-06-22
Payer: MEDICARE

## 2020-06-22 VITALS
SYSTOLIC BLOOD PRESSURE: 142 MMHG | TEMPERATURE: 97.2 F | RESPIRATION RATE: 18 BRPM | HEART RATE: 70 BPM | OXYGEN SATURATION: 95 % | DIASTOLIC BLOOD PRESSURE: 68 MMHG

## 2020-06-22 LAB
BASOPHILS # BLD: 0 K/UL (ref 0–0.2)
BASOPHILS NFR BLD: 1 % (ref 0–2)
CREAT SERPL-MCNC: 2.44 MG/DL (ref 0.6–1)
CRP SERPL-MCNC: 5 MG/DL (ref 0–0.9)
DIFFERENTIAL METHOD BLD: ABNORMAL
EOSINOPHIL # BLD: 0.1 K/UL (ref 0–0.8)
EOSINOPHIL NFR BLD: 2 % (ref 0.5–7.8)
ERYTHROCYTE [DISTWIDTH] IN BLOOD BY AUTOMATED COUNT: 17.5 % (ref 11.9–14.6)
ERYTHROCYTE [SEDIMENTATION RATE] IN BLOOD: 76 MM/HR (ref 0–30)
HCT VFR BLD AUTO: 24.8 % (ref 35.8–46.3)
HGB BLD-MCNC: 7.7 G/DL (ref 11.7–15.4)
IMM GRANULOCYTES # BLD AUTO: 0 K/UL (ref 0–0.5)
IMM GRANULOCYTES NFR BLD AUTO: 1 % (ref 0–5)
LYMPHOCYTES # BLD: 0.8 K/UL (ref 0.5–4.6)
LYMPHOCYTES NFR BLD: 26 % (ref 13–44)
MCH RBC QN AUTO: 32.4 PG (ref 26.1–32.9)
MCHC RBC AUTO-ENTMCNC: 31 G/DL (ref 31.4–35)
MCV RBC AUTO: 104.2 FL (ref 79.6–97.8)
MONOCYTES # BLD: 0.2 K/UL (ref 0.1–1.3)
MONOCYTES NFR BLD: 7 % (ref 4–12)
NEUTS SEG # BLD: 2.1 K/UL (ref 1.7–8.2)
NEUTS SEG NFR BLD: 64 % (ref 43–78)
NRBC # BLD: 0 K/UL (ref 0–0.2)
PLATELET # BLD AUTO: 119 K/UL (ref 150–450)
PMV BLD AUTO: 11.4 FL (ref 9.4–12.3)
RBC # BLD AUTO: 2.38 M/UL (ref 4.05–5.2)
WBC # BLD AUTO: 3.3 K/UL (ref 4.3–11.1)

## 2020-06-22 PROCEDURE — 85652 RBC SED RATE AUTOMATED: CPT

## 2020-06-22 PROCEDURE — 3331090001 HH PPS REVENUE CREDIT

## 2020-06-22 PROCEDURE — 86140 C-REACTIVE PROTEIN: CPT

## 2020-06-22 PROCEDURE — 3331090002 HH PPS REVENUE DEBIT

## 2020-06-22 PROCEDURE — G0299 HHS/HOSPICE OF RN EA 15 MIN: HCPCS

## 2020-06-22 PROCEDURE — 85025 COMPLETE CBC W/AUTO DIFF WBC: CPT

## 2020-06-22 PROCEDURE — 82565 ASSAY OF CREATININE: CPT

## 2020-06-22 RX ORDER — POLYETHYLENE GLYCOL 3350 17 G/17G
17 POWDER, FOR SOLUTION ORAL DAILY
Qty: 30 PACKET | Refills: 0 | Status: CANCELLED | OUTPATIENT
Start: 2020-06-22

## 2020-06-22 RX ORDER — CALCIUM CARBONATE 500(1250)
TABLET ORAL
Qty: 4 TAB | Refills: 0 | Status: CANCELLED | OUTPATIENT
Start: 2020-06-22

## 2020-06-23 PROCEDURE — 3331090001 HH PPS REVENUE CREDIT

## 2020-06-23 PROCEDURE — 3331090002 HH PPS REVENUE DEBIT

## 2020-06-24 ENCOUNTER — HOME CARE VISIT (OUTPATIENT)
Dept: SCHEDULING | Facility: HOME HEALTH | Age: 75
End: 2020-06-24
Payer: MEDICARE

## 2020-06-24 VITALS
SYSTOLIC BLOOD PRESSURE: 126 MMHG | TEMPERATURE: 97.8 F | DIASTOLIC BLOOD PRESSURE: 70 MMHG | HEART RATE: 78 BPM | OXYGEN SATURATION: 96 % | RESPIRATION RATE: 18 BRPM

## 2020-06-24 PROCEDURE — 3331090001 HH PPS REVENUE CREDIT

## 2020-06-24 PROCEDURE — 3331090002 HH PPS REVENUE DEBIT

## 2020-06-24 PROCEDURE — G0299 HHS/HOSPICE OF RN EA 15 MIN: HCPCS

## 2020-06-25 PROCEDURE — 3331090002 HH PPS REVENUE DEBIT

## 2020-06-25 PROCEDURE — 3331090001 HH PPS REVENUE CREDIT

## 2020-06-26 ENCOUNTER — HOME CARE VISIT (OUTPATIENT)
Dept: SCHEDULING | Facility: HOME HEALTH | Age: 75
End: 2020-06-26
Payer: MEDICARE

## 2020-06-26 VITALS
HEART RATE: 70 BPM | DIASTOLIC BLOOD PRESSURE: 84 MMHG | SYSTOLIC BLOOD PRESSURE: 128 MMHG | TEMPERATURE: 97.7 F | OXYGEN SATURATION: 95 %

## 2020-06-26 PROCEDURE — G0299 HHS/HOSPICE OF RN EA 15 MIN: HCPCS

## 2020-06-26 PROCEDURE — 3331090002 HH PPS REVENUE DEBIT

## 2020-06-26 PROCEDURE — 3331090001 HH PPS REVENUE CREDIT

## 2020-06-27 PROCEDURE — 3331090002 HH PPS REVENUE DEBIT

## 2020-06-27 PROCEDURE — 3331090001 HH PPS REVENUE CREDIT

## 2020-06-28 PROCEDURE — 3331090002 HH PPS REVENUE DEBIT

## 2020-06-28 PROCEDURE — 3331090001 HH PPS REVENUE CREDIT

## 2020-06-29 ENCOUNTER — HOME CARE VISIT (OUTPATIENT)
Dept: SCHEDULING | Facility: HOME HEALTH | Age: 75
End: 2020-06-29
Payer: MEDICARE

## 2020-06-29 VITALS
DIASTOLIC BLOOD PRESSURE: 80 MMHG | SYSTOLIC BLOOD PRESSURE: 142 MMHG | TEMPERATURE: 97.8 F | RESPIRATION RATE: 18 BRPM | HEART RATE: 74 BPM | OXYGEN SATURATION: 97 %

## 2020-06-29 PROCEDURE — 3331090001 HH PPS REVENUE CREDIT

## 2020-06-29 PROCEDURE — G0299 HHS/HOSPICE OF RN EA 15 MIN: HCPCS

## 2020-06-29 PROCEDURE — 3331090002 HH PPS REVENUE DEBIT

## 2020-06-30 PROCEDURE — 3331090002 HH PPS REVENUE DEBIT

## 2020-06-30 PROCEDURE — 3331090001 HH PPS REVENUE CREDIT

## 2020-07-01 ENCOUNTER — HOME CARE VISIT (OUTPATIENT)
Dept: SCHEDULING | Facility: HOME HEALTH | Age: 75
End: 2020-07-01
Payer: MEDICARE

## 2020-07-01 VITALS
OXYGEN SATURATION: 98 % | DIASTOLIC BLOOD PRESSURE: 78 MMHG | SYSTOLIC BLOOD PRESSURE: 142 MMHG | RESPIRATION RATE: 18 BRPM | TEMPERATURE: 98.2 F | HEART RATE: 78 BPM

## 2020-07-01 VITALS
TEMPERATURE: 97.4 F | DIASTOLIC BLOOD PRESSURE: 80 MMHG | RESPIRATION RATE: 18 BRPM | OXYGEN SATURATION: 92 % | SYSTOLIC BLOOD PRESSURE: 132 MMHG | HEART RATE: 70 BPM

## 2020-07-01 PROCEDURE — 3331090001 HH PPS REVENUE CREDIT

## 2020-07-01 PROCEDURE — G0299 HHS/HOSPICE OF RN EA 15 MIN: HCPCS

## 2020-07-01 PROCEDURE — 3331090002 HH PPS REVENUE DEBIT

## 2020-07-02 ENCOUNTER — HOSPITAL ENCOUNTER (OUTPATIENT)
Dept: INTERVENTIONAL RADIOLOGY/VASCULAR | Age: 75
Discharge: HOME | End: 2020-07-02
Attending: INTERNAL MEDICINE
Payer: MEDICARE

## 2020-07-02 VITALS
SYSTOLIC BLOOD PRESSURE: 181 MMHG | BODY MASS INDEX: 37.98 KG/M2 | TEMPERATURE: 98.3 F | OXYGEN SATURATION: 98 % | DIASTOLIC BLOOD PRESSURE: 77 MMHG | WEIGHT: 242 LBS | HEART RATE: 76 BPM | RESPIRATION RATE: 18 BRPM | HEIGHT: 67 IN

## 2020-07-02 DIAGNOSIS — T81.49XA LEFT HIP POSTOPERATIVE WOUND INFECTION: ICD-10-CM

## 2020-07-02 PROCEDURE — 3331090001 HH PPS REVENUE CREDIT

## 2020-07-02 PROCEDURE — 36589 REMOVAL TUNNELED CV CATH: CPT

## 2020-07-02 PROCEDURE — 3331090002 HH PPS REVENUE DEBIT

## 2020-07-02 NOTE — DISCHARGE INSTRUCTIONS
[address]  Department of Interventional Radiology  [address] [place] [address] [address] [facility]  [phone] Office  [phone] Fax    DRAIN/PORT/CATHETER REMOVAL  DISCHARGE INSTRUCTIONS    General Information:     Your doctor has ordered for us to remove your drain, port, or catheter. This could be that you do not need it anymore, it is not doing its job, your physician has decided on another plan for your treatment and/or it may need replacing. Home Care Instructions: You can resume your regular diet and medication regimen. Do not drink alcohol, drive, or make any important legal decisions in the next 24 hours. Do not lift anything heavier than a gallon of milk, or do anything strenuous for the next 24 hours. You will notice a dressing over the site of the removal. This dressing should stay in place until the site is healed. The dressing should be changed at least every 48 hours. You should change the dressing sooner if it becomes soiled or wet. The nurse who discharges you to home should review with you any wound care instructions. Resume your normal level of activity slowly. You may shower after 24 hours, but do not take a bath, swim or immerse yourself in water until the site has healed, and keep the dressing dry with plastic wrap while showering. The site may ooze for a couple of days. This drainage should lessen with each passing day. Call If:     You should call your Physician and/or the Radiology Nurse if you have any bleeding other than a small spot on your bandage. Call if you have any signs of infection, fever, or increased pain at the site of the tube. Call if the oozing increases, if it changes color, or begins to have an odor. Follow-Up Instructions: Please see your ordering doctor as he/she has requested. To Reach Us: If you have any questions about your procedure, please call the Interventional Radiology department at [phone]. After business hours (5pm) and weekends, call the answering service at [phone] and ask for the Radiologist on call to be paged. Si tiene Preguntas acerca del procedimiento, por favor llame al departamento de Radiología Intervencional al [phone]. Después de horas de oficina (5 pm) y los fines de [place], llamar al [facility] Vinny Edwards al [phone] y pregunte por el Radiologo de [facility]. Interventional Radiology General Nurse Discharge    After general anesthesia or intravenous sedation, for 24 hours or while taking prescription Narcotics:  · Limit your activities  · Do not drive and operate hazardous machinery  · Do not make important personal or business decisions  · Do  not drink alcoholic beverages  · If you have not urinated within 8 hours after discharge, please contact your surgeon on call. * Please give a list of your current medications to your Primary Care Provider. * Please update this list whenever your medications are discontinued, doses are     changed, or new medications (including over-the-counter products) are added. * Please carry medication information at all times in case of emergency situations. These are general instructions for a healthy lifestyle:    No smoking/ No tobacco products/ Avoid exposure to second hand smoke  Surgeon General's Warning:  Quitting smoking now greatly reduces serious risk to your health. Obesity, smoking, and sedentary lifestyle greatly increases your risk for illness  A healthy diet, regular physical exercise & weight monitoring are important for maintaining a healthy lifestyle    You may be retaining fluid if you have a history of heart failure or if you experience any of the following symptoms:  Weight gain of 3 pounds or more overnight or 5 pounds in a week, increased swelling in our hands or feet or shortness of breath while lying flat in bed.   Please call your doctor as soon as you notice any of these symptoms; do not wait until your next office visit. Recognize signs and symptoms of STROKE:  F-face looks uneven    A-arms unable to move or move unevenly    S-speech slurred or non-existent    T-time-call 911 as soon as signs and symptoms begin-DO NOT go       Back to bed or wait to see if you get better-TIME IS BRAIN.       Patient Signature:  Date: 7/2/2020  Discharging Nurse: Riley Guo RN

## 2020-07-03 ENCOUNTER — HOME CARE VISIT (OUTPATIENT)
Dept: SCHEDULING | Facility: HOME HEALTH | Age: 75
End: 2020-07-03
Payer: MEDICARE

## 2020-07-03 PROCEDURE — 400014 HH F/U

## 2020-07-03 PROCEDURE — 3331090001 HH PPS REVENUE CREDIT

## 2020-07-03 PROCEDURE — G0299 HHS/HOSPICE OF RN EA 15 MIN: HCPCS

## 2020-07-03 PROCEDURE — 3331090002 HH PPS REVENUE DEBIT

## 2020-07-04 PROCEDURE — 3331090002 HH PPS REVENUE DEBIT

## 2020-07-04 PROCEDURE — 3331090001 HH PPS REVENUE CREDIT

## 2020-07-05 VITALS
OXYGEN SATURATION: 97 % | HEART RATE: 67 BPM | SYSTOLIC BLOOD PRESSURE: 128 MMHG | DIASTOLIC BLOOD PRESSURE: 70 MMHG | RESPIRATION RATE: 18 BRPM | TEMPERATURE: 97.2 F

## 2020-07-05 PROCEDURE — 3331090001 HH PPS REVENUE CREDIT

## 2020-07-05 PROCEDURE — 3331090002 HH PPS REVENUE DEBIT

## 2020-07-06 ENCOUNTER — HOME CARE VISIT (OUTPATIENT)
Dept: SCHEDULING | Facility: HOME HEALTH | Age: 75
End: 2020-07-06
Payer: MEDICARE

## 2020-07-06 VITALS
HEART RATE: 73 BPM | OXYGEN SATURATION: 98 % | DIASTOLIC BLOOD PRESSURE: 82 MMHG | TEMPERATURE: 97.9 F | RESPIRATION RATE: 16 BRPM | SYSTOLIC BLOOD PRESSURE: 128 MMHG

## 2020-07-06 PROCEDURE — 3331090001 HH PPS REVENUE CREDIT

## 2020-07-06 PROCEDURE — G0299 HHS/HOSPICE OF RN EA 15 MIN: HCPCS

## 2020-07-06 PROCEDURE — 3331090002 HH PPS REVENUE DEBIT

## 2020-07-07 PROCEDURE — 3331090001 HH PPS REVENUE CREDIT

## 2020-07-07 PROCEDURE — 3331090002 HH PPS REVENUE DEBIT

## 2020-07-08 ENCOUNTER — HOME CARE VISIT (OUTPATIENT)
Dept: SCHEDULING | Facility: HOME HEALTH | Age: 75
End: 2020-07-08
Payer: MEDICARE

## 2020-07-08 VITALS
DIASTOLIC BLOOD PRESSURE: 60 MMHG | OXYGEN SATURATION: 92 % | SYSTOLIC BLOOD PRESSURE: 110 MMHG | HEART RATE: 77 BPM | RESPIRATION RATE: 18 BRPM | TEMPERATURE: 98.2 F

## 2020-07-08 PROCEDURE — 3331090001 HH PPS REVENUE CREDIT

## 2020-07-08 PROCEDURE — G0299 HHS/HOSPICE OF RN EA 15 MIN: HCPCS

## 2020-07-08 PROCEDURE — 3331090002 HH PPS REVENUE DEBIT

## 2020-07-09 PROCEDURE — 3331090002 HH PPS REVENUE DEBIT

## 2020-07-09 PROCEDURE — 3331090001 HH PPS REVENUE CREDIT

## 2020-07-10 ENCOUNTER — HOME CARE VISIT (OUTPATIENT)
Dept: SCHEDULING | Facility: HOME HEALTH | Age: 75
End: 2020-07-10
Payer: MEDICARE

## 2020-07-10 PROCEDURE — 3331090002 HH PPS REVENUE DEBIT

## 2020-07-10 PROCEDURE — 3331090001 HH PPS REVENUE CREDIT

## 2020-07-10 PROCEDURE — G0299 HHS/HOSPICE OF RN EA 15 MIN: HCPCS

## 2020-07-11 PROCEDURE — 3331090002 HH PPS REVENUE DEBIT

## 2020-07-11 PROCEDURE — 3331090001 HH PPS REVENUE CREDIT

## 2020-07-12 VITALS
DIASTOLIC BLOOD PRESSURE: 64 MMHG | TEMPERATURE: 97.6 F | HEART RATE: 67 BPM | RESPIRATION RATE: 18 BRPM | SYSTOLIC BLOOD PRESSURE: 118 MMHG | OXYGEN SATURATION: 97 %

## 2020-07-12 PROCEDURE — 3331090001 HH PPS REVENUE CREDIT

## 2020-07-12 PROCEDURE — 3331090002 HH PPS REVENUE DEBIT

## 2020-07-13 ENCOUNTER — HOME CARE VISIT (OUTPATIENT)
Dept: SCHEDULING | Facility: HOME HEALTH | Age: 75
End: 2020-07-13
Payer: MEDICARE

## 2020-07-13 VITALS
RESPIRATION RATE: 18 BRPM | HEART RATE: 64 BPM | OXYGEN SATURATION: 92 % | SYSTOLIC BLOOD PRESSURE: 110 MMHG | DIASTOLIC BLOOD PRESSURE: 70 MMHG | TEMPERATURE: 98 F

## 2020-07-13 PROCEDURE — G0299 HHS/HOSPICE OF RN EA 15 MIN: HCPCS

## 2020-07-13 PROCEDURE — 3331090002 HH PPS REVENUE DEBIT

## 2020-07-13 PROCEDURE — 3331090001 HH PPS REVENUE CREDIT

## 2020-07-14 PROCEDURE — 3331090002 HH PPS REVENUE DEBIT

## 2020-07-14 PROCEDURE — 3331090001 HH PPS REVENUE CREDIT

## 2020-07-15 ENCOUNTER — HOME CARE VISIT (OUTPATIENT)
Dept: SCHEDULING | Facility: HOME HEALTH | Age: 75
End: 2020-07-15
Payer: MEDICARE

## 2020-07-15 VITALS
RESPIRATION RATE: 18 BRPM | OXYGEN SATURATION: 93 % | TEMPERATURE: 97.9 F | DIASTOLIC BLOOD PRESSURE: 63 MMHG | HEART RATE: 74 BPM | SYSTOLIC BLOOD PRESSURE: 135 MMHG

## 2020-07-15 PROCEDURE — G0299 HHS/HOSPICE OF RN EA 15 MIN: HCPCS

## 2020-07-15 PROCEDURE — 3331090002 HH PPS REVENUE DEBIT

## 2020-07-15 PROCEDURE — 3331090001 HH PPS REVENUE CREDIT

## 2020-07-16 PROCEDURE — 3331090002 HH PPS REVENUE DEBIT

## 2020-07-16 PROCEDURE — 3331090001 HH PPS REVENUE CREDIT

## 2020-07-17 ENCOUNTER — HOME CARE VISIT (OUTPATIENT)
Dept: SCHEDULING | Facility: HOME HEALTH | Age: 75
End: 2020-07-17
Payer: MEDICARE

## 2020-07-17 VITALS
SYSTOLIC BLOOD PRESSURE: 130 MMHG | TEMPERATURE: 97.9 F | OXYGEN SATURATION: 96 % | HEART RATE: 76 BPM | RESPIRATION RATE: 18 BRPM | DIASTOLIC BLOOD PRESSURE: 60 MMHG

## 2020-07-17 PROCEDURE — G0299 HHS/HOSPICE OF RN EA 15 MIN: HCPCS

## 2020-07-17 PROCEDURE — 3331090001 HH PPS REVENUE CREDIT

## 2020-07-17 PROCEDURE — 3331090002 HH PPS REVENUE DEBIT

## 2020-07-18 PROCEDURE — 3331090001 HH PPS REVENUE CREDIT

## 2020-07-18 PROCEDURE — 3331090002 HH PPS REVENUE DEBIT

## 2020-07-19 PROCEDURE — 3331090002 HH PPS REVENUE DEBIT

## 2020-07-19 PROCEDURE — 3331090001 HH PPS REVENUE CREDIT

## 2020-07-20 ENCOUNTER — HOME CARE VISIT (OUTPATIENT)
Dept: SCHEDULING | Facility: HOME HEALTH | Age: 75
End: 2020-07-20
Payer: MEDICARE

## 2020-07-20 VITALS
RESPIRATION RATE: 18 BRPM | HEART RATE: 72 BPM | TEMPERATURE: 97.7 F | SYSTOLIC BLOOD PRESSURE: 146 MMHG | DIASTOLIC BLOOD PRESSURE: 68 MMHG | OXYGEN SATURATION: 97 %

## 2020-07-20 PROCEDURE — 3331090001 HH PPS REVENUE CREDIT

## 2020-07-20 PROCEDURE — 3331090002 HH PPS REVENUE DEBIT

## 2020-07-20 PROCEDURE — G0299 HHS/HOSPICE OF RN EA 15 MIN: HCPCS

## 2020-07-21 PROCEDURE — 3331090002 HH PPS REVENUE DEBIT

## 2020-07-21 PROCEDURE — 3331090001 HH PPS REVENUE CREDIT

## 2020-07-22 ENCOUNTER — HOME CARE VISIT (OUTPATIENT)
Dept: SCHEDULING | Facility: HOME HEALTH | Age: 75
End: 2020-07-22
Payer: MEDICARE

## 2020-07-22 PROCEDURE — 3331090001 HH PPS REVENUE CREDIT

## 2020-07-22 PROCEDURE — G0299 HHS/HOSPICE OF RN EA 15 MIN: HCPCS

## 2020-07-22 PROCEDURE — 3331090002 HH PPS REVENUE DEBIT

## 2020-07-23 VITALS
OXYGEN SATURATION: 94 % | DIASTOLIC BLOOD PRESSURE: 64 MMHG | HEART RATE: 76 BPM | RESPIRATION RATE: 18 BRPM | SYSTOLIC BLOOD PRESSURE: 142 MMHG | TEMPERATURE: 97.3 F

## 2020-07-23 PROCEDURE — 3331090002 HH PPS REVENUE DEBIT

## 2020-07-23 PROCEDURE — 3331090001 HH PPS REVENUE CREDIT

## 2020-07-24 ENCOUNTER — HOME CARE VISIT (OUTPATIENT)
Dept: SCHEDULING | Facility: HOME HEALTH | Age: 75
End: 2020-07-24
Payer: MEDICARE

## 2020-07-24 VITALS
DIASTOLIC BLOOD PRESSURE: 68 MMHG | TEMPERATURE: 97.7 F | RESPIRATION RATE: 18 BRPM | OXYGEN SATURATION: 93 % | SYSTOLIC BLOOD PRESSURE: 122 MMHG | HEART RATE: 68 BPM

## 2020-07-24 PROCEDURE — G0299 HHS/HOSPICE OF RN EA 15 MIN: HCPCS

## 2020-07-24 PROCEDURE — 3331090001 HH PPS REVENUE CREDIT

## 2020-07-24 PROCEDURE — 3331090002 HH PPS REVENUE DEBIT

## 2020-07-25 PROCEDURE — 3331090002 HH PPS REVENUE DEBIT

## 2020-07-25 PROCEDURE — 3331090001 HH PPS REVENUE CREDIT

## 2020-07-26 PROCEDURE — 3331090001 HH PPS REVENUE CREDIT

## 2020-07-26 PROCEDURE — 3331090002 HH PPS REVENUE DEBIT

## 2020-07-27 ENCOUNTER — HOME CARE VISIT (OUTPATIENT)
Dept: SCHEDULING | Facility: HOME HEALTH | Age: 75
End: 2020-07-27
Payer: MEDICARE

## 2020-07-27 ENCOUNTER — HOSPITAL ENCOUNTER (OUTPATIENT)
Dept: LAB | Age: 75
Discharge: HOME | End: 2020-07-27
Payer: MEDICARE

## 2020-07-27 VITALS
HEART RATE: 68 BPM | OXYGEN SATURATION: 93 % | TEMPERATURE: 96.8 F | DIASTOLIC BLOOD PRESSURE: 72 MMHG | SYSTOLIC BLOOD PRESSURE: 126 MMHG | RESPIRATION RATE: 18 BRPM

## 2020-07-27 LAB
BASOPHILS # BLD: 0 K/UL (ref 0–0.2)
BASOPHILS NFR BLD: 2 % (ref 0–2)
CREAT SERPL-MCNC: 2.61 MG/DL (ref 0.6–1)
CRP SERPL-MCNC: 2.6 MG/DL (ref 0–0.9)
DIFFERENTIAL METHOD BLD: ABNORMAL
EOSINOPHIL # BLD: 0 K/UL (ref 0–0.8)
EOSINOPHIL NFR BLD: 2 % (ref 0.5–7.8)
ERYTHROCYTE [DISTWIDTH] IN BLOOD BY AUTOMATED COUNT: 19.7 % (ref 11.9–14.6)
ERYTHROCYTE [SEDIMENTATION RATE] IN BLOOD: 112 MM/HR (ref 0–30)
HCT VFR BLD AUTO: 21.5 % (ref 35.8–46.3)
HGB BLD-MCNC: 6.7 G/DL (ref 11.7–15.4)
IMM GRANULOCYTES # BLD AUTO: 0 K/UL (ref 0–0.5)
IMM GRANULOCYTES NFR BLD AUTO: 1 % (ref 0–5)
LYMPHOCYTES # BLD: 0.8 K/UL (ref 0.5–4.6)
LYMPHOCYTES NFR BLD: 48 % (ref 13–44)
MCH RBC QN AUTO: 33.3 PG (ref 26.1–32.9)
MCHC RBC AUTO-ENTMCNC: 31.2 G/DL (ref 31.4–35)
MCV RBC AUTO: 107 FL (ref 79.6–97.8)
MONOCYTES # BLD: 0.1 K/UL (ref 0.1–1.3)
MONOCYTES NFR BLD: 7 % (ref 4–12)
NEUTS SEG # BLD: 0.7 K/UL (ref 1.7–8.2)
NEUTS SEG NFR BLD: 41 % (ref 43–78)
NRBC # BLD: 0.02 K/UL (ref 0–0.2)
PLATELET # BLD AUTO: 163 K/UL (ref 150–450)
PMV BLD AUTO: 10.3 FL (ref 9.4–12.3)
RBC # BLD AUTO: 2.01 M/UL (ref 4.05–5.2)
WBC # BLD AUTO: 1.7 K/UL (ref 4.3–11.1)

## 2020-07-27 PROCEDURE — 85025 COMPLETE CBC W/AUTO DIFF WBC: CPT

## 2020-07-27 PROCEDURE — 86140 C-REACTIVE PROTEIN: CPT

## 2020-07-27 PROCEDURE — 3331090001 HH PPS REVENUE CREDIT

## 2020-07-27 PROCEDURE — 85652 RBC SED RATE AUTOMATED: CPT

## 2020-07-27 PROCEDURE — 3331090002 HH PPS REVENUE DEBIT

## 2020-07-27 PROCEDURE — G0299 HHS/HOSPICE OF RN EA 15 MIN: HCPCS

## 2020-07-27 PROCEDURE — 82565 ASSAY OF CREATININE: CPT

## 2020-07-27 RX ORDER — DIPHENHYDRAMINE HCL 25 MG
25 CAPSULE ORAL ONCE
Status: CANCELLED | OUTPATIENT
Start: 2020-07-28 | End: 2020-07-28

## 2020-07-27 RX ORDER — ACETAMINOPHEN 325 MG/1
650 TABLET ORAL ONCE
Status: CANCELLED | OUTPATIENT
Start: 2020-07-28 | End: 2020-07-28

## 2020-07-27 RX ORDER — SODIUM CHLORIDE 9 MG/ML
250 INJECTION, SOLUTION INTRAVENOUS AS NEEDED
Status: CANCELLED | OUTPATIENT
Start: 2020-07-27

## 2020-07-27 RX ORDER — SODIUM CHLORIDE 9 MG/ML
25 INJECTION, SOLUTION INTRAVENOUS ONCE
Status: CANCELLED | OUTPATIENT
Start: 2020-07-28 | End: 2020-07-28

## 2020-07-28 ENCOUNTER — HOSPITAL ENCOUNTER (OUTPATIENT)
Dept: INFUSION THERAPY | Age: 75
Discharge: HOME | End: 2020-07-28
Payer: MEDICARE

## 2020-07-28 ENCOUNTER — HOSPITAL ENCOUNTER (OUTPATIENT)
Dept: LAB | Age: 75
Discharge: HOME | End: 2020-07-28
Payer: MEDICARE

## 2020-07-28 DIAGNOSIS — N18.9 ANEMIA DUE TO CHRONIC KIDNEY DISEASE TREATED WITH ERYTHROPOIETIN: Primary | ICD-10-CM

## 2020-07-28 DIAGNOSIS — D63.1 ANEMIA DUE TO CHRONIC KIDNEY DISEASE TREATED WITH ERYTHROPOIETIN: ICD-10-CM

## 2020-07-28 DIAGNOSIS — D64.9 ANEMIA, UNSPECIFIED TYPE: ICD-10-CM

## 2020-07-28 DIAGNOSIS — N18.9 ANEMIA DUE TO CHRONIC KIDNEY DISEASE TREATED WITH ERYTHROPOIETIN: ICD-10-CM

## 2020-07-28 DIAGNOSIS — D63.1 ANEMIA DUE TO CHRONIC KIDNEY DISEASE TREATED WITH ERYTHROPOIETIN: Primary | ICD-10-CM

## 2020-07-28 LAB
ALBUMIN SERPL-MCNC: 2.8 G/DL (ref 3.2–4.6)
ALBUMIN/GLOB SERPL: 0.7 {RATIO} (ref 1.2–3.5)
ALP SERPL-CCNC: 174 U/L (ref 50–136)
ALT SERPL-CCNC: 35 U/L (ref 12–65)
ANION GAP SERPL CALC-SCNC: 8 MMOL/L (ref 7–16)
AST SERPL-CCNC: 47 U/L (ref 15–37)
BASOPHILS # BLD: 0.1 K/UL (ref 0–0.2)
BASOPHILS NFR BLD: 3 % (ref 0–2)
BILIRUB SERPL-MCNC: 0.7 MG/DL (ref 0.2–1.1)
BUN SERPL-MCNC: 33 MG/DL (ref 8–23)
CALCIUM SERPL-MCNC: 8.6 MG/DL (ref 8.3–10.4)
CHLORIDE SERPL-SCNC: 106 MMOL/L (ref 98–107)
CO2 SERPL-SCNC: 23 MMOL/L (ref 21–32)
CREAT SERPL-MCNC: 2.9 MG/DL (ref 0.6–1)
DIFFERENTIAL METHOD BLD: ABNORMAL
EOSINOPHIL # BLD: 0 K/UL (ref 0–0.8)
EOSINOPHIL NFR BLD: 2 % (ref 0.5–7.8)
ERYTHROCYTE [DISTWIDTH] IN BLOOD BY AUTOMATED COUNT: 20.4 % (ref 11.9–14.6)
FERRITIN SERPL-MCNC: 1131 NG/ML (ref 8–388)
FOLATE SERPL-MCNC: 8.7 NG/ML (ref 3.1–17.5)
GLOBULIN SER CALC-MCNC: 4 G/DL (ref 2.3–3.5)
GLUCOSE SERPL-MCNC: 120 MG/DL (ref 65–100)
HAPTOGLOB SERPL-MCNC: 207 MG/DL (ref 30–200)
HCT VFR BLD AUTO: 24.3 % (ref 35.8–46.3)
HGB BLD-MCNC: 7.8 G/DL (ref 11.7–15.4)
HGB RETIC QN AUTO: 38 PG (ref 29–35)
IMM GRANULOCYTES # BLD AUTO: 0 K/UL (ref 0–0.5)
IMM GRANULOCYTES NFR BLD AUTO: 0 % (ref 0–5)
IMM RETICS NFR: 22.8 % (ref 3–15.9)
IRON SATN MFR SERPL: 71 %
IRON SERPL-MCNC: 133 UG/DL (ref 35–150)
LDH SERPL L TO P-CCNC: 274 U/L (ref 110–210)
LYMPHOCYTES # BLD: 0.8 K/UL (ref 0.5–4.6)
LYMPHOCYTES NFR BLD: 40 % (ref 13–44)
MCH RBC QN AUTO: 34.1 PG (ref 26.1–32.9)
MCHC RBC AUTO-ENTMCNC: 32.1 G/DL (ref 31.4–35)
MCV RBC AUTO: 106.1 FL (ref 79.6–97.8)
MONOCYTES # BLD: 0.1 K/UL (ref 0.1–1.3)
MONOCYTES NFR BLD: 7 % (ref 4–12)
NEUTS SEG # BLD: 1 K/UL (ref 1.7–8.2)
NEUTS SEG NFR BLD: 50 % (ref 43–78)
NRBC # BLD: 0 K/UL (ref 0–0.2)
PLATELET # BLD AUTO: 252 K/UL (ref 150–450)
PMV BLD AUTO: 9.8 FL (ref 9.4–12.3)
POTASSIUM SERPL-SCNC: 3.9 MMOL/L (ref 3.5–5.1)
PROT SERPL-MCNC: 6.8 G/DL (ref 6.3–8.2)
RBC # BLD AUTO: 2.29 M/UL (ref 4.05–5.25)
RETICS # AUTO: 0.09 M/UL (ref 0.03–0.1)
RETICS/RBC NFR AUTO: 4 % (ref 0.3–2)
SODIUM SERPL-SCNC: 137 MMOL/L (ref 136–145)
TIBC SERPL-MCNC: 187 UG/DL (ref 250–450)
VIT B12 SERPL-MCNC: 3289 PG/ML (ref 193–986)
WBC # BLD AUTO: 2 K/UL (ref 4.3–11.1)

## 2020-07-28 PROCEDURE — 86334 IMMUNOFIX E-PHORESIS SERUM: CPT

## 2020-07-28 PROCEDURE — 96372 THER/PROPH/DIAG INJ SC/IM: CPT

## 2020-07-28 PROCEDURE — 83615 LACTATE (LD) (LDH) ENZYME: CPT

## 2020-07-28 PROCEDURE — 85046 RETICYTE/HGB CONCENTRATE: CPT

## 2020-07-28 PROCEDURE — 82728 ASSAY OF FERRITIN: CPT

## 2020-07-28 PROCEDURE — 3331090001 HH PPS REVENUE CREDIT

## 2020-07-28 PROCEDURE — 83540 ASSAY OF IRON: CPT

## 2020-07-28 PROCEDURE — 82607 VITAMIN B-12: CPT

## 2020-07-28 PROCEDURE — 74011250636 HC RX REV CODE- 250/636: Performed by: INTERNAL MEDICINE

## 2020-07-28 PROCEDURE — 80053 COMPREHEN METABOLIC PANEL: CPT

## 2020-07-28 PROCEDURE — 85025 COMPLETE CBC W/AUTO DIFF WBC: CPT

## 2020-07-28 PROCEDURE — 3331090002 HH PPS REVENUE DEBIT

## 2020-07-28 PROCEDURE — 82746 ASSAY OF FOLIC ACID SERUM: CPT

## 2020-07-28 PROCEDURE — 84165 PROTEIN E-PHORESIS SERUM: CPT

## 2020-07-28 PROCEDURE — 83010 ASSAY OF HAPTOGLOBIN QUANT: CPT

## 2020-07-28 RX ORDER — CYANOCOBALAMIN 1000 UG/ML
1000 INJECTION, SOLUTION INTRAMUSCULAR; SUBCUTANEOUS
Status: COMPLETED | OUTPATIENT
Start: 2020-07-28 | End: 2020-07-28

## 2020-07-28 RX ADMIN — CYANOCOBALAMIN 1000 MCG: 1000 INJECTION, SOLUTION INTRAMUSCULAR at 12:36

## 2020-07-28 RX ADMIN — EPOETIN ALFA-EPBX 40000 UNITS: 40000 INJECTION, SOLUTION INTRAVENOUS; SUBCUTANEOUS at 12:36

## 2020-07-28 NOTE — PROGRESS NOTES
Arrived to the [facility]. Retacrit and Vitamin B12 completed. Patient tolerated well. Copper level to be drawn at next appointment. Any issues or concerns during appointment: none. Patient aware of next infusion appointment on 8/11 (date) at 1600 (time). Discharged w/c.

## 2020-07-29 ENCOUNTER — HOME CARE VISIT (OUTPATIENT)
Dept: SCHEDULING | Facility: HOME HEALTH | Age: 75
End: 2020-07-29
Payer: MEDICARE

## 2020-07-29 VITALS
TEMPERATURE: 98.4 F | RESPIRATION RATE: 17 BRPM | OXYGEN SATURATION: 92 % | SYSTOLIC BLOOD PRESSURE: 138 MMHG | HEART RATE: 67 BPM | DIASTOLIC BLOOD PRESSURE: 80 MMHG

## 2020-07-29 PROCEDURE — G0299 HHS/HOSPICE OF RN EA 15 MIN: HCPCS

## 2020-07-29 PROCEDURE — 3331090001 HH PPS REVENUE CREDIT

## 2020-07-29 PROCEDURE — 3331090002 HH PPS REVENUE DEBIT

## 2020-07-30 LAB
ALBUMIN SERPL ELPH-MCNC: 2.91 G/DL (ref 3.2–5.6)
ALBUMIN/GLOB SERPL: 0.8 {RATIO} (ref 1.2–3.5)
ALPHA1 GLOB SERPL ELPH-MCNC: 0.39 G/DL (ref 0.1–0.4)
ALPHA2 GLOB SERPL ELPH-MCNC: 0.98 G/DL (ref 0.4–1.2)
B-GLOBULIN SERPL QL ELPH: 0.81 G/DL (ref 0.6–1.3)
GAMMA GLOB MFR SERPL ELPH: 1.31 G/DL (ref 0.5–1.6)
IGA SERPL-MCNC: 151 MG/DL (ref 85–499)
IGG SERPL-MCNC: 1258 MG/DL (ref 610–1616)
IGM SERPL-MCNC: 101 MG/DL (ref 35–242)
M PROTEIN SERPL ELPH-MCNC: ABNORMAL G/DL
PROT PATTERN SERPL ELPH-IMP: ABNORMAL
PROT PATTERN SPEC IFE-IMP: ABNORMAL
PROT SERPL-MCNC: 6.4 G/DL (ref 6.3–8.2)

## 2020-07-30 PROCEDURE — 3331090001 HH PPS REVENUE CREDIT

## 2020-07-30 PROCEDURE — 3331090002 HH PPS REVENUE DEBIT

## 2020-07-31 ENCOUNTER — HOME CARE VISIT (OUTPATIENT)
Dept: SCHEDULING | Facility: HOME HEALTH | Age: 75
End: 2020-07-31
Payer: MEDICARE

## 2020-07-31 VITALS
TEMPERATURE: 98.4 F | OXYGEN SATURATION: 92 % | DIASTOLIC BLOOD PRESSURE: 50 MMHG | SYSTOLIC BLOOD PRESSURE: 102 MMHG | HEART RATE: 77 BPM | RESPIRATION RATE: 14 BRPM

## 2020-07-31 PROCEDURE — G0299 HHS/HOSPICE OF RN EA 15 MIN: HCPCS

## 2020-07-31 PROCEDURE — 3331090002 HH PPS REVENUE DEBIT

## 2020-07-31 PROCEDURE — 3331090001 HH PPS REVENUE CREDIT

## 2020-08-01 ENCOUNTER — HOME CARE VISIT (OUTPATIENT)
Dept: SCHEDULING | Facility: HOME HEALTH | Age: 75
End: 2020-08-01
Payer: MEDICARE

## 2020-08-01 VITALS
SYSTOLIC BLOOD PRESSURE: 138 MMHG | HEART RATE: 78 BPM | TEMPERATURE: 98.1 F | DIASTOLIC BLOOD PRESSURE: 62 MMHG | RESPIRATION RATE: 16 BRPM | OXYGEN SATURATION: 96 %

## 2020-08-01 PROCEDURE — 3331090002 HH PPS REVENUE DEBIT

## 2020-08-01 PROCEDURE — 3331090001 HH PPS REVENUE CREDIT

## 2020-08-01 PROCEDURE — G0299 HHS/HOSPICE OF RN EA 15 MIN: HCPCS

## 2020-08-02 PROCEDURE — 3331090002 HH PPS REVENUE DEBIT

## 2020-08-02 PROCEDURE — 3331090001 HH PPS REVENUE CREDIT

## 2020-08-03 ENCOUNTER — HOME CARE VISIT (OUTPATIENT)
Dept: SCHEDULING | Facility: HOME HEALTH | Age: 75
End: 2020-08-03
Payer: MEDICARE

## 2020-08-03 VITALS
TEMPERATURE: 99 F | DIASTOLIC BLOOD PRESSURE: 72 MMHG | HEART RATE: 78 BPM | OXYGEN SATURATION: 94 % | SYSTOLIC BLOOD PRESSURE: 110 MMHG | RESPIRATION RATE: 18 BRPM

## 2020-08-03 PROCEDURE — 400014 HH F/U

## 2020-08-03 PROCEDURE — G0299 HHS/HOSPICE OF RN EA 15 MIN: HCPCS

## 2020-08-03 PROCEDURE — 3331090001 HH PPS REVENUE CREDIT

## 2020-08-03 PROCEDURE — 3331090002 HH PPS REVENUE DEBIT

## 2020-08-04 ENCOUNTER — APPOINTMENT (OUTPATIENT)
Dept: MRI IMAGING | Age: 75
DRG: 064 | End: 2020-08-04
Attending: PSYCHIATRY & NEUROLOGY
Payer: MEDICARE

## 2020-08-04 ENCOUNTER — APPOINTMENT (OUTPATIENT)
Dept: CT IMAGING | Age: 75
DRG: 064 | End: 2020-08-04
Attending: EMERGENCY MEDICINE
Payer: MEDICARE

## 2020-08-04 ENCOUNTER — HOSPITAL ENCOUNTER (INPATIENT)
Age: 75
LOS: 15 days | Discharge: NURSING, REHAB OR LONG-TERM CARE | DRG: 064 | End: 2020-08-19
Attending: EMERGENCY MEDICINE | Admitting: INTERNAL MEDICINE
Payer: MEDICARE

## 2020-08-04 ENCOUNTER — HOME CARE VISIT (OUTPATIENT)
Dept: SCHEDULING | Facility: HOME HEALTH | Age: 75
End: 2020-08-04
Payer: MEDICARE

## 2020-08-04 ENCOUNTER — APPOINTMENT (OUTPATIENT)
Dept: GENERAL RADIOLOGY | Age: 75
DRG: 064 | End: 2020-08-04
Attending: EMERGENCY MEDICINE
Payer: MEDICARE

## 2020-08-04 ENCOUNTER — APPOINTMENT (OUTPATIENT)
Dept: CT IMAGING | Age: 75
DRG: 064 | End: 2020-08-04
Attending: INTERNAL MEDICINE
Payer: MEDICARE

## 2020-08-04 VITALS
RESPIRATION RATE: 18 BRPM | OXYGEN SATURATION: 97 % | HEART RATE: 75 BPM | TEMPERATURE: 97.8 F | SYSTOLIC BLOOD PRESSURE: 110 MMHG | DIASTOLIC BLOOD PRESSURE: 70 MMHG

## 2020-08-04 DIAGNOSIS — N18.4 CHRONIC RENAL FAILURE, STAGE 4 (SEVERE) (HCC): ICD-10-CM

## 2020-08-04 DIAGNOSIS — D64.9 ANEMIA, UNSPECIFIED TYPE: ICD-10-CM

## 2020-08-04 DIAGNOSIS — S71.002D OPEN WOUND OF LEFT HIP, SUBSEQUENT ENCOUNTER: ICD-10-CM

## 2020-08-04 DIAGNOSIS — I63.9 CEREBROVASCULAR ACCIDENT (CVA), UNSPECIFIED MECHANISM (HCC): Primary | ICD-10-CM

## 2020-08-04 PROBLEM — Z66 DNR (DO NOT RESUSCITATE): Status: ACTIVE | Noted: 2020-08-04

## 2020-08-04 PROBLEM — N18.9 CKD (CHRONIC KIDNEY DISEASE): Status: ACTIVE | Noted: 2020-08-04

## 2020-08-04 PROBLEM — R11.10 VOMITING: Status: ACTIVE | Noted: 2020-08-04

## 2020-08-04 LAB
ALBUMIN SERPL-MCNC: 2.4 G/DL (ref 3.2–4.6)
ALBUMIN/GLOB SERPL: 0.7 {RATIO} (ref 1.2–3.5)
ALP SERPL-CCNC: 141 U/L (ref 50–136)
ALT SERPL-CCNC: 22 U/L (ref 12–65)
AMMONIA PLAS-SCNC: 12 UMOL/L (ref 11–32)
ANION GAP SERPL CALC-SCNC: 7 MMOL/L (ref 7–16)
APPEARANCE UR: CLEAR
ARTERIAL PATENCY WRIST A: YES
AST SERPL-CCNC: 39 U/L (ref 15–37)
ATRIAL RATE: 72 BPM
BACTERIA URNS QL MICRO: 0 /HPF
BASE DEFICIT BLD-SCNC: 3 MMOL/L
BASOPHILS # BLD: 0 K/UL (ref 0–0.2)
BASOPHILS NFR BLD: 1 % (ref 0–2)
BDY SITE: ABNORMAL
BILIRUB SERPL-MCNC: 0.6 MG/DL (ref 0.2–1.1)
BILIRUB UR QL: NEGATIVE
BUN SERPL-MCNC: 28 MG/DL (ref 8–23)
CALCIUM SERPL-MCNC: 8.3 MG/DL (ref 8.3–10.4)
CALCULATED P AXIS, ECG09: 35 DEGREES
CALCULATED R AXIS, ECG10: 15 DEGREES
CALCULATED T AXIS, ECG11: -2 DEGREES
CASTS URNS QL MICRO: ABNORMAL /LPF
CHLORIDE SERPL-SCNC: 106 MMOL/L (ref 98–107)
CO2 BLD-SCNC: 22 MMOL/L
CO2 SERPL-SCNC: 27 MMOL/L (ref 21–32)
COLLECT TIME,HTIME: 1350
COLOR UR: YELLOW
CREAT SERPL-MCNC: 2.55 MG/DL (ref 0.6–1)
DIAGNOSIS, 93000: NORMAL
DIFFERENTIAL METHOD BLD: ABNORMAL
EOSINOPHIL # BLD: 0 K/UL (ref 0–0.8)
EOSINOPHIL NFR BLD: 0 % (ref 0.5–7.8)
EPI CELLS #/AREA URNS HPF: ABNORMAL /HPF
ERYTHROCYTE [DISTWIDTH] IN BLOOD BY AUTOMATED COUNT: 22.9 % (ref 11.9–14.6)
GAS FLOW.O2 O2 DELIVERY SYS: ABNORMAL L/MIN
GLOBULIN SER CALC-MCNC: 3.5 G/DL (ref 2.3–3.5)
GLUCOSE SERPL-MCNC: 86 MG/DL (ref 65–100)
GLUCOSE UR STRIP.AUTO-MCNC: NEGATIVE MG/DL
HCO3 BLD-SCNC: 21.1 MMOL/L (ref 22–26)
HCT VFR BLD AUTO: 21.2 % (ref 35.8–46.3)
HGB BLD-MCNC: 6.8 G/DL (ref 11.7–15.4)
HGB UR QL STRIP: NEGATIVE
IMM GRANULOCYTES # BLD AUTO: 0 K/UL (ref 0–0.5)
IMM GRANULOCYTES NFR BLD AUTO: 1 % (ref 0–5)
KETONES UR QL STRIP.AUTO: NEGATIVE MG/DL
LACTATE SERPL-SCNC: 1 MMOL/L (ref 0.4–2)
LEUKOCYTE ESTERASE UR QL STRIP.AUTO: ABNORMAL
LYMPHOCYTES # BLD: 1 K/UL (ref 0.5–4.6)
LYMPHOCYTES NFR BLD: 42 % (ref 13–44)
MCH RBC QN AUTO: 36.2 PG (ref 26.1–32.9)
MCHC RBC AUTO-ENTMCNC: 32.1 G/DL (ref 31.4–35)
MCV RBC AUTO: 112.8 FL (ref 79.6–97.8)
MONOCYTES # BLD: 0.3 K/UL (ref 0.1–1.3)
MONOCYTES NFR BLD: 13 % (ref 4–12)
NEUTS SEG # BLD: 1 K/UL (ref 1.7–8.2)
NEUTS SEG NFR BLD: 43 % (ref 43–78)
NITRITE UR QL STRIP.AUTO: NEGATIVE
NRBC # BLD: 0.06 K/UL (ref 0–0.2)
P-R INTERVAL, ECG05: 144 MS
PCO2 BLD: 33.9 MMHG (ref 35–45)
PH BLD: 7.4 [PH] (ref 7.35–7.45)
PH UR STRIP: 5.5 [PH] (ref 5–9)
PLATELET # BLD AUTO: 118 K/UL (ref 150–450)
PMV BLD AUTO: 10.4 FL (ref 9.4–12.3)
PO2 BLD: 72 MMHG (ref 75–100)
POTASSIUM SERPL-SCNC: 4.3 MMOL/L (ref 3.5–5.1)
PROT SERPL-MCNC: 5.9 G/DL (ref 6.3–8.2)
PROT UR STRIP-MCNC: NEGATIVE MG/DL
Q-T INTERVAL, ECG07: 392 MS
QRS DURATION, ECG06: 86 MS
QTC CALCULATION (BEZET), ECG08: 429 MS
RBC # BLD AUTO: 1.88 M/UL (ref 4.05–5.2)
RBC #/AREA URNS HPF: ABNORMAL /HPF
SAO2 % BLD: 94 % (ref 95–98)
SERVICE CMNT-IMP: ABNORMAL
SODIUM SERPL-SCNC: 140 MMOL/L (ref 136–145)
SP GR UR REFRACTOMETRY: 1.01 (ref 1–1.02)
SPECIMEN TYPE: ABNORMAL
UROBILINOGEN UR QL STRIP.AUTO: 0.2 EU/DL (ref 0.2–1)
VENTRICULAR RATE, ECG03: 72 BPM
WBC # BLD AUTO: 2.3 K/UL (ref 4.3–11.1)
WBC URNS QL MICRO: ABNORMAL /HPF

## 2020-08-04 PROCEDURE — 83605 ASSAY OF LACTIC ACID: CPT

## 2020-08-04 PROCEDURE — 30233N1 TRANSFUSION OF NONAUTOLOGOUS RED BLOOD CELLS INTO PERIPHERAL VEIN, PERCUTANEOUS APPROACH: ICD-10-PCS | Performed by: EMERGENCY MEDICINE

## 2020-08-04 PROCEDURE — 82803 BLOOD GASES ANY COMBINATION: CPT

## 2020-08-04 PROCEDURE — 3331090002 HH PPS REVENUE DEBIT

## 2020-08-04 PROCEDURE — 87086 URINE CULTURE/COLONY COUNT: CPT

## 2020-08-04 PROCEDURE — 3331090003 HH PPS REVENUE ADJ

## 2020-08-04 PROCEDURE — 87040 BLOOD CULTURE FOR BACTERIA: CPT

## 2020-08-04 PROCEDURE — 74011250637 HC RX REV CODE- 250/637: Performed by: INTERNAL MEDICINE

## 2020-08-04 PROCEDURE — 93005 ELECTROCARDIOGRAM TRACING: CPT | Performed by: EMERGENCY MEDICINE

## 2020-08-04 PROCEDURE — 74011000302 HC RX REV CODE- 302: Performed by: INTERNAL MEDICINE

## 2020-08-04 PROCEDURE — 70450 CT HEAD/BRAIN W/O DYE: CPT

## 2020-08-04 PROCEDURE — G0299 HHS/HOSPICE OF RN EA 15 MIN: HCPCS

## 2020-08-04 PROCEDURE — 36600 WITHDRAWAL OF ARTERIAL BLOOD: CPT

## 2020-08-04 PROCEDURE — 86923 COMPATIBILITY TEST ELECTRIC: CPT

## 2020-08-04 PROCEDURE — 70496 CT ANGIOGRAPHY HEAD: CPT

## 2020-08-04 PROCEDURE — 80053 COMPREHEN METABOLIC PANEL: CPT

## 2020-08-04 PROCEDURE — 86580 TB INTRADERMAL TEST: CPT | Performed by: INTERNAL MEDICINE

## 2020-08-04 PROCEDURE — 74011000258 HC RX REV CODE- 258: Performed by: INTERNAL MEDICINE

## 2020-08-04 PROCEDURE — 74011250636 HC RX REV CODE- 250/636: Performed by: INTERNAL MEDICINE

## 2020-08-04 PROCEDURE — 70553 MRI BRAIN STEM W/O & W/DYE: CPT

## 2020-08-04 PROCEDURE — 71045 X-RAY EXAM CHEST 1 VIEW: CPT

## 2020-08-04 PROCEDURE — 74011636320 HC RX REV CODE- 636/320: Performed by: INTERNAL MEDICINE

## 2020-08-04 PROCEDURE — 99285 EMERGENCY DEPT VISIT HI MDM: CPT

## 2020-08-04 PROCEDURE — P9016 RBC LEUKOCYTES REDUCED: HCPCS

## 2020-08-04 PROCEDURE — 3331090001 HH PPS REVENUE CREDIT

## 2020-08-04 PROCEDURE — 82140 ASSAY OF AMMONIA: CPT

## 2020-08-04 PROCEDURE — 85025 COMPLETE CBC W/AUTO DIFF WBC: CPT

## 2020-08-04 PROCEDURE — 36430 TRANSFUSION BLD/BLD COMPNT: CPT

## 2020-08-04 PROCEDURE — 51701 INSERT BLADDER CATHETER: CPT

## 2020-08-04 PROCEDURE — 81001 URINALYSIS AUTO W/SCOPE: CPT

## 2020-08-04 PROCEDURE — 86900 BLOOD TYPING SEROLOGIC ABO: CPT

## 2020-08-04 PROCEDURE — 65660000000 HC RM CCU STEPDOWN

## 2020-08-04 PROCEDURE — A9575 INJ GADOTERATE MEGLUMI 0.1ML: HCPCS | Performed by: INTERNAL MEDICINE

## 2020-08-04 RX ORDER — SODIUM CHLORIDE 0.9 % (FLUSH) 0.9 %
5-40 SYRINGE (ML) INJECTION EVERY 8 HOURS
Status: DISCONTINUED | OUTPATIENT
Start: 2020-08-04 | End: 2020-08-19 | Stop reason: HOSPADM

## 2020-08-04 RX ORDER — TRIAMTERENE/HYDROCHLOROTHIAZID 37.5-25 MG
1 TABLET ORAL DAILY
Status: DISCONTINUED | OUTPATIENT
Start: 2020-08-05 | End: 2020-08-19 | Stop reason: HOSPADM

## 2020-08-04 RX ORDER — GUAIFENESIN 100 MG/5ML
81 LIQUID (ML) ORAL DAILY
Status: DISCONTINUED | OUTPATIENT
Start: 2020-08-05 | End: 2020-08-19 | Stop reason: HOSPADM

## 2020-08-04 RX ORDER — SODIUM CHLORIDE 0.9 % (FLUSH) 0.9 %
10 SYRINGE (ML) INJECTION
Status: COMPLETED | OUTPATIENT
Start: 2020-08-04 | End: 2020-08-04

## 2020-08-04 RX ORDER — SODIUM CHLORIDE 9 MG/ML
150 INJECTION, SOLUTION INTRAVENOUS CONTINUOUS
Status: DISCONTINUED | OUTPATIENT
Start: 2020-08-04 | End: 2020-08-04

## 2020-08-04 RX ORDER — CEPHALEXIN 250 MG/1
250 CAPSULE ORAL 2 TIMES DAILY
Status: DISCONTINUED | OUTPATIENT
Start: 2020-08-04 | End: 2020-08-19 | Stop reason: HOSPADM

## 2020-08-04 RX ORDER — SODIUM CHLORIDE 0.9 % (FLUSH) 0.9 %
5-40 SYRINGE (ML) INJECTION AS NEEDED
Status: DISCONTINUED | OUTPATIENT
Start: 2020-08-04 | End: 2020-08-19 | Stop reason: HOSPADM

## 2020-08-04 RX ORDER — ACETAMINOPHEN 650 MG/1
650 SUPPOSITORY RECTAL
Status: DISCONTINUED | OUTPATIENT
Start: 2020-08-04 | End: 2020-08-19 | Stop reason: HOSPADM

## 2020-08-04 RX ORDER — DIPHENHYDRAMINE HCL 25 MG
25 CAPSULE ORAL
Status: DISCONTINUED | OUTPATIENT
Start: 2020-08-04 | End: 2020-08-19 | Stop reason: HOSPADM

## 2020-08-04 RX ORDER — GADOTERATE MEGLUMINE 376.9 MG/ML
23 INJECTION INTRAVENOUS
Status: COMPLETED | OUTPATIENT
Start: 2020-08-04 | End: 2020-08-04

## 2020-08-04 RX ORDER — LABETALOL HYDROCHLORIDE 5 MG/ML
5 INJECTION, SOLUTION INTRAVENOUS
Status: DISCONTINUED | OUTPATIENT
Start: 2020-08-04 | End: 2020-08-16

## 2020-08-04 RX ORDER — SODIUM CHLORIDE 9 MG/ML
250 INJECTION, SOLUTION INTRAVENOUS AS NEEDED
Status: DISCONTINUED | OUTPATIENT
Start: 2020-08-04 | End: 2020-08-19 | Stop reason: HOSPADM

## 2020-08-04 RX ORDER — ATORVASTATIN CALCIUM 80 MG/1
80 TABLET, FILM COATED ORAL
Status: DISCONTINUED | OUTPATIENT
Start: 2020-08-04 | End: 2020-08-19 | Stop reason: HOSPADM

## 2020-08-04 RX ORDER — SODIUM CHLORIDE 9 MG/ML
75 INJECTION, SOLUTION INTRAVENOUS CONTINUOUS
Status: DISPENSED | OUTPATIENT
Start: 2020-08-04 | End: 2020-08-05

## 2020-08-04 RX ORDER — ONDANSETRON 2 MG/ML
4 INJECTION INTRAMUSCULAR; INTRAVENOUS
Status: DISCONTINUED | OUTPATIENT
Start: 2020-08-04 | End: 2020-08-19 | Stop reason: HOSPADM

## 2020-08-04 RX ORDER — AMLODIPINE BESYLATE 5 MG/1
5 TABLET ORAL DAILY
Status: DISCONTINUED | OUTPATIENT
Start: 2020-08-05 | End: 2020-08-19 | Stop reason: HOSPADM

## 2020-08-04 RX ORDER — AMOXICILLIN 250 MG
2 CAPSULE ORAL
Status: DISCONTINUED | OUTPATIENT
Start: 2020-08-04 | End: 2020-08-19 | Stop reason: HOSPADM

## 2020-08-04 RX ADMIN — SODIUM CHLORIDE 75 ML/HR: 900 INJECTION, SOLUTION INTRAVENOUS at 17:30

## 2020-08-04 RX ADMIN — GADOTERATE MEGLUMINE 23 ML: 376.9 INJECTION INTRAVENOUS at 19:02

## 2020-08-04 RX ADMIN — Medication 10 ML: at 17:52

## 2020-08-04 RX ADMIN — ATORVASTATIN CALCIUM 80 MG: 80 TABLET, FILM COATED ORAL at 22:59

## 2020-08-04 RX ADMIN — CEPHALEXIN 250 MG: 250 CAPSULE ORAL at 22:59

## 2020-08-04 RX ADMIN — IOPAMIDOL 50 ML: 755 INJECTION, SOLUTION INTRAVENOUS at 17:52

## 2020-08-04 RX ADMIN — Medication 5 ML: at 23:04

## 2020-08-04 RX ADMIN — SENNOSIDES AND DOCUSATE SODIUM 2 TABLET: 8.6; 5 TABLET ORAL at 22:59

## 2020-08-04 RX ADMIN — Medication 10 ML: at 19:02

## 2020-08-04 RX ADMIN — SODIUM CHLORIDE 100 ML: 900 INJECTION, SOLUTION INTRAVENOUS at 17:52

## 2020-08-04 RX ADMIN — TUBERCULIN PURIFIED PROTEIN DERIVATIVE 5 UNITS: 5 INJECTION, SOLUTION INTRADERMAL at 23:00

## 2020-08-04 NOTE — ED TRIAGE NOTES
Pt arrives via EMS from home, called out for AMS since yesterday. Pt has wound on left hip with wound vac, bed sore, but family states pt can get up and move herself. Pt was treated for sepsis recently at this hospital. Taking oral abx at home. Pt stays in a hospital bed at home. NAD. Masked.     VS: 150/60, BGl 113, temp 98.9, HR 87, O2 sat 97%RA
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
## 2020-08-04 NOTE — H&P
History and Physical    Patient: Edenilson Rutherford MRN: [id number]  SSN: [id number]    YOB: 1945  Age: 67 y.o. Sex: female      Subjective:  Cc:\" she is confused\"      Edenilson Rutherford is a 67 y.o. female who has a PMH of HTN, dyslipidemia, metastatic breast cancer, CKD stage IV, sp left hip arthroplasty + wound vac, wound infection on chronic antibiotic therapy, who was brought in via EMS after her daughter noted her confused, not making sense since yesterday around 1600 hrs. The patient is bed-bound after her hip surgery on 5/20. She complains of nausea, vomiting and decreased appetite. She is unable to follow commands. All information was gathered from her daughter Rehan Thompson at [facility]. Her mother has not had fever, chills, cough, GI bleeding or diarrhea. She has been compliant to all her home meds, including keflex. She last saw ID yesterday. Upon ed arrival VS were stable. The patient was alert, but incoherent to answer questions. She was not following commands. Labs: hb 6.8 gr; wbc 2.3; plat 118k  Cr 2.5 ( baseline ); ammonia: 12  Abg: ph 7.4, pco2 33, po2 72, O2 94%  Brain ct: large subacute CVA, at the L temporal and parietal lobe. Negative for bleeding. Hospitalist was consulted for admission. ER MD ordered 1 unit of Blood.     ROS: unable to obtain due to patients condition  Social hx: non smoker  Family hx: her parents had heart disease     Past Medical History:   Diagnosis Date    Abnormal EKG 2/16/2016    Anemia due to chronic kidney disease treated with erythropoietin 7/25/2017    Aortic valve insufficiency 2/16/2016    Cancer ([facility] [address]     breast    Hyperlipidemia 2/16/2016    Hypertension 2/16/2016    UNSPECIFIED     Obesity 2/16/2016    UNSPECIFIED      Past Surgical History:   Procedure Laterality Date    HX TUBAL LIGATION      IR INSERT TUNL CVC W/O PORT OVER 5 YR  5/19/2020    IR REMOVE TUNL CVAD W/O PORT / PUMP  7/2/2020      Family History   Problem Relation Age of Onset    Coronary Artery Disease Mother     Heart Disease Father      Social History     Tobacco Use    Smoking status: Never Smoker    Smokeless tobacco: Current User     Types: Snuff   Substance Use Topics    Alcohol use: No      Prior to Admission medications    Medication Sig Start Date End Date Taking? Authorizing Provider   oxyCODONE-acetaminophen 5-300 mg tab Take  by mouth. Provider, Historical   triamterene-hydroCHLOROthiazide (MAXZIDE) 37.5-25 mg per tablet TAKE 1 TABLET BY MOUTH EVERY DAY  Patient taking differently: Take 1 tablet by mouth daily. 7/26/20   Marta Stephen MD   cephALEXin (KEFLEX) 250 mg capsule Take 250 mg by mouth two (2) times a day. 6/27/20   Provider, Historical   sodium chloride (BD Pre-Filled Normal Saline) 10 mL by IntraVENous route as needed for Other (using SASH method and post blood draws). Lizy Palmer MD   heparin sod,porcine/0.9 % NaCl (HEPARIN FLUSH IV) 5 mL by IntraVENous route as needed for Other (using SASH method and post blood draws). Heparin 100Units/ml    Lizy Palmer MD   polyethylene glycol (MIRALAX) 17 gram packet Take 1 Packet by mouth daily. Patient taking differently: Take 1 Packet by mouth daily. mix with 8 oz water 5/22/20   Laure Quiles,    magnesium oxide (MAG-OX) 400 mg tablet Take 1 Tab by mouth two (2) times a day. 5/21/20   Laure Quiles DO   aspirin (ASPIRIN) 325 mg tablet Take 325 mg by mouth daily. Provider, Historical   docusate sodium (COLACE) 50 mg capsule Take 50 mg by mouth two (2) times daily as needed for Constipation. Provider, Historical   sennosides 25 mg tab Take 1 Tab by mouth nightly as needed for Other (constipation). Provider, Historical   diphenhydrAMINE (BENADRYL) 25 mg tablet Take 25 mg by mouth every six (6) hours as needed for Allergies.     Provider, Historical   palbociclib (Ibrance) 75 mg cap Take one capsule by mouthOnce daily with food for21 days on, followed by7 days off during each cyCle of 28 days 4/21/20   Marta Stephen MD   ondansetron hcl (Zofran) 8 mg tablet Take 1 Tab by mouth every eight (8) hours as needed for Nausea. 4/15/20   Chitra Ulloa NP   letrozole [facility]) 2.5 mg tablet TAKE 1 TABLET BY MOUTH EVERY DAY 2/7/20   Marta Stephen MD   melatonin 5 mg cap capsule Take 10 mg by mouth nightly. Provider, Historical   furosemide (LASIX) 20 mg tablet TAKE 1 TAB BY MOUTH DAILY AS NEEDED. FOR SWELLING. 12/4/18   Marta Stephen MD   calcium carbonate (OS-JORGE) 500 mg calcium (1,250 mg) tablet Take 2 tablets at lunch and 2 tablets at dinner. Indications: hypocalcemia  Patient taking differently: Take 2 Tabs by mouth. Take 2 tablets at lunch and 2 tablets at dinner. Indications: low amount of calcium in the blood 1/23/17   Nina Gardner NP   amLODIPine (NORVASC) 5 mg tablet Take 5 mg by mouth daily. Provider, Historical        Allergies   Allergen Reactions    Penicillins Rash       Review of Systems: unable to assess due to aphasia    Objective:     Vitals:    08/04/20 1513 08/04/20 1528 08/04/20 1540 08/04/20 1600   BP:  108/54 109/58 122/57   Pulse: 65 66 64 63   Resp: 16 18     Temp: 98.4 °F (36.9 °C) 98.5 °F (36.9 °C)     SpO2: 96% 100% 96% 96%   Weight:       Height:            Physical Exam:  GENERAL: alert, uncooperative  EYE: negative  LYMPHATIC: Cervical, supraclavicular, and axillary nodes normal.   THROAT & NECK: normal and no erythema or exudates noted. LUNG: clear to auscultation bilaterally  HEART: regular rate and rhythm, S1, S2 normal, no murmur, click, rub or gallop  ABDOMEN: soft, non-tender. Bowel sounds normal. No masses,  no organomegaly  EXTREMITIES:  extremities normal, atraumatic, no cyanosis or edema. Left hip with wound vac, no purulent secretion, no erythema   SKIN: Normal.  NEUROLOGIC: aphasia, unable to follow commands. Generalized weakness, unable to assess pronator.      Assessment:     Hospital Problems  Date Reviewed: 7/28/2020          Codes Class Noted POA    Vomiting ICD-10-CM: R11.10  ICD-9-CM: 787.03  8/4/2020 Unknown        Anemia ICD-10-CM: D64.9  ICD-9-CM: 285.9  8/4/2020 Unknown        * (Principal) CVA (cerebral vascular accident) ([facility] ICD-10-CM: I63.9  ICD-9-CM: 434.91  8/4/2020 Unknown        CKD (chronic kidney disease) ICD-10-CM: N18.9  ICD-9-CM: 585.9  8/4/2020 Unknown        DNR (do not resuscitate) ICD-10-CM: Z66  ICD-9-CM: V49.86  8/4/2020 Yes        Left hip postoperative wound infection ICD-10-CM: T81.49XA  ICD-9-CM: 998.59  5/13/2020 Yes        Stage 4 chronic kidney disease ([facility] (Chronic) ICD-10-CM: N18.4  ICD-9-CM: 585.4  5/1/2020 Yes        Severe obesity ([facility] [address] (Chronic) ICD-10-CM: E66.01  ICD-9-CM: 278.01  9/24/2019 Yes        Anemia due to chronic kidney disease treated with erythropoietin ICD-10-CM: N18.9, D63.1  ICD-9-CM: 285.21, 585.9  7/25/2017 Yes        Malignant neoplasm metastatic to bone [facility]) ICD-10-CM: C79.51  ICD-9-CM: 198.5  7/31/2016 Yes    Overview Signed 1/10/2017  6:13 PM by Eda Jaime MD     Last Assessment & Plan:   1. Complete radiation therapy to the patient's sacrum and femurs as directed by Dr. Jaylene Lama. 2.  Return to the office in 6 weeks with x-rays of the pelvis on arrival.  3.  Instruct the patient to advance her weightbearing as tolerated as she continues to enjoy a reduction in pain following the radiation therapy. Hypertension (Chronic) ICD-10-CM: I10  ICD-9-CM: 401.9  2/16/2016 Yes    Overview Signed 2/16/2016 12:09 PM by Treva Roman     UNSPECIFIED                    Plan:     -Large subacute CVA: possible embolic:  The patient is bed bound  Recent left hip surgery, on 325 mg on asa at home  Admit as inpatient  Keep npo  Start IVFs  She will need speech evaluation prior resuming po diet  Continue asa.  Start statins  HTN control  DVT ppx  Keep under telemetry  Check brain MRI, ECHO, CTA head and neck   Neurology consult  PT/OT  Place PPD    -Anemia:  Possible due to CKD, wound vac  Transfuse 1 unit PRBC  Monitor HH serially    -Vomiting: multifactorial  Antiemetics  IVFs    -CKD stage Iv:  Avoid nephrotoxic meds  IOs  Daily labs    -HTN: resume home meds     -Left hip wound infection, sp hardware removal:  Continue wound vac  Wound care consult  On chronic keflex    -Thrombocytopenia:  Noted. Monitor  Hold heparin derivatives    -Hx of metastatic breast cancer: On letrozole    -Obesity: counseling    DVT ppx: compression stockings. Holding heparin in view of anemia and thrombocytopenia     Code status: DNR. Confirmed with patients daughter     Estimated LOS > 2MN  Risk: high  Estimated DC planning: STR  Goals of care discussed with Mario Ambriz ( daughter ).      Signed By: Kassie Mansfield MD     August 4, 2020

## 2020-08-04 NOTE — PROGRESS NOTES
TRANSFER - IN REPORT:    Verbal report received from Guerda(name) on Ursula Michael  being received from ED(unit) for routine progression of care      Report consisted of patients Situation, Background, Assessment and   Recommendations(SBAR). Information from the following report(s) SBAR, Kardex, ED Summary and Recent Results was reviewed with the receiving nurse. Opportunity for questions and clarification was provided. Assessment completed upon patients arrival to unit and care assumed.

## 2020-08-04 NOTE — ED PROVIDER NOTES
Patient presents to the emerge department by EMS from home with a history of altered mental status. History obtained from the daughter. Daughter states that over the last 24 to 36 hours the patient has been increasingly somnolent and was noted to be talking nonsensically yesterday. She has been vomiting intermittently and has had vomited once today. She is currently being treated for a chronic wound to the left hip and had been receiving IV antibiotics at home via PICC line but it was transitioned to oral antibiotics. No fever has been noted no diarrhea has been noted and review of systems was otherwise negative. Patient is also being treated for metastatic breast cancer, there is no history of metastases to the brain. The history is provided by a relative and medical records. The history is limited by the condition of the patient. Altered mental status    This is a new problem. The current episode started yesterday. The problem has not changed since onset. Associated symptoms include confusion. Mental status baseline is normal.  Risk factors include a recent illness and a recent infection. Her past medical history does not include seizures, CVA or psychotropic medication treatment.         Past Medical History:   Diagnosis Date    Abnormal EKG 2/16/2016    Anemia due to chronic kidney disease treated with erythropoietin 7/25/2017    Aortic valve insufficiency 2/16/2016    Cancer (HCC)     breast    Hyperlipidemia 2/16/2016    Hypertension 2/16/2016    UNSPECIFIED     Obesity 2/16/2016    UNSPECIFIED        Past Surgical History:   Procedure Laterality Date    HX TUBAL LIGATION      IR INSERT TUNL CVC W/O PORT OVER 5 YR  5/19/2020    IR REMOVE TUNL CVAD W/O PORT / PUMP  7/2/2020         Family History:   Problem Relation Age of Onset    Coronary Artery Disease Mother     Heart Disease Father        Social History     Socioeconomic History    Marital status:      Spouse name: Not on file  Number of children: Not on file    Years of education: Not on file    Highest education level: Not on file   Occupational History    Not on file   Social Needs    Financial resource strain: Not on file    Food insecurity     Worry: Not on file     Inability: Not on file    Transportation needs     Medical: Not on file     Non-medical: Not on file   Tobacco Use    Smoking status: Never Smoker    Smokeless tobacco: Current User     Types: Snuff   Substance and Sexual Activity    Alcohol use: No    Drug use: Not on file    Sexual activity: Not on file   Lifestyle    Physical activity     Days per week: Not on file     Minutes per session: Not on file    Stress: Not on file   Relationships    Social connections     Talks on phone: Not on file     Gets together: Not on file     Attends Zoroastrianism service: Not on file     Active member of club or organization: Not on file     Attends meetings of clubs or organizations: Not on file     Relationship status: Not on file    Intimate partner violence     Fear of current or ex partner: Not on file     Emotionally abused: Not on file     Physically abused: Not on file     Forced sexual activity: Not on file   Other Topics Concern    Not on file   Social History Narrative    Not on file         ALLERGIES: Penicillins    Review of Systems   Constitutional: Negative for chills and fever. Gastrointestinal: Positive for vomiting. Psychiatric/Behavioral: Positive for confusion. All other systems reviewed and are negative. Vitals:    08/04/20 1254   BP: 143/63   Pulse: 72   Resp: 16   Temp: 99.8 °F (37.7 °C)   SpO2: 99%   Weight: 109.8 kg (242 lb)   Height: 5' 7\" (1.702 m)            Physical Exam  Vitals signs and nursing note reviewed. Constitutional:       General: She is not in acute distress. Appearance: Normal appearance. She is obese. She is ill-appearing. She is not toxic-appearing or diaphoretic.    HENT:      Head: Normocephalic and atraumatic. Mouth/Throat:      Mouth: Mucous membranes are moist.      Pharynx: No oropharyngeal exudate. Eyes:      Extraocular Movements: Extraocular movements intact. Pupils: Pupils are equal, round, and reactive to light. Comments: Conjunctiva are pale   Neck:      Musculoskeletal: Normal range of motion and neck supple. Cardiovascular:      Rate and Rhythm: Normal rate and regular rhythm. Pulses: Normal pulses. Pulmonary:      Effort: Pulmonary effort is normal.      Breath sounds: Normal breath sounds. Abdominal:      General: Bowel sounds are normal. There is no distension. Palpations: Abdomen is soft. Tenderness: There is no abdominal tenderness. There is no guarding or rebound. Musculoskeletal: Normal range of motion. Lymphadenopathy:      Cervical: No cervical adenopathy. Skin:     General: Skin is warm and dry. Capillary Refill: Capillary refill takes less than 2 seconds. Coloration: Skin is pale. Neurological:      General: No focal deficit present. Mental Status: She is alert. She is disoriented. Cranial Nerves: No cranial nerve deficit. Sensory: No sensory deficit. Motor: No weakness. Coordination: Coordination normal.      Comments: Patient only answers \"I just do not know\" to all questions. Psychiatric:         Mood and Affect: Mood normal.         Behavior: Behavior normal.         Thought Content:  Thought content normal.          MDM  Number of Diagnoses or Management Options     Amount and/or Complexity of Data Reviewed  Clinical lab tests: ordered and reviewed  Tests in the radiology section of CPT®: ordered and reviewed  Review and summarize past medical records: yes  Discuss the patient with other providers: yes  Independent visualization of images, tracings, or specimens: yes (EKG at 1325: Is a sinus rhythm with premature atrial complexes, rate of 72, no ischemic changes.)    Risk of Complications, Morbidity, and/or Mortality  Presenting problems: high  Diagnostic procedures: moderate  Management options: moderate    Patient Progress  Patient progress: stable         Procedures

## 2020-08-04 NOTE — ED NOTES
TRANSFER - OUT REPORT:    Verbal report given to [id number]  Don[address] 18, rn on Karen Santacruz  being transferred to [address] [place] [address] for routine progression of care       Report consisted of patients Situation, Background, Assessment and   Recommendations(SBAR). Information from the following report(s) SBAR was reviewed with the receiving nurse. Lines:   Peripheral IV 08/04/20 Left; Outer Forearm (Active)   Site Assessment Clean, dry, & intact 08/04/20 1255   Phlebitis Assessment 0 08/04/20 1255   Infiltration Assessment 0 08/04/20 1255   Dressing Status Clean, dry, & intact 08/04/20 1255   Hub Color/Line Status Pink 08/04/20 1255        Opportunity for questions and clarification was provided.       Patient transported with:   Monitor  Registered Nurse

## 2020-08-05 ENCOUNTER — APPOINTMENT (OUTPATIENT)
Dept: INFUSION THERAPY | Age: 75
End: 2020-08-05

## 2020-08-05 ENCOUNTER — HOME CARE VISIT (OUTPATIENT)
Dept: SCHEDULING | Facility: HOME HEALTH | Age: 75
End: 2020-08-05
Payer: MEDICARE

## 2020-08-05 LAB
ANION GAP SERPL CALC-SCNC: 10 MMOL/L (ref 7–16)
BUN SERPL-MCNC: 28 MG/DL (ref 8–23)
CALCIUM SERPL-MCNC: 8.3 MG/DL (ref 8.3–10.4)
CHLORIDE SERPL-SCNC: 108 MMOL/L (ref 98–107)
CHOLEST SERPL-MCNC: 153 MG/DL
CO2 SERPL-SCNC: 24 MMOL/L (ref 21–32)
CREAT SERPL-MCNC: 2.29 MG/DL (ref 0.6–1)
EST. AVERAGE GLUCOSE BLD GHB EST-MCNC: NORMAL MG/DL
GLUCOSE SERPL-MCNC: 75 MG/DL (ref 65–100)
HBA1C MFR BLD: 4.8 % (ref 4.8–6)
HCT VFR BLD AUTO: 25.1 % (ref 35.8–46.3)
HCT VFR BLD AUTO: 27.4 % (ref 35.8–46.3)
HDLC SERPL-MCNC: 36 MG/DL (ref 40–60)
HDLC SERPL: 4.3 {RATIO}
HGB BLD-MCNC: 7.8 G/DL (ref 11.7–15.4)
HGB BLD-MCNC: 8.3 G/DL (ref 11.7–15.4)
LDLC SERPL CALC-MCNC: 98 MG/DL
LIPID PROFILE,FLP: ABNORMAL
MM INDURATION POC: 0 MM (ref 0–5)
POTASSIUM SERPL-SCNC: 4.4 MMOL/L (ref 3.5–5.1)
PPD POC: NEGATIVE NEGATIVE
SODIUM SERPL-SCNC: 142 MMOL/L (ref 136–145)
TRIGL SERPL-MCNC: 95 MG/DL (ref 35–150)
TSH SERPL DL<=0.005 MIU/L-ACNC: 1.02 UIU/ML (ref 0.36–3.74)
VLDLC SERPL CALC-MCNC: 19 MG/DL (ref 6–23)

## 2020-08-05 PROCEDURE — 97162 PT EVAL MOD COMPLEX 30 MIN: CPT

## 2020-08-05 PROCEDURE — 85018 HEMOGLOBIN: CPT

## 2020-08-05 PROCEDURE — 92610 EVALUATE SWALLOWING FUNCTION: CPT

## 2020-08-05 PROCEDURE — 83036 HEMOGLOBIN GLYCOSYLATED A1C: CPT

## 2020-08-05 PROCEDURE — 84443 ASSAY THYROID STIM HORMONE: CPT

## 2020-08-05 PROCEDURE — 97535 SELF CARE MNGMENT TRAINING: CPT

## 2020-08-05 PROCEDURE — 77030019934 HC DRSG VAC ASST KCON -B

## 2020-08-05 PROCEDURE — 3331090001 HH PPS REVENUE CREDIT

## 2020-08-05 PROCEDURE — 74011250636 HC RX REV CODE- 250/636: Performed by: INTERNAL MEDICINE

## 2020-08-05 PROCEDURE — 74011250637 HC RX REV CODE- 250/637: Performed by: INTERNAL MEDICINE

## 2020-08-05 PROCEDURE — 97166 OT EVAL MOD COMPLEX 45 MIN: CPT

## 2020-08-05 PROCEDURE — 74750000023 HC WOUND THERAPY

## 2020-08-05 PROCEDURE — 36415 COLL VENOUS BLD VENIPUNCTURE: CPT

## 2020-08-05 PROCEDURE — 77030019952 HC CANSTR VAC ASST KCON -B

## 2020-08-05 PROCEDURE — C8929 TTE W OR WO FOL WCON,DOPPLER: HCPCS

## 2020-08-05 PROCEDURE — 74011000250 HC RX REV CODE- 250: Performed by: INTERNAL MEDICINE

## 2020-08-05 PROCEDURE — 65660000000 HC RM CCU STEPDOWN

## 2020-08-05 PROCEDURE — 80048 BASIC METABOLIC PNL TOTAL CA: CPT

## 2020-08-05 PROCEDURE — 97530 THERAPEUTIC ACTIVITIES: CPT

## 2020-08-05 PROCEDURE — 80061 LIPID PANEL: CPT

## 2020-08-05 PROCEDURE — 3331090002 HH PPS REVENUE DEBIT

## 2020-08-05 PROCEDURE — 97605 NEG PRS WND THER DME<=50SQCM: CPT

## 2020-08-05 RX ORDER — PANTOPRAZOLE SODIUM 40 MG/1
40 TABLET, DELAYED RELEASE ORAL
Status: DISCONTINUED | OUTPATIENT
Start: 2020-08-06 | End: 2020-08-19 | Stop reason: HOSPADM

## 2020-08-05 RX ADMIN — Medication 10 ML: at 21:39

## 2020-08-05 RX ADMIN — FAMOTIDINE 20 MG: 10 INJECTION, SOLUTION INTRAVENOUS at 08:23

## 2020-08-05 RX ADMIN — TRIAMTERENE AND HYDROCHLOROTHIAZIDE 1 TABLET: 37.5; 25 TABLET ORAL at 09:54

## 2020-08-05 RX ADMIN — Medication 5 ML: at 05:07

## 2020-08-05 RX ADMIN — AMLODIPINE BESYLATE 5 MG: 5 TABLET ORAL at 08:22

## 2020-08-05 RX ADMIN — CEPHALEXIN 250 MG: 250 CAPSULE ORAL at 17:51

## 2020-08-05 RX ADMIN — SODIUM CHLORIDE 75 ML/HR: 900 INJECTION, SOLUTION INTRAVENOUS at 05:54

## 2020-08-05 RX ADMIN — PERFLUTREN 1 ML: 6.52 INJECTION, SUSPENSION INTRAVENOUS at 13:00

## 2020-08-05 RX ADMIN — SENNOSIDES AND DOCUSATE SODIUM 2 TABLET: 8.6; 5 TABLET ORAL at 21:39

## 2020-08-05 RX ADMIN — ASPIRIN 81 MG: 81 TABLET, CHEWABLE ORAL at 08:22

## 2020-08-05 RX ADMIN — CEPHALEXIN 250 MG: 250 CAPSULE ORAL at 09:00

## 2020-08-05 RX ADMIN — ATORVASTATIN CALCIUM 80 MG: 80 TABLET, FILM COATED ORAL at 21:39

## 2020-08-05 NOTE — PROGRESS NOTES
Problem: Dysphagia (Adult)  Goal: *Acute Goals and Plan of Care (Insert Text)  Outcome: Progressing Towards Goal  Note: LTG: Patient will tolerate least restrictive diet without overt signs or symptoms of airway compromise. STG: Patient will tolerate mechanical soft diet and thin liquids without overt signs or symptoms of airway compromise. STG: Patient will participate in modified barium swallow study as clinically indicated. STG: Patient will participate in speech/language/cognitive evaluation        SPEECH LANGUAGE PATHOLOGY: DYSPHAGIA- Initial Assessment    NAME/AGE/GENDER: Rose Gibson is a 67 y.o. female  DATE: 8/5/2020  PRIMARY DIAGNOSIS: CVA (cerebral vascular accident) ([facility] [address] [I63.9]  Anemia [D64.9]  CKD (chronic kidney disease) [N18.9]  Vomiting [R11.10]       ICD-10: Treatment Diagnosis: R13.11 Dysphagia, Oral Phase    RECOMMENDATIONS   DIET:   PO:  Mechanical soft with chopped meat and vegetables  Liquids:  regular thin    MEDICATIONS: Crushed in puree     ASPIRATION PRECAUTIONS  Slow rate of intake  Small bites/sips  Upright at 90 degrees during meal     COMPENSATORY STRATEGIES/MODIFICATIONS  Small sips and bites  Assistance with po intake  Alternate solids/liquids     EDUCATION:  Recommendations discussed with Nursing  Patient     CONTINUATION OF SKILLED SERVICES/MEDICAL NECESSITY:  Patient is expected to demonstrate progress in  swallow strength, swallow timeliness, swallow function, diet tolerance, and swallow safety in order to  improve swallow safety, work toward diet advancement, and decrease aspiration risk. Patient continues to require skilled intervention due to dysphagia. RECOMMENDATIONS for CONTINUED SPEECH THERAPY:   YES: Anticipate need for ongoing speech therapy during this hospitalization and at next level of care. ASSESSMENT   Patient presents with mild oral dysphagia with solids and no overt s/sx pharyngeal dysphagia.  Overall limited intake of trials despite encouragement. Appears to fatigue. Recommend mechanical soft diet and thin liquids. Meds crushed in puree. Will follow for po trials/diet tolerance. Patient will need assistance with meals and alternate solids/liquids. Patient should be fully awake and alert. Hold if drowsy. RN notified. REHABILITATION POTENTIAL FOR STATED GOALS: Good    PLAN    FREQUENCY/DURATION: Continue to follow patient 3 times a week for duration of hospital stay to address above goals. - Recommendations for next treatment session: Next treatment will address diet tolerance/trials and language evaluation    SUBJECTIVE   Upright in bed. Significant encouragement to participate. History of Present Injury/Illness: Ms. Shashi Coy  has a past medical history of Abnormal EKG (2/16/2016), Anemia due to chronic kidney disease treated with erythropoietin (7/25/2017), Aortic valve insufficiency (2/16/2016), Cancer ([facility] [address] Hyperlipidemia (2/16/2016), Hypertension (2/16/2016), and Obesity (2/16/2016). . She also  has a past surgical history that includes hx tubal ligation; ir insert tunl cvc w/o port over 5 yr (5/19/2020); and ir remove tunl cvad w/o port / pump (7/2/2020). Problem List:  (Impairments causing functional limitations):  Oral dysphagia    Previous Dysphagia: NONE REPORTED passed STAND  Diet Prior to Evaluation: regular diet/thin liquids    Orientation:   Did not respond  Nodded \"yes\" to her name     Pain: Pain Scale 1: Numeric (0 - 10)  Pain Intensity 1: 0    Cognitive-Linguistic Screen:  Speech Production:   Impaired but minimal verbalizations. Low volume. Expressive Language:  Impaired  Receptive Language:  Impaired  Cognition:   Impaired  Further assessment warranted. Patient with minimal verbalizations primarily spontaneous \"I don't want that\" or \"not now\". Decreased command following and response to open ended questions. Anticipate need for full language evaluation.      OBJECTIVE   Oral Motor: did not follow commands to complete. Dentition: Natural, Limited, and Poor    Swallow evaluation:   Patient consumed trials of ice chips, thin liquids by cup and straw, and cracker. Refused fruit and puree. No overt s/sx airway compromise with liquid or solid. Mildly prolonged mastication and liquid rinse aided in prep and clearance, but functional with no oral residue. Vocal quality clear. Single swallow per bite/sip. INTERDISCIPLINARY COLLABORATION: Registered Nurse  PRECAUTIONS/ALLERGIES: Penicillins     Tool Used: Dysphagia Outcome and Severity Scale (SACHIN)    Score Comments   Normal Diet  [] 7 With no strategies or extra time needed   Functional Swallow  [] 6 May have mild oral or pharyngeal delay   Mild Dysphagia  [x] 5 Which may require one diet consistency restricted    Mild-Moderate Dysphagia  [] 4 With 1-2 diet consistencies restricted   Moderate Dysphagia  [] 3 With 2 or more diet consistencies restricted   Moderate-Severe Dysphagia  [] 2 With partial PO strategies (trials with ST only)   Severe Dysphagia  [] 1 With inability to tolerate any PO safely      Score:  Initial:5 Most Recent: x (Date 08/05/20 )   Interpretation of Tool: The Dysphagia Outcome and Severity Scale (SACHIN) is a simple, easy-to-use, 7-point scale developed to systematically rate the functional severity of dysphagia based on objective assessment and make recommendations for diet level, independence level, and type of nutrition. Current Medications:   No current facility-administered medications on file prior to encounter. Current Outpatient Medications on File Prior to Encounter   Medication Sig Dispense Refill    oxyCODONE-acetaminophen 5-300 mg tab Take  by mouth.      triamterene-hydroCHLOROthiazide (MAXZIDE) 37.5-25 mg per tablet TAKE 1 TABLET BY MOUTH EVERY DAY (Patient taking differently: Take 1 tablet by mouth daily.) 30 Tab 1    cephALEXin (KEFLEX) 250 mg capsule Take 250 mg by mouth two (2) times a day.       sodium chloride (BD Pre-Filled Normal Saline) 10 mL by IntraVENous route as needed for Other (using SASH method and post blood draws). heparin sod,porcine/0.9 % NaCl (HEPARIN FLUSH IV) 5 mL by IntraVENous route as needed for Other (using SASH method and post blood draws). Heparin 100Units/ml      polyethylene glycol (MIRALAX) 17 gram packet Take 1 Packet by mouth daily. (Patient taking differently: Take 1 Packet by mouth daily. mix with 8 oz water) 30 Packet 0    magnesium oxide (MAG-OX) 400 mg tablet Take 1 Tab by mouth two (2) times a day. 60 Tab 0    aspirin (ASPIRIN) 325 mg tablet Take 325 mg by mouth daily. docusate sodium (COLACE) 50 mg capsule Take 50 mg by mouth two (2) times daily as needed for Constipation. sennosides 25 mg tab Take 1 Tab by mouth nightly as needed for Other (constipation). diphenhydrAMINE (BENADRYL) 25 mg tablet Take 25 mg by mouth every six (6) hours as needed for Allergies. palbociclib (Ibrance) 75 mg cap Take one capsule by mouthOnce daily with food for21 days on, followed by7 days off during each cyCle of 28 days 21 Cap 5    ondansetron hcl (Zofran) 8 mg tablet Take 1 Tab by mouth every eight (8) hours as needed for Nausea. 90 Tab 2    letrozole (FEMARA) 2.5 mg tablet TAKE 1 TABLET BY MOUTH EVERY DAY 90 Tab 3    melatonin 5 mg cap capsule Take 10 mg by mouth nightly. furosemide (LASIX) 20 mg tablet TAKE 1 TAB BY MOUTH DAILY AS NEEDED. FOR SWELLING. 30 Tab 1    calcium carbonate (OS-JORGE) 500 mg calcium (1,250 mg) tablet Take 2 tablets at lunch and 2 tablets at dinner. Indications: hypocalcemia (Patient taking differently: Take 2 Tabs by mouth. Take 2 tablets at lunch and 2 tablets at dinner. Indications: low amount of calcium in the blood) 4 Tab 0    amLODIPine (NORVASC) 5 mg tablet Take 5 mg by mouth daily.          After treatment position/precautions:  Upright in bed  RN notified  Call light within reach    Total Treatment Duration:   Time In: 3508  Time Out: 20 [facility], [facility]., CCC-SLP

## 2020-08-05 NOTE — WOUND CARE
Pt seen for wound vac dressing change, pt admitted with home wound vac. Removed dressing and home wound vac placed in chair in patients room. Can reattached once pt is stable for discharge if plan is still to go home. Pt turned to right side  With assistance from primary RN. Removed old wound vac noted wound to left hip extending to lateral thigh measuring 17.5 X 4 x 4 with granulation tissue present in wound bed. Wound is clean and granulating well. Cleansed with dermal wound cleanser. Applied new wound vac dressing. Attached to hospital wound vac machine, no leaks noted. Pt tolerated well. Pt kept turned to right side with pillows. Wound team will plan for MWF dressing changes while in acute care setting. Wound team will continue to follow.

## 2020-08-05 NOTE — DISCHARGE INSTRUCTIONS
Stroke: After Your Visit     Your Care Instructions      Risk factors for stroke include being overweight, smoking, and sedentary lifestyle. This means that the blood flow to a part of your brain was blocked for some time, which damages the nerve cells in that part of the brain. The part of your body controlled by that part of your brain may not function properly now. The brain is an amazing organ that can heal itself to some degree. The stroke you had damaged part of your brain, but other parts of your brain may take over in some way for the damaged areas. You have already started this process. Going home may be hard for you and your family. The more you can try to do for yourself, the better. Remember to take each day one at a time. Follow-up care is a key part of your treatment and safety. Be sure to make and go to all appointments, and call your doctor if you are having problems. Its also a good idea to know your test results and keep a list of the medicines you take. How can you care for yourself at home? Enter a stroke rehabilitation (rehab) program, if your doctor recommends it. Physical, speech, and occupational therapies can help you manage bathing, dressing, eating, and other basics of daily living. Eat a heart-healthy diet that is low in cholesterol, saturated fat, and salt. Eat lots of fresh fruits and vegetables and foods high in fiber. Increase your activities slowly. Take short rest breaks when you get tired. Gradually increase the amount you walk. Start out by walking a little more than you did the day before. Do not drive until your doctor says it is okay. It is normal to feel sad or depressed after a stroke. If the blues last, talk to your doctor. If you are having problems with urine leakage, go to the bathroom at regular times, including when you first wake up and at bedtime. Also, limit fluids after dinner.   If you are constipated, drink plenty of fluids, enough so that your urine is light yellow or clear like water. If you have kidney, heart, or liver disease and have to limit fluids, talk with your doctor before you increase the amount of fluids you drink. Set up a regular time for using the toilet. If you continue to have constipation, your doctor may suggest using a bulking agent, such as Metamucil, or a stool softener, laxative, or enema. Medicines  Take your medicines exactly as prescribed. Call your doctor if you think you are having a problem with your medicine. You may be taking several medicines. ACE (angiotensin-converting enzyme) inhibitors, angiotensin II receptor blockers (ARBs), beta-blockers, diuretics (water pills), and calcium channel blockers control your blood pressure. Statins help lower cholesterol. Your doctor may also prescribe medicines for depression, pain, sleep problems, anxiety, or agitation. If your doctor has given you medicine that prevents blood clots, such as warfarin (Coumadin), aspirin combined with extended-release dipyridamole (Aggrenox), clopidogrel (Plavix), or aspirin to prevent another stroke, you should:  Tell your dentist, pharmacist, and other health professionals that you take these medicines. Watch for unusual bruising or bleeding, such as blood in your urine, red or black stools, or bleeding from your nose or gums. Get regular blood tests to check your clotting time if you are taking Coumadin. Wear medical alert jewelry that says you take blood thinners. You can buy this at most drugstores. Do not take any over-the-counter medicines or herbal products without talking to your doctor first.  If you take birth control pills or hormone replacement therapy, talk to your doctor about whether they are right for you. For family members and caregivers  Make the home safe. Set up a room so that your loved one does not have to climb stairs. Be sure the bathroom is on the same floor.  Move throw rugs and furniture that could cause falls, and make sure that the lighting is good. Put grab bars and seats in tubs and showers. Find out what your loved one can do and what he or she needs help with. Try not to do things for your loved one that your loved one can do on his or her own. Help him or her learn and practice new skills. Visit and talk with your loved one often. Try doing activities together that you both enjoy, such as playing cards or board games. Keep in touch with your loved one's friends as much as you can, and encourage them to visit. Take care of yourself. Do not try to do everything yourself. Ask other family members to help. Eat well, get enough rest, and take time to do things that you enjoy. Keep up with your own doctor visits, and make sure to take your medicines regularly. Get out of the house as much as you can. Join a local support group. Find out if you qualify for home health care visits to help with rehab or for adult day care. When should you call for help? Call 911 anytime you think you may need emergency care. For example, call if:  You have signs of another stroke. These may include:  Sudden numbness, paralysis, or weakness in your face, arm, or leg, especially on only one side of your body. New problems with walking or balance. Sudden vision changes. Drooling or slurred speech. New problems speaking or understanding simple statements, or you feel confused. A sudden, severe headache that is different from past headaches. Call 911 even if these symptoms go away in a few minutes. You cough up blood. You vomit blood or what looks like coffee grounds. You pass maroon or very bloody stools. Call your doctor now or seek immediate medical care if:  You have new bruises or blood spots under your skin. You have a nosebleed. Your gums bleed when you brush your teeth. You have blood in your urine. Your stools are black and tarlike or have streaks of blood.   You have vaginal bleeding when you are not having your period, or heavy period bleeding. You have new symptoms that may be related to your stroke, such as falls or trouble swallowing. Watch closely for changes in your health, and be sure to contact your doctor if you have any problems. Where can you learn more? Go to [employer]    Enter C294  in the search box to learn more about \"Stroke: After Your Visit\". © 0424-6387 [employer]. Care instructions adapted under license by [facility] (which disclaims liability or warranty for this information). This care instruction is for use with your licensed healthcare professional. If you have questions about a medical condition or this instruction, always ask your healthcare professional. [place] any warranty or liability for your use of this information.

## 2020-08-05 NOTE — PROGRESS NOTES
Problem: Patient Education: Go to Patient Education Activity  Goal: Patient/Family Education  Outcome: Progressing Towards Goal  Note:   1. Patient will complete self-feeding and grooming with moderate assistance and adaptive equipment as needed. 2. Patient will complete upper body dressing and bathing with minimal assistance and adaptive equipment as needed. 3. Patient will complete bed mobility with minimal assistance and adaptive equipment as needed in preparation for functional transfers. 4. Patient will participate in 10 minutes of therapeutic exercises to increase strength in BUEs for increased participation in ADLs. 5. Patient will participate in 10 minutes of therapeutic activity to increase coordination in RUE for increased participation in ADLs. 6. Patient will attempt to stand with maximal assistance and adaptive equipment as needed in preparation for functional transfers. 7. Patient will demonstrate improved cognition for ADLs by completing functional tasks with up to minimal cueing from therapist.     Timeframe: 7 visits       OCCUPATIONAL THERAPY: Initial Assessment, Daily Note, and AM 8/5/2020  INPATIENT: OT Visit Days: 1  Payor: Mirta Stock / Plan: [address] [place] [address] HMO / Product Type: [employer] Medicare /      NAME/AGE/GENDER: Sherwin Herman is a 67 y.o. female   PRIMARY DIAGNOSIS:  CVA (cerebral vascular accident) ([facility] [address] [I63.9]  Anemia [D64.9]  CKD (chronic kidney disease) [N18.9]  Vomiting [R11.10] CVA (cerebral vascular accident) ([facility] [address] CVA (cerebral vascular accident) ([facility] [address]        ICD-10: Treatment Diagnosis:    Generalized Muscle Weakness (M62.81)  Hemiplegia and hemiparesis following cerebral infarction affecting   right dominant side (M76.326)    Precautions/Allergies:    Fall precautions    Penicillins      ASSESSMENT:     Ms. Earnest Iverson is a 67 y.o. female admitted with AMS, CT positive for large subacute CVA, at the L temporal and parietal lobe.  Unsure of baseline as pt with AMS, appears to have global aphasia. Hx breast CA with bony mets, L NORMA with wound and wound vac in place. Upon arrival pt alert, confused, and agreeable to OT evaluation and treatment. BUE assessment revealed AROM and strength generally decreased in LUE, grossly decreased in RUE. Increased tone noted in BUEs. Unable to assess sensation due to cognition. Pt completed bed mobility with MaxAx2/cues for technique. Pt practiced scooting not edge of bed with MaxAx2, cues for weight shifts and technique. Fair sitting balance, prop sitting throughout. Pt practiced grooming in sitting with total assist, unable to follow commands to wash face. Total body bathing with RUE MaxA, pt perseverates on washing L knee. Multimodal cues throughout for sequencing each step. Changed gowns with MaxA, pt with good participation threading BUEs. Pt spontaneously scooted R laterally with SBA. Sit > supine with ModAx2. Pt practiced rolling in supine for brief change with ModAx2 to roll R, MaxAx2 to roll L. Pt favors R sidelying. Total assist for clothing management. Pt with increased participation and command following by end of session. Pt left sidelying R with head of bed elevated, bony prominences offloaded, and call bell within reach. Pt presents with deficits in cognition, strength, activity tolerance, balance, ambulation and transfers. Rafa Pereira is currently functioning below baseline and would benefit from continued OT to increase safety and independence with ADLs. Will follow. This patient is appropriate for co-treatment at this time due to multiple deficits including decreased balance, decreased cognition, decreased endurance, decreased strength, and need for high level assistance to complete functional transfers and functional tasks.      This section established at most recent assessment   PROBLEM LIST (Impairments causing functional limitations):  Decreased Strength  Decreased ADL/Functional Activities  Decreased Transfer Abilities  Decreased Ambulation Ability/Technique  Decreased Balance  Decreased Activity Tolerance  Increased Fatigue  Decreased Flexibility/Joint Mobility  Edema/Girth  Decreased Skin Integrity/Hygeine  Decreased [place] with Home Exercise Program  Decreased Cognition   INTERVENTIONS PLANNED: (Benefits and precautions of occupational therapy have been discussed with the patient.)  Activities of daily living training  Adaptive equipment training  Balance training  Clothing management  Cognitive training  Community reintergration  Donning&doffing training  [employer] training  Hygiene training  Neuromuscular re-eduation  Re-evaluation  Therapeutic activity  Therapeutic exercise  Wheelchair management     TREATMENT PLAN: Frequency/Duration: Follow patient 3x/week to address above goals. Rehabilitation Potential For Stated Goals: Good     REHAB RECOMMENDATIONS (at time of discharge pending progress):    Placement: It is my opinion, based on this patient's performance to date, that Ms. So Saravia may benefit from intensive therapy at a [facility] after discharge due to the functional deficits listed above that are likely to improve with skilled rehabilitation and concerns that he/she may be unsafe to be unsupervised at home due to impaired strength and balance impacting ADLs, increasing risk of falls, Chronic L hip wound. Equipment:   TBD              OCCUPATIONAL PROFILE AND HISTORY:   History of Present Injury/Illness (Reason for Referral):  See H&P. \"Edith Maxwell is a 67 y.o. female who has a PMH of HTN, dyslipidemia, metastatic breast cancer, CKD stage IV, sp left hip arthroplasty + wound vac, wound infection on chronic antibiotic therapy, who was brought in via EMS after her daughter noted her confused, not making sense since yesterday around 1600 hrs. The patient is bed-bound after her hip surgery on 5/20.  She complains of nausea, vomiting and decreased appetite. She is unable to follow commands. All information was gathered from her daughter Singh Pal at beside. Her mother has not had fever, chills, cough, GI bleeding or diarrhea. She has been compliant to all her home meds, including keflex. She last saw ID yesterday. Upon ed arrival VS were stable. The patient was alert, but incoherent to answer questions. She was not following commands. Labs: hb 6.8 gr; wbc 2.3; plat 118k  Cr 2.5 ( baseline ); ammonia: 12  Abg: ph 7.4, pco2 33, po2 72, O2 94%  Brain ct: large subacute CVA, at the L temporal and parietal lobe. Negative for bleeding. \"  Past Medical History/Comorbidities:   Ms. Quincy Machado  has a past medical history of Abnormal EKG (2/16/2016), Anemia due to chronic kidney disease treated with erythropoietin (7/25/2017), Aortic valve insufficiency (2/16/2016), Cancer ([facility] [address] Hyperlipidemia (2/16/2016), Hypertension (2/16/2016), and Obesity (2/16/2016). Ms. Quincy Machado  has a past surgical history that includes hx tubal ligation; ir insert tunl cvc w/o port over 5 yr (5/19/2020); and ir remove tunl cvad w/o port / pump (7/2/2020). Social History/Living Environment:   Support Systems: Child(ezequiel)  Patient Expects to be Discharged to[de-identified] Unknown  Current DME Used/Available at Home: Hospital bed  Prior Level of Function/Work/Activity:  Unsure of baseline as pt with AMS, appears to have global aphasia. Hx breast CA with bony mets, L NORMA with wound and wound vac in place. Personal Factors:          Sex:  female        Age:  67 y.o. Other factors that influence how disability is experienced by the patient:  Multiple co-morbidities    Number of Personal Factors/Comorbidities that affect the Plan of Care: Expanded review of therapy/medical records (1-2):  MODERATE COMPLEXITY   ASSESSMENT OF OCCUPATIONAL PERFORMANCE[de-identified]   Activities of Daily Living:   Basic ADLs (From Assessment) Complex ADLs (From Assessment)   Feeding: Total assistance  Oral Facial Hygiene/Grooming:  Total assistance  Bathing: Total assistance  Upper Body Dressing: Maximum assistance  Lower Body Dressing: Total assistance  Toileting: Total assistance Instrumental ADL  Meal Preparation: Total assistance  Homemaking: Total assistance  Medication Management: Total assistance  Financial Management: Total assistance   Grooming/Bathing/Dressing Activities of Daily Living   Grooming  Position Performed: Seated edge of bed  Washing Face: Total assistance (dependent) Cognitive Retraining  Safety/Judgement: Fall prevention   Upper Body Bathing  Bathing Assistance: Maximum assistance; Total assistance(dependent)  Position Performed: Seated edge of bed  Cues: Physical assistance; Tactile cues provided;Verbal cues provided;Visual cues provided     Lower Body Bathing  Lower Body : Total assistance (dependent)  Position Performed: Seated edge of bed Toileting  Clothing Management: Total assistance (dependent)   Upper Body Dressing Assistance  Orthotics(Brace): Maximum assistance     Lower Body Dressing Assistance  Protective Undergarmet: Total assistance (dependent) Bed/Mat Mobility  Rolling: Moderate assistance;Maximum assistance;Assist x2  Supine to Sit: Maximum assistance;Assist x2  Sit to Supine: Moderate assistance;Assist x2  Scooting: Maximum assistance;Assist x2     Most Recent Physical Functioning:   Gross Assessment:  AROM: Grossly decreased, non-functional(RUE)  PROM: Within functional limits(BUEs)  Strength: Grossly decreased, non-functional(RUE)  Coordination: Grossly decreased, non-functional(BUEs)  Tone: Abnormal(Increased tone in BUEs)  Sensation: (Unable to assess due to cognition )               Posture:     Balance:  Sitting: Impaired  Sitting - Static: Fair (occasional)  Sitting - Dynamic: Fair (occasional) Bed Mobility:  Rolling: Moderate assistance;Maximum assistance;Assist x2  Supine to Sit: Maximum assistance;Assist x2  Sit to Supine:  Moderate assistance;Assist x2  Scooting: Maximum assistance;Assist x2  Wheelchair Mobility:     Transfers:               Patient Vitals for the past 6 hrs:   BP BP Patient Position SpO2 Pulse   08/05/20 0800 138/76 At rest 95 % 67       Mental Status  Neurologic State: Alert, Confused  Orientation Level: Unable to verbalize  Cognition: Decreased attention/concentration, Decreased command following  Perception: Cues to attend to right side of body  Safety/Judgement: Fall prevention                          Physical Skills Involved:  Range of Motion  Balance  Strength  Activity Tolerance  Fine Motor Control  Gross Motor Control  Skin Integrity Cognitive Skills Affected (resulting in the inability to perform in a timely and safe manner):  Perception  Executive Function  Sustained Attention  Divided Attention  Comprehension  Expression Psychosocial Skills Affected:  Habits/Routines  Environmental Adaptation  Social Interaction  Emotional Regulation  Self-Awareness  Awareness of Others  Social Roles   Number of elements that affect the Plan of Care: 5+:  HIGH COMPLEXITY   CLINICAL DECISION MAKING:   [facility] AM-PAC 6 Clicks   Daily Activity Inpatient Short Form  How much help from another person does the patient currently need. .. Total A Lot A Little None   1. Putting on and taking off regular lower body clothing? [x] 1   [] 2   [] 3   [] 4   2. Bathing (including washing, rinsing, drying)? [x] 1   [] 2   [] 3   [] 4   3. Toileting, which includes using toilet, bedpan or urinal?   [x] 1   [] 2   [] 3   [] 4   4. Putting on and taking off regular upper body clothing? [x] 1   [] 2   [] 3   [] 4   5. Taking care of personal grooming such as brushing teeth? [] 1   [x] 2   [] 3   [] 4   6. Eating meals? [x] 1   [] 2   [] 3   [] 4   © 2007, Trustees of [facility], under license to [employer].  All rights reserved      Score:  Initial: 6 8/5/2020 Most Recent: X (Date: -- )    Interpretation of Tool:  Represents activities that are increasingly more difficult (i.e. Bed mobility, Transfers, Gait). Medical Necessity:     Patient demonstrates   good and fair   rehab potential due to higher previous functional level. Reason for Services/Other Comments:  Patient continues to require skilled intervention due to   Impaired strength and balance impacting safety and ADLs  . Use of outcome tool(s) and clinical judgement create a POC that gives a: HIGH COMPLEXITY         TREATMENT:   (In addition to Assessment/Re-Assessment sessions the following treatments were rendered)     Pre-treatment Symptoms/Complaints:    Pain: Initial:   Pain Intensity 1: 0  Post Session:  same     Today's treatment session addressed Decreased Strength, Decreased ADL/Functional Activities, Decreased Transfer Abilities, Decreased Ambulation Ability/Technique, Decreased Balance, Decreased Activity Tolerance, Increased Fatigue, Decreased Flexibility/Joint Mobility, Edema/Girth, Decreased Skin Integrity/Hygeine, and Decreased Cognition to progress towards achieving goal(s). During this session,  Physical Therapy addressed  Balance to progress towards their discipline specific goal(s). Co-treatment was necessary to improve patient's cognitive participation, ability to follow higher level commands, ability to increase activity demands, and ability to return to normal functional activity. Self Care: (25 minutes): Procedure(s) (per grid) utilized to improve and/or restore self-care/home management as related to dressing, bathing, and grooming. Required maximal visual, verbal, manual, and tactile cueing to facilitate activities of daily living skills and compensatory activities. Pt practiced scooting not edge of bed with MaxAx2, cues for weight shifts and technique. Fair sitting balance, prop sitting throughout. Pt practiced grooming in sitting with total assist, unable to follow commands to wash face. Total body bathing with RUE MaxA, pt perseverates on washing L knee.  Multimodal cues throughout for sequencing each step. Changed gowns with MaxA, pt with good participation threading BUEs. Pt spontaneously scooted R laterally with SBA. Sit > supine with ModAx2. Pt practiced rolling in supine for brief change with ModAx2 to roll R, MaxAx2 to roll L. Pt favors R sidelying. Total assist for clothing management. Braces/Orthotics/Lines/Etc:   IV  rodriguez catheter  O2 Device: Room air  Treatment/Session Assessment:    Response to Treatment:  Tolerated well   Interdisciplinary Collaboration:   Physical Therapist  Occupational Therapist  Registered Nurse  Wound RN  After treatment position/precautions:   Supine in bed  Bed/Chair-wheels locked  Bed in low position  Call light within reach  Side rails x 3   Compliance with Program/Exercises: Compliant all of the time, Will assess as treatment progresses. Recommendations/Intent for next treatment session: \"Next visit will focus on advancements to more challenging activities and reduction in assistance provided\".   Total Treatment Duration:  OT Patient Time In/Time Out  Time In: 1015  Time Out: [address], OTR/L

## 2020-08-05 NOTE — PROGRESS NOTES
08/04/20 2000   NIH Stroke Scale   Interval Other (comment)  (Dual NIH with Laura)   LOC 0   LOC Questions 2   LOC Commands 2   Best Gaze 1   Visual 2   Facial Palsy 0   Motor Right Arm 3   Motor Left Arm 3   Motor Right Leg 3   Motor Left Leg 3   Limb Ataxia 2   Sensory 2   Best Language 1   Dysarthria 2   Extinction and Inattention 1   Total 27

## 2020-08-05 NOTE — PROGRESS NOTES
Problem: Falls - Risk of  Goal: *Absence of Falls  Description: Document Vickii Bridges Fall Risk and appropriate interventions in the flowsheet. Outcome: Progressing Towards Goal  Note: Fall Risk Interventions:  Mobility Interventions: Communicate number of staff needed for ambulation/transfer    Mentation Interventions: Bed/chair exit alarm, Door open when patient unattended    Medication Interventions: Patient to call before getting OOB, Bed/chair exit alarm    Elimination Interventions: Call light in reach, Bed/chair exit alarm    History of Falls Interventions: Bed/chair exit alarm, Door open when patient unattended         Problem: Patient Education: Go to Patient Education Activity  Goal: Patient/Family Education  Outcome: Progressing Towards Goal     Problem: Pressure Injury - Risk of  Goal: *Prevention of pressure injury  Description: Document Arturo Scale and appropriate interventions in the flowsheet.   Outcome: Progressing Towards Goal  Note: Pressure Injury Interventions:  Sensory Interventions: Assess changes in LOC    Moisture Interventions: Absorbent underpads    Activity Interventions: Pressure redistribution bed/mattress(bed type)    Mobility Interventions: Pressure redistribution bed/mattress (bed type)    Nutrition Interventions: Offer support with meals,snacks and hydration, Document food/fluid/supplement intake                     Problem: Patient Education: Go to Patient Education Activity  Goal: Patient/Family Education  Outcome: Progressing Towards Goal     Problem: Patient Education: Go to Patient Education Activity  Goal: Patient/Family Education  Outcome: Progressing Towards Goal     Problem: Patient Education: Go to Patient Education Activity  Goal: Patient/Family Education  Outcome: Progressing Towards Goal

## 2020-08-05 NOTE — PROGRESS NOTES
08/05/20 1927   NIH Stroke Scale   Interval Other (comment)  (Dual with Patrick Valencia RN)   LOC 0   LOC Questions 2   LOC Commands 2   Best Gaze 1   Visual 2   Facial Palsy 0   Motor Right Arm 2   Motor Left Arm 2   Motor Right Leg 3   Motor Left Leg 3   Limb Ataxia 2   Sensory 2   Best Language 1   Dysarthria 1   Extinction and Inattention 1   Total 24

## 2020-08-05 NOTE — PROGRESS NOTES
visited patient and family, offered support, assurance of spiritual care staff's prayers.     Andrea Uriosteguilain RUCHI

## 2020-08-05 NOTE — PROGRESS NOTES
Physical Therapy Note:    Orders received, chart reviewed and initial evaluation attempted. Staff at bedside changing wound vac at time of attempt. Will attempt later as patient is available and schedule permits.     Thank you,  Ricarda Martinez, PT, DPT

## 2020-08-05 NOTE — PROGRESS NOTES
Neurology Daily Progress Note     Assessment:     67year old female with acute left parietal lobe infarct. No contrast enhancement concerning for metastasis on MRI. CTA demonstrates an left M2 branch MCA occlusion and diffuse atherosclerosis. Marantic endocarditis should be excluded given history of metastatic breast cancer. TTE pending. Plan:     · Continue aspirin   · Continue statin   · Neurochecks Q4H  · TTE pending  ·  echocardiogram with bubble study  · PT/OT/ST  · DVT prophylaxis   · BP management - normotensive, with long-term goal <140/90  · Smoking cessation if applicable   · Diabetes education if applicable   · Depression Screening prior to discharge      Subjective: Interval history:    Patient aphasic. Appears to have some right sided weakness. MRI demonstrates left parietal lobe infarct. TTE pending. History:    Lauro Coleman is a 67 y.o. female who is being seen for stroke. Unable to obtain ROS due to aphasia.        Objective:     Vitals:    08/05/20 0000 08/05/20 0400 08/05/20 0800 08/05/20 1200   BP: (!) 154/98 132/68 138/76 125/63   Pulse: 68 68 67 66   Resp: 19 19 17 17   Temp: 97.8 °F (36.6 °C) 97.6 °F (36.4 °C) 98.2 °F (36.8 °C) 98 °F (36.7 °C)   SpO2: 97% 96% 95% 97%   Weight:       Height:              Current Facility-Administered Medications:     perflutren lipid microspheres (DEFINITY) in NS bolus IV, 1 mL, IntraVENous, PRN, Shekhar Flowers MD, 1 mL at 08/05/20 1300    0.9% sodium chloride infusion 250 mL, 250 mL, IntraVENous, PRN, Sawyer Valero, DO    sodium chloride (NS) flush 5-40 mL, 5-40 mL, IntraVENous, Q8H, Shekhar Flowers MD, 5 mL at 08/05/20 0507    sodium chloride (NS) flush 5-40 mL, 5-40 mL, IntraVENous, PRN, Shekhar Flowers MD    0.9% sodium chloride infusion, 75 mL/hr, IntraVENous, CONTINUOUS, Shekhar Flowers MD, Last Rate: 75 mL/hr at 08/05/20 0554, 75 mL/hr at 08/05/20 0554    ondansetron (ZOFRAN) injection 4 mg, 4 mg, IntraVENous, Q6H PRN, Everton Steele MD    atorvastatin (LIPITOR) tablet 80 mg, 80 mg, Oral, QHS, Everton Steele MD, 80 mg at 08/04/20 2259    acetaminophen (TYLENOL) suppository 650 mg, 650 mg, Rectal, Q4H PRN, Everton Steele MD    labetaloL (NORMODYNE;TRANDATE) injection 5 mg, 5 mg, IntraVENous, Q10MIN PRN, Everton Steele MD    senna-docusate (PERICOLACE) 8.6-50 mg per tablet 2 Tab, 2 Tab, Oral, QHS, Everton Steele MD, 2 Tab at 08/04/20 2259    amLODIPine (NORVASC) tablet 5 mg, 5 mg, Oral, DAILY, Everton Steele MD, 5 mg at 08/05/20 0406    cephALEXin (KEFLEX) capsule 250 mg, 250 mg, Oral, BID, Everton Steele MD, 250 mg at 08/05/20 0900    diphenhydrAMINE (BENADRYL) capsule 25 mg, 25 mg, Oral, Q6H PRN, Everton Steele MD    triamterene-hydroCHLOROthiazide (MAXZIDE) 37.5-25 mg per tablet 1 Tab, 1 Tab, Oral, DAILY, Everton Steele MD, 1 Tab at 08/05/20 0954    famotidine (PF) (PEPCID) 20 mg in 0.9% sodium chloride 10 mL injection, 20 mg, IntraVENous, DAILY, Everton Steele MD, 20 mg at 08/05/20 6112    aspirin chewable tablet 81 mg, 81 mg, Oral, DAILY, Everton Steele MD, 81 mg at 08/05/20 3900    tuberculin injection 5 Units, 5 Units, IntraDERMal, ONCE, Everton Steele MD, 5 Units at 08/04/20 2300    Recent Results (from the past 12 hour(s))   HGB & HCT    Collection Time: 08/05/20  5:31 AM   Result Value Ref Range    HGB 7.8 (L) 11.7 - 15.4 g/dL    HCT 25.1 (L) 35.8 - 46.3 %   TSH 3RD GENERATION    Collection Time: 08/05/20  5:31 AM   Result Value Ref Range    TSH 1.020 0.358 - 3.740 uIU/mL   LIPID PANEL    Collection Time: 08/05/20  5:31 AM   Result Value Ref Range    LIPID PROFILE          Cholesterol, total 153 <200 MG/DL    Triglyceride 95 35 - 150 MG/DL    HDL Cholesterol 36 (L) 40 - 60 MG/DL    LDL, calculated 98 <100 MG/DL    VLDL, calculated 19 6.0 - 23.0 MG/DL    CHOL/HDL Ratio 4.3     HEMOGLOBIN A1C WITH EAG    Collection Time: 08/05/20  5:31 AM   Result Value Ref Range    Hemoglobin A1c 4.8 4.8 - 6.0 % Est. average glucose Cannot be calculated mg/dL   METABOLIC PANEL, BASIC    Collection Time: 08/05/20  5:31 AM   Result Value Ref Range    Sodium 142 136 - 145 mmol/L    Potassium 4.4 3.5 - 5.1 mmol/L    Chloride 108 (H) 98 - 107 mmol/L    CO2 24 21 - 32 mmol/L    Anion gap 10 7 - 16 mmol/L    Glucose 75 65 - 100 mg/dL    BUN 28 (H) 8 - 23 MG/DL    Creatinine 2.29 (H) 0.6 - 1.0 MG/DL    GFR est AA 27 (L) >60 ml/min/1.73m2    GFR est non-AA 22 (L) >60 ml/min/1.73m2    Calcium 8.3 8.3 - 10.4 MG/DL   HGB & HCT    Collection Time: 08/05/20 12:52 PM   Result Value Ref Range    HGB 8.3 (L) 11.7 - 15.4 g/dL    HCT 27.4 (L) 35.8 - 46.3 %         Physical Exam:  General - Chronically ill appearing female in NAD. HEENT - Normocephalic, atraumatic. Conjunctiva are clear. Neck - Supple without masses  Abdomen - Soft, nontender with normal bowel sounds. Extremities - Peripheral pulses intact. No edema and no rashes. Neurological examination - Awake and alert. Expressive and receptive aphasia. On cranial nerve examination, (II, III, IV, VI) pupils are equal, round, and reactive to light. Unable to assess visual acuity or visual fields. Extraocular motility is normal. (V, VII) Face is symmetric. Motor examination - There is normal muscle tone and bulk. Patient moves all extremities spontaneously, L>R. Does not move extremities to command. Unable to assess sensation, cerebellar function, or gait. Signed By: Shaan Navarrete NP     August 5, 2020    Neurology attending    Patient seen and examined I have reviewed her MRI scan    No evidence to suggest metastatic breast cancer fortunately. Still awaiting results of cardiac study with reference to possibility of embolic lesion on the basis of cardiac disease    Agree with findings above otherwise    Management long-term is clearly going to be linked in with management of her cancer and her overall systemic management.

## 2020-08-05 NOTE — PROGRESS NOTES
08/04/20 2000   Dual Skin Pressure Injury Assessment   Dual Skin Pressure Injury Assessment WDL   Second Care Provider (Based on [address] [place] [address]) Lynette Bergeron, RN   Skin Integumentary   Skin Integumentary (WDL) X    Pressure  Injury Documentation No Pressure Injury Noted-Pressure Ulcer Prevention Initiated   Skin Color Ecchymosis (comment)  (Bilateral LLE)   Skin Condition/Temp Warm;Dry   Skin Integrity Wound (add Wound LDA)  (to the L.  Hip)   Turgor Non-tenting   Varicosities Absent   Wound Prevention and Protection Methods   Orientation of Wound Prevention Posterior   Location of Wound Prevention Sacrum/Coccyx   Dressing Present  No   Wound Offloading (Prevention Methods) Bed, pressure redistribution/air;Bed, pressure reduction mattress;Pillows;Repositioning

## 2020-08-05 NOTE — PROGRESS NOTES
Patient daughter Jo Mckeon need update and clarification on visitation rights please call [phone]

## 2020-08-05 NOTE — PROGRESS NOTES
Progress Note    Patient: William Henenssy MRN: [id number]  SSN: [id number]    YOB: 1945  Age: 67 y.o. Sex: female      Admit Date: 8/4/2020    LOS: 1 day     Subjective:   She was found in no distress, aphasic. Unable to follow commands. Objective:     Vitals:    08/05/20 0000 08/05/20 0400 08/05/20 0800 08/05/20 1200   BP: (!) 154/98 132/68 138/76 125/63   Pulse: 68 68 67 66   Resp: 19 19 17 17   Temp: 97.8 °F (36.6 °C) 97.6 °F (36.4 °C) 98.2 °F (36.8 °C) 98 °F (36.7 °C)   SpO2: 97% 96% 95% 97%   Weight:       Height:            Intake and Output:  Current Shift: No intake/output data recorded. Last three shifts: 08/03 1901 - 08/05 0700  In: 351.3   Out: 600 [Urine:600]    Physical Exam:   GENERAL: alert, uncooperative  EYE: negative  LYMPHATIC: Cervical, supraclavicular, and axillary nodes normal.   THROAT & NECK: normal and no erythema or exudates noted. LUNG: clear to auscultation bilaterally  HEART: regular rate and rhythm, S1, S2 normal, no murmur, click, rub or gallop  ABDOMEN: soft, non-tender. Bowel sounds normal. No masses,  no organomegaly  EXTREMITIES:  extremities normal, atraumatic, no cyanosis or edema. Left hip with wound vac, no purulent secretion, no erythema   SKIN: Normal.  NEUROLOGIC: aphasia, unable to follow commands. Generalized weakness, unable to assess pronator drift or sensation.      Lab/Data Review: All lab results for the last 24 hours reviewed. Assessment:     Principal Problem:    CVA (cerebral vascular accident) ([facility] (8/4/2020)    Active Problems:    Hypertension (2/16/2016)      Overview: UNSPECIFIED       Malignant neoplasm metastatic to bone ([facility] (7/31/2016)      Overview: Last Assessment & Plan:       1. Complete radiation therapy to the patient's sacrum and femurs as       directed by Dr. Florian Marcano.       2.  Return to the office in 6 weeks with x-rays of the pelvis on arrival.      3.  Instruct the patient to advance her weightbearing as tolerated as she       continues to enjoy a reduction in pain following the radiation therapy. Anemia due to chronic kidney disease treated with erythropoietin (7/25/2017)      Severe obesity ([address] (9/24/2019)      Stage 4 chronic kidney disease ([facility] (5/1/2020)      Left hip postoperative wound infection (5/13/2020)      Vomiting (8/4/2020)      Anemia (8/4/2020)      CKD (chronic kidney disease) (8/4/2020)      DNR (do not resuscitate) (8/4/2020)        Plan:   -Acute Left MCA infarct (parietal): Concerns of marantic etiology in view of metastatic breast cancer  CTA: Occlusion of an anterior M2 branch of the left middle cerebral artery. Continue mechanical soft diet  Appreciate speech evaluation   Continue asa, high intensity statin  HTN control  DVT ppx  Keep under telemetry  F/u ECHO  Neurology following   PT/OT. Suggest LTAC, will inform CM     -Anemia:  Possible due to CKD, wound vac  Sp 1 prbc. Hb is now 8.3 gr   Monitor HH serially     -Vomiting: multifactorial: resolved      -CKD stage Iv:  Avoid nephrotoxic meds  IOs  Daily labs     -HTN:  home meds      -Left hip wound infection, sp hardware removal:  Continue wound vac  Wound care consulted  On chronic keflex     -Thrombocytopenia:  Noted. Monitor  Hold heparin derivatives     -Hx of metastatic breast cancer: On letrozole     -Obesity: counseling     DVT ppx: compression stockings.   Holding heparin in view of anemia and thrombocytopenia      Code status: DNR.     Risk: high    Estimated DC planning: STR versus LTAC    Signed By: Allen Vazquez MD     August 5, 2020

## 2020-08-05 NOTE — PROGRESS NOTES
Problem: Mobility Impaired (Adult and Pediatric)  Goal: *Acute Goals and Plan of Care (Insert Text)  Outcome: Progressing Towards Goal  Note: STG:  (1.)Ms. Kaz Jang will move from supine to sit and sit to supine , scoot up and down, and roll side to side with MINIMAL ASSIST within 3 treatment day(s). (2.)Ms. Kaz Jang will transfer from bed to chair and chair to bed with MODERATE ASSIST using the least restrictive device within 3 treatment day(s). (3.)Ms. Kaz Jang will ambulate with MODERATE ASSIST for 3 feet with the least restrictive device within 3 treatment day(s). (4.)Ms. Kaz Jang will perform standing static and dynamic balance activities x 5 minutes with MODERATE ASSIST to improve safety within 3 day(s). LTG:  (1.)Ms. Kaz Jang will move from supine to sit and sit to supine , scoot up and down, and roll side to side in bed with CONTACT GUARD ASSIST within 7 treatment day(s). (2.)Ms. Kaz Jang will transfer from bed to chair and chair to bed with MINIMAL ASSIST using the least restrictive device within 7 treatment day(s). (3.)Ms. Kaz Jang will ambulate with MINIMAL ASSIST for 5 feet with the least restrictive device within 7 treatment day(s). (4.)Ms. Kaz Jang will perform standing static and dynamic balance activities x 5 minutes with MINIMAL ASSIST to improve safety within 7 day(s).   ________________________________________________________________________________________________      PHYSICAL THERAPY: Initial Assessment, Daily Note, and AM 8/5/2020  INPATIENT: PT Visit Days : 1  Payor: Zhen Carroll / Plan: [address] [place] [address] HMO / Product Type: Managed Care Medicare /       NAME/AGE/GENDER: Beth Son is a 67 y.o. female   PRIMARY DIAGNOSIS: CVA (cerebral vascular accident) ([address] [I63.9]  Anemia [D64.9]  CKD (chronic kidney disease) [N18.9]  Vomiting [R11.10] CVA (cerebral vascular accident) ([facility] CVA (cerebral vascular accident) ([facility]        ICD-10: Treatment Diagnosis:    Generalized Muscle Weakness (M62.81)   Precaution/Allergies:  Penicillins      ASSESSMENT:     Ms. Roel Egan is a 67 y.o. female admitted for CVA workup. Per MRI: \"Acute left parietal infarct. No evidence of hemorrhage. \" She is unable to provide history due to confusion, however was recently hospitalized for L hip infection so history obtained from old PT notes from May 2020. As of May, patient was living with daughter in a mobile home and able to transfer to bedside commode/chair, however did not do much walking. She has a hospital bed at home. This AM she is disoriented x4 with perseveration noted in conversation and mobility. Moderate to maximal assist x2 to transfer to sitting, however able to sit periodically without support. OT completed ADLs with patient with decreased command following noted throughout all activity. Treatment initiated to include sitting balance/activity tolerance at edge of bed as well as scooting laterally at edge of bed which patient was unable to assist with to command, however automatically did so prior to returning to supine with minimal assist x2. Moderate assist x2 to return to supine and for rolling with cues for reaching. Edenilson Rutherford is currently functioning below her baseline and would benefit from skilled PT during acute care stay to maximize safety and independence with functional mobility. At this time, patient is appropriate for Co-treatment with occupational therapy due to patient's decreased overall endurance/tolerance levels, as well as need for high level skilled assistance to complete functional transfers/mobility and functional tasks. Edenilson Rutherford is appropriate for a multidisciplinary co-treatment of PT and OT to address goals of both disciplines.      This section established at most recent assessment   PROBLEM LIST (Impairments causing functional limitations):  Decreased Strength  Decreased ADL/Functional Activities  Decreased Transfer Abilities  Decreased Ambulation Ability/Technique  Decreased Balance  Decreased Activity Tolerance  Decreased Knowledge of Precautions  Decreased [place] with Home Exercise Program   INTERVENTIONS PLANNED: (Benefits and precautions of physical therapy have been discussed with the patient.)  Balance Exercise  Bed Mobility  Family Education  Gait Training  Home Exercise Program (HEP)  Neuromuscular Re-education/Strengthening  Therapeutic Activites  Therapeutic Exercise/Strengthening  Transfer Training     TREATMENT PLAN: Frequency/Duration: 3 times a week for duration of hospital stay  Rehabilitation Potential For Stated Goals: [facility] (at time of discharge pending progress):    Placement: It is my opinion, based on this patient's performance to date, that Ms. Sara Lanza may benefit from [facility]. Equipment:   None at this time              HISTORY:   History of Present Injury/Illness (Reason for Referral):  Ursula Michael is a 67 y.o. female who has a PMH of HTN, dyslipidemia, metastatic breast cancer, CKD stage IV, sp left hip arthroplasty + wound vac, wound infection on chronic antibiotic therapy, who was brought in via EMS after her daughter noted her confused, not making sense since yesterday around 1600 hrs. The patient is bed-bound after her hip surgery on 5/20. She complains of nausea, vomiting and decreased appetite. She is unable to follow commands. All information was gathered from her daughter Trudy Luque at beside. Her mother has not had fever, chills, cough, GI bleeding or diarrhea. She has been compliant to all her home meds, including keflex. She last saw ID yesterday. Past Medical History/Comorbidities:   Ms. Sara Lanza  has a past medical history of Abnormal EKG (2/16/2016), Anemia due to chronic kidney disease treated with erythropoietin (7/25/2017), Aortic valve insufficiency (2/16/2016), Cancer ([facility] Hyperlipidemia (2/16/2016), Hypertension (2/16/2016), and Obesity (2/16/2016).   Ms. Sara Lanza  has a past surgical history that includes hx tubal ligation; ir insert tunl cvc w/o port over 5 yr (5/19/2020); and ir remove tunl cvad w/o port / pump (7/2/2020). Social History/Living Environment:   Support Systems: Child(ezequiel)  Patient Expects to be Discharged to[de-identified] Unknown  Current DME Used/Available at Home: Hospital bed, Wheelchair, Walker, rolling, Commode, bedside  Prior Level of Function/Work/Activity:  She is unable to provide history due to confusion, however was recently hospitalized for L hip infection so history obtained from old PT notes from May 2020. As of May, patient was living with daughter in a mobile home and able to transfer to bedside commode/chair, however did not do much walking. She has a hospital bed at home. Number of Personal Factors/Comorbidities that affect the Plan of Care: 3+: HIGH COMPLEXITY   EXAMINATION:   Most Recent Physical Functioning:   Gross Assessment:  AROM: Grossly decreased, non-functional  PROM: Generally decreased, functional  Strength: Grossly decreased, non-functional  Tone: Abnormal               Posture:  Posture (WDL): Exceptions to WDL  Posture Assessment: Forward head  Balance:  Sitting: Impaired  Sitting - Static: Fair (occasional)  Sitting - Dynamic: Fair (occasional) Bed Mobility:  Rolling: Maximum assistance; Moderate assistance;Assist x2  Supine to Sit: Maximum assistance;Assist x2  Sit to Supine: Moderate assistance;Assist x2  Scooting: Maximum assistance; Moderate assistance;Assist x2  Wheelchair Mobility:     Transfers:     Gait:            Body Structures Involved:  Nerves  Voice/Speech  Heart  Muscles Body Functions Affected:  Mental  Sensory/Pain  Voice and Speech  Cardio  Neuromusculoskeletal  Movement Related Activities and Participation Affected:  Learning and Applying Knowledge  General Tasks and Demands  Communication  Mobility  Self Care  Domestic Life  Interpersonal Interactions and Relationships  Community, Social and [place]   Number of elements that affect the Plan of Care: 4+: HIGH COMPLEXITY   CLINICAL PRESENTATION:   Presentation: Evolving clinical presentation with changing clinical characteristics: MODERATE COMPLEXITY   CLINICAL DECISION MAKIN [facility] Mobility Inpatient Short Form  How much difficulty does the patient currently have. .. Unable A Lot A Little None   1. Turning over in bed (including adjusting bedclothes, sheets and blankets)? [] 1   [x] 2   [] 3   [] 4   2. Sitting down on and standing up from a chair with arms ( e.g., wheelchair, bedside commode, etc.)   [] 1   [x] 2   [] 3   [] 4   3. Moving from lying on back to sitting on the side of the bed? [] 1   [x] 2   [] 3   [] 4   How much help from another person does the patient currently need. .. Total A Lot A Little None   4. Moving to and from a bed to a chair (including a wheelchair)? [x] 1   [] 2   [] 3   [] 4   5. Need to walk in hospital room? [x] 1   [] 2   [] 3   [] 4   6. Climbing 3-5 steps with a railing? [x] 1   [] 2   [] 3   [] 4   © , Trustees of [address] [place] [address], under license to [employer]. All rights reserved      Score:  Initial: 9 Most Recent: X (Date: -- )    Interpretation of Tool:  Represents activities that are increasingly more difficult (i.e. Bed mobility, Transfers, Gait). Medical Necessity:     Patient demonstrates   good   rehab potential due to higher previous functional level. Reason for Services/Other Comments:  Patient   continues to require modification of therapeutic interventions to increase complexity of exercises  .    Use of outcome tool(s) and clinical judgement create a POC that gives a: Questionable prediction of patient's progress: MODERATE COMPLEXITY            TREATMENT:   (In addition to Assessment/Re-Assessment sessions the following treatments were rendered)   Pre-treatment Symptoms/Complaints:  none  Pain: Initial:   Pain Intensity 1: 0  Post Session:  0     Today's treatment session addressed Decreased Strength, Decreased Transfer Abilities, Decreased Ambulation Ability/Technique, and Decreased Balance to progress towards achieving goal(s) 1 and 2. During this session, Occupational Therapy addressed ADLs to progress towards their discipline specific goal(s). Co-treatment was necessary to improve patient's cognitive participation, ability to follow higher level commands, ability to increase activity demands, and ability to return to normal functional activity. Therapeutic Activity: (    23 minutes): Therapeutic activities including Bed transfers , scooting at edge of bed and sitting balance/activity tolerance activities to improve mobility, strength, and balance. Required minimal manual and verbal cues   to promote static and dynamic balance in sitting. Braces/Orthotics/Lines/Etc:   O2 Device: Room air  Treatment/Session Assessment:    Response to Treatment:  Patient tolerated sitting at edge of bed for 25 minutes. Interdisciplinary Collaboration:   Physical Therapist  Occupational Therapist  Registered Nurse  After treatment position/precautions:   Supine in bed  Bed/Chair-wheels locked  Bed in low position  Call light within reach  RN notified   Compliance with Program/Exercises: Will assess as treatment progresses  Recommendations/Intent for next treatment session: \"Next visit will focus on advancements to more challenging activities and reduction in assistance provided\".   Total Treatment Duration:  PT Patient Time In/Time Out  Time In: 1015  Time Out: Lizzy Sage, PT, DPT

## 2020-08-05 NOTE — PROGRESS NOTES
08/05/20 0725   NIH Stroke Scale   Interval Other (comment)  ([facility] with Jenn De Leon RN)   LOC 0   LOC Questions 2   LOC Commands 2   Best Gaze 1   Visual 2   Facial Palsy 0   Motor Right Arm 2   Motor Left Arm 2   Motor Right Leg 3   Motor Left Leg 3   Limb Ataxia 2   Sensory 2   Best Language 1   Dysarthria 2   Extinction and Inattention 1   Total 25

## 2020-08-06 LAB
ANION GAP SERPL CALC-SCNC: 12 MMOL/L (ref 7–16)
BASOPHILS # BLD: 0.1 K/UL (ref 0–0.2)
BASOPHILS NFR BLD: 2 % (ref 0–2)
BUN SERPL-MCNC: 26 MG/DL (ref 8–23)
CALCIUM SERPL-MCNC: 8.4 MG/DL (ref 8.3–10.4)
CHLORIDE SERPL-SCNC: 108 MMOL/L (ref 98–107)
CO2 SERPL-SCNC: 20 MMOL/L (ref 21–32)
CREAT SERPL-MCNC: 2.05 MG/DL (ref 0.6–1)
DIFFERENTIAL METHOD BLD: ABNORMAL
EOSINOPHIL # BLD: 0 K/UL (ref 0–0.8)
EOSINOPHIL NFR BLD: 2 % (ref 0.5–7.8)
ERYTHROCYTE [DISTWIDTH] IN BLOOD BY AUTOMATED COUNT: 25.8 % (ref 11.9–14.6)
GLUCOSE SERPL-MCNC: 82 MG/DL (ref 65–100)
HCT VFR BLD AUTO: 26.6 % (ref 35.8–46.3)
HGB BLD-MCNC: 8.3 G/DL (ref 11.7–15.4)
IMM GRANULOCYTES # BLD AUTO: 0 K/UL (ref 0–0.5)
IMM GRANULOCYTES NFR BLD AUTO: 1 % (ref 0–5)
LYMPHOCYTES # BLD: 1 K/UL (ref 0.5–4.6)
LYMPHOCYTES NFR BLD: 42 % (ref 13–44)
MCH RBC QN AUTO: 32.7 PG (ref 26.1–32.9)
MCHC RBC AUTO-ENTMCNC: 31.2 G/DL (ref 31.4–35)
MCV RBC AUTO: 104.7 FL (ref 79.6–97.8)
MM INDURATION POC: 0 MM (ref 0–5)
MONOCYTES # BLD: 0.3 K/UL (ref 0.1–1.3)
MONOCYTES NFR BLD: 12 % (ref 4–12)
NEUTS SEG # BLD: 1 K/UL (ref 1.7–8.2)
NEUTS SEG NFR BLD: 41 % (ref 43–78)
NRBC # BLD: 0.02 K/UL (ref 0–0.2)
PLATELET # BLD AUTO: 111 K/UL (ref 150–450)
PMV BLD AUTO: 10.7 FL (ref 9.4–12.3)
POTASSIUM SERPL-SCNC: 3.9 MMOL/L (ref 3.5–5.1)
PPD POC: NEGATIVE NEGATIVE
RBC # BLD AUTO: 2.54 M/UL (ref 4.05–5.2)
SODIUM SERPL-SCNC: 140 MMOL/L (ref 136–145)
WBC # BLD AUTO: 2.4 K/UL (ref 4.3–11.1)

## 2020-08-06 PROCEDURE — 80048 BASIC METABOLIC PNL TOTAL CA: CPT

## 2020-08-06 PROCEDURE — 74011250637 HC RX REV CODE- 250/637: Performed by: INTERNAL MEDICINE

## 2020-08-06 PROCEDURE — 65660000000 HC RM CCU STEPDOWN

## 2020-08-06 PROCEDURE — 3331090002 HH PPS REVENUE DEBIT

## 2020-08-06 PROCEDURE — 3331090001 HH PPS REVENUE CREDIT

## 2020-08-06 PROCEDURE — 36415 COLL VENOUS BLD VENIPUNCTURE: CPT

## 2020-08-06 PROCEDURE — 74750000023 HC WOUND THERAPY

## 2020-08-06 PROCEDURE — 92523 SPEECH SOUND LANG COMPREHEN: CPT

## 2020-08-06 PROCEDURE — 85025 COMPLETE CBC W/AUTO DIFF WBC: CPT

## 2020-08-06 RX ADMIN — AMLODIPINE BESYLATE 5 MG: 5 TABLET ORAL at 09:57

## 2020-08-06 RX ADMIN — TRIAMTERENE AND HYDROCHLOROTHIAZIDE 1 TABLET: 37.5; 25 TABLET ORAL at 09:58

## 2020-08-06 RX ADMIN — Medication 10 ML: at 21:20

## 2020-08-06 RX ADMIN — SENNOSIDES AND DOCUSATE SODIUM 2 TABLET: 8.6; 5 TABLET ORAL at 21:20

## 2020-08-06 RX ADMIN — CEPHALEXIN 250 MG: 250 CAPSULE ORAL at 18:10

## 2020-08-06 RX ADMIN — PANTOPRAZOLE SODIUM 40 MG: 40 TABLET, DELAYED RELEASE ORAL at 05:05

## 2020-08-06 RX ADMIN — ASPIRIN 81 MG: 81 TABLET, CHEWABLE ORAL at 09:57

## 2020-08-06 RX ADMIN — Medication 10 ML: at 05:12

## 2020-08-06 RX ADMIN — Medication 10 ML: at 15:11

## 2020-08-06 RX ADMIN — CEPHALEXIN 250 MG: 250 CAPSULE ORAL at 10:54

## 2020-08-06 RX ADMIN — ATORVASTATIN CALCIUM 80 MG: 80 TABLET, FILM COATED ORAL at 21:20

## 2020-08-06 NOTE — PROGRESS NOTES
08/06/20 0706   NIH Stroke Scale   Interval Other (comment)  (Dual with [place] [facility])   LOC 0   LOC Questions 2   LOC Commands 2   Best Gaze 1   Visual 2   Facial Palsy 0   Motor Right Arm 2   Motor Left Arm 1   Motor Right Leg 3   Motor Left Leg 3   Limb Ataxia 2   Sensory 1   Best Language 1   Dysarthria 1   Extinction and Inattention 1   Total 22   Dual NIH with [place] [facility]

## 2020-08-06 NOTE — PROGRESS NOTES
08/06/20 1917   NIH Stroke Scale   Interval Other (comment)  (Dual with Shekhar Harding RN)   LOC 0   LOC Questions 2   LOC Commands 2   Best Gaze 1   Visual 2   Facial Palsy 0   Motor Right Arm 2   Motor Left Arm 1   Motor Right Leg 3   Motor Left Leg 3   Limb Ataxia 2   Sensory 1   Best Language 1   Dysarthria 1   Extinction and Inattention 0   Total 21

## 2020-08-06 NOTE — PROGRESS NOTES
CM received return call from daughter, Sophy Gifford. She has spoken with the rest of the family and they are in agreement for patient to go to [facility] or [facility]. Referral has been sent to [facility] with [facility] and she is reviewing. CM will continue to follow for discharge planning.

## 2020-08-06 NOTE — PROGRESS NOTES
Problem: Communication Impaired (Adult)  Goal: *Acute Goals and Plan of Care (Insert Text)  Outcome: Progressing Towards Goal  Note: LTG: Patient will increase receptive/expressive language skills demonstrated by the ability to communicate basic wants/needs across environments     STG: Patient will answer yes/no questions with 60% accuracy given moderate cueing  STG: Patient will follow 1 step commands with 60% accuracy given model   STG: Patient will identify item in field of 2 with 75% accuracy given minimal cueing   STG: Patient will identify body parts presented verbally with 70% accuracy given moderate cueing     STG: Patient will complete automatic naming tasks in unison with ST with 90% accuracy  STG: Patient will complete basic responsive naming tasks with 80% accuracy given moderate cueing   STG: Patient will name basic objects/items with 80% accuracy given moderate   STG: Patient will repeat basic words with 75% accuracy given moderate cueing    SPEECH LANGUAGE PATHOLOGY: SPEECH-LANGUAGE/COGNITION: Initial Assessment    NAME/AGE/GENDER: Everett Kwon is a 67 y.o. female  DATE: 8/6/2020  PRIMARY DIAGNOSIS: CVA (cerebral vascular accident) ([address] [I63.9]  Anemia [D64.9]  CKD (chronic kidney disease) [N18.9]  Vomiting [R11.10]       ICD-10: Treatment Diagnosis: F80.2 Mixed Receptive-Expressive Language Disorder    RECOMMENDATIONS   Recommendations:   Ongoing speech therapy for basic expressive/receptive language      EDUCATION:  Recommendations discussed with Patient     Continuation of Skilled Services/Medical Necessity:  Patient is expected to demonstrate progress in expressive communication and receptive ability to decrease assistance required communication, increase independence with activities of daily living, and increase communication with family/caregivers. Patient continues to require skilled intervention due to aphasia.      RECOMMENDATIONS for CONTINUED SPEECH THERAPY: YES: Anticipate need for ongoing speech therapy during this hospitalization and at next level of care. ASSESSMENT   Patient presents with severe expressive and receptive aphasia. Receptive language characterized by no command following, unreliable responses to basic yes/no questions, and difficulty identifying basic objects via yes/no. Expressive language primarily spontaneous utterances with no attempts to name common objects or complete basic automatic speech tasks in unison. Patient is currently functioning significantly below baseline. Recommend ongoing speech therapy services for communication deficits and dysphagia. Patient declined all po trials this date. Will follow for language treatment and diet tolerance of mechanical soft diet/thin liquids. REHABILITATION POTENTIAL FOR STATED GOALS: Good    PLAN    FREQUENCY/DURATION: Continue to follow patient 3 times a week for duration of hospital stay to address above goals. SUBJECTIVE   Upright in bed. Drowsy     History of Present Injury/Illness: Ms. Rogelio Maldonado  has a past medical history of Abnormal EKG (2/16/2016), Anemia due to chronic kidney disease treated with erythropoietin (7/25/2017), Aortic valve insufficiency (2/16/2016), Cancer ([facility] [address] Hyperlipidemia (2/16/2016), Hypertension (2/16/2016), and Obesity (2/16/2016). . She also  has a past surgical history that includes hx tubal ligation; ir insert tunl cvc w/o port over 5 yr (5/19/2020); and ir remove tunl cvad w/o port / pump (7/2/2020).      Problem List:  (Impairments causing functional limitations):  Expressive/receptive language    Orientation:   Did not respond to questions    Pain: Pain Scale 1: FLACC  Pain Intensity 1: 0    OBJECTIVE   Basic language evaluation completed-  1 step commands: 0 attempts with model  ID object via yes/no: 2/5; no response on 2 occasions   Basic yes/no questions: 2/6; no response or favors \"yes\"    Confrontational naming: no attempts to name basic objects; \"I reckon\" or mumbling about a house/live  Automatic speech: no attempts to count to 10 in unison with ST across 3 attempts  Responsive naming: no attempts; again unrelated automatic response \"maybe later\"       Only utterances were spontaneous/automatic. Patient required significant encouragement and repetition to attend to questions, but still inconsistently responds or responses not appropriate. Tool Used: MODIFIED LEELEE SCALE (mRS)   Score   No Symptoms  [] 0   No significant disability despite symptoms; able to carry out all usual duties and activities  [] 1   Slight disability; unable to carry out all previous activities but able to look after own affairs without assistance. [] 2   Moderate disability; requiring some help but able to walk without assistance  [] 3   Moderately severe disability; unable to walk without assistance and unable to attend to own bodily needs without assistance  [] 4   Severe disability; bedridden, incontinent, and requiring constant nursing care and attention  [x] 5      Score:  Initial:5    Interpretation of Tool: The Modified Cabell Scale is a 7-point scaled used to quantify level of disability as it relates to a patient's functional abilities. Current Medications:   No current facility-administered medications on file prior to encounter. Current Outpatient Medications on File Prior to Encounter   Medication Sig Dispense Refill    oxyCODONE-acetaminophen 5-300 mg tab Take  by mouth.      triamterene-hydroCHLOROthiazide (MAXZIDE) 37.5-25 mg per tablet TAKE 1 TABLET BY MOUTH EVERY DAY (Patient taking differently: Take 1 tablet by mouth daily.) 30 Tab 1    cephALEXin (KEFLEX) 250 mg capsule Take 250 mg by mouth two (2) times a day. sodium chloride (BD Pre-Filled Normal Saline) 10 mL by IntraVENous route as needed for Other (using SASH method and post blood draws).       heparin sod,porcine/0.9 % NaCl (HEPARIN FLUSH IV) 5 mL by IntraVENous route as needed for Other (using [facility] [place] method and post blood draws). Heparin 100Units/ml      polyethylene glycol (MIRALAX) 17 gram packet Take 1 Packet by mouth daily. (Patient taking differently: Take 1 Packet by mouth daily. mix with 8 oz water) 30 Packet 0    magnesium oxide (MAG-OX) 400 mg tablet Take 1 Tab by mouth two (2) times a day. 60 Tab 0    aspirin (ASPIRIN) 325 mg tablet Take 325 mg by mouth daily. docusate sodium (COLACE) 50 mg capsule Take 50 mg by mouth two (2) times daily as needed for Constipation. sennosides 25 mg tab Take 1 Tab by mouth nightly as needed for Other (constipation). diphenhydrAMINE (BENADRYL) 25 mg tablet Take 25 mg by mouth every six (6) hours as needed for Allergies. palbociclib (Ibrance) 75 mg cap Take one capsule by mouthOnce daily with food for21 days on, followed by7 days off during each cyCle of 28 days 21 Cap 5    ondansetron hcl (Zofran) 8 mg tablet Take 1 Tab by mouth every eight (8) hours as needed for Nausea. 90 Tab 2    letrozole (FEMARA) 2.5 mg tablet TAKE 1 TABLET BY MOUTH EVERY DAY 90 Tab 3    melatonin 5 mg cap capsule Take 10 mg by mouth nightly. furosemide (LASIX) 20 mg tablet TAKE 1 TAB BY MOUTH DAILY AS NEEDED. FOR SWELLING. 30 Tab 1    calcium carbonate (OS-JORGE) 500 mg calcium (1,250 mg) tablet Take 2 tablets at lunch and 2 tablets at dinner. Indications: hypocalcemia (Patient taking differently: Take 2 Tabs by mouth. Take 2 tablets at lunch and 2 tablets at dinner. Indications: low amount of calcium in the blood) 4 Tab 0    amLODIPine (NORVASC) 5 mg tablet Take 5 mg by mouth daily.          INTERDISCIPLINARY COLLABORATION:  CNA  PRECAUTIONS/ALLERGIES: Penicillins     SAFETY:  After treatment position/precautions:  Upright in bed  Call light within reach    Total Treatment Duration:     Time In: 1112  Time Out: [address] [id number] [facility]

## 2020-08-06 NOTE — PROGRESS NOTES
Problem: Falls - Risk of  Goal: *Absence of Falls  Description: Document Riley Juan Jose Fall Risk and appropriate interventions in the flowsheet. Outcome: Progressing Towards Goal  Note: Fall Risk Interventions:  Mobility Interventions: Communicate number of staff needed for ambulation/transfer    Mentation Interventions: Bed/chair exit alarm, Door open when patient unattended    Medication Interventions: Bed/chair exit alarm    Elimination Interventions: Bed/chair exit alarm    History of Falls Interventions: Bed/chair exit alarm, Door open when patient unattended         Problem: Patient Education: Go to Patient Education Activity  Goal: Patient/Family Education  Outcome: Progressing Towards Goal     Problem: Pressure Injury - Risk of  Goal: *Prevention of pressure injury  Description: Document Arturo Scale and appropriate interventions in the flowsheet.   Outcome: Progressing Towards Goal  Note: Pressure Injury Interventions:  Sensory Interventions: Assess changes in LOC    Moisture Interventions: Absorbent underpads    Activity Interventions: Pressure redistribution bed/mattress(bed type)    Mobility Interventions: Pressure redistribution bed/mattress (bed type)    Nutrition Interventions: Document food/fluid/supplement intake                     Problem: Patient Education: Go to Patient Education Activity  Goal: Patient/Family Education  Outcome: Progressing Towards Goal     Problem: Patient Education: Go to Patient Education Activity  Goal: Patient/Family Education  Outcome: Progressing Towards Goal     Problem: Patient Education: Go to Patient Education Activity  Goal: Patient/Family Education  Outcome: Progressing Towards Goal     Problem: Patient Education: Go to Patient Education Activity  Goal: Patient/Family Education  Outcome: Progressing Towards Goal     Problem: Patient Education: Go to Patient Education Activity  Goal: Patient/Family Education  Outcome: Progressing Towards Goal

## 2020-08-06 NOTE — PROGRESS NOTES
Problem: Falls - Risk of  Goal: *Absence of Falls  Description: Document Hailey Fulton Fall Risk and appropriate interventions in the flowsheet. Outcome: Progressing Towards Goal  Note: Fall Risk Interventions:  Mobility Interventions: Communicate number of staff needed for ambulation/transfer, Bed/chair exit alarm    Mentation Interventions: Bed/chair exit alarm, Door open when patient unattended    Medication Interventions: Patient to call before getting OOB    Elimination Interventions: Bed/chair exit alarm, Call light in reach, Patient to call for help with toileting needs    History of Falls Interventions: Bed/chair exit alarm         Problem: Patient Education: Go to Patient Education Activity  Goal: Patient/Family Education  Outcome: Progressing Towards Goal     Problem: Pressure Injury - Risk of  Goal: *Prevention of pressure injury  Description: Document Arturo Scale and appropriate interventions in the flowsheet.   Outcome: Progressing Towards Goal  Note: Pressure Injury Interventions:  Sensory Interventions: Assess changes in LOC, Assess need for specialty bed    Moisture Interventions: Absorbent underpads, Apply protective barrier, creams and emollients    Activity Interventions: Pressure redistribution bed/mattress(bed type)    Mobility Interventions: Pressure redistribution bed/mattress (bed type), Assess need for specialty bed    Nutrition Interventions: Document food/fluid/supplement intake, Offer support with meals,snacks and hydration                     Problem: Patient Education: Go to Patient Education Activity  Goal: Patient/Family Education  Outcome: Progressing Towards Goal     Problem: Patient Education: Go to Patient Education Activity  Goal: Patient/Family Education  Outcome: Progressing Towards Goal     Problem: Patient Education: Go to Patient Education Activity  Goal: Patient/Family Education  Outcome: Progressing Towards Goal     Problem: Patient Education: Go to Patient Education Activity  Goal: Patient/Family Education  Outcome: Progressing Towards Goal

## 2020-08-06 NOTE — PROGRESS NOTES
CM reviewed chart for discharge planning. Per MD, daughter does not want patient to go to rehab of any sort. Message left for daughter, Bennett Milton.      Discharge Location  Discharge Placement: Unable to determine at this time

## 2020-08-07 LAB
BACTERIA SPEC CULT: NORMAL
SERVICE CMNT-IMP: NORMAL

## 2020-08-07 PROCEDURE — 74011250637 HC RX REV CODE- 250/637: Performed by: INTERNAL MEDICINE

## 2020-08-07 PROCEDURE — 77030019934 HC DRSG VAC ASST KCON -B

## 2020-08-07 PROCEDURE — 3331090002 HH PPS REVENUE DEBIT

## 2020-08-07 PROCEDURE — 74750000023 HC WOUND THERAPY

## 2020-08-07 PROCEDURE — 65660000000 HC RM CCU STEPDOWN

## 2020-08-07 PROCEDURE — 97535 SELF CARE MNGMENT TRAINING: CPT

## 2020-08-07 PROCEDURE — 97530 THERAPEUTIC ACTIVITIES: CPT

## 2020-08-07 PROCEDURE — 3331090001 HH PPS REVENUE CREDIT

## 2020-08-07 PROCEDURE — 97605 NEG PRS WND THER DME<=50SQCM: CPT

## 2020-08-07 PROCEDURE — 74011250636 HC RX REV CODE- 250/636: Performed by: INTERNAL MEDICINE

## 2020-08-07 RX ADMIN — PANTOPRAZOLE SODIUM 40 MG: 40 TABLET, DELAYED RELEASE ORAL at 05:32

## 2020-08-07 RX ADMIN — ASPIRIN 81 MG: 81 TABLET, CHEWABLE ORAL at 09:47

## 2020-08-07 RX ADMIN — CEPHALEXIN 250 MG: 250 CAPSULE ORAL at 17:50

## 2020-08-07 RX ADMIN — ATORVASTATIN CALCIUM 80 MG: 80 TABLET, FILM COATED ORAL at 21:39

## 2020-08-07 RX ADMIN — SENNOSIDES AND DOCUSATE SODIUM 2 TABLET: 8.6; 5 TABLET ORAL at 21:39

## 2020-08-07 RX ADMIN — ONDANSETRON 4 MG: 2 INJECTION INTRAMUSCULAR; INTRAVENOUS at 21:46

## 2020-08-07 RX ADMIN — Medication 10 ML: at 17:50

## 2020-08-07 RX ADMIN — AMLODIPINE BESYLATE 5 MG: 5 TABLET ORAL at 09:47

## 2020-08-07 RX ADMIN — Medication 10 ML: at 05:32

## 2020-08-07 RX ADMIN — Medication 10 ML: at 21:40

## 2020-08-07 RX ADMIN — CEPHALEXIN 250 MG: 250 CAPSULE ORAL at 09:47

## 2020-08-07 RX ADMIN — TRIAMTERENE AND HYDROCHLOROTHIAZIDE 1 TABLET: 37.5; 25 TABLET ORAL at 09:47

## 2020-08-07 NOTE — PROGRESS NOTES
SPEECH PATHOLOGY NOTE:      Attempted to see patient, however she declined. Refused all po trials, despite encouragement. Also attempted language treatment, but patient nodding head \"no\" to everything. Will check back at later date.        [address] CCC-SLP

## 2020-08-07 NOTE — PROGRESS NOTES
Problem: Patient Education: Go to Patient Education Activity  Goal: Patient/Family Education  Outcome: Progressing Towards Goal  Note:   1. Patient will complete self-feeding and grooming with moderate assistance and adaptive equipment as needed. PROGRESSING 8/7/2020  2. Patient will complete upper body dressing and bathing with minimal assistance and adaptive equipment as needed. 3. Patient will complete bed mobility with minimal assistance and adaptive equipment as needed in preparation for functional transfers. 4. Patient will participate in 10 minutes of therapeutic exercises to increase strength in BUEs for increased participation in ADLs. 5. Patient will participate in 10 minutes of therapeutic activity to increase coordination in RUE for increased participation in ADLs. 6. Patient will attempt to stand with maximal assistance and adaptive equipment as needed in preparation for functional transfers.    7. Patient will demonstrate improved cognition for ADLs by completing functional tasks with up to minimal cueing from therapist. Chantel Cordero 8/7/2020    Timeframe: 7 visits       OCCUPATIONAL THERAPY: Daily Note and PM 8/7/2020  INPATIENT: OT Visit Days: 2  Payor: Lisa Leyva / Plan: [address] [place] [address] HMO / Product Type: [employer] /      NAME/AGE/GENDER: Kenneth Stevens is a 67 y.o. female   PRIMARY DIAGNOSIS:  CVA (cerebral vascular accident) ([facility] [address] [I63.9]  Anemia [D64.9]  CKD (chronic kidney disease) [N18.9]  Vomiting [R11.10] CVA (cerebral vascular accident) ([facility] [address] CVA (cerebral vascular accident) ([facility] [address]       ICD-10: Treatment Diagnosis:    · Generalized Muscle Weakness (M62.81)  · Hemiplegia and hemiparesis following cerebral infarction affecting   · right dominant side (Y38.493)    Precautions/Allergies:    Fall precautions    Penicillins      ASSESSMENT:   Per Initial Assessment:  Ms. Patrick Arellano is a 67 y.o. female admitted with AMS, CT positive for large subacute CVA, at the  temporal and parietal lobe. Unsure of baseline as pt with AMS, appears to have global aphasia. Hx breast CA with bony mets, L NORMA with wound and wound vac in place. 8/7/2020: Pt found supine in bed upon arrival, alert and agreeable to OT treatment. Pt practiced bed mobility with MaxAx2 for supine > sit, cues for technique. Initially poor sitting balance requiring Mod-MaxA, total assist to scoot to edge of bed for proper positioning. Static sitting balance improves to SBA, prop sitting. Pt tolerated unsupported sitting with SBA-CGA for ADLs. Total assist to wash/comb hair, wash face, CGA-set up for drink to mouth with L and R hand. Sit > supine with MaxAx2/cues, rolling in supine with MaxAx2 for brief change with total assist for hygiene/brief management. Pt left supine in bed with head of bed raised, call bell within reach. Pt is making slow progress towards goals. See above. Continue OT POC. This patient is appropriate for co-treatment at this time due to multiple deficits including decreased balance, decreased cognition, decreased endurance, decreased strength, and need for high level assistance to complete functional transfers and functional tasks. This section established at most recent assessment   PROBLEM LIST (Impairments causing functional limitations):  1. Decreased Strength  2. Decreased ADL/Functional Activities  3. Decreased Transfer Abilities  4. Decreased Ambulation Ability/Technique  5. Decreased Balance  6. Decreased Activity Tolerance  7. Increased Fatigue  8. Decreased Flexibility/Joint Mobility  9. Edema/Girth  10. Decreased Skin Integrity/Hygeine  11. Decreased Early with Home Exercise Program  12. Decreased Cognition   INTERVENTIONS PLANNED: (Benefits and precautions of occupational therapy have been discussed with the patient.)  1. Activities of daily living training  2. Adaptive equipment training  3. Balance training  4. Clothing management  5.  Cognitive training  6. Community reintergration  7. Donning&doffing training  8. Royce tech training  9. Hygiene training  10. Neuromuscular re-eduation  11. Re-evaluation  12. Therapeutic activity  13. Therapeutic exercise  14. Wheelchair management     TREATMENT PLAN: Frequency/Duration: Follow patient 3x/week to address above goals. Rehabilitation Potential For Stated Goals: Good     REHAB RECOMMENDATIONS (at time of discharge pending progress):    Placement: It is my opinion, based on this patient's performance to date, that Ms. Quincy Machado may benefit [facility] due to the functional deficits listed above that are likely to improve with skilled rehabilitation and concerns that he/she may be unsafe to be unsupervised at home due to impaired strength and balance impacting ADLs, increasing risk of falls, Chronic L hip wound. Equipment:    TBD              OCCUPATIONAL PROFILE AND HISTORY:   History of Present Injury/Illness (Reason for Referral):  See H&P. \"Edith Johnson is a 67 y.o. female who has a PMH of HTN, dyslipidemia, metastatic breast cancer, CKD stage IV, sp left hip arthroplasty + wound vac, wound infection on chronic antibiotic therapy, who was brought in via EMS after her daughter noted her confused, not making sense since yesterday around 1600 hrs. The patient is bed-bound after her hip surgery on 5/20. She complains of nausea, vomiting and decreased appetite. She is unable to follow commands. All information was gathered from her daughter Singh Pal at beside. Her mother has not had fever, chills, cough, GI bleeding or diarrhea. She has been compliant to all her home meds, including keflex. She last saw ID yesterday. Upon ed arrival VS were stable. The patient was alert, but incoherent to answer questions. She was not following commands.   Labs: hb 6.8 gr; wbc 2.3; plat 118k  Cr 2.5 ( baseline ); ammonia: 12  Abg: ph 7.4, pco2 33, po2 72, O2 94%  Brain ct: large subacute CVA, at the L temporal and parietal lobe. Negative for bleeding. \"  Past Medical History/Comorbidities:   Ms. Earnest Iverson  has a past medical history of Abnormal EKG (2/16/2016), Anemia due to chronic kidney disease treated with erythropoietin (7/25/2017), Aortic valve insufficiency (2/16/2016), Cancer ([facility] [address] Hyperlipidemia (2/16/2016), Hypertension (2/16/2016), and Obesity (2/16/2016). Ms. Earnest Iverson  has a past surgical history that includes hx tubal ligation; ir insert tunl cvc w/o port over 5 yr (5/19/2020); and ir remove tunl cvad w/o port / pump (7/2/2020). Social History/Living Environment:   Support Systems: Child(ezequiel)  Patient Expects to be Discharged to[de-identified] Unknown  Current DME Used/Available at Home: Hospital bed, Wheelchair, Walker, rolling, Commode, bedside  Prior Level of Function/Work/Activity:  Unsure of baseline as pt with AMS, appears to have global aphasia. Hx breast CA with bony mets, L NORMA with wound and wound vac in place. Personal Factors:          Sex:  female        Age:  67 y.o. Other factors that influence how disability is experienced by the patient:  Multiple co-morbidities    Number of Personal Factors/Comorbidities that affect the Plan of Care: Expanded review of therapy/medical records (1-2):  MODERATE COMPLEXITY   ASSESSMENT OF OCCUPATIONAL PERFORMANCE[de-identified]   Activities of Daily Living:   Basic ADLs (From Assessment) Complex ADLs (From Assessment)   Feeding: Total assistance  Oral Facial Hygiene/Grooming: Total assistance  Bathing: Total assistance  Upper Body Dressing: Maximum assistance  Lower Body Dressing: Total assistance  Toileting: Total assistance Instrumental ADL  Meal Preparation: Total assistance  Homemaking:  Total assistance  Medication Management: Total assistance  Financial Management: Total assistance   Grooming/Bathing/Dressing Activities of Daily Living                             Bed/Mat Mobility  Rolling: Maximum assistance;Assist x2  Supine to Sit: Maximum assistance;Assist x2  Sit to Supine: Maximum assistance;Assist x2  Scooting: Maximum assistance;Assist x2     Most Recent Physical Functioning:   Gross Assessment:  AROM: Grossly decreased, non-functional(RUE)  PROM: Within functional limits(BUEs)  Strength: Grossly decreased, non-functional(RUE)  Coordination: Grossly decreased, non-functional(BUEs)  Tone: Abnormal(Increased tone in BUEs)  Sensation: (Unable to assess due to cognition )               Posture:  Posture (WDL): Exceptions to WDL  Posture Assessment: Forward head  Balance:  Sitting: Impaired  Sitting - Static: Fair (occasional); Prop sitting  Sitting - Dynamic: Poor (constant support); Prop sitting Bed Mobility:  Rolling: Maximum assistance;Assist x2  Supine to Sit: Maximum assistance;Assist x2  Sit to Supine: Maximum assistance;Assist x2  Scooting: Maximum assistance;Assist x2  Wheelchair Mobility:     Transfers:               Patient Vitals for the past 6 hrs:   BP BP Patient Position SpO2 Pulse   08/07/20 1200 117/57 At rest 97 % 81       Mental Status  Neurologic State: Alert, Confused, Restless  Orientation Level: Disoriented X4, Unable to verbalize  Cognition: Decreased attention/concentration, Decreased command following  Perception: Cues to attend to right side of body, Cues to maintain midline in sitting  Perseveration: Perseverates during ADLS, Perseverates during conversation, Perseverates during mobility  Safety/Judgement: Fall prevention                          Physical Skills Involved:  1. Range of Motion  2. Balance  3. Strength  4. Activity Tolerance  5. Fine Motor Control  6. Gross Motor Control  7. Skin Integrity Cognitive Skills Affected (resulting in the inability to perform in a timely and safe manner):  1. Perception  2. Executive Function  3. Sustained Attention  4. Divided Attention  5. Comprehension  6. Expression Psychosocial Skills Affected:  1. Habits/Routines  2. Environmental Adaptation  3.  Social Interaction  4. Emotional Regulation  5. Self-Awareness  6. Awareness of Others  7. Social Roles   Number of elements that affect the Plan of Care: 5+:  HIGH COMPLEXITY   CLINICAL DECISION MAKIN[address] [place] [address] AM-PAC 6 Clicks   Daily Activity Inpatient Short Form  How much help from another person does the patient currently need. .. Total A Lot A Little None   1. Putting on and taking off regular lower body clothing? [x] 1   [] 2   [] 3   [] 4   2. Bathing (including washing, rinsing, drying)? [x] 1   [] 2   [] 3   [] 4   3. Toileting, which includes using toilet, bedpan or urinal?   [x] 1   [] 2   [] 3   [] 4   4. Putting on and taking off regular upper body clothing? [x] 1   [] 2   [] 3   [] 4   5. Taking care of personal grooming such as brushing teeth? [] 1   [x] 2   [] 3   [] 4   6. Eating meals? [x] 1   [] 2   [] 3   [] 4   © , Trustees of [address] [place] [address], under license to [employer]. All rights reserved      Score:  Initial: 6 2020 Most Recent: X (Date: -- )    Interpretation of Tool:  Represents activities that are increasingly more difficult (i.e. Bed mobility, Transfers, Gait). Medical Necessity:     · Patient demonstrates   · good and fair  ·  rehab potential due to higher previous functional level. Reason for Services/Other Comments:  · Patient continues to require skilled intervention due to   · Impaired strength and balance impacting safety and ADLs  · . Use of outcome tool(s) and clinical judgement create a POC that gives a: HIGH COMPLEXITY         TREATMENT:   (In addition to Assessment/Re-Assessment sessions the following treatments were rendered)     Pre-treatment Symptoms/Complaints:    Pain: Initial:   Pain Intensity 1: 0  Post Session:  same     Self Care: (20 minutes): Procedure(s) (per grid) utilized to improve and/or restore self-care/home management as related to dressing, bathing, and grooming.  Required maximal visual, verbal, manual, and tactile cueing to facilitate activities of daily living skills and compensatory activities. Pt tolerated unsupported sitting with SBA-CGA for ADLs. Total assist to wash/comb hair, wash face, CGA-set up for drink to mouth with L and R hand. Sit > supine with MaxAx2/cues, rolling in supine with MaxAx2 for brief change with total assist for hygiene/brief management. Therapeutic Activity: (    26): Therapeutic activities including Bed transfers and sitting tolerance/balance to improve mobility, strength, balance, coordination and activity tolerance. Required minimal to maximal assist   to promote static and dynamic balance in sitting and promote coordination of bilateral, upper extremity(s), lower extremity(s). Pt practiced bed mobility with MaxAx2 for supine > sit, cues for technique. Initially poor sitting balance requiring Mod-MaxA, total assist to scoot to edge of bed for proper positioning. Static sitting balance improves to SBA, prop sitting. Braces/Orthotics/Lines/Etc:   · IV  · rodriguez catheter  · O2 Device: Room air  Treatment/Session Assessment:    · Response to Treatment:  Tolerated well   · Interdisciplinary Collaboration:   o Occupational Therapist  o Registered Nurse  o Rehabilitation Attendant  · After treatment position/precautions:   o Supine in bed  o Bed/Chair-wheels locked  o Bed in low position  o Call light within reach  o Side rails x 3   · Compliance with Program/Exercises: Compliant all of the time, Will assess as treatment progresses. · Recommendations/Intent for next treatment session: \"Next visit will focus on advancements to more challenging activities and reduction in assistance provided\".   Total Treatment Duration:  OT Patient Time In/Time Out  Time In: 4541  Time Out: Freida Guillermo OTR/L

## 2020-08-07 NOTE — PROGRESS NOTES
Progress Note    Patient: Mouna Oneal MRN: [id number]  SSN: [id number]    YOB: 1945  Age: 67 y.o. Sex: female      Admit Date: 8/4/2020    LOS: 3 days     Subjective:   She has no complains. The patient has aphasia and was unable to answer my questions today    Objective:     Vitals:    08/06/20 1600 08/06/20 2000 08/07/20 0000 08/07/20 0528   BP: 139/77 147/75 155/66 110/45   Pulse: 75 76 80 80   Resp: 17 18 18 18   Temp: 97.5 °F (36.4 °C) 97.8 °F (36.6 °C) 99.1 °F (37.3 °C) 98.3 °F (36.8 °C)   SpO2: 97% 96% 97% 94%   Weight:       Height:            Intake and Output:  Current Shift: No intake/output data recorded. Last three shifts: 08/05 1901 - 08/07 0700  In: 650 [P.O.:100]  Out: 550 [Urine:550]    Physical Exam:   GENERAL: alert, cooperative  EYE: negative  LYMPHATIC: Cervical, supraclavicular, and axillary nodes normal.   THROAT & NECK: normal and no erythema or exudates noted. LUNG: clear to auscultation bilaterally  HEART: regular rate and rhythm, S1, S2 normal, no murmur, click, rub or gallop  ABDOMEN: soft, non-tender. Bowel sounds normal. No masses,  no organomegaly  EXTREMITIES:  extremities normal, atraumatic, no cyanosis or edema. Left hip with wound vac, no purulent secretion, no erythema   SKIN: Normal.  NEUROLOGIC: aphasia, able to follow some commands (opening eyes, squeeze fingers). Generalized weakness, unable to assess pronator drift or sensation.      Lab/Data Review: All lab results for the last 24 hours reviewed. Assessment:     Principal Problem:    CVA (cerebral vascular accident) ([facility] [address] (8/4/2020)    Active Problems:    Hypertension (2/16/2016)      Overview: UNSPECIFIED       Malignant neoplasm metastatic to bone ([facility] [address] (7/31/2016)      Overview: Last Assessment & Plan:       1. Complete radiation therapy to the patient's sacrum and femurs as       directed by Dr. Cari Rubio.       2.  Return to the office in 6 weeks with x-rays of the pelvis on arrival.      3.  Instruct the patient to advance her weightbearing as tolerated as she       continues to enjoy a reduction in pain following the radiation therapy. Anemia due to chronic kidney disease treated with erythropoietin (7/25/2017)      Severe obesity ([facility] [address] (9/24/2019)      Stage 4 chronic kidney disease ([facility] [address] (5/1/2020)      Left hip postoperative wound infection (5/13/2020)      Vomiting (8/4/2020)      Anemia (8/4/2020)      CKD (chronic kidney disease) (8/4/2020)      DNR (do not resuscitate) (8/4/2020)        Plan:   -Acute Left MCA infarct (parietal):  CTA: Occlusion of an anterior M2 branch of the left middle cerebral artery. ECHO: normal ef, no shunts, no vegetations or masses   Continue mechanical soft diet  Appreciate speech evaluation   Continue asa, high intensity statin  HTN control  DVT ppx  Keep under telemetry  Neurology following   PT/OT. Suggested LTAC. CM aware     -Anemia:  Possible due to CKD, wound vac  Sp 1 prbc. Hb is now 8.3 gr   Monitor HH serially     -Vomiting: multifactorial: resolved      -CKD stage Iv:  Avoid nephrotoxic meds  IOs  Daily labs     -HTN:  home meds      -Left hip wound infection, sp hardware removal:  Continue wound vac  Wound care consulted  On chronic keflex     -Thrombocytopenia:  Noted. Monitor  Hold heparin derivatives     -Hx of metastatic breast cancer: On letrozole     -Obesity: counseling     DVT ppx: compression stockings.   Holding heparin in view of anemia and thrombocytopenia      Code status: DNR.     Risk: high    Estimated DC planning: STR versus LTAC    CM monitoring     Signed By: Aren Farrell MD     August 7, 2020

## 2020-08-07 NOTE — WOUND CARE
Pt seen for wound vac dressing change to left hip. Pt turned to right side removed old vac dressing. Wound bed clean and granulating. Small amount of serosanguinous drainage noted. Cleansed with dermal wound cleanser and applied new wound vac dressing. Attached to wound vac machine, pt tolerated well. Wound team will plan for next dressing change on Monday and will continue to follow while in acute care setting.

## 2020-08-07 NOTE — PROGRESS NOTES
08/07/20 0739   NIH Stroke Scale   Interval Other (comment)  (Dual NIH with Al Rn)   LOC 0   LOC Questions 2   LOC Commands 2   Best Gaze 1   Visual 2   Facial Palsy 0   Motor Right Arm 2   Motor Left Arm 1   Motor Right Leg 3   Motor Left Leg 3   Limb Ataxia 2   Sensory 1   Best Language 1   Dysarthria 1   Extinction and Inattention 0   Total 21

## 2020-08-07 NOTE — PROGRESS NOTES
Problem: Mobility Impaired (Adult and Pediatric)  Goal: *Acute Goals and Plan of Care (Insert Text)  Outcome: Progressing Towards Goal  Note: STG:  (1.)Ms. Yolis Fernández will move from supine to sit and sit to supine , scoot up and down, and roll side to side with MINIMAL ASSIST within 3 treatment day(s). (2.)Ms. Yolis Fernández will transfer from bed to chair and chair to bed with MODERATE ASSIST using the least restrictive device within 3 treatment day(s). (3.)Ms. Yolis Fernández will ambulate with MODERATE ASSIST for 3 feet with the least restrictive device within 3 treatment day(s). (4.)Ms. Yolis Fernández will perform standing static and dynamic balance activities x 5 minutes with MODERATE ASSIST to improve safety within 3 day(s). LTG:  (1.)Ms. Yolis Fernández will move from supine to sit and sit to supine , scoot up and down, and roll side to side in bed with CONTACT GUARD ASSIST within 7 treatment day(s). (2.)Ms. Yolis Fernández will transfer from bed to chair and chair to bed with MINIMAL ASSIST using the least restrictive device within 7 treatment day(s). (3.)Ms. Yolis Fernández will ambulate with MINIMAL ASSIST for 5 feet with the least restrictive device within 7 treatment day(s). (4.)Ms. Yolis Fernández will perform standing static and dynamic balance activities x 5 minutes with MINIMAL ASSIST to improve safety within 7 day(s).   ________________________________________________________________________________________________      PHYSICAL THERAPY: Daily Note and AM 8/7/2020  INPATIENT: PT Visit Days : 2  Payor: Kevan Estrada / Plan: [address] [place] [address] HMO / Product Type: Managed Care Medicare /       NAME/AGE/GENDER: Jaya Galvan is a 67 y.o. female   PRIMARY DIAGNOSIS: CVA [facility]cerebral vascular accident) ([facility] [I63.9]  Anemia [D64.9]  CKD (chronic kidney disease) [N18.9]  Vomiting [R11.10] CVA (cerebral vascular accident) ([facility] CVA (cerebral vascular accident) ([facility]       ICD-10: Treatment Diagnosis:    · Generalized Muscle Weakness (M62.81) Precaution/Allergies:  Penicillins      ASSESSMENT:     Ms. Roel Egan is a 67 y.o. female admitted for CVA workup. Per MRI: \"Acute left parietal infarct. No evidence of hemorrhage. \" She is unable to provide history due to confusion, however was recently hospitalized for L hip infection so history obtained from old PT notes from May 2020. As of May, patient was living with daughter in a mobile home and able to transfer to bedside commode/chair, however did not do much walking. She has a hospital bed at home. Patient was supine upon contact; RN lior'ronan treatment. Patient presents aphasic, confused with decreased command following. Patient needed max assist x 2 for all mobility. Prop sitting EOB x 18 minutes working on posture, sitting balance, LE exercises and command following. Patient started to moan in pain grabbing her lower belly. Patient was returned to supine with max assist x 2 where she was found to have a bowel movement. Patient was rolled left and right with max assist x 2 to allow therapist to change her. Overall slow progress towards physical therapy goals. Patient's goals listed above are still appropriate. Will continue skilled PT to address remaining deficits. At this time, patient is appropriate for Co-treatment with occupational therapy due to patient's decreased overall endurance/tolerance levels, as well as need for high level skilled assistance to complete functional transfers/mobility and functional tasks. Edenilson Rutherford is appropriate for a multidisciplinary co-treatment of PT and OT to address goals of both disciplines. This section established at most recent assessment   PROBLEM LIST (Impairments causing functional limitations):  1. Decreased Strength  2. Decreased ADL/Functional Activities  3. Decreased Transfer Abilities  4. Decreased Ambulation Ability/Technique  5. Decreased Balance  6. Decreased Activity Tolerance  7. Decreased Knowledge of Precautions  8.  Decreased Hoffman with Home Exercise Program   INTERVENTIONS PLANNED: (Benefits and precautions of physical therapy have been discussed with the patient.)  1. Balance Exercise  2. Bed Mobility  3. Family Education  4. Gait Training  5. Home Exercise Program (HEP)  6. Neuromuscular Re-education/Strengthening  7. Therapeutic Activites  8. Therapeutic Exercise/Strengthening  9. Transfer Training     TREATMENT PLAN: Frequency/Duration: 3 times a week for duration of hospital stay  Rehabilitation Potential For Stated Goals: [facility] (at time of discharge pending progress):    Placement: It is my opinion, based on this patient's performance to date, that Ms. Sara Lanza may benefit from [facility]. Equipment:    None at this time              HISTORY:   History of Present Injury/Illness (Reason for Referral):  Ursula Michael is a 67 y.o. female who has a PMH of HTN, dyslipidemia, metastatic breast cancer, CKD stage IV, sp left hip arthroplasty + wound vac, wound infection on chronic antibiotic therapy, who was brought in via EMS after her daughter noted her confused, not making sense since yesterday around 1600 hrs. The patient is bed-bound after her hip surgery on 5/20. She complains of nausea, vomiting and decreased appetite. She is unable to follow commands. All information was gathered from her daughter Trudy Luque at beside. Her mother has not had fever, chills, cough, GI bleeding or diarrhea. She has been compliant to all her home meds, including keflex. She last saw ID yesterday. Past Medical History/Comorbidities:   Ms. Sara Lanza  has a past medical history of Abnormal EKG (2/16/2016), Anemia due to chronic kidney disease treated with erythropoietin (7/25/2017), Aortic valve insufficiency (2/16/2016), Cancer ([facility] Hyperlipidemia (2/16/2016), Hypertension (2/16/2016), and Obesity (2/16/2016).   Ms. Sara Lanza  has a past surgical history that includes hx tubal ligation; ir insert tunl cvc w/o port over 5 yr (2020); and ir remove tunl cvad w/o port / pump (2020). Social History/Living Environment:   Support Systems: Child(ezequiel)  Patient Expects to be Discharged to[de-identified] Unknown  Current DME Used/Available at Home: Hospital bed, Wheelchair, Walker, rolling, Commode, bedside  Prior Level of Function/Work/Activity:  She is unable to provide history due to confusion, however was recently hospitalized for L hip infection so history obtained from old PT notes from May 2020. As of May, patient was living with daughter in a mobile home and able to transfer to bedside commode/chair, however did not do much walking. She has a hospital bed at home. Number of Personal Factors/Comorbidities that affect the Plan of Care: 3+: HIGH COMPLEXITY   EXAMINATION:   Most Recent Physical Functioning:   Gross Assessment:                  Posture:     Balance:  Sitting: Impaired  Sitting - Static: Prop sitting  Sitting - Dynamic: Prop sitting Bed Mobility:  Rolling: Maximum assistance;Assist x2  Supine to Sit: Maximum assistance;Assist x2  Sit to Supine: Maximum assistance;Assist x2  Wheelchair Mobility:     Transfers:     Gait:            Body Structures Involved:  1. Nerves  2. Voice/Speech  3. Heart  4. Muscles Body Functions Affected:  1. Mental  2. Sensory/Pain  3. Voice and Speech  4. Cardio  5. Neuromusculoskeletal  6. Movement Related Activities and Participation Affected:  1. Learning and Applying Knowledge  2. General Tasks and Demands  3. Communication  4. Mobility  5. Self Care  6. Domestic Life  7. Interpersonal Interactions and Relationships  8.  Community, Social and [place]   Number of elements that affect the Plan of Care: 4+: HIGH COMPLEXITY   CLINICAL PRESENTATION:   Presentation: Evolving clinical presentation with changing clinical characteristics: MODERATE COMPLEXITY   CLINICAL DECISION MAKIN [facility] Mobility Inpatient Short Form  How much difficulty does the patient currently have. .. Unable A Lot A Little None   1. Turning over in bed (including adjusting bedclothes, sheets and blankets)? [] 1   [x] 2   [] 3   [] 4   2. Sitting down on and standing up from a chair with arms ( e.g., wheelchair, bedside commode, etc.)   [] 1   [x] 2   [] 3   [] 4   3. Moving from lying on back to sitting on the side of the bed? [] 1   [x] 2   [] 3   [] 4   How much help from another person does the patient currently need. .. Total A Lot A Little None   4. Moving to and from a bed to a chair (including a wheelchair)? [x] 1   [] 2   [] 3   [] 4   5. Need to walk in hospital room? [x] 1   [] 2   [] 3   [] 4   6. Climbing 3-5 steps with a railing? [x] 1   [] 2   [] 3   [] 4   © 2007, Trustees of [address] [place] [address] [address] [id number], under license to [employer]. All rights reserved      Score:  Initial: 9 Most Recent: X (Date: -- )    Interpretation of Tool:  Represents activities that are increasingly more difficult (i.e. Bed mobility, Transfers, Gait). Medical Necessity:     · Patient demonstrates   · good  ·  rehab potential due to higher previous functional level. Reason for Services/Other Comments:  · Patient   · continues to require modification of therapeutic interventions to increase complexity of exercises  · . Use of outcome tool(s) and clinical judgement create a POC that gives a: Questionable prediction of patient's progress: MODERATE COMPLEXITY            TREATMENT:   (In addition to Assessment/Re-Assessment sessions the following treatments were rendered)   Pre-treatment Symptoms/Complaints:  none  Pain: Initial:   Pain Intensity 1: 0  Post Session:  0     Therapeutic Activity: (    25 minutes): Therapeutic activities including bed mobility training, static/dynamic sitting balance, scooting, posture training, LE exercises, and patient education to improve mobility, strength, and balance. Required minimal manual and verbal cues   to promote static and dynamic balance in sitting. Braces/Orthotics/Lines/Etc:   · O2 Device: Room air  Treatment/Session Assessment:    · Response to Treatment: None  · Interdisciplinary Collaboration:   o Physical Therapy Assistant  o Registered Nurse  o Rehabilitation Attendant  · After treatment position/precautions:   o Supine in bed  o Bed alarm/tab alert on  o Bed/Chair-wheels locked  o Bed in low position  o Call light within reach  o RN notified   · Compliance with Program/Exercises: Will assess as treatment progresses  · Recommendations/Intent for next treatment session: \"Next visit will focus on advancements to more challenging activities and reduction in assistance provided\".   Total Treatment Duration:  PT Patient Time In/Time Out  Time In: 0833  Time Out: [place] PTA

## 2020-08-08 LAB
ABO + RH BLD: NORMAL
ANION GAP SERPL CALC-SCNC: 11 MMOL/L (ref 7–16)
BLD PROD TYP BPU: NORMAL
BLD PROD TYP BPU: NORMAL
BLOOD GROUP ANTIBODIES SERPL: NORMAL
BPU ID: NORMAL
BPU ID: NORMAL
BUN SERPL-MCNC: 28 MG/DL (ref 8–23)
CALCIUM SERPL-MCNC: 8.4 MG/DL (ref 8.3–10.4)
CHLORIDE SERPL-SCNC: 106 MMOL/L (ref 98–107)
CO2 SERPL-SCNC: 21 MMOL/L (ref 21–32)
CREAT SERPL-MCNC: 2.15 MG/DL (ref 0.6–1)
CROSSMATCH RESULT,%XM: NORMAL
CROSSMATCH RESULT,%XM: NORMAL
GLUCOSE SERPL-MCNC: 77 MG/DL (ref 65–100)
POTASSIUM SERPL-SCNC: 4.3 MMOL/L (ref 3.5–5.1)
SODIUM SERPL-SCNC: 138 MMOL/L (ref 136–145)
SPECIMEN EXP DATE BLD: NORMAL
STATUS OF UNIT,%ST: NORMAL
STATUS OF UNIT,%ST: NORMAL
UNIT DIVISION, %UDIV: 0
UNIT DIVISION, %UDIV: 0

## 2020-08-08 PROCEDURE — 74750000023 HC WOUND THERAPY

## 2020-08-08 PROCEDURE — 74011250637 HC RX REV CODE- 250/637: Performed by: INTERNAL MEDICINE

## 2020-08-08 PROCEDURE — 65660000000 HC RM CCU STEPDOWN

## 2020-08-08 PROCEDURE — 80048 BASIC METABOLIC PNL TOTAL CA: CPT

## 2020-08-08 PROCEDURE — 3331090001 HH PPS REVENUE CREDIT

## 2020-08-08 PROCEDURE — 3331090002 HH PPS REVENUE DEBIT

## 2020-08-08 PROCEDURE — 36415 COLL VENOUS BLD VENIPUNCTURE: CPT

## 2020-08-08 RX ADMIN — AMLODIPINE BESYLATE 5 MG: 5 TABLET ORAL at 12:22

## 2020-08-08 RX ADMIN — CEPHALEXIN 250 MG: 250 CAPSULE ORAL at 12:22

## 2020-08-08 RX ADMIN — PANTOPRAZOLE SODIUM 40 MG: 40 TABLET, DELAYED RELEASE ORAL at 05:58

## 2020-08-08 RX ADMIN — TRIAMTERENE AND HYDROCHLOROTHIAZIDE 1 TABLET: 37.5; 25 TABLET ORAL at 12:22

## 2020-08-08 RX ADMIN — Medication 10 ML: at 05:58

## 2020-08-08 RX ADMIN — Medication 10 ML: at 22:54

## 2020-08-08 RX ADMIN — ASPIRIN 81 MG: 81 TABLET, CHEWABLE ORAL at 12:22

## 2020-08-08 RX ADMIN — Medication 10 ML: at 17:26

## 2020-08-08 RX ADMIN — CEPHALEXIN 250 MG: 250 CAPSULE ORAL at 17:26

## 2020-08-08 NOTE — PROGRESS NOTES
She was awake  Calm  Didnt make much sense as she spoke  She did give simple answers when I asked her questions  Will continue to follow

## 2020-08-08 NOTE — PROGRESS NOTES
08/08/20 0731   NIH Stroke Scale   Interval Other (comment)  (Dual [facility] with Al RN)   LOC 0   LOC Questions 2   LOC Commands 2   Best Gaze 1   Visual 2   Facial Palsy 0   Motor Right Arm 2   Motor Left Arm 1   Motor Right Leg 3   Motor Left Leg 3   Limb Ataxia 2   Sensory 1   Best Language 1   Dysarthria 1   Extinction and Inattention 0   Total 21

## 2020-08-08 NOTE — PROGRESS NOTES
Progress Note    Patient: Rose Gibson MRN: [id number]  SSN: [id number]    YOB: 1945  Age: 67 y.o. Sex: female      Admit Date: 8/4/2020    LOS: 4 days     Subjective: The patient was found more conversant today. She denied acute complains     Objective:     Vitals:    08/07/20 1600 08/07/20 2000 08/08/20 0000 08/08/20 0400   BP: 127/69 116/65 131/76 111/66   Pulse: 84 85 (!) 55 88   Resp: 18 18 20 20   Temp: 98.4 °F (36.9 °C) 99 °F (37.2 °C) 98.5 °F (36.9 °C) 98.7 °F (37.1 °C)   SpO2: 97% 94% 92% 94%   Weight:       Height:            Intake and Output:  Current Shift: No intake/output data recorded. Last three shifts: 08/06 1901 - 08/08 0700  In: 0   Out: 750 [Urine:750]    Physical Exam:   GENERAL: alert, cooperative  EYE: negative  LYMPHATIC: Cervical, supraclavicular, and axillary nodes normal.   THROAT & NECK: normal and no erythema or exudates noted. LUNG: clear to auscultation bilaterally  HEART: regular rate and rhythm, S1, S2 normal, no murmur, click, rub or gallop  ABDOMEN: soft, non-tender. Bowel sounds normal. No masses,  no organomegaly  EXTREMITIES:  extremities normal, atraumatic, no cyanosis or edema. Left hip with wound vac, no purulent secretion, no erythema   SKIN: Normal.  NEUROLOGIC: aphasia, able to follow some commands, more conversant today. Generalized weakness, unable to assess pronator drift or sensation.      Lab/Data Review: All lab results for the last 24 hours reviewed. Assessment:     Principal Problem:    CVA (cerebral vascular accident) ([facility] (8/4/2020)    Active Problems:    Hypertension (2/16/2016)      Overview: UNSPECIFIED       Malignant neoplasm metastatic to bone ([facility] (7/31/2016)      Overview: Last Assessment & Plan:       1. Complete radiation therapy to the patient's sacrum and femurs as       directed by Dr. Isiah Stahl.       2.  Return to the office in 6 weeks with x-rays of the pelvis on arrival.      3.  Instruct the patient to advance her weightbearing as tolerated as she       continues to enjoy a reduction in pain following the radiation therapy. Anemia due to chronic kidney disease treated with erythropoietin (7/25/2017)      Severe obesity ([address] (9/24/2019)      Stage 4 chronic kidney disease ([facility] (5/1/2020)      Left hip postoperative wound infection (5/13/2020)      Vomiting (8/4/2020)      Anemia (8/4/2020)      CKD (chronic kidney disease) (8/4/2020)      DNR (do not resuscitate) (8/4/2020)        Plan:   -Acute Left MCA infarct (parietal):  CTA: Occlusion of an anterior M2 branch of the left middle cerebral artery. ECHO: normal ef, no shunts, no vegetations or masses   Continue mechanical soft diet  Appreciate speech evaluation   Continue asa, high intensity statin  HTN control  DVT ppx  Keep under telemetry  Neurology following   PT/OT. Suggested LTAC. CM aware     -Anemia:  Possible due to CKD, wound vac  Sp 1 prbc. Hb is now 8.3 gr   Monitor HH serially     -Vomiting: multifactorial: resolved      -CKD stage Iv:  Avoid nephrotoxic meds  IOs  Daily labs     -HTN:  home meds      -Left hip wound infection, sp hardware removal:  Continue wound vac  Wound care consulted  On chronic keflex     -Thrombocytopenia:  Noted. Monitor  Hold heparin derivatives     -Hx of metastatic breast cancer: On letrozole     -Obesity: counseling     DVT ppx: compression stockings.   Holding heparin in view of anemia and thrombocytopenia      Code status: DNR.     Risk: high    Estimated DC planning: STR versus LTAC    Signed By: Allen Rajput MD     August 8, 2020

## 2020-08-08 NOTE — PROGRESS NOTES
08/08/20 1935   NIH Stroke Scale   Interval Other (comment)   LOC 0   LOC Questions 2   LOC Commands 2   Best Gaze 1   Visual 2   Facial Palsy 0   Motor Right Arm 2   Motor Left Arm 1   Motor Right Leg 3   Motor Left Leg 3   Limb Ataxia 2   Sensory 1   Best Language 1   Dysarthria 1   Extinction and Inattention 0   Total 21

## 2020-08-08 NOTE — PROGRESS NOTES
08/1945   NIH Stroke Scale   Interval Other (comment)   LOC 0   LOC Questions 2   LOC Commands 2   Best Gaze 1   Visual 2   Facial Palsy 0   Motor Right Arm 2   Motor Left Arm 1   Motor Right Leg 3   Motor Left Leg 3   Limb Ataxia 2   Sensory 1   Best Language 1   Dysarthria 1   Extinction and Inattention 0   Total 21

## 2020-08-09 LAB
BACTERIA SPEC CULT: NORMAL
BACTERIA SPEC CULT: NORMAL
SERVICE CMNT-IMP: NORMAL
SERVICE CMNT-IMP: NORMAL

## 2020-08-09 PROCEDURE — 65660000000 HC RM CCU STEPDOWN

## 2020-08-09 PROCEDURE — 74011250637 HC RX REV CODE- 250/637: Performed by: INTERNAL MEDICINE

## 2020-08-09 PROCEDURE — 3331090001 HH PPS REVENUE CREDIT

## 2020-08-09 PROCEDURE — 74750000023 HC WOUND THERAPY

## 2020-08-09 PROCEDURE — 3331090002 HH PPS REVENUE DEBIT

## 2020-08-09 RX ADMIN — Medication 5 ML: at 23:23

## 2020-08-09 RX ADMIN — TRIAMTERENE AND HYDROCHLOROTHIAZIDE 1 TABLET: 37.5; 25 TABLET ORAL at 09:24

## 2020-08-09 RX ADMIN — CEPHALEXIN 250 MG: 250 CAPSULE ORAL at 09:24

## 2020-08-09 RX ADMIN — AMLODIPINE BESYLATE 5 MG: 5 TABLET ORAL at 09:24

## 2020-08-09 RX ADMIN — ATORVASTATIN CALCIUM 80 MG: 80 TABLET, FILM COATED ORAL at 23:23

## 2020-08-09 RX ADMIN — CEPHALEXIN 250 MG: 250 CAPSULE ORAL at 17:20

## 2020-08-09 RX ADMIN — SENNOSIDES AND DOCUSATE SODIUM 2 TABLET: 8.6; 5 TABLET ORAL at 23:23

## 2020-08-09 RX ADMIN — ASPIRIN 81 MG: 81 TABLET, CHEWABLE ORAL at 09:24

## 2020-08-09 RX ADMIN — Medication 5 ML: at 17:22

## 2020-08-09 RX ADMIN — Medication 5 ML: at 05:35

## 2020-08-09 RX ADMIN — PANTOPRAZOLE SODIUM 40 MG: 40 TABLET, DELAYED RELEASE ORAL at 05:35

## 2020-08-09 NOTE — PROGRESS NOTES
08/09/20 0726   NIH Stroke Scale   Interval Other (comment)  (Dual [facility] with Al RN)   LOC 0   LOC Questions 2   LOC Commands 2   Best Gaze 1   Visual 2   Facial Palsy 0   Motor Right Arm 2   Motor Left Arm 1   Motor Right Leg 3   Motor Left Leg 3   Limb Ataxia 2   Sensory 1   Best Language 1   Dysarthria 1   Extinction and Inattention 0   Total 21

## 2020-08-09 NOTE — PROGRESS NOTES
08/09/20 1935   NIH Stroke Scale   Interval Other (comment)   LOC 0   LOC Questions 2   LOC Commands 2   Best Gaze 1   Visual 2   Facial Palsy 0   Motor Right Arm 2   Motor Left Arm 1   Motor Right Leg 3   Motor Left Leg 3   Limb Ataxia 2   Sensory 1   Best Language 1   Dysarthria 1   Extinction and Inattention 0   Total 21

## 2020-08-09 NOTE — PROGRESS NOTES
Progress Note    Patient: Terrell Garza MRN: [id number]  SSN: [id number]    YOB: 1945  Age: 67 y.o. Sex: female      Admit Date: 8/4/2020    LOS: 5 days     Subjective:   She was found more conversant. She was able to say she would like to be pull up from the bed. She had no other complains    Objective:     Vitals:    08/08/20 2000 08/09/20 0000 08/09/20 0400 08/09/20 0800   BP: 127/80 113/52 105/58 129/72   Pulse: 79 72 74 77   Resp: 18 16 16 17   Temp: 98.7 °F (37.1 °C) 98.9 °F (37.2 °C) 97.8 °F (36.6 °C) 97.7 °F (36.5 °C)   SpO2: 95% 92% 94% 95%   Weight:       Height:            Intake and Output:  Current Shift: No intake/output data recorded. Last three shifts: 08/07 1901 - 08/09 0700  In: -   Out: 1150 [Urine:1150]    Physical Exam:   GENERAL: alert, cooperative  EYE: negative  LYMPHATIC: Cervical, supraclavicular, and axillary nodes normal.   THROAT & NECK: normal and no erythema or exudates noted. LUNG: clear to auscultation bilaterally  HEART: regular rate and rhythm, S1, S2 normal, no murmur, click, rub or gallop  ABDOMEN: soft, non-tender. Bowel sounds normal. No masses,  no organomegaly  EXTREMITIES:  extremities normal, atraumatic, no cyanosis or edema. Left hip with wound vac, no purulent secretion, no erythema   SKIN: Normal.  NEUROLOGIC: aphasia, able to follow some commands, more conversant today. Generalized weakness, unable to assess pronator drift or sensation.      Lab/Data Review: All lab results for the last 24 hours reviewed. Assessment:     Principal Problem:    CVA (cerebral vascular accident) ([facility] (8/4/2020)    Active Problems:    Hypertension (2/16/2016)      Overview: UNSPECIFIED       Malignant neoplasm metastatic to bone ([address] (7/31/2016)      Overview: Last Assessment & Plan:       1. Complete radiation therapy to the patient's sacrum and femurs as       directed by Dr. Alysha Osman.       2.  Return to the office in 6 weeks with x-rays of the pelvis on arrival.      3.  Instruct the patient to advance her weightbearing as tolerated as she       continues to enjoy a reduction in pain following the radiation therapy. Anemia due to chronic kidney disease treated with erythropoietin (7/25/2017)      Severe obesity ([address] (9/24/2019)      Stage 4 chronic kidney disease ([facility] (5/1/2020)      Left hip postoperative wound infection (5/13/2020)      Vomiting (8/4/2020)      Anemia (8/4/2020)      CKD (chronic kidney disease) (8/4/2020)      DNR (do not resuscitate) (8/4/2020)        Plan:   -Acute Left MCA infarct (parietal):  CTA: Occlusion of an anterior M2 branch of the left middle cerebral artery. ECHO: normal ef, no shunts, no vegetations or masses   Continue mechanical soft diet  Appreciate speech evaluation   Continue asa, high intensity statin  HTN control  DVT ppx  Keep under telemetry  Neurology following   PT/OT. Suggested LTAC.    -Anemia:  Possible due to CKD, wound vac  Sp 1 prbc. Hb is now 8.3 gr   Monitor HH serially     -Vomiting: resolved      -CKD stage Iv:  Avoid nephrotoxic meds  IOs  Daily labs     -HTN:  home meds      -Left hip wound infection, sp hardware removal:  Continue wound vac  Wound care consulted  On chronic keflex     -Thrombocytopenia:  Noted. Monitor  Hold heparin derivatives     -Hx of metastatic breast cancer: On letrozole     -Obesity: counseling     DVT ppx: compression stockings.   Holding heparin in view of anemia and thrombocytopenia      Code status: DNR.     Risk: high    Estimated DC planning: STR versus LTAC    Signed By: Virginia Sears MD     August 9, 2020

## 2020-08-10 PROCEDURE — 65660000000 HC RM CCU STEPDOWN

## 2020-08-10 PROCEDURE — 97605 NEG PRS WND THER DME<=50SQCM: CPT

## 2020-08-10 PROCEDURE — 3331090001 HH PPS REVENUE CREDIT

## 2020-08-10 PROCEDURE — 74750000023 HC WOUND THERAPY

## 2020-08-10 PROCEDURE — 3331090002 HH PPS REVENUE DEBIT

## 2020-08-10 PROCEDURE — 74011250637 HC RX REV CODE- 250/637: Performed by: INTERNAL MEDICINE

## 2020-08-10 PROCEDURE — 77030019934 HC DRSG VAC ASST KCON -B

## 2020-08-10 RX ADMIN — SENNOSIDES AND DOCUSATE SODIUM 2 TABLET: 8.6; 5 TABLET ORAL at 23:52

## 2020-08-10 RX ADMIN — Medication 1 AMPULE: at 08:09

## 2020-08-10 RX ADMIN — TRIAMTERENE AND HYDROCHLOROTHIAZIDE 1 TABLET: 37.5; 25 TABLET ORAL at 08:06

## 2020-08-10 RX ADMIN — ASPIRIN 81 MG: 81 TABLET, CHEWABLE ORAL at 08:06

## 2020-08-10 RX ADMIN — CEPHALEXIN 250 MG: 250 CAPSULE ORAL at 08:09

## 2020-08-10 RX ADMIN — ATORVASTATIN CALCIUM 80 MG: 80 TABLET, FILM COATED ORAL at 23:52

## 2020-08-10 RX ADMIN — Medication 10 ML: at 23:53

## 2020-08-10 RX ADMIN — AMLODIPINE BESYLATE 5 MG: 5 TABLET ORAL at 08:06

## 2020-08-10 RX ADMIN — Medication 1 AMPULE: at 23:53

## 2020-08-10 RX ADMIN — Medication 10 ML: at 06:26

## 2020-08-10 RX ADMIN — PANTOPRAZOLE SODIUM 40 MG: 40 TABLET, DELAYED RELEASE ORAL at 06:26

## 2020-08-10 RX ADMIN — CEPHALEXIN 250 MG: 250 CAPSULE ORAL at 17:21

## 2020-08-10 NOTE — PROGRESS NOTES
08/10/20 0400   NIH Stroke Scale   Interval Other (comment)  (Neuro checks)   LOC 0   LOC Questions 2   LOC Commands 2   Motor Right Arm 2   Motor Left Arm 1   Motor Right Leg 3   Motor Left Leg 3   Dysarthria 1     Neuro checks

## 2020-08-10 NOTE — PROGRESS NOTES
08/10/20 1908   NIH Stroke Scale   Interval Other (comment)   LOC 0   LOC Questions 2   LOC Commands 2   Best Gaze 1   Visual 2   Facial Palsy 0   Motor Right Arm 2   Motor Left Arm 1   Motor Right Leg 3   Motor Left Leg 3   Limb Ataxia 2   Sensory 1   Best Language 1   Dysarthria 1   Extinction and Inattention 0   Total 21

## 2020-08-10 NOTE — PROGRESS NOTES
Pt family came to visit requested MD update and d/c plans  Perfect serve to Dr Saleem Contreras  Note to [facility]

## 2020-08-10 NOTE — PROGRESS NOTES
08/10/20 0656   NIH Stroke Scale   Interval Other (comment)  (Dual [facility] with Al, RN)   LOC 0   LOC Questions 2   LOC Commands 2   Best Gaze 1   Visual 2   Facial Palsy 0   Motor Right Arm 2   Motor Left Arm 1   Motor Right Leg 3   Motor Left Leg 3   Limb Ataxia 2   Sensory 1   Best Language 1   Dysarthria 1   Extinction and Inattention 0   Total 21     Dual [facility] with Al, RN

## 2020-08-10 NOTE — WOUND CARE
Left hip wound vac dressing change. Patietn positioned on right side, wound vac dressing removed, cleaned with dermal wound , wound is 17.5x3. 4x2.4cm upper portion is beginning to epithelize, wound vac dressing applied, wound base pink granular well healing. Will monitor.

## 2020-08-10 NOTE — PROGRESS NOTES
08/09/20 2323   NIH Stroke Scale   Interval Other (comment)  (Neuro checks)   LOC 0   LOC Questions 2   LOC Commands 2   Motor Right Arm 2   Motor Left Arm 1   Motor Right Leg 3   Motor Left Leg 3   Dysarthria 1     Neuro checks

## 2020-08-10 NOTE — PROGRESS NOTES
08/10/20 0216   NIH Stroke Scale   Interval Other (comment)  (Neuro checks)   LOC 0   LOC Questions 2   LOC Commands 2   Motor Right Arm 2   Motor Left Arm 1   Motor Right Leg 3   Motor Left Leg 3   Dysarthria 1     Neuro checks

## 2020-08-11 ENCOUNTER — HOSPITAL ENCOUNTER (OUTPATIENT)
Dept: INFUSION THERAPY | Age: 75
End: 2020-08-11

## 2020-08-11 PROCEDURE — 74011250637 HC RX REV CODE- 250/637: Performed by: INTERNAL MEDICINE

## 2020-08-11 PROCEDURE — 65660000000 HC RM CCU STEPDOWN

## 2020-08-11 PROCEDURE — 74750000023 HC WOUND THERAPY

## 2020-08-11 PROCEDURE — 74011250637 HC RX REV CODE- 250/637: Performed by: FAMILY MEDICINE

## 2020-08-11 PROCEDURE — 3331090002 HH PPS REVENUE DEBIT

## 2020-08-11 PROCEDURE — 77030041247 HC PROTECTOR HEEL HEELMEDIX MDII -B

## 2020-08-11 PROCEDURE — 3331090001 HH PPS REVENUE CREDIT

## 2020-08-11 RX ORDER — HYDROCODONE BITARTRATE AND ACETAMINOPHEN 5; 325 MG/1; MG/1
1 TABLET ORAL
Status: DISCONTINUED | OUTPATIENT
Start: 2020-08-11 | End: 2020-08-19 | Stop reason: HOSPADM

## 2020-08-11 RX ADMIN — Medication 10 ML: at 23:13

## 2020-08-11 RX ADMIN — SENNOSIDES AND DOCUSATE SODIUM 2 TABLET: 8.6; 5 TABLET ORAL at 23:12

## 2020-08-11 RX ADMIN — Medication 1 AMPULE: at 09:34

## 2020-08-11 RX ADMIN — HYDROCODONE BITARTRATE AND ACETAMINOPHEN 1 TABLET: 5; 325 TABLET ORAL at 23:12

## 2020-08-11 RX ADMIN — ATORVASTATIN CALCIUM 80 MG: 80 TABLET, FILM COATED ORAL at 23:12

## 2020-08-11 RX ADMIN — Medication 10 ML: at 17:04

## 2020-08-11 RX ADMIN — Medication 1 AMPULE: at 23:13

## 2020-08-11 RX ADMIN — CEPHALEXIN 250 MG: 250 CAPSULE ORAL at 17:04

## 2020-08-11 RX ADMIN — Medication 10 ML: at 06:41

## 2020-08-11 RX ADMIN — PANTOPRAZOLE SODIUM 40 MG: 40 TABLET, DELAYED RELEASE ORAL at 06:40

## 2020-08-11 RX ADMIN — CEPHALEXIN 250 MG: 250 CAPSULE ORAL at 09:34

## 2020-08-11 RX ADMIN — TRIAMTERENE AND HYDROCHLOROTHIAZIDE 1 TABLET: 37.5; 25 TABLET ORAL at 09:34

## 2020-08-11 RX ADMIN — ASPIRIN 81 MG: 81 TABLET, CHEWABLE ORAL at 09:34

## 2020-08-11 RX ADMIN — AMLODIPINE BESYLATE 5 MG: 5 TABLET ORAL at 09:34

## 2020-08-11 NOTE — PROGRESS NOTES
Per Dannie's the medical director, Dr. Carrie Hogan, for [facility] is requesting a peer to peer. The number to call is [phone] option 5. This must be completed by 1300. Attending will be updated.

## 2020-08-11 NOTE — PROGRESS NOTES
SPEECH PATHOLOGY NOTE:    Attempted to see patient, however she politely declined to participate despite encouragement and several options offered. Several family members at bedside report patient declined lunch tray. Will follow up at later date.        Kamala [phone] CCC-SLP

## 2020-08-11 NOTE — PROGRESS NOTES
Hospitalist Note     Admit Date:  2020 12:46 PM   Name:  Yenifer Lam   Age:  67 y.o.  :  1945   MRN:  [id number]   PCP:  Aletha Restrepo NP  Treatment Team: Attending Provider: Eliud Serrano MD; Utilization Review: Rocklin Meckel, RN; Primary Nurse: Stephanie Dyer; Care Manager: John Almaguer RN; Charge Nurse: Rodo Esparza Speech Language Pathologist: Juliet Kohler; Physical Therapy Assistant: Kathya Pond; Occupational Therapy Assistant: MADONNA Alejandra    HPI/Subjective:   Marco Antonio Cassidy is a 67 y. o. female who has a PMH of HTN, dyslipidemia, metastatic breast cancer sp RT in 2016, CKD stage IV, sp left hip arthroplasty + wound vac, wound infection on chronic antibiotic therapy, who was brought in via EMS after her daughter noted her confused, not making sense. The patient is bed-bound after her hip surgery on . Upon arrival she was noted aphasic, anemic and received 1 PRBc after a Hb of 6.8 gr. A brain CT showed a large subacute CVA, at the L temporal and parietal lobe. Negative for bleeding. cva work up was completed. The patient was started on asa and plavix. Neurology followed. There were no telemetry changes, no shunts on echo. PT/OT suggested LTAC and CM on board.     :    No other complaints  Objective:     Patient Vitals for the past 24 hrs:   Temp Pulse Resp BP SpO2   20 0800 97.4 °F (36.3 °C) 74 17 122/48 100 %   20 0400 97.2 °F (36.2 °C) 73 18 142/61 96 %   20 0000 97.5 °F (36.4 °C) 76 18 132/64 96 %   08/10/20 2000 97.7 °F (36.5 °C) 78 18 135/60 96 %   08/10/20 1600 97.7 °F (36.5 °C) 76 18 148/60 97 %   08/10/20 1200 97.9 °F (36.6 °C) 72 18 127/87 93 %     Oxygen Therapy  O2 Sat (%): 100 % (20 0800)  Pulse via Oximetry: 68 beats per minute (20 1700)  O2 Device: Room air (20 1254)    Estimated body mass index is 37.9 kg/m² as calculated from the following:    Height as of this encounter: 5' 7\" (1.702 m). Weight as of this encounter: 109.8 kg (242 lb). Intake/Output Summary (Last 24 hours) at 8/11/2020 1026  Last data filed at 8/11/2020 0546  Gross per 24 hour   Intake    Output 600 ml   Net -600 ml       *Note that automatically entered I/Os may not be accurate; dependent on patient compliance with collection and accurate  by techs. General:    Well nourished. Alert. Obese. Chronically ill appearing. CV:   RRR. No murmur, rub, or gallop. Lungs:   CTAB. No wheezing, rhonchi, or rales. Abdomen:   Soft, nontender, nondistended. :  N[place] catheter. Extremities: Warm and dry. No cyanosis or edema. Wound vac left hip. Skin:     No rashes or jaundice. Neuro:  No gross focal deficits    Data Reviewed:  I have reviewed all labs, meds, and studies from the last 24 hours:  No results found for this or any previous visit (from the past 24 hour(s)).      Current Meds:  Current Facility-Administered Medications   Medication Dose Route Frequency    alcohol 62% (NOZIN) nasal  1 Ampule  1 Ampule Topical Q12H    pantoprazole (PROTONIX) tablet 40 mg  40 mg Oral ACB    0.9% sodium chloride infusion 250 mL  250 mL IntraVENous PRN    sodium chloride (NS) flush 5-40 mL  5-40 mL IntraVENous Q8H    sodium chloride (NS) flush 5-40 mL  5-40 mL IntraVENous PRN    ondansetron (ZOFRAN) injection 4 mg  4 mg IntraVENous Q6H PRN    atorvastatin (LIPITOR) tablet 80 mg  80 mg Oral QHS    acetaminophen (TYLENOL) suppository 650 mg  650 mg Rectal Q4H PRN    labetaloL (NORMODYNE;TRANDATE) injection 5 mg  5 mg IntraVENous Q10MIN PRN    senna-docusate (PERICOLACE) 8.6-50 mg per tablet 2 Tab  2 Tab Oral QHS    amLODIPine (NORVASC) tablet 5 mg  5 mg Oral DAILY    cephALEXin (KEFLEX) capsule 250 mg  250 mg Oral BID    diphenhydrAMINE (BENADRYL) capsule 25 mg  25 mg Oral Q6H PRN    triamterene-hydroCHLOROthiazide (MAXZIDE) 37.5-25 mg per tablet 1 Tab  1 Tab Oral DAILY    aspirin chewable tablet 81 mg  81 mg Oral DAILY       Other Studies:  Results for orders placed or performed during the hospital encounter of 20   2D ECHO COMPLETE ADULT (TTE) W OR [address] [facility]  [address], [address] [facility]  [phone]    Transthoracic Echocardiogram  2D, M-mode, Doppler, and Color Doppler    Patient: Amanda Pederson  MR #: [id number]  : 1945  Age: 67 years  Gender: Female  Study date: 05-Aug-2020  Account #: [de-identified]  Height: 67 in  Weight: 241.6 lb  BSA: 2.19 mï¾²  Status:Routine  Location: [facility]  BP: 122/ 57    Allergies: PENICILLINS    Sonographer:  Lynne Garcia RDCS  Group:  [facility]  Referring Physician:  Shahriar De Jesus MD  Reading Physician:  Nicolette Wadsworth MD    INDICATIONS: CVA. PROCEDURE: This was a routine study. A transthoracic echocardiogram was  performed. The study included complete 2D imaging, M-mode, complete spectral  Doppler, and color Doppler. Intravenous contrast (Definity) was administered. Intravenous contrast (agitated saline) was administered. Image quality was  adequate. LEFT VENTRICLE: Size was normal. Systolic function was normal. Ejection  fraction was estimated in the range of 55 % to 60 %. There were no regional  wall motion abnormalities. Wall thickness was normal. Avg. E/e': 8.4. Left  ventricular diastolic function parameters were normal.    RIGHT VENTRICLE: The size was normal. Systolic function was normal. The  tricuspid jet envelope definition was inadequate for estimation of RV   systolic  pressure. LEFT ATRIUM: Size was normal.    ATRIAL SEPTUM: Contrast injection was performed. There was no right-to-left  shunt, with provocative maneuvers to increase right atrial pressure. RIGHT ATRIUM: Size was normal.    SYSTEMIC VEINS: IVC: The inferior vena cava was normal in size and course. The  respirophasic change in diameter was more than 50%.     AORTIC VALVE: The valve was structurally normal, tri-commissural. There was   no  evidence for stenosis. There was mild regurgitation. MITRAL VALVE: Valve structure was normal. There was no evidence for stenosis. There was mild regurgitation. TRICUSPID VALVE: The valve structure was normal. There was no evidence for  stenosis. There was trace regurgitation. PULMONIC VALVE: The valve structure was normal. There was no evidence for  stenosis. There was trivial regurgitation. PERICARDIUM: There was no pericardial effusion. AORTA: The root exhibited normal size. SUMMARY:    -  Left ventricle: Systolic function was normal. Ejection fraction was  estimated in the range of 55 % to 60 %. There were no regional wall motion  abnormalities. -  Atrial septum: Contrast injection was performed. There was no   right-to-left  shunt, with provocative maneuvers to increase right atrial pressure. SYSTEM MEASUREMENT TABLES    2D mode  AoR Diam (2D): 3.3 cm  LA Dimension (2D): 3.3 cm  Left Atrium Systolic Volume Index; Method of Disks, Biplane; 2D mode;: 32.8  ml/m2  IVS/LVPW (2D): 1  IVSd (2D): 0.8 cm  LVIDd (2D): 4.4 cm  LVIDs (2D): 2.2 cm  LVOT Area (2D): 3.1 cm2  LVPWd (2D): 0.8 cm  RVIDd (2D): 2.8 cm    Unspecified Scan Mode  Peak Grad; Mean; Antegrade Flow: 11 mm[Hg]  Vmax; Antegrade Flow: 167 cm/s  LVOT Diam: 2 cm    Prepared and signed by    Jose A Huynh MD  Signed 05-Aug-2020 17:36:57         No results found.     All Micro Results     Procedure Component Value Units Date/Time    CULTURE, BLOOD [[id number]] Collected:  08/04/20 1257    Order Status:  Completed Specimen:  Blood Updated:  08/09/20 0634     Special Requests: --        RIGHT  Antecubital       Culture result: NO GROWTH 5 DAYS       CULTURE, BLOOD [[id number]] Collected:  08/04/20 1257    Order Status:  Completed Specimen:  Blood Updated:  08/09/20 0634     Special Requests: --        LEFT  FOREARM       Culture result: NO GROWTH 5 DAYS       CULTURE, URINE [[id number]] Collected:  08/04/20 1530    Order Status:  Completed Specimen:  Cath Urine Updated:  08/07/20 0804     Special Requests: NO SPECIAL REQUESTS        Culture result: NO GROWTH 2 DAYS             SARS-CoV-2 Lab Results  \"Novel Coronavirus\" Test: No results found for: COV2NT   \"Emergent Disease\" Test: No results found for: EDPR  \"SARS-COV-2\" Test: No results found for: XGCOVT  \"Precision Labs\" Test: No results found for: RSLT  Rapid Test: No results found for: COVR         Assessment and Plan:     Hospital Problems as of 8/11/2020 Date Reviewed: 7/28/2020          Codes Class Noted - Resolved POA    Vomiting ICD-10-CM: R11.10  ICD-9-CM: 787.03  8/4/2020 - Present Unknown        Anemia ICD-10-CM: D64.9  ICD-9-CM: 285.9  8/4/2020 - Present Unknown        * (Principal) CVA (cerebral vascular accident) ([facility] ICD-10-CM: I63.9  ICD-9-CM: 434.91  8/4/2020 - Present Unknown        CKD (chronic kidney disease) ICD-10-CM: N18.9  ICD-9-CM: 585.9  8/4/2020 - Present Unknown        DNR (do not resuscitate) ICD-10-CM: Z66  ICD-9-CM: V49.86  8/4/2020 - Present Yes        Left hip postoperative wound infection ICD-10-CM: T81.49XA  ICD-9-CM: 998.59  5/13/2020 - Present Yes        Stage 4 chronic kidney disease ([facility] (Chronic) ICD-10-CM: N18.4  ICD-9-CM: 585.4  5/1/2020 - Present Yes        Severe obesity ([facility] [address] [facility] (Chronic) ICD-10-CM: E66.01  ICD-9-CM: 278.01  9/24/2019 - Present Yes        Anemia due to chronic kidney disease treated with erythropoietin ICD-10-CM: N18.9, D63.1  ICD-9-CM: 285.21, 585.9  7/25/2017 - Present Yes        Malignant neoplasm metastatic to bone [facility]) ICD-10-CM: C79.51  ICD-9-CM: 198.5  7/31/2016 - Present Yes    Overview Signed 1/10/2017  6:13 PM by Eda Jaime MD     Last Assessment & Plan:   1. Complete radiation therapy to the patient's sacrum and femurs as directed by Dr. Jaylene Lama.   2.  Return to the office in 6 weeks with x-rays of the pelvis on arrival.  3.  Instruct the patient to advance her weightbearing as tolerated as she continues to enjoy a reduction in pain following the radiation therapy. Hypertension (Chronic) ICD-10-CM: I10  ICD-9-CM: 401.9  2/16/2016 - Present Yes    Overview Signed 2/16/2016 12:09 PM by Jacky Walsh     UNSPECIFIED                    Plan:  # Acute Left MCA infarct (parietal)   - CTA w occlusion of an anterior M2 branch of the left  MCA. TTE normal. ASA, statin. BP control Neuro have s/o. Con't therapy efforst.    - Attempted to get [facility]/LTAC placement. I did peer to peer with [address] [place] [address], Dr. Kishan Hagan, on 8/11 and patient is not an LTAC candidate. D/w CM. Long term care probably best option. CM to f/u with family. # Anemia   - S/p 1U RBCs. Stable. No acute issues. # CKD IV   - Stable. # HTN   - Home meds      # Left hip wound infection, sp hardware removal   - NORMA 4/2020. Revision/ I&D 5/2020.   - Wound vac. Keflex. Wound care     # Thrombocytopenia:   - No bleeding. # Hx of metastatic breast cancer:    - Prior radiotherapy in 2016.    - On letrozole    DC planning/Dispo: Unclear. CM helping. Not LTAC candidate per peer to peer on 8/11.   Diet:  DIET CARDIAC  DVT ppx: SCDs    Signed:  Tyler Marrero MD

## 2020-08-11 NOTE — PROGRESS NOTES
[facility] denied peer to peer. CM in to speak with three family members a bedside. They are all in agreement that they would like patient to return with home health services. Resumption of care order will be sent to [facility]. Daughter will have home wound vac here tomorrow for set up. Wound care RN and attending notified.

## 2020-08-11 NOTE — PROGRESS NOTES
08/11/20 1921   NIH Stroke Scale   Interval Other (comment)  (Dual NIHSS performed with Jackelin Rajput RN)   LOC 0   LOC Questions 2   LOC Commands 1   Best Gaze 1   Visual 1   Facial Palsy 0   Motor Right Arm 2   Motor Left Arm 2   Motor Right Leg 3   Motor Left Leg 3   Limb Ataxia 0   Sensory 1   Best Language 1   Dysarthria 1   Extinction and Inattention 0   Total 18

## 2020-08-11 NOTE — PROGRESS NOTES
08/11/20 0400   NIH Stroke Scale   Interval Other (comment)  (Neuro checks)   LOC 0   LOC Questions 2   LOC Commands 2   Motor Right Arm 2   Motor Left Arm 1   Motor Right Leg 3   Motor Left Leg 3   Dysarthria 1     Neuro checks

## 2020-08-11 NOTE — PROGRESS NOTES
08/11/20 0718   NIH Stroke Scale   Interval Other (comment)  (Dual NIH with Page  RN)   LOC 0   LOC Questions 2   LOC Commands 1   Best Gaze 1   Visual 1   Facial Palsy 0   Motor Right Arm 2   Motor Left Arm 2   Motor Right Leg 3   Motor Left Leg 3   Limb Ataxia 0   Sensory 1   Best Language 1   Dysarthria 1   Extinction and Inattention 0   Total 18     Dual NIH with Teddy Ruiz

## 2020-08-11 NOTE — PROGRESS NOTES
08/11/20 0000   NIH Stroke Scale   Interval Other (comment)  (Neuro checks)   LOC 0   LOC Questions 2   LOC Commands 2   Motor Right Arm 2   Motor Left Arm 1   Motor Right Leg 3   Motor Left Leg 3   Dysarthria 1     Neuro checks

## 2020-08-11 NOTE — PROGRESS NOTES
08/10/20 2000   NIH Stroke Scale   Interval Other (comment)  (Dual NIH with Dennis)   LOC 0   LOC Questions 2   LOC Commands 2   Best Gaze 1   Visual 2   Facial Palsy 0   Motor Right Arm 2   Motor Left Arm 1   Motor Right Leg 3   Motor Left Leg 3   Limb Ataxia 2   Sensory 1   Best Language 1   Dysarthria 1   Extinction and Inattention 0   Total 21     Dual NIH with Dennis RN

## 2020-08-12 PROCEDURE — 74011250637 HC RX REV CODE- 250/637: Performed by: INTERNAL MEDICINE

## 2020-08-12 PROCEDURE — 3331090002 HH PPS REVENUE DEBIT

## 2020-08-12 PROCEDURE — 74750000023 HC WOUND THERAPY

## 2020-08-12 PROCEDURE — 77030019934 HC DRSG VAC ASST KCON -B

## 2020-08-12 PROCEDURE — 74011250637 HC RX REV CODE- 250/637: Performed by: FAMILY MEDICINE

## 2020-08-12 PROCEDURE — 97605 NEG PRS WND THER DME<=50SQCM: CPT

## 2020-08-12 PROCEDURE — 77030038269 HC DRN EXT URIN PURWCK BARD -A

## 2020-08-12 PROCEDURE — 74011250636 HC RX REV CODE- 250/636: Performed by: INTERNAL MEDICINE

## 2020-08-12 PROCEDURE — 3331090001 HH PPS REVENUE CREDIT

## 2020-08-12 PROCEDURE — 65660000000 HC RM CCU STEPDOWN

## 2020-08-12 RX ADMIN — ONDANSETRON 4 MG: 2 INJECTION INTRAMUSCULAR; INTRAVENOUS at 14:08

## 2020-08-12 RX ADMIN — SENNOSIDES AND DOCUSATE SODIUM 2 TABLET: 8.6; 5 TABLET ORAL at 21:14

## 2020-08-12 RX ADMIN — CEPHALEXIN 250 MG: 250 CAPSULE ORAL at 09:35

## 2020-08-12 RX ADMIN — Medication 1 AMPULE: at 21:14

## 2020-08-12 RX ADMIN — ASPIRIN 81 MG: 81 TABLET, CHEWABLE ORAL at 09:35

## 2020-08-12 RX ADMIN — ATORVASTATIN CALCIUM 80 MG: 80 TABLET, FILM COATED ORAL at 21:14

## 2020-08-12 RX ADMIN — PANTOPRAZOLE SODIUM 40 MG: 40 TABLET, DELAYED RELEASE ORAL at 06:08

## 2020-08-12 RX ADMIN — AMLODIPINE BESYLATE 5 MG: 5 TABLET ORAL at 09:35

## 2020-08-12 RX ADMIN — Medication 5 ML: at 06:08

## 2020-08-12 RX ADMIN — Medication 1 AMPULE: at 09:35

## 2020-08-12 RX ADMIN — Medication 5 ML: at 13:34

## 2020-08-12 RX ADMIN — Medication 10 ML: at 21:15

## 2020-08-12 RX ADMIN — CEPHALEXIN 250 MG: 250 CAPSULE ORAL at 17:38

## 2020-08-12 RX ADMIN — HYDROCODONE BITARTRATE AND ACETAMINOPHEN 1 TABLET: 5; 325 TABLET ORAL at 06:07

## 2020-08-12 RX ADMIN — TRIAMTERENE AND HYDROCHLOROTHIAZIDE 1 TABLET: 37.5; 25 TABLET ORAL at 09:35

## 2020-08-12 NOTE — PROGRESS NOTES
08/12/20 0731   NIH Stroke Scale   Interval Other (comment)  (Dual NIH with Claudetta Linea RN)   LOC 0   LOC Questions 2   LOC Commands 0   Best Gaze 1   Visual 1   Facial Palsy 0   Motor Right Arm 2   Motor Left Arm 2   Motor Right Leg 3   Motor Left Leg 3   Limb Ataxia 0   Sensory 1   Best Language 1   Dysarthria 1   Extinction and Inattention 0   Total 17     Dual NIH with Torsten Castro

## 2020-08-12 NOTE — PROGRESS NOTES
08/12/20 1900   NIH Stroke Scale   Interval Other (comment)   LOC 0   LOC Questions 2   LOC Commands 1   Best Gaze 1   Visual 1   Facial Palsy 0   Motor Right Arm 0   Motor Left Arm 1   Motor Right Leg 3   Motor Left Leg 3   Limb Ataxia 0   Sensory 1   Best Language 1   Dysarthria 1   Extinction and Inattention 0   Total 15

## 2020-08-12 NOTE — PROGRESS NOTES
Family brought in wound vac but no tubing or canister. Notified daughter to bring these, a dressing, and everything they have that is related to wound vac to hospital, states she will tomorrow.

## 2020-08-12 NOTE — PROGRESS NOTES
Hospitalist Progress Note     Admit Date:  2020 12:46 PM   Name:  Mar Levin   Age:  67 y.o.  :  1945   MRN:  [id number]   PCP:  Demi Cavazos NP  Treatment Team: Attending Provider: Phi Barrett MD; Utilization Review: Bee Cerda RN; Primary Nurse: Chloe Velazquez; Care Manager: Anita Robb RN    Subjective: The patient is a 68-year-old  lady with HTN, dyslipidemia, CKD stage IV, chronic left hip infection on chronic antibiotic therapy, is presently managed for acute CVA and anemia. She was noted to be depressed and appeared tired. She is basically bedbound, denies any significant pain or shortness of breath. Objective:     Patient Vitals for the past 24 hrs:   Temp Pulse Resp BP SpO2   20 1600 98.1 °F (36.7 °C) 78 17 134/79 95 %   20 1200 97.9 °F (36.6 °C) 80 18 127/79 94 %   20 0759 97.4 °F (36.3 °C) 77 17 (!) 136/91 93 %   20 0400 97.4 °F (36.3 °C) 70 18 123/55 96 %   20 0000 97.5 °F (36.4 °C) 68 18 153/73 96 %   20 2000 97.7 °F (36.5 °C) 73 18 134/76 96 %     Oxygen Therapy  O2 Sat (%): 95 % (20 1600)  Pulse via Oximetry: 68 beats per minute (20 1700)  O2 Device: Room air (20 1254)    Intake/Output Summary (Last 24 hours) at 2020 1856  Last data filed at 2020 1818  Gross per 24 hour   Intake    Output 400 ml   Net -400 ml         General:    Well nourished. Alert. CV:   RRR. No murmur, rub, or gallop. Lungs:   Clear to auscultation bilaterally. No wheezing, rhonchi, or rales. Abdomen:   Soft, nontender, nondistended. Extremities: Warm and dry. No cyanosis or edema. Skin:     No rashes or jaundice. Data Review:  I have reviewed all labs, meds, telemetry events, and studies from the last 24 hours. No results found for this or any previous visit (from the past 24 hour(s)).      All Micro Results     Procedure Component Value Units Date/Time    CULTURE, BLOOD [[id number]] Collected:  08/04/20 1257    Order Status:  Completed Specimen:  Blood Updated:  08/09/20 0634     Special Requests: --        RIGHT  Antecubital       Culture result: NO GROWTH 5 DAYS       CULTURE, BLOOD [[id number]] Collected:  08/04/20 1257    Order Status:  Completed Specimen:  Blood Updated:  08/09/20 0634     Special Requests: --        LEFT  FOREARM       Culture result: NO GROWTH 5 DAYS       CULTURE, URINE [[id number]] Collected:  08/04/20 1530    Order Status:  Completed Specimen:  Cath Urine Updated:  08/07/20 0804     Special Requests: NO SPECIAL REQUESTS        Culture result: NO GROWTH 2 DAYS             Current Meds:  Current Facility-Administered Medications   Medication Dose Route Frequency    HYDROcodone-acetaminophen (NORCO) 5-325 mg per tablet 1 Tab  1 Tab Oral Q6H PRN    alcohol 62% (NOZIN) nasal  1 Ampule  1 Ampule Topical Q12H    pantoprazole (PROTONIX) tablet 40 mg  40 mg Oral ACB    0.9% sodium chloride infusion 250 mL  250 mL IntraVENous PRN    sodium chloride (NS) flush 5-40 mL  5-40 mL IntraVENous Q8H    sodium chloride (NS) flush 5-40 mL  5-40 mL IntraVENous PRN    ondansetron (ZOFRAN) injection 4 mg  4 mg IntraVENous Q6H PRN    atorvastatin (LIPITOR) tablet 80 mg  80 mg Oral QHS    acetaminophen (TYLENOL) suppository 650 mg  650 mg Rectal Q4H PRN    labetaloL (NORMODYNE;TRANDATE) injection 5 mg  5 mg IntraVENous Q10MIN PRN    senna-docusate (PERICOLACE) 8.6-50 mg per tablet 2 Tab  2 Tab Oral QHS    amLODIPine (NORVASC) tablet 5 mg  5 mg Oral DAILY    cephALEXin (KEFLEX) capsule 250 mg  250 mg Oral BID    diphenhydrAMINE (BENADRYL) capsule 25 mg  25 mg Oral Q6H PRN    triamterene-hydroCHLOROthiazide (MAXZIDE) 37.5-25 mg per tablet 1 Tab  1 Tab Oral DAILY    aspirin chewable tablet 81 mg  81 mg Oral DAILY       Other Studies (last 24 hours):  No results found.     Assessment and Plan:     Hospital Problems as of 8/12/2020 Date Reviewed: 7/28/2020          Codes Class Noted - Resolved POA    Vomiting ICD-10-CM: R11.10  ICD-9-CM: 787.03  8/4/2020 - Present Unknown        Anemia ICD-10-CM: D64.9  ICD-9-CM: 285.9  8/4/2020 - Present Unknown        * (Principal) CVA (cerebral vascular accident) ([facility] [address] ICD-10-CM: I63.9  ICD-9-CM: 434.91  8/4/2020 - Present Unknown        CKD (chronic kidney disease) ICD-10-CM: N18.9  ICD-9-CM: 585.9  8/4/2020 - Present Unknown        DNR (do not resuscitate) ICD-10-CM: Z66  ICD-9-CM: V49.86  8/4/2020 - Present Yes        Left hip postoperative wound infection ICD-10-CM: T81.49XA  ICD-9-CM: 998.59  5/13/2020 - Present Yes        Stage 4 chronic kidney disease ([facility] [address] (Chronic) ICD-10-CM: N18.4  ICD-9-CM: 585.4  5/1/2020 - Present Yes        Severe obesity ([facility] [address] (Chronic) ICD-10-CM: E66.01  ICD-9-CM: 278.01  9/24/2019 - Present Yes        Anemia due to chronic kidney disease treated with erythropoietin ICD-10-CM: N18.9, D63.1  ICD-9-CM: 285.21, 585.9  7/25/2017 - Present Yes        Malignant neoplasm metastatic to bone [facility]) ICD-10-CM: C79.51  ICD-9-CM: 198.5  7/31/2016 - Present Yes    Overview Signed 1/10/2017  6:13 PM by Román Martinez MD     Last Assessment & Plan:   1. Complete radiation therapy to the patient's sacrum and femurs as directed by Dr. El Boles. 2.  Return to the office in 6 weeks with x-rays of the pelvis on arrival.  3.  Instruct the patient to advance her weightbearing as tolerated as she continues to enjoy a reduction in pain following the radiation therapy.              Hypertension (Chronic) ICD-10-CM: I10  ICD-9-CM: 401.9  2/16/2016 - Present Yes    Overview Signed 2/16/2016 12:09 PM by Millicent Ordonez     UNSPECIFIED                  1 - acute CVA affecting the left temporal and parietal lobes  2 - anemia  3 - CKD stage IV  4 - HTN  5 - dyslipidemia   6 - chronic left hip infection with wound VAC in position    PLAN:    · Continue aspirin and statins  · Encourage physical therapy  · Continue to monitor renal function which seems to be at baseline  · She is status post 1 PRBC after presenting for hemoglobin of 6.8. Her most recent hemoglobin is 8.3.  · Continue present home medications for chronic conditions  · Continue lifelong cephalexin     DC planning/Dispo: Home with home health in 24 to 48 hours  DVT ppx: With SCDs    Signed:   Brooklynn Handley MD

## 2020-08-12 NOTE — PROGRESS NOTES
08/12/20 0236   NIH Stroke Scale   Interval Other (comment)  (Neuro checks)   LOC 0   LOC Questions 2   LOC Commands 1   Motor Right Arm 2   Motor Left Arm 2   Motor Right Leg 3   Motor Left Leg 3   Dysarthria 1     Neuro checks

## 2020-08-12 NOTE — PROGRESS NOTES
Speech therapy note    Attempted to see pt this am, however, pt refused PO and would not answer any questions.       Anabel Ching MA/SHERWIN/SLP

## 2020-08-12 NOTE — PROGRESS NOTES
Comprehensive Nutrition Assessment    Type and Reason for Visit: RD nutrition re-screen/LOS    Nutrition Recommendations/Plan:   Ensure enlive TID initiated. Nutrition Assessment:  Acute Left MCA infarct (parietal). PMH of HTN, dyslipidemia, metastatic breast cancer sp RT in 2016, CKD stage IV, sp left hip arthroplasty + wound vac, wound infection on chronic antibiotic therapy, who was brought in via EMS after her daughter noted her confused, not making sense. The patient is bed-bound after her hip surgery on 5/20. Planning for home with family with wound vac. Per SLP severe expressive and receptive aphasia    Pt lethargic at RD visit, awakens oriented to person only. Lunch tray and outside food at bedside untouched. Pt speaks of a \"puzzle\" repeatedly unable to obtain any information from her. No family at bedside. RN reports lethargic most of the day will arouse for medications. No po intake today. Estimated Daily Nutrient Needs:  Energy (kcal):  6855-8576(20-25 kcal/kg)  Protein (g):  83(20% low end calorie range in setting of CKD/Wound)         Wounds:    (Left hip wound vac )       Current Nutrition Therapies:   DIET CARDIAC Mechanical Soft    Anthropometric Measures:  · Height:  5' 7\" (170.2 cm)  · Current Body Wt:  109.8 kg (242 lb 1 oz)   · Body mass index is 37.9 kg/m². · BMI Category:  Obese class 2 (BMI 35.0-39. 9)       Nutrition Diagnosis:   · Inadequate oral intake related to cognitive or neurological impairment as evidenced by intake 0-25%      Nutrition Interventions:   Food and/or Nutrient Delivery: Continue current diet, Start oral nutrition supplement  Coordination of Nutrition Care:  POC discussed with Gerry Carrillo RN. Goals:  Meet >50% estimated needs within 7 days       Nutrition Monitoring and Evaluation:   Food/Nutrient Intake Outcomes: Food and nutrient intake, Supplement intake    Discharge Planning:     Too soon to determine     Tony Blackman, RENETTA, LDN on 8/12/2020 at 4:39 PM  Contact: [phone]

## 2020-08-12 NOTE — PROGRESS NOTES
08/12/20 0400   NIH Stroke Scale   Interval Other (comment)  (Neuro checks)   LOC 0   LOC Questions 2   LOC Commands 0   Motor Right Arm 2   Motor Left Arm 2   Motor Right Leg 3   Motor Left Leg 3   Dysarthria 1     Neuro checks

## 2020-08-12 NOTE — WOUND CARE
Left hip wound vac dressing changed, wound is pink and granular, 16x2. 6x2cm, well healing, moderate serousanganous drainage. May discharge today with home home health, daughter to bring home vac in, nursing to call for assistance with home vac if needed. Will monitor while in acute care.

## 2020-08-12 NOTE — PROGRESS NOTES
08/11/20 2321   NIH Stroke Scale   Interval Other (comment)  (Neuro checks)   LOC 0   LOC Questions 2   LOC Commands 0   Motor Right Arm 2   Motor Left Arm 2   Motor Right Leg 3   Motor Left Leg 3   Dysarthria 1     Neuro checks

## 2020-08-13 LAB
ANION GAP SERPL CALC-SCNC: 11 MMOL/L (ref 7–16)
BUN SERPL-MCNC: 36 MG/DL (ref 8–23)
CALCIUM SERPL-MCNC: 8.8 MG/DL (ref 8.3–10.4)
CHLORIDE SERPL-SCNC: 107 MMOL/L (ref 98–107)
CO2 SERPL-SCNC: 22 MMOL/L (ref 21–32)
CREAT SERPL-MCNC: 2.06 MG/DL (ref 0.6–1)
ERYTHROCYTE [DISTWIDTH] IN BLOOD BY AUTOMATED COUNT: 21.9 % (ref 11.9–14.6)
GLUCOSE SERPL-MCNC: 71 MG/DL (ref 65–100)
HCT VFR BLD AUTO: 27 % (ref 35.8–46.3)
HGB BLD-MCNC: 8.7 G/DL (ref 11.7–15.4)
MCH RBC QN AUTO: 33.6 PG (ref 26.1–32.9)
MCHC RBC AUTO-ENTMCNC: 32.2 G/DL (ref 31.4–35)
MCV RBC AUTO: 104.2 FL (ref 79.6–97.8)
NRBC # BLD: 0.02 K/UL (ref 0–0.2)
PLATELET # BLD AUTO: 168 K/UL (ref 150–450)
PMV BLD AUTO: 10.6 FL (ref 9.4–12.3)
POTASSIUM SERPL-SCNC: 4.2 MMOL/L (ref 3.5–5.1)
RBC # BLD AUTO: 2.59 M/UL (ref 4.05–5.2)
SODIUM SERPL-SCNC: 140 MMOL/L (ref 136–145)
WBC # BLD AUTO: 4.5 K/UL (ref 4.3–11.1)

## 2020-08-13 PROCEDURE — 80048 BASIC METABOLIC PNL TOTAL CA: CPT

## 2020-08-13 PROCEDURE — 65660000000 HC RM CCU STEPDOWN

## 2020-08-13 PROCEDURE — 97112 NEUROMUSCULAR REEDUCATION: CPT

## 2020-08-13 PROCEDURE — 3331090002 HH PPS REVENUE DEBIT

## 2020-08-13 PROCEDURE — 74011250637 HC RX REV CODE- 250/637: Performed by: INTERNAL MEDICINE

## 2020-08-13 PROCEDURE — 3331090001 HH PPS REVENUE CREDIT

## 2020-08-13 PROCEDURE — 74750000023 HC WOUND THERAPY

## 2020-08-13 PROCEDURE — 77030038269 HC DRN EXT URIN PURWCK BARD -A

## 2020-08-13 PROCEDURE — 36415 COLL VENOUS BLD VENIPUNCTURE: CPT

## 2020-08-13 PROCEDURE — 92526 ORAL FUNCTION THERAPY: CPT

## 2020-08-13 PROCEDURE — 97530 THERAPEUTIC ACTIVITIES: CPT

## 2020-08-13 PROCEDURE — 85027 COMPLETE CBC AUTOMATED: CPT

## 2020-08-13 PROCEDURE — 92507 TX SP LANG VOICE COMM INDIV: CPT

## 2020-08-13 RX ORDER — ATORVASTATIN CALCIUM 80 MG/1
80 TABLET, FILM COATED ORAL
Qty: 30 TAB | Refills: 0 | Status: SHIPPED | OUTPATIENT
Start: 2020-08-13

## 2020-08-13 RX ORDER — HYDROCODONE BITARTRATE AND ACETAMINOPHEN 5; 325 MG/1; MG/1
1 TABLET ORAL
Qty: 20 TAB | Refills: 0 | Status: SHIPPED | OUTPATIENT
Start: 2020-08-13 | End: 2020-08-18

## 2020-08-13 RX ORDER — GUAIFENESIN 100 MG/5ML
81 LIQUID (ML) ORAL DAILY
Qty: 30 TAB | Refills: 0 | Status: SHIPPED | OUTPATIENT
Start: 2020-08-14

## 2020-08-13 RX ADMIN — PANTOPRAZOLE SODIUM 40 MG: 40 TABLET, DELAYED RELEASE ORAL at 05:23

## 2020-08-13 RX ADMIN — AMLODIPINE BESYLATE 5 MG: 5 TABLET ORAL at 09:06

## 2020-08-13 RX ADMIN — CEPHALEXIN 250 MG: 250 CAPSULE ORAL at 09:06

## 2020-08-13 RX ADMIN — CEPHALEXIN 250 MG: 250 CAPSULE ORAL at 17:06

## 2020-08-13 RX ADMIN — Medication 1 AMPULE: at 22:50

## 2020-08-13 RX ADMIN — Medication 1 AMPULE: at 09:06

## 2020-08-13 RX ADMIN — ASPIRIN 81 MG: 81 TABLET, CHEWABLE ORAL at 09:06

## 2020-08-13 RX ADMIN — Medication 5 ML: at 22:50

## 2020-08-13 RX ADMIN — Medication 5 ML: at 17:06

## 2020-08-13 RX ADMIN — Medication 10 ML: at 05:23

## 2020-08-13 RX ADMIN — TRIAMTERENE AND HYDROCHLOROTHIAZIDE 1 TABLET: 37.5; 25 TABLET ORAL at 09:06

## 2020-08-13 NOTE — PROGRESS NOTES
08/13/20 1900   NIH Stroke Scale   Interval Other (comment)   LOC 0   LOC Questions 2   LOC Commands 1   Best Gaze 1   Visual 1   Facial Palsy 0   Motor Right Arm 0   Motor Left Arm 1   Motor Right Leg 3   Motor Left Leg 3   Limb Ataxia 0   Sensory 1   Best Language 1   Dysarthria 1   Extinction and Inattention 0   Total 15

## 2020-08-13 NOTE — PROGRESS NOTES
Problem: Patient Education: Go to Patient Education Activity  Goal: Patient/Family Education  Outcome: Progressing Towards Goal  Note:   1. Patient will complete self-feeding and grooming with moderate assistance and adaptive equipment as needed. PROGRESSING 8/7/2020  2. Patient will complete upper body dressing and bathing with minimal assistance and adaptive equipment as needed. 3. Patient will complete bed mobility with minimal assistance and adaptive equipment as needed in preparation for functional transfers. PROGRESSING 8/13/2020  4. Patient will participate in 10 minutes of therapeutic exercises to increase strength in BUEs for increased participation in ADLs. 5. Patient will participate in 10 minutes of therapeutic activity to increase coordination in RUE for increased participation in ADLs. PROGRESSING 8/13/2020  6. Patient will attempt to stand with maximal assistance and adaptive equipment as needed in preparation for functional transfers. PROGRESSING 8/13/2020  7.  Patient will demonstrate improved cognition for ADLs by completing functional tasks with up to minimal cueing from therapist. [address] 8/13/2020    Timeframe: 7 visits       OCCUPATIONAL THERAPY: Daily Note and PM 8/13/2020  INPATIENT: OT Visit Days: 3  Payor: Jerome Villatoro / Plan: [address] [place] [address] HMO / Product Type: [employer] /      NAME/AGE/GENDER: Huseyin Acevedo is a 67 y.o. female   PRIMARY DIAGNOSIS:  CVA (cerebral vascular accident) ([facility] [address] [I63.9]  Anemia [D64.9]  CKD (chronic kidney disease) [N18.9]  Vomiting [R11.10] CVA (cerebral vascular accident) ([facility] [address] CVA (cerebral vascular accident) ([facility] [address]       ICD-10: Treatment Diagnosis:    · Generalized Muscle Weakness (M62.81)  · Hemiplegia and hemiparesis following cerebral infarction affecting   · right dominant side (H45.381)    Precautions/Allergies:    Fall precautions    Penicillins      ASSESSMENT:   Per Initial Assessment:  Ms. Felicia Rosado is a 67 y.o. female admitted with AMS, CT positive for large subacute CVA, at the L temporal and parietal lobe. Unsure of baseline as pt with AMS, appears to have global aphasia. Hx breast CA with bony mets, L NOMRA with wound and wound vac in place. 8/13/2020: Pt found supine in bed upon arrival, alert and agreeable to OT treatment. Pt practiced bed mobility with Kendra-MinAx2, cues for technique. Pt with improved command following this sesion. Pt with improved sitting balance this date, with good static sitting noted. Pt practiced several sit > stand transfers with Mod-MaxAx2/cues for technique, posture, and forward weight shifting. Able to tolerated standing balance edge of bed for approximately 20 seconds each sit > stand trial. Pt with good participation. Pt able to extend B knees to come into standing, unable to extend at hips to achieve upright posture, likely due to cognition. Forward flexed at hips at 90*. Pt sat with ModAx2. Sit > supine with MinAx2, rolling in supine for brief change with MinAx2. Pt left supine in bed with head of bed raised, call bell within reach. Pt is making good progress towards goals. See above. Continue OT POC. This patient is appropriate for co-treatment at this time due to multiple deficits including decreased balance, decreased cognition, decreased endurance, decreased strength, and need for high level assistance to complete functional transfers and functional tasks. This section established at most recent assessment   PROBLEM LIST (Impairments causing functional limitations):  1. Decreased Strength  2. Decreased ADL/Functional Activities  3. Decreased Transfer Abilities  4. Decreased Ambulation Ability/Technique  5. Decreased Balance  6. Decreased Activity Tolerance  7. Increased Fatigue  8. Decreased Flexibility/Joint Mobility  9. Edema/Girth  10. Decreased Skin Integrity/Hygeine  11. Decreased Mcdonald with Home Exercise Program  12.  Decreased Cognition   INTERVENTIONS PLANNED: (Benefits and precautions of occupational therapy have been discussed with the patient.)  1. Activities of daily living training  2. Adaptive equipment training  3. Balance training  4. Clothing management  5. Cognitive training  6. Community reintergration  7. Donning&doffing training  8. Royce tech training  9. Hygiene training  10. Neuromuscular re-eduation  11. Re-evaluation  12. Therapeutic activity  13. Therapeutic exercise  14. Wheelchair management     TREATMENT PLAN: Frequency/Duration: Follow patient 3x/week to address above goals. Rehabilitation Potential For Stated Goals: Good     REHAB RECOMMENDATIONS (at time of discharge pending progress):    Placement: It is my opinion, based on this patient's performance to date, that Ms. Dl Conley may benefit from intensive therapy at a [facility] after discharge due to the functional deficits listed above that are likely to improve with skilled rehabilitation and concerns that he/she may be unsafe to be unsupervised at home due to impaired strength and balance impacting ADLs, increasing risk of falls, Chronic L hip wound. Equipment:    TBD              OCCUPATIONAL PROFILE AND HISTORY:   History of Present Injury/Illness (Reason for Referral):  See H&P. \"Edith Ortega is a 67 y.o. female who has a PMH of HTN, dyslipidemia, metastatic breast cancer, CKD stage IV, sp left hip arthroplasty + wound vac, wound infection on chronic antibiotic therapy, who was brought in via EMS after her daughter noted her confused, not making sense since yesterday around 1600 hrs. The patient is bed-bound after her hip surgery on 5/20. She complains of nausea, vomiting and decreased appetite. She is unable to follow commands. All information was gathered from her daughter Sergio Aguero at beside. Her mother has not had fever, chills, cough, GI bleeding or diarrhea. She has been compliant to all her home meds, including keflex. She last saw ID yesterday.      Upon ed arrival VS were stable. The patient was alert, but incoherent to answer questions. She was not following commands. Labs: hb 6.8 gr; wbc 2.3; plat 118k  Cr 2.5 ( baseline ); ammonia: 12  Abg: ph 7.4, pco2 33, po2 72, O2 94%  Brain ct: large subacute CVA, at the L temporal and parietal lobe. Negative for bleeding. \"  Past Medical History/Comorbidities:   Ms. Arleth Rojas  has a past medical history of Abnormal EKG (2/16/2016), Anemia due to chronic kidney disease treated with erythropoietin (7/25/2017), Aortic valve insufficiency (2/16/2016), Cancer ([facility] [address] Hyperlipidemia (2/16/2016), Hypertension (2/16/2016), and Obesity (2/16/2016). Ms. Arleth Rojas  has a past surgical history that includes hx tubal ligation; ir insert tunl cvc w/o port over 5 yr (5/19/2020); and ir remove tunl cvad w/o port / pump (7/2/2020). Social History/Living Environment:   Support Systems: Child(ezequiel)  Patient Expects to be Discharged to[de-identified] Unknown  Current DME Used/Available at Home: Hospital bed, Wheelchair, Walker, rolling, Commode, bedside  Prior Level of Function/Work/Activity:  Unsure of baseline as pt with AMS, appears to have global aphasia. Hx breast CA with bony mets, L NORMA with wound and wound vac in place. Personal Factors:          Sex:  female        Age:  67 y.o. Other factors that influence how disability is experienced by the patient:  Multiple co-morbidities    Number of Personal Factors/Comorbidities that affect the Plan of Care: Expanded review of therapy/medical records (1-2):  MODERATE COMPLEXITY   ASSESSMENT OF OCCUPATIONAL PERFORMANCE[de-identified]   Activities of Daily Living:   Basic ADLs (From Assessment) Complex ADLs (From Assessment)   Feeding: Total assistance  Oral Facial Hygiene/Grooming: Total assistance  Bathing: Total assistance  Upper Body Dressing: Maximum assistance  Lower Body Dressing: Total assistance  Toileting: Total assistance Instrumental ADL  Meal Preparation: Total assistance  Homemaking:  Total assistance  Medication Management: Total assistance  Financial Management: Total assistance   Grooming/Bathing/Dressing Activities of Daily Living     Cognitive Retraining  Safety/Judgement: Fall prevention                       Bed/Mat Mobility  Rolling: Minimum assistance  Supine to Sit: Minimum assistance; Moderate assistance  Sit to Supine: Minimum assistance;Assist x2  Sit to Stand: Maximum assistance;Assist x2  Stand to Sit: Moderate assistance;Assist x2     Most Recent Physical Functioning:   Gross Assessment:  AROM: Grossly decreased, non-functional(RUE)  PROM: Within functional limits(BUEs)  Strength: Grossly decreased, non-functional(RUE)  Coordination: Grossly decreased, non-functional(BUEs)  Tone: Abnormal(Increased tone in BUEs)  Sensation: (Unable to assess due to cognition )               Posture:  Posture (WDL): Exceptions to WDL  Posture Assessment: Forward head  Balance:  Sitting: Impaired  Sitting - Static: Good (unsupported)  Sitting - Dynamic: Fair (occasional); Good (unsupported)  Standing: Impaired  Standing - Static: Poor;Constant support Bed Mobility:  Rolling: Minimum assistance  Supine to Sit: Minimum assistance; Moderate assistance  Sit to Supine: Minimum assistance;Assist x2  Wheelchair Mobility:     Transfers:  Sit to Stand: Maximum assistance;Assist x2  Stand to Sit: Moderate assistance;Assist x2            No data found. Mental Status  Neurologic State: Alert  Orientation Level: Unable to verbalize  Cognition: Decreased command following  Perception: Cues to maintain midline in standing, Cues to attend right visual field  Perseveration: Perseverates during ADLS, Perseverates during conversation, Perseverates during mobility  Safety/Judgement: Fall prevention                          Physical Skills Involved:  1. Range of Motion  2. Balance  3. Strength  4. Activity Tolerance  5. Fine Motor Control  6. Gross Motor Control  7.  Skin Integrity Cognitive Skills Affected (resulting in the inability to perform in a timely and safe manner):  1. Perception  2. Executive Function  3. Sustained Attention  4. Divided Attention  5. Comprehension  6. Expression Psychosocial Skills Affected:  1. Habits/Routines  2. Environmental Adaptation  3. Social Interaction  4. Emotional Regulation  5. Self-Awareness  6. Awareness of Others  7. Social Roles   Number of elements that affect the Plan of Care: 5+:  HIGH COMPLEXITY   CLINICAL DECISION MAKIN[address] [place] [address] AM-PAC 6 Clicks   Daily Activity Inpatient Short Form  How much help from another person does the patient currently need. .. Total A Lot A Little None   1. Putting on and taking off regular lower body clothing? [x] 1   [] 2   [] 3   [] 4   2. Bathing (including washing, rinsing, drying)? [x] 1   [] 2   [] 3   [] 4   3. Toileting, which includes using toilet, bedpan or urinal?   [x] 1   [] 2   [] 3   [] 4   4. Putting on and taking off regular upper body clothing? [x] 1   [] 2   [] 3   [] 4   5. Taking care of personal grooming such as brushing teeth? [] 1   [x] 2   [] 3   [] 4   6. Eating meals? [x] 1   [] 2   [] 3   [] 4   © , Trustees of [address] [place] [address], under license to [employer]. All rights reserved      Score:  Initial: 6 2020 Most Recent: X (Date: -- )    Interpretation of Tool:  Represents activities that are increasingly more difficult (i.e. Bed mobility, Transfers, Gait). Medical Necessity:     · Patient demonstrates   · good and fair  ·  rehab potential due to higher previous functional level. Reason for Services/Other Comments:  · Patient continues to require skilled intervention due to   · Impaired strength and balance impacting safety and ADLs  · .    Use of outcome tool(s) and clinical judgement create a POC that gives a: HIGH COMPLEXITY         TREATMENT:   (In addition to Assessment/Re-Assessment sessions the following treatments were rendered)     Pre-treatment Symptoms/Complaints:    Pain: Initial:   Pain Intensity 1: 0 Post Session:  same     Today's treatment session addressed Decreased Strength, Decreased ADL/Functional Activities, Decreased Transfer Abilities, Decreased Balance, Increased Pain, Decreased Activity Tolerance, Increased Fatigue, Decreased Flexibility/Joint Mobility, Edema/Girth, Decreased Skin Integrity/Hygeine and Decreased Cognition to progress towards achieving goal(s). During this session,  Physical Therapy addressed  Functional Transfers to progress towards their discipline specific goal(s). Co-treatment was necessary to improve patient's cognitive participation, ability to follow higher level commands, ability to increase activity demands and ability to return to normal functional activity. Neuromuscular Re-education: ( 30 minutes):  Exercise/activities per grid below to improve balance, coordination, posture and weight shifting. Required minimal to maximal visual, verbal, manual and tactile cues to promote static balance in standing and promote coordination of bilateral, upper extremity(s), lower extremity(s). Pt practiced bed mobility with Kendra-MinAx2, cues for technique. Pt with good command following. Pt with significantly improved sitting balance this date, with good static sitting noted. Pt practiced several sit > stand transfers with MaxAx2/cues for technique, posture, and forward weight shifting. Pt with good participation. Pt able to extend B knees to come into standing, unable to extend at hips to achieve upright posture, likely due to cognition. Forward flexed at hips at 90*. Pt sat with ModAx2. Sit > supine with MinAx2, rolling in supine for brief change with MinAx2.              Braces/Orthotics/Lines/Etc:   · O2 Device: Room air  Treatment/Session Assessment:    · Response to Treatment:  Tolerated well   · Interdisciplinary Collaboration:   o Physical Therapist  o Occupational Therapist  o Registered Nurse  o Physician  o   · After treatment position/precautions:   o Supine in bed  o Bed alarm/tab alert on  o Bed/Chair-wheels locked  o Bed in low position  o Call light within reach  o RN notified   · Compliance with Program/Exercises: Compliant all of the time, Will assess as treatment progresses. · Recommendations/Intent for next treatment session: \"Next visit will focus on advancements to more challenging activities and reduction in assistance provided\".   Total Treatment Duration:  OT Patient Time In/Time Out  Time In: 1520  Time Out: [address], OTR/L

## 2020-08-13 NOTE — WOUND CARE
Left hip wound vac tubing exchanged, connected to home vac. To discharge home with home health today.

## 2020-08-13 NOTE — DISCHARGE SUMMARY
Hospitalist Discharge Summary     Admit Date:  2020 12:46 PM   Name:  Brenda Valenzuela   Age:  67 y.o.  :  1945   MRN:  [id number]   PCP:  Khoa Anthony NP  Treatment Team: Attending Provider: Pita Dooley MD; Utilization Review: Alex Blood RN; Primary Nurse: Brennan Calles; Care Manager: Javi Resendiz RN; Physical Therapist: Devin Levine DPT; Occupational Therapist: Samir Torre OTR/L    Problem List for this Hospitalization:  Hospital Problems as of 2020 Date Reviewed: 2020          Codes Class Noted - Resolved POA    Vomiting ICD-10-CM: R11.10  ICD-9-CM: 787.03  2020 - Present Unknown        Anemia ICD-10-CM: D64.9  ICD-9-CM: 285.9  2020 - Present Unknown        * (Principal) CVA [facility]cerebral vascular accident) ([facility] ICD-10-CM: I63.9  ICD-9-CM: 434.91  2020 - Present Unknown        CKD (chronic kidney disease) ICD-10-CM: N18.9  ICD-9-CM: 585.9  2020 - Present Unknown        DNR (do not resuscitate) ICD-10-CM: Z66  ICD-9-CM: V49.86  2020 - Present Yes        Left hip postoperative wound infection ICD-10-CM: T81.49XA  ICD-9-CM: 998.59  2020 - Present Yes        Stage 4 chronic kidney disease ([facility] (Chronic) ICD-10-CM: N18.4  ICD-9-CM: 585.4  2020 - Present Yes        Severe obesity ([facility] (Chronic) ICD-10-CM: E66.01  ICD-9-CM: 278.01  2019 - Present Yes        Anemia due to chronic kidney disease treated with erythropoietin ICD-10-CM: N18.9, D63.1  ICD-9-CM: 285.21, 585.9  2017 - Present Yes        Malignant neoplasm metastatic to bone [facility]) ICD-10-CM: C79.51  ICD-9-CM: 198.5  2016 - Present Yes    Overview Signed 1/10/2017  6:13 PM by Harley Canales MD     Last Assessment & Plan:   1. Complete radiation therapy to the patient's sacrum and femurs as directed by Dr. Laymon Dakin.   2.  Return to the office in 6 weeks with x-rays of the pelvis on arrival.  3.  Instruct the patient to advance her weightbearing as tolerated as she continues to enjoy a reduction in pain following the radiation therapy. Hypertension (Chronic) ICD-10-CM: I10  ICD-9-CM: 401.9  2/16/2016 - Present Yes    Overview Signed 2/16/2016 12:09 PM by Michael Gamboa     UNSPECIFIED                        Hospital Course: The patient is a 67-year-old  lady with HTN, dyslipidemia, CKD stage IV, chronic left hip infection on chronic antibiotic therapy, metastatic breast CA, was managed for acute CVA and anemia. She was noted to be depressed and appeared tired. She is basically bedbound, denies any significant pain or shortness of breath. She was recommended for LTAC, but this was denied by her insurance. She has received 1 PRBC and her Hb has remained stable, 8.7. Her renal function is also stable, with a Cr of 2.06 at discharge. The patient has a wound vac in position for the left hip wound. She is also maintained on chronic antibiotic therapy with cephalexin. She was initiated on aspirin and statins. As she is clinically and hemodynamically stable, she is being discharged home with home health services today. Follow up instructions below. Plan was discussed with the patient, her family and the nursing staff. All questions answered. Patient was stable at time of discharge and was instructed to call or return if there are any concerns or recurrence of symptoms. Diagnostic Imaging/Tests:   Mri Brain W Wo Cont    Result Date: 8/4/2020  MRI of the brain INDICATION: Altered mental status, history of breast cancer Standard MRI sequences were obtained through the brain in multiple planes. Images were obtained before and after intravenous infusion of 23 mL of Dotarem. FINDINGS: There is an acute posterior left MCA territory infarction. There is no associated hemorrhage. .  No other areas of acute infarction are seen. There is a small old posterior right parietal infarct. There are also chronic changes in the white matter.     The ventricles are normal in size. There are no extra-axial fluid collections. There are no bony lesions. The sinuses are clear. Post-contrast images show no abnormal enhancement. There are no masses. IMPRESSION: Acute left parietal infarct. No evidence of hemorrhage. Ct Head Wo Cont    Result Date: 8/4/2020  CT of the head without contrast. CLINICAL INDICATION: Altered mental status since yesterday PROCEDURE: Serial thin section axial images are obtained from the cranial vertex through the skull base without the administration of intravenous contrast. Radiation dose reduction techniques were used for this study. Our CT scanners use one or all of the following: Automated exposure control, adjusted of the mA and/or kV according to patient size, iterative reconstruction COMPARISON: No prior. FINDINGS: There is no acute intracranial hemorrhage, mass, or mass effect. No abnormal extra-axial fluid collections identified. There is no hydrocephalus. The basilar cisterns are widely patent. Hypoattenuation is noted throughout the left temporal lobe in keeping with a large, subacute CVA. There is mild patchy hypoattenuation in the left parietal lobe as well. The posterior fossa is unremarkable. There is an area of focal cortically based hypoattenuation in the right parietal lobe that likely represents old infarct. Included paranasal sinuses and mastoid air cells are clear. No fractures identified. .     IMPRESSION: 1. Large, subacute CVA in the left temporal lobe and to a lesser extent the left parietal lobe. 2. Old, small infarct in the right parietal lobe. 3. No acute hemorrhage. Xr Chest Port    Result Date: 8/4/2020  Portable chest x-ray CLINICAL INDICATION: Sepsis FINDINGS: Single AP view of the chest compared to a similar exam dated 5/13/2020 show the lungs to be expanded and clear. No pleural effusion or pneumothorax. The cardiac silhouette and mediastinum are unremarkable.  Degenerative osteoarthritic changes are noted of  the bilateral shoulders. IMPRESSION: No acute cardiopulmonary abnormality. Cta Code Neuro Head And Neck W Cont    Result Date: 8/5/2020  History: Altered mental status. Onset was yesterday. Comments: CT ANGIOGRAM OF THE NECK AND CT ANGIOGRAM OF THE [place] was obtained following the administration of IV contrast. IV contrast was administered to evaluate the arterial vasculature. Reformatted images in the coronal and sagittal planes as well as 3-D imaging was obtained and reviewed on a dedicated PACS and [address]. Radiation reduction dose techniques were used for the study. Our CT scanner use one or all of the following- Automated exposure control, adjustment of the mA and/or KV according the patient size, iterative reconstruction. All measurements are based upon NASCET criteria if appropriate. Findings: CT ANGIOGRAM OF THE NECK: The arch and proximal great vessels are patent however atherosclerotic changes are noted at the origins of the proximal great vessels however no significant stenosis is appreciated. The carotid bulbs demonstrate eccentric calcified plaque disease. Degree of stenosis is less than 50%. The vertebral arteries are patent in their proximal course however probable high-grade stenoses are noted bilaterally. Mild atelectatic changes are noted in the lung bases. The thyroid tissue demonstrates a partially calcified nodule on the right. CT angiogram of [place]: The petrous, cavernous, and supraclinoid internal carotid arteries are patent with mild diffuse atherosclerotic changes. The anterior circulations patent. The right middle cerebral artery is patent. There is occlusion of an M2 segment of the left middle cerebral artery. There is a large dominant pain running superficially along the lateral aspect of the face outside the skull. This may be a venous anomaly.  The distal vertebral arteries demonstrate diffuse atherosclerotic changes in their distal segments with at least a moderate stenosis of the distal left vertebral artery. The basilar artery and posterior cerebral arteries are patent. IMPRESSION: 1. Occlusion of an anterior M2 branch of the left middle cerebral artery. 2. Questionable venous anomaly as described above. These findings were relayed to the patient's nurse upon interpretation      Echocardiogram results:  Results for orders placed or performed during the hospital encounter of 20   2D ECHO COMPLETE ADULT (TTE) [address] [facility] [address] [facility], [address] [facility]  [phone]    Transthoracic Echocardiogram  2D, M-mode, Doppler, and Color Doppler    Patient: Taya Gong  MR #: [id number]  : 1945  Age: 67 years  Gender: Female  Study date: 05-Aug-2020  Account #: [de-identified]  Height: 67 in  Weight: 241.6 lb  BSA: 2.19 mï¾²  Status:Routine  Location: [facility]  BP: 122/ 57    Allergies: PENICILLINS    Sonographer:  Toney Cannon RDCS  Group:  [facility]  Referring Physician:  Shalonda Lind MD  Reading Physician:  Concha Finch MD    INDICATIONS: CVA. PROCEDURE: This was a routine study. A transthoracic echocardiogram was  performed. The study included complete 2D imaging, M-mode, complete spectral  Doppler, and color Doppler. Intravenous contrast (Definity) was administered. Intravenous contrast (agitated saline) was administered. Image quality was  adequate. LEFT VENTRICLE: Size was normal. Systolic function was normal. Ejection  fraction was estimated in the range of 55 % to 60 %. There were no regional  wall motion abnormalities. Wall thickness was normal. Avg. E/e': 8.4. Left  ventricular diastolic function parameters were normal.    RIGHT VENTRICLE: The size was normal. Systolic function was normal. The  tricuspid jet envelope definition was inadequate for estimation of RV   systolic  pressure.     LEFT ATRIUM: Size was normal.    ATRIAL SEPTUM: Contrast injection was performed. There was no right-to-left  shunt, with provocative maneuvers to increase right atrial pressure. RIGHT ATRIUM: Size was normal.    SYSTEMIC VEINS: IVC: The inferior vena cava was normal in size and course. The  respirophasic change in diameter was more than 50%. AORTIC VALVE: The valve was structurally normal, tri-commissural. There was   no  evidence for stenosis. There was mild regurgitation. MITRAL VALVE: Valve structure was normal. There was no evidence for stenosis. There was mild regurgitation. TRICUSPID VALVE: The valve structure was normal. There was no evidence for  stenosis. There was trace regurgitation. PULMONIC VALVE: The valve structure was normal. There was no evidence for  stenosis. There was trivial regurgitation. PERICARDIUM: There was no pericardial effusion. AORTA: The root exhibited normal size. SUMMARY:    -  Left ventricle: Systolic function was normal. Ejection fraction was  estimated in the range of 55 % to 60 %. There were no regional wall motion  abnormalities. -  Atrial septum: Contrast injection was performed. There was no   right-to-left  shunt, with provocative maneuvers to increase right atrial pressure. SYSTEM MEASUREMENT TABLES    2D mode  AoR Diam (2D): 3.3 cm  LA Dimension (2D): 3.3 cm  Left Atrium Systolic Volume Index; Method of Disks, Biplane; 2D mode;: 32.8  ml/m2  IVS/LVPW (2D): 1  IVSd (2D): 0.8 cm  LVIDd (2D): 4.4 cm  LVIDs (2D): 2.2 cm  LVOT Area (2D): 3.1 cm2  LVPWd (2D): 0.8 cm  RVIDd (2D): 2.8 cm    Unspecified Scan Mode  Peak Grad; Mean; Antegrade Flow: 11 mm[Hg]  Vmax;  Antegrade Flow: 167 cm/s  LVOT Diam: 2 cm    Prepared and signed by    Shlomo Fu MD  Signed 05-Aug-2020 17:36:57           All Micro Results     Procedure Component Value Units Date/Time    CULTURE, BLOOD [[id number]] Collected:  08/04/20 1257    Order Status:  Completed Specimen:  Blood Updated:  08/09/20 0839     Special Requests: --        RIGHT  Antecubital       Culture result: NO GROWTH 5 DAYS       CULTURE, BLOOD [[id number]] Collected:  08/04/20 1257    Order Status:  Completed Specimen:  Blood Updated:  08/09/20 0634     Special Requests: --        LEFT  FOREARM       Culture result: NO GROWTH 5 DAYS       CULTURE, URINE [[id number]] Collected:  08/04/20 1530    Order Status:  Completed Specimen:  Cath Urine Updated:  08/07/20 0804     Special Requests: NO SPECIAL REQUESTS        Culture result: NO GROWTH 2 DAYS             Labs: Results:       BMP, Mg, Phos Recent Labs     08/13/20  1151      K 4.2      CO2 22   AGAP 11   BUN 36*   CREA 2.06*   CA 8.8   GLU 71      CBC Recent Labs     08/13/20  1151   WBC 4.5   RBC 2.59*   HGB 8.7*   HCT 27.0*         LFT No results for input(s): ALT, TBIL, AP, TP, ALB, GLOB, AGRAT in the last 72 hours.     No lab exists for component: SGOT, GPT   Cardiac Testing No results found for: BNPP, BNP, CPK, RCK1, RCK2, RCK3, RCK4, CKMB, CKNDX, CKND1, TROPT, TROIQ   Coagulation Tests Lab Results   Component Value Date/Time    Prothrombin time 13.9 05/28/2020 10:04 AM    Prothrombin time 13.8 04/26/2020 02:59 PM    Prothrombin time 14.0 08/07/2018 07:27 PM    INR 1.0 05/28/2020 10:04 AM    INR 1.0 04/26/2020 02:59 PM    INR 1.1 08/07/2018 07:27 PM    aPTT 28.2 05/28/2020 10:04 AM      A1c Lab Results   Component Value Date/Time    Hemoglobin A1c 4.8 08/05/2020 05:31 AM      Lipid Panel Lab Results   Component Value Date/Time    Cholesterol, total 153 08/05/2020 05:31 AM    HDL Cholesterol 36 (L) 08/05/2020 05:31 AM    LDL, calculated 98 08/05/2020 05:31 AM    VLDL, calculated 19 08/05/2020 05:31 AM    Triglyceride 95 08/05/2020 05:31 AM    CHOL/HDL Ratio 4.3 08/05/2020 05:31 AM      Thyroid Panel Lab Results   Component Value Date/Time    TSH 1.020 08/05/2020 05:31 AM    TSH 1.010 04/18/2017 11:15 AM        Most Recent UA Lab Results   Component Value Date/Time    Color YELLOW 08/04/2020 03:30 PM    Appearance CLEAR 08/04/2020 03:30 PM    Specific gravity 1.012 08/04/2020 03:30 PM    pH (UA) 5.5 08/04/2020 03:30 PM    Protein Negative 08/04/2020 03:30 PM    Glucose Negative 08/04/2020 03:30 PM    Ketone Negative 08/04/2020 03:30 PM    Bilirubin Negative 08/04/2020 03:30 PM    Blood Negative 08/04/2020 03:30 PM    Urobilinogen 0.2 08/04/2020 03:30 PM    Nitrites Negative 08/04/2020 03:30 PM    Leukocyte Esterase TRACE (A) 08/04/2020 03:30 PM        Allergies   Allergen Reactions    Penicillins Rash     Immunization History   Administered Date(s) Administered    TB Skin Test (PPD) Intradermal 08/08/2018, 04/26/2020, 08/04/2020       All Labs from Last 24 Hrs:  Recent Results (from the past 24 hour(s))   CBC W/O DIFF    Collection Time: 08/13/20 11:51 AM   Result Value Ref Range    WBC 4.5 4.3 - 11.1 K/uL    RBC 2.59 (L) 4.05 - 5.2 M/uL    HGB 8.7 (L) 11.7 - 15.4 g/dL    HCT 27.0 (L) 35.8 - 46.3 %    .2 (H) 79.6 - 97.8 FL    MCH 33.6 (H) 26.1 - 32.9 PG    MCHC 32.2 31.4 - 35.0 g/dL    RDW 21.9 (H) 11.9 - 14.6 %    PLATELET 693 731 - 933 K/uL    MPV 10.6 9.4 - 12.3 FL    ABSOLUTE NRBC 0.02 0.0 - 0.2 K/uL   METABOLIC PANEL, BASIC    Collection Time: 08/13/20 11:51 AM   Result Value Ref Range    Sodium 140 136 - 145 mmol/L    Potassium 4.2 3.5 - 5.1 mmol/L    Chloride 107 98 - 107 mmol/L    CO2 22 21 - 32 mmol/L    Anion gap 11 7 - 16 mmol/L    Glucose 71 65 - 100 mg/dL    BUN 36 (H) 8 - 23 MG/DL    Creatinine 2.06 (H) 0.6 - 1.0 MG/DL    GFR est AA 30 (L) >60 ml/min/1.73m2    GFR est non-AA 25 (L) >60 ml/min/1.73m2    Calcium 8.8 8.3 - 10.4 MG/DL       Discharge Exam:  Patient Vitals for the past 24 hrs:   Temp Pulse Resp BP SpO2   08/13/20 0800 98.3 °F (36.8 °C) 71 18 107/70 99 %   08/13/20 0400 97.8 °F (36.6 °C) 72 17 143/57 91 %   08/13/20 0000 98 °F (36.7 °C) 73 17 142/75 95 %   08/12/20 2000 97.9 °F (36.6 °C) 76 17 136/73 95 %   08/12/20 1600 98.1 °F (36.7 °C) 78 17 134/79 95 % Oxygen Therapy  O2 Sat (%): 99 % (08/13/20 0800)  Pulse via Oximetry: 68 beats per minute (08/04/20 1700)  O2 Device: Room air (08/04/20 1254)    Intake/Output Summary (Last 24 hours) at 8/13/2020 1537  Last data filed at 8/12/2020 1818  Gross per 24 hour   Intake    Output 200 ml   Net -200 ml       General:    Well nourished. Alert. No distress. Eyes:   Normal sclera. Extraocular movements intact. ENT:  Normocephalic, atraumatic. Moist mucous membranes  CV:   Regular rate and rhythm. No murmur, rub, or gallop. Lungs:  Clear to auscultation bilaterally. No wheezing, rhonchi, or rales. Abdomen: Soft, nontender, nondistended. Bowel sounds normal.   Extremities: Warm and dry. No cyanosis or edema. L hip wound with wound vac in position. Neurologic: CN II-XII grossly intact. Sensation intact. Skin:     No rashes or jaundice. Psych:  Normal mood and affect. Discharge Info:   Current Discharge Medication List      START taking these medications    Details   aspirin 81 mg chewable tablet Take 1 Tab by mouth daily. Qty: 30 Tab, Refills: 0      atorvastatin (LIPITOR) 80 mg tablet Take 1 Tab by mouth nightly. Qty: 30 Tab, Refills: 0      HYDROcodone-acetaminophen (NORCO) 5-325 mg per tablet Take 1 Tab by mouth every six (6) hours as needed for Pain for up to 5 days. Max Daily Amount: 4 Tabs. Qty: 20 Tab, Refills: 0    Associated Diagnoses: Open wound of left hip, subsequent encounter         CONTINUE these medications which have NOT CHANGED    Details   triamterene-hydroCHLOROthiazide (MAXZIDE) 37.5-25 mg per tablet TAKE 1 TABLET BY MOUTH EVERY DAY  Qty: 30 Tab, Refills: 1    Associated Diagnoses: Edema, unspecified type      cephALEXin (KEFLEX) 250 mg capsule Take 250 mg by mouth two (2) times a day. polyethylene glycol (MIRALAX) 17 gram packet Take 1 Packet by mouth daily.   Qty: 30 Packet, Refills: 0      docusate sodium (COLACE) 50 mg capsule Take 50 mg by mouth two (2) times daily as needed for Constipation. sennosides 25 mg tab Take 1 Tab by mouth nightly as needed for Other (constipation). diphenhydrAMINE (BENADRYL) 25 mg tablet Take 25 mg by mouth every six (6) hours as needed for Allergies. palbociclib (Ibrance) 75 mg cap Take one capsule by mouthOnce daily with food for21 days on, followed by7 days off during each cyCle of 28 days  Qty: 21 Cap, Refills: 5    Associated Diagnoses: Malignant neoplasm metastatic to bone ([address] Infiltrating ductal carcinoma of female breast, unspecified laterality (HCC)      ondansetron hcl (Zofran) 8 mg tablet Take 1 Tab by mouth every eight (8) hours as needed for Nausea. Qty: 90 Tab, Refills: 2    Associated Diagnoses: Malignant neoplasm metastatic to bone (HCC)      letrozole (FEMARA) 2.5 mg tablet TAKE 1 TABLET BY MOUTH EVERY DAY  Qty: 90 Tab, Refills: 3    Associated Diagnoses: Infiltrating ductal carcinoma of female breast, unspecified laterality (HCC)      melatonin 5 mg cap capsule Take 10 mg by mouth nightly. furosemide (LASIX) 20 mg tablet TAKE 1 TAB BY MOUTH DAILY AS NEEDED. FOR SWELLING. Qty: 30 Tab, Refills: 1    Associated Diagnoses: Edema, unspecified type      calcium carbonate (OS-JORGE) 500 mg calcium (1,250 mg) tablet Take 2 tablets at lunch and 2 tablets at dinner. Indications: hypocalcemia  Qty: 4 Tab, Refills: 0    Associated Diagnoses: Hypocalcemia      amLODIPine (NORVASC) 5 mg tablet Take 5 mg by mouth daily.          STOP taking these medications       oxyCODONE-acetaminophen 5-300 mg tab Comments:   Reason for Stopping:         sodium chloride (BD Pre-Filled Normal Saline) Comments:   Reason for Stopping:         heparin sod,porcine/0.9 % NaCl (HEPARIN FLUSH IV) Comments:   Reason for Stopping:         magnesium oxide (MAG-OX) 400 mg tablet Comments:   Reason for Stopping:         aspirin (ASPIRIN) 325 mg tablet Comments:   Reason for Stopping:                 Disposition: Home with home health services Activity: Activity as tolerated  Diet: DIET CARDIAC Mechanical Soft  DIET NUTRITIONAL SUPPLEMENTS All Meals; Ensure Enlive ( )    Follow-up Appointments   Procedures    FOLLOW UP VISIT Appointment in: One Week With PCP in 1-2 weeks. With PCP in 1-2 weeks. Standing Status:   Standing     Number of Occurrences:   1     Order Specific Question:   Appointment in     Answer: One Week         Follow-up Information     Follow up With Specialties Details Why Jorge Padgett NP [address]  [phone]            Time spent in patient discharge planning and coordination 35 minutes. Signed:   Lexi Bose MD

## 2020-08-13 NOTE — PROGRESS NOTES
Problem: Communication Impaired (Adult)  Goal: *Acute Goals and Plan of Care (Insert Text)  Outcome: Progressing Towards Goal  Note: LTG: Patient will increase receptive/expressive language skills demonstrated by the ability to communicate basic wants/needs across environments      STG: Patient will answer yes/no questions with 60% accuracy given moderate cueing  STG: Patient will follow 1 step commands with 60% accuracy given model   STG: Patient will identify item in field of 2 with 75% accuracy given minimal cueing   STG: Patient will identify body parts presented verbally with 70% accuracy given moderate cueing   STG: Patient will complete automatic naming tasks in unison with ST with 90% accuracy  STG: Patient will complete basic responsive naming tasks with 80% accuracy given moderate cueing   STG: Patient will name basic objects/items with 80% accuracy given moderate   STG: Patient will repeat basic words with 75% accuracy given moderate cueing    Problem: Dysphagia (Adult)  Goal: *Acute Goals and Plan of Care (Insert Text)  Outcome: Progressing Towards Goal  Note: LTG: Patient will tolerate least restrictive diet without overt signs or symptoms of airway compromise. STG: Patient will tolerate mechanical soft diet and thin liquids without overt signs or symptoms of airway compromise. STG: Patient will participate in modified barium swallow study as clinically indicated.      STG: Patient will participate in speech/language/cognitive evaluation.  MET 8/6        SPEECH LANGUAGE PATHOLOGY: DYSPHAGIA AND SPEECH-LANGUAGE/COGNITION: Daily Note 1    NAME/AGE/GENDER: Gris Cannon is a 67 y.o. female  DATE: 8/13/2020  PRIMARY [facility]AGNOSIS: [facility] [I63.9]  Anemia [D64.9]  CKD (chronic kidney disease) [N18.9]  Vomiting [R11.10]      ICD-10: Treatment Diagnosis: R13.12 Dysphagia, Oropharyngeal Phase  F80.2 Mixed Receptive-Expressive Language Disorder    RECOMMENDATIONS   DIET:  continue prescribed diet   PO:  Mechanical soft   Liquids:  regular thin    MEDICATIONS: Crushed in puree     ASPIRATION PRECAUTIONS  · Slow rate of intake  · Small bites/sips  · Upright at 90 degrees during meal     COMPENSATORY STRATEGIES/MODIFICATIONS  · Small sips and bites  · Assistance with po intake      EDUCATION:  · Recommendations discussed with Patient, nursing      CONTINUATION OF SKILLED SERVICES/MEDICAL NECESSITY:   Patient is expected to demonstrate progress in  swallow strength, swallow timeliness, swallow function, diet tolerance and swallow safety in order to  improve swallow safety and decrease aspiration risk.  Patient is expected to demonstrate progress in expressive communication and receptive ability to decrease assistance required communication, increase independence with activities of daily living and increase communication with family/caregivers.  Patient continues to require skilled intervention due to dysphagia and aphasia. RECOMMENDATIONS for CONTINUED SPEECH THERAPY: YES: Anticipate need for ongoing speech therapy during this hospitalization and at next level of care. ASSESSMENT   Dysphagia: patient demonstrates poor oral intake, despite significant encouragement and multiple options presented. No overt s/sx airway compromise with liquids. Mildly prolonged prep, but functional. Overall very limited intake, especially of any solids. Continue current diet: mechanical soft diet/thin liquids. Will attempt to follow up x1 for tolerance. Language: severe expressive and receptive language. Receptive language with impaired command following and response to basic yes/no questions. Expressive language deficits characterized by fluent, but perseverative and lacks meaning. Unable to express basic wants/needs.      Patient is currently functioning significantly below baseline, therefore Recommend ongoing speech therapy to improve functional communication and diet tolerance. COMPLIANCE WITH PROGRAM/EXERCISES: Will assess as treatment progresses  REHABILITATION POTENTIAL FOR STATED GOALS: Fair    PLAN    FREQUENCY/DURATION: Continue to follow patient 3 times a week for duration of hospital stay to address above goals. - Recommendations for next treatment session: Next treatment will address diet tolerance and language    SUBJECTIVE   Upright in bed. Awake    History of Present Injury/Illness: Ms. Conor Krishnan  has a past medical history of Abnormal EKG (2/16/2016), Anemia due to chronic kidney disease treated with erythropoietin (7/25/2017), Aortic valve insufficiency (2/16/2016), [facility] ([facility] Hyperlipidemia (2/16/2016), Hypertension (2/16/2016), and Obesity (2/16/2016). . She also  has a past surgical history that includes hx tubal ligation; ir insert tunl cvc w/o port over 5 yr (5/19/2020); and ir remove tunl cvad w/o port / pump (7/2/2020). Problem List:  (Impairments causing functional limitations):  1. Oral dysphagia  2. Aphasia     Orientation:   Incorrect in yes/no format  aphasic    Pain: Pain Scale 1: Numeric (0 - 10)  Pain Intensity 1: 0    OBJECTIVE   Swallow treatment-   Patient tolerating serial sips of thin liquid without overt s/sx airway compromise. Declined everything on meal tray and pizza at bedside brought in by family. Declined tatianna cracker, but eventually agreeable to 1 bite. Mild increased prep due to limited dentition, but functional. Declined all additional trials including pudding and fruit.        Language treatment-  Basic yes/no questions: 0/5, \"I don't know\" on 2 occasions and perseverating on other 3  1 step commands: no attempt to follow commands with model requiring hand over hand  Automatic speech: nodding head or stating \"yeah\" but no attempt to join in unison despite multiple trials and cues  Word repetition:  0/5 despite max cues either no attempt or \"I don't know\" or \"ok\"    Patient perseverating on \"I need another piece of the puzzle\" \"a piece of worker\". Finally after multiple attempts to determine what patient was referring to, she appeared to want water. However, later perseverating on puzzle again and appeared to lack any meaning. INTERDISCIPLINARY COLLABORATION: n/a  PRECAUTIONS/ALLERGIES: Penicillins     Tool Used: Dysphagia Outcome and Severity Scale (SACHIN)    Score Comments   Normal Diet  [] 7 With no strategies or extra time needed   Functional Swallow  [] 6 May have mild oral or pharyngeal delay   Mild Dysphagia  [] 5 Which may require one diet consistency restricted    Mild-Moderate Dysphagia  [] 4 With 1-2 diet consistencies restricted   Moderate Dysphagia  [] 3 With 2 or more diet consistencies restricted   Moderate-Severe Dysphagia  [] 2 With partial PO strategies (trials with ST only)   Severe Dysphagia  [] 1 With inability to tolerate any PO safely      Score:  Initial:5 Most Recent: x (Date 08/13/20 )   Interpretation of Tool: The Dysphagia Outcome and Severity Scale (SACHIN) is a simple, easy-to-use, 7-point scale developed to systematically rate the functional severity of dysphagia based on objective assessment and make recommendations for diet level, independence level, and type of nutrition. Tool Used: MODIFIED STALIN SCALE (mRS)   Score   No Symptoms  [] 0   No significant disability despite symptoms; able to carry out all usual duties and activities  [] 1   Slight disability; unable to carry out all previous activities but able to look after own affairs without assistance.    [] 2   Moderate disability; requiring some help but able to walk without assistance  [] 3   Moderately severe disability; unable to walk without assistance and unable to attend to own bodily needs without assistance  [] 4   Severe disability; bedridden, incontinent, and requiring constant nursing care and attention  [x] 5      Score:  Initial: 5    Interpretation of Tool: The Modified Stalin Scale is a 7-point scaled used to quantify level of disability as it relates to a patient's functional abilities. Current Medications:   No current facility-administered medications on file prior to encounter. Current Outpatient Medications on File Prior to Encounter   Medication Sig Dispense Refill    oxyCODONE-acetaminophen 5-300 mg tab Take  by mouth.  triamterene-hydroCHLOROthiazide (MAXZIDE) 37.5-25 mg per tablet TAKE 1 TABLET BY MOUTH EVERY DAY (Patient taking differently: Take 1 tablet by mouth daily.) 30 Tab 1    cephALEXin (KEFLEX) 250 mg capsule Take 250 mg by mouth two (2) times a day.  sodium chloride (BD Pre-Filled Normal Saline) 10 mL by IntraVENous route as needed for Other (using SASH method and post blood draws).  heparin sod,porcine/0.9 % NaCl (HEPARIN FLUSH IV) 5 mL by IntraVENous route as needed for Other (using SASH method and post blood draws). Heparin 100Units/ml      polyethylene glycol (MIRALAX) 17 gram packet Take 1 Packet by mouth daily. (Patient taking differently: Take 1 Packet by mouth daily. mix with 8 oz water) 30 Packet 0    magnesium oxide (MAG-OX) 400 mg tablet Take 1 Tab by mouth two (2) times a day. 60 Tab 0    aspirin (ASPIRIN) 325 mg tablet Take 325 mg by mouth daily.  docusate sodium (COLACE) 50 mg capsule Take 50 mg by mouth two (2) times daily as needed for Constipation.  sennosides 25 mg tab Take 1 Tab by mouth nightly as needed for Other (constipation).  diphenhydrAMINE (BENADRYL) 25 mg tablet Take 25 mg by mouth every six (6) hours as needed for Allergies.  palbociclib (Ibrance) 75 mg cap Take one capsule by mouthOnce daily with food for21 days on, followed by7 days off during each cyCle of 28 days 21 Cap 5    ondansetron hcl (Zofran) 8 mg tablet Take 1 Tab by mouth every eight (8) hours as needed for Nausea. 90 Tab 2    letrozole (FEMARA) 2.5 mg tablet TAKE 1 TABLET BY MOUTH EVERY DAY 90 Tab 3    melatonin 5 mg cap capsule Take 10 mg by mouth nightly.       furosemide (LASIX) 20 mg tablet TAKE 1 TAB BY MOUTH DAILY AS NEEDED. FOR SWELLING. 30 Tab 1    calcium carbonate (OS-JORGE) 500 mg calcium (1,250 mg) tablet Take 2 tablets at lunch and 2 tablets at dinner. Indications: hypocalcemia (Patient taking differently: Take 2 Tabs by mouth. Take 2 tablets at lunch and 2 tablets at dinner. Indications: low amount of calcium in the blood) 4 Tab 0    amLODIPine (NORVASC) 5 mg tablet Take 5 mg by mouth daily.          SAFETY:  After treatment position/precautions:  · Upright in bed  · Call light within reach    Total Treatment Duration:   Time In: 0969  Time Out: [facility] [address] [address] [facility]

## 2020-08-13 NOTE — PROGRESS NOTES
All wound care supplies at bedside for wound vac. Wound care RN notified. Dr. Jimenez Soto notified. Care Management Interventions  PCP Verified by CM: Yes  Mode of Transport at Discharge: S  Transition of Care Consult (CM Consult): [address]: Yes  Discharge Durable Medical Equipment: No  Physical Therapy Consult: Yes  Occupational Therapy Consult: Yes  Speech Therapy Consult: Yes  Current Support Network: Own Home, Lives with Caregiver  Confirm Follow Up Transport: Family  The Plan for Transition of Care is Related to the Following Treatment Goals : Patient will participate in home health therapies in order to maintain baseline independence in ADLs. The Patient and/or Patient Representative was Provided with a Choice of Provider and Agrees with the Discharge Plan?: Yes  Name of the Patient Representative Who was Provided with a Choice of Provider and Agrees with the Discharge Plan: Sandra Casas.   Freedom of Choice List was Provided with Basic Dialogue that Supports the Patient's Individualized Plan of Care/Goals, Treatment Preferences and Shares the Quality Data Associated with the Providers?: Yes  Discharge Location  Discharge Placement: Home with home health

## 2020-08-13 NOTE — PROGRESS NOTES
Problem: Mobility Impaired (Adult and Pediatric)  Goal: *Acute Goals and Plan of Care (Insert Text)  Outcome: Progressing Towards Goal  Note: STG:  (1.)Ms. Jesus Alberto Palafox will move from supine to sit and sit to supine , scoot up and down, and roll side to side with MINIMAL ASSIST within 3 treatment day(s). (2.)Ms. Jesus Alberto Palafox will transfer from bed to chair and chair to bed with MODERATE ASSIST using the least restrictive device within 3 treatment day(s). (3.)Ms. Jesus Alberto Palafox will ambulate with MODERATE ASSIST for 3 feet with the least restrictive device within 3 treatment day(s). (4.)Ms. Jesus Alberto Palafox will perform standing static and dynamic balance activities x 5 minutes with MODERATE ASSIST to improve safety within 3 day(s). LTG:  (1.)Ms. Jesus Alberto Palafox will move from supine to sit and sit to supine , scoot up and down, and roll side to side in bed with CONTACT GUARD ASSIST within 7 treatment day(s). (2.)Ms. Jesus Alberto Palafox will transfer from bed to chair and chair to bed with MINIMAL ASSIST using the least restrictive device within 7 treatment day(s). (3.)Ms. Jesus Alberto Palafox will ambulate with MINIMAL ASSIST for 5 feet with the least restrictive device within 7 treatment day(s). (4.)Ms. Jesus Alberto Palafox will perform standing static and dynamic balance activities x 5 minutes with MINIMAL ASSIST to improve safety within 7 day(s).   ________________________________________________________________________________________________      PHYSICAL THERAPY: Daily Note and PM 8/13/2020  INPATIENT: PT Visit Days : 2  Payor: Romero Khalil / Plan: [address] [place] [address] HMO / Product Type: Managed Care Medicare /       NAME/AGE/GENDER: Karen Santacruz is a 67 y.o. female   PRIMARY DIAGNOSIS: CVA (cerebral vascular accident) ([facility] [I63.9]  Anemia [D64.9]  CKD (chronic kidney disease) [N18.9]  Vomiting [R11.10] CVA (cerebral vascular accident) ([facility]       ICD-10: Treatment Diagnosis:    · Generalized Muscle Weakness (M62.81) Precaution/Allergies:  Penicillins      ASSESSMENT:     Ms. Sindy Richardson is a 67 y.o. female admitted for CVA workup. Per MRI: \"Acute left parietal infarct. No evidence of hemorrhage. \" She is unable to provide history due to confusion, however was recently hospitalized for L hip infection so history obtained from old PT notes from May 2020. As of May, patient was living with daughter in a mobile home and able to transfer to bedside commode/chair, however did not do much walking. She has a hospital bed at home. Patient was supine upon contact; RN lior'd treatment. Patient presents aphasic, confused with decreased command following. Patient needed mod-max assist x 2 to get to  EOB. Once sitting up pt became verbal making unrelated comments about a puzzle piece. Attempted sit-stand x4, 2 with HHA maxAx2.  2 others with RW and holding hands on RW. With modAx2 pt was able to get hips far enough fwd to clear bed and stand straight uup on LEs, however she was not engaging UEs to push up straight. OTs experience suggested problem was more cognition than ability. asst pt BTB with minAx2 mainly with LEs. Pt is improving consideraalby from maxA for all mobility to [place] with some and able to stand for max 20secs in flexed position. Overall slow progress towards physical therapy goals, however todays gains were remarkable. . Patient's goals listed above are still appropriate. Will continue skilled PT to address remaining deficits. At this time, patient is appropriate for Co-treatment with occupational therapy due to patient's decreased overall endurance/tolerance levels, as well as need for high level skilled assistance to complete functional transfers/mobility and functional tasks. Camille Pompa is appropriate for a multidisciplinary co-treatment of PT and OT to address goals of both disciplines.      This section established at most recent assessment   PROBLEM LIST (Impairments causing functional limitations):  1. Decreased Strength  2. Decreased ADL/Functional Activities  3. Decreased Transfer Abilities  4. Decreased Ambulation Ability/Technique  5. Decreased Balance  6. Decreased Activity Tolerance  7. Decreased Knowledge of Precautions  8. Decreased [place] with Home Exercise Program   INTERVENTIONS PLANNED: (Benefits and precautions of physical therapy have been discussed with the patient.)  1. Balance Exercise  2. Bed Mobility  3. Family Education  4. Gait Training  5. Home Exercise Program (HEP)  6. Neuromuscular Re-education/Strengthening  7. Therapeutic Activites  8. Therapeutic Exercise/Strengthening  9. Transfer Training     TREATMENT PLAN: Frequency/Duration: 3 times a week for duration of hospital stay  Rehabilitation Potential For Stated Goals: [facility] (at time of discharge pending progress):    Placement: It is my opinion, based on this patient's performance to date, that Ms. Catalina Rodriguez may benefit from [facility], [facility]. Equipment:    None at this time              HISTORY:   History of Present Injury/Illness (Reason for Referral):  Ferdinand Habermann is a 67 y.o. female who has a PMH of HTN, dyslipidemia, metastatic breast cancer, CKD stage IV, sp left hip arthroplasty + wound vac, wound infection on chronic antibiotic therapy, who was brought in via EMS after her daughter noted her confused, not making sense since yesterday around 1600 hrs. The patient is bed-bound after her hip surgery on 5/20. She complains of nausea, vomiting and decreased appetite. She is unable to follow commands. All information was gathered from her daughter Caryn Marshall at beside. Her mother has not had fever, chills, cough, GI bleeding or diarrhea. She has been compliant to all her home meds, including keflex. She last saw ID yesterday.   Past Medical History/Comorbidities:   Ms. Catalina Rodriguez  has a past medical history of Abnormal EKG (2/16/2016), Anemia due to chronic kidney disease treated with erythropoietin (7/25/2017), Aortic valve insufficiency (2/16/2016), Cancer ([facility] Hyperlipidemia (2/16/2016), Hypertension (2/16/2016), and Obesity (2/16/2016). Ms. Sara Lanza  has a past surgical history that includes hx tubal ligation; ir insert tunl cvc w/o port over 5 yr (5/19/2020); and ir remove tunl cvad w/o port / pump (7/2/2020). Social History/Living Environment:   Support Systems: Child(ezequiel)  Patient Expects to be Discharged to[de-identified] Unknown  Current DME Used/Available at Home: Hospital bed, Wheelchair, Walker, rolling, Commode, bedside  Prior Level of Function/Work/Activity:  She is unable to provide history due to confusion, however was recently hospitalized for L hip infection so history obtained from old PT notes from May 2020. As of May, patient was living with daughter in a mobile home and able to transfer to bedside commode/chair, however did not do much walking. She has a hospital bed at home. Number of Personal Factors/Comorbidities that affect the Plan of Care: 3+: HIGH COMPLEXITY   EXAMINATION:   Most Recent Physical Functioning:   Gross Assessment:                  Posture:     Balance:  Sitting: Impaired  Sitting - Static: Good (unsupported)  Sitting - Dynamic: Fair (occasional)  Standing: Impaired  Standing - Static: Poor Bed Mobility:  Rolling: Minimum assistance  Supine to Sit: Minimum assistance  Sit to Supine: Minimum assistance;Assist x2; Additional time  Wheelchair Mobility:     Transfers:  Sit to Stand: Maximum assistance;Assist x2; Additional time  Stand to Sit: Maximum assistance;Assist x2  Gait:            Body Structures Involved:  1. Nerves  2. Voice/Speech  3. Heart  4. Muscles Body Functions Affected:  1. Mental  2. Sensory/Pain  3. Voice and Speech  4. Cardio  5. Neuromusculoskeletal  6. Movement Related Activities and Participation Affected:  1. Learning and Applying Knowledge  2. General Tasks and Demands  3. Communication  4. Mobility  5. Self Care  6. Domestic Life  7.  Interpersonal Interactions and Relationships  8. Community, Social and [place]   Number of elements that affect the Plan of Care: 4+: HIGH COMPLEXITY   CLINICAL PRESENTATION:   Presentation: Evolving clinical presentation with changing clinical characteristics: MODERATE COMPLEXITY   CLINICAL DECISION MAKIN [facility] Inpatient Short Form  How much difficulty does the patient currently have. .. Unable A Lot A Little None   1. Turning over in bed (including adjusting bedclothes, sheets and blankets)? [] 1   [x] 2   [] 3   [] 4   2. Sitting down on and standing up from a chair with arms ( e.g., wheelchair, bedside commode, etc.)   [] 1   [x] 2   [] 3   [] 4   3. Moving from lying on back to sitting on the side of the bed? [] 1   [x] 2   [] 3   [] 4   How much help from another person does the patient currently need. .. Total A Lot A Little None   4. Moving to and from a bed to a chair (including a wheelchair)? [x] 1   [] 2   [] 3   [] 4   5. Need to walk in hospital room? [x] 1   [] 2   [] 3   [] 4   6. Climbing 3-5 steps with a railing? [x] 1   [] 2   [] 3   [] 4   © , Trustees of [address] [place] [address] [address] [address], under license to [employer]. All rights reserved      Score:  Initial: 9 Most Recent: X (Date: -- )    Interpretation of Tool:  Represents activities that are increasingly more difficult (i.e. Bed mobility, Transfers, Gait). Medical Necessity:     · Patient demonstrates   · good  ·  rehab potential due to higher previous functional level. Reason for Services/Other Comments:  · Patient   · continues to require modification of therapeutic interventions to increase complexity of exercises  · .    Use of outcome tool(s) and clinical judgement create a POC that gives a: Questionable prediction of patient's progress: MODERATE COMPLEXITY            TREATMENT:   (In addition to Assessment/Re-Assessment sessions the following treatments were rendered)   Pre-treatment Symptoms/Complaints:  none  Pain: Initial:   Pain Intensity 1: 0  Post Session:  0     Therapeutic Activity: (    25 minutes): Therapeutic activities including bed mobility training, static/dynamic sitting balance, scooting, posture training, LE exercises, and patient education to improve mobility, strength, and balance. Required minimal manual and verbal cues   to promote static and dynamic balance in sitting. Braces/Orthotics/Lines/Etc:   · O2 Device: Room air  Treatment/Session Assessment:    · Response to Treatment: None  · Interdisciplinary Collaboration:   o Physical Therapist  o Occupational Therapist  o Registered Nurse  o Certified Nursing Assistant/Patient Care Technician  · After treatment position/precautions:   o Supine in bed  o Bed alarm/tab alert on  o Bed/Chair-wheels locked  o Bed in low position  o Call light within reach  o RN notified   · Compliance with Program/Exercises: Will assess as treatment progresses  · Recommendations/Intent for next treatment session: \"Next visit will focus on advancements to more challenging activities and reduction in assistance provided\".   Total Treatment Duration:  PT Patient Time In/Time Out  Time In: 1520  Time Out: 1008 Beau Pedro DPT

## 2020-08-13 NOTE — PROGRESS NOTES
08/13/20 0656   NIH Stroke Scale   Interval Other (comment)  (Dual NIH)   LOC 0   LOC Questions 2   LOC Commands 1   Best Gaze 1   Visual 1   Facial Palsy 0   Motor Right Arm 0   Motor Left Arm 1   Motor Right Leg 3   Motor Left Leg 3   Limb Ataxia 0   Sensory 1   Best Language 1   Dysarthria 1   Extinction and Inattention 0   Total 15

## 2020-08-14 PROCEDURE — 65660000000 HC RM CCU STEPDOWN

## 2020-08-14 PROCEDURE — 74011250637 HC RX REV CODE- 250/637: Performed by: INTERNAL MEDICINE

## 2020-08-14 PROCEDURE — 3331090002 HH PPS REVENUE DEBIT

## 2020-08-14 PROCEDURE — 3331090001 HH PPS REVENUE CREDIT

## 2020-08-14 RX ADMIN — Medication 10 ML: at 05:09

## 2020-08-14 RX ADMIN — TRIAMTERENE AND HYDROCHLOROTHIAZIDE 1 TABLET: 37.5; 25 TABLET ORAL at 08:32

## 2020-08-14 RX ADMIN — ASPIRIN 81 MG: 81 TABLET, CHEWABLE ORAL at 08:32

## 2020-08-14 RX ADMIN — CEPHALEXIN 250 MG: 250 CAPSULE ORAL at 08:32

## 2020-08-14 RX ADMIN — SENNOSIDES AND DOCUSATE SODIUM 2 TABLET: 8.6; 5 TABLET ORAL at 21:52

## 2020-08-14 RX ADMIN — Medication 1 AMPULE: at 08:32

## 2020-08-14 RX ADMIN — Medication 1 AMPULE: at 21:52

## 2020-08-14 RX ADMIN — AMLODIPINE BESYLATE 5 MG: 5 TABLET ORAL at 08:32

## 2020-08-14 RX ADMIN — ATORVASTATIN CALCIUM 80 MG: 80 TABLET, FILM COATED ORAL at 21:52

## 2020-08-14 RX ADMIN — PANTOPRAZOLE SODIUM 40 MG: 40 TABLET, DELAYED RELEASE ORAL at 05:09

## 2020-08-14 RX ADMIN — Medication 10 ML: at 21:53

## 2020-08-14 RX ADMIN — CEPHALEXIN 250 MG: 250 CAPSULE ORAL at 17:21

## 2020-08-14 NOTE — PROGRESS NOTES
Portable woundVac on patient does not charge because no chrger available.  Contacted pt family (daughter) left [facility] in cell ( to bring in [facility]) to  call [phone]

## 2020-08-14 NOTE — PROGRESS NOTES
Chart reviewed for why patient did not discharge. Daughter contacted and she states that someone in therapy told her that they could get her into a \"different kind of rehab facility\" where they could visit. Appears that PT had recommended [facility] which has already been denied after peer to peer with Dr. Les Wynn. Explained to daughter again about SNF, Covid testing and visitation and she would like referral sent to [address] [place] [address] or [place]. Liaison's contacted and [facility] can take patient tomorrow with a rapid covid test.  All patient in formation sent to [employer] for insurance approval and Covid testing ordered.

## 2020-08-14 NOTE — PROGRESS NOTES
08/14/20 1940   NIH Stroke Scale   Interval Other (comment)   LOC 0   LOC Questions 2   LOC Commands 1   Best Gaze 1   Visual 1   Facial Palsy 0   Motor Right Arm 0   Motor Left Arm 1   Motor Right Leg 2   Motor Left Leg 2   Limb Ataxia 0   Sensory 1   Best Language 1   Dysarthria 1   Extinction and Inattention 0   Total 13

## 2020-08-14 NOTE — PROGRESS NOTES
08/14/20 0711   NIH Stroke Scale   Interval Other (comment)  (Dual NIH)   LOC 0   LOC Questions 2   LOC Commands 1   Best Gaze 1   Visual 1   Facial Palsy 0   Motor Right Arm 0   Motor Left Arm 1   Motor Right Leg 2   Motor Left Leg 2   Limb Ataxia 0   Sensory 1   Best Language 1   Dysarthria 1   Extinction and Inattention 0   Total 13

## 2020-08-14 NOTE — PROGRESS NOTES
Hospitalist Progress Note     Admit Date:  2020 12:46 PM   Name:  Mar Levin   Age:  67 y.o.  :  1945   MRN:  [id number]   PCP:  Demi Cavazos NP  Treatment Team: Attending Provider: Phi Barrett MD; Utilization Review: Bee Cerda RN; Primary Nurse: Chloe Velazquez; Care Manager: Anita Robb RN    Subjective: The patient is a 16-year-old  lady with HTN, dyslipidemia, CKD stage IV, chronic left hip infection on chronic antibiotic therapy, is presently managed for acute CVA and anemia. She is basically bedbound, denies any significant pain or shortness of breath. She was actually going to be discharged home yesterday with home health services, but at the last minute the family decided that they would attempt placement at a skilled nursing facility for rehabilitation, as recommended by physical therapy. Initially, the family had declined nursing home/SNF, but now they are reconsidering. Case management started the process for SNF placement. Objective:     Patient Vitals for the past 24 hrs:   Temp Pulse Resp BP SpO2   20 1600 98.1 °F (36.7 °C) 78 18 123/79 99 %   20 1200 97.9 °F (36.6 °C) 75 18 102/46 100 %   20 0800 98.2 °F (36.8 °C) 83 17 118/63 96 %   20 0400 97.8 °F (36.6 °C) 74 16 103/54 93 %   20 0000 97.7 °F (36.5 °C) 76 14 111/64 95 %   20 2000 97.9 °F (36.6 °C) 80 16 174/81 92 %     Oxygen Therapy  O2 Sat (%): 99 % (20 1600)  Pulse via Oximetry: 68 beats per minute (20 1700)  O2 Device: Room air (20 1254)  No intake or output data in the 24 hours ending 20 192      General:    Well nourished. Alert. CV:   RRR. No murmur, rub, or gallop. Lungs:   Clear to auscultation bilaterally. No wheezing, rhonchi, or rales. Abdomen:   Soft, nontender, nondistended. Extremities: Warm and dry. No cyanosis or edema. Left hip wound with wound VAC in position  Skin:     No rashes or jaundice. Data Review:  I have reviewed all labs, meds, telemetry events, and studies from the last 24 hours. No results found for this or any previous visit (from the past 24 hour(s)).      All Micro Results     Procedure Component Value Units Date/Time    CULTURE, BLOOD [[id number]] Collected:  08/04/20 1257    Order Status:  Completed Specimen:  Blood Updated:  08/09/20 0634     Special Requests: --        RIGHT  Antecubital       Culture result: NO GROWTH 5 DAYS       CULTURE, BLOOD [[id number]] Collected:  08/04/20 1257    Order Status:  Completed Specimen:  Blood Updated:  08/09/20 0634     Special Requests: --        LEFT  FOREARM       Culture result: NO GROWTH 5 DAYS       CULTURE, URINE [[id number]] Collected:  08/04/20 1530    Order Status:  Completed Specimen:  Cath Urine Updated:  08/07/20 0804     Special Requests: NO SPECIAL REQUESTS        Culture result: NO GROWTH 2 DAYS             Current Meds:  Current Facility-Administered Medications   Medication Dose Route Frequency    HYDROcodone-acetaminophen (NORCO) 5-325 mg per tablet 1 Tab  1 Tab Oral Q6H PRN    alcohol 62% (NOZIN) nasal  1 Ampule  1 Ampule Topical Q12H    pantoprazole (PROTONIX) tablet 40 mg  40 mg Oral ACB    0.9% sodium chloride infusion 250 mL  250 mL IntraVENous PRN    sodium chloride (NS) flush 5-40 mL  5-40 mL IntraVENous Q8H    sodium chloride (NS) flush 5-40 mL  5-40 mL IntraVENous PRN    ondansetron (ZOFRAN) injection 4 mg  4 mg IntraVENous Q6H PRN    atorvastatin (LIPITOR) tablet 80 mg  80 mg Oral QHS    acetaminophen (TYLENOL) suppository 650 mg  650 mg Rectal Q4H PRN    labetaloL (NORMODYNE;TRANDATE) injection 5 mg  5 mg IntraVENous Q10MIN PRN    senna-docusate (PERICOLACE) 8.6-50 mg per tablet 2 Tab  2 Tab Oral QHS    amLODIPine (NORVASC) tablet 5 mg  5 mg Oral DAILY    cephALEXin (KEFLEX) capsule 250 mg  250 mg Oral BID    diphenhydrAMINE (BENADRYL) capsule 25 mg  25 mg Oral Q6H PRN    triamterene-hydroCHLOROthiazide (MAXZIDE) 37.5-25 mg per tablet 1 Tab  1 Tab Oral DAILY    aspirin chewable tablet 81 mg  81 mg Oral DAILY       Other Studies (last 24 hours):  No results found. Assessment and Plan:     Hospital Problems as of 8/14/2020 Date Reviewed: 7/28/2020          Codes Class Noted - Resolved POA    Vomiting ICD-10-CM: R11.10  ICD-9-CM: 787.03  8/4/2020 - Present Unknown        Anemia ICD-10-CM: D64.9  ICD-9-CM: 285.9  8/4/2020 - Present Unknown        * (Principal) CVA (cerebral vascular accident) ([facility] ICD-10-CM: I63.9  ICD-9-CM: 434.91  8/4/2020 - Present Unknown        CKD (chronic kidney disease) ICD-10-CM: N18.9  ICD-9-CM: 585.9  8/4/2020 - Present Unknown        DNR (do not resuscitate) ICD-10-CM: Z66  ICD-9-CM: V49.86  8/4/2020 - Present Yes        Left hip postoperative wound infection ICD-10-CM: T81.49XA  ICD-9-CM: 998.59  5/13/2020 - Present Yes        Stage 4 chronic kidney disease ([facility] (Chronic) ICD-10-CM: N18.4  ICD-9-CM: 585.4  5/1/2020 - Present Yes        Severe obesity ([facility] (Chronic) ICD-10-CM: E66.01  ICD-9-CM: 278.01  9/24/2019 - Present Yes        Anemia due to chronic kidney disease treated with erythropoietin ICD-10-CM: N18.9, D63.1  ICD-9-CM: 285.21, 585.9  7/25/2017 - Present Yes        Malignant neoplasm metastatic to bone [facility]) ICD-10-CM: C79.51  ICD-9-CM: 198.5  7/31/2016 - Present Yes    Overview Signed 1/10/2017  6:13 PM by Yaquelin Gomez MD     Last Assessment & Plan:   1. Complete radiation therapy to the patient's sacrum and femurs as directed by Dr. Maura Pham. 2.  Return to the office in 6 weeks with x-rays of the pelvis on arrival.  3.  Instruct the patient to advance her weightbearing as tolerated as she continues to enjoy a reduction in pain following the radiation therapy.              Hypertension (Chronic) ICD-10-CM: I10  ICD-9-CM: 401.9  2/16/2016 - Present Yes    Overview Signed 2/16/2016 12:09 PM by Ashley Godoy     UNSPECIFIED 1 - acute CVA affecting the left temporal and parietal lobes  2 - anemia  3 - CKD stage IV  4 - HTN  5 - dyslipidemia   6 - chronic left hip infection with wound VAC in position     PLAN:    · Continue aspirin and statins  · Encourage physical therapy  · Continue to monitor renal function which seems to be at baseline  · She is status post 1 PRBC after presenting for hemoglobin of 6.8. Her most recent hemoglobin is 8.7. · Continue present home medications for chronic conditions  · Continue lifelong cephalexin      DC planning/Dispo: SNF when bed available  DVT ppx: With SCDs      Signed:   Marissa Driscoll MD

## 2020-08-15 LAB
COVID-19 RAPID TEST, COVR: NOT DETECTED
SARS COV-2, XPGCVT: POSITIVE
SOURCE, COVRS: ABNORMAL

## 2020-08-15 PROCEDURE — 74011250637 HC RX REV CODE- 250/637: Performed by: INTERNAL MEDICINE

## 2020-08-15 PROCEDURE — 65660000000 HC RM CCU STEPDOWN

## 2020-08-15 PROCEDURE — 3331090001 HH PPS REVENUE CREDIT

## 2020-08-15 PROCEDURE — 87635 SARS-COV-2 COVID-19 AMP PRB: CPT

## 2020-08-15 PROCEDURE — 3331090002 HH PPS REVENUE DEBIT

## 2020-08-15 PROCEDURE — 77030038269 HC DRN EXT URIN PURWCK BARD -A

## 2020-08-15 RX ADMIN — ATORVASTATIN CALCIUM 80 MG: 80 TABLET, FILM COATED ORAL at 20:58

## 2020-08-15 RX ADMIN — SENNOSIDES AND DOCUSATE SODIUM 2 TABLET: 8.6; 5 TABLET ORAL at 20:58

## 2020-08-15 RX ADMIN — Medication 10 ML: at 04:43

## 2020-08-15 RX ADMIN — PANTOPRAZOLE SODIUM 40 MG: 40 TABLET, DELAYED RELEASE ORAL at 04:42

## 2020-08-15 RX ADMIN — Medication 1 AMPULE: at 20:58

## 2020-08-15 RX ADMIN — TRIAMTERENE AND HYDROCHLOROTHIAZIDE 1 TABLET: 37.5; 25 TABLET ORAL at 08:36

## 2020-08-15 RX ADMIN — AMLODIPINE BESYLATE 5 MG: 5 TABLET ORAL at 08:36

## 2020-08-15 RX ADMIN — Medication 5 ML: at 17:10

## 2020-08-15 RX ADMIN — CEPHALEXIN 250 MG: 250 CAPSULE ORAL at 08:36

## 2020-08-15 RX ADMIN — Medication 10 ML: at 20:59

## 2020-08-15 RX ADMIN — CEPHALEXIN 250 MG: 250 CAPSULE ORAL at 17:09

## 2020-08-15 RX ADMIN — Medication 1 AMPULE: at 08:36

## 2020-08-15 RX ADMIN — ASPIRIN 81 MG: 81 TABLET, CHEWABLE ORAL at 08:36

## 2020-08-15 NOTE — PROGRESS NOTES
08/15/20 0707   NIH Stroke Scale   Interval Other (comment)  (dual NIH)   LOC 0   LOC Questions 2   LOC Commands 1   Best Gaze 1   Visual 1   Facial Palsy 0   Motor Right Arm 0   Motor Left Arm 1   Motor Right Leg 2   Motor Left Leg 2   Limb Ataxia 0   Sensory 1   Best Language 1   Dysarthria 1   Extinction and Inattention 0   Total 13

## 2020-08-15 NOTE — PROGRESS NOTES
Hospitalist Progress Note     Admit Date:  2020 12:46 PM   Name:  Radha Garcia   Age:  67 y.o.  :  1945   MRN:  [id number]   PCP:  April Rdz NP  Treatment Team: Attending Provider: Shaniqua Ren MD; Utilization Review: Rupa Zarate RN; Primary Nurse: Keyur Rain; Care Manager: Karen Arriola RN    Subjective: The patient is a 70-year-old  lady with HTN, dyslipidemia, CKD stage IV, chronic left hip infection on chronic antibiotic therapy, is presently managed for acute CVA and anemia. She is basically bedbound, denies any significant pain or shortness of breath. She was actually going to be discharged home 2 days ago with home health services, but at the last minute the family decided that they would attempt placement at a skilled nursing facility for rehabilitation, as recommended by physical therapy. Initially, the family had declined nursing home/SNF, but now they are reconsidering. Case management started the process for SNF placement. Objective:     Patient Vitals for the past 24 hrs:   Temp Pulse Resp BP SpO2   08/15/20 1600 98.1 °F (36.7 °C) 74 18 137/69 97 %   08/15/20 1200 97.5 °F (36.4 °C) 75 20 116/50 98 %   08/15/20 0800 98.3 °F (36.8 °C) 70 18 101/62 94 %   08/15/20 0400 97.5 °F (36.4 °C) 74 17 115/49 96 %   08/15/20 0000 97.5 °F (36.4 °C) 70 17 121/54 96 %   20 2000 97.8 °F (36.6 °C) 78 17 149/69 96 %     Oxygen Therapy  O2 Sat (%): 97 % (08/15/20 1600)  Pulse via Oximetry: 68 beats per minute (20 1700)  O2 Device: Room air (20 1254)  No intake or output data in the 24 hours ending 08/15/20 1917      General:    Well nourished. Alert. CV:   RRR. No murmur, rub, or gallop. Lungs:   Clear to auscultation bilaterally. No wheezing, rhonchi, or rales. Abdomen:   Soft, nontender, nondistended. Extremities: Warm and dry. No cyanosis or edema. Left hip wound with wound VAC in position  Skin:     No rashes or jaundice. Data Review:  I have reviewed all labs, meds, telemetry events, and studies from the last 24 hours.     Recent Results (from the past 24 hour(s))   SARS-COV-2    Collection Time: 08/15/20  4:31 AM   Result Value Ref Range    Specimen source Nasopharyngeal      COVID-19 rapid test Not detected NOTD      SARS CoV-2 PENDING         All Micro Results     Procedure Component Value Units Date/Time    CULTURE, BLOOD [[id number]] Collected:  08/04/20 1257    Order Status:  Completed Specimen:  Blood Updated:  08/09/20 0634     Special Requests: --        RIGHT  Antecubital       Culture result: NO GROWTH 5 DAYS       CULTURE, BLOOD [[id number]] Collected:  08/04/20 1257    Order Status:  Completed Specimen:  Blood Updated:  08/09/20 0634     Special Requests: --        LEFT  FOREARM       Culture result: NO GROWTH 5 DAYS       CULTURE, URINE [[id number]] Collected:  08/04/20 1530    Order Status:  Completed Specimen:  Cath Urine Updated:  08/07/20 0804     Special Requests: NO SPECIAL REQUESTS        Culture result: NO GROWTH 2 DAYS             Current Meds:  Current Facility-Administered Medications   Medication Dose Route Frequency    HYDROcodone-acetaminophen (NORCO) 5-325 mg per tablet 1 Tab  1 Tab Oral Q6H PRN    alcohol 62% (NOZIN) nasal  1 Ampule  1 Ampule Topical Q12H    pantoprazole (PROTONIX) tablet 40 mg  40 mg Oral ACB    0.9% sodium chloride infusion 250 mL  250 mL IntraVENous PRN    sodium chloride (NS) flush 5-40 mL  5-40 mL IntraVENous Q8H    sodium chloride (NS) flush 5-40 mL  5-40 mL IntraVENous PRN    ondansetron (ZOFRAN) injection 4 mg  4 mg IntraVENous Q6H PRN    atorvastatin (LIPITOR) tablet 80 mg  80 mg Oral QHS    acetaminophen (TYLENOL) suppository 650 mg  650 mg Rectal Q4H PRN    labetaloL (NORMODYNE;TRANDATE) injection 5 mg  5 mg IntraVENous Q10MIN PRN    senna-docusate (PERICOLACE) 8.6-50 mg per tablet 2 Tab  2 Tab Oral QHS    amLODIPine (NORVASC) tablet 5 mg  5 mg Oral DAILY  cephALEXin (KEFLEX) capsule 250 mg  250 mg Oral BID    diphenhydrAMINE (BENADRYL) capsule 25 mg  25 mg Oral Q6H PRN    triamterene-hydroCHLOROthiazide (MAXZIDE) 37.5-25 mg per tablet 1 Tab  1 Tab Oral DAILY    aspirin chewable tablet 81 mg  81 mg Oral DAILY       Other Studies (last 24 hours):  No results found. Assessment and Plan:     Hospital Problems as of 8/15/2020 Date Reviewed: 7/28/2020          Codes Class Noted - Resolved POA    Vomiting ICD-10-CM: R11.10  ICD-9-CM: 787.03  8/4/2020 - Present Unknown        Anemia ICD-10-CM: D64.9  ICD-9-CM: 285.9  8/4/2020 - Present Unknown        * (Principal) CVA (cerebral vascular accident) ([facility] ICD-10-CM: I63.9  ICD-9-CM: 434.91  8/4/2020 - Present Unknown        CKD (chronic kidney disease) ICD-10-CM: N18.9  ICD-9-CM: 585.9  8/4/2020 - Present Unknown        DNR (do not resuscitate) ICD-10-CM: Z66  ICD-9-CM: V49.86  8/4/2020 - Present Yes        Left hip postoperative wound infection ICD-10-CM: T81.49XA  ICD-9-CM: 998.59  5/13/2020 - Present Yes        Stage 4 chronic kidney disease ([facility] (Chronic) ICD-10-CM: N18.4  ICD-9-CM: 585.4  5/1/2020 - Present Yes        Severe obesity ([facility] (Chronic) ICD-10-CM: E66.01  ICD-9-CM: 278.01  9/24/2019 - Present Yes        Anemia due to chronic kidney disease treated with erythropoietin ICD-10-CM: N18.9, D63.1  ICD-9-CM: 285.21, 585.9  7/25/2017 - Present Yes        Malignant neoplasm metastatic to bone [facility]) ICD-10-CM: C79.51  ICD-9-CM: 198.5  7/31/2016 - Present Yes    Overview Signed 1/10/2017  6:13 PM by Neha Medrano MD     Last Assessment & Plan:   1. Complete radiation therapy to the patient's sacrum and femurs as directed by Dr. Leeann Owen. 2.  Return to the office in 6 weeks with x-rays of the pelvis on arrival.  3.  Instruct the patient to advance her weightbearing as tolerated as she continues to enjoy a reduction in pain following the radiation therapy.              Hypertension (Chronic) ICD-10-CM: I10  ICD-9-CM: 401.9  2/16/2016 - Present Yes    Overview Signed 2/16/2016 12:09 PM by Ashley Godoy     UNSPECIFIED                    1 - acute CVA affecting the left temporal and parietal lobes  2 - anemia  3 - CKD stage IV  4 - HTN  5 - dyslipidemia   6 - chronic left hip infection with wound VAC in position     PLAN:    · Continue aspirin and statins  · Encourage physical therapy  · Continue to monitor renal function which seems to be at baseline  · She is status post 1 PRBC after presenting with hemoglobin of 6.8.  Her most recent hemoglobin is 8.7. · Continue present home medications for chronic conditions  · Continue lifelong cephalexin      DC planning/Dispo: SNF when bed available  DVT ppx: With SCDs        Signed:   Celestine Lowe MD

## 2020-08-15 NOTE — PROGRESS NOTES
08/15/20 1852   NIH Stroke Scale   Interval Other (comment)   LOC 0   LOC Questions 2   LOC Commands 1   Best Gaze 1   Visual 1   Facial Palsy 0   Motor Right Arm 0   Motor Left Arm 1   Motor Right Leg 2   Motor Left Leg 2   Limb Ataxia 0   Sensory 1   Best Language 1   Dysarthria 1   Extinction and Inattention 0   Total 13

## 2020-08-15 NOTE — PROGRESS NOTES
Contacted pt daughter about wound vac , family brought in the . Wound vac charging and working correctly on pt.

## 2020-08-16 ENCOUNTER — APPOINTMENT (OUTPATIENT)
Dept: GENERAL RADIOLOGY | Age: 75
DRG: 064 | End: 2020-08-16
Attending: FAMILY MEDICINE
Payer: MEDICARE

## 2020-08-16 LAB
ALBUMIN SERPL-MCNC: 2.6 G/DL (ref 3.2–4.6)
ALBUMIN/GLOB SERPL: 0.7 {RATIO} (ref 1.2–3.5)
ALP SERPL-CCNC: 223 U/L (ref 50–136)
ALT SERPL-CCNC: 22 U/L (ref 12–65)
ANION GAP SERPL CALC-SCNC: 5 MMOL/L (ref 7–16)
AST SERPL-CCNC: 75 U/L (ref 15–37)
BASOPHILS # BLD: 0.1 K/UL (ref 0–0.2)
BASOPHILS NFR BLD: 1 % (ref 0–2)
BILIRUB SERPL-MCNC: 0.8 MG/DL (ref 0.2–1.1)
BUN SERPL-MCNC: 41 MG/DL (ref 8–23)
CALCIUM SERPL-MCNC: 9.2 MG/DL (ref 8.3–10.4)
CHLORIDE SERPL-SCNC: 109 MMOL/L (ref 98–107)
CO2 SERPL-SCNC: 25 MMOL/L (ref 21–32)
CREAT SERPL-MCNC: 1.98 MG/DL (ref 0.6–1)
DIFFERENTIAL METHOD BLD: ABNORMAL
EOSINOPHIL # BLD: 0 K/UL (ref 0–0.8)
EOSINOPHIL NFR BLD: 0 % (ref 0.5–7.8)
ERYTHROCYTE [DISTWIDTH] IN BLOOD BY AUTOMATED COUNT: 21.6 % (ref 11.9–14.6)
GLOBULIN SER CALC-MCNC: 3.5 G/DL (ref 2.3–3.5)
GLUCOSE SERPL-MCNC: 96 MG/DL (ref 65–100)
HCT VFR BLD AUTO: 27.8 % (ref 35.8–46.3)
HGB BLD-MCNC: 8.7 G/DL (ref 11.7–15.4)
IMM GRANULOCYTES # BLD AUTO: 0.9 K/UL (ref 0–0.5)
IMM GRANULOCYTES NFR BLD AUTO: 12 % (ref 0–5)
LYMPHOCYTES # BLD: 1.1 K/UL (ref 0.5–4.6)
LYMPHOCYTES NFR BLD: 15 % (ref 13–44)
MAGNESIUM SERPL-MCNC: 2 MG/DL (ref 1.8–2.4)
MCH RBC QN AUTO: 32.3 PG (ref 26.1–32.9)
MCHC RBC AUTO-ENTMCNC: 31.3 G/DL (ref 31.4–35)
MCV RBC AUTO: 103.3 FL (ref 79.6–97.8)
MONOCYTES # BLD: 0.7 K/UL (ref 0.1–1.3)
MONOCYTES NFR BLD: 10 % (ref 4–12)
NEUTS SEG # BLD: 4.3 K/UL (ref 1.7–8.2)
NEUTS SEG NFR BLD: 62 % (ref 43–78)
NRBC # BLD: 0.05 K/UL (ref 0–0.2)
PHOSPHATE SERPL-MCNC: 3 MG/DL (ref 2.3–3.7)
PLATELET # BLD AUTO: 169 K/UL (ref 150–450)
PLATELET COMMENTS,PCOM: ADEQUATE
PMV BLD AUTO: 10.2 FL (ref 9.4–12.3)
POTASSIUM SERPL-SCNC: 4.3 MMOL/L (ref 3.5–5.1)
PROT SERPL-MCNC: 6.1 G/DL (ref 6.3–8.2)
RBC # BLD AUTO: 2.69 M/UL (ref 4.05–5.2)
RBC MORPH BLD: ABNORMAL
RBC MORPH BLD: ABNORMAL
SODIUM SERPL-SCNC: 139 MMOL/L (ref 136–145)
WBC # BLD AUTO: 7.1 K/UL (ref 4.3–11.1)
WBC MORPH BLD: ABNORMAL

## 2020-08-16 PROCEDURE — 74011250637 HC RX REV CODE- 250/637: Performed by: INTERNAL MEDICINE

## 2020-08-16 PROCEDURE — 36415 COLL VENOUS BLD VENIPUNCTURE: CPT

## 2020-08-16 PROCEDURE — 71045 X-RAY EXAM CHEST 1 VIEW: CPT

## 2020-08-16 PROCEDURE — 80053 COMPREHEN METABOLIC PANEL: CPT

## 2020-08-16 PROCEDURE — 83735 ASSAY OF MAGNESIUM: CPT

## 2020-08-16 PROCEDURE — 84100 ASSAY OF PHOSPHORUS: CPT

## 2020-08-16 PROCEDURE — 74011250637 HC RX REV CODE- 250/637: Performed by: FAMILY MEDICINE

## 2020-08-16 PROCEDURE — 3331090002 HH PPS REVENUE DEBIT

## 2020-08-16 PROCEDURE — 74011250636 HC RX REV CODE- 250/636: Performed by: FAMILY MEDICINE

## 2020-08-16 PROCEDURE — 85025 COMPLETE CBC W/AUTO DIFF WBC: CPT

## 2020-08-16 PROCEDURE — 3331090001 HH PPS REVENUE CREDIT

## 2020-08-16 PROCEDURE — 65660000000 HC RM CCU STEPDOWN

## 2020-08-16 PROCEDURE — 74011000258 HC RX REV CODE- 258: Performed by: FAMILY MEDICINE

## 2020-08-16 RX ORDER — ASCORBIC ACID 500 MG
2000 TABLET ORAL 3 TIMES DAILY
Status: DISCONTINUED | OUTPATIENT
Start: 2020-08-16 | End: 2020-08-19 | Stop reason: HOSPADM

## 2020-08-16 RX ORDER — ASPIRIN 300 MG/1
150 SUPPOSITORY RECTAL DAILY
Status: DISCONTINUED | OUTPATIENT
Start: 2020-08-16 | End: 2020-08-17

## 2020-08-16 RX ORDER — LABETALOL HYDROCHLORIDE 5 MG/ML
20 INJECTION, SOLUTION INTRAVENOUS
Status: DISCONTINUED | OUTPATIENT
Start: 2020-08-16 | End: 2020-08-17

## 2020-08-16 RX ORDER — UREA 10 %
220 LOTION (ML) TOPICAL DAILY
Status: DISCONTINUED | OUTPATIENT
Start: 2020-08-16 | End: 2020-08-19 | Stop reason: HOSPADM

## 2020-08-16 RX ADMIN — Medication 1 AMPULE: at 22:15

## 2020-08-16 RX ADMIN — SODIUM CHLORIDE 1000 MG: 900 INJECTION, SOLUTION INTRAVENOUS at 22:16

## 2020-08-16 RX ADMIN — Medication 10 ML: at 13:09

## 2020-08-16 RX ADMIN — ASPIRIN 150 MG: 300 SUPPOSITORY RECTAL at 22:16

## 2020-08-16 RX ADMIN — SODIUM CHLORIDE 1000 MG: 900 INJECTION, SOLUTION INTRAVENOUS at 12:51

## 2020-08-16 RX ADMIN — Medication 10 ML: at 22:17

## 2020-08-16 RX ADMIN — Medication 10 ML: at 06:15

## 2020-08-16 RX ADMIN — Medication 1 AMPULE: at 09:52

## 2020-08-16 NOTE — PROGRESS NOTES
TRANSFER - IN REPORT:    Verbal report received from Enrique Schultz RN on Marge Mccann  being received from 7th floor for routine progression of care      Report consisted of patients Situation, Background, Assessment and   Recommendations(SBAR). Information from the following report(s) SBAR, Kardex and Recent Results was reviewed with the receiving nurse. Opportunity for questions and clarification was provided. Assessment completed upon patients arrival to unit and care assumed.

## 2020-08-16 NOTE — PROGRESS NOTES
PM assessment done. No changes noted. Respiration even and unlabored 20/min at rest. Refused to turn and reposition. No s/s of pain noted at present. Encouraged to call with needs.

## 2020-08-16 NOTE — PROGRESS NOTES
TRANSFER - OUT REPORT:    Verbal report given to Margy Powell RN(name) on [employer]  being transferred to [facility] [id number] for routine progression of care       Report consisted of patients Situation, Background, Assessment and   Recommendations(SBAR). Information from the following report(s) SBAR, Kardex, [facility] and Recent Results was reviewed with the receiving nurse. Lines:   Peripheral IV 08/06/20 Right Forearm (Active)   Site Assessment Clean, dry, & intact 08/15/20 1900   Phlebitis Assessment 0 08/15/20 1900   Infiltration Assessment 0 08/15/20 1900   Dressing Status Clean, dry, & intact 08/15/20 1900   Dressing Type Transparent;Tape 08/15/20 1900   Hub Color/Line Status Capped 08/15/20 1200   Alcohol Cap Used No 08/11/20 0936        Opportunity for questions and clarification was provided.       Patient transported with:   Monitor tele monitor with box number 1655  Home wound vac and

## 2020-08-16 NOTE — PROGRESS NOTES
SPEECH PATHOLOGY NOTE    Consult received and appreciated. Pt already on caseload for language therapy and diet toleration. Pt last seen by SLP on 8/13/20. SLP team will continue to follow per plan of care.      Thank you,  Gilford Schwartz, MA, CCC-SLP

## 2020-08-16 NOTE — PROGRESS NOTES
Date of Admission: 8/4/2020    History of Present Illness/Hospital Course:  76-year-old female with HTN, HLD, CKD 4, chronically infected left hip joint on chronic antibiotic therapy admitted for acute CVA and anemia. Patient is essentially bedbound at home. Patient was evaluated by physical therapy after acute treatment for stroke and deemed suitable for SNF placement. Patient had COVID swab drawn for SNF placement that returned positive. Patient transferred to isolation floor on 8/15/2020. Today: Patient without supplemental oxygen requirement therefore not a candidate for targeted therapy against novel coronavirus infection. Repeat chest x-ray unremarkable. Patient is medically stable for discharge to skilled nursing facility, working with case management. Given patient somnolent she was made n.p.o. by bedside nurse today. Speech therapy reconsulted to evaluate safety of patient swallowing. Currently medically stable for discharge. Comprehensive review of systems negative unless stated above. I have personally reviewed all current data for this patient.     Physical Exam:  General: Elderly white female, somnolent, lying in bed, no acute distress  HEENT: NCAT, moist mucous membranes  Skin: No rash noted  Cardio: RRR, normal S1/S2, no rubs, no gallops, no murmurs  Pulm: Non labored respirations on room air, LCAB, no wheezing, no rales, no rhonchi  GI: Soft, Nt, Nd, Nml bowel sounds, no masses noted  Extremity: Atraumatic, no deformities, no edema  Neuro: Somnolent, arouses easily to loud voice, oriented, moving all extremities, no focal deficits noted, does not follow commands for full neurologic examination  Psych: Pleasant, cooperative, normal range of affect    Assessment and Plan:    #Acute ischemic CVA, left temporal and parietal lobes:  -Transition oral aspirin to aspirin suppository while n.p.o.  -Reinitiate atorvastatin oral when patient is able to take p.o.  -Daily labs    #Macrocytic anemia: Stable.  -Daily labs    #CKD 4: Stable. #HTN: Reinitiate home triamterene/hydrochlorothiazide and amlodipine when able to take p.o., as needed labetalol IV in the interim  #HLD: Aspirin atorvastatin as above  #Chronically infected left hip joint with wound VAC in position: Transition from oral cephalexin to half dose of IV cefazolin, this was discussed with pharmacy department    Inpatient for above, plan to discharge to skilled nursing facility once improved by insurance and bed becomes available    Full code    SCDs for VTE prophylaxis    Please call [phone] for questions or concerns.

## 2020-08-16 NOTE — PROGRESS NOTES
Patients daughter Patt Amos was notified of patient needing to be transferred to [facility] r/t [facility] test resulting positive.

## 2020-08-17 LAB
ALBUMIN SERPL-MCNC: 2.5 G/DL (ref 3.2–4.6)
ALBUMIN/GLOB SERPL: 0.7 {RATIO} (ref 1.2–3.5)
ALP SERPL-CCNC: 203 U/L (ref 50–136)
ALT SERPL-CCNC: 17 U/L (ref 12–65)
ANION GAP SERPL CALC-SCNC: 12 MMOL/L (ref 7–16)
AST SERPL-CCNC: 72 U/L (ref 15–37)
BASOPHILS # BLD: 0.1 K/UL (ref 0–0.2)
BASOPHILS NFR BLD: 1 % (ref 0–2)
BILIRUB SERPL-MCNC: 0.6 MG/DL (ref 0.2–1.1)
BUN SERPL-MCNC: 44 MG/DL (ref 8–23)
CALCIUM SERPL-MCNC: 8.9 MG/DL (ref 8.3–10.4)
CHLORIDE SERPL-SCNC: 109 MMOL/L (ref 98–107)
CO2 SERPL-SCNC: 24 MMOL/L (ref 21–32)
CREAT SERPL-MCNC: 2.19 MG/DL (ref 0.6–1)
DIFFERENTIAL METHOD BLD: ABNORMAL
EOSINOPHIL # BLD: 0 K/UL (ref 0–0.8)
EOSINOPHIL NFR BLD: 0 % (ref 0.5–7.8)
ERYTHROCYTE [DISTWIDTH] IN BLOOD BY AUTOMATED COUNT: 21.9 % (ref 11.9–14.6)
GLOBULIN SER CALC-MCNC: 3.6 G/DL (ref 2.3–3.5)
GLUCOSE SERPL-MCNC: 84 MG/DL (ref 65–100)
HCT VFR BLD AUTO: 25.6 % (ref 35.8–46.3)
HGB BLD-MCNC: 8 G/DL (ref 11.7–15.4)
IMM GRANULOCYTES # BLD AUTO: 0.8 K/UL (ref 0–0.5)
IMM GRANULOCYTES NFR BLD AUTO: 11 % (ref 0–5)
LYMPHOCYTES # BLD: 1.3 K/UL (ref 0.5–4.6)
LYMPHOCYTES NFR BLD: 18 % (ref 13–44)
MAGNESIUM SERPL-MCNC: 2.1 MG/DL (ref 1.8–2.4)
MCH RBC QN AUTO: 32.7 PG (ref 26.1–32.9)
MCHC RBC AUTO-ENTMCNC: 31.3 G/DL (ref 31.4–35)
MCV RBC AUTO: 104.5 FL (ref 79.6–97.8)
MONOCYTES # BLD: 0.7 K/UL (ref 0.1–1.3)
MONOCYTES NFR BLD: 10 % (ref 4–12)
NEUTS SEG # BLD: 4.1 K/UL (ref 1.7–8.2)
NEUTS SEG NFR BLD: 59 % (ref 43–78)
NRBC # BLD: 0.05 K/UL (ref 0–0.2)
PHOSPHATE SERPL-MCNC: 3.2 MG/DL (ref 2.3–3.7)
PLATELET # BLD AUTO: 149 K/UL (ref 150–450)
PMV BLD AUTO: 11.1 FL (ref 9.4–12.3)
POTASSIUM SERPL-SCNC: 4.1 MMOL/L (ref 3.5–5.1)
PROT SERPL-MCNC: 6.1 G/DL (ref 6.3–8.2)
RBC # BLD AUTO: 2.45 M/UL (ref 4.05–5.2)
SODIUM SERPL-SCNC: 145 MMOL/L (ref 136–145)
WBC # BLD AUTO: 6.8 K/UL (ref 4.3–11.1)

## 2020-08-17 PROCEDURE — 77030019952 HC CANSTR VAC ASST KCON -B

## 2020-08-17 PROCEDURE — 84100 ASSAY OF PHOSPHORUS: CPT

## 2020-08-17 PROCEDURE — 74011000258 HC RX REV CODE- 258: Performed by: FAMILY MEDICINE

## 2020-08-17 PROCEDURE — 74011250636 HC RX REV CODE- 250/636: Performed by: FAMILY MEDICINE

## 2020-08-17 PROCEDURE — 3331090002 HH PPS REVENUE DEBIT

## 2020-08-17 PROCEDURE — 92526 ORAL FUNCTION THERAPY: CPT

## 2020-08-17 PROCEDURE — 83735 ASSAY OF MAGNESIUM: CPT

## 2020-08-17 PROCEDURE — 74011250637 HC RX REV CODE- 250/637: Performed by: FAMILY MEDICINE

## 2020-08-17 PROCEDURE — 3331090001 HH PPS REVENUE CREDIT

## 2020-08-17 PROCEDURE — 80053 COMPREHEN METABOLIC PANEL: CPT

## 2020-08-17 PROCEDURE — 85025 COMPLETE CBC W/AUTO DIFF WBC: CPT

## 2020-08-17 PROCEDURE — 65660000000 HC RM CCU STEPDOWN

## 2020-08-17 PROCEDURE — 74750000023 HC WOUND THERAPY

## 2020-08-17 PROCEDURE — 74011250637 HC RX REV CODE- 250/637: Performed by: INTERNAL MEDICINE

## 2020-08-17 PROCEDURE — 77030019934 HC DRSG VAC ASST KCON -B

## 2020-08-17 PROCEDURE — 97605 NEG PRS WND THER DME<=50SQCM: CPT

## 2020-08-17 RX ORDER — LABETALOL HYDROCHLORIDE 5 MG/ML
20 INJECTION, SOLUTION INTRAVENOUS
Status: DISCONTINUED | OUTPATIENT
Start: 2020-08-17 | End: 2020-08-19 | Stop reason: HOSPADM

## 2020-08-17 RX ADMIN — AMLODIPINE BESYLATE 5 MG: 5 TABLET ORAL at 08:49

## 2020-08-17 RX ADMIN — SODIUM CHLORIDE 1000 MG: 900 INJECTION, SOLUTION INTRAVENOUS at 07:45

## 2020-08-17 RX ADMIN — Medication 1 AMPULE: at 08:49

## 2020-08-17 RX ADMIN — Medication 10 ML: at 14:00

## 2020-08-17 RX ADMIN — OXYCODONE HYDROCHLORIDE AND ACETAMINOPHEN 2000 MG: 500 TABLET ORAL at 08:49

## 2020-08-17 RX ADMIN — Medication 10 ML: at 21:54

## 2020-08-17 RX ADMIN — ASPIRIN 81 MG: 81 TABLET, CHEWABLE ORAL at 17:20

## 2020-08-17 RX ADMIN — Medication 220 MG: at 08:49

## 2020-08-17 RX ADMIN — Medication 10 ML: at 07:49

## 2020-08-17 RX ADMIN — Medication 1 AMPULE: at 21:48

## 2020-08-17 RX ADMIN — PANTOPRAZOLE SODIUM 40 MG: 40 TABLET, DELAYED RELEASE ORAL at 07:49

## 2020-08-17 RX ADMIN — OXYCODONE HYDROCHLORIDE AND ACETAMINOPHEN 2000 MG: 500 TABLET ORAL at 17:20

## 2020-08-17 NOTE — PROGRESS NOTES
CM spoke with Melody liaison form [facility]. Pt is now Covid Pos and she has 1 Covid bed avail. Pt has [place] [facility] that requires pre-cert. We are at this time [facility]. Discharge Plan is STR at [address] if insurance approves  Case management will continue to follow.   Please consult  if any new issues arise

## 2020-08-17 NOTE — PROGRESS NOTES
Problem: Communication Impaired (Adult)  Goal: *Acute Goals and Plan of Care (Insert Text)  Outcome: Progressing Towards Goal  Note: LTG: Patient will increase receptive/expressive language skills demonstrated by the ability to communicate basic wants/needs across environments      STG: Patient will answer yes/no questions with 60% accuracy given moderate cueing  STG: Patient will follow 1 step commands with 60% accuracy given model   STG: Patient will identify item in field of 2 with 75% accuracy given minimal cueing   STG: Patient will identify body parts presented verbally with 70% accuracy given moderate cueing   STG: Patient will complete automatic naming tasks in unison with ST with 90% accuracy  STG: Patient will complete basic responsive naming tasks with 80% accuracy given moderate cueing   STG: Patient will name basic objects/items with 80% accuracy given moderate   STG: Patient will repeat basic words with 75% accuracy given moderate cueing    Problem: Dysphagia (Adult)  Goal: *Acute Goals and Plan of Care (Insert Text)  Outcome: Progressing Towards Goal  Note: LTG: Patient will tolerate least restrictive diet without overt signs or symptoms of airway compromise. STG: Patient will tolerate mechanical soft diet and thin liquids without overt signs or symptoms of airway compromise. STG: Patient will participate in modified barium swallow study as clinically indicated.      STG: Patient will participate in speech/language/cognitive evaluation.  MET 8/6        SPEECH LANGUAGE PATHOLOGY: DYSPHAGIA AND SPEECH/LANGUAGE COGNITION  Re-evaluation     NAME/AGE/GENDER: Camille Pompa is a 67 y.o. female  DATE: 8/17/2020  PRIMARY DIAGNOSIS: CVA (cerebral vascular accident) ([facility] [address] [I63.9]  Anemia [D64.9]  CKD (chronic kidney disease) [N18.9]  Vomiting [R11.10]      ICD-10: Treatment Diagnosis: R13.12 Dysphagia, Oropharyngeal Phase  F80.2 Mixed Receptive-Expressive Language Disorder    RECOMMENDATIONS   DIET:  continue prescribed diet   PO:  Mechanical soft   Liquids:  regular thin    MEDICATIONS: Crushed in puree     ASPIRATION PRECAUTIONS  · Slow rate of intake  · Small bites/sips  · Upright at 90 degrees during meal     COMPENSATORY STRATEGIES/MODIFICATIONS  · Small sips and bites  · Assistance with po intake      EDUCATION:  · Recommendations discussed with Patient, nursing      CONTINUATION OF SKILLED SERVICES/MEDICAL NECESSITY:   Patient is expected to demonstrate progress in  swallow strength, swallow timeliness, swallow function, diet tolerance and swallow safety in order to  improve swallow safety and decrease aspiration risk.  Patient is expected to demonstrate progress in expressive communication and receptive ability to decrease assistance required communication, increase independence with activities of daily living and increase communication with family/caregivers.  Patient continues to require skilled intervention due to dysphagia and aphasia. RECOMMENDATIONS for CONTINUED SPEECH THERAPY: YES: Anticipate need for ongoing speech therapy during this hospitalization and at next level of care. ASSESSMENT   Dysphagia: Re-assessment of swallow function completed after decline in status over weekend. Limited assessment as patient was only agreeable to thin liquids trials. Serial sips of thin liquids consumed without overt s/sx of airway compromise. Per chart review, patient with minimal po acceptance and poor participation in speech therapy since admission. Recommend continue current diet of mechanical soft diet/thin liquids as she has been tolerating diet earlier this admission. Medications crushed in puree. Anticipate poor intake given limited acceptance and continual refusal for intake with clinician. Will follow for diet tolerance once she is agreeable to additional trials. Language: Treatment deferred.      Patient is currently functioning significantly below baseline, therefore Recommend ongoing speech therapy to improve functional communication and diet tolerance. COMPLIANCE WITH PROGRAM/EXERCISES: Will assess as treatment progresses  REHABILITATION POTENTIAL FOR STATED GOALS: Fair    PLAN    FREQUENCY/DURATION: Continue to follow patient 3 times a week for duration of hospital stay to address above goals. - Recommendations for next treatment session: Next treatment will address diet tolerance and language    SUBJECTIVE   Awake. Perseverating on need to \"clean it\". Unable to answer orientation questions. History of Present Injury/Illness: Ms. Shari Ch  has a past medical history of Abnormal EKG (2/16/2016), Anemia due to chronic kidney disease treated with erythropoietin (7/25/2017), Aortic valve insufficiency (2/16/2016), Cancer ([facility] [address] Hyperlipidemia (2/16/2016), Hypertension (2/16/2016), and Obesity (2/16/2016). . She also  has a past surgical history that includes hx tubal ligation; ir insert tunl cvc w/o port over 5 yr (5/19/2020); and ir remove tunl cvad w/o port / pump (7/2/2020). Problem List:  (Impairments causing functional limitations):  1. Oral dysphagia  2. Aphasia     Orientation:   Unable to assess due to perseveration on \"clean it\"    Pain: Pain Scale 1: Numeric (0 - 10)  Pain Intensity 1: 0    OBJECTIVE   Swallow treatment-   Patient took serial sips of thin liquids via cup and straw sips independently. Timely swallow upon palpation. Vocal quality remained clear. No cough of throat clear observed. Multiple food items presented (pudding, rice/gravy. Bbq, mixed fruit, cracker). She continually refused all items by stated \"clearn it. I only want to clean it. \"  Per chart review, patient with limited intake and participation in speech therapy treatment since admission.       Language treatment-  Treatment deferred    INTERDISCIPLINARY COLLABORATION: n/a  PRECAUTIONS/ALLERGIES: Penicillins     Tool Used: Dysphagia Outcome and Severity Scale (SACHIN)    Score Comments Normal Diet  [] 7 With no strategies or extra time needed   Functional Swallow  [] 6 May have mild oral or pharyngeal delay   Mild Dysphagia  [] 5 Which may require one diet consistency restricted    Mild-Moderate Dysphagia  [] 4 With 1-2 diet consistencies restricted   Moderate Dysphagia  [] 3 With 2 or more diet consistencies restricted   Moderate-Severe Dysphagia  [] 2 With partial PO strategies (trials with ST only)   Severe Dysphagia  [] 1 With inability to tolerate any PO safely      Score:  Initial:5 Most Recent: x (Date 08/17/20 )   Interpretation of Tool: The Dysphagia Outcome and Severity Scale (SACHIN) is a simple, easy-to-use, 7-point scale developed to systematically rate the functional severity of dysphagia based on objective assessment and make recommendations for diet level, independence level, and type of nutrition. Tool Used: MODIFIED STALIN SCALE (mRS)   Score   No Symptoms  [] 0   No significant disability despite symptoms; able to carry out all usual duties and activities  [] 1   Slight disability; unable to carry out all previous activities but able to look after own affairs without assistance. [] 2   Moderate disability; requiring some help but able to walk without assistance  [] 3   Moderately severe disability; unable to walk without assistance and unable to attend to own bodily needs without assistance  [] 4   Severe disability; bedridden, incontinent, and requiring constant nursing care and attention  [x] 5      Score:  Initial: 5    Interpretation of Tool: The Modified Stalin Scale is a 7-point scaled used to quantify level of disability as it relates to a patient's functional abilities. Current Medications:   No current facility-administered medications on file prior to encounter. Current Outpatient Medications on File Prior to Encounter   Medication Sig Dispense Refill    oxyCODONE-acetaminophen 5-300 mg tab Take  by mouth.       triamterene-hydroCHLOROthiazide (MAXZIDE) 37.5-25 mg per tablet TAKE 1 TABLET BY MOUTH EVERY DAY (Patient taking differently: Take 1 tablet by mouth daily.) 30 Tab 1    cephALEXin (KEFLEX) 250 mg capsule Take 250 mg by mouth two (2) times a day.  sodium chloride (BD Pre-Filled Normal Saline) 10 mL by IntraVENous route as needed for Other (using SASH method and post blood draws).  heparin sod,porcine/0.9 % NaCl (HEPARIN FLUSH IV) 5 mL by IntraVENous route as needed for Other (using SASH method and post blood draws). Heparin 100Units/ml      polyethylene glycol (MIRALAX) 17 gram packet Take 1 Packet by mouth daily. (Patient taking differently: Take 1 Packet by mouth daily. mix with 8 oz water) 30 Packet 0    magnesium oxide (MAG-OX) 400 mg tablet Take 1 Tab by mouth two (2) times a day. 60 Tab 0    aspirin (ASPIRIN) 325 mg tablet Take 325 mg by mouth daily.  docusate sodium (COLACE) 50 mg capsule Take 50 mg by mouth two (2) times daily as needed for Constipation.  sennosides 25 mg tab Take 1 Tab by mouth nightly as needed for Other (constipation).  diphenhydrAMINE (BENADRYL) 25 mg tablet Take 25 mg by mouth every six (6) hours as needed for Allergies.  palbociclib (Ibrance) 75 mg cap Take one capsule by mouthOnce daily with food for21 days on, followed by7 days off during each cyCle of 28 days 21 Cap 5    ondansetron hcl (Zofran) 8 mg tablet Take 1 Tab by mouth every eight (8) hours as needed for Nausea. 90 Tab 2    letrozole (FEMARA) 2.5 mg tablet TAKE 1 TABLET BY MOUTH EVERY DAY 90 Tab 3    melatonin 5 mg cap capsule Take 10 mg by mouth nightly.  furosemide (LASIX) 20 mg tablet TAKE 1 TAB BY MOUTH DAILY AS NEEDED. FOR SWELLING. 30 Tab 1    calcium carbonate (OS-JORGE) 500 mg calcium (1,250 mg) tablet Take 2 tablets at lunch and 2 tablets at dinner. Indications: hypocalcemia (Patient taking differently: Take 2 Tabs by mouth. Take 2 tablets at lunch and 2 tablets at dinner.   Indications: low amount of calcium in the blood) 4 Tab 0    amLODIPine (NORVASC) 5 mg tablet Take 5 mg by mouth daily.          SAFETY:  After treatment position/precautions:  · Upright in bed  · Call light within reach    Total Treatment Duration:   Time In: 1330  Time Out: [address], [address] CCC-SLP

## 2020-08-17 NOTE — PROGRESS NOTES
Pt is resting in bed. Respirations present on RA. No signs of distress. No needs expressed. Bed low and locked. Call light within reach.  Report received from [facility]

## 2020-08-17 NOTE — PROGRESS NOTES
Resting quietly at present. NAD noted. Appetite poor. Took PO meds today. Remains on droplet plus isolation for positive COVID-19 screening. Ordered PPE in place and in use. Safety maintained through out the shift. To report off to oncoming nurse.

## 2020-08-17 NOTE — WOUND CARE
Noted patient was not discharged as planned last week, wound vac sealed and working well, due for dressing change, wound vac from home in use, not compatible with the dressings we have on formulary. Changed to hospital machine. Wound base is granular, well healing [id number]. Dressing changed, seal achieved and machine working. Patients home machine kept in room on window seal. Case management notified that home vac dressing would need brought in for discharge to SNF. Will monitor.

## 2020-08-17 NOTE — PROGRESS NOTES
Date of Admission: 8/4/2020    History of Present Illness/Hospital Course:  43-year-old female with HTN, HLD, CKD 4, chronically infected left hip joint on chronic antibiotic therapy admitted for acute CVA and anemia. Patient is essentially bedbound at home. Patient was evaluated by physical therapy after acute treatment for stroke and deemed suitable for SNF placement. Patient had COVID swab drawn for SNF placement that returned positive. Patient transferred to isolation floor on 8/15/2020. Patient without supplemental oxygen requirement therefore not a candidate for targeted therapy against novel coronavirus infection. Repeat chest x-ray unremarkable. Patient is medically stable for discharge to skilled nursing facility, working with case management. Given patient somnolent she was made n.p.o. by bedside nurse today. Speech therapy reconsulted to evaluate safety of patient swallowing. Currently medically stable for discharge. Today: Continued comfortable respirations on room air. Case management is working towards having the patient discharged to a skilled nursing facility. Medically stable for discharge. Comprehensive review of systems negative unless stated above. I have personally reviewed all current data for this patient.     Physical Exam:  General: Elderly white female, somnolent, lying in bed, no acute distress  HEENT: NCAT, moist mucous membranes  Skin: No rash noted  Cardio: RRR, normal S1/S2, no rubs, no gallops, no murmurs  Pulm: Non labored respirations on room air, LCAB, no wheezing, no rales, no rhonchi  GI: Soft, Nt, Nd, Nml bowel sounds, no masses noted  Extremity: Atraumatic, no deformities, no edema  Neuro: Somnolent, arouses easily to loud voice, oriented, moving all extremities, no focal deficits noted, does not follow commands for full neurologic examination  Psych: Pleasant, cooperative, normal range of affect    Assessment and Plan:    #Acute ischemic CVA, left temporal and parietal lobes:  -Transition back to oral aspirin  -Restart atorvastatin oral when patient is able to take p.o.  -Daily labs    #Novel coronavirus infection: Completely asymptomatic. Does not meet any criteria for targeted therapy. -Ascorbic acid and zinc sulfate    #Macrocytic anemia: Stable.  -Daily labs    #CKD 4: Stable. #HTN: Restart home triamterene/hydrochlorothiazide and amlodipine   #HLD: Aspirin and atorvastatin as above  #Chronically infected left hip joint with wound VAC in position: Restart cephalexin    Inpatient for above, plan to discharge to skilled nursing facility once improved by insurance and bed becomes available    Full code    SCDs for VTE prophylaxis    Please call [phone] for questions or concerns.

## 2020-08-18 LAB
ALBUMIN SERPL-MCNC: 2.4 G/DL (ref 3.2–4.6)
ALBUMIN/GLOB SERPL: 0.7 {RATIO} (ref 1.2–3.5)
ALP SERPL-CCNC: 218 U/L (ref 50–136)
ALT SERPL-CCNC: 9 U/L (ref 12–65)
ANION GAP SERPL CALC-SCNC: 12 MMOL/L (ref 7–16)
AST SERPL-CCNC: 83 U/L (ref 15–37)
BASOPHILS # BLD: 0.1 K/UL (ref 0–0.2)
BASOPHILS NFR BLD: 1 % (ref 0–2)
BILIRUB SERPL-MCNC: 0.6 MG/DL (ref 0.2–1.1)
BUN SERPL-MCNC: 48 MG/DL (ref 8–23)
CALCIUM SERPL-MCNC: 9.2 MG/DL (ref 8.3–10.4)
CHLORIDE SERPL-SCNC: 108 MMOL/L (ref 98–107)
CO2 SERPL-SCNC: 23 MMOL/L (ref 21–32)
CREAT SERPL-MCNC: 2.17 MG/DL (ref 0.6–1)
DIFFERENTIAL METHOD BLD: ABNORMAL
EOSINOPHIL # BLD: 0 K/UL (ref 0–0.8)
EOSINOPHIL NFR BLD: 0 % (ref 0.5–7.8)
ERYTHROCYTE [DISTWIDTH] IN BLOOD BY AUTOMATED COUNT: 21.5 % (ref 11.9–14.6)
GLOBULIN SER CALC-MCNC: 3.6 G/DL (ref 2.3–3.5)
GLUCOSE SERPL-MCNC: 91 MG/DL (ref 65–100)
HCT VFR BLD AUTO: 25.9 % (ref 35.8–46.3)
HGB BLD-MCNC: 8.2 G/DL (ref 11.7–15.4)
IMM GRANULOCYTES # BLD AUTO: 0.8 K/UL (ref 0–0.5)
IMM GRANULOCYTES NFR BLD AUTO: 10 % (ref 0–5)
LYMPHOCYTES # BLD: 1.1 K/UL (ref 0.5–4.6)
LYMPHOCYTES NFR BLD: 14 % (ref 13–44)
MAGNESIUM SERPL-MCNC: 2 MG/DL (ref 1.8–2.4)
MCH RBC QN AUTO: 32.8 PG (ref 26.1–32.9)
MCHC RBC AUTO-ENTMCNC: 31.7 G/DL (ref 31.4–35)
MCV RBC AUTO: 103.6 FL (ref 79.6–97.8)
MONOCYTES # BLD: 0.8 K/UL (ref 0.1–1.3)
MONOCYTES NFR BLD: 10 % (ref 4–12)
NEUTS SEG # BLD: 5.1 K/UL (ref 1.7–8.2)
NEUTS SEG NFR BLD: 65 % (ref 43–78)
NRBC # BLD: 0.07 K/UL (ref 0–0.2)
PHOSPHATE SERPL-MCNC: 2.9 MG/DL (ref 2.3–3.7)
PLATELET # BLD AUTO: 133 K/UL (ref 150–450)
PLATELET COMMENTS,PCOM: ADEQUATE
PMV BLD AUTO: 10.6 FL (ref 9.4–12.3)
POTASSIUM SERPL-SCNC: 3.7 MMOL/L (ref 3.5–5.1)
PROT SERPL-MCNC: 6 G/DL (ref 6.3–8.2)
RBC # BLD AUTO: 2.5 M/UL (ref 4.05–5.2)
RBC MORPH BLD: ABNORMAL
SODIUM SERPL-SCNC: 143 MMOL/L (ref 136–145)
WBC # BLD AUTO: 7.9 K/UL (ref 4.3–11.1)
WBC MORPH BLD: ABNORMAL

## 2020-08-18 PROCEDURE — 92507 TX SP LANG VOICE COMM INDIV: CPT

## 2020-08-18 PROCEDURE — 3331090001 HH PPS REVENUE CREDIT

## 2020-08-18 PROCEDURE — 3331090002 HH PPS REVENUE DEBIT

## 2020-08-18 PROCEDURE — 65660000000 HC RM CCU STEPDOWN

## 2020-08-18 PROCEDURE — 83735 ASSAY OF MAGNESIUM: CPT

## 2020-08-18 PROCEDURE — 92526 ORAL FUNCTION THERAPY: CPT

## 2020-08-18 PROCEDURE — 97530 THERAPEUTIC ACTIVITIES: CPT

## 2020-08-18 PROCEDURE — 80053 COMPREHEN METABOLIC PANEL: CPT

## 2020-08-18 PROCEDURE — 74750000023 HC WOUND THERAPY

## 2020-08-18 PROCEDURE — 74011250637 HC RX REV CODE- 250/637: Performed by: INTERNAL MEDICINE

## 2020-08-18 PROCEDURE — 85025 COMPLETE CBC W/AUTO DIFF WBC: CPT

## 2020-08-18 PROCEDURE — 74011250637 HC RX REV CODE- 250/637: Performed by: FAMILY MEDICINE

## 2020-08-18 PROCEDURE — 84100 ASSAY OF PHOSPHORUS: CPT

## 2020-08-18 RX ADMIN — Medication 10 ML: at 21:00

## 2020-08-18 RX ADMIN — SENNOSIDES AND DOCUSATE SODIUM 2 TABLET: 8.6; 5 TABLET ORAL at 20:15

## 2020-08-18 RX ADMIN — ATORVASTATIN CALCIUM 80 MG: 80 TABLET, FILM COATED ORAL at 21:00

## 2020-08-18 RX ADMIN — Medication 1 AMPULE: at 20:15

## 2020-08-18 RX ADMIN — Medication 1 AMPULE: at 08:57

## 2020-08-18 RX ADMIN — Medication 10 ML: at 05:13

## 2020-08-18 RX ADMIN — Medication 10 ML: at 14:53

## 2020-08-18 RX ADMIN — OXYCODONE HYDROCHLORIDE AND ACETAMINOPHEN 2000 MG: 500 TABLET ORAL at 20:15

## 2020-08-18 NOTE — PROGRESS NOTES
Problem: Falls - Risk of  Goal: *Absence of Falls  Description: Document Damaris Wilson Fall Risk and appropriate interventions in the flowsheet.   Outcome: Progressing Towards Goal  Note: Fall Risk Interventions:  Mobility Interventions: Patient to call before getting OOB    Mentation Interventions: Adequate sleep, hydration, pain control    Medication Interventions: Evaluate medications/consider consulting pharmacy    Elimination Interventions: Call light in reach    History of Falls Interventions: Room close to nurse's station

## 2020-08-18 NOTE — PROGRESS NOTES
Name: Jaya Galvan MRN: [id number]  : 1945  Age:75 y.o.  female  Admit Date:  2020 LOS: 15      Hospitalist Progress Note    Jaya Galvan is a 67 y.o. female with medical history significant for HTN, HLD, CKD 4, chronically infected left hip joint on chronic antibiotic therapy admitted for acute CVA and anemia. Patient is essentially bedbound at home. Patient was evaluated by physical therapy after acute treatment for stroke and deemed suitable for SNF placement. Patient had COVID swab drawn for SNF placement that returned positive. Patient transferred to isolation floor on 8/15/2020. Patient without supplemental oxygen requirement therefore not a candidate for targeted therapy against novel coronavirus infection. Repeat chest x-ray unremarkable. Patient is medically stable for discharge to skilled nursing facility. Patient was somnolent and made NPO overnight on  due to concern of risk for aspiration. Speech therapy evaluated the patient on  and recommended mechanical soft diet      Subjective (20) :  Patient is seen and examined at bedside. No acute events reported overnight by nursing staff. Appeared comfortable on room air without any distress. Denies any pain or discomfort. States she did not get any breakfast and asking for some food in the AM.    ROS: 10 point review of systems is otherwise negative with the exception of the elements mentioned above.     Objective:    Patient Vitals for the past 24 hrs:   Temp Pulse Resp BP SpO2   20 1135 97.4 °F (36.3 °C) 86  (!) 142/97 96 %   20 0743 98.2 °F (36.8 °C) 78  (!) 135/95 95 %   20 0330 98.1 °F (36.7 °C) 79 19 125/56 91 %   20 0000  87      20 2320 98.2 °F (36.8 °C) 76 18 124/69 94 %   208 98 °F (36.7 °C) 78 18 130/72 92 %   20  76        Oxygen Therapy  O2 Sat (%): 96 % (20 1135)  Pulse via Oximetry: 68 beats per minute (20 1700)  O2 Device: Room air (08/18/20 1523)    Estimated body mass index is 37.9 kg/m² as calculated from the following:    Height as of this encounter: 5' 7\" (1.702 m). Weight as of an earlier encounter on 8/4/20: 109.8 kg (242 lb). Intake/Output Summary (Last 24 hours) at 8/18/2020 1550  Last data filed at 8/18/2020 1230  Gross per 24 hour   Intake 200 ml   Output    Net 200 ml       *Note that automatically entered I/Os may not be accurate; dependent on patient compliance with collection and accurate  by techs. Physical Exam:   General:     alert, awake, no acute distress. Well nourished. Obese  Head:   normocephalic, atraumatic  Eyes, Ears, nose: PERRL, EOMI. Normal conjunctiva  Neck:    supple, non-tender. Trachea midline. Lungs:   CTAB, no wheezing, rhonchi, rales  Cardiac:   RRR, Normal S1 and S2. No S3, S4 or murmurs. Abdomen:   Soft, non distended, nontender, +BS, no guarding/rebound  Extremities:   Warm, dry. No edema   Skin:   No rashes, no jaundice  Neuro:  Alert and awake. Incoherent at times. No gross focal neurological deficit  Psychiatric:  No anxiety, calm, cooperative    Data Review:  I have reviewed all labs, meds, and studies from the last 24 hours:    Recent Results (from the past 24 hour(s))   CBC WITH AUTOMATED DIFF    Collection Time: 08/18/20  5:08 AM   Result Value Ref Range    WBC 7.9 4.3 - 11.1 K/uL    RBC 2.50 (L) 4.05 - 5.2 M/uL    HGB 8.2 (L) 11.7 - 15.4 g/dL    HCT 25.9 (L) 35.8 - 46.3 %    .6 (H) 79.6 - 97.8 FL    MCH 32.8 26.1 - 32.9 PG    MCHC 31.7 31.4 - 35.0 g/dL    RDW 21.5 (H) 11.9 - 14.6 %    PLATELET 378 (L) 072 - 450 K/uL    MPV 10.6 9.4 - 12.3 FL    ABSOLUTE NRBC 0.07 0.0 - 0.2 K/uL    NEUTROPHILS 65 43 - 78 %    LYMPHOCYTES 14 13 - 44 %    MONOCYTES 10 4.0 - 12.0 %    EOSINOPHILS 0 (L) 0.5 - 7.8 %    BASOPHILS 1 0.0 - 2.0 %    IMMATURE GRANULOCYTES 10 (H) 0.0 - 5.0 %    ABS. NEUTROPHILS 5.1 1.7 - 8.2 K/UL    ABS. LYMPHOCYTES 1.1 0.5 - 4.6 K/UL    ABS. MONOCYTES 0.8 0.1 - 1.3 K/UL    ABS. EOSINOPHILS 0.0 0.0 - 0.8 K/UL    ABS. BASOPHILS 0.1 0.0 - 0.2 K/UL    ABS. IMM. GRANS. 0.8 (H) 0.0 - 0.5 K/UL    RBC COMMENTS SLIGHT  ANISOCYTOSIS + POIKILOCYTOSIS        WBC COMMENTS Result Confirmed By Smear      PLATELET COMMENTS ADEQUATE      DF AUTOMATED     METABOLIC PANEL, COMPREHENSIVE    Collection Time: 08/18/20  5:08 AM   Result Value Ref Range    Sodium 143 136 - 145 mmol/L    Potassium 3.7 3.5 - 5.1 mmol/L    Chloride 108 (H) 98 - 107 mmol/L    CO2 23 21 - 32 mmol/L    Anion gap 12 7 - 16 mmol/L    Glucose 91 65 - 100 mg/dL    BUN 48 (H) 8 - 23 MG/DL    Creatinine 2.17 (H) 0.6 - 1.0 MG/DL    GFR est AA 28 (L) >60 ml/min/1.73m2    GFR est non-AA 23 (L) >60 ml/min/1.73m2    Calcium 9.2 8.3 - 10.4 MG/DL    Bilirubin, total 0.6 0.2 - 1.1 MG/DL    ALT (SGPT) 9 (L) 12 - 65 U/L    AST (SGOT) 83 (H) 15 - 37 U/L    Alk.  phosphatase 218 (H) 50 - 136 U/L    Protein, total 6.0 (L) 6.3 - 8.2 g/dL    Albumin 2.4 (L) 3.2 - 4.6 g/dL    Globulin 3.6 (H) 2.3 - 3.5 g/dL    A-G Ratio 0.7 (L) 1.2 - 3.5     MAGNESIUM    Collection Time: 08/18/20  5:08 AM   Result Value Ref Range    Magnesium 2.0 1.8 - 2.4 mg/dL   PHOSPHORUS    Collection Time: 08/18/20  5:08 AM   Result Value Ref Range    Phosphorus 2.9 2.3 - 3.7 MG/DL        All Micro Results     Procedure Component Value Units Date/Time    CULTURE, BLOOD [[id number]] Collected:  08/04/20 1257    Order Status:  Completed Specimen:  Blood Updated:  08/09/20 0634     Special Requests: --        RIGHT  Antecubital       Culture result: NO GROWTH 5 DAYS       CULTURE, BLOOD [[id number]] Collected:  08/04/20 1257    Order Status:  Completed Specimen:  Blood Updated:  08/09/20 0634     Special Requests: --        LEFT  FOREARM       Culture result: NO GROWTH 5 DAYS       CULTURE, URINE [[id number]] Collected:  08/04/20 1530    Order Status:  Completed Specimen:  Cath Urine Updated:  08/07/20 0804     Special Requests: NO SPECIAL REQUESTS Culture result: NO GROWTH 2 DAYS             Current Meds:  Current Facility-Administered Medications   Medication Dose Route Frequency    labetaloL (NORMODYNE;TRANDATE) injection 20 mg  20 mg IntraVENous Q2H PRN    ascorbic acid (vitamin C) (VITAMIN C) tablet 2,000 mg  2,000 mg Oral TID    zinc sulfate tablet 220 mg  220 mg Oral DAILY    HYDROcodone-acetaminophen (NORCO) 5-325 mg per tablet 1 Tab  1 Tab Oral Q6H PRN    alcohol 62% (NOZIN) nasal  1 Ampule  1 Ampule Topical Q12H    pantoprazole (PROTONIX) tablet 40 mg  40 mg Oral ACB    0.9% sodium chloride infusion 250 mL  250 mL IntraVENous PRN    sodium chloride (NS) flush 5-40 mL  5-40 mL IntraVENous Q8H    sodium chloride (NS) flush 5-40 mL  5-40 mL IntraVENous PRN    ondansetron (ZOFRAN) injection 4 mg  4 mg IntraVENous Q6H PRN    atorvastatin (LIPITOR) tablet 80 mg  80 mg Oral QHS    acetaminophen (TYLENOL) suppository 650 mg  650 mg Rectal Q4H PRN    senna-docusate (PERICOLACE) 8.6-50 mg per tablet 2 Tab  2 Tab Oral QHS    amLODIPine (NORVASC) tablet 5 mg  5 mg Oral DAILY    cephALEXin (KEFLEX) capsule 250 mg  250 mg Oral BID    diphenhydrAMINE (BENADRYL) capsule 25 mg  25 mg Oral Q6H PRN    triamterene-hydroCHLOROthiazide (MAXZIDE) 37.5-25 mg per tablet 1 Tab  1 Tab Oral DAILY    aspirin chewable tablet 81 mg  81 mg Oral DAILY         Other Studies:  Mri Brain W Wo Cont    Result Date: 8/4/2020  MRI of the brain INDICATION: Altered mental status, history of breast cancer Standard MRI sequences were obtained through the brain in multiple planes. Images were obtained before and after intravenous infusion of 23 mL of Dotarem. FINDINGS: There is an acute posterior left MCA territory infarction. There is no associated hemorrhage. .  No other areas of acute infarction are seen. There is a small old posterior right parietal infarct. There are also chronic changes in the white matter. The ventricles are normal in size.   There are no extra-axial fluid collections. There are no bony lesions. The sinuses are clear. Post-contrast images show no abnormal enhancement. There are no masses. IMPRESSION: Acute left parietal infarct. No evidence of hemorrhage. Ct Head Wo Cont    Result Date: 8/4/2020  CT of the head without contrast. CLINICAL INDICATION: Altered mental status since yesterday PROCEDURE: Serial thin section axial images are obtained from the cranial vertex through the skull base without the administration of intravenous contrast. Radiation dose reduction techniques were used for this study. Our CT scanners use one or all of the following: Automated exposure control, adjusted of the mA and/or kV according to patient size, iterative reconstruction COMPARISON: No prior. FINDINGS: There is no acute intracranial hemorrhage, mass, or mass effect. No abnormal extra-axial fluid collections identified. There is no hydrocephalus. The basilar cisterns are widely patent. Hypoattenuation is noted throughout the left temporal lobe in keeping with a large, subacute CVA. There is mild patchy hypoattenuation in the left parietal lobe as well. The posterior fossa is unremarkable. There is an area of focal cortically based hypoattenuation in the right parietal lobe that likely represents old infarct. Included paranasal sinuses and mastoid air cells are clear. No fractures identified. .     IMPRESSION: 1. Large, subacute CVA in the left temporal lobe and to a lesser extent the left parietal lobe. 2. Old, small infarct in the right parietal lobe. 3. No acute hemorrhage. Xr Chest Port    Result Date: 8/4/2020  Portable chest x-ray CLINICAL INDICATION: Sepsis FINDINGS: Single AP view of the chest compared to a similar exam dated 5/13/2020 show the lungs to be expanded and clear. No pleural effusion or pneumothorax. The cardiac silhouette and mediastinum are unremarkable. Degenerative osteoarthritic changes are noted of  the bilateral shoulders. IMPRESSION: No acute cardiopulmonary abnormality. Cta Code Neuro Head And Neck W Cont    Result Date: 8/5/2020  History: Altered mental status. Onset was yesterday. Comments: CT ANGIOGRAM OF THE NECK AND CT ANGIOGRAM OF THE [place] was obtained following the administration of IV contrast. IV contrast was administered to evaluate the arterial vasculature. Reformatted images in the coronal and sagittal planes as well as 3-D imaging was obtained and reviewed on a dedicated PACS and [address]. Radiation reduction dose techniques were used for the study. Our CT scanner use one or all of the following- Automated exposure control, adjustment of the mA and/or KV according the patient size, iterative reconstruction. All measurements are based upon NASCET criteria if appropriate. Findings: CT ANGIOGRAM OF THE NECK: The arch and proximal great vessels are patent however atherosclerotic changes are noted at the origins of the proximal great vessels however no significant stenosis is appreciated. The carotid bulbs demonstrate eccentric calcified plaque disease. Degree of stenosis is less than 50%. The vertebral arteries are patent in their proximal course however probable high-grade stenoses are noted bilaterally. Mild atelectatic changes are noted in the lung bases. The thyroid tissue demonstrates a partially calcified nodule on the right. CT angiogram of [place]: The petrous, cavernous, and supraclinoid internal carotid arteries are patent with mild diffuse atherosclerotic changes. The anterior circulations patent. The right middle cerebral artery is patent. There is occlusion of an M2 segment of the left middle cerebral artery. There is a large dominant pain running superficially along the lateral aspect of the face outside the skull. This may be a venous anomaly.  The distal vertebral arteries demonstrate diffuse atherosclerotic changes in their distal segments with at least a moderate stenosis of the distal left vertebral artery. The basilar artery and posterior cerebral arteries are patent. IMPRESSION: 1. Occlusion of an anterior M2 branch of the left middle cerebral artery. 2. Questionable venous anomaly as described above. These findings were relayed to the patient's nurse upon interpretation         Assessment:    Active Hospital Problems    Diagnosis Date Noted    Vomiting 08/04/2020    Anemia 08/04/2020    CVA (cerebral vascular accident) ([facility] [address] 08/04/2020    CKD (chronic kidney disease) 08/04/2020    DNR (do not resuscitate) 08/04/2020    Left hip postoperative wound infection 05/13/2020    Stage 4 chronic kidney disease ([facility] [address] 05/01/2020    Severe obesity ([facility] [address] 09/24/2019    Anemia due to chronic kidney disease treated with erythropoietin 07/25/2017    Malignant neoplasm metastatic to bone ([facility] [address] 07/31/2016     Last Assessment & Plan:   1. Complete radiation therapy to the patient's sacrum and femurs as directed by Dr. Peggy Moreno. 2.  Return to the office in 6 weeks with x-rays of the pelvis on arrival.  3.  Instruct the patient to advance her weightbearing as tolerated as she continues to enjoy a reduction in pain following the radiation therapy.  Hypertension 02/16/2016     UNSPECIFIED          Plan:    Acute ischemic CVA, left temporal and parietal lobes  -continue with ASA and atorvastatin oral     Novel coronavirus infection:  Completely asymptomatic. Does not meet any criteria for targeted therapy. -Ascorbic acid and zinc sulfate     Macrocytic anemia: Stable.     CKD 4: Stable. HTN // HLD : continue with home triamterene/hydrochlorothiazide and amlodipine, statin and aspirin    Chronically infected left hip joint with wound VAC in position    Diet:  DIET CARDIAC  DIET NUTRITIONAL SUPPLEMENTS  DVT PPx: SCDs  Code: DNR  Dispo: SNF  Estimated Discharge: medically ready. Pending insurance approval and bed for dc to SNF    Labs/Imaging Reviewed.    Patient is Moderate risk due to current condition and comorbid conditions. Plan discussed with staff, patient/family and are in agreement. Time Spent: Greater than 45 minutes was spent in reviewing charts, physical exam, discussion with patient, and answered any questions.     Signed By: Mel Martinez MD     August 18, 2020

## 2020-08-18 NOTE — PROGRESS NOTES
Pt resting in bed. Respirations present on RA. No signs of distress. No needs expressed. Bed low and locked. Call light within reach.  Report given to Liza Urrutia

## 2020-08-18 NOTE — PROGRESS NOTES
Problem: Communication Impaired (Adult)  Goal: *Acute Goals and Plan of Care (Insert Text)  Outcome: Progressing Towards Goal  Note: LTG: Patient will increase receptive/expressive language skills demonstrated by the ability to communicate basic wants/needs across environments      STG: Patient will answer yes/no questions with 60% accuracy given moderate cueing  STG: Patient will follow 1 step commands with 60% accuracy given model   STG: Patient will identify item in field of 2 with 75% accuracy given minimal cueing   STG: Patient will identify body parts presented verbally with 70% accuracy given moderate cueing   STG: Patient will complete automatic naming tasks in unison with ST with 90% accuracy  STG: Patient will complete basic responsive naming tasks with 80% accuracy given moderate cueing   STG: Patient will name basic objects/items with 80% accuracy given moderate   STG: Patient will repeat basic words with 75% accuracy given moderate cueing    Problem: Dysphagia (Adult)  Goal: *Acute Goals and Plan of Care (Insert Text)  Outcome: Progressing Towards Goal  Note: LTG: Patient will tolerate least restrictive diet without overt signs or symptoms of airway compromise. STG: Patient will tolerate mechanical soft diet and thin liquids without overt signs or symptoms of airway compromise. STG: Patient will participate in modified barium swallow study as clinically indicated.      STG: Patient will participate in speech/language/cognitive evaluation.  MET 8/6        SPEECH LANGUAGE PATHOLOGY: DYSPHAGIA AND SPEECH/LANGUAGE COGNITION  Daily Note 1     NAME/AGE/GENDER: Terrell Garza is a 67 y.o. female  DATE: 8/18/2020  PRIMARY DIAGNOSIS: CVA (cerebral vascular accident) ([facility] [address] [I63.9]  Anemia [D64.9]  CKD (chronic kidney disease) [N18.9]  Vomiting [R11.10]      ICD-10: Treatment Diagnosis: R13.12 Dysphagia, Oropharyngeal Phase  F80.2 Mixed Receptive-Expressive Language Disorder    RECOMMENDATIONS   DIET:  continue prescribed diet   PO:  Mechanical soft   Liquids:  regular thin    MEDICATIONS: Crushed or whole in puree     ASPIRATION PRECAUTIONS  · Slow rate of intake  · Small bites/sips  · Upright at 90 degrees during meal     COMPENSATORY STRATEGIES/MODIFICATIONS  · Small sips and bites  · Assistance with po intake      EDUCATION:  · Recommendations discussed with Patient, nursing      CONTINUATION OF SKILLED SERVICES/MEDICAL NECESSITY:   Patient is expected to demonstrate progress in  swallow strength, swallow timeliness, swallow function, diet tolerance and swallow safety in order to  improve swallow safety and decrease aspiration risk.  Patient is expected to demonstrate progress in expressive communication and receptive ability to decrease assistance required communication, increase independence with activities of daily living and increase communication with family/caregivers.  Patient continues to require skilled intervention due to dysphagia and aphasia. RECOMMENDATIONS for CONTINUED SPEECH THERAPY: YES: Anticipate need for ongoing speech therapy during this hospitalization and at next level of care. ASSESSMENT   Dysphagia: Re-assessment of swallow function completed after reported aspiration event with meds last night. RN reports patient has been NPO since that time. Limited assessment as patient continues to be only agreeable to thin liquids trials. Serial sips of thin liquids consumed without overt s/sx of airway compromise. Patient did accept one bite of pears, but grimaced and spit out. Refused trial of applesauce. Did consume 1 bite of tatianna cracker without difficulty, but refused further trials. Consumed more water without any signs/symptoms of aspiration. Recommend continue current diet of mechanical soft diet/thin liquids as she has been tolerating diet earlier this admission. Medications crushed in puree.  Anticipate poor intake given limited acceptance and continual refusal for intake with clinician. May want to consider calorie count. Will continue to follow for diet tolerance if she is agreeable to additional trials. Language: severe expressive and receptive language. Patient does not respond to Y/N questions regarding her name or comfort/wants/needs. She does not follow any commands in context. Fluent, perseverative speech lacks meaning. Unable to express basic wants/needs. No repetition and no automatic speech elicited. COMPLIANCE WITH PROGRAM/EXERCISES: Will assess as treatment progresses  REHABILITATION POTENTIAL FOR STATED GOALS: Fair    PLAN    FREQUENCY/DURATION: Continue to follow patient 3 times a week for duration of hospital stay to address above goals. - Recommendations for next treatment session: Next treatment will address diet tolerance and language    SUBJECTIVE   Patient awake, perseverating on \"working on that puzzle\"  History of Present Injury/Illness: Ms. Nusrat Keyes  has a past medical history of Abnormal EKG (2/16/2016), Anemia due to chronic kidney disease treated with erythropoietin (7/25/2017), Aortic valve insufficiency (2/16/2016), Cancer ([facility] [address] Hyperlipidemia (2/16/2016), Hypertension (2/16/2016), and Obesity (2/16/2016). . She also  has a past surgical history that includes hx tubal ligation; ir insert tunl cvc w/o port over 5 yr (5/19/2020); and ir remove tunl cvad w/o port / pump (7/2/2020). Problem List:  (Impairments causing functional limitations):  1. Oral dysphagia  2. Aphasia     Orientation:   Unable to assess due to perseveration - does not respond to Y/N questions about her name. Pain: Pain Scale 1: FLACC  Pain Intensity 1: 0    OBJECTIVE   Swallow treatment-   Patient took serial sips of thin liquids via cup and straw sips independently. Timely swallow upon palpation. Vocal quality remained clear. No cough of throat clear observed.  Patient refused trials of applesauce, spit out pears (appears to not like taste) and tolerated single bite of tatianna cracker but refused further trials. Also attempted Ensure to see if patient might be better served on full liquid diet, but she refused \"that is bad\". Language treatment-  Attempted Y/N questions, simple one-step commands, automatic speech, single word repetition, singing ABCs. No appropriate response to any task. INTERDISCIPLINARY COLLABORATION: Registered Nurse  PRECAUTIONS/ALLERGIES: Penicillins     Tool Used: Dysphagia Outcome and Severity Scale (SACHIN)    Score Comments   Normal Diet  [] 7 With no strategies or extra time needed   Functional Swallow  [] 6 May have mild oral or pharyngeal delay   Mild Dysphagia  [] 5 Which may require one diet consistency restricted    Mild-Moderate Dysphagia  [] 4 With 1-2 diet consistencies restricted   Moderate Dysphagia  [] 3 With 2 or more diet consistencies restricted   Moderate-Severe Dysphagia  [] 2 With partial PO strategies (trials with ST only)   Severe Dysphagia  [] 1 With inability to tolerate any PO safely      Score:  Initial:5 Most Recent: 5 (Date 08/18/20 )   Interpretation of Tool: The Dysphagia Outcome and Severity Scale (SACHIN) is a simple, easy-to-use, 7-point scale developed to systematically rate the functional severity of dysphagia based on objective assessment and make recommendations for diet level, independence level, and type of nutrition. Tool Used: MODIFIED LEELEE SCALE (mRS)   Score   No Symptoms  [] 0   No significant disability despite symptoms; able to carry out all usual duties and activities  [] 1   Slight disability; unable to carry out all previous activities but able to look after own affairs without assistance.    [] 2   Moderate disability; requiring some help but able to walk without assistance  [] 3   Moderately severe disability; unable to walk without assistance and unable to attend to own bodily needs without assistance  [] 4   Severe disability; bedridden, incontinent, and requiring constant nursing care and attention  [x] 5      Score:  Initial: 5    Interpretation of Tool: The Modified Stalin Scale is a 7-point scaled used to quantify level of disability as it relates to a patient's functional abilities.      SAFETY:  After treatment position/precautions:  · Upright in bed  · RN notified  · Call light within reach    Total Treatment Duration:   Time In: 1120  Time Out: [place] [address], [address] Jessi Silver, CCC-SLP

## 2020-08-18 NOTE — PROGRESS NOTES
Beside shift report received from THE [facility] - [facility] RN  Patient lying in bed  Respirations present  No signs of distress  No needs expressed at this time  Safety measures in place

## 2020-08-18 NOTE — PROGRESS NOTES
Pt aspirated night medications. Need to reconsult speech.  Not giving anything else by mouth tonight

## 2020-08-18 NOTE — PROGRESS NOTES
Problem: Mobility Impaired (Adult and Pediatric)  Goal: *Acute Goals and Plan of Care (Insert Text)  Outcome: Progressing Towards Goal  Note: STG:  (1.)Ms. Pat Jean will move from supine to sit and sit to supine , scoot up and down, and roll side to side with MINIMAL ASSIST within 3 treatment day(s). (2.)Ms. Pat Jean will transfer from bed to chair and chair to bed with MODERATE ASSIST using the least restrictive device within 3 treatment day(s). (3.)Ms. Pat Jean will ambulate with MODERATE ASSIST for 3 feet with the least restrictive device within 3 treatment day(s). (4.)Ms. Pat Jean will perform standing static and dynamic balance activities x 5 minutes with MODERATE ASSIST to improve safety within 3 day(s). LTG:  (1.)Ms. Pat Jean will move from supine to sit and sit to supine , scoot up and down, and roll side to side in bed with CONTACT GUARD ASSIST within 7 treatment day(s). (2.)Ms. Pat Jean will transfer from bed to chair and chair to bed with MINIMAL ASSIST using the least restrictive device within 7 treatment day(s). (3.)Ms. Pat Jean will ambulate with MINIMAL ASSIST for 5 feet with the least restrictive device within 7 treatment day(s). (4.)Ms. Pat Jean will perform standing static and dynamic balance activities x 5 minutes with MINIMAL ASSIST to improve safety within 7 day(s).   ________________________________________________________________________________________________      PHYSICAL THERAPY: Daily Note and PM 8/18/2020  INPATIENT: PT Visit Days : 4  Payor: Irma Jenkins / Plan: [address] [place] [address] HMO / Product Type: Managed Care Medicare /       NAME/AGE/GENDER: Esdras Jackson is a 67 y.o. female   PRIMARY DIAGNOSIS: CVA (cerebral vascular accident) ([address] [I63.9]  Anemia [D64.9]  CKD (chronic kidney disease) [N18.9]  Vomiting [R11.10] CVA (cerebral vascular accident) ([facility] CVA (cerebral vascular accident) ([facility]       ICD-10: Treatment Diagnosis:    · Generalized Muscle Weakness (M62.81) Precaution/Allergies:  Penicillins      ASSESSMENT:     Ms. Meaghan Frias is a 67 y.o. female admitted for CVA workup. Per MRI: \"Acute left parietal infarct. No evidence of hemorrhage. \" She is unable to provide history due to confusion, however was recently hospitalized for L hip infection so history obtained from old PT notes from May 2020. As of May, patient was living with daughter in a mobile home and able to transfer to bedside commode/chair, however did not do much walking. She has a hospital bed at home. 8/18- pt now COVID (+). She presents sitting up in bed; difficulty verbalizing or answering questions and limited command following noted. Moved to chair position which did improve alertness; pt able to tactile cues for JONO Diez. Pt requires mod-max assist for bed mobility/transfer to sitting at edge of bed. Good to fair seated balance at edge of bed. Worked on static/dynamic balance and functional activities at bedside. No standing attempted today. Pt progressing slowly towards therapy goals- will need rehab at [place]. Max-total assist to return to supine and reposition. At this time, patient is appropriate for Co-treatment with occupational therapy due to patient's decreased overall endurance/tolerance levels, as well as need for high level skilled assistance to complete functional transfers/mobility and functional tasks. Jerilynn Cheadle is appropriate for a multidisciplinary co-treatment of PT and OT to address goals of both disciplines. This section established at most recent assessment   PROBLEM LIST (Impairments causing functional limitations):  1. Decreased Strength  2. Decreased ADL/Functional Activities  3. Decreased Transfer Abilities  4. Decreased Ambulation Ability/Technique  5. Decreased Balance  6. Decreased Activity Tolerance  7. Decreased Knowledge of Precautions  8.  Decreased [place] with Home Exercise Program   INTERVENTIONS PLANNED: (Benefits and precautions of physical therapy have been discussed with the patient.)  1. Balance Exercise  2. Bed Mobility  3. Family Education  4. Gait Training  5. Home Exercise Program (HEP)  6. Neuromuscular Re-education/Strengthening  7. Therapeutic Activites  8. Therapeutic Exercise/Strengthening  9. Transfer Training     TREATMENT PLAN: Frequency/Duration: 3 times a week for duration of hospital stay  Rehabilitation Potential For Stated Goals: [facility] (at time of discharge pending progress):    Placement: It is my opinion, based on this patient's performance to date, that Ms. Jenny Quintana may benefit from short term rehab. Equipment:    None at this time              HISTORY:   History of Present Injury/Illness (Reason for Referral):  Nelson Norman is a 67 y.o. female who has a PMH of HTN, dyslipidemia, metastatic breast cancer, CKD stage IV, sp left hip arthroplasty + wound vac, wound infection on chronic antibiotic therapy, who was brought in via EMS after her daughter noted her confused, not making sense since yesterday around 1600 hrs. The patient is bed-bound after her hip surgery on 5/20. She complains of nausea, vomiting and decreased appetite. She is unable to follow commands. All information was gathered from her daughter Christine Balderrama at beside. Her mother has not had fever, chills, cough, GI bleeding or diarrhea. She has been compliant to all her home meds, including keflex. She last saw ID yesterday. Past Medical History/Comorbidities:   Ms. Jenny Quintana  has a past medical history of Abnormal EKG (2/16/2016), Anemia due to chronic kidney disease treated with erythropoietin (7/25/2017), Aortic valve insufficiency (2/16/2016), Cancer ([facility] Hyperlipidemia (2/16/2016), Hypertension (2/16/2016), and Obesity (2/16/2016). Ms. Jenny Quintana  has a past surgical history that includes hx tubal ligation; ir insert tunl cvc w/o port over 5 yr (5/19/2020); and ir remove tunl cvad w/o port / pump (7/2/2020).   Social History/Living Environment:   Support Systems: Child(ezequiel)  Patient Expects to be Discharged to[de-identified] Unknown  Current DME Used/Available at Home: Hospital bed, Wheelchair, Walker, rolling, Commode, bedside  Prior Level of Function/Work/Activity:  She is unable to provide history due to confusion, however was recently hospitalized for L hip infection so history obtained from old PT notes from May 2020. As of May, patient was living with daughter in a mobile home and able to transfer to bedside commode/chair, however did not do much walking. She has a hospital bed at home. Number of Personal Factors/Comorbidities that affect the Plan of Care: 3+: HIGH COMPLEXITY   EXAMINATION:   Most Recent Physical Functioning:   Gross Assessment:                  Posture:     Balance:  Sitting: Impaired  Sitting - Static: Good (unsupported)  Sitting - Dynamic: Fair (occasional) Bed Mobility:  Supine to Sit: Moderate assistance;Maximum assistance  Sit to Supine: Maximum assistance  Scooting: Total assistance  Interventions: Verbal cues; Visual cues; Safety awareness training; Tactile cues;Manual cues  Duration: 25 Minutes  Wheelchair Mobility:     Transfers:     Gait:            Body Structures Involved:  1. Nerves  2. Voice/Speech  3. Heart  4. Muscles Body Functions Affected:  1. Mental  2. Sensory/Pain  3. Voice and Speech  4. Cardio  5. Neuromusculoskeletal  6. Movement Related Activities and Participation Affected:  1. Learning and Applying Knowledge  2. General Tasks and Demands  3. Communication  4. Mobility  5. Self Care  6. Domestic Life  7. Interpersonal Interactions and Relationships  8.  Community, Social and [place]   Number of elements that affect the Plan of Care: 4+: HIGH COMPLEXITY   CLINICAL PRESENTATION:   Presentation: Evolving clinical presentation with changing clinical characteristics: MODERATE COMPLEXITY   CLINICAL DECISION MAKIN [facility] Short Form  How much difficulty does the patient currently have. .. Unable A Lot A Little None   1. Turning over in bed (including adjusting bedclothes, sheets and blankets)? [] 1   [x] 2   [] 3   [] 4   2. Sitting down on and standing up from a chair with arms ( e.g., wheelchair, bedside commode, etc.)   [] 1   [x] 2   [] 3   [] 4   3. Moving from lying on back to sitting on the side of the bed? [] 1   [x] 2   [] 3   [] 4   How much help from another person does the patient currently need. .. Total A Lot A Little None   4. Moving to and from a bed to a chair (including a wheelchair)? [x] 1   [] 2   [] 3   [] 4   5. Need to walk in hospital room? [x] 1   [] 2   [] 3   [] 4   6. Climbing 3-5 steps with a railing? [x] 1   [] 2   [] 3   [] 4   © 2007, Trustees of [address] [place] [address], under license to [employer]. All rights reserved      Score:  Initial: 9 Most Recent: X (Date: -- )    Interpretation of Tool:  Represents activities that are increasingly more difficult (i.e. Bed mobility, Transfers, Gait). Medical Necessity:     · Patient demonstrates   · good  ·  rehab potential due to higher previous functional level. Reason for Services/Other Comments:  · Patient   · continues to require modification of therapeutic interventions to increase complexity of exercises  · .    Use of outcome tool(s) and clinical judgement create a POC that gives a: Questionable prediction of patient's progress: MODERATE COMPLEXITY            TREATMENT:   (In addition to Assessment/Re-Assessment sessions the following treatments were rendered)   Pre-treatment Symptoms/Complaints:  Pt talking and smiling, nonsensical   Pain: Initial:   Pain Intensity 1: 0  Post Session:  0/10 FLACC     Therapeutic Activity: (25 Minutes   ):  Therapeutic activities including bed mobility training (bed to chair, rolling, scooting, and turning), supine <-> sit transfer, static/dynamic sitting balance, scooting, posture training, LE exercises, and patient education to improve mobility, strength, balance, coordination and activity tolerance. Required minimal manual and verbal cues   to promote static and dynamic balance in sitting. Braces/Orthotics/Lines/Etc:   · O2 Device: Room air  Treatment/Session Assessment:    · Response to Treatment: None  · Interdisciplinary Collaboration:   o Physical Therapist  o Registered Nurse  o   · After treatment position/precautions:   o Supine in bed  o Bed/Chair-wheels locked  o Bed in low position  o Call light within reach  o RN notified   · Compliance with Program/Exercises: Will assess as treatment progresses  · Recommendations/Intent for next treatment session: \"Next visit will focus on advancements to more challenging activities and reduction in assistance provided\".   Total Treatment Duration:  PT Patient Time In/Time Out  Time In: 4575  Time Out: [address], [place] cup/straw/fed by clinician

## 2020-08-18 NOTE — PROGRESS NOTES
Patient resting quietly in bed and answers questions appropriately at this time. Wound vac on and working. Call light in place with heels off loaded.

## 2020-08-19 VITALS
HEIGHT: 67 IN | RESPIRATION RATE: 19 BRPM | DIASTOLIC BLOOD PRESSURE: 70 MMHG | BODY MASS INDEX: 37.98 KG/M2 | HEART RATE: 89 BPM | OXYGEN SATURATION: 92 % | TEMPERATURE: 97.6 F | WEIGHT: 242 LBS | SYSTOLIC BLOOD PRESSURE: 143 MMHG

## 2020-08-19 LAB
ALBUMIN SERPL-MCNC: 2.3 G/DL (ref 3.2–4.6)
ALBUMIN/GLOB SERPL: 0.6 {RATIO} (ref 1.2–3.5)
ALP SERPL-CCNC: 231 U/L (ref 50–136)
ALT SERPL-CCNC: 8 U/L (ref 12–65)
ANION GAP SERPL CALC-SCNC: 13 MMOL/L (ref 7–16)
AST SERPL-CCNC: 80 U/L (ref 15–37)
BASOPHILS # BLD: 0.1 K/UL (ref 0–0.2)
BASOPHILS NFR BLD: 1 % (ref 0–2)
BILIRUB SERPL-MCNC: 0.6 MG/DL (ref 0.2–1.1)
BUN SERPL-MCNC: 51 MG/DL (ref 8–23)
CALCIUM SERPL-MCNC: 8.9 MG/DL (ref 8.3–10.4)
CHLORIDE SERPL-SCNC: 108 MMOL/L (ref 98–107)
CO2 SERPL-SCNC: 23 MMOL/L (ref 21–32)
CREAT SERPL-MCNC: 2.03 MG/DL (ref 0.6–1)
DIFFERENTIAL METHOD BLD: ABNORMAL
EOSINOPHIL # BLD: 0 K/UL (ref 0–0.8)
EOSINOPHIL NFR BLD: 0 % (ref 0.5–7.8)
ERYTHROCYTE [DISTWIDTH] IN BLOOD BY AUTOMATED COUNT: 21.4 % (ref 11.9–14.6)
GLOBULIN SER CALC-MCNC: 3.9 G/DL (ref 2.3–3.5)
GLUCOSE SERPL-MCNC: 125 MG/DL (ref 65–100)
HCT VFR BLD AUTO: 27.8 % (ref 35.8–46.3)
HGB BLD-MCNC: 8.6 G/DL (ref 11.7–15.4)
IMM GRANULOCYTES # BLD AUTO: 0.8 K/UL (ref 0–0.5)
IMM GRANULOCYTES NFR BLD AUTO: 11 % (ref 0–5)
LYMPHOCYTES # BLD: 1.4 K/UL (ref 0.5–4.6)
LYMPHOCYTES NFR BLD: 18 % (ref 13–44)
MAGNESIUM SERPL-MCNC: 2.1 MG/DL (ref 1.8–2.4)
MCH RBC QN AUTO: 33 PG (ref 26.1–32.9)
MCHC RBC AUTO-ENTMCNC: 30.9 G/DL (ref 31.4–35)
MCV RBC AUTO: 106.5 FL (ref 79.6–97.8)
MONOCYTES # BLD: 0.9 K/UL (ref 0.1–1.3)
MONOCYTES NFR BLD: 12 % (ref 4–12)
NEUTS SEG # BLD: 4.5 K/UL (ref 1.7–8.2)
NEUTS SEG NFR BLD: 58 % (ref 43–78)
NRBC # BLD: 0.13 K/UL (ref 0–0.2)
PHOSPHATE SERPL-MCNC: 2.5 MG/DL (ref 2.3–3.7)
PLATELET # BLD AUTO: 123 K/UL (ref 150–450)
PMV BLD AUTO: 11.1 FL (ref 9.4–12.3)
POTASSIUM SERPL-SCNC: 3.8 MMOL/L (ref 3.5–5.1)
PROT SERPL-MCNC: 6.2 G/DL (ref 6.3–8.2)
RBC # BLD AUTO: 2.61 M/UL (ref 4.05–5.2)
SODIUM SERPL-SCNC: 144 MMOL/L (ref 136–145)
WBC # BLD AUTO: 7.8 K/UL (ref 4.3–11.1)

## 2020-08-19 PROCEDURE — 80053 COMPREHEN METABOLIC PANEL: CPT

## 2020-08-19 PROCEDURE — 74750000023 HC WOUND THERAPY

## 2020-08-19 PROCEDURE — 74011250637 HC RX REV CODE- 250/637: Performed by: FAMILY MEDICINE

## 2020-08-19 PROCEDURE — 3331090001 HH PPS REVENUE CREDIT

## 2020-08-19 PROCEDURE — 3331090002 HH PPS REVENUE DEBIT

## 2020-08-19 PROCEDURE — 85025 COMPLETE CBC W/AUTO DIFF WBC: CPT

## 2020-08-19 PROCEDURE — 84100 ASSAY OF PHOSPHORUS: CPT

## 2020-08-19 PROCEDURE — 74011250637 HC RX REV CODE- 250/637: Performed by: INTERNAL MEDICINE

## 2020-08-19 PROCEDURE — 83735 ASSAY OF MAGNESIUM: CPT

## 2020-08-19 RX ORDER — UREA 10 %
220 LOTION (ML) TOPICAL DAILY
Qty: 6 TAB | Refills: 0 | Status: SHIPPED | OUTPATIENT
Start: 2020-08-20 | End: 2020-08-26

## 2020-08-19 RX ORDER — ASCORBIC ACID 500 MG
500 TABLET ORAL 3 TIMES DAILY
Qty: 18 TAB | Refills: 0 | Status: SHIPPED | OUTPATIENT
Start: 2020-08-19 | End: 2020-08-25

## 2020-08-19 RX ADMIN — Medication 10 ML: at 06:07

## 2020-08-19 RX ADMIN — Medication 1 AMPULE: at 07:41

## 2020-08-19 RX ADMIN — PANTOPRAZOLE SODIUM 40 MG: 40 TABLET, DELAYED RELEASE ORAL at 06:07

## 2020-08-19 NOTE — PROGRESS NOTES
Both daughters, Deisi Nuñez and Betty Romain, notified of patients pending transfer to [facility] ER post acute rehab.

## 2020-08-19 NOTE — PROGRESS NOTES
Report called to [facility] @ [facility] ER post acute rehab. Kristin Rebolledo ambulance to  between 1:30-2.

## 2020-08-19 NOTE — PROGRESS NOTES
Pt refusing to eat this morning. She did drink her Ensure for breakfast but will not take solid food or medications crushed in applesauce/ice cream.     Speech is nonsensical but clear. Pt seems to be c/o pain in lower abdomen (FLACC score 5) but will not take PO pain medication when attempted to administer. Pt not following commands when asked. Breathing unlabored on RA. Wound VAC to L hip patent to 125mmHg with dressing c/d/i. Pt in no obvious distress.

## 2020-08-19 NOTE — DISCHARGE SUMMARY
Hospitalist Discharge Summary     Admit Date:  2020 12:46 PM   Name:  Sherwin Herman   Age:  67 y.o.  :  1945   MRN:  [id number]   PCP:  Gabriel Connors NP  Treatment Team: Attending Provider: Camilla Dominguez MD; Utilization Review: Gary Sánchez RN; Primary Nurse: August Garcia; Care Manager: Liliya Dominguez RN; Speech Language Pathologist: Diego Robledo; Physical Therapist: Cleve Myrick DPT    Problem List for this Hospitalization:  Active Hospital Problems    Diagnosis Date Noted    Vomiting 2020    Anemia 2020    CVA [facility]cerebral vascular accident) ([facility] [address] 2020    CKD (chronic kidney disease) 2020    DNR (do not resuscitate) 2020    Left hip postoperative wound infection 2020    Stage 4 chronic kidney disease ([facility] [address] 2020    Severe obesity ([facility] [address] 2019    Anemia due to chronic kidney disease treated with erythropoietin 2017    Malignant neoplasm metastatic to bone ([facility] [address] 2016     Last Assessment & Plan:   1. Complete radiation therapy to the patient's sacrum and femurs as directed by Dr. Sirena Land. 2.  Return to the office in 6 weeks with x-rays of the pelvis on arrival.  3.  Instruct the patient to advance her weightbearing as tolerated as she continues to enjoy a reduction in pain following the radiation therapy.  Hypertension 2016     UNSPECIFIED            Hospital Course :  Please refer to the admission H&P for details of presentation. In summary, Sherwin Herman is a 67 y.o. female with past medical history significant for HTN, HLD, CKD 4, chronically infected left hip joint on chronic antibiotic therapy admitted for acute CVA and anemia.  Patient is essentially bedbound at home. Janeen Delcid was evaluated by physical therapy after acute treatment for stroke and deemed suitable for SNF placement.  Patient had COVID swab drawn for SNF placement that returned positive.  Patient transferred to isolation floor on 8/15/2020. Betsey Kaminski without supplemental oxygen requirement therefore not a candidate for targeted therapy against novel coronavirus infection.  Repeat chest x-ray unremarkable.  Patient is medically stable for discharge to skilled nursing facility. Patient was made n.p.o. overnight on 8/17 due to concern for risk of aspiration. Speech therapy evaluate the patient on 8/18 and recommended mechanical soft diet. She is medically stable for discharge. Hemodynamically stable and remains on room air. Disposition: Rehab Facility  Activity: Activity as tolerated  Diet: DIET CARDIAC Mechanical Soft  DIET NUTRITIONAL SUPPLEMENTS All Meals; Ensure Enlive ( )  Code Status: DNR      Follow up instructions, discharge meds at bottom of this note. Plan was discussed with patient/family. All questions answered. Patient was stable at time of discharge. Patient will call a physician or return if any concerns. Diagnostic Imaging/Tests:   Mri Brain W Wo Cont    Result Date: 8/4/2020  MRI of the brain INDICATION: Altered mental status, history of breast cancer Standard MRI sequences were obtained through the brain in multiple planes. Images were obtained before and after intravenous infusion of 23 mL of Dot[place]. FINDINGS: There is an acute posterior left MCA territory infarction. There is no associated hemorrhage. .  No other areas of acute infarction are seen. There is a small old posterior right parietal infarct. There are also chronic changes in the white matter. The ventricles are normal in size. There are no extra-axial fluid collections. There are no bony lesions. The sinuses are clear. Post-contrast images show no abnormal enhancement. There are no masses. IMPRESSION: Acute left parietal infarct. No evidence of hemorrhage.      Ct Head Wo Cont    Result Date: 8/4/2020  CT of the head without contrast. CLINICAL INDICATION: Altered mental status since yesterday PROCEDURE: Serial thin section axial images are obtained from the cranial vertex through the skull base without the administration of intravenous contrast. Radiation dose reduction techniques were used for this study. Our CT scanners use one or all of the following: Automated exposure control, adjusted of the mA and/or kV according to patient size, iterative reconstruction COMPARISON: No prior. FINDINGS: There is no acute intracranial hemorrhage, mass, or mass effect. No abnormal extra-axial fluid collections identified. There is no hydrocephalus. The basilar cisterns are widely patent. Hypoattenuation is noted throughout the left temporal lobe in keeping with a large, subacute CVA. There is mild patchy hypoattenuation in the left parietal lobe as well. The posterior fossa is unremarkable. There is an area of focal cortically based hypoattenuation in the right parietal lobe that likely represents old infarct. Included paranasal sinuses and mastoid air cells are clear. No fractures identified. .     IMPRESSION: 1. Large, subacute CVA in the left temporal lobe and to a lesser extent the left parietal lobe. 2. Old, small infarct in the right parietal lobe. 3. No acute hemorrhage. Xr Chest Port    Result Date: 8/4/2020  Portable chest x-ray CLINICAL INDICATION: Sepsis FINDINGS: Single AP view of the chest compared to a similar exam dated 5/13/2020 show the lungs to be expanded and clear. No pleural effusion or pneumothorax. The cardiac silhouette and mediastinum are unremarkable. Degenerative osteoarthritic changes are noted of  the bilateral shoulders. IMPRESSION: No acute cardiopulmonary abnormality. Cta Code Neuro Head And Neck W Cont    Result Date: 8/5/2020  History: Altered mental status. Onset was yesterday. Comments: CT ANGIOGRAM OF THE NECK AND CT ANGIOGRAM OF THE [place] was obtained following the administration of IV contrast. IV contrast was administered to evaluate the arterial vasculature.  Reformatted images in the coronal and sagittal planes as well as 3-D imaging was obtained and reviewed on a dedicated PACS and [address]. Radiation reduction dose techniques were used for the study. Our CT scanner use one or all of the following- Automated exposure control, adjustment of the mA and/or KV according the patient size, iterative reconstruction. All measurements are based upon NASCET criteria if appropriate. Findings: CT ANGIOGRAM OF THE NECK: The arch and proximal great vessels are patent however atherosclerotic changes are noted at the origins of the proximal great vessels however no significant stenosis is appreciated. The carotid bulbs demonstrate eccentric calcified plaque disease. Degree of stenosis is less than 50%. The vertebral arteries are patent in their proximal course however probable high-grade stenoses are noted bilaterally. Mild atelectatic changes are noted in the lung bases. The thyroid tissue demonstrates a partially calcified nodule on the right. CT angiogram of [place]: The petrous, cavernous, and supraclinoid internal carotid arteries are patent with mild diffuse atherosclerotic changes. The anterior circulations patent. The right middle cerebral artery is patent. There is occlusion of an M2 segment of the left middle cerebral artery. There is a large dominant pain running superficially along the lateral aspect of the face outside the skull. This may be a venous anomaly. The distal vertebral arteries demonstrate diffuse atherosclerotic changes in their distal segments with at least a moderate stenosis of the distal left vertebral artery. The basilar artery and posterior cerebral arteries are patent. IMPRESSION: 1. Occlusion of an anterior M2 branch of the left middle cerebral artery. 2. Questionable venous anomaly as described above.  These findings were relayed to the patient's nurse upon interpretation      Echocardiogram results:  Results for orders placed or performed during the hospital encounter of 20   2D ECHO COMPLETE ADULT (TTE) W OR [address] [facility]  [address] [facility], [address] [facility]  [phone]    Transthoracic Echocardiogram  2D, M-mode, Doppler, and Color Doppler    Patient: Bruna Guthrie  MR #: [id number]  : 1945  Age: 67 years  Gender: Female  Study date: 05-Aug-2020  Account #: [de-identified]  Height: 67 in  Weight: 241.6 lb  BSA: 2.19 mï¾²  Status:Routine  Location: [facility]  BP: 122/ 57    Allergies: PENICILLINS    Sonographer:  Melani Sifuentes [facility]  Group:  [id number] Cardiology  Referring Physician:  Winter Esparza MD  Reading Physician:  Shlomo Fu MD    INDICATIONS: CVA. PROCEDURE: This was a routine study. A transthoracic echocardiogram was  performed. The study included complete 2D imaging, M-mode, complete spectral  Doppler, and color Doppler. Intravenous contrast (Definity) was administered. Intravenous contrast (agitated saline) was administered. Image quality was  adequate. LEFT VENTRICLE: Size was normal. Systolic function was normal. Ejection  fraction was estimated in the range of 55 % to 60 %. There were no regional  wall motion abnormalities. Wall thickness was normal. Avg. E/e': 8.4. Left  ventricular diastolic function parameters were normal.    RIGHT VENTRICLE: The size was normal. Systolic function was normal. The  tricuspid jet envelope definition was inadequate for estimation of RV   systolic  pressure. LEFT ATRIUM: Size was normal.    ATRIAL SEPTUM: Contrast injection was performed. There was no right-to-left  shunt, with provocative maneuvers to increase right atrial pressure. RIGHT ATRIUM: Size was normal.    SYSTEMIC VEINS: IVC: The inferior vena cava was normal in size and course. The  respirophasic change in diameter was more than 50%. AORTIC VALVE: The valve was structurally normal, tri-commissural. There was   no  evidence for stenosis.  There was mild regurgitation. MITRAL VALVE: Valve structure was normal. There was no evidence for stenosis. There was mild regurgitation. TRICUSPID VALVE: The valve structure was normal. There was no evidence for  stenosis. There was trace regurgitation. PULMONIC VALVE: The valve structure was normal. There was no evidence for  stenosis. There was trivial regurgitation. PERICARDIUM: There was no pericardial effusion. AORTA: The root exhibited normal size. SUMMARY:    -  Left ventricle: Systolic function was normal. Ejection fraction was  estimated in the range of 55 % to 60 %. There were no regional wall motion  abnormalities. -  Atrial septum: Contrast injection was performed. There was no   right-to-left  shunt, with provocative maneuvers to increase right atrial pressure. SYSTEM MEASUREMENT TABLES    2D mode  AoR Diam (2D): 3.3 cm  LA Dimension (2D): 3.3 cm  Left Atrium Systolic Volume Index; Method of Disks, Biplane; 2D mode;: 32.8  ml/m2  IVS/LVPW (2D): 1  IVSd (2D): 0.8 cm  LVIDd (2D): 4.4 cm  LVIDs (2D): 2.2 cm  LVOT Area (2D): 3.1 cm2  LVPWd (2D): 0.8 cm  RVIDd (2D): 2.8 cm    Unspecified Scan Mode  Peak Grad; Mean; Antegrade Flow: 11 mm[Hg]  Vmax;  Antegrade Flow: 167 cm/s  LVOT Diam: 2 cm    Prepared and signed by    Juliane Rdz MD  Signed 05-Aug-2020 17:36:57         Procedures done this admission:  * No surgery found *    All Micro Results     Procedure Component Value Units Date/Time    CULTURE, BLOOD [[id number]] Collected:  08/04/20 1257    Order Status:  Completed Specimen:  Blood Updated:  08/09/20 0634     Special Requests: --        RIGHT  Antecubital       Culture result: NO GROWTH 5 DAYS       CULTURE, BLOOD [[id number]] Collected:  08/04/20 1257    Order Status:  Completed Specimen:  Blood Updated:  08/09/20 0634     Special Requests: --        LEFT  FOREARM       Culture result: NO GROWTH 5 DAYS       CULTURE, URINE [[id number]] Collected:  08/04/20 1530    Order Status:  Completed Specimen:  Cath Urine Updated:  08/07/20 0804     Special Requests: NO SPECIAL REQUESTS        Culture result: NO GROWTH 2 DAYS             Labs: Results:       BMP, Mg, Phos Recent Labs     08/19/20 0236 08/18/20  0508 08/17/20  0408    143 145   K 3.8 3.7 4.1   * 108* 109*   CO2 23 23 24   AGAP 13 12 12   BUN 51* 48* 44*   CREA 2.03* 2.17* 2.19*   CA 8.9 9.2 8.9   * 91 84   MG 2.1 2.0 2.1   PHOS 2.5 2.9 3.2      CBC Recent Labs     08/19/20 0236 08/18/20  0508 08/17/20  0408   WBC 7.8 7.9 6.8   RBC 2.61* 2.50* 2.45*   HGB 8.6* 8.2* 8.0*   HCT 27.8* 25.9* 25.6*   * 133* 149*   GRANS 58 65 59   LYMPH 18 14 18   EOS 0* 0* 0*   MONOS 12 10 10   BASOS 1 1 1   IG 11* 10* 11*   ANEU 4.5 5.1 4.1   ABL 1.4 1.1 1.3   DEEPA 0.0 0.0 0.0   ABM 0.9 0.8 0.7   ABB 0.1 0.1 0.1   AIG 0.8* 0.8* 0.8*      LFT Recent Labs     08/19/20 0236 08/18/20  0508 08/17/20  0408   ALT 8* 9* 17   * 218* 203*   TP 6.2* 6.0* 6.1*   ALB 2.3* 2.4* 2.5*   GLOB 3.9* 3.6* 3.6*   AGRAT 0.6* 0.7* 0.7*      Cardiac Testing No results found for: BNPP, BNP, CPK, RCK1, RCK2, RCK3, RCK4, CKMB, CKNDX, CKND1, TROPT, TROIQ   Coagulation Tests Lab Results   Component Value Date/Time    Prothrombin time 13.9 05/28/2020 10:04 AM    Prothrombin time 13.8 04/26/2020 02:59 PM    Prothrombin time 14.0 08/07/2018 07:27 PM    INR 1.0 05/28/2020 10:04 AM    INR 1.0 04/26/2020 02:59 PM    INR 1.1 08/07/2018 07:27 PM    aPTT 28.2 05/28/2020 10:04 AM      A1c Lab Results   Component Value Date/Time    Hemoglobin A1c 4.8 08/05/2020 05:31 AM      Lipid Panel Lab Results   Component Value Date/Time    Cholesterol, total 153 08/05/2020 05:31 AM    HDL Cholesterol 36 (L) 08/05/2020 05:31 AM    LDL, calculated 98 08/05/2020 05:31 AM    VLDL, calculated 19 08/05/2020 05:31 AM    Triglyceride 95 08/05/2020 05:31 AM    CHOL/HDL Ratio 4.3 08/05/2020 05:31 AM      Thyroid Panel Lab Results   Component Value Date/Time    TSH 1.020 08/05/2020 05:31 AM TSH 1.010 04/18/2017 11:15 AM        Most Recent UA Lab Results   Component Value Date/Time    Color YELLOW 08/04/2020 03:30 PM    Appearance CLEAR 08/04/2020 03:30 PM    Specific gravity 1.012 08/04/2020 03:30 PM    pH (UA) 5.5 08/04/2020 03:30 PM    Protein Negative 08/04/2020 03:30 PM    Glucose Negative 08/04/2020 03:30 PM    Ketone Negative 08/04/2020 03:30 PM    Bilirubin Negative 08/04/2020 03:30 PM    Blood Negative 08/04/2020 03:30 PM    Urobilinogen 0.2 08/04/2020 03:30 PM    Nitrites Negative 08/04/2020 03:30 PM    Leukocyte Esterase TRACE (A) 08/04/2020 03:30 PM    WBC 0-3 08/04/2020 03:30 PM    RBC 0-3 08/04/2020 03:30 PM    Epithelial cells 0-3 08/04/2020 03:30 PM    Bacteria 0 08/04/2020 03:30 PM    Casts 0-3 08/04/2020 03:30 PM    Crystals, urine 0 06/07/2019 02:10 PM    Mucus TRACE 04/29/2020 12:13 PM    Other observations RESULTS VERIFIED MANUALLY 04/29/2020 12:13 PM        Allergies   Allergen Reactions    Penicillins Rash     Immunization History   Administered Date(s) Administered    TB Skin Test (PPD) Intradermal 08/08/2018, 04/26/2020, 08/04/2020       All Labs from Last 24 Hrs:  Recent Results (from the past 24 hour(s))   CBC WITH AUTOMATED DIFF    Collection Time: 08/19/20  2:36 AM   Result Value Ref Range    WBC 7.8 4.3 - 11.1 K/uL    RBC 2.61 (L) 4.05 - 5.2 M/uL    HGB 8.6 (L) 11.7 - 15.4 g/dL    HCT 27.8 (L) 35.8 - 46.3 %    .5 (H) 79.6 - 97.8 FL    MCH 33.0 (H) 26.1 - 32.9 PG    MCHC 30.9 (L) 31.4 - 35.0 g/dL    RDW 21.4 (H) 11.9 - 14.6 %    PLATELET 748 (L) 431 - 450 K/uL    MPV 11.1 9.4 - 12.3 FL    ABSOLUTE NRBC 0.13 0.0 - 0.2 K/uL    DF AUTOMATED      NEUTROPHILS 58 43 - 78 %    LYMPHOCYTES 18 13 - 44 %    MONOCYTES 12 4.0 - 12.0 %    EOSINOPHILS 0 (L) 0.5 - 7.8 %    BASOPHILS 1 0.0 - 2.0 %    IMMATURE GRANULOCYTES 11 (H) 0.0 - 5.0 %    ABS. NEUTROPHILS 4.5 1.7 - 8.2 K/UL    ABS. LYMPHOCYTES 1.4 0.5 - 4.6 K/UL    ABS. MONOCYTES 0.9 0.1 - 1.3 K/UL    ABS.  EOSINOPHILS 0.0 0.0 - 0.8 K/UL    ABS. BASOPHILS 0.1 0.0 - 0.2 K/UL    ABS. IMM. GRANS. 0.8 (H) 0.0 - 0.5 K/UL   METABOLIC PANEL, COMPREHENSIVE    Collection Time: 08/19/20  2:36 AM   Result Value Ref Range    Sodium 144 136 - 145 mmol/L    Potassium 3.8 3.5 - 5.1 mmol/L    Chloride 108 (H) 98 - 107 mmol/L    CO2 23 21 - 32 mmol/L    Anion gap 13 7 - 16 mmol/L    Glucose 125 (H) 65 - 100 mg/dL    BUN 51 (H) 8 - 23 MG/DL    Creatinine 2.03 (H) 0.6 - 1.0 MG/DL    GFR est AA 31 (L) >60 ml/min/1.73m2    GFR est non-AA 25 (L) >60 ml/min/1.73m2    Calcium 8.9 8.3 - 10.4 MG/DL    Bilirubin, total 0.6 0.2 - 1.1 MG/DL    ALT (SGPT) 8 (L) 12 - 65 U/L    AST (SGOT) 80 (H) 15 - 37 U/L    Alk.  phosphatase 231 (H) 50 - 136 U/L    Protein, total 6.2 (L) 6.3 - 8.2 g/dL    Albumin 2.3 (L) 3.2 - 4.6 g/dL    Globulin 3.9 (H) 2.3 - 3.5 g/dL    A-G Ratio 0.6 (L) 1.2 - 3.5     MAGNESIUM    Collection Time: 08/19/20  2:36 AM   Result Value Ref Range    Magnesium 2.1 1.8 - 2.4 mg/dL   PHOSPHORUS    Collection Time: 08/19/20  2:36 AM   Result Value Ref Range    Phosphorus 2.5 2.3 - 3.7 MG/DL       Current Med List in Hospital:   Current Facility-Administered Medications   Medication Dose Route Frequency    labetaloL (NORMODYNE;TRANDATE) injection 20 mg  20 mg IntraVENous Q2H PRN    ascorbic acid (vitamin C) (VITAMIN C) tablet 2,000 mg  2,000 mg Oral TID    zinc sulfate tablet 220 mg  220 mg Oral DAILY    HYDROcodone-acetaminophen (NORCO) 5-325 mg per tablet 1 Tab  1 Tab Oral Q6H PRN    alcohol 62% (NOZIN) nasal  1 Ampule  1 Ampule Topical Q12H    pantoprazole (PROTONIX) tablet 40 mg  40 mg Oral ACB    0.9% sodium chloride infusion 250 mL  250 mL IntraVENous PRN    sodium chloride (NS) flush 5-40 mL  5-40 mL IntraVENous Q8H    sodium chloride (NS) flush 5-40 mL  5-40 mL IntraVENous PRN    ondansetron (ZOFRAN) injection 4 mg  4 mg IntraVENous Q6H PRN    atorvastatin (LIPITOR) tablet 80 mg  80 mg Oral QHS    acetaminophen (TYLENOL) suppository 650 mg  650 mg Rectal Q4H PRN    senna-docusate (PERICOLACE) 8.6-50 mg per tablet 2 Tab  2 Tab Oral QHS    amLODIPine (NORVASC) tablet 5 mg  5 mg Oral DAILY    cephALEXin (KEFLEX) capsule 250 mg  250 mg Oral BID    diphenhydrAMINE (BENADRYL) capsule 25 mg  25 mg Oral Q6H PRN    triamterene-hydroCHLOROthiazide (MAXZIDE) 37.5-25 mg per tablet 1 Tab  1 Tab Oral DAILY    aspirin chewable tablet 81 mg  81 mg Oral DAILY       Discharge Exam:  Patient Vitals for the past 24 hrs:   Temp Pulse Resp BP SpO2   08/19/20 1113 97.6 °F (36.4 °C) 89 19 143/70 92 %   08/19/20 0725 97.6 °F (36.4 °C) 78 19 162/69 95 %   08/19/20 0328 97.9 °F (36.6 °C) 65 19 121/55 96 %   08/18/20 2329 98.1 °F (36.7 °C) 85 20 118/59 96 %   08/18/20 2010 98.2 °F (36.8 °C) 78 20 122/83 91 %   08/18/20 1550 97.6 °F (36.4 °C) 80 16 (!) 128/91 96 %     Oxygen Therapy  O2 Sat (%): 92 % (08/19/20 1113)  Pulse via Oximetry: 68 beats per minute (08/04/20 1700)  O2 Device: Room air (08/18/20 1523)    Estimated body mass index is 37.9 kg/m² as calculated from the following:    Height as of this encounter: 5' 7\" (1.702 m). Weight as of an earlier encounter on 8/4/20: 109.8 kg (242 lb). Intake/Output Summary (Last 24 hours) at 8/19/2020 1136  Last data filed at 8/19/2020 0855  Gross per 24 hour   Intake 710 ml   Output 0 ml   Net 710 ml       *Note that automatically entered I/Os may not be accurate; dependent on patient compliance with collection and accurate  by assistants. Physical Exam:   General:                     alert, awake, no acute distress. Well nourished. Obese  Head:                          normocephalic, atraumatic  Eyes, Ears, nose:      PERRL, EOMI. Normal conjunctiva  Neck:                          supple, non-tender. Trachea midline. Lungs:                        CTAB, no wheezing, rhonchi, rales  Cardiac:                      RRR, Normal S1 and S2. No S3, S4 or murmurs.    Abdomen:                  Soft, non distended, nontender, +BS, no guarding/rebound  Extremities:               Warm, dry. No edema   Skin:                           No rashes, no jaundice  Neuro:              Alert and awake. Incoherent at times. No gross focal neurological deficit  Psychiatric:                No anxiety, calm, cooperative         Discharge Info:   Current Discharge Medication List      START taking these medications    Details   zinc sulfate 220 mg tablet Take 1 Tab by mouth daily for 6 days. Qty: 6 Tab, Refills: 0      ascorbic acid, vitamin C, (VITAMIN C) 500 mg tablet Take 1 Tab by mouth three (3) times daily for 6 days. Qty: 18 Tab, Refills: 0      aspirin 81 mg chewable tablet Take 1 Tab by mouth daily. Qty: 30 Tab, Refills: 0      atorvastatin (LIPITOR) 80 mg tablet Take 1 Tab by mouth nightly. Qty: 30 Tab, Refills: 0      HYDROcodone-acetaminophen (NORCO) 5-325 mg per tablet Take 1 Tab by mouth every six (6) hours as needed for Pain for up to 5 days. Max Daily Amount: 4 Tabs. Qty: 20 Tab, Refills: 0    Associated Diagnoses: Open wound of left hip, subsequent encounter         CONTINUE these medications which have NOT CHANGED    Details   triamterene-hydroCHLOROthiazide (MAXZIDE) 37.5-25 mg per tablet TAKE 1 TABLET BY MOUTH EVERY DAY  Qty: 30 Tab, Refills: 1    Associated Diagnoses: Edema, unspecified type      cephALEXin (KEFLEX) 250 mg capsule Take 250 mg by mouth two (2) times a day. polyethylene glycol (MIRALAX) 17 gram packet Take 1 Packet by mouth daily. Qty: 30 Packet, Refills: 0      docusate sodium (COLACE) 50 mg capsule Take 50 mg by mouth two (2) times daily as needed for Constipation. sennosides 25 mg tab Take 1 Tab by mouth nightly as needed for Other (constipation). diphenhydrAMINE (BENADRYL) 25 mg tablet Take 25 mg by mouth every six (6) hours as needed for Allergies.       palbociclib (Ibrance) 75 mg cap Take one capsule by mouthOnce daily with food for21 days on, followed by7 days off during each cyCle of 28 days  Qty: 21 Cap, Refills: 5    Associated Diagnoses: Malignant neoplasm metastatic to bone ([facility] [address] Infiltrating ductal carcinoma of female breast, unspecified laterality (HCC)      ondansetron hcl (Zofran) 8 mg tablet Take 1 Tab by mouth every eight (8) hours as needed for Nausea. Qty: 90 Tab, Refills: 2    Associated Diagnoses: Malignant neoplasm metastatic to bone (HCC)      letrozole (FEMARA) 2.5 mg tablet TAKE 1 TABLET BY MOUTH EVERY DAY  Qty: 90 Tab, Refills: 3    Associated Diagnoses: Infiltrating ductal carcinoma of female breast, unspecified laterality (HCC)      melatonin 5 mg cap capsule Take 10 mg by mouth nightly. furosemide (LASIX) 20 mg tablet TAKE 1 TAB BY MOUTH DAILY AS NEEDED. FOR SWELLING. Qty: 30 Tab, Refills: 1    Associated Diagnoses: Edema, unspecified type      calcium carbonate (OS-JORGE) 500 mg calcium (1,250 mg) tablet Take 2 tablets at lunch and 2 tablets at dinner. Indications: hypocalcemia  Qty: 4 Tab, Refills: 0    Associated Diagnoses: Hypocalcemia      amLODIPine (NORVASC) 5 mg tablet Take 5 mg by mouth daily. STOP taking these medications       oxyCODONE-acetaminophen 5-300 mg tab Comments:   Reason for Stopping:         sodium chloride (BD Pre-Filled Normal Saline) Comments:   Reason for Stopping:         heparin sod,porcine/0.9 % NaCl (HEPARIN FLUSH IV) Comments:   Reason for Stopping:         magnesium oxide (MAG-OX) 400 mg tablet Comments:   Reason for Stopping:         aspirin (ASPIRIN) 325 mg tablet Comments:   Reason for Stopping: Follow Up Orders: Follow-up Appointments   Procedures    FOLLOW UP VISIT Appointment in: One Week With PCP in 1-2 weeks. With PCP in 1-2 weeks. Standing Status:   Standing     Number of Occurrences:   1     Order Specific Question:   Appointment in     Answer:    One Week       Follow-up Information     Follow up With Specialties Details Why Contact Info    Boris Crawford NP Family Medicine   [address] [place] [address]  [phone]            Time spent in patient discharge planning and coordination 40 minutes.     Signed:  Taryn Rose MD

## 2020-08-19 NOTE — PROGRESS NOTES
Pt is being discharged today for [address]: Angelo Alvarez  [phone]  Transport Forrest Forte MD and RN notified  No further needs/supportive care orders received for CM at this time. Pt will have close follow up with Facility MD  Wound care RN notified  Regan Minerenson [facility] will provide wound vac    Milestone Met    Care Management Interventions  PCP Verified by CM: Yes  Mode of Transport at Discharge: BLS  Transition of Care Consult (CM Consult): SNF([facility])  Partner SNF: Yes  Discharge Durable Medical Equipment: No  Physical Therapy Consult: Yes  Occupational Therapy Consult: Yes  Speech Therapy Consult: Yes  Current Support Network: Own Home, Lives with Caregiver  Confirm Follow Up Transport: Family  The Plan for Transition of Care is Related to the Following Treatment Goals : Patient will participate in home health therapies in order to maintain baseline independence in ADLs. The Patient and/or Patient Representative was Provided with a Choice of Provider and Agrees with the Discharge Plan?: Yes  Name of the Patient Representative Who was Provided with a Choice of Provider and Agrees with the Discharge Plan: Daughter Mario Ambriz.   Freedom of Choice List was Provided with Basic Dialogue that Supports the Patient's Individualized Plan of Care/Goals, Treatment Preferences and Shares the Quality Data Associated with the Providers?: Yes  Discharge Location  Discharge Placement: Skilled nursing facility([facility])

## 2020-08-19 NOTE — WOUND CARE
Nurse updated to notify SNF that bandage change is still due today and to disconnect the machine at tubing and send dressing with patient. Patient is discharging today. Unknown if ever smoked

## 2020-08-20 ENCOUNTER — PATIENT OUTREACH (OUTPATIENT)
Dept: CASE MANAGEMENT | Age: 75
End: 2020-08-20

## 2020-08-20 ENCOUNTER — HOME CARE VISIT (OUTPATIENT)
Dept: HOME HEALTH SERVICES | Facility: HOME HEALTH | Age: 75
End: 2020-08-20
Payer: MEDICARE

## 2020-08-20 PROCEDURE — 3331090001 HH PPS REVENUE CREDIT

## 2020-08-20 PROCEDURE — 3331090002 HH PPS REVENUE DEBIT

## 2020-08-20 NOTE — PROGRESS NOTES
Transition of care outreach postponed for 14 days due to patient's discharge to SNF.   [facility]

## 2020-08-25 NOTE — PROGRESS NOTES
Name: Mar Levin MRN: [id number]  : 1945  Age:75 y.o.  female  Admit Date:  2020 LOS: 13      Hospitalist Progress Note    Mar Levin is a 67 y.o. female with medical history significant for HTN, HLD, CKD 4, chronically infected left hip joint on chronic antibiotic therapy admitted for acute CVA and anemia. Patient is essentially bedbound at home. Patient was evaluated by physical therapy after acute treatment for stroke and deemed suitable for SNF placement. Patient had COVID swab drawn for SNF placement that returned positive. Patient transferred to isolation floor on 8/15/2020. Patient without supplemental oxygen requirement therefore not a candidate for targeted therapy against novel coronavirus infection. Repeat chest x-ray unremarkable. Patient is medically stable for discharge to skilled nursing facility. Patient was somnolent and made NPO overnight on  due to concern of risk for aspiration. Speech therapy evaluated the patient on  and recommended mechanical soft diet      Date of service (2020):  Patient is seen and examined at bedside. No acute events reported overnight by nursing staff. Patient is medically stable for discharge. See Discharge summary. ROS: 10 point review of systems is otherwise negative with the exception of the elements mentioned above. Objective:    No data found. Oxygen Therapy  O2 Sat (%): 92 % (20 1113)  Pulse via Oximetry: 68 beats per minute (20 1700)  O2 Device: Room air (20 1523)    Estimated body mass index is 37.9 kg/m² as calculated from the following:    Height as of this encounter: 5' 7\" (1.702 m). Weight as of an earlier encounter on 20: 109.8 kg (242 lb). No intake or output data in the 24 hours ending 20 1222    *Note that automatically entered I/Os may not be accurate; dependent on patient compliance with collection and accurate  by techs.     Physical Exam: General:     alert, awake, no acute distress. Well nourished. Obese  Head:   normocephalic, atraumatic  Eyes, Ears, nose: PERRL, EOMI. Normal conjunctiva  Neck:    supple, non-tender. Trachea midline. Lungs:   CTAB, no wheezing, rhonchi, rales  Cardiac:   RRR, Normal S1 and S2. No S3, S4 or murmurs. Abdomen:   Soft, non distended, nontender, +BS, no guarding/rebound  Extremities:   Warm, dry. No edema   Skin:   No rashes, no jaundice  Neuro:  Alert and awake. Incoherent at times. No gross focal neurological deficit  Psychiatric:  No anxiety, calm, cooperative    Data Review:  I have reviewed all labs, meds, and studies from the last 24 hours:    No results found for this or any previous visit (from the past 24 hour(s)). All Micro Results     Procedure Component Value Units Date/Time    CULTURE, BLOOD [[id number]] Collected:  08/04/20 1257    Order Status:  Completed Specimen:  Blood Updated:  08/09/20 0634     Special Requests: --        RIGHT  Antecubital       Culture result: NO GROWTH 5 DAYS       CULTURE, BLOOD [[id number]] Collected:  08/04/20 1257    Order Status:  Completed Specimen:  Blood Updated:  08/09/20 0634     Special Requests: --        LEFT  FOREARM       Culture result: NO GROWTH 5 DAYS       CULTURE, URINE [[id number]] Collected:  08/04/20 1530    Order Status:  Completed Specimen:  Cath Urine Updated:  08/07/20 0804     Special Requests: NO SPECIAL REQUESTS        Culture result: NO GROWTH 2 DAYS             Current Meds:  No current facility-administered medications for this encounter. Current Outpatient Medications   Medication Sig    zinc sulfate 220 mg tablet Take 1 Tab by mouth daily for 6 days.  ascorbic acid, vitamin C, (VITAMIN C) 500 mg tablet Take 1 Tab by mouth three (3) times daily for 6 days.  aspirin 81 mg chewable tablet Take 1 Tab by mouth daily.  atorvastatin (LIPITOR) 80 mg tablet Take 1 Tab by mouth nightly.     triamterene-hydroCHLOROthiazide (MAXZIDE) 37.5-25 mg per tablet TAKE 1 TABLET BY MOUTH EVERY DAY    cephALEXin (KEFLEX) 250 mg capsule Take 250 mg by mouth two (2) times a day.  polyethylene glycol (MIRALAX) 17 gram packet Take 1 Packet by mouth daily. (Patient taking differently: Take 1 Packet by mouth daily. mix with 8 oz water)    docusate sodium (COLACE) 50 mg capsule Take 50 mg by mouth two (2) times daily as needed for Constipation.  sennosides 25 mg tab Take 1 Tab by mouth nightly as needed for Other (constipation).  diphenhydrAMINE (BENADRYL) 25 mg tablet Take 25 mg by mouth every six (6) hours as needed for Allergies.  palbociclib (Ibrance) 75 mg cap Take one capsule by mouthOnce daily with food for21 days on, followed by7 days off during each cyCle of 28 days    ondansetron hcl (Zofran) 8 mg tablet Take 1 Tab by mouth every eight (8) hours as needed for Nausea.  letrozole (FEMARA) 2.5 mg tablet TAKE 1 TABLET BY MOUTH EVERY DAY    melatonin 5 mg cap capsule Take 10 mg by mouth nightly.  furosemide (LASIX) 20 mg tablet TAKE 1 TAB BY MOUTH DAILY AS NEEDED. FOR SWELLING.  calcium carbonate (OS-JORGE) 500 mg calcium (1,250 mg) tablet Take 2 tablets at lunch and 2 tablets at dinner. Indications: hypocalcemia (Patient taking differently: Take 2 Tabs by mouth. Take 2 tablets at lunch and 2 tablets at dinner. Indications: low amount of calcium in the blood)    amLODIPine (NORVASC) 5 mg tablet Take 5 mg by mouth daily. Other Studies:  Mri Brain W Wo Cont    Result Date: 8/4/2020  MRI of the brain INDICATION: Altered mental status, history of breast cancer Standard MRI sequences were obtained through the brain in multiple planes. Images were obtained before and after intravenous infusion of 23 mL of Dotarem. FINDINGS: There is an acute posterior left MCA territory infarction. There is no associated hemorrhage. .  No other areas of acute infarction are seen. There is a small old posterior right parietal infarct.   There are also chronic changes in the white matter. The ventricles are normal in size. There are no extra-axial fluid collections. There are no bony lesions. The sinuses are clear. Post-contrast images show no abnormal enhancement. There are no masses. IMPRESSION: Acute left parietal infarct. No evidence of hemorrhage. Ct Head Wo Cont    Result Date: 8/4/2020  CT of the head without contrast. CLINICAL INDICATION: Altered mental status since yesterday PROCEDURE: Serial thin section axial images are obtained from the cranial vertex through the skull base without the administration of intravenous contrast. Radiation dose reduction techniques were used for this study. Our CT scanners use one or all of the following: Automated exposure control, adjusted of the mA and/or kV according to patient size, iterative reconstruction COMPARISON: No prior. FINDINGS: There is no acute intracranial hemorrhage, mass, or mass effect. No abnormal extra-axial fluid collections identified. There is no hydrocephalus. The basilar cisterns are widely patent. Hypoattenuation is noted throughout the left temporal lobe in keeping with a large, subacute CVA. There is mild patchy hypoattenuation in the left parietal lobe as well. The posterior fossa is unremarkable. There is an area of focal cortically based hypoattenuation in the right parietal lobe that likely represents old infarct. Included paranasal sinuses and mastoid air cells are clear. No fractures identified. .     IMPRESSION: 1. Large, subacute CVA in the left temporal lobe and to a lesser extent the left parietal lobe. 2. Old, small infarct in the right parietal lobe. 3. No acute hemorrhage. Xr Chest Port    Result Date: 8/4/2020  Portable chest x-ray CLINICAL INDICATION: Sepsis FINDINGS: Single AP view of the chest compared to a similar exam dated 5/13/2020 show the lungs to be expanded and clear. No pleural effusion or pneumothorax.  The cardiac silhouette and mediastinum are unremarkable. Degenerative osteoarthritic changes are noted of  the bilateral shoulders. IMPRESSION: No acute cardiopulmonary abnormality. Cta Code Neuro Head And Neck W Cont    Result Date: 8/5/2020  History: Altered mental status. Onset was yesterday. Comments: CT ANGIOGRAM OF THE NECK AND CT ANGIOGRAM OF THE [place] was obtained following the administration of IV contrast. IV contrast was administered to evaluate the arterial vasculature. Reformatted images in the coronal and sagittal planes as well as 3-D imaging was obtained and reviewed on a dedicated PACS and [address]. Radiation reduction dose techniques were used for the study. Our CT scanner use one or all of the following- Automated exposure control, adjustment of the mA and/or KV according the patient size, iterative reconstruction. All measurements are based upon NASCET criteria if appropriate. Findings: CT ANGIOGRAM OF THE NECK: The arch and proximal great vessels are patent however atherosclerotic changes are noted at the origins of the proximal great vessels however no significant stenosis is appreciated. The carotid bulbs demonstrate eccentric calcified plaque disease. Degree of stenosis is less than 50%. The vertebral arteries are patent in their proximal course however probable high-grade stenoses are noted bilaterally. Mild atelectatic changes are noted in the lung bases. The thyroid tissue demonstrates a partially calcified nodule on the right. CT angiogram of [place]: The petrous, cavernous, and supraclinoid internal carotid arteries are patent with mild diffuse atherosclerotic changes. The anterior circulations patent. The right middle cerebral artery is patent. There is occlusion of an M2 segment of the left middle cerebral artery. There is a large dominant pain running superficially along the lateral aspect of the face outside the skull. This may be a venous anomaly.  The distal vertebral arteries demonstrate diffuse atherosclerotic changes in their distal segments with at least a moderate stenosis of the distal left vertebral artery. The basilar artery and posterior cerebral arteries are patent. IMPRESSION: 1. Occlusion of an anterior M2 branch of the left middle cerebral artery. 2. Questionable venous anomaly as described above. These findings were relayed to the patient's nurse upon interpretation         Assessment:    Active Hospital Problems    Diagnosis Date Noted    Vomiting 08/04/2020    Anemia 08/04/2020    CVA (cerebral vascular accident) ([facility] 08/04/2020    CKD (chronic kidney disease) 08/04/2020    DNR (do not resuscitate) 08/04/2020    Left hip postoperative wound infection 05/13/2020    Stage 4 chronic kidney disease ([facility] 05/01/2020    Severe obesity ([facility] [address] 09/24/2019    Anemia due to chronic kidney disease treated with erythropoietin 07/25/2017    Malignant neoplasm metastatic to bone ([facility] [address] 07/31/2016     Last Assessment & Plan:   1. Complete radiation therapy to the patient's sacrum and femurs as directed by Dr. Gadiel Prasad. 2.  Return to the office in 6 weeks with x-rays of the pelvis on arrival.  3.  Instruct the patient to advance her weightbearing as tolerated as she continues to enjoy a reduction in pain following the radiation therapy.  Hypertension 02/16/2016     UNSPECIFIED          Plan:    Acute ischemic CVA, left temporal and parietal lobes  -continue with ASA and atorvastatin oral     Novel coronavirus infection:  Completely asymptomatic. Does not meet any criteria for targeted therapy. -Ascorbic acid and zinc sulfate     Macrocytic anemia: Stable.     CKD 4: Stable. HTN // HLD : continue with home triamterene/hydrochlorothiazide and amlodipine, statin and aspirin    Chronically infected left hip joint with wound VAC in position    Diet:  None  DVT PPx: SCDs  Code: Prior  Dispo: SNF  Estimated Discharge: today      Labs/Imaging Reviewed.    Patient is Moderate risk due to current condition and comorbid conditions. Plan discussed with staff, patient/family and are in agreement. Time Spent: Greater than 45 minutes was spent in reviewing charts, physical exam, discussion with patient, and answered any questions.     Signed By: Orlando Kumari MD     August 25, 2020

## 2020-09-03 ENCOUNTER — PATIENT OUTREACH (OUTPATIENT)
Dept: CASE MANAGEMENT | Age: 75
End: 2020-09-03

## 2020-09-08 ENCOUNTER — APPOINTMENT (OUTPATIENT)
Dept: INFUSION THERAPY | Age: 75
End: 2020-09-08

## 2020-09-11 ENCOUNTER — PATIENT OUTREACH (OUTPATIENT)
Dept: CASE MANAGEMENT | Age: 75
End: 2020-09-11

## 2022-12-17 NOTE — PROGRESS NOTES
-Dress site with Betadine and dry dressing daily. Use felt offloading pads to offload site. Ambulate with Darco offloading shoe. Follow-up in 1 week for reevaluation or sooner if other concerns arise. Progress Note    Patient: Pat Serrano MRN: [id number]  SSN: [id number]    YOB: 1945  Age: 67 y.o. Sex: female      Admit Date: 8/4/2020    LOS: 2 days     Subjective: The patient has no complains. She is more conversant, she was able to tell me \" This hospital is horrible\". Objective:     Vitals:    08/05/20 2000 08/05/20 2359 08/06/20 0000 08/06/20 0400   BP: 130/79  130/58 123/57   Pulse: 69 73 71 76   Resp: 16  16 16   Temp: 98.3 °F (36.8 °C)  97.4 °F (36.3 °C) 98.1 °F (36.7 °C)   SpO2: 96%  95%    Weight:       Height:            Intake and Output:  Current Shift: No intake/output data recorded. Last three shifts: 08/04 1901 - 08/06 0700  In: 790 [P.O.:240]  Out: 1000 [Urine:1000]    Physical Exam:   GENERAL: alert, cooperative  EYE: negative  LYMPHATIC: Cervical, supraclavicular, and axillary nodes normal.   THROAT & NECK: normal and no erythema or exudates noted. LUNG: clear to auscultation bilaterally  HEART: regular rate and rhythm, S1, S2 normal, no murmur, click, rub or gallop  ABDOMEN: soft, non-tender. Bowel sounds normal. No masses,  no organomegaly  EXTREMITIES:  extremities normal, atraumatic, no cyanosis or edema. Left hip with wound vac, no purulent secretion, no erythema   SKIN: Normal.  NEUROLOGIC: aphasia, able to follow some commands (opening eyes, squeeze fingers). Generalized weakness, unable to assess pronator drift or sensation.      Lab/Data Review: All lab results for the last 24 hours reviewed. Assessment:     Principal Problem:    CVA (cerebral vascular accident) ([facility] [address] (8/4/2020)    Active Problems:    Hypertension (2/16/2016)      Overview: UNSPECIFIED       Malignant neoplasm metastatic to bone ([facility] [address] (7/31/2016)      Overview: Last Assessment & Plan:       1. Complete radiation therapy to the patient's sacrum and femurs as       directed by Dr. Raymundo Driver.       2.  Return to the office in 6 weeks with x-rays of the pelvis on arrival. 3.  Instruct the patient to advance her weightbearing as tolerated as she       continues to enjoy a reduction in pain following the radiation therapy. Anemia due to chronic kidney disease treated with erythropoietin (7/25/2017)      Severe obesity ([facility] [address] (9/24/2019)      Stage 4 chronic kidney disease ([facility] [address] (5/1/2020)      Left hip postoperative wound infection (5/13/2020)      Vomiting (8/4/2020)      Anemia (8/4/2020)      CKD (chronic kidney disease) (8/4/2020)      DNR (do not resuscitate) (8/4/2020)        Plan:   -Acute Left MCA infarct (parietal):  CTA: Occlusion of an anterior M2 branch of the left middle cerebral artery. ECHO: normal ef, no shunts, no vegetations or masses   Continue mechanical soft diet  Appreciate speech evaluation   Continue asa, high intensity statin  HTN control  DVT ppx  Keep under telemetry  Neurology following   PT/OT. Suggested LTAC     -Anemia:  Possible due to CKD, wound vac  Sp 1 prbc. Hb is now 8.3 gr   Monitor HH serially     -Vomiting: multifactorial: resolved      -CKD stage Iv:  Avoid nephrotoxic meds  IOs  Daily labs     -HTN:  home meds      -Left hip wound infection, sp hardware removal:  Continue wound vac  Wound care consulted  On chronic keflex     -Thrombocytopenia:  Noted. Monitor  Hold heparin derivatives     -Hx of metastatic breast cancer: On letrozole     -Obesity: counseling     DVT ppx: compression stockings. Holding heparin in view of anemia and thrombocytopenia      Code status: DNR.     Risk: high    Estimated DC planning: STR versus LTAC    Goals of care discussed with her daughter Mario Ambriz over the phone. She does not like the idea of her mother going to [address] or LTAC, she would prefer her going back home. I explained that now that she has a CVA that limits her mobility and speech it would be very difficult to achieve appropriately her rehabilitation post cva at home. Mario Ambriz is afraid she wont be allowed to visit her mother if she goes to str.  I informed CM about this situation, who kindly agreed to reach out to Micheal later today. If her final decision is home + health I may discharge the patient tomorrow.      Signed By: Eloise Whitaker MD     August 6, 2020

## 2023-07-02 NOTE — PROGRESS NOTES
Arrived to the [facility]. B12 completed. Patient tolerated well. Any issues or concerns during appointment: none. Patient aware of next infusion appointment on 4/12/19 at 1330. Discharged in [facility].
Problem: Knowledge Deficit  Goal: *Participate in the learning process  Outcome: Progressing Towards Goal  Reviewed POC.
Yes

## (undated) DEVICE — DRAPE C-ARMOUR C-ARM KIT --

## (undated) DEVICE — DRESSING NEG PRSS M 18X12.5X3.3CM POLYUR FOR WND THER VAC

## (undated) DEVICE — SPONGE LAP 18X18IN STRL -- 5/PK

## (undated) DEVICE — SCREW BNE L42MM DIA4.5MM PROX CORT TIB S STL ST LOK FULL
Type: IMPLANTABLE DEVICE | Site: FEMUR | Status: NON-FUNCTIONAL
Removed: 2018-08-08

## (undated) DEVICE — REM POLYHESIVE ADULT PATIENT RETURN ELECTRODE: Brand: VALLEYLAB

## (undated) DEVICE — ROD RMR L950MM DIA2.5MM W/ EXTN BALL TIP

## (undated) DEVICE — STOCKINETTE,IMPERVIOUS,12X48,STERILE: Brand: MEDLINE

## (undated) DEVICE — KIT SYR 1ML 2ML TRAUM TRAUMCEM V+

## (undated) DEVICE — SOLUTION IV 1000ML 0.9% SOD CHL

## (undated) DEVICE — NEEDLE DEL 12GA L10CM BNE CEM INJ NORIAN

## (undated) DEVICE — GOWN,REINFORCED,POLY,AURORA,XXLARGE,STR: Brand: MEDLINE

## (undated) DEVICE — BUTTON SWITCH PENCIL BLADE ELECTRODE, HOLSTER: Brand: EDGE

## (undated) DEVICE — PAD,ABDOMINAL,5"X9",ST,LF,25/BX: Brand: MEDLINE INDUSTRIES, INC.

## (undated) DEVICE — TUBING, SUCTION, 1/4" X 10', STRAIGHT: Brand: MEDLINE

## (undated) DEVICE — 3.2MM GUIDE WIRE 400MM

## (undated) DEVICE — GUIDEWIRE ORTH DIA2.5MM LOK SMOOTH DRL TIP FLX THRD FOR

## (undated) DEVICE — (D)PREP SKN CHLRAPRP APPL 26ML -- CONVERT TO ITEM 371833

## (undated) DEVICE — DRAPE,TOP,102X53,STERILE: Brand: MEDLINE

## (undated) DEVICE — OCCLUSIVE GAUZE STRIP,3% BISMUTH TRIBROMOPHENATE IN PETROLATUM BLEND: Brand: XEROFORM

## (undated) DEVICE — 2000CC GUARDIAN II: Brand: GUARDIAN

## (undated) DEVICE — HANDPIECE SET WITH COAXIAL HIGH FLOW TIP AND SUCTION TUBE: Brand: INTERPULSE

## (undated) DEVICE — SUTURE STRATAFIX SPRL SZ 1 L14IN ABSRB VLT L48CM CTX 1/2 SXPD2B405

## (undated) DEVICE — CEMENT BNE INJ: Type: IMPLANTABLE DEVICE | Site: HIP | Status: NON-FUNCTIONAL

## (undated) DEVICE — 7 DAY SILVER-COATED ANTIMICROBIAL BARRIER DRESSING: Brand: ACTICOAT 7  4" X 5"

## (undated) DEVICE — DRAPE,U/SHT,SPLIT,FILM,60X84,STERILE: Brand: MEDLINE

## (undated) DEVICE — INTENDED FOR TISSUE SEPARATION, AND OTHER PROCEDURES THAT REQUIRE A SHARP SURGICAL BLADE TO PUNCTURE OR CUT.: Brand: BARD-PARKER SAFETY BLADES SIZE 10, STERILE

## (undated) DEVICE — 1010 S-DRAPE TOWEL DRAPE 10/BX: Brand: STERI-DRAPE™

## (undated) DEVICE — SPONGE GZ W4XL4IN COT 12 PLY TYP VII WVN C FLD DSGN

## (undated) DEVICE — SUTURE VCRL SZ 1 L36IN ABSRB UD CTX L48MM 1/2 CIR J977H

## (undated) DEVICE — LOWER EXTREMITY: Brand: MEDLINE INDUSTRIES, INC.

## (undated) DEVICE — SHEET, DRAPE, SPLIT, STERILE: Brand: MEDLINE

## (undated) DEVICE — X-LARGE COTTON GLOVE: Brand: DEROYAL

## (undated) DEVICE — SUTURE MCRYL SZ 0 L27IN ABSRB VLT CT-1 L36MM 1/2 CIR TAPR Y340H

## (undated) DEVICE — SUTURE MCRYL SZ 2-0 L27IN ABSRB VLT CT-2 L26MM 1/2 CIR Y333H

## (undated) DEVICE — IMPLANTABLE DEVICE
Type: IMPLANTABLE DEVICE | Site: FEMUR | Status: NON-FUNCTIONAL
Removed: 2018-08-08

## (undated) DEVICE — SUTURE MCRYL SZ 2-0 L27IN ABSRB UD SH L26MM TAPERPOINT NDL Y417H

## (undated) DEVICE — (D)DRAPE ISOL ANTIMC 129X100IN -- DISC BY MFR

## (undated) DEVICE — SOL IRR SOD CL 0.9% 3000ML --

## (undated) DEVICE — SLIM BODY SKIN STAPLER: Brand: APPOSE ULC

## (undated) DEVICE — TRAY PREP DRY W/ PREM GLV 2 APPL 6 SPNG 2 UNDPD 1 OVERWRAP

## (undated) DEVICE — 3M™ IOBAN™ 2 ANTIMICROBIAL INCISE DRAPE 6650EZ: Brand: IOBAN™ 2

## (undated) DEVICE — DRAPE SHT 3 QTR PROXIMA 53X77 --

## (undated) DEVICE — BIT DRL L180MM DIA4.3MM QUIK CPL W/O STP REUSE

## (undated) DEVICE — DRSG VAC ASST CLSR GRNUFM MED -- 18X12.5X3.2CM

## (undated) DEVICE — TFN: Brand: MEDLINE INDUSTRIES, INC.

## (undated) DEVICE — INTENDED FOR TISSUE SEPARATION, AND OTHER PROCEDURES THAT REQUIRE A SHARP SURGICAL BLADE TO PUNCTURE OR CUT.: Brand: BARD-PARKER ® STAINLESS STEEL BLADES

## (undated) DEVICE — CONTAINER,SPECIMEN,O.R.STRL,4.5OZ: Brand: MEDLINE

## (undated) DEVICE — SUTURE PDS II SZ 1 L36IN ABSRB VLT L48MM CTX 1/2 CIR Z371T

## (undated) DEVICE — AMD ANTIMICROBIAL GAUZE SPONGES,12 PLY USP TYPE VII, 0.2% POLYHEXAMETHYLENE BIGUANIDE HCI (PHMB): Brand: CURITY

## (undated) DEVICE — SURGICAL PROCEDURE PACK BASIC ST FRANCIS

## (undated) DEVICE — YANKAUER,BULB TIP,W/O VENT,RIGID,STERILE: Brand: MEDLINE

## (undated) DEVICE — SUTURE MCRYL SZ 2-0 L27IN ABSRB VLT CT-1 L36MM 1/2 CIR TAPR Y339H

## (undated) DEVICE — BIT DRL L145MM DIA3.2MM QUIK CPL W/O STP REUSE

## (undated) DEVICE — 3M™ TEGADERM™ TRANSPARENT FILM DRESSING FRAME STYLE, 1628, 6 IN X 8 IN (15 CM X 20 CM), 10/CT 8CT/CASE: Brand: 3M™ TEGADERM™

## (undated) DEVICE — SCREW BNE L44MM DIA4.5MM PROX CORT TIB S STL ST LOK FULL
Type: IMPLANTABLE DEVICE | Site: FEMUR | Status: NON-FUNCTIONAL
Removed: 2018-08-08

## (undated) DEVICE — DRAPE TWL SURG 16X26IN BLU ORB04] ALLCARE INC]

## (undated) DEVICE — CANNULA INJ FOR TFNA SYS STRL TRAUMACEM V+